# Patient Record
Sex: FEMALE | Race: WHITE | NOT HISPANIC OR LATINO | Employment: UNEMPLOYED | URBAN - METROPOLITAN AREA
[De-identification: names, ages, dates, MRNs, and addresses within clinical notes are randomized per-mention and may not be internally consistent; named-entity substitution may affect disease eponyms.]

---

## 2017-01-28 ENCOUNTER — HOSPITAL ENCOUNTER (EMERGENCY)
Facility: HOSPITAL | Age: 22
Discharge: HOME | End: 2017-01-28
Attending: EMERGENCY MEDICINE | Admitting: EMERGENCY MEDICINE

## 2017-01-28 ENCOUNTER — APPOINTMENT (OUTPATIENT)
Dept: GENERAL RADIOLOGY | Facility: HOSPITAL | Age: 22
End: 2017-01-28

## 2017-01-28 VITALS
WEIGHT: 168 LBS | DIASTOLIC BLOOD PRESSURE: 85 MMHG | HEIGHT: 69 IN | TEMPERATURE: 99.5 F | SYSTOLIC BLOOD PRESSURE: 138 MMHG | BODY MASS INDEX: 24.88 KG/M2 | OXYGEN SATURATION: 100 % | HEART RATE: 102 BPM | RESPIRATION RATE: 22 BRPM

## 2017-01-28 DIAGNOSIS — R06.02 SHORTNESS OF BREATH: Primary | ICD-10-CM

## 2017-01-28 DIAGNOSIS — R09.1 PLEURISY: ICD-10-CM

## 2017-01-28 DIAGNOSIS — F41.9 ANXIETY: ICD-10-CM

## 2017-01-28 PROCEDURE — 71020 HC CHEST PA AND LATERAL: CPT

## 2017-01-28 PROCEDURE — 99284 EMERGENCY DEPT VISIT MOD MDM: CPT

## 2017-01-28 RX ORDER — IBUPROFEN 200 MG
200 TABLET ORAL EVERY 6 HOURS PRN
COMMUNITY
End: 2018-04-27

## 2017-01-28 RX ORDER — MULTIVITAMIN
1 TABLET ORAL
COMMUNITY
End: 2018-04-27

## 2017-01-28 RX ORDER — HYDROCODONE BITARTRATE AND ACETAMINOPHEN 5; 325 MG/1; MG/1
1 TABLET ORAL EVERY 6 HOURS PRN
Qty: 12 TABLET | Refills: 0 | Status: SHIPPED | OUTPATIENT
Start: 2017-01-28 | End: 2018-04-27

## 2017-01-28 RX ORDER — HYDROCODONE BITARTRATE AND ACETAMINOPHEN 7.5; 325 MG/1; MG/1
1 TABLET ORAL ONCE
Status: COMPLETED | OUTPATIENT
Start: 2017-01-28 | End: 2017-01-28

## 2017-01-28 RX ORDER — ERGOCALCIFEROL 1.25 MG/1
50000 CAPSULE ORAL
COMMUNITY
End: 2018-04-27

## 2017-01-28 RX ORDER — MECLIZINE HCL 12.5 MG/1
12.5 TABLET ORAL 3 TIMES DAILY
COMMUNITY
Start: 2016-12-30 | End: 2018-04-27

## 2017-01-28 RX ORDER — FOLIC ACID 1 MG/1
1 TABLET ORAL
COMMUNITY
End: 2018-04-27

## 2017-01-28 RX ADMIN — HYDROCODONE BITARTRATE AND ACETAMINOPHEN 1 TABLET: 7.5; 325 TABLET ORAL at 20:48

## 2018-04-27 ENCOUNTER — OFFICE VISIT (OUTPATIENT)
Dept: FAMILY MEDICINE CLINIC | Facility: CLINIC | Age: 23
End: 2018-04-27

## 2018-04-27 ENCOUNTER — HOSPITAL ENCOUNTER (OUTPATIENT)
Dept: CT IMAGING | Facility: HOSPITAL | Age: 23
Discharge: HOME | End: 2018-04-27

## 2018-04-27 ENCOUNTER — HOSPITAL ENCOUNTER (OUTPATIENT)
Dept: CARDIOLOGY | Facility: HOSPITAL | Age: 23
Discharge: HOME | End: 2018-04-27
Admitting: NURSE PRACTITIONER

## 2018-04-27 VITALS
TEMPERATURE: 98.4 F | HEIGHT: 69 IN | BODY MASS INDEX: 25.92 KG/M2 | WEIGHT: 175 LBS | DIASTOLIC BLOOD PRESSURE: 88 MMHG | SYSTOLIC BLOOD PRESSURE: 152 MMHG

## 2018-04-27 DIAGNOSIS — M79.661 RIGHT CALF PAIN: Primary | ICD-10-CM

## 2018-04-27 DIAGNOSIS — R06.02 SOB (SHORTNESS OF BREATH): ICD-10-CM

## 2018-04-27 DIAGNOSIS — E55.9 VITAMIN D DEFICIENCY: ICD-10-CM

## 2018-04-27 DIAGNOSIS — D50.9 IRON DEFICIENCY ANEMIA, UNSPECIFIED IRON DEFICIENCY ANEMIA TYPE: ICD-10-CM

## 2018-04-27 LAB
25(OH)D3+25(OH)D2 SERPL-MCNC: 15.6 NG/ML (ref 30–100)
BASOPHILS # BLD AUTO: 0.01 10*3/MM3 (ref 0–0.2)
BASOPHILS NFR BLD AUTO: 0.3 % (ref 0–1.5)
BH CV LOWER VASCULAR LEFT COMMON FEMORAL AUGMENT: NORMAL
BH CV LOWER VASCULAR LEFT COMMON FEMORAL COMPETENT: NORMAL
BH CV LOWER VASCULAR LEFT COMMON FEMORAL COMPRESS: NORMAL
BH CV LOWER VASCULAR LEFT COMMON FEMORAL PHASIC: NORMAL
BH CV LOWER VASCULAR LEFT COMMON FEMORAL SPONT: NORMAL
BH CV LOWER VASCULAR RIGHT COMMON FEMORAL AUGMENT: NORMAL
BH CV LOWER VASCULAR RIGHT COMMON FEMORAL COMPETENT: NORMAL
BH CV LOWER VASCULAR RIGHT COMMON FEMORAL COMPRESS: NORMAL
BH CV LOWER VASCULAR RIGHT COMMON FEMORAL PHASIC: NORMAL
BH CV LOWER VASCULAR RIGHT COMMON FEMORAL SPONT: NORMAL
BH CV LOWER VASCULAR RIGHT DISTAL FEMORAL COMPRESS: NORMAL
BH CV LOWER VASCULAR RIGHT GASTRONEMIUS COMPRESS: NORMAL
BH CV LOWER VASCULAR RIGHT GREATER SAPH AK COMPRESS: NORMAL
BH CV LOWER VASCULAR RIGHT GREATER SAPH BK COMPRESS: NORMAL
BH CV LOWER VASCULAR RIGHT MID FEMORAL AUGMENT: NORMAL
BH CV LOWER VASCULAR RIGHT MID FEMORAL COMPETENT: NORMAL
BH CV LOWER VASCULAR RIGHT MID FEMORAL COMPRESS: NORMAL
BH CV LOWER VASCULAR RIGHT MID FEMORAL PHASIC: NORMAL
BH CV LOWER VASCULAR RIGHT MID FEMORAL SPONT: NORMAL
BH CV LOWER VASCULAR RIGHT PERONEAL COMPRESS: NORMAL
BH CV LOWER VASCULAR RIGHT POPLITEAL AUGMENT: NORMAL
BH CV LOWER VASCULAR RIGHT POPLITEAL COMPETENT: NORMAL
BH CV LOWER VASCULAR RIGHT POPLITEAL COMPRESS: NORMAL
BH CV LOWER VASCULAR RIGHT POPLITEAL PHASIC: NORMAL
BH CV LOWER VASCULAR RIGHT POPLITEAL SPONT: NORMAL
BH CV LOWER VASCULAR RIGHT POSTERIOR TIBIAL COMPRESS: NORMAL
BH CV LOWER VASCULAR RIGHT PROXIMAL FEMORAL COMPRESS: NORMAL
BH CV LOWER VASCULAR RIGHT SAPHENOFEMORAL JUNCTION AUGMENT: NORMAL
BH CV LOWER VASCULAR RIGHT SAPHENOFEMORAL JUNCTION COMPETENT: NORMAL
BH CV LOWER VASCULAR RIGHT SAPHENOFEMORAL JUNCTION COMPRESS: NORMAL
BH CV LOWER VASCULAR RIGHT SAPHENOFEMORAL JUNCTION PHASIC: NORMAL
BH CV LOWER VASCULAR RIGHT SAPHENOFEMORAL JUNCTION SPONT: NORMAL
EOSINOPHIL # BLD AUTO: 0.02 10*3/MM3 (ref 0–0.7)
EOSINOPHIL NFR BLD AUTO: 0.5 % (ref 0.3–6.2)
ERYTHROCYTE [DISTWIDTH] IN BLOOD BY AUTOMATED COUNT: 16.3 % (ref 11.7–13)
HCT VFR BLD AUTO: 37.7 % (ref 35.6–45.5)
HGB BLD-MCNC: 11.6 G/DL (ref 11.9–15.5)
IMM GRANULOCYTES # BLD: 0 10*3/MM3 (ref 0–0.03)
IMM GRANULOCYTES NFR BLD: 0 % (ref 0–0.5)
IRON SATN MFR SERPL: 6 % (ref 20–50)
IRON SERPL-MCNC: 31 MCG/DL (ref 37–145)
LYMPHOCYTES # BLD AUTO: 1.08 10*3/MM3 (ref 0.9–4.8)
LYMPHOCYTES NFR BLD AUTO: 28.9 % (ref 19.6–45.3)
MCH RBC QN AUTO: 25.3 PG (ref 26.9–32)
MCHC RBC AUTO-ENTMCNC: 30.8 G/DL (ref 32.4–36.3)
MCV RBC AUTO: 82.3 FL (ref 80.5–98.2)
MONOCYTES # BLD AUTO: 0.39 10*3/MM3 (ref 0.2–1.2)
MONOCYTES NFR BLD AUTO: 10.4 % (ref 5–12)
NEUTROPHILS # BLD AUTO: 2.24 10*3/MM3 (ref 1.9–8.1)
NEUTROPHILS NFR BLD AUTO: 59.9 % (ref 42.7–76)
PLATELET # BLD AUTO: 269 10*3/MM3 (ref 140–500)
RBC # BLD AUTO: 4.58 10*6/MM3 (ref 3.9–5.2)
TIBC SERPL-MCNC: 524 MCG/DL
UIBC SERPL-MCNC: 493 MCG/DL
WBC # BLD AUTO: 3.74 10*3/MM3 (ref 4.5–10.7)

## 2018-04-27 PROCEDURE — 71275 CT ANGIOGRAPHY CHEST: CPT

## 2018-04-27 PROCEDURE — 0 IOPAMIDOL PER 1 ML: Performed by: NURSE PRACTITIONER

## 2018-04-27 PROCEDURE — 99204 OFFICE O/P NEW MOD 45 MIN: CPT | Performed by: NURSE PRACTITIONER

## 2018-04-27 PROCEDURE — 93971 EXTREMITY STUDY: CPT

## 2018-04-27 RX ORDER — MULTIVITAMIN
1 TABLET ORAL DAILY
COMMUNITY
End: 2022-07-22

## 2018-04-27 RX ORDER — GABAPENTIN 800 MG/1
TABLET ORAL
COMMUNITY
Start: 2018-01-04 | End: 2020-11-24

## 2018-04-27 RX ORDER — IBUPROFEN 200 MG
TABLET ORAL
COMMUNITY
End: 2022-07-22

## 2018-04-27 RX ORDER — ONDANSETRON 4 MG/1
TABLET, ORALLY DISINTEGRATING ORAL
COMMUNITY
Start: 2018-02-14 | End: 2019-04-12 | Stop reason: SDUPTHER

## 2018-04-27 RX ADMIN — IOPAMIDOL 95 ML: 755 INJECTION, SOLUTION INTRAVENOUS at 18:03

## 2018-04-27 NOTE — PROGRESS NOTES
"Subjective   Henrietta Garrett is a 23 y.o. female.     History of Present Illness   Henrietta Garrett 23 y.o. female who presents today for a new patient appointment.    she has a history of   Patient Active Problem List   Diagnosis   • Right calf pain   • SOB (shortness of breath)   • Iron deficiency anemia   • Vitamin D deficiency   .  she is here to establish care I reviewed the PFSH recorded today by my MA/LPN staff.   she   She has been feeling anxious and poorly.    Patient's presenting to the office today with concerns over shortness of breath that she's had for a while but it has worsened in the last 2 weeks.  She is in a boot on her right foot being treated by an orthopedic issues had the boot on for 7 months.  She states that her shortness of breath probably started around that time also she's been having calf pain for about 2 months and that the amount of time at the shortness of breath is gotten worse.  She has not been traveling at all and no airplane she does not have any blood clotting disorders.  That she is sedentary.  She is involved in physical therapy and shrine to do more walking at home with her mom.  She gets very anxious she does state that she has a lot of stress but she believes that she's able to deal with her stress appropriately and she does not want to talk about any medications for that at this time.  She also has a history of iron deficiency anemia she does take vitamins that she's not had his level checked in quite a while.    The following portions of the patient's history were reviewed and updated as appropriate: allergies, current medications, past social history and problem list.    Review of Systems   Respiratory: Positive for shortness of breath.    Cardiovascular: Positive for chest pain.   All other systems reviewed and are negative.      Objective   /88 (BP Location: Right arm, Patient Position: Sitting)   Temp 98.4 °F (36.9 °C)   Ht 175.3 cm (69.02\")   Wt 79.4 kg (175 lb)  "  BMI 25.83 kg/m²   Physical Exam   Constitutional: She is oriented to person, place, and time. She appears well-developed and well-nourished. No distress.   HENT:   Head: Normocephalic and atraumatic. Hair is normal.   Right Ear: Hearing, tympanic membrane, external ear and ear canal normal. No drainage. No decreased hearing is noted.   Left Ear: Hearing, tympanic membrane, external ear and ear canal normal. No decreased hearing is noted.   Nose: No nasal deformity.   Mouth/Throat: Oropharynx is clear and moist.   Eyes: Conjunctivae, EOM and lids are normal. Pupils are equal, round, and reactive to light. Right eye exhibits no discharge. Left eye exhibits no discharge.   Neck: Normal range of motion. Neck supple. No JVD present. No tracheal deviation present. No thyromegaly present.   Cardiovascular: Normal rate, regular rhythm, normal heart sounds, intact distal pulses and normal pulses.  Exam reveals no gallop and no friction rub.    No murmur heard.  Pulmonary/Chest: Effort normal and breath sounds normal. No respiratory distress. She has no wheezes. She has no rales. She exhibits no tenderness.   Abdominal: Soft. Bowel sounds are normal. She exhibits no distension and no mass. There is no tenderness. There is no rebound and no guarding. No hernia.   Musculoskeletal: Normal range of motion. She exhibits no edema, tenderness or deformity.   Right calf pain with palpation.  No rope    Lymphadenopathy:     She has no cervical adenopathy.   Neurological: She is alert and oriented to person, place, and time. She has normal reflexes. She displays normal reflexes. No cranial nerve deficit. She exhibits normal muscle tone. Coordination normal.   Skin: Skin is warm and dry. No rash noted. She is not diaphoretic. No erythema.   Psychiatric: She has a normal mood and affect. Her behavior is normal. Judgment and thought content normal.   Vitals reviewed.      Assessment/Plan   Problem List Items Addressed This Visit         Respiratory    SOB (shortness of breath)    Relevant Orders    Duplex Venous Lower Extremity - Right CAR    CT angiogram chest w contrast       Digestive    Vitamin D deficiency    Relevant Orders    Vitamin D 25 Hydroxy       Nervous and Auditory    Right calf pain - Primary    Relevant Orders    Duplex Venous Lower Extremity - Right CAR    CT angiogram chest w contrast       Hematopoietic and Hemostatic    Iron deficiency anemia    Relevant Orders    CBC & Differential    Iron Profile      Other Visit Diagnoses    None.       I do believe that she is suffering from high anxiety and I do believe his shortness of breath probably has something to do with that but she is very determined not to talk about the anxiety and she does not want to be placed on a medication for the anxiety.  Considering her history of having a boot on consistently for the last 7 months and the shortness of breath and calf pain has increased in the last 2 months I believe that should rule out a DVT and possible PE.  Follow-up after testing.

## 2018-04-30 ENCOUNTER — TELEPHONE (OUTPATIENT)
Dept: FAMILY MEDICINE CLINIC | Facility: CLINIC | Age: 23
End: 2018-04-30

## 2018-05-02 RX ORDER — ERGOCALCIFEROL 1.25 MG/1
50000 CAPSULE ORAL WEEKLY
Qty: 12 CAPSULE | Refills: 3 | Status: SHIPPED | OUTPATIENT
Start: 2018-05-02 | End: 2020-11-24

## 2018-05-02 RX ORDER — IRON, FOLIC ACID, CYANOCOBALAMIN, ASCORBIC ACID, AND DOCUSATE SODIUM 90; 1; 12; 120; 50 MG/1; MG/1; UG/1; MG/1; MG/1
TABLET, FILM COATED ORAL
Qty: 90 EACH | Refills: 0 | Status: SHIPPED | OUTPATIENT
Start: 2018-05-02 | End: 2021-02-03

## 2018-05-04 ENCOUNTER — OFFICE VISIT (OUTPATIENT)
Dept: FAMILY MEDICINE CLINIC | Facility: CLINIC | Age: 23
End: 2018-05-04

## 2018-05-04 VITALS
HEIGHT: 69 IN | BODY MASS INDEX: 26.07 KG/M2 | OXYGEN SATURATION: 82 % | DIASTOLIC BLOOD PRESSURE: 82 MMHG | WEIGHT: 176 LBS | SYSTOLIC BLOOD PRESSURE: 124 MMHG

## 2018-05-04 DIAGNOSIS — R09.02 HYPOXIA: Primary | ICD-10-CM

## 2018-05-04 DIAGNOSIS — R06.02 SHORTNESS OF BREATH: ICD-10-CM

## 2018-05-04 DIAGNOSIS — J45.41 MODERATE PERSISTENT ASTHMA WITH EXACERBATION: ICD-10-CM

## 2018-05-04 PROBLEM — J45.990 EXERCISE-INDUCED ASTHMA: Status: ACTIVE | Noted: 2018-05-04

## 2018-05-04 PROBLEM — M32.9 LUPUS (SYSTEMIC LUPUS ERYTHEMATOSUS) (HCC): Status: ACTIVE | Noted: 2018-05-04

## 2018-05-04 PROBLEM — M79.661 RIGHT CALF PAIN: Status: RESOLVED | Noted: 2018-04-27 | Resolved: 2018-05-04

## 2018-05-04 PROBLEM — M06.9 RHEUMATOID ARTHRITIS INVOLVING MULTIPLE SITES: Status: ACTIVE | Noted: 2018-05-04

## 2018-05-04 PROBLEM — Q79.60 EHLERS-DANLOS SYNDROME: Status: ACTIVE | Noted: 2018-05-04

## 2018-05-04 PROBLEM — G90.523 COMPLEX REGIONAL PAIN SYNDROME TYPE 1 OF BOTH LOWER EXTREMITIES: Status: ACTIVE | Noted: 2018-05-04

## 2018-05-04 PROBLEM — K58.2 IRRITABLE BOWEL SYNDROME WITH BOTH CONSTIPATION AND DIARRHEA: Status: ACTIVE | Noted: 2018-05-04

## 2018-05-04 PROBLEM — I73.00 RAYNAUD'S DISEASE WITHOUT GANGRENE: Status: ACTIVE | Noted: 2018-05-04

## 2018-05-04 PROBLEM — G90.521 COMPLEX REGIONAL PAIN SYNDROME TYPE 1 OF RIGHT LOWER EXTREMITY: Status: ACTIVE | Noted: 2018-05-04

## 2018-05-04 PROCEDURE — 94640 AIRWAY INHALATION TREATMENT: CPT | Performed by: FAMILY MEDICINE

## 2018-05-04 PROCEDURE — 99214 OFFICE O/P EST MOD 30 MIN: CPT | Performed by: FAMILY MEDICINE

## 2018-05-04 PROCEDURE — 96372 THER/PROPH/DIAG INJ SC/IM: CPT | Performed by: FAMILY MEDICINE

## 2018-05-04 RX ORDER — ALBUTEROL SULFATE 90 UG/1
2 AEROSOL, METERED RESPIRATORY (INHALATION) EVERY 4 HOURS PRN
Qty: 18 G | Refills: 11 | Status: SHIPPED | OUTPATIENT
Start: 2018-05-04 | End: 2022-07-22

## 2018-05-04 RX ORDER — METHYLPREDNISOLONE SODIUM SUCCINATE 125 MG/2ML
125 INJECTION, POWDER, LYOPHILIZED, FOR SOLUTION INTRAMUSCULAR; INTRAVENOUS ONCE
Status: COMPLETED | OUTPATIENT
Start: 2018-05-04 | End: 2018-05-04

## 2018-05-04 RX ORDER — AMITRIPTYLINE HYDROCHLORIDE 50 MG/1
50 TABLET, FILM COATED ORAL NIGHTLY
COMMUNITY
End: 2019-05-21

## 2018-05-04 RX ORDER — IPRATROPIUM BROMIDE AND ALBUTEROL SULFATE 2.5; .5 MG/3ML; MG/3ML
3 SOLUTION RESPIRATORY (INHALATION) ONCE
Status: COMPLETED | OUTPATIENT
Start: 2018-05-04 | End: 2018-05-04

## 2018-05-04 RX ADMIN — IPRATROPIUM BROMIDE AND ALBUTEROL SULFATE 3 ML: 2.5; .5 SOLUTION RESPIRATORY (INHALATION) at 14:25

## 2018-05-04 RX ADMIN — METHYLPREDNISOLONE SODIUM SUCCINATE 125 MG: 125 INJECTION, POWDER, LYOPHILIZED, FOR SOLUTION INTRAMUSCULAR; INTRAVENOUS at 14:25

## 2018-05-04 NOTE — PROGRESS NOTES
Henrietta Garrett is a 23 y.o. female.     Chief Complaint   Patient presents with   • Shortness of Breath     pt in in pain lower right leg shoulder and is having chest pain and pressure on chets       HPI     Patient is a very pleasant 23-year-old female with a past medical history of Tomas-Danlos syndrome, rheumatoid arthritis involving multiple joints, Raynaud's disease with poor control, almost constant symptoms of the distal digits, iron deficiency anemia, complex regional pain syndrome of the right extremity managed by pain management, irritable bowel syndrome, fibromyalgia, and shortness of breath for the past few months.    SOA: She has been short of breath intermently throughout her life .  She would be swimming and told that she looked like a dying seal.  She was diagnosed with exercise induced asthma - it did help some.  Now she has difficulty with exertion, walking up stair and small things.  No SOA at rest.  It does improve with her inhaler.  She does get a nocturnal cough and coughs up phlegm, chest pressure and congestion.     The following portions of the patient's history were reviewed and updated as appropriate: allergies, current medications, past family history, past medical history, past social history, past surgical history and problem list.    Review of Systems   Constitutional: Negative for fever and unexpected weight change.   HENT: Negative for dental problem.    Respiratory: Positive for cough, chest tightness and shortness of breath.    Cardiovascular: Negative for chest pain.   Gastrointestinal: Negative for blood in stool.   Genitourinary: Negative for dysuria.   Musculoskeletal:        Lower right leg pain.left shoulder and chest pressure   Skin: Negative for rash.   Allergic/Immunologic: Negative for environmental allergies.   Neurological: Negative for syncope.   Psychiatric/Behavioral: The patient is not nervous/anxious.        Objective  Vitals:    05/04/18 1235   BP: 124/82    SpO2: (!) 82%        Physical Exam   Constitutional: She is oriented to person, place, and time. She appears well-developed and well-nourished.   Ill-appearing, pale and weak.  Not able to speak a full set without taking a breath   HENT:   Head: Normocephalic.   Nose: Nose normal.   Eyes: EOM are normal.   Cardiovascular: Normal rate, regular rhythm, normal heart sounds and intact distal pulses.    No murmur heard.  Pulmonary/Chest:   Patient is working hard to breathe, one deep inhalation every 10-15 seconds.  She is not tachypneic.  Breath sounds are minimal, very tight.  No wheezing.   Musculoskeletal: Normal range of motion.   Neurological: She is alert and oriented to person, place, and time.   Skin: Skin is warm and dry. No rash noted.   Psychiatric: She has a normal mood and affect. Her behavior is normal. Judgment and thought content normal.   Nursing note and vitals reviewed.        Current Outpatient Prescriptions:   •  amitriptyline (ELAVIL) 50 MG tablet, Take 50 mg by mouth Every Night., Disp: , Rfl:   •  Fe Cbn-Fe Gluc-FA-B12-C-DSS (FERRALET 90) 90-1 MG tablet, Take one tablet daily, Disp: 90 each, Rfl: 0  •  gabapentin (NEURONTIN) 800 MG tablet, TAKE ONE TABLET BY MOUTH FOUR TIMES A DAY, Disp: , Rfl:   •  ibuprofen (ADVIL,MOTRIN) 200 MG tablet, Take  by mouth., Disp: , Rfl:   •  Multiple Vitamin (MULTIVITAMIN) tablet, Take 1 tablet by mouth., Disp: , Rfl:   •  ondansetron ODT (ZOFRAN-ODT) 4 MG disintegrating tablet, , Disp: , Rfl:   •  SPRINTEC 28 0.25-35 MG-MCG per tablet, , Disp: , Rfl:   •  albuterol (PROVENTIL HFA;VENTOLIN HFA) 108 (90 Base) MCG/ACT inhaler, Inhale 2 puffs Every 4 (Four) Hours As Needed for Wheezing., Disp: 18 g, Rfl: 11  •  Fluticasone Furoate 200 MCG/ACT aerosol powder , Inhale 2 puffs Daily., Disp: 30 each, Rfl: 11  •  LYRICA 75 MG capsule, Take 75 mg by mouth 2 (Two) Times a Day., Disp: , Rfl:   •  vitamin D (ERGOCALCIFEROL) 71096 units capsule capsule, Take 1 capsule by  mouth 1 (One) Time Per Week., Disp: 12 capsule, Rfl: 3  No current facility-administered medications for this visit.     Procedures    Lab Results (most recent)     None              Henrietta was seen today for shortness of breath.    Diagnoses and all orders for this visit:    Hypoxia  -     methylPREDNISolone sodium succinate (SOLU-Medrol) injection 125 mg; Inject 2 mL into the shoulder, thigh, or buttocks 1 (One) Time.  -     ipratropium-albuterol (DUO-NEB) nebulizer solution 3 mL; Take 3 mL by nebulization 1 (One) Time.    Moderate persistent asthma with exacerbation  -     methylPREDNISolone sodium succinate (SOLU-Medrol) injection 125 mg; Inject 2 mL into the shoulder, thigh, or buttocks 1 (One) Time.  -     ipratropium-albuterol (DUO-NEB) nebulizer solution 3 mL; Take 3 mL by nebulization 1 (One) Time.  -     albuterol (PROVENTIL HFA;VENTOLIN HFA) 108 (90 Base) MCG/ACT inhaler; Inhale 2 puffs Every 4 (Four) Hours As Needed for Wheezing.  -     Fluticasone Furoate 200 MCG/ACT aerosol powder ; Inhale 2 puffs Daily.    Shortness of breath  -     methylPREDNISolone sodium succinate (SOLU-Medrol) injection 125 mg; Inject 2 mL into the shoulder, thigh, or buttocks 1 (One) Time.  -     ipratropium-albuterol (DUO-NEB) nebulizer solution 3 mL; Take 3 mL by nebulization 1 (One) Time.      Patient is a pleasant 23-year-old female with a comp.  It medical history, see HPI.  She is a new patient to me.  Presents today with 2 months of shortness of breath, I think is secondary to an asthma exacerbation.  She was hypoxic initially at 64 - difficult to get a pulse ox with severe Raynaud's of all her digits.  Initially, I thought it was an inaccurate pulse ox.  However we started her on oxygen and her O2 sat improved to 74.  We then started doing nebs and Solu-Medrol 125mg IM and oxygen improved to 85 on 4 L NC.  I had encouraged the patient throughout the office visit today for her to go emergently to the emergency room.  She  "was very resistant as she hates going to the hospital.  Was able to speak her mother over the phone and we came to an agreement for her to go to the emergency room by private car.  She is aware of the risk of death and permanent disability with not going by ambulance as she has severe hypoxia without oxygen.  She did feel significantly better - \"like a new person\" - after getting the Duonebs and oxygen.    Return in about 1 week (around 5/11/2018), or if symptoms worsen or fail to improve, for Please go to the ER immedietly, FU with me thereafter .      Angie Leung MD  "

## 2018-11-09 ENCOUNTER — TELEPHONE (OUTPATIENT)
Dept: GENERAL RADIOLOGY | Facility: HOSPITAL | Age: 23
End: 2018-11-09

## 2018-11-09 ENCOUNTER — LAB (OUTPATIENT)
Dept: OTHER | Facility: HOSPITAL | Age: 23
End: 2018-11-09

## 2018-11-09 ENCOUNTER — CONSULT (OUTPATIENT)
Dept: ONCOLOGY | Facility: CLINIC | Age: 23
End: 2018-11-09

## 2018-11-09 VITALS
OXYGEN SATURATION: 94 % | TEMPERATURE: 98 F | HEART RATE: 58 BPM | WEIGHT: 174.5 LBS | HEIGHT: 70 IN | DIASTOLIC BLOOD PRESSURE: 96 MMHG | RESPIRATION RATE: 16 BRPM | SYSTOLIC BLOOD PRESSURE: 144 MMHG | BODY MASS INDEX: 24.98 KG/M2

## 2018-11-09 DIAGNOSIS — D72.9 ABNORMAL WHITE BLOOD CELL (WBC) COUNT: Primary | ICD-10-CM

## 2018-11-09 DIAGNOSIS — R06.02 SOB (SHORTNESS OF BREATH): ICD-10-CM

## 2018-11-09 DIAGNOSIS — C95.90 LEUKEMIA NOT HAVING ACHIEVED REMISSION, UNSPECIFIED LEUKEMIA TYPE (HCC): Primary | ICD-10-CM

## 2018-11-09 DIAGNOSIS — R07.89 CHEST WALL PAIN: ICD-10-CM

## 2018-11-09 DIAGNOSIS — R04.2 HEMOPTYSIS: ICD-10-CM

## 2018-11-09 DIAGNOSIS — D50.9 IRON DEFICIENCY ANEMIA, UNSPECIFIED IRON DEFICIENCY ANEMIA TYPE: ICD-10-CM

## 2018-11-09 PROBLEM — T45.4X5A ADVERSE EFFECT OF IRON: Status: ACTIVE | Noted: 2018-11-09

## 2018-11-09 PROBLEM — E53.8 B12 DEFICIENCY: Status: ACTIVE | Noted: 2018-11-09

## 2018-11-09 LAB
ALBUMIN SERPL-MCNC: 4.5 G/DL (ref 3.5–5.2)
ALBUMIN/GLOB SERPL: 1 G/DL
ALP SERPL-CCNC: 45 U/L (ref 39–117)
ALT SERPL W P-5'-P-CCNC: 7 U/L (ref 1–33)
ANION GAP SERPL CALCULATED.3IONS-SCNC: 12.3 MMOL/L
AST SERPL-CCNC: 12 U/L (ref 1–32)
BASOPHILS # BLD AUTO: 0.01 10*3/MM3 (ref 0–0.2)
BASOPHILS NFR BLD AUTO: 0.2 % (ref 0–1.5)
BILIRUB SERPL-MCNC: 0.3 MG/DL (ref 0.1–1.2)
BUN BLD-MCNC: 8 MG/DL (ref 6–20)
BUN/CREAT SERPL: 10.4 (ref 7–25)
CALCIUM SPEC-SCNC: 10.1 MG/DL (ref 8.6–10.5)
CHLORIDE SERPL-SCNC: 102 MMOL/L (ref 98–107)
CO2 SERPL-SCNC: 24.7 MMOL/L (ref 22–29)
CREAT BLD-MCNC: 0.77 MG/DL (ref 0.57–1)
DEPRECATED RDW RBC AUTO: 41.7 FL (ref 37–54)
EOSINOPHIL # BLD AUTO: 0.02 10*3/MM3 (ref 0–0.7)
EOSINOPHIL NFR BLD AUTO: 0.4 % (ref 0.3–6.2)
ERYTHROCYTE [DISTWIDTH] IN BLOOD BY AUTOMATED COUNT: 14.6 % (ref 11.7–13)
FERRITIN SERPL-MCNC: 9.1 NG/ML (ref 13–150)
GFR SERPL CREATININE-BSD FRML MDRD: 93 ML/MIN/1.73
GLOBULIN UR ELPH-MCNC: 4.6 GM/DL
GLUCOSE BLD-MCNC: 102 MG/DL (ref 65–99)
HCT VFR BLD AUTO: 35.5 % (ref 35.6–45.5)
HGB BLD-MCNC: 11.9 G/DL (ref 11.9–15.5)
HGB RETIC QN: 30.4 PG (ref 32.7–38.6)
IMM GRANULOCYTES # BLD: 0.01 10*3/MM3 (ref 0–0.03)
IMM GRANULOCYTES NFR BLD: 0.2 % (ref 0–0.5)
IMM RETICS NFR: 13.5 % (ref 0.7–13.7)
IRON 24H UR-MRATE: 42 MCG/DL (ref 37–145)
IRON SATN MFR SERPL: 8 % (ref 20–50)
LYMPHOCYTES # BLD AUTO: 1.25 10*3/MM3 (ref 0.9–4.8)
LYMPHOCYTES NFR BLD AUTO: 26.9 % (ref 19.6–45.3)
MCH RBC QN AUTO: 26.4 PG (ref 26.9–32)
MCHC RBC AUTO-ENTMCNC: 33.5 G/DL (ref 32.4–36.3)
MCV RBC AUTO: 78.7 FL (ref 80.5–98.2)
MONOCYTES # BLD AUTO: 0.69 10*3/MM3 (ref 0.2–1.2)
MONOCYTES NFR BLD AUTO: 14.9 % (ref 5–12)
NEUTROPHILS # BLD AUTO: 2.66 10*3/MM3 (ref 1.9–8.1)
NEUTROPHILS NFR BLD AUTO: 57.4 % (ref 42.7–76)
NRBC BLD MANUAL-RTO: 0 /100 WBC (ref 0–0)
PLATELET # BLD AUTO: 218 10*3/MM3 (ref 140–500)
PMV BLD AUTO: 10.6 FL (ref 6–12)
POTASSIUM BLD-SCNC: 3.8 MMOL/L (ref 3.5–5.2)
PROT SERPL-MCNC: 9.1 G/DL (ref 6–8.5)
RBC # BLD AUTO: 4.51 10*6/MM3 (ref 3.9–5.2)
RETICS/RBC NFR AUTO: 0.99 % (ref 0.5–1.5)
SODIUM BLD-SCNC: 139 MMOL/L (ref 136–145)
TIBC SERPL-MCNC: 495 MCG/DL (ref 298–536)
TRANSFERRIN SERPL-MCNC: 332 MG/DL (ref 200–360)
VIT B12 BLD-MCNC: 154 PG/ML (ref 211–946)
WBC NRBC COR # BLD: 4.64 10*3/MM3 (ref 4.5–10.7)

## 2018-11-09 PROCEDURE — 80053 COMPREHEN METABOLIC PANEL: CPT | Performed by: INTERNAL MEDICINE

## 2018-11-09 PROCEDURE — 84466 ASSAY OF TRANSFERRIN: CPT | Performed by: INTERNAL MEDICINE

## 2018-11-09 PROCEDURE — 85014 HEMATOCRIT: CPT | Performed by: INTERNAL MEDICINE

## 2018-11-09 PROCEDURE — 85046 RETICYTE/HGB CONCENTRATE: CPT | Performed by: INTERNAL MEDICINE

## 2018-11-09 PROCEDURE — 99245 OFF/OP CONSLTJ NEW/EST HI 55: CPT | Performed by: INTERNAL MEDICINE

## 2018-11-09 PROCEDURE — 82607 VITAMIN B-12: CPT | Performed by: INTERNAL MEDICINE

## 2018-11-09 PROCEDURE — 82747 ASSAY OF FOLIC ACID RBC: CPT | Performed by: INTERNAL MEDICINE

## 2018-11-09 PROCEDURE — 83540 ASSAY OF IRON: CPT | Performed by: INTERNAL MEDICINE

## 2018-11-09 PROCEDURE — 82728 ASSAY OF FERRITIN: CPT | Performed by: INTERNAL MEDICINE

## 2018-11-09 PROCEDURE — 85025 COMPLETE CBC W/AUTO DIFF WBC: CPT | Performed by: INTERNAL MEDICINE

## 2018-11-09 PROCEDURE — 36415 COLL VENOUS BLD VENIPUNCTURE: CPT

## 2018-11-09 RX ORDER — SODIUM CHLORIDE 9 MG/ML
250 INJECTION, SOLUTION INTRAVENOUS ONCE
Status: CANCELLED | OUTPATIENT
Start: 2018-11-27

## 2018-11-09 RX ORDER — CYANOCOBALAMIN 1000 UG/ML
1000 INJECTION, SOLUTION INTRAMUSCULAR; SUBCUTANEOUS ONCE
Status: CANCELLED
Start: 2018-11-13 | End: 2018-11-13

## 2018-11-09 RX ORDER — SODIUM CHLORIDE 9 MG/ML
250 INJECTION, SOLUTION INTRAVENOUS ONCE
Status: CANCELLED | OUTPATIENT
Start: 2018-11-13

## 2018-11-09 RX ORDER — CYANOCOBALAMIN 1000 UG/ML
1000 INJECTION, SOLUTION INTRAMUSCULAR; SUBCUTANEOUS ONCE
Status: CANCELLED
Start: 2018-11-27 | End: 2018-11-20

## 2018-11-09 NOTE — PROGRESS NOTES
REFERRING PROVIDER:    Lorraine Terrazas MD  [id number]  [address] [address]    REASON FOR CONSULTATION:    1.  Concern for leukemia  2.  History of mild leukopenia  3.  History of mild microcytic anemia    HISTORY OF PRESENT ILLNESS:  Henrietta Garrett is a 23 y.o. female who is referred today for further evaluation of the above issues.    She has a complicated medical history including Raynaud's, rheumatoid arthritis, Tomas-Danlos syndrome, complex regional pain syndrome, and POTS.      She requires a left shoulder surgery.  She has been seeing Dr. Terrazas.          She had a CT angiogram of the chest on 4/27/2018 for chest pain that did not indicate any pulmonary embolism or pneumothorax but did show a 1.3 x 2.1 cm left axillary lymph node.  No further evaluation for this was performed at that time.  She had a normal right lower extremity venous duplex also on 4/27/2018.    She has a long history of iron deficiency anemia.  She has been on an iron supplement once daily for many years.    She has been seeing Dr. Molina with rheumatology but has not seen him recently.    She complains of pain extending from the left axilla through the left anterior chest wall and left breast.  This has been going on for many months.  Over the past month she has had hemoptysis.  This occurred about 3 or 4 times.  She describes this as blood streaks.  She has not had this for about 1 week.  This seems to be in association with orthostatic symptoms when she stands for long periods of time.  She feels lightheaded and nauseous.  She did meter has near-syncope or syncope.  She has coughed up blood on several of these occasions.  This does occur a lot in the shower when she is standing for long periods of time.  Again, she has a long history of POTS but has not had syncope in the recently.  This has not been formally evaluated.    She complains of low-grade fevers ongoing for a couple of months to 100.0°F.  She reports  excessive fatigue.  She has night sweats.  She has cold sensitivity.  She has heat intolerance.  She complains of skin changes.  She has poor vision.  She complains of shortness of breath.  She complains of chest pain and palpitations.  Every gastrointestinal review of system is positive.  She complains of abnormal urination and states that she sometimes goes a day or more without urinating.  She complains of constipation and sometimes goes more than 1 day without bowel movements.  She complains of diffuse arthralgias and back pain.  She has chronic headaches and paresthesias.    She has been unable to palpate the left axillary lymphadenopathy.  She does admit to heavy periods.  She denies any bleeding otherwise outside of the scant hemoptysis on a few occasions.  She does have easy bruising secondary to her connective tissue disorder.    Past Medical History:   Diagnosis Date   • Anemia    • Arthritis     RHEUMATOID   • CPRS 1 (complex regional pain syndrome I) of upper limb    • Depression    • EDS (Tomas-Danlos syndrome)    • Fibromyalgia 2015   • History of hyperkeratosis of skin    • IBS (irritable bowel syndrome)    • Raynauds syndrome    • SOB (shortness of breath)        Past Surgical History:   Procedure Laterality Date   • DENTAL PROCEDURE     • EPIDURAL BLOCK     • SHOULDER SURGERY Bilateral     X2   • TOE SURGERY Right 2011    3rd toe        SOCIAL HISTORY:   reports that she has never smoked. She has never used smokeless tobacco. She reports that she does not drink alcohol or use drugs.    FAMILY HISTORY:  family history includes Arthritis in her maternal uncle; Asthma in her brother and sister; Cancer in her maternal uncle and paternal grandmother; Diabetes in her maternal grandfather; Diabetes type II in her father; Tomas-Danlos syndrome in her sister; Heart disease in her maternal grandmother; Hyperlipidemia in her father; Leukemia in her maternal grandmother; Migraines in her sister; No Known  Problems in her brother; Prostate cancer in her maternal grandfather; Rheum arthritis in her maternal grandmother and mother; Stroke in her maternal grandfather and maternal grandmother.  One uncle had head and neck cancer.  Grandfather had prostate cancer.  Grandmother had leukemia.  Another grandmother had colon cancer.    ALLERGIES:  Allergies   Allergen Reactions   • Anesthetics, Amide Nausea And Vomiting   • Metoclopramide Nausea And Vomiting       MEDICATIONS:  The medication list has been reviewed with the patient by the medical assistant, and the list has been updated in the electronic medical record, which I reviewed.  Medication dosages and frequencies were confirmed to be accurate.    Review of Systems   Constitutional: Positive for chills, diaphoresis, fatigue and fever. Negative for appetite change and unexpected weight change.   HENT: Negative for congestion, dental problem, facial swelling, hearing loss, mouth sores, nosebleeds, rhinorrhea, sore throat, tinnitus, trouble swallowing and voice change.    Eyes: Positive for visual disturbance.   Respiratory: Positive for cough, chest tightness, shortness of breath and wheezing. Negative for stridor.    Cardiovascular: Positive for chest pain, palpitations and leg swelling.   Gastrointestinal: Positive for abdominal pain, constipation and nausea. Negative for abdominal distention, blood in stool and vomiting.   Endocrine: Positive for cold intolerance and heat intolerance.   Genitourinary: Positive for difficulty urinating. Negative for dysuria, flank pain, frequency, hematuria, menstrual problem, pelvic pain, vaginal bleeding and vaginal discharge.   Musculoskeletal: Positive for arthralgias, back pain, joint swelling, myalgias and neck pain. Negative for neck stiffness.   Skin: Positive for rash. Negative for color change and wound.   Allergic/Immunologic: Negative for environmental allergies.   Neurological: Positive for dizziness, syncope, numbness  "and headaches. Negative for seizures and weakness.   Hematological: Positive for adenopathy. Does not bruise/bleed easily.   Psychiatric/Behavioral: Negative for agitation, confusion and sleep disturbance. The patient is not nervous/anxious.    All other systems reviewed and are negative.      Vitals:    11/09/18 0815   BP: 144/96   Pulse: 58   Resp: 16   Temp: 98 °F (36.7 °C)   TempSrc: Oral   SpO2: 94%   Weight: 79.2 kg (174 lb 8 oz)   Height: 177.8 cm (70\")   PainSc:   8   PainLoc: Generalized       Physical Exam   Constitutional: She is oriented to person, place, and time. She appears well-developed and well-nourished.   HENT:   Head: Normocephalic and atraumatic.   Nose: Nose normal.   Eyes: Pupils are equal, round, and reactive to light. Conjunctivae and EOM are normal.   Neck: Neck supple.   Cardiovascular: Normal rate, regular rhythm, S1 normal, S2 normal and normal heart sounds.  Exam reveals no gallop and no friction rub.    No murmur heard.  Pulmonary/Chest: Effort normal and breath sounds normal. No stridor. No respiratory distress. She has no wheezes. She has no rhonchi. She has no rales. She exhibits no tenderness.   Abdominal: Soft. Bowel sounds are normal. She exhibits no distension and no mass. There is tenderness in the epigastric area. There is no rebound and no guarding.   Musculoskeletal: Normal range of motion. She exhibits no edema.   Tenderness in both axillary areas seemingly greater on the left   Lymphadenopathy:     She has no cervical adenopathy.     She has no axillary adenopathy.        Right: No inguinal and no supraclavicular adenopathy present.        Left: No inguinal and no supraclavicular adenopathy present.   Neurological: She is alert and oriented to person, place, and time. No cranial nerve deficit or sensory deficit.   Skin: Skin is warm and dry. No rash noted. No erythema.   Psychiatric: She has a normal mood and affect. Her behavior is normal. Judgment and thought content " normal.   Vitals reviewed.      DIAGNOSTIC DATA:  Results for orders placed or performed in visit on 11/09/18   CBC Auto Differential   Result Value Ref Range    WBC 4.64 4.50 - 10.70 10*3/mm3    RBC 4.51 3.90 - 5.20 10*6/mm3    Hemoglobin 11.9 11.9 - 15.5 g/dL    Hematocrit 35.5 (L) 35.6 - 45.5 %    MCV 78.7 (L) 80.5 - 98.2 fL    MCH 26.4 (L) 26.9 - 32.0 pg    MCHC 33.5 32.4 - 36.3 g/dL    RDW 14.6 (H) 11.7 - 13.0 %    RDW-SD 41.7 37.0 - 54.0 fl    MPV 10.6 6.0 - 12.0 fL    Platelets 218 140 - 500 10*3/mm3    Neutrophil % 57.4 42.7 - 76.0 %    Lymphocyte % 26.9 19.6 - 45.3 %    Monocyte % 14.9 (H) 5.0 - 12.0 %    Eosinophil % 0.4 0.3 - 6.2 %    Basophil % 0.2 0.0 - 1.5 %    Immature Grans % 0.2 0.0 - 0.5 %    Neutrophils, Absolute 2.66 1.90 - 8.10 10*3/mm3    Lymphocytes, Absolute 1.25 0.90 - 4.80 10*3/mm3    Monocytes, Absolute 0.69 0.20 - 1.20 10*3/mm3    Eosinophils, Absolute 0.02 0.00 - 0.70 10*3/mm3    Basophils, Absolute 0.01 0.00 - 0.20 10*3/mm3    Immature Grans, Absolute 0.01 0.00 - 0.03 10*3/mm3    nRBC 0.0 0.0 - 0.0 /100 WBC       IMAGING:    I personally reviewed her peripheral blood smear.  There is some mild anisocytosis and poikilocytosis present.  White blood cells appear normal in maturation and distribution.  Platelets are adequate in number and normal in morphology.    ASSESSMENT:  This is a 23 y.o. female with:  1.  History of mild leukopenia: Her white blood cell count is at the lower limit of normal today.  Her differential appears normal.  Nonetheless, there has been concern for leukemia.  I don't see any evidence for this today.  Nonetheless, we will send peripheral blood flow cytometry for further evaluation as she has had a history of leukopenia in the past.  2.  Microcytosis: She has a history of iron deficiency anemia.  She is on an iron supplement once daily.  She may not be absorbing this well.  She is not anemic.  She may still be iron deficient.  We will send labs today.  She may  require intravenous iron as she is already very constipated on her current oral iron supplement.  3.  Left axillary adenopathy: I was not able to palpate a lymph node today but she was very tender in this area and the examination was somewhat limited.  Because of this and other respiratory symptoms including hemoptysis and chest pain, we will obtain another CT scan of her chest for further evaluation.  If there remains an enlarged left axillary lymph node, we will consider an ultrasound-guided biopsy.  4.  History of Tomas Danlos syndrome, Raynaud's phenomenon, POTS, complex regional pain syndrome previously seen by Dr. Molina with rheumatology.  It might benefit her to be seen at a tertiary care center since her situation is very complicated.  5.  Hemoptysis: Etiology unclear.  CT scan of the chest.  6.  Syncope: Likely related to the POTS but she states that she has been known to have this condition for a long time and never had syncope until recently.  She might benefit from a cardiac evaluation.  Again, she might benefit from an evaluation at tertiary care center.  7.  Left shoulder pain with surgery apparently required: She follows with Dr. Terrazas    PLAN:   1.  Return to the lab today for flow cytometry, reticulocyte count, ferritin, iron profile, vitamin B12 level, RBC folate level.  2.  CT scan of the chest with contrast  3.  I will see her back in the next couple of weeks for follow-up with a CBC    ADDENDUM:  B12 low at 154  Ferritin low at 9.10  Iron 42 with 8% iron saturation  Left msg on voicemail to call me regarding the results.      I spoke with her on the phone.  She is interested in Injectafer and vitamin B12 injections.  Will schedule.

## 2018-11-09 NOTE — PROGRESS NOTES
Message rec'd from Dr Skinner for injectafer orders and B12 x 2 doses each. Plan entered as per ordered.

## 2018-11-12 ENCOUNTER — DOCUMENTATION (OUTPATIENT)
Dept: ONCOLOGY | Facility: CLINIC | Age: 23
End: 2018-11-12

## 2018-11-12 LAB
FOLATE BLD-MCNC: 288.8 NG/ML
FOLATE RBC-MCNC: 807 NG/ML
HCT VFR BLD AUTO: 35.8 % (ref 34–46.6)

## 2018-11-12 NOTE — PROGRESS NOTES
OSW initiated a phone call to the pt today ([phone])  to address her Distress Questionnare completed on 11/9/18 on which she scored 9/10, marking a host of emotional and physical concerns The pt came to EP for an initial medical oncology consultation with Dr. Skinner regarding concern for leukemia diagnosis. The pt has a follow-up scheduled with Dr. Skinner on 11/30/18.    OSW left a voicemail at 9:45 a.m. requesting a callback.    OSW remains available as needs arise.    Stephany Rogers, [facility]  Oncology Social Worker

## 2018-11-13 ENCOUNTER — APPOINTMENT (OUTPATIENT)
Dept: ONCOLOGY | Facility: HOSPITAL | Age: 23
End: 2018-11-13

## 2018-11-13 LAB — REF LAB TEST METHOD: NORMAL

## 2018-11-14 ENCOUNTER — INFUSION (OUTPATIENT)
Dept: ONCOLOGY | Facility: HOSPITAL | Age: 23
End: 2018-11-14

## 2018-11-14 VITALS
TEMPERATURE: 97.5 F | WEIGHT: 173 LBS | HEART RATE: 80 BPM | BODY MASS INDEX: 24.77 KG/M2 | SYSTOLIC BLOOD PRESSURE: 141 MMHG | HEIGHT: 70 IN | OXYGEN SATURATION: 98 % | RESPIRATION RATE: 18 BRPM | DIASTOLIC BLOOD PRESSURE: 89 MMHG

## 2018-11-14 DIAGNOSIS — E53.8 B12 DEFICIENCY: ICD-10-CM

## 2018-11-14 DIAGNOSIS — T45.4X5A ADVERSE EFFECT OF IRON, INITIAL ENCOUNTER: Primary | ICD-10-CM

## 2018-11-14 DIAGNOSIS — D50.9 IRON DEFICIENCY ANEMIA, UNSPECIFIED IRON DEFICIENCY ANEMIA TYPE: ICD-10-CM

## 2018-11-14 PROCEDURE — 25010000002 CYANOCOBALAMIN PER 1000 MCG: Performed by: INTERNAL MEDICINE

## 2018-11-14 PROCEDURE — 96372 THER/PROPH/DIAG INJ SC/IM: CPT | Performed by: NURSE PRACTITIONER

## 2018-11-14 PROCEDURE — 96374 THER/PROPH/DIAG INJ IV PUSH: CPT | Performed by: NURSE PRACTITIONER

## 2018-11-14 PROCEDURE — 25010000002 FERRIC CARBOXYMALTOSE 750 MG/15ML SOLUTION 15 ML VIAL: Performed by: INTERNAL MEDICINE

## 2018-11-14 RX ORDER — SODIUM CHLORIDE 9 MG/ML
250 INJECTION, SOLUTION INTRAVENOUS ONCE
Status: COMPLETED | OUTPATIENT
Start: 2018-11-14 | End: 2018-11-14

## 2018-11-14 RX ORDER — CYANOCOBALAMIN 1000 UG/ML
1000 INJECTION, SOLUTION INTRAMUSCULAR; SUBCUTANEOUS ONCE
Status: COMPLETED | OUTPATIENT
Start: 2018-11-14 | End: 2018-11-14

## 2018-11-14 RX ADMIN — SODIUM CHLORIDE 250 ML: 900 INJECTION, SOLUTION INTRAVENOUS at 08:49

## 2018-11-14 RX ADMIN — FERRIC CARBOXYMALTOSE INJECTION 750 MG: 50 INJECTION, SOLUTION INTRAVENOUS at 08:49

## 2018-11-14 RX ADMIN — CYANOCOBALAMIN 1000 MCG: 1000 INJECTION, SOLUTION INTRAMUSCULAR at 08:30

## 2018-11-19 ENCOUNTER — DOCUMENTATION (OUTPATIENT)
Dept: ONCOLOGY | Facility: CLINIC | Age: 23
End: 2018-11-19

## 2018-11-19 NOTE — PROGRESS NOTES
OSW initiated a phone call to the pt today ([phone])  to address her Distress Questionnare completed on 11/9/18 on which she scored 9/10, marking a host of emotional and physical concerns The pt came to EP for an initial medical oncology consultation with Dr. Skinner regarding concern for leukemia diagnosis. The pt has a follow-up scheduled with Dr. Skinner on 11/30/18.     OSW left a voicemail at 11:25 a.m. requesting a callback.     OSW remains available as needs arise.     Stephany Rogers, [facility]  Oncology Social Worker

## 2018-11-20 ENCOUNTER — APPOINTMENT (OUTPATIENT)
Dept: ONCOLOGY | Facility: HOSPITAL | Age: 23
End: 2018-11-20

## 2018-11-26 ENCOUNTER — HOSPITAL ENCOUNTER (OUTPATIENT)
Dept: CT IMAGING | Facility: HOSPITAL | Age: 23
Discharge: HOME | End: 2018-11-26
Attending: INTERNAL MEDICINE | Admitting: INTERNAL MEDICINE

## 2018-11-26 DIAGNOSIS — D72.9 ABNORMAL WHITE BLOOD CELL (WBC) COUNT: ICD-10-CM

## 2018-11-26 DIAGNOSIS — R04.2 HEMOPTYSIS: ICD-10-CM

## 2018-11-26 DIAGNOSIS — D50.9 IRON DEFICIENCY ANEMIA, UNSPECIFIED IRON DEFICIENCY ANEMIA TYPE: ICD-10-CM

## 2018-11-26 DIAGNOSIS — R06.02 SOB (SHORTNESS OF BREATH): ICD-10-CM

## 2018-11-26 DIAGNOSIS — R07.89 CHEST WALL PAIN: ICD-10-CM

## 2018-11-26 PROCEDURE — 71260 CT THORAX DX C+: CPT

## 2018-11-26 PROCEDURE — 25010000002 IOPAMIDOL 61 % SOLUTION: Performed by: INTERNAL MEDICINE

## 2018-11-26 RX ADMIN — IOPAMIDOL 75 ML: 612 INJECTION, SOLUTION INTRAVENOUS at 09:49

## 2018-11-27 ENCOUNTER — INFUSION (OUTPATIENT)
Dept: ONCOLOGY | Facility: HOSPITAL | Age: 23
End: 2018-11-27

## 2018-11-27 VITALS
WEIGHT: 171.2 LBS | BODY MASS INDEX: 24.51 KG/M2 | TEMPERATURE: 97.4 F | HEART RATE: 67 BPM | OXYGEN SATURATION: 98 % | HEIGHT: 70 IN | DIASTOLIC BLOOD PRESSURE: 74 MMHG | RESPIRATION RATE: 16 BRPM | SYSTOLIC BLOOD PRESSURE: 118 MMHG

## 2018-11-27 DIAGNOSIS — E53.8 B12 DEFICIENCY: ICD-10-CM

## 2018-11-27 DIAGNOSIS — T45.4X5A ADVERSE EFFECT OF IRON, INITIAL ENCOUNTER: Primary | ICD-10-CM

## 2018-11-27 DIAGNOSIS — D50.9 IRON DEFICIENCY ANEMIA, UNSPECIFIED IRON DEFICIENCY ANEMIA TYPE: ICD-10-CM

## 2018-11-27 PROCEDURE — 96372 THER/PROPH/DIAG INJ SC/IM: CPT | Performed by: INTERNAL MEDICINE

## 2018-11-27 PROCEDURE — 25010000002 FERRIC CARBOXYMALTOSE 750 MG/15ML SOLUTION 15 ML VIAL: Performed by: INTERNAL MEDICINE

## 2018-11-27 PROCEDURE — 25010000002 CYANOCOBALAMIN PER 1000 MCG: Performed by: INTERNAL MEDICINE

## 2018-11-27 PROCEDURE — 96374 THER/PROPH/DIAG INJ IV PUSH: CPT | Performed by: INTERNAL MEDICINE

## 2018-11-27 RX ORDER — SODIUM CHLORIDE 9 MG/ML
250 INJECTION, SOLUTION INTRAVENOUS ONCE
Status: COMPLETED | OUTPATIENT
Start: 2018-11-27 | End: 2018-11-27

## 2018-11-27 RX ORDER — CYANOCOBALAMIN 1000 UG/ML
1000 INJECTION, SOLUTION INTRAMUSCULAR; SUBCUTANEOUS ONCE
Status: COMPLETED | OUTPATIENT
Start: 2018-11-27 | End: 2018-11-27

## 2018-11-27 RX ADMIN — SODIUM CHLORIDE 250 ML: 900 INJECTION, SOLUTION INTRAVENOUS at 08:55

## 2018-11-27 RX ADMIN — CYANOCOBALAMIN 1000 MCG: 1000 INJECTION, SOLUTION INTRAMUSCULAR at 08:57

## 2018-11-27 RX ADMIN — FERRIC CARBOXYMALTOSE INJECTION 750 MG: 50 INJECTION, SOLUTION INTRAVENOUS at 08:55

## 2018-11-30 ENCOUNTER — OFFICE VISIT (OUTPATIENT)
Dept: ONCOLOGY | Facility: CLINIC | Age: 23
End: 2018-11-30

## 2018-11-30 ENCOUNTER — LAB (OUTPATIENT)
Dept: OTHER | Facility: HOSPITAL | Age: 23
End: 2018-11-30

## 2018-11-30 VITALS
TEMPERATURE: 97.4 F | SYSTOLIC BLOOD PRESSURE: 135 MMHG | HEART RATE: 65 BPM | DIASTOLIC BLOOD PRESSURE: 87 MMHG | RESPIRATION RATE: 16 BRPM | HEIGHT: 70 IN | WEIGHT: 170.5 LBS | BODY MASS INDEX: 24.41 KG/M2

## 2018-11-30 DIAGNOSIS — R06.02 SOB (SHORTNESS OF BREATH): ICD-10-CM

## 2018-11-30 DIAGNOSIS — R59.0 LYMPHADENOPATHY, AXILLARY: ICD-10-CM

## 2018-11-30 DIAGNOSIS — R04.2 HEMOPTYSIS: ICD-10-CM

## 2018-11-30 DIAGNOSIS — D50.9 IRON DEFICIENCY ANEMIA, UNSPECIFIED IRON DEFICIENCY ANEMIA TYPE: Primary | ICD-10-CM

## 2018-11-30 DIAGNOSIS — R07.89 CHEST WALL PAIN: ICD-10-CM

## 2018-11-30 DIAGNOSIS — D72.9 ABNORMAL WHITE BLOOD CELL (WBC) COUNT: ICD-10-CM

## 2018-11-30 DIAGNOSIS — E53.8 B12 DEFICIENCY: ICD-10-CM

## 2018-11-30 DIAGNOSIS — D50.9 IRON DEFICIENCY ANEMIA, UNSPECIFIED IRON DEFICIENCY ANEMIA TYPE: ICD-10-CM

## 2018-11-30 LAB
BASOPHILS # BLD MANUAL: 0.04 10*3/MM3 (ref 0–0.2)
BASOPHILS NFR BLD AUTO: 1 % (ref 0–2)
DEPRECATED RDW RBC AUTO: 49 FL (ref 37–54)
EOSINOPHIL # BLD MANUAL: 0.04 10*3/MM3 (ref 0–0.7)
EOSINOPHIL NFR BLD MANUAL: 1 % (ref 0–7)
ERYTHROCYTE [DISTWIDTH] IN BLOOD BY AUTOMATED COUNT: 16.5 % (ref 11.7–13)
HCT VFR BLD AUTO: 38.7 % (ref 35.6–45.5)
HGB BLD-MCNC: 13 G/DL (ref 11.9–15.5)
LYMPHOCYTES # BLD MANUAL: 1.4 10*3/MM3 (ref 0.8–7)
LYMPHOCYTES NFR BLD MANUAL: 10 % (ref 0–12)
LYMPHOCYTES NFR BLD MANUAL: 37 % (ref 24–44)
MCH RBC QN AUTO: 27.4 PG (ref 26.9–32)
MCHC RBC AUTO-ENTMCNC: 33.6 G/DL (ref 32.4–36.3)
MCV RBC AUTO: 81.6 FL (ref 80.5–98.2)
MICROCYTES BLD QL: ABNORMAL
MONOCYTES # BLD AUTO: 0.38 10*3/MM3 (ref 0–1)
NEUTROPHILS # BLD AUTO: 1.9 10*3/MM3 (ref 1.9–8.1)
NEUTROPHILS NFR BLD MANUAL: 50 % (ref 42.7–76)
NRBC SPEC MANUAL: 1 /100 WBC (ref 0–0)
OVALOCYTES BLD QL SMEAR: ABNORMAL
PLAT MORPH BLD: NORMAL
PLATELET # BLD AUTO: 195 10*3/MM3 (ref 140–500)
PMV BLD AUTO: 10.3 FL (ref 6–12)
RBC # BLD AUTO: 4.74 10*6/MM3 (ref 3.9–5.2)
SCAN SLIDE: NORMAL
VARIANT LYMPHS NFR BLD MANUAL: 1 % (ref 0–5)
WBC MORPH BLD: NORMAL
WBC NRBC COR # BLD: 3.79 10*3/MM3 (ref 4.5–10.7)

## 2018-11-30 PROCEDURE — 85025 COMPLETE CBC W/AUTO DIFF WBC: CPT | Performed by: INTERNAL MEDICINE

## 2018-11-30 PROCEDURE — 36415 COLL VENOUS BLD VENIPUNCTURE: CPT

## 2018-11-30 PROCEDURE — 85007 BL SMEAR W/DIFF WBC COUNT: CPT | Performed by: INTERNAL MEDICINE

## 2018-11-30 PROCEDURE — 99215 OFFICE O/P EST HI 40 MIN: CPT | Performed by: INTERNAL MEDICINE

## 2018-11-30 NOTE — PROGRESS NOTES
[facility] GROUP OUTPATIENT FOLLOW UP CLINIC VISIT    REASON FOR FOLLOW-UP:    1.  Concern for leukemia; flow cytometry negative  2.  History of mild leukopenia  3.  Microcytic anemia with iron and B12 deficiency  4.  Left axillary lymphadenopathy  Intravenous Injectafer and intramuscular vitamin B12 injections administered on 11/14/2018 and 11/27/2018.      HISTORY OF PRESENT ILLNESS:  Henrietta Garrett is a 23 y.o. female who returns today for follow up of the above issue.      She tolerated the Injectafer and vitamin B12 injections well.  She might have a little bit more energy.  Joint pain persists.        HEMATOLOGIC HISTORY:  She was seen initially in consultation on 11/9/2018.    She has a complicated medical history including Raynaud's, rheumatoid arthritis, Tomas-Danlos syndrome, complex regional pain syndrome, and POTS.       She requires a left shoulder surgery.  She has been seeing Dr. Terrazas.           She had a CT angiogram of the chest on 4/27/2018 for chest pain that did not indicate any pulmonary embolism or pneumothorax but did show a 1.3 x 2.1 cm left axillary lymph node.  No further evaluation for this was performed at that time.  She had a normal right lower extremity venous duplex also on 4/27/2018.     She has a long history of iron deficiency anemia.  She has been on an iron supplement once daily for many years.     She has been seeing Dr. Molina with rheumatology but has not seen him recently.     She complains of pain extending from the left axilla through the left anterior chest wall and left breast.  This has been going on for many months.  Over the past month she has had hemoptysis.  This occurred about 3 or 4 times.  She describes this as blood streaks.  She has not had this for about 1 week.  This seems to be in association with orthostatic symptoms when she stands for long periods of time.  She feels lightheaded and nauseous.  She did meter has near-syncope or syncope.  She  has coughed up blood on several of these occasions.  This does occur a lot in the shower when she is standing for long periods of time.  Again, she has a long history of POTS but has not had syncope in the recently.  This has not been formally evaluated.     She complains of low-grade fevers ongoing for a couple of months to 100.0°F.  She reports excessive fatigue.  She has night sweats.  She has cold sensitivity.  She has heat intolerance.  She complains of skin changes.  She has poor vision.  She complains of shortness of breath.  She complains of chest pain and palpitations.  Every gastrointestinal review of system is positive.  She complains of abnormal urination and states that she sometimes goes a day or more without urinating.  She complains of constipation and sometimes goes more than 1 day without bowel movements.  She complains of diffuse arthralgias and back pain.  She has chronic headaches and paresthesias.     She has been unable to palpate the left axillary lymphadenopathy.  She does admit to heavy periods.  She denies any bleeding otherwise outside of the scant hemoptysis on a few occasions.  She does have easy bruising secondary to her connective tissue disorder.    She was found to be vitamin B12 and iron deficient.  She was given 2 doses of Injectafer and vitamin B12 injections on 11/14/2018 and 11/27/2018.  Flow cytometry was unremarkable.  CT imaging of the chest showed some mild likely reactive left axillary lymphadenopathy.      ALLERGIES:  Allergies   Allergen Reactions   • Anesthetics, Amide Nausea And Vomiting   • Metoclopramide Nausea And Vomiting       MEDICATIONS:  The medication list has been reviewed with the patient by the medical assistant, and the list has been updated in the electronic medical record, which I reviewed.  Medication dosages and frequencies were confirmed to be accurate.    REVIEW OF SYSTEMS:  Constitutional: Positive for chills, diaphoresis, fatigue and fever.  "Negative for appetite change and unexpected weight change.   HENT: Negative for congestion, dental problem, facial swelling, hearing loss, mouth sores, nosebleeds, rhinorrhea, sore throat, tinnitus, trouble swallowing and voice change.    Eyes: Positive for visual disturbance.   Respiratory: Positive for cough, chest tightness, shortness of breath and wheezing. Negative for stridor.    Cardiovascular: Positive for chest pain, palpitations and leg swelling.   Gastrointestinal: Positive for abdominal pain, constipation and nausea. Negative for abdominal distention, blood in stool and vomiting.   Endocrine: Positive for cold intolerance and heat intolerance.   Genitourinary: Positive for difficulty urinating. Negative for dysuria, flank pain, frequency, hematuria, menstrual problem, pelvic pain, vaginal bleeding and vaginal discharge.   Musculoskeletal: Positive for arthralgias, back pain, joint swelling, myalgias and neck pain. Negative for neck stiffness.   Skin: Positive for rash. Negative for color change and wound.   Allergic/Immunologic: Negative for environmental allergies.   Neurological: Positive for dizziness, syncope, numbness and headaches. Negative for seizures and weakness.   Hematological: Positive for adenopathy. Does not bruise/bleed easily.   Psychiatric/Behavioral: Negative for agitation, confusion and sleep disturbance. The patient is not nervous/anxious.    All other systems reviewed and are negative.        Vitals:    11/30/18 0751   BP: 135/87   Pulse: 65   Resp: 16   Temp: 97.4 °F (36.3 °C)   TempSrc: Oral   SpO2: Comment: raynauds syndrome poor circulation   Weight: 77.3 kg (170 lb 8 oz)   Height: 177.8 cm (70\")   PainSc: 0-No pain       PHYSICAL EXAMINATION:  GENERAL:  Well-developed well-nourished female; awake, alert and oriented, in no acute distress.  SKIN:  Warm and dry, without rashes, purpura, or petechiae.  HEAD:  Normocephalic, atraumatic.  EYES:  Pupils equal, round and reactive to " light.  Extraocular movements intact.  Conjunctivae normal.  EARS:  Hearing intact.  NOSE:  Septum midline.  No excoriations or nasal discharge.  MOUTH:  No stomatitis or ulcers.  Lips are normal.  THROAT:  Oropharynx without lesions or exudates.  NECK:  Supple with good range of motion; no thyromegaly or masses; no JVD or bruits.  LYMPHATICS:  No cervical, supraclavicular, or axillary lymphadenopathy.  CHEST:  Lungs are clear to auscultation bilaterally.  No wheezes, rales, or rhonchi.  HEART:  Regular rate; normal rhythm.  No murmurs, gallops or rubs.  ABDOMEN:  Not examined today  EXTREMITIES:  No clubbing cyanosis or edema.  NEUROLOGICAL:  No focal neurologic deficits.    DIAGNOSTIC DATA:  Results for orders placed or performed in visit on 11/30/18   CBC Auto Differential   Result Value Ref Range    WBC 3.79 (L) 4.50 - 10.70 10*3/mm3    RBC 4.74 3.90 - 5.20 10*6/mm3    Hemoglobin 13.0 11.9 - 15.5 g/dL    Hematocrit 38.7 35.6 - 45.5 %    MCV 81.6 80.5 - 98.2 fL    MCH 27.4 26.9 - 32.0 pg    MCHC 33.6 32.4 - 36.3 g/dL    RDW 16.5 (H) 11.7 - 13.0 %    RDW-SD 49.0 37.0 - 54.0 fl    MPV 10.3 6.0 - 12.0 fL    Platelets 195 140 - 500 10*3/mm3    nRBC 0.0 0.0 - 0.0 /100 WBC       IMAGING:  CT images of the chest from 11/26/2018 images personally reviewed.  No significant lymphadenopathy.  No other acute findings.    ASSESSMENT:  This is a 23 y.o. female with:  1.  History of mild leukopenia: Her white blood cell count is at the lower limit of normal today.  Her differential appears normal.   flow cytometry was unremarkable.  I suspect this is related to rheumatologic disease.  2.  Microcytosis: She was found to be iron and vitamin B12 deficient.  She received iron and vitamin B12 on 11/14/2018 and 11/27/2018.  Her hemoglobin, hematocrit, and MCV today are normal.  We will monitor this intermittently.  3.  Left axillary adenopathy: This was not palpable.  The lymph nodes are not pathologically enlarged on CT imaging.   Monitor clinically.  4.  History of Tomas Danlos syndrome, Raynaud's phenomenon, POTS, complex regional pain syndrome previously seen by Dr. Molina with rheumatology.  It might benefit her to be seen at a tertiary care center since her situation is very complicated.  I advised her and her mother again of this today.  5.  Hemoptysis: Etiology unclear.  CT scan of the chest was unremarkable.  She did not complain of this today.  6.  Syncope: Likely related to the POTS but she states that she has been known to have this condition for a long time and never had syncope until recently.  She might benefit from a cardiac evaluation.  Again, she might benefit from an evaluation at tertiary care center.  7.  Left shoulder pain with surgery apparently required: She follows with Dr. Terrazas.  No hematologic contraindications for the left shoulder surgery.    PLAN:  1.  Weekly vitamin B12 injections through December.  After that she will start an oral disintegrating vitamin B12 supplement.  2.  I will see her back in about 3-4 months for follow-up with a CBC, reticulocyte count, ferritin, iron profile, and vitamin B12 level at that time.  3.  I think she should be seen at a tertiary care center.  4.  She is connected on social media with other people who have Tomas Danlos syndrome.  I advised her to reach out to that group to see if they recommend any specialists in the region.    Her mother was present today and provided some history.

## 2018-12-07 ENCOUNTER — INFUSION (OUTPATIENT)
Dept: ONCOLOGY | Facility: HOSPITAL | Age: 23
End: 2018-12-07

## 2018-12-07 VITALS — WEIGHT: 170.6 LBS | BODY MASS INDEX: 24.48 KG/M2

## 2018-12-07 DIAGNOSIS — E53.8 B12 DEFICIENCY: Primary | ICD-10-CM

## 2018-12-07 PROCEDURE — 96372 THER/PROPH/DIAG INJ SC/IM: CPT | Performed by: INTERNAL MEDICINE

## 2018-12-07 PROCEDURE — 25010000002 CYANOCOBALAMIN PER 1000 MCG: Performed by: INTERNAL MEDICINE

## 2018-12-07 RX ORDER — CYANOCOBALAMIN 1000 UG/ML
1000 INJECTION, SOLUTION INTRAMUSCULAR; SUBCUTANEOUS
Status: DISCONTINUED | OUTPATIENT
Start: 2018-12-07 | End: 2018-12-07 | Stop reason: HOSPADM

## 2018-12-07 RX ADMIN — CYANOCOBALAMIN 1000 MCG: 1000 INJECTION, SOLUTION INTRAMUSCULAR at 08:19

## 2018-12-14 ENCOUNTER — INFUSION (OUTPATIENT)
Dept: ONCOLOGY | Facility: HOSPITAL | Age: 23
End: 2018-12-14

## 2018-12-14 VITALS — BODY MASS INDEX: 24.77 KG/M2 | WEIGHT: 172.6 LBS

## 2018-12-14 DIAGNOSIS — E53.8 B12 DEFICIENCY: Primary | ICD-10-CM

## 2018-12-14 PROCEDURE — 96372 THER/PROPH/DIAG INJ SC/IM: CPT | Performed by: NURSE PRACTITIONER

## 2018-12-14 PROCEDURE — 25010000002 CYANOCOBALAMIN PER 1000 MCG: Performed by: NURSE PRACTITIONER

## 2018-12-14 RX ORDER — CYANOCOBALAMIN 1000 UG/ML
1000 INJECTION, SOLUTION INTRAMUSCULAR; SUBCUTANEOUS
Status: DISCONTINUED | OUTPATIENT
Start: 2018-12-14 | End: 2018-12-14 | Stop reason: HOSPADM

## 2018-12-14 RX ADMIN — CYANOCOBALAMIN 1000 MCG: 1000 INJECTION, SOLUTION INTRAMUSCULAR; SUBCUTANEOUS at 08:15

## 2018-12-21 ENCOUNTER — APPOINTMENT (OUTPATIENT)
Dept: ONCOLOGY | Facility: HOSPITAL | Age: 23
End: 2018-12-21

## 2019-04-12 ENCOUNTER — OFFICE VISIT (OUTPATIENT)
Dept: ONCOLOGY | Facility: CLINIC | Age: 24
End: 2019-04-12

## 2019-04-12 ENCOUNTER — INFUSION (OUTPATIENT)
Dept: ONCOLOGY | Facility: HOSPITAL | Age: 24
End: 2019-04-12

## 2019-04-12 ENCOUNTER — TELEPHONE (OUTPATIENT)
Dept: ONCOLOGY | Facility: CLINIC | Age: 24
End: 2019-04-12

## 2019-04-12 ENCOUNTER — LAB (OUTPATIENT)
Dept: OTHER | Facility: HOSPITAL | Age: 24
End: 2019-04-12

## 2019-04-12 VITALS
OXYGEN SATURATION: 95 % | DIASTOLIC BLOOD PRESSURE: 87 MMHG | WEIGHT: 180.8 LBS | TEMPERATURE: 98.2 F | BODY MASS INDEX: 25.88 KG/M2 | RESPIRATION RATE: 16 BRPM | HEART RATE: 87 BPM | HEIGHT: 70 IN | SYSTOLIC BLOOD PRESSURE: 138 MMHG

## 2019-04-12 DIAGNOSIS — R11.2 NON-INTRACTABLE VOMITING WITH NAUSEA, UNSPECIFIED VOMITING TYPE: ICD-10-CM

## 2019-04-12 DIAGNOSIS — R59.0 LYMPHADENOPATHY, AXILLARY: ICD-10-CM

## 2019-04-12 DIAGNOSIS — E53.8 B12 DEFICIENCY: Primary | ICD-10-CM

## 2019-04-12 DIAGNOSIS — D50.9 IRON DEFICIENCY ANEMIA, UNSPECIFIED IRON DEFICIENCY ANEMIA TYPE: ICD-10-CM

## 2019-04-12 DIAGNOSIS — E53.8 B12 DEFICIENCY: ICD-10-CM

## 2019-04-12 LAB
BASOPHILS # BLD AUTO: 0.01 10*3/MM3 (ref 0–0.2)
BASOPHILS NFR BLD AUTO: 0.3 % (ref 0–1.5)
DEPRECATED RDW RBC AUTO: 42.9 FL (ref 37–54)
EOSINOPHIL # BLD AUTO: 0.05 10*3/MM3 (ref 0–0.4)
EOSINOPHIL NFR BLD AUTO: 1.4 % (ref 0.3–6.2)
ERYTHROCYTE [DISTWIDTH] IN BLOOD BY AUTOMATED COUNT: 12.8 % (ref 12.3–15.4)
FERRITIN SERPL-MCNC: 193.7 NG/ML (ref 13–150)
HCT VFR BLD AUTO: 37 % (ref 34–46.6)
HGB BLD-MCNC: 12.5 G/DL (ref 12–15.9)
HGB RETIC QN AUTO: 34.9 PG (ref 29.8–36.1)
IMM GRANULOCYTES # BLD AUTO: 0 10*3/MM3 (ref 0–0.05)
IMM GRANULOCYTES NFR BLD AUTO: 0 % (ref 0–0.5)
IMM RETICS NFR: 10.5 % (ref 3–15.8)
IRON 24H UR-MRATE: 58 MCG/DL (ref 37–145)
IRON SATN MFR SERPL: 18 % (ref 20–50)
LYMPHOCYTES # BLD AUTO: 1.08 10*3/MM3 (ref 0.7–3.1)
LYMPHOCYTES NFR BLD AUTO: 29.6 % (ref 19.6–45.3)
MCH RBC QN AUTO: 30.9 PG (ref 26.6–33)
MCHC RBC AUTO-ENTMCNC: 33.8 G/DL (ref 31.5–35.7)
MCV RBC AUTO: 91.4 FL (ref 79–97)
MONOCYTES # BLD AUTO: 0.37 10*3/MM3 (ref 0.1–0.9)
MONOCYTES NFR BLD AUTO: 10.1 % (ref 5–12)
NEUTROPHILS # BLD AUTO: 2.14 10*3/MM3 (ref 1.4–7)
NEUTROPHILS NFR BLD AUTO: 58.6 % (ref 42.7–76)
NRBC BLD AUTO-RTO: 0 /100 WBC (ref 0–0)
PLATELET # BLD AUTO: 210 10*3/MM3 (ref 140–450)
PMV BLD AUTO: 10 FL (ref 6–12)
RBC # BLD AUTO: 4.05 10*6/MM3 (ref 3.77–5.28)
RETICS/RBC NFR AUTO: 1.48 % (ref 0.5–1.5)
TIBC SERPL-MCNC: 326 MCG/DL (ref 298–536)
TRANSFERRIN SERPL-MCNC: 219 MG/DL (ref 200–360)
VIT B12 BLD-MCNC: 160 PG/ML (ref 211–946)
WBC NRBC COR # BLD: 3.65 10*3/MM3 (ref 3.4–10.8)

## 2019-04-12 PROCEDURE — 82607 VITAMIN B-12: CPT | Performed by: INTERNAL MEDICINE

## 2019-04-12 PROCEDURE — 99215 OFFICE O/P EST HI 40 MIN: CPT | Performed by: INTERNAL MEDICINE

## 2019-04-12 PROCEDURE — 25010000002 CYANOCOBALAMIN PER 1000 MCG: Performed by: INTERNAL MEDICINE

## 2019-04-12 PROCEDURE — 83540 ASSAY OF IRON: CPT | Performed by: INTERNAL MEDICINE

## 2019-04-12 PROCEDURE — 82728 ASSAY OF FERRITIN: CPT | Performed by: INTERNAL MEDICINE

## 2019-04-12 PROCEDURE — 85025 COMPLETE CBC W/AUTO DIFF WBC: CPT | Performed by: INTERNAL MEDICINE

## 2019-04-12 PROCEDURE — 36415 COLL VENOUS BLD VENIPUNCTURE: CPT

## 2019-04-12 PROCEDURE — 85046 RETICYTE/HGB CONCENTRATE: CPT | Performed by: INTERNAL MEDICINE

## 2019-04-12 PROCEDURE — 96372 THER/PROPH/DIAG INJ SC/IM: CPT | Performed by: INTERNAL MEDICINE

## 2019-04-12 PROCEDURE — 84466 ASSAY OF TRANSFERRIN: CPT | Performed by: INTERNAL MEDICINE

## 2019-04-12 RX ORDER — ONDANSETRON 4 MG/1
4 TABLET, ORALLY DISINTEGRATING ORAL EVERY 8 HOURS PRN
Qty: 30 TABLET | Refills: 3 | Status: SHIPPED | OUTPATIENT
Start: 2019-04-12 | End: 2022-07-22

## 2019-04-12 RX ORDER — CYANOCOBALAMIN 1000 UG/ML
1000 INJECTION, SOLUTION INTRAMUSCULAR; SUBCUTANEOUS
Status: DISCONTINUED | OUTPATIENT
Start: 2019-04-12 | End: 2019-04-12 | Stop reason: HOSPADM

## 2019-04-12 RX ORDER — GABAPENTIN 300 MG/1
CAPSULE ORAL
Refills: 1 | COMMUNITY
Start: 2019-03-09 | End: 2020-11-24

## 2019-04-12 RX ADMIN — CYANOCOBALAMIN 1000 MCG: 1000 INJECTION, SOLUTION INTRAMUSCULAR at 09:00

## 2019-04-12 NOTE — TELEPHONE ENCOUNTER
----- Message from Kee Skinner MD sent at 4/12/2019 12:04 PM EDT -----  Please call the patient regarding her abnormal result.  Please let her know that her vit B12 level remains low.  We gave her an injection today and advised her to start an oral supplement.  However, it's reasonable to bring her in monthly for injections.  If she wants to do that, please arrange.  Otherwise she can take it orally as we previously discussed today.  Also let her know that her iron levels are excellent.  Thanks!  JENNY

## 2019-04-12 NOTE — TELEPHONE ENCOUNTER
Called patient and informed her of her abnormal results per dr. Skinner.   She will start a b12 supplament and would not come in every month for b12 injections.  She will take an oral supplement.

## 2019-04-12 NOTE — PROGRESS NOTES
[facility] GROUP OUTPATIENT FOLLOW UP CLINIC VISIT    REASON FOR FOLLOW-UP:    1.  Concern for leukemia; flow cytometry negative  2.  History of mild leukopenia  3.  Microcytic anemia with iron and B12 deficiency.    4.  Left axillary lymphadenopathy  5.  Intravenous Injectafer and intramuscular vitamin B12 injections administered on 11/14/2018 and 11/27/2018.      HISTORY OF PRESENT ILLNESS:  Henrietta Garrett is a 24 y.o. female who returns today for follow up of the above issue.      She has not been feeling well lately.  She initially felt better after her IV iron infusions and her B12 injections.  She did not start an oral iron supplement and I suggested that she get something over-the-counter today.  We will give her a vitamin B12 injection today.    She has persistent tender lymphadenopathy left axilla greater than right.  Nothing is palpable.    She still needs shoulder surgery but has not schedule this due to other issues as discussed below.    She has been having some issues with daily nausea and vomiting mostly in the mornings and early afternoons for the past few months.  She was taking ondansetron for this occasionally but does not have any medication for this at this point.  She complains of abdominal bloating after meals and improves with vomiting or bowel movements.  She denies any bleeding.  Again, the nausea usually improves by the middle of the afternoon.  No fevers or chills.  She is having some abdominal tenderness which has been chronic.    HEMATOLOGIC HISTORY:  She was seen initially in consultation on 11/9/2018.    She has a complicated medical history including Raynaud's, rheumatoid arthritis, Tomas-Danlos syndrome, complex regional pain syndrome, and POTS.       She requires a left shoulder surgery.  She has been seeing Dr. Terrazas.           She had a CT angiogram of the chest on 4/27/2018 for chest pain that did not indicate any pulmonary embolism or pneumothorax but did show a  1.3 x 2.1 cm left axillary lymph node.  No further evaluation for this was performed at that time.  She had a normal right lower extremity venous duplex also on 4/27/2018.     She has a long history of iron deficiency anemia.  She has been on an iron supplement once daily for many years.     She has been seeing Dr. Molina with rheumatology but has not seen him recently.     She complains of pain extending from the left axilla through the left anterior chest wall and left breast.  This has been going on for many months.  Over the past month she has had hemoptysis.  This occurred about 3 or 4 times.  She describes this as blood streaks.  She has not had this for about 1 week.  This seems to be in association with orthostatic symptoms when she stands for long periods of time.  She feels lightheaded and nauseous.  She did meter has near-syncope or syncope.  She has coughed up blood on several of these occasions.  This does occur a lot in the shower when she is standing for long periods of time.  Again, she has a long history of POTS but has not had syncope in the recently.  This has not been formally evaluated.     She complains of low-grade fevers ongoing for a couple of months to 100.0°F.  She reports excessive fatigue.  She has night sweats.  She has cold sensitivity.  She has heat intolerance.  She complains of skin changes.  She has poor vision.  She complains of shortness of breath.  She complains of chest pain and palpitations.  Every gastrointestinal review of system is positive.  She complains of abnormal urination and states that she sometimes goes a day or more without urinating.  She complains of constipation and sometimes goes more than 1 day without bowel movements.  She complains of diffuse arthralgias and back pain.  She has chronic headaches and paresthesias.     She has been unable to palpate the left axillary lymphadenopathy.  She does admit to heavy periods.  She denies any bleeding otherwise outside  of the scant hemoptysis on a few occasions.  She does have easy bruising secondary to her connective tissue disorder.    She was found to be vitamin B12 and iron deficient.  She was given 2 doses of Injectafer and vitamin B12 injections on 11/14/2018 and 11/27/2018.  Flow cytometry was unremarkable.  CT imaging of the chest showed some mild likely reactive left axillary lymphadenopathy.      ALLERGIES:  Allergies   Allergen Reactions   • Anesthetics, Amide Nausea And Vomiting   • Metoclopramide Nausea And Vomiting       MEDICATIONS:  The medication list has been reviewed with the patient by the medical assistant, and the list has been updated in the electronic medical record, which I reviewed.  Medication dosages and frequencies were confirmed to be accurate.    REVIEW OF SYSTEMS:  Constitutional: Fatigue negative for appetite change and unexpected weight change.   HENT: Negative for congestion, dental problem, facial swelling, hearing loss, mouth sores, nosebleeds, rhinorrhea, sore throat, tinnitus, trouble swallowing and voice change.    Eyes: Positive for visual disturbance.   Respiratory: No cough or shortness of breath   Cardiovascular: Positive for chest pain, palpitations and leg swelling.   Gastrointestinal: Positive for abdominal pain, constipation and nausea/vomiting.  Intermittent abdominal distention related to meals.  Endocrine: Positive for cold intolerance and heat intolerance.   Genitourinary: Positive for difficulty urinating. Negative for dysuria, flank pain, frequency, hematuria, menstrual problem, pelvic pain, vaginal bleeding and vaginal discharge.   Musculoskeletal: Positive for arthralgias, back pain, joint swelling, myalgias and neck pain. Negative for neck stiffness.   Skin: Positive for rash. Negative for color change and wound.   Allergic/Immunologic: Negative for environmental allergies.   Neurological: Positive for dizziness, syncope, numbness and headaches. Negative for seizures and  "weakness.   Hematological: Positive for adenopathy. Does not bruise/bleed easily.   Psychiatric/Behavioral: Poor sleep which is chronic.  Some depression is present.          Vitals:    04/12/19 0825   BP: 138/87   Pulse: 87   Resp: 16   Temp: 98.2 °F (36.8 °C)   TempSrc: Oral   SpO2: 95%   Weight: 82 kg (180 lb 12.8 oz)   Height: 177.8 cm (70\")   PainSc: 0-No pain  Comment: anemia       PHYSICAL EXAMINATION:  GENERAL:  Well-developed well-nourished female; awake, alert and oriented, in no acute distress.  Flat affect.  Appears depressed.  SKIN:  Warm and dry, without rashes, purpura, or petechiae.  HEAD:  Normocephalic, atraumatic.  EYES:  Pupils equal, round and reactive to light.  Extraocular movements intact.  Conjunctivae normal.  EARS:  Hearing intact.  NOSE:  Septum midline.  No excoriations or nasal discharge.  MOUTH:  No stomatitis or ulcers.  Lips are normal.  THROAT:  Oropharynx without lesions or exudates.  LYMPHATICS:  No cervical, supraclavicular, or axillary lymphadenopathy is palpable.  CHEST:  Lungs are clear to auscultation bilaterally.  No wheezes, rales, or rhonchi.  HEART:  Regular rate; normal rhythm.  No murmurs, gallops or rubs.  ABDOMEN: Soft, mild diffuse tenderness to palpation without rebound or guarding.  Normoactive bowel sounds.  EXTREMITIES:  No clubbing cyanosis or edema.  NEUROLOGICAL:  No focal neurologic deficits.    DIAGNOSTIC DATA:  Results for orders placed or performed in visit on 04/12/19   Retic With IRF & RET-He   Result Value Ref Range    Immature Reticulocyte Fraction 10.5 3.0 - 15.8 %    Reticulocyte % 1.48 0.50 - 1.50 %    Reticulocyte Hgb 34.9 29.8 - 36.1 pg   CBC Auto Differential   Result Value Ref Range    WBC 3.65 3.40 - 10.80 10*3/mm3    RBC 4.05 3.77 - 5.28 10*6/mm3    Hemoglobin 12.5 12.0 - 15.9 g/dL    Hematocrit 37.0 34.0 - 46.6 %    MCV 91.4 79.0 - 97.0 fL    MCH 30.9 26.6 - 33.0 pg    MCHC 33.8 31.5 - 35.7 g/dL    RDW 12.8 12.3 - 15.4 %    RDW-SD 42.9 37.0 - " 54.0 fl    MPV 10.0 6.0 - 12.0 fL    Platelets 210 140 - 450 10*3/mm3    Neutrophil % 58.6 42.7 - 76.0 %    Lymphocyte % 29.6 19.6 - 45.3 %    Monocyte % 10.1 5.0 - 12.0 %    Eosinophil % 1.4 0.3 - 6.2 %    Basophil % 0.3 0.0 - 1.5 %    Immature Grans % 0.0 0.0 - 0.5 %    Neutrophils, Absolute 2.14 1.40 - 7.00 10*3/mm3    Lymphocytes, Absolute 1.08 0.70 - 3.10 10*3/mm3    Monocytes, Absolute 0.37 0.10 - 0.90 10*3/mm3    Eosinophils, Absolute 0.05 0.00 - 0.40 10*3/mm3    Basophils, Absolute 0.01 0.00 - 0.20 10*3/mm3    Immature Grans, Absolute 0.00 0.00 - 0.05 10*3/mm3    nRBC 0.0 0.0 - 0.0 /100 WBC       IMAGING:  CT images of the chest from 11/26/2018.  No significant lymphadenopathy.  No other acute findings.    ASSESSMENT:  This is a 24 y.o. female with:  1.  History of mild leukopenia: Her white blood cell count is at the lower limit of normal today.  Her differential appears normal.  Flow cytometry was unremarkable.  I suspect this is related to rheumatologic disease.  2.  Microcytosis: She was found to be iron and vitamin B12 deficient.  She received Iv Injectafer and vitamin B12 on 11/14/2018 and 11/27/2018.  She received further vitamin B12 injections on 12/7/2018 and 12/14/2018 and then started an oral supplement.  Her hemoglobin, hematocrit, and MCV today remain normal.  We will monitor this intermittently.  3.  Left axillary adenopathy: This was not palpable.  The lymph nodes are not pathologically enlarged on CT imaging.  Monitor clinically.  She continues to complain of tenderness.    4.  History of Tomas Danlos syndrome, Raynaud's phenomenon, POTS, complex regional pain syndrome previously seen by Dr. Molina with rheumatology.  It might benefit her to be seen at a tertiary care center since her situation is very complicated.  5.  Hemoptysis: Etiology unclear.  CT scan of the chest was unremarkable.  She did not complain of this today.  6.  Syncope: Likely related to the POTS but she states that she has  been known to have this condition for a long time and never had syncope until recently.  She might benefit from a cardiac evaluation.  Again, she might benefit from an evaluation at tertiary care center.  7.  Left shoulder pain with surgery apparently required: She follows with Dr. Terrazas.  No hematologic contraindications for the left shoulder surgery.  She has not scheduled this yet.    8.  Nausea and vomiting: I refilled her ondansetron 4 mg tablets today.  She will see Dr. Barone with gastroenterology in the near future.  In the meantime, advised starting an over-the-counter proton pump inhibitor in the mornings.    PLAN:  1.  She will start an oral disintegrating vitamin B12 supplement.  2.  I refilled ondansetron 4 mg tablets for her today  3.  I suggested that she start an over-the-counter proton pump inhibitor in the mornings  4.  Follow-up pending labs from today  5.  Vitamin B12 injection today  6.  I continue to believe that she should be seen at a tertiary care center  7.  I will see her back in a few months for follow-up with a CBC and reticulocyte count  8.  She will see Dr. Barone with gastroenterology in the near future.

## 2019-05-21 ENCOUNTER — OFFICE VISIT (OUTPATIENT)
Dept: GASTROENTEROLOGY | Facility: CLINIC | Age: 24
End: 2019-05-21

## 2019-05-21 VITALS
SYSTOLIC BLOOD PRESSURE: 124 MMHG | WEIGHT: 176 LBS | HEIGHT: 71 IN | BODY MASS INDEX: 24.64 KG/M2 | DIASTOLIC BLOOD PRESSURE: 70 MMHG | TEMPERATURE: 98 F

## 2019-05-21 DIAGNOSIS — R11.14 BILIOUS VOMITING WITH NAUSEA: Primary | ICD-10-CM

## 2019-05-21 DIAGNOSIS — E53.8 VITAMIN B12 DEFICIENCY: ICD-10-CM

## 2019-05-21 DIAGNOSIS — R19.7 DIARRHEA, UNSPECIFIED TYPE: ICD-10-CM

## 2019-05-21 PROCEDURE — 99203 OFFICE O/P NEW LOW 30 MIN: CPT | Performed by: INTERNAL MEDICINE

## 2019-05-21 RX ORDER — PANTOPRAZOLE SODIUM 40 MG/1
40 TABLET, DELAYED RELEASE ORAL DAILY
Qty: 30 TABLET | Refills: 5 | Status: SHIPPED | OUTPATIENT
Start: 2019-05-21 | End: 2020-11-24 | Stop reason: ALTCHOICE

## 2019-05-21 NOTE — PROGRESS NOTES
Chief Complaint   Patient presents with   • Irritable Bowel Syndrome     Henrietta Garrett is a 24 y.o. female who presents with vomiting and diarrhea.    She reports vomiting 5-10 minutes after she eats.  She feels nauseated until midday.      She had lost a lot of weight (17lbs) and then she has gained this back.    She has had issues most of her life.    She will have abdominal distension prior to these episodes.  She rarely has formed stool - he sees blood in her stool which she attributes to hemorrhoids.    She tried elavil for for depression but it made her feel drugged.    She takes nsaids when she has her period.    She almost never has a formed stool.  HPI  Past Medical History:   Diagnosis Date   • Anemia    • Arthritis     RHEUMATOID   • Asthma    • CPRS 1 (complex regional pain syndrome I) of upper limb    • Depression    • EDS (Tomas-Danlos syndrome)    • Fibromyalgia 2015   • History of hyperkeratosis of skin    • IBS (irritable bowel syndrome)    • Leukopenia, mild    • Poor vision    • Raynauds syndrome    • SOB (shortness of breath)      Past Surgical History:   Procedure Laterality Date   • DENTAL PROCEDURE     • EPIDURAL BLOCK     • SHOULDER SURGERY Bilateral     X2   • TOE SURGERY Right 2011    3rd toe        Current Outpatient Medications:   •  albuterol (PROVENTIL HFA;VENTOLIN HFA) 108 (90 Base) MCG/ACT inhaler, Inhale 2 puffs Every 4 (Four) Hours As Needed for Wheezing., Disp: 18 g, Rfl: 11  •  Fe Cbn-Fe Gluc-FA-B12-C-DSS (FERRALET 90) 90-1 MG tablet, Take one tablet daily, Disp: 90 each, Rfl: 0  •  Fluticasone Furoate 200 MCG/ACT aerosol powder , Inhale 2 puffs Daily. (Patient taking differently: Inhale 2 puffs As Needed.), Disp: 30 each, Rfl: 11  •  gabapentin (NEURONTIN) 800 MG tablet, TAKE ONE TABLET BY MOUTH FOUR TIMES A DAY, Disp: , Rfl:   •  ibuprofen (ADVIL,MOTRIN) 200 MG tablet, Take  by mouth., Disp: , Rfl:   •  LYRICA 75 MG capsule, Take 75 mg by mouth 2 (Two) Times a Day., Disp: ,  Rfl:   •  Multiple Vitamin (MULTIVITAMIN) tablet, Take 1 tablet by mouth., Disp: , Rfl:   •  ondansetron ODT (ZOFRAN-ODT) 4 MG disintegrating tablet, Take 1 tablet by mouth Every 8 (Eight) Hours As Needed for Nausea or Vomiting., Disp: 30 tablet, Rfl: 3  •  SPRINTEC 28 0.25-35 MG-MCG per tablet, , Disp: , Rfl:   •  vitamin D (ERGOCALCIFEROL) 27634 units capsule capsule, Take 1 capsule by mouth 1 (One) Time Per Week., Disp: 12 capsule, Rfl: 3  •  gabapentin (NEURONTIN) 300 MG capsule, TAKE 1 TO 2 TABLETS BY MOUTH THREE TIMES DAILY, Disp: , Rfl: 1  •  pantoprazole (PROTONIX) 40 MG EC tablet, Take 1 tablet by mouth Daily., Disp: 30 tablet, Rfl: 5  Allergies   Allergen Reactions   • Anesthetics, Amide Nausea And Vomiting   • Metoclopramide Nausea And Vomiting     Social History     Socioeconomic History   • Marital status: Single     Spouse name: Not on file   • Number of children: 0   • Years of education: High School   • Highest education level: Not on file   Occupational History     Employer: [employer]   Tobacco Use   • Smoking status: Never Smoker   • Smokeless tobacco: Never Used   Substance and Sexual Activity   • Alcohol use: No   • Drug use: No   • Sexual activity: No     Family History   Problem Relation Age of Onset   • Rheum arthritis Mother    • Diabetes type II Father    • Hyperlipidemia Father    • Diabetes Father    • Cancer Maternal Uncle         throat cancer    • Arthritis Maternal Uncle    • Asthma Sister    • Migraines Sister    • Tomas-Danlos syndrome Sister    • Asthma Brother    • Leukemia Maternal Grandmother    • Stroke Maternal Grandmother    • Heart disease Maternal Grandmother    • Rheum arthritis Maternal Grandmother    • Prostate cancer Maternal Grandfather    • Stroke Maternal Grandfather    • Diabetes Maternal Grandfather    • Cancer Paternal Grandmother    • Colon cancer Paternal Grandmother    • No Known Problems Brother    • Breast cancer Neg Hx      Review of Systems    Constitutional: Negative for appetite change and unexpected weight change.   Gastrointestinal: Positive for abdominal pain, diarrhea, nausea and vomiting. Negative for blood in stool.   Musculoskeletal: Positive for arthralgias.        Raynaud's   All other systems reviewed and are negative.    Vitals:    05/21/19 1359   BP: 124/70   Temp: 98 °F (36.7 °C)         05/21/19  1359   Weight: 79.8 kg (176 lb)     Physical Exam   Constitutional: She appears well-developed and well-nourished.   HENT:   Head: Normocephalic and atraumatic.   Eyes: No scleral icterus.   Pulmonary/Chest: Effort normal.   Abdominal: Soft. She exhibits no distension. There is no tenderness.   Neurological: She is alert.   Skin: Skin is warm and dry.   Psychiatric: She has a normal mood and affect.     No images are attached to the encounter.  No notes on file  Henrietta was seen today for irritable bowel syndrome.    Diagnoses and all orders for this visit:    Bilious vomiting with nausea  -     Celiac Ab tTG DGP TIgA    Diarrhea, unspecified type    Vitamin B12 deficiency    Other orders  -     pantoprazole (PROTONIX) 40 MG EC tablet; Take 1 tablet by mouth Daily.    Plan:  - trial pantoprazole 40 mg daily  - check celiac panel  - plan for egd/colonoscopy for further evaluation

## 2019-05-22 LAB
GLIADIN PEPTIDE IGA SER-ACNC: 4 UNITS (ref 0–19)
GLIADIN PEPTIDE IGG SER-ACNC: 3 UNITS (ref 0–19)
IGA SERPL-MCNC: 367 MG/DL (ref 87–352)
TTG IGA SER-ACNC: <2 U/ML (ref 0–3)
TTG IGG SER-ACNC: 2 U/ML (ref 0–5)

## 2019-05-23 ENCOUNTER — TELEPHONE (OUTPATIENT)
Dept: GASTROENTEROLOGY | Facility: CLINIC | Age: 24
End: 2019-05-23

## 2019-08-09 ENCOUNTER — APPOINTMENT (OUTPATIENT)
Dept: ONCOLOGY | Facility: CLINIC | Age: 24
End: 2019-08-09

## 2019-08-09 ENCOUNTER — APPOINTMENT (OUTPATIENT)
Dept: OTHER | Facility: HOSPITAL | Age: 24
End: 2019-08-09

## 2020-11-24 ENCOUNTER — OFFICE VISIT (OUTPATIENT)
Dept: GASTROENTEROLOGY | Facility: CLINIC | Age: 25
End: 2020-11-24

## 2020-11-24 VITALS — BODY MASS INDEX: 25.34 KG/M2 | WEIGHT: 181 LBS | HEIGHT: 71 IN

## 2020-11-24 DIAGNOSIS — Z80.0 FH: COLON CANCER: ICD-10-CM

## 2020-11-24 DIAGNOSIS — R19.7 DIARRHEA, UNSPECIFIED TYPE: ICD-10-CM

## 2020-11-24 DIAGNOSIS — K21.9 GASTROESOPHAGEAL REFLUX DISEASE, UNSPECIFIED WHETHER ESOPHAGITIS PRESENT: ICD-10-CM

## 2020-11-24 DIAGNOSIS — R11.2 NAUSEA AND VOMITING, INTRACTABILITY OF VOMITING NOT SPECIFIED, UNSPECIFIED VOMITING TYPE: Primary | ICD-10-CM

## 2020-11-24 DIAGNOSIS — R10.30 LOWER ABDOMINAL PAIN: ICD-10-CM

## 2020-11-24 DIAGNOSIS — K62.5 RECTAL BLEEDING: ICD-10-CM

## 2020-11-24 PROCEDURE — 99214 OFFICE O/P EST MOD 30 MIN: CPT | Performed by: NURSE PRACTITIONER

## 2020-11-24 RX ORDER — FAMOTIDINE 20 MG/1
20 TABLET, FILM COATED ORAL 2 TIMES DAILY
Qty: 60 TABLET | Refills: 3 | Status: SHIPPED | OUTPATIENT
Start: 2020-11-24 | End: 2021-02-16 | Stop reason: SDUPTHER

## 2020-11-24 RX ORDER — DICYCLOMINE HCL 20 MG
20 TABLET ORAL 3 TIMES DAILY PRN
Qty: 90 TABLET | Refills: 3 | Status: SHIPPED | OUTPATIENT
Start: 2020-11-24 | End: 2021-02-16 | Stop reason: SDUPTHER

## 2020-11-24 NOTE — PROGRESS NOTES
Chief Complaint   Patient presents with   • Nausea   • Vomiting   • Diarrhea   • Rectal Bleeding       Henrietta Garrett is a  25 y.o. female here for a follow up visit for nausea and vomiting.    HPI  25-year-old female presents today for follow-up visit for nausea and vomiting with diarrhea.  She is a patient of Dr. Barone.  She was last seen in the office on 5/21/2019.  She is new to me.  She has been struggling with nausea and vomiting after eating with lots of diarrhea and occasional rectal bleeding.  Last year when she saw Dr. Chan she was supposed to go have an EGD and colonoscopy.  Unfortunately at that time she tore a tendon in her right foot and had to have that fixed so the scopes were delayed.  Then COVID-19 hit and she is finally getting back in here to hopefully get her evaluation back on track.  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  She tells me she is never had an EGD or colonoscopy.  She did have celiac panel done which was negative.  She reports has been in the ER several times since last year with the same symptoms as always nausea and vomiting with diarrhea and rectal bleeding.  She will have pain when she eats.  She tells me the ER doctors told her that think she has gastroparesis.  Although she is never done a gastric emptying study before.  She tells me she was given Reglan at one time but it made her hallucinate so she could not tolerate it so it was stopped.  She tells me she also has iron deficiency anemia and sees hematology for iron infusions.  She tells me she has a a family history of colon cancer with a paternal grandmother.  Patient admits the only medication that has really seem to help her lately is bentyl.  She was given this in the ER and it has just really helped.  She tells me it helps her abdominal pain helps the nausea vomiting it helps diarrhea.  She would like a refill of it.  She also is a history of GERD but she reports the Protonix that she was given last  year did not help.  But she is currently having reflux symptoms.  But she is not taking anything for it.  She reports Zofran does not help with nausea.  And neither does Phenergan.  She has tried both of those in the past.  She denies any dysphagia, constipation, or melena.  She admits her appetite is good but she is afraid to eat because then she gets sick after words.  Looks like she has gained 15 pounds since last November 2019.  She admits occasionally with a lot of diarrhea she will have some rectal bleeding when she wipes.  She admits last time she had any was several weeks ago.  Past Medical History:   Diagnosis Date   • Anemia    • Arthritis     RHEUMATOID   • Asthma    • CPRS 1 (complex regional pain syndrome I) of upper limb    • Depression    • EDS (Tomas-Danlos syndrome)    • Fibromyalgia 2015   • History of hyperkeratosis of skin    • IBS (irritable bowel syndrome)    • Leukopenia, mild    • Poor vision    • Raynauds syndrome    • SOB (shortness of breath)        Past Surgical History:   Procedure Laterality Date   • DENTAL PROCEDURE     • EPIDURAL BLOCK     • SHOULDER SURGERY Bilateral     X2   • TOE SURGERY Right 2011    3rd toe        Scheduled Meds:    Continuous Infusions:No current facility-administered medications for this visit.       PRN Meds:.    Allergies   Allergen Reactions   • Anesthetics, Amide Nausea And Vomiting   • Metoclopramide Nausea And Vomiting       Social History     Socioeconomic History   • Marital status: Single     Spouse name: Not on file   • Number of children: 0   • Years of education: High School   • Highest education level: Not on file   Occupational History     Employer: [employer]   Tobacco Use   • Smoking status: Never Smoker   • Smokeless tobacco: Never Used   Substance and Sexual Activity   • Alcohol use: No   • Drug use: No   • Sexual activity: Never       Family History   Problem Relation Age of Onset   • Rheum arthritis Mother    • Diabetes type II Father     • Hyperlipidemia Father    • Diabetes Father    • Cancer Maternal Uncle         throat cancer    • Arthritis Maternal Uncle    • Asthma Sister    • Migraines Sister    • Tomas-Danlos syndrome Sister    • Asthma Brother    • Leukemia Maternal Grandmother    • Stroke Maternal Grandmother    • Heart disease Maternal Grandmother    • Rheum arthritis Maternal Grandmother    • Prostate cancer Maternal Grandfather    • Stroke Maternal Grandfather    • Diabetes Maternal Grandfather    • Cancer Paternal Grandmother    • Colon cancer Paternal Grandmother    • No Known Problems Brother    • Breast cancer Neg Hx        Review of Systems   Constitutional: Negative for appetite change, chills, diaphoresis, fatigue, fever and unexpected weight change.   HENT: Negative for nosebleeds, postnasal drip, sore throat, trouble swallowing and voice change.    Respiratory: Negative for cough, choking, chest tightness, shortness of breath and wheezing.    Cardiovascular: Negative for chest pain, palpitations and leg swelling.   Gastrointestinal: Positive for abdominal distention, abdominal pain, anal bleeding, diarrhea, nausea and vomiting. Negative for blood in stool, constipation and rectal pain.   Endocrine: Negative for polydipsia, polyphagia and polyuria.   Musculoskeletal: Negative for gait problem.   Skin: Negative for rash and wound.   Allergic/Immunologic: Negative for food allergies.   Neurological: Negative for dizziness, speech difficulty and light-headedness.   Psychiatric/Behavioral: Negative for confusion, self-injury, sleep disturbance and suicidal ideas.       There were no vitals filed for this visit.    Physical Exam  Constitutional:       General: She is not in acute distress.     Appearance: She is well-developed. She is not ill-appearing.   HENT:      Head: Normocephalic.   Eyes:      Pupils: Pupils are equal, round, and reactive to light.   Cardiovascular:      Rate and Rhythm: Normal rate and regular rhythm.       Heart sounds: Normal heart sounds.   Pulmonary:      Effort: Pulmonary effort is normal.      Breath sounds: Normal breath sounds.   Abdominal:      General: Bowel sounds are normal. There is distension.      Palpations: Abdomen is soft. There is no mass.      Tenderness: There is abdominal tenderness. There is no guarding or rebound.      Hernia: No hernia is present.       Musculoskeletal: Normal range of motion.   Skin:     General: Skin is warm and dry.   Neurological:      Mental Status: She is alert and oriented to person, place, and time.   Psychiatric:         Speech: Speech normal.         Behavior: Behavior normal.         Judgment: Judgment normal.         No radiology results for the last 7 days     Assessment and plan     1. Nausea and vomiting, intractability of vomiting not specified, unspecified vomiting type  - Case Request; Standing  - Case Request  - NM Gastric Emptying; Future    2. Rectal bleeding  - Case Request; Standing  - Case Request    3. Lower abdominal pain  - Case Request; Standing  - Case Request  - NM Gastric Emptying; Future    4. Diarrhea, unspecified type  - Case Request; Standing  - Case Request  - NM Gastric Emptying; Future    5. Gastroesophageal reflux disease, unspecified whether esophagitis present  - Case Request; Standing  - Case Request  - NM Gastric Emptying; Future    6. FH: colon cancer  - Case Request; Standing  - Case Request      I did review her ER records with her today.  I also discussed everything over the phone with her mother as well during the visit.  I am not really sure what all the patient has going on.  I definitely think her reflux is not well controlled.  I will start with getting her on Pepcid 20 mg twice daily.  Continue GERD precautions.  I will go ahead and refill the Bentyl since it seems to be helping with the nausea vomiting and diarrhea.  I definitely think given her history and current symptoms that she schedule an EGD and colonoscopy with   Lizabeth for further evaluation.  Patient and mother are agreeable to the scopes.  Patient to call the office with any issues.  Patient to follow-up with me after her scopes.

## 2020-12-07 ENCOUNTER — LAB REQUISITION (OUTPATIENT)
Dept: LAB | Facility: HOSPITAL | Age: 25
End: 2020-12-07

## 2020-12-07 DIAGNOSIS — Z00.00 ENCOUNTER FOR GENERAL ADULT MEDICAL EXAMINATION WITHOUT ABNORMAL FINDINGS: ICD-10-CM

## 2020-12-07 PROCEDURE — U0004 COV-19 TEST NON-CDC HGH THRU: HCPCS | Performed by: INTERNAL MEDICINE

## 2020-12-08 LAB — SARS-COV-2 RNA RESP QL NAA+PROBE: NOT DETECTED

## 2020-12-09 ENCOUNTER — HOSPITAL ENCOUNTER (OUTPATIENT)
Dept: NUCLEAR MEDICINE | Facility: HOSPITAL | Age: 25
Discharge: HOME | End: 2020-12-09

## 2020-12-09 DIAGNOSIS — R19.7 DIARRHEA, UNSPECIFIED TYPE: ICD-10-CM

## 2020-12-09 DIAGNOSIS — K21.9 GASTROESOPHAGEAL REFLUX DISEASE, UNSPECIFIED WHETHER ESOPHAGITIS PRESENT: ICD-10-CM

## 2020-12-09 DIAGNOSIS — R10.30 LOWER ABDOMINAL PAIN: ICD-10-CM

## 2020-12-09 DIAGNOSIS — R11.2 NAUSEA AND VOMITING, INTRACTABILITY OF VOMITING NOT SPECIFIED, UNSPECIFIED VOMITING TYPE: ICD-10-CM

## 2020-12-09 PROCEDURE — 0 TECHNETIUM SULFUR COLLOID: Performed by: NURSE PRACTITIONER

## 2020-12-09 PROCEDURE — 78264 GASTRIC EMPTYING IMG STUDY: CPT

## 2020-12-09 PROCEDURE — A9541 TC99M SULFUR COLLOID: HCPCS | Performed by: NURSE PRACTITIONER

## 2020-12-09 RX ADMIN — TECHNETIUM TC 99M SULFUR COLLOID 1 DOSE: KIT at 07:25

## 2020-12-11 ENCOUNTER — OUTSIDE FACILITY SERVICE (OUTPATIENT)
Dept: GASTROENTEROLOGY | Facility: CLINIC | Age: 25
End: 2020-12-11

## 2020-12-11 ENCOUNTER — TELEPHONE (OUTPATIENT)
Dept: GASTROENTEROLOGY | Facility: CLINIC | Age: 25
End: 2020-12-11

## 2020-12-11 PROCEDURE — 43239 EGD BIOPSY SINGLE/MULTIPLE: CPT | Performed by: INTERNAL MEDICINE

## 2020-12-11 PROCEDURE — 45380 COLONOSCOPY AND BIOPSY: CPT | Performed by: INTERNAL MEDICINE

## 2020-12-11 RX ORDER — PANTOPRAZOLE SODIUM 40 MG/1
40 TABLET, DELAYED RELEASE ORAL DAILY
Qty: 30 TABLET | Refills: 5 | Status: SHIPPED | OUTPATIENT
Start: 2020-12-11 | End: 2021-02-03 | Stop reason: SDUPTHER

## 2020-12-16 ENCOUNTER — TELEPHONE (OUTPATIENT)
Dept: GASTROENTEROLOGY | Facility: CLINIC | Age: 25
End: 2020-12-16

## 2020-12-16 NOTE — TELEPHONE ENCOUNTER
"Call to pt.  States has been taking pantoprazole as ordered on 12/11 without relief of \"excrutiating stomach pain\".  Dicyclomine also does not help.  Reports diarrhea after eating.  Knows has hemorrhoids - notes blood on tissue with BM's.     Frustrated re: persistent symptoms.  Advise will send message to DR Barone 2nd recent scopes.      Verb understanding.  Also requests M Piedad call her.     Message to Manager re: scope notes.    "

## 2020-12-16 NOTE — TELEPHONE ENCOUNTER
Await path from recent EGD-could certainly start her on something like Carafate in the meantime in the hopes that this will help her abdominal pain.  She also needs to be on a bowel regimen to prevent further hemorrhoidal irritation

## 2020-12-16 NOTE — TELEPHONE ENCOUNTER
----- Message from Madelaine Westfall sent at 12/16/2020  9:22 AM EST -----  Regarding: Stomach pains  Contact: [phone]  PT hasn't been able to eat and has been having pain in her stomach and would like to discuss what she can do, please advise

## 2020-12-16 NOTE — TELEPHONE ENCOUNTER
Looks like she had a normal colonoscopy but I do not see any results of the EGD or path report yet.  Dr. Barone what are your thoughts?

## 2020-12-18 RX ORDER — SUCRALFATE 1 G/1
1 TABLET ORAL
Qty: 120 TABLET | Refills: 3 | Status: SHIPPED | OUTPATIENT
Start: 2020-12-18 | End: 2021-04-22

## 2020-12-18 NOTE — TELEPHONE ENCOUNTER
rec benefiber 1 tsp daily (this should not be too much for her).  The biopsies from her egd showed mild inflammation in her stomach but were otherwise normal.  Add carafate as recommended - small meals multiple times daily - move to liquid diet when nausea present-schedule officee f/u in 3-4 weeks with np

## 2020-12-18 NOTE — TELEPHONE ENCOUNTER
"Call to pt . Advise per Dr Barone note.  Verb understanding.      Question to pt if on fiber supplement for bowel regime - states has been advised by richie REESE's not to take fiber.  Instruct drink fluid - states drinking water is difficult because gets full quickly.  Drinks gatorade.  Does not use stool softener, because \"I go too much\".      Asking about path results.    Advise will update DR Barone.     Escribe initiated for carafate 1 gm - take 1 tab by mouth ac and hs prn.  Dissolve in 10 ml water, #120, R3.   "

## 2020-12-21 NOTE — TELEPHONE ENCOUNTER
Call to pt.  Advise per DR Barone note.  Verb understanding.     F/u appt scheduled with BERRY Herbert for 1/12 @ 9am.

## 2021-01-06 ENCOUNTER — TELEPHONE (OUTPATIENT)
Dept: GASTROENTEROLOGY | Facility: CLINIC | Age: 26
End: 2021-01-06

## 2021-01-06 NOTE — TELEPHONE ENCOUNTER
----- Message from MITCHELL Crawford sent at 12/9/2020  4:25 PM EST -----  These call the patient and let her know her gastric emptying study did show some slight delay after 4 hours.  These mail her out a gastroparesis handout.  She needs to work on eating smaller more frequent meals.  We can discuss this further at her next visit.

## 2021-01-06 NOTE — TELEPHONE ENCOUNTER
Called pt and advised of Jessica's note. Verb understanding. Gastroparesis handout mailed to pt's home address.

## 2021-02-03 ENCOUNTER — OFFICE VISIT (OUTPATIENT)
Dept: GASTROENTEROLOGY | Facility: CLINIC | Age: 26
End: 2021-02-03

## 2021-02-03 VITALS — TEMPERATURE: 97 F | BODY MASS INDEX: 24.44 KG/M2 | HEIGHT: 71 IN | WEIGHT: 174.6 LBS

## 2021-02-03 DIAGNOSIS — R19.7 DIARRHEA, UNSPECIFIED TYPE: ICD-10-CM

## 2021-02-03 DIAGNOSIS — K31.84 GASTROPARESIS: ICD-10-CM

## 2021-02-03 DIAGNOSIS — K20.80 ESOPHAGITIS, LOS ANGELES GRADE A: Primary | ICD-10-CM

## 2021-02-03 DIAGNOSIS — R10.30 LOWER ABDOMINAL PAIN: ICD-10-CM

## 2021-02-03 PROCEDURE — 99214 OFFICE O/P EST MOD 30 MIN: CPT | Performed by: NURSE PRACTITIONER

## 2021-02-03 RX ORDER — PANTOPRAZOLE SODIUM 40 MG/1
40 TABLET, DELAYED RELEASE ORAL 2 TIMES DAILY
COMMUNITY
Start: 2020-12-11

## 2021-02-03 RX ORDER — PREDNISONE 20 MG/1
20 TABLET ORAL DAILY
COMMUNITY
Start: 2021-02-01

## 2021-02-03 RX ORDER — SUCRALFATE 1 G/1
TABLET ORAL
COMMUNITY
Start: 2020-12-18 | End: 2021-02-03 | Stop reason: SDUPTHER

## 2021-02-03 RX ORDER — NIFEDIPINE 30 MG/1
TABLET, EXTENDED RELEASE ORAL
COMMUNITY
Start: 2021-02-01 | End: 2022-07-22

## 2021-02-03 NOTE — PROGRESS NOTES
Chief Complaint   Patient presents with   • Follow-up     Post Scopes   • Nausea   • Vomiting   • Diarrhea       Henrietta Garrett is a  26 y.o. female here for a follow up visit for nausea and vomiting.  HPI  26-year-old female presents today for follow-up visit for nausea and vomiting with diarrhea.  She is a patient of Dr. Barone.  She was last seen in the office by me on 11/24/2020.  She underwent gastric emptying study recently that showed significant delay at the end of 4 hours.  Patient admits she is not surprised that she has gastroparesis since that is one of the problems that is linked to her Erler's Danlos syndrome.  She tells me she is tried Reglan in the past and she is now allergic to it.  And she tells me she does not want to try domperidone.  She is also looking at the antibiotics that we use for gastroparesis and she does not want to try those either.  She tells me she is had a really hard time eating lately.  She has me she is not feeling any better.  She does me the Protonix is not working anymore.  She still taking the Carafate but she still having reflux after she eats.  She tells me she tries to eat really small frequent meals throughout the day.  She feels like she is barely getting anything down.  Since she eats she has fecal urgency with diarrhea.  She has been eating things like grilled chicken, crackers, yogurt and Jell-O.  She tells me the Dexilant she tried did not work.  Recently she underwent an EGD and colonoscopy on 12/11/2020.  The EGD showed LA grade a reflux esophagitis with gastritis.  Colonoscopy was unremarkable except for some nonbleeding internal hemorrhoids.  Path was negative.  She tells me she quit taking the Benefiber after 1 time.  She admits it did not help.  She does me now the Bentyl is not working.  She tells me she is just about to 2 years.  She just does not know why she feels so bad.  She tells me she is just tired of feeling this bad.  She does not even want to eat.   She is drinking Gatorade because she tells me she just cannot tolerate water.  Water does not want to stay down.  She is still having lower abdominal pain and cramping.  She is still taking Zofran as needed.  She tells me occasionally she has trouble swallowing.  But that is nothing new.  She denies any constipation, rectal bleeding or melena.  Past Medical History:   Diagnosis Date   • Anemia    • Arthritis     RHEUMATOID   • Asthma    • CPRS 1 (complex regional pain syndrome I) of upper limb    • Depression    • EDS (Tomas-Danlos syndrome)    • Fibromyalgia 2015   • History of hyperkeratosis of skin    • IBS (irritable bowel syndrome)    • Leukopenia, mild    • Poor vision    • Raynauds syndrome    • SOB (shortness of breath)        Past Surgical History:   Procedure Laterality Date   • COLONOSCOPY  12/11/2020    Dr. Barone   • DENTAL PROCEDURE     • EPIDURAL BLOCK     • SHOULDER SURGERY Bilateral     X2   • TOE SURGERY Right 2011    3rd toe    • UPPER GASTROINTESTINAL ENDOSCOPY  12/11/1920    Dr. Barone       Scheduled Meds:    Continuous Infusions:No current facility-administered medications for this visit.       PRN Meds:.    Allergies   Allergen Reactions   • Anesthetics, Amide Nausea And Vomiting   • Ciprofloxacin Other (See Comments)     EHERLIS STANDLIS? ILLNESS. UNABLE TO TAKE    • Metoclopramide Nausea And Vomiting       Social History     Socioeconomic History   • Marital status: Single     Spouse name: Not on file   • Number of children: 0   • Years of education: High School   • Highest education level: Not on file   Occupational History     Employer: [employer]   Tobacco Use   • Smoking status: Never Smoker   • Smokeless tobacco: Never Used   Substance and Sexual Activity   • Alcohol use: No   • Drug use: No   • Sexual activity: Never       Family History   Problem Relation Age of Onset   • Rheum arthritis Mother    • Diabetes type II Father    • Hyperlipidemia Father    • Diabetes Father    •  Cancer Maternal Uncle         throat cancer    • Arthritis Maternal Uncle    • Asthma Sister    • Migraines Sister    • Tomas-Danlos syndrome Sister    • Asthma Brother    • Leukemia Maternal Grandmother    • Stroke Maternal Grandmother    • Heart disease Maternal Grandmother    • Rheum arthritis Maternal Grandmother    • Prostate cancer Maternal Grandfather    • Stroke Maternal Grandfather    • Diabetes Maternal Grandfather    • Cancer Paternal Grandmother    • Colon cancer Paternal Grandmother    • No Known Problems Brother    • Breast cancer Neg Hx        Review of Systems   Constitutional: Positive for appetite change, fatigue and unexpected weight change. Negative for chills, diaphoresis and fever.   HENT: Negative for nosebleeds, postnasal drip, sore throat, trouble swallowing and voice change.    Respiratory: Negative for cough, choking, chest tightness, shortness of breath and wheezing.    Cardiovascular: Negative for chest pain, palpitations and leg swelling.   Gastrointestinal: Positive for abdominal distention, abdominal pain, diarrhea, nausea and vomiting. Negative for anal bleeding, blood in stool, constipation and rectal pain.   Endocrine: Negative for polydipsia, polyphagia and polyuria.   Musculoskeletal: Negative for gait problem.   Skin: Negative for rash and wound.   Allergic/Immunologic: Negative for food allergies.   Neurological: Negative for dizziness, speech difficulty and light-headedness.   Psychiatric/Behavioral: Negative for confusion, self-injury, sleep disturbance and suicidal ideas.       Vitals:    02/03/21 1103   Temp: 97 °F (36.1 °C)       Physical Exam  Constitutional:       General: She is not in acute distress.     Appearance: She is well-developed. She is not ill-appearing.   HENT:      Head: Normocephalic.   Eyes:      Pupils: Pupils are equal, round, and reactive to light.   Cardiovascular:      Rate and Rhythm: Normal rate and regular rhythm.      Heart sounds: Normal heart  sounds.   Pulmonary:      Effort: Pulmonary effort is normal.      Breath sounds: Normal breath sounds.   Abdominal:      General: Bowel sounds are normal. There is distension.      Palpations: Abdomen is soft. There is no mass.      Tenderness: There is abdominal tenderness. There is no guarding or rebound.      Hernia: No hernia is present.       Musculoskeletal: Normal range of motion.   Skin:     General: Skin is warm and dry.   Neurological:      Mental Status: She is alert and oriented to person, place, and time.   Psychiatric:         Speech: Speech normal.         Behavior: Behavior normal.         Judgment: Judgment normal.         No radiology results for the last 7 days      Assessment and plan     1. Esophagitis, [place] grade A    2. Gastroparesis    3. Lower abdominal pain    4. Diarrhea, unspecified type    Reviewed EGD and colonoscopy results with her today.  Sounds like she still having the same symptoms of reflux, nausea vomiting and diarrhea.  I am not sure what to do for her at this point.  I will discuss the case further with Dr. Barone.  I am not sure if she would benefit from a referral to the [facility] GI motility clinic?  Continue current medicine occasions for now.  Continue bland diet for now.  Stay hydrated.  Once I talked to Dr. Barone further I will let the patient know.  Patient is agreeable to the plan.

## 2021-02-10 ENCOUNTER — TELEPHONE (OUTPATIENT)
Dept: GASTROENTEROLOGY | Facility: CLINIC | Age: 26
End: 2021-02-10

## 2021-02-10 NOTE — TELEPHONE ENCOUNTER
----- Message from MITCHELL Crawford sent at 2/10/2021 12:59 PM EST -----  Call the patient with recommendations below from Dr. Barone.  Thanks  ----- Message -----  From: Malini Barone MD  Sent: 2/10/2021  12:48 PM EST  To: MITCHELL Crawford    I agree with considering referral to [facility] but it will take a long time for her to get in.  In the interim, would consider short trial of Reglan perhaps for 4 to 6 weeks.  We discussed the risks of this medication with her.  Given her significant symptoms I think it would be worth trying.  She also seems extremely anxious when I have met her and per your observations depressed, may be helpful to have a mental health provider see her to help her deal with some of these issues as well as help her cope with her medical problems.    lb  ----- Message -----  From: Jessica Herbert APRN  Sent: 2/3/2021   4:28 PM EST  To: Malini Barone MD    Seen today for follow-up.  She still seems pretty miserable.  On the verge of tears.  Now telling me the Bentyl is not working.  None of the reflux meds are working.  She tells me water when he can stay down.  She is drinking Gatorade.  She is pretty much just eating small pieces of chicken, crackers, Jell-O and yogurt.  She does me the Dexilant the Protonix while working.  She thinks maybe the Carafate helps a little bit but not much.  She seems depressed to me.  I did review the gastric emptying study with her which did show delay.  I gave her gastroparesis handouts.  She is allergic to Reglan.  I thought about a referral to [facility] motility clinic?  What are your thoughts?  Thanks

## 2021-02-10 NOTE — TELEPHONE ENCOUNTER
Call to pt.  Advise per DR Barone note.  Verb understanding.      States is allergic to reglan.  Refuses mental health referral.  States willing to proceed with referral to [facility] even if takes months to get in - will at least know there is a plan in place.     Message to Dr Barone.

## 2021-02-16 RX ORDER — DICYCLOMINE HCL 20 MG
20 TABLET ORAL 3 TIMES DAILY PRN
Qty: 270 TABLET | Refills: 1 | Status: SHIPPED | OUTPATIENT
Start: 2021-02-16 | End: 2021-09-17

## 2021-02-16 RX ORDER — FAMOTIDINE 20 MG/1
TABLET, FILM COATED ORAL
Qty: 180 TABLET | Refills: 1 | Status: SHIPPED | OUTPATIENT
Start: 2021-02-16 | End: 2021-09-17

## 2021-02-25 ENCOUNTER — TELEPHONE (OUTPATIENT)
Dept: GASTROENTEROLOGY | Facility: CLINIC | Age: 26
End: 2021-02-25

## 2021-02-25 NOTE — TELEPHONE ENCOUNTER
----- Message from Madelaine Cedillo sent at 2/25/2021 11:52 AM EST -----  Regarding: dr referral  Contact: [phone]  Pt is checking on status of referral to [facility] for Dr Quintana. Please advise pt. Thank you

## 2021-04-16 ENCOUNTER — BULK ORDERING (OUTPATIENT)
Dept: CASE MANAGEMENT | Facility: OTHER | Age: 26
End: 2021-04-16

## 2021-04-16 DIAGNOSIS — Z23 IMMUNIZATION DUE: ICD-10-CM

## 2021-04-22 RX ORDER — SUCRALFATE 1 G/1
1 TABLET ORAL
Qty: 120 TABLET | Refills: 3 | Status: SHIPPED | OUTPATIENT
Start: 2021-04-22 | End: 2022-07-22

## 2021-09-17 RX ORDER — DICYCLOMINE HCL 20 MG
20 TABLET ORAL 3 TIMES DAILY PRN
Qty: 270 TABLET | Refills: 1 | Status: SHIPPED | OUTPATIENT
Start: 2021-09-17 | End: 2022-07-23 | Stop reason: HOSPADM

## 2021-09-17 RX ORDER — FAMOTIDINE 20 MG/1
TABLET, FILM COATED ORAL
Qty: 180 TABLET | Refills: 1 | Status: SHIPPED | OUTPATIENT
Start: 2021-09-17

## 2022-02-24 ENCOUNTER — TRANSCRIBE ORDERS (OUTPATIENT)
Dept: ADMINISTRATIVE | Facility: HOSPITAL | Age: 27
End: 2022-02-24

## 2022-02-24 DIAGNOSIS — R47.02 DYSPHASIA: Primary | ICD-10-CM

## 2022-03-16 ENCOUNTER — APPOINTMENT (OUTPATIENT)
Dept: GENERAL RADIOLOGY | Facility: HOSPITAL | Age: 27
End: 2022-03-16

## 2022-03-16 RX ORDER — DICYCLOMINE HCL 20 MG
20 TABLET ORAL 3 TIMES DAILY PRN
Qty: 270 TABLET | Refills: 1 | OUTPATIENT
Start: 2022-03-16

## 2022-03-16 RX ORDER — FAMOTIDINE 20 MG/1
TABLET, FILM COATED ORAL
Qty: 180 TABLET | Refills: 1 | OUTPATIENT
Start: 2022-03-16

## 2022-03-31 ENCOUNTER — APPOINTMENT (OUTPATIENT)
Dept: GENERAL RADIOLOGY | Facility: HOSPITAL | Age: 27
End: 2022-03-31

## 2022-04-21 ENCOUNTER — APPOINTMENT (OUTPATIENT)
Dept: GENERAL RADIOLOGY | Facility: HOSPITAL | Age: 27
End: 2022-04-21

## 2022-05-09 ENCOUNTER — APPOINTMENT (OUTPATIENT)
Dept: GENERAL RADIOLOGY | Facility: HOSPITAL | Age: 27
End: 2022-05-09

## 2022-05-27 ENCOUNTER — APPOINTMENT (OUTPATIENT)
Dept: GENERAL RADIOLOGY | Facility: HOSPITAL | Age: 27
End: 2022-05-27

## 2022-07-14 ENCOUNTER — APPOINTMENT (OUTPATIENT)
Dept: GENERAL RADIOLOGY | Facility: HOSPITAL | Age: 27
End: 2022-07-14

## 2022-07-22 ENCOUNTER — HOSPITAL ENCOUNTER (OUTPATIENT)
Facility: HOSPITAL | Age: 27
Setting detail: OBSERVATION
Discharge: HOME | End: 2022-07-23
Attending: EMERGENCY MEDICINE | Admitting: EMERGENCY MEDICINE

## 2022-07-22 DIAGNOSIS — R10.32 LEFT LOWER QUADRANT PAIN: Primary | ICD-10-CM

## 2022-07-22 DIAGNOSIS — K62.5 BRIGHT RED BLOOD PER RECTUM: ICD-10-CM

## 2022-07-22 LAB
DEPRECATED RDW RBC AUTO: 43.4 FL (ref 37–54)
ERYTHROCYTE [DISTWIDTH] IN BLOOD BY AUTOMATED COUNT: 13.2 % (ref 12.3–15.4)
HCT VFR BLD AUTO: 36 % (ref 34–46.6)
HGB BLD-MCNC: 11.9 G/DL (ref 12–15.9)
MCH RBC QN AUTO: 30.1 PG (ref 26.6–33)
MCHC RBC AUTO-ENTMCNC: 33.1 G/DL (ref 31.5–35.7)
MCV RBC AUTO: 90.9 FL (ref 79–97)
PLATELET # BLD AUTO: 270 10*3/MM3 (ref 140–450)
PMV BLD AUTO: 10.4 FL (ref 6–12)
RBC # BLD AUTO: 3.96 10*6/MM3 (ref 3.77–5.28)
SARS-COV-2 RNA RESP QL NAA+PROBE: NOT DETECTED
WBC NRBC COR # BLD: 5.27 10*3/MM3 (ref 3.4–10.8)

## 2022-07-22 PROCEDURE — 25010000002 ONDANSETRON PER 1 MG: Performed by: EMERGENCY MEDICINE

## 2022-07-22 PROCEDURE — U0003 INFECTIOUS AGENT DETECTION BY NUCLEIC ACID (DNA OR RNA); SEVERE ACUTE RESPIRATORY SYNDROME CORONAVIRUS 2 (SARS-COV-2) (CORONAVIRUS DISEASE [COVID-19]), AMPLIFIED PROBE TECHNIQUE, MAKING USE OF HIGH THROUGHPUT TECHNOLOGIES AS DESCRIBED BY CMS-2020-01-R: HCPCS | Performed by: EMERGENCY MEDICINE

## 2022-07-22 PROCEDURE — G0378 HOSPITAL OBSERVATION PER HR: HCPCS

## 2022-07-22 PROCEDURE — 96376 TX/PRO/DX INJ SAME DRUG ADON: CPT

## 2022-07-22 PROCEDURE — C9803 HOPD COVID-19 SPEC COLLECT: HCPCS

## 2022-07-22 PROCEDURE — 96361 HYDRATE IV INFUSION ADD-ON: CPT

## 2022-07-22 PROCEDURE — 96374 THER/PROPH/DIAG INJ IV PUSH: CPT

## 2022-07-22 PROCEDURE — 99284 EMERGENCY DEPT VISIT MOD MDM: CPT

## 2022-07-22 PROCEDURE — 85027 COMPLETE CBC AUTOMATED: CPT | Performed by: STUDENT IN AN ORGANIZED HEALTH CARE EDUCATION/TRAINING PROGRAM

## 2022-07-22 PROCEDURE — 99244 OFF/OP CNSLTJ NEW/EST MOD 40: CPT | Performed by: INTERNAL MEDICINE

## 2022-07-22 PROCEDURE — 25010000002 HYDROMORPHONE 1 MG/ML SOLUTION: Performed by: NURSE PRACTITIONER

## 2022-07-22 PROCEDURE — 63710000001 PREDNISONE PER 1 MG: Performed by: NURSE PRACTITIONER

## 2022-07-22 PROCEDURE — 96375 TX/PRO/DX INJ NEW DRUG ADDON: CPT

## 2022-07-22 PROCEDURE — 25010000002 HYDROMORPHONE 1 MG/ML SOLUTION: Performed by: EMERGENCY MEDICINE

## 2022-07-22 RX ORDER — HYDROXYCHLOROQUINE SULFATE 200 MG/1
200 TABLET, FILM COATED ORAL 2 TIMES DAILY
Status: DISCONTINUED | OUTPATIENT
Start: 2022-07-22 | End: 2022-07-23 | Stop reason: HOSPADM

## 2022-07-22 RX ORDER — DICYCLOMINE HYDROCHLORIDE 10 MG/1
20 CAPSULE ORAL 3 TIMES DAILY PRN
Refills: 1 | Status: DISCONTINUED | OUTPATIENT
Start: 2022-07-22 | End: 2022-07-23 | Stop reason: HOSPADM

## 2022-07-22 RX ORDER — CYANOCOBALAMIN 1000 UG/ML
1 INJECTION, SOLUTION INTRAMUSCULAR; SUBCUTANEOUS
COMMUNITY

## 2022-07-22 RX ORDER — SODIUM CHLORIDE 0.9 % (FLUSH) 0.9 %
10 SYRINGE (ML) INJECTION EVERY 12 HOURS SCHEDULED
Status: DISCONTINUED | OUTPATIENT
Start: 2022-07-22 | End: 2022-07-23 | Stop reason: HOSPADM

## 2022-07-22 RX ORDER — ONDANSETRON 2 MG/ML
4 INJECTION INTRAMUSCULAR; INTRAVENOUS ONCE
Status: COMPLETED | OUTPATIENT
Start: 2022-07-22 | End: 2022-07-22

## 2022-07-22 RX ORDER — ONDANSETRON 4 MG/1
4 TABLET, FILM COATED ORAL EVERY 6 HOURS PRN
Status: DISCONTINUED | OUTPATIENT
Start: 2022-07-22 | End: 2022-07-23 | Stop reason: HOSPADM

## 2022-07-22 RX ORDER — ACETAMINOPHEN 325 MG/1
650 TABLET ORAL EVERY 4 HOURS PRN
Status: DISCONTINUED | OUTPATIENT
Start: 2022-07-22 | End: 2022-07-23 | Stop reason: HOSPADM

## 2022-07-22 RX ORDER — LEUCOVORIN CALCIUM 5 MG/1
1 TABLET ORAL WEEKLY
COMMUNITY
Start: 2022-03-07

## 2022-07-22 RX ORDER — PROMETHAZINE HYDROCHLORIDE 25 MG/1
25 TABLET ORAL EVERY 6 HOURS PRN
COMMUNITY
Start: 2022-03-30 | End: 2023-03-25

## 2022-07-22 RX ORDER — NORETHINDRONE ACETATE AND ETHINYL ESTRADIOL, AND FERROUS FUMARATE 1MG-20(24)
1 KIT ORAL DAILY
COMMUNITY

## 2022-07-22 RX ORDER — SILDENAFIL CITRATE 20 MG/1
20 TABLET ORAL DAILY
COMMUNITY

## 2022-07-22 RX ORDER — AMLODIPINE BESYLATE 10 MG/1
10 TABLET ORAL DAILY
COMMUNITY

## 2022-07-22 RX ORDER — FAMOTIDINE 20 MG/1
20 TABLET, FILM COATED ORAL DAILY PRN
Status: DISCONTINUED | OUTPATIENT
Start: 2022-07-22 | End: 2022-07-23 | Stop reason: HOSPADM

## 2022-07-22 RX ORDER — SILDENAFIL CITRATE 20 MG/1
20 TABLET ORAL DAILY
Status: DISCONTINUED | OUTPATIENT
Start: 2022-07-23 | End: 2022-07-23 | Stop reason: HOSPADM

## 2022-07-22 RX ORDER — ONDANSETRON 2 MG/ML
4 INJECTION INTRAMUSCULAR; INTRAVENOUS EVERY 6 HOURS PRN
Status: DISCONTINUED | OUTPATIENT
Start: 2022-07-22 | End: 2022-07-23 | Stop reason: HOSPADM

## 2022-07-22 RX ORDER — NITROGLYCERIN 0.4 MG/1
0.4 TABLET SUBLINGUAL
Status: DISCONTINUED | OUTPATIENT
Start: 2022-07-22 | End: 2022-07-22

## 2022-07-22 RX ORDER — SCOLOPAMINE TRANSDERMAL SYSTEM 1 MG/1
1 PATCH, EXTENDED RELEASE TRANSDERMAL
COMMUNITY
Start: 2022-03-18

## 2022-07-22 RX ORDER — PREDNISONE 20 MG/1
20 TABLET ORAL DAILY
Status: DISCONTINUED | OUTPATIENT
Start: 2022-07-22 | End: 2022-07-23 | Stop reason: HOSPADM

## 2022-07-22 RX ORDER — CHOLECALCIFEROL (VITAMIN D3) 125 MCG
5 CAPSULE ORAL NIGHTLY PRN
Status: DISCONTINUED | OUTPATIENT
Start: 2022-07-22 | End: 2022-07-23 | Stop reason: HOSPADM

## 2022-07-22 RX ORDER — HYDROXYCHLOROQUINE SULFATE 200 MG/1
200 TABLET, FILM COATED ORAL 2 TIMES DAILY
COMMUNITY
Start: 2022-07-21

## 2022-07-22 RX ORDER — SODIUM CHLORIDE, SODIUM LACTATE, POTASSIUM CHLORIDE, CALCIUM CHLORIDE 600; 310; 30; 20 MG/100ML; MG/100ML; MG/100ML; MG/100ML
100 INJECTION, SOLUTION INTRAVENOUS CONTINUOUS
Status: DISCONTINUED | OUTPATIENT
Start: 2022-07-22 | End: 2022-07-23 | Stop reason: HOSPADM

## 2022-07-22 RX ORDER — PANTOPRAZOLE SODIUM 40 MG/1
40 TABLET, DELAYED RELEASE ORAL 2 TIMES DAILY
Status: DISCONTINUED | OUTPATIENT
Start: 2022-07-22 | End: 2022-07-23 | Stop reason: HOSPADM

## 2022-07-22 RX ORDER — AMLODIPINE BESYLATE 10 MG/1
10 TABLET ORAL DAILY
Status: DISCONTINUED | OUTPATIENT
Start: 2022-07-23 | End: 2022-07-23 | Stop reason: HOSPADM

## 2022-07-22 RX ORDER — HYDROCORTISONE ACETATE 25 MG/1
25 SUPPOSITORY RECTAL 2 TIMES DAILY
Status: DISCONTINUED | OUTPATIENT
Start: 2022-07-22 | End: 2022-07-23 | Stop reason: HOSPADM

## 2022-07-22 RX ORDER — SODIUM CHLORIDE 0.9 % (FLUSH) 0.9 %
10 SYRINGE (ML) INJECTION AS NEEDED
Status: DISCONTINUED | OUTPATIENT
Start: 2022-07-22 | End: 2022-07-23 | Stop reason: HOSPADM

## 2022-07-22 RX ADMIN — HYDROCORTISONE ACETATE 25 MG: 25 SUPPOSITORY RECTAL at 22:32

## 2022-07-22 RX ADMIN — Medication 10 ML: at 20:35

## 2022-07-22 RX ADMIN — SODIUM CHLORIDE, POTASSIUM CHLORIDE, SODIUM LACTATE AND CALCIUM CHLORIDE 100 ML/HR: 600; 310; 30; 20 INJECTION, SOLUTION INTRAVENOUS at 20:15

## 2022-07-22 RX ADMIN — ONDANSETRON 4 MG: 2 INJECTION INTRAMUSCULAR; INTRAVENOUS at 13:52

## 2022-07-22 RX ADMIN — PANTOPRAZOLE SODIUM 40 MG: 40 TABLET, DELAYED RELEASE ORAL at 20:34

## 2022-07-22 RX ADMIN — HYDROMORPHONE HYDROCHLORIDE 1 MG: 1 INJECTION, SOLUTION INTRAMUSCULAR; INTRAVENOUS; SUBCUTANEOUS at 13:53

## 2022-07-22 RX ADMIN — HYDROMORPHONE HYDROCHLORIDE 1 MG: 1 INJECTION, SOLUTION INTRAMUSCULAR; INTRAVENOUS; SUBCUTANEOUS at 20:34

## 2022-07-22 RX ADMIN — HYDROXYCHLOROQUINE SULFATE 200 MG: 200 TABLET ORAL at 22:31

## 2022-07-22 RX ADMIN — PREDNISONE 20 MG: 20 TABLET ORAL at 20:34

## 2022-07-22 RX ADMIN — DICYCLOMINE HYDROCHLORIDE 20 MG: 10 CAPSULE ORAL at 20:45

## 2022-07-22 NOTE — PROGRESS NOTES
Clinical Pharmacy Services: Medication History    Henrietta Garrett is a 27 y.o. female presenting to [facility] for   Chief Complaint   Patient presents with   • Abdominal Pain   • Rectal Bleeding       She  has a past medical history of Anemia, Arthritis, Asthma, CPRS 1 (complex regional pain syndrome I) of upper limb, Depression, EDS (Tomas-Danlos syndrome), Fibromyalgia (2015), History of hyperkeratosis of skin, IBS (irritable bowel syndrome), Leukopenia, mild, Poor vision, Raynauds syndrome, and SOB (shortness of breath).    Allergies as of 07/22/2022 - Reviewed 07/22/2022   Allergen Reaction Noted   • Anesthetics, amide Nausea And Vomiting 04/27/2018   • Ciprofloxacin Other (See Comments) 01/07/2021   • Metoclopramide Nausea And Vomiting 01/27/2017       Medication information was obtained from: Patient   Pharmacy and Phone Number:     Prior to Admission Medications     Prescriptions Last Dose Informant Patient Reported? Taking?    amLODIPine (NORVASC) 10 MG tablet 7/22/2022 Self Yes Yes    Take 10 mg by mouth Daily.    cyanocobalamin 1000 MCG/ML injection Past Month Self Yes Yes    Inject 1 mL into the appropriate muscle as directed by prescriber Every 30 (Thirty) Days.    dicyclomine (BENTYL) 20 MG tablet 7/22/2022 Self No Yes    TAKE 1 TABLET BY MOUTH 3 (THREE) TIMES A DAY AS NEEDED (ABD PAIN OR CRAMPING).    famotidine (PEPCID) 20 MG tablet Past Week Self No Yes    TAKE 1 TABLET BY MOUTH TWICE A DAY    Patient taking differently:  Take 20 mg by mouth As Needed.    hydroxychloroquine (PLAQUENIL) 200 MG tablet 7/22/2022 Self Yes Yes    Take 200 mg by mouth 2 (Two) Times a Day.    leucovorin 5 MG tablet Past Week Self Yes Yes    Take 1 tablet by mouth 1 (One) Time Per Week. Mondays    NON FORMULARY 7/21/2022 Self Yes Yes    Take 10 mg by mouth 3 (Three) Times a Day With Meals. Domperidone    norethindrone-ethinyl estradiol-ferrous fumarate (Misa 24 Fe) 1-20 MG-MCG(24) per tablet 7/21/2022 Self  Yes Yes    Take 1 tablet by mouth Daily.    pantoprazole (PROTONIX) 40 MG EC tablet 7/22/2022 Self Yes Yes    Take 40 mg by mouth 2 (Two) Times a Day.    predniSONE (DELTASONE) 20 MG tablet 7/21/2022 Self Yes Yes    Take 20 mg by mouth Daily.    promethazine (PHENERGAN) 25 MG tablet 7/22/2022 Self Yes Yes    Take 25 mg by mouth Every 6 (Six) Hours As Needed.    Scopolamine 1 MG/3DAYS patch Past Month Self Yes Yes    Place 1 patch on the skin as directed by provider Every 72 (Seventy-Two) Hours As Needed.    sildenafil (REVATIO) 20 MG tablet 7/22/2022 Self Yes Yes    Take 20 mg by mouth Daily.            Medication notes:    This medication list is complete to the best of my knowledge as of 7/22/2022    Please call if questions.    Fabio Handley  Medication History Technician  [phone]    7/22/2022 15:41 EDT

## 2022-07-22 NOTE — ED NOTES
Pt to ED from home c/o bright red rectal bleeding beginning yesterday c nausea and LLQ abd pain. Pt reports hx of gastroparesis and lupus and receives infusions 2x week (last infusion Wednesday). SPO2 monitoring placed.    PPE per protocol utilized throughout entire patient encounter.

## 2022-07-22 NOTE — ED TRIAGE NOTES
Bright red rectal bleeding onset yesterday, states covers the bottom of toilet    Mask placed on patient in triage. Triage staff wore appropriate PPE during interaction with patient.

## 2022-07-22 NOTE — ED NOTES
"Nursing report ED to floor  Henrietta Garrett  27 y.o.  female    HPI :   Chief Complaint   Patient presents with   • Abdominal Pain   • Rectal Bleeding       Admitting doctor:   Lencho Carter MD    Admitting diagnosis:   The primary encounter diagnosis was Left lower quadrant pain. A diagnosis of Bright red blood per rectum was also pertinent to this visit.    Code status:   Current Code Status     Date Active Code Status Order ID Comments User Context       Not on file    Advance Care Planning Activity          Allergies:   Anesthetics, amide; Ciprofloxacin; and Metoclopramide    Intake and Output  No intake or output data in the 24 hours ending 07/22/22 1621    Weight:       07/22/22  1320   Weight: 77.1 kg (170 lb)       Most recent vitals:   Vitals:    07/22/22 1242 07/22/22 1320 07/22/22 1321 07/22/22 1331   BP:    142/80   Pulse: 87   88   Resp:       Temp:       TempSrc:       SpO2:   94% 97%   Weight:  77.1 kg (170 lb)     Height:  180.3 cm (71\")         Active LDAs/IV Access:   Lines, Drains & Airways     Active LDAs     Name Placement date Placement time Site Days    Peripheral IV 07/22/22 1324 Right Antecubital 07/22/22  1324  Antecubital  less than 1                Labs (abnormal labs have a star):   Labs Reviewed   COVID-19,[facility] IN-HOUSE CEPHEID/TED, NP SWAB IN TRANSPORT MEDIA 8-12 HR TAT - Normal    Narrative:     Fact sheet for providers: https://www.fda.gov/media/518637/download     Fact sheet for patients: https://www.fda.gov/media/601752/download   COVID PRE-OP / PRE-PROCEDURE SCREENING ORDER (NO ISOLATION)    Narrative:     The following orders were created for panel order COVID PRE-OP / PRE-PROCEDURE SCREENING ORDER (NO ISOLATION) - Swab, Nasopharynx.  Procedure                               Abnormality         Status                     ---------                               -----------         ------                     [facility] IN-HOUSE...[[id number]]  Normal              Final " result                 Please view results for these tests on the individual orders.       EKG:   No orders to display       Meds given in ED:   Medications   HYDROmorphone (DILAUDID) injection 1 mg (1 mg Intravenous Given 7/22/22 1353)   ondansetron (ZOFRAN) injection 4 mg (4 mg Intravenous Given 7/22/22 1352)       Imaging results:  No radiology results for the last day    Ambulatory status:   UP AD RODNEY    Social issues:   Social History     Socioeconomic History   • Marital status: Single   • Number of children: 0   • Years of education: High School   Tobacco Use   • Smoking status: Never Smoker   • Smokeless tobacco: Never Used   Substance and Sexual Activity   • Alcohol use: No   • Drug use: No   • Sexual activity: Never       NIH Stroke Scale:        Nursing report ED to floor:

## 2022-07-22 NOTE — ED PROVIDER NOTES
EMERGENCY DEPARTMENT ENCOUNTER    Room Number:  17/17  Date of encounter:  7/22/2022  PCP: Fanny Jarrett MD  Historian: Patient, mother at bedside    I used full protective equipment while examining this patient.  This includes face mask, gloves and protective eyewear.  I washed my hands before entering the room and immediately upon leaving the room      HPI:  Chief Complaint: GI bleeding, abdominal pain  A complete HPI/ROS/PMH/PSH/SH/FH are unobtainable due to: None    Context: Henrietta Grarett is a 27 y.o. female who presents to the ED c/o GI bleeding, abdominal pain.  Patient reports onset of bright red blood per rectum onset last night.  She passed moderate amount of stool on toilet paper and in the commode.  Bleeding was without stool.  Patient does report left lower quadrant pain which is currently moderate and cramping.  Patient denies nausea or vomiting.  She does have a history of multiple medical problems including lupus, Erler's Danlos syndrome and gastroparesis.  Patient went to [facility] and left without being seen this morning.  She then went to [facility] and apparently had CT scan of the abdomen and labs.  Apparently she spoke with Dr. El her primary care provider who told her to come to our hospital for possible admission.      MEDICAL RECORD REVIEW  I reviewed prior medical records including GI office visits with [facility] gastroenterology.  Patient does have a history of gastroparesis.  I did did count and find records from [facility] from earlier this morning.  CT scan of the abdomen was unremarkable and labs were relatively benign with normal white blood count and red blood count.  Chemistry is also unremarkable with sodium of 134.    PAST MEDICAL HISTORY  Active Ambulatory Problems     Diagnosis Date Noted   • SOB (shortness of breath) 04/27/2018   • Iron deficiency anemia 04/27/2018   • Vitamin D deficiency 04/27/2018   • Fibromyalgia 07/17/2015   •  Tomas-Danlos syndrome 05/04/2018   • Complex regional pain syndrome type 1 of right lower extremity 05/04/2018   • Rheumatoid arthritis involving multiple sites (HCC) 05/04/2018   • Raynaud's disease without gangrene 05/04/2018   • Irritable bowel syndrome with both constipation and diarrhea 05/04/2018   • Exercise-induced asthma 05/04/2018   • Lupus (systemic lupus erythematosus) (HCC) 05/04/2018   • Hemoptysis 11/09/2018   • Chest wall pain 11/09/2018   • Abnormal white blood cell (WBC) count 11/09/2018   • Adverse effect of iron 11/09/2018   • B12 deficiency 11/09/2018   • Lymphadenopathy, axillary 11/30/2018   • Nausea & vomiting 04/12/2019     Resolved Ambulatory Problems     Diagnosis Date Noted   • Right calf pain 04/27/2018     Past Medical History:   Diagnosis Date   • Anemia    • Arthritis    • Asthma    • CPRS 1 (complex regional pain syndrome I) of upper limb    • Depression    • EDS (Tomas-Danlos syndrome)    • History of hyperkeratosis of skin    • IBS (irritable bowel syndrome)    • Leukopenia, mild    • Poor vision    • Raynauds syndrome          PAST SURGICAL HISTORY  Past Surgical History:   Procedure Laterality Date   • COLONOSCOPY  12/11/2020    Dr. Barone   • DENTAL PROCEDURE     • EPIDURAL BLOCK     • SHOULDER SURGERY Bilateral     X2   • TOE SURGERY Right 2011    3rd toe    • UPPER GASTROINTESTINAL ENDOSCOPY  12/11/1920    Dr. Barone         FAMILY HISTORY  Family History   Problem Relation Age of Onset   • Rheum arthritis Mother    • Diabetes type II Father    • Hyperlipidemia Father    • Diabetes Father    • Cancer Maternal Uncle         throat cancer    • Arthritis Maternal Uncle    • Asthma Sister    • Migraines Sister    • Tomas-Danlos syndrome Sister    • Asthma Brother    • Leukemia Maternal Grandmother    • Stroke Maternal Grandmother    • Heart disease Maternal Grandmother    • Rheum arthritis Maternal Grandmother    • Prostate cancer Maternal Grandfather    • Stroke Maternal  Grandfather    • Diabetes Maternal Grandfather    • Cancer Paternal Grandmother    • Colon cancer Paternal Grandmother    • No Known Problems Brother    • Breast cancer Neg Hx          SOCIAL HISTORY  Social History     Socioeconomic History   • Marital status: Single   • Number of children: 0   • Years of education: High School   Tobacco Use   • Smoking status: Never Smoker   • Smokeless tobacco: Never Used   Substance and Sexual Activity   • Alcohol use: No   • Drug use: No   • Sexual activity: Never         ALLERGIES  Anesthetics, amide; Ciprofloxacin; and Metoclopramide       REVIEW OF SYSTEMS  Review of Systems   Constitutional: Negative.  Negative for fever.   HENT: Negative.  Negative for sore throat.    Eyes: Negative.    Respiratory: Negative.  Negative for cough.    Cardiovascular: Negative.  Negative for chest pain.   Gastrointestinal: Positive for abdominal pain and blood in stool.   Genitourinary: Negative.  Negative for dysuria.   Musculoskeletal: Negative.  Negative for back pain.   Skin: Negative.  Negative for rash.   Neurological: Negative.  Negative for headaches.   All other systems reviewed and are negative.          PHYSICAL EXAM    I have reviewed the triage vital signs and nursing notes.    ED Triage Vitals   Temp Heart Rate Resp BP SpO2   07/22/22 1240 07/22/22 1242 07/22/22 1240 07/22/22 1240 07/22/22 1321   98.8 °F (37.1 °C) 87 16 (!) 140/108 94 %      Temp src Heart Rate Source Patient Position BP Location FiO2 (%)   07/22/22 1240 -- -- -- --   Tympanic           Physical Exam  GENERAL: Alert female in mild distress.  Triage vitals reviewed notable for elevated blood pressure of 140/108, pulse 87  HENT: nares patent  EYES: no scleral icterus  CV: regular rhythm, regular rate  RESPIRATORY: normal effort, clear to auscultation bilaterally  ABDOMEN: soft, mild left lower quadrant tenderness to palpation  MUSCULOSKELETAL: no deformity  NEURO: Strength sensation and coordination are grossly  intact.  Speech and mentation are unremarkable  SKIN: warm, dry      LAB RESULTS  Recent Results (from the past 24 hour(s))   HCG, SERUM, QUALITATIVE    Collection Time: 07/22/22  5:35 AM    Specimen: Blood   Result Value Ref Range    HCG Qualitative Neg Negative   PT AND PTT    Collection Time: 07/22/22  5:35 AM    Specimen: Blood   Result Value Ref Range    Protime 11.4 9.1 - 12.2 Second    INR 0.95 INR INR    PTT 33.9 23.1 - 34.6 Second   AUTODIFF    Collection Time: 07/22/22  5:35 AM    Specimen: Blood   Result Value Ref Range    Neutrophil % 80.4 (H) 34.0 - 69.5 %    Lymphocyte % 12.3 (L) 20.0 - 53.0 %    Monocyte % 5.8 5.0 - 12.5 %    Eosinophil % 0.8 0.7 - 6.0 %    Basophil % 0.7 0.0 - 3.0 %    Neutrophils Absolute 6.60 (H) 1.70 - 6.00 x10(3)/ul    Lymphocytes Absolute 1.0 0.8 - 3.2 x10(3)/ul    Monocytes Absolute 0.5 0.2 - 1.4 x10(3)/ul    Eosinophils Absolute 0.1 0.0 - 0.6 x10(3)/ul    Basophils Absolute 0.1 0.0 - 0.3 x10(3)/ul   Urinalysis With Culture If Indicated -    Collection Time: 07/22/22  5:42 AM    Specimen: Urine   Result Value Ref Range    Specimen Type: U CleanCatch     Color, UA YELLOW     Appearance, UA CLEAR Clear    Specific Gravity, UA >=1.030 1.003 - 1.030    pH, UA 6 5.0 - 8.0    Leuk Esterase, UA NEGATIVE Negative    Nitrite, UA NEGATIVE Negative    Protein, UA NEGATIVE Negative    Glucose, UA NEGATIVE Negative    Ketones, UA NEGATIVE Negative    Urobilinogen, UA 0.2 0.1 - 1.0 EU/dL    Bilirubin, UA NEGATIVE Negative    Blood, UA NEGATIVE Negative       Ordered the above labs and independently reviewed the results.      RADIOLOGY  No Radiology Exams Resulted Within Past 24 Hours    I ordered the above noted radiological studies. Reviewed by me and discussed with radiologist.  See dictation for official radiology interpretation.      PROCEDURES  Procedures      MEDICATIONS GIVEN IN ER    Medications   HYDROmorphone (DILAUDID) injection 1 mg (1 mg Intravenous Given 7/22/22 3063)    ondansetron (ZOFRAN) injection 4 mg (4 mg Intravenous Given 7/22/22 1352)         PROGRESS, DATA ANALYSIS, CONSULTS, AND MEDICAL DECISION MAKING    All labs have been independently reviewed by me.  All radiology studies have been reviewed by me and discussed with radiologist dictating the report.   EKG's independently viewed and interpreted by me.  Discussion below represents my analysis of pertinent findings related to patient's condition, differential diagnosis, treatment plan and final disposition.      ED Course as of 07/22/22 1415   Fri Jul 22, 2022   1343 BXE-75-lvic-old female with multiple medical problems including lupus, Erler's Danlos syndrome and gastroparesis presents with rectal bleeding and left lower quadrant pain.    She states that Dr. El wanted her to be admitted to the hospital.  Labs and CT are fairly unremarkable.  Could certainly admit for observation status related to lower GI bleeding and possible GI consultation.  Patient family are not sure that they really want to stay in the hospital but would like me to contact Dr. El.  We will go ahead and make a call out to Dr. Gerald jaquez.  We will go ahead and give some IV Dilaudid and Zofran to help with pain and nausea. [DB]   2044 I did review CT scan and labs from visit at [facility] earlier today.  CT scan unremarkable without intra-abdominal bowel pathology.  Labs also benign with normal hemoglobin and white count.  Chemistries unremarkable.    At this point patient and family are still concerned about left lower quadrant pain and bright red blood per bleeding.  Will admit to observation unit for further observation of GI bleeding and likely GI consultation. [DB]      ED Course User Index  [DB] Quentin Gamble MD       AS OF 14:15 EDT VITALS:    BP - 142/80  HR - 88  TEMP - 98.8 °F (37.1 °C) (Tympanic)  O2 SATS - 97%      DIAGNOSIS  Final diagnoses:   Left lower quadrant pain   Bright red blood per rectum          DISPOSITION  Observation admission         Quentin Gamble MD  07/22/22 0233

## 2022-07-22 NOTE — H&P
.   [facility]   HISTORY AND PHYSICAL    Patient Name: Henrietta Garrett  : 1995  MRN: [id number]  Primary Care Physician:  Fanny Jarrett MD  Date of admission: 2022    Subjective   Subjective     Chief Complaint: Bleeding    HPI:    Henrietta Garrett is a 27 y.o. female past medical history including but not limited to depression, EDS, fibromyalgia, IBS, and Raynaud's syndrome presents to [facility] with rectal bleeding abdominal pain.  She states symptoms of bright red blood per rectum that started last night.  Reports bright red blood without any stool.  Patient reports left lower quadrant pain described as cramping and currently 8 on a scale 0-10.  Patient reports nausea but denies vomiting.  Report history of lupus and gastroparesis.  Patient reports history of hemorrhoids and frequent flareup but without large amounts of blood per rectum.  Patient reports she was seen at [facility] and had a CT scan of the abdomen and labs.  States she spoke with her PCP and was told to go to [facility] for possible admission.  Patient denies headache, dizziness, lightheadedness.  Denies chest pain, dyspnea, or abdominal pain.  Denies cough, chills, fever, or lower extremity air.  Denies hemoptysis or hematemesis.  Denies dysuria, hematuria, increased urinary frequency/urgency.    Review of Systems   All systems were reviewed and negative except for: All systems were reviewed and negative except for the mention above in HPI    Personal History     Past Medical History:   Diagnosis Date   • Anemia    • Arthritis     RHEUMATOID   • Asthma    • CPRS 1 (complex regional pain syndrome I) of upper limb    • Depression    • EDS (Tomas-Danlos syndrome)    • Fibromyalgia    • History of hyperkeratosis of skin    • IBS (irritable bowel syndrome)    • Leukopenia, mild    • Poor vision    • Raynauds syndrome    • SOB (shortness of breath)        Past Surgical History:    Procedure Laterality Date   • COLONOSCOPY  12/11/2020    Dr. Braone   • DENTAL PROCEDURE     • EPIDURAL BLOCK     • SHOULDER SURGERY Bilateral     X2   • TOE SURGERY Right 2011    3rd toe    • UPPER GASTROINTESTINAL ENDOSCOPY  12/11/1920    Dr. Barone       Family History: family history includes Arthritis in her maternal uncle; Asthma in her brother and sister; Cancer in her maternal uncle and paternal grandmother; Colon cancer in her paternal grandmother; Diabetes in her father and maternal grandfather; Diabetes type II in her father; Tomas-Danlos syndrome in her sister; Heart disease in her maternal grandmother; Hyperlipidemia in her father; Leukemia in her maternal grandmother; Migraines in her sister; No Known Problems in her brother; Prostate cancer in her maternal grandfather; Rheum arthritis in her maternal grandmother and mother; Stroke in her maternal grandfather and maternal grandmother. Otherwise pertinent FHx was reviewed and not pertinent to current issue.    Social History:  reports that she has never smoked. She has never used smokeless tobacco. She reports that she does not drink alcohol and does not use drugs.    Home Medications:  NON FORMULARY, Scopolamine, amLODIPine, cyanocobalamin, dicyclomine, famotidine, hydroxychloroquine, leucovorin, norethindrone-ethinyl estradiol-ferrous fumarate, pantoprazole, predniSONE, promethazine, and sildenafil    Allergies:  Allergies   Allergen Reactions   • Anesthetics, Amide Nausea And Vomiting   • Ciprofloxacin Other (See Comments)     EHERLIS STANDLIS? ILLNESS. UNABLE TO TAKE    • Metoclopramide Nausea And Vomiting       Objective   Objective     Vitals:   Temp:  [97.7 °F (36.5 °C)-98.8 °F (37.1 °C)] 97.7 °F (36.5 °C)  Heart Rate:  [60-88] 87  Resp:  [16] 16  BP: (110-142)/() 138/81  Physical Exam    Constitutional: Awake, alert, no acute distress   Eyes: PERRLA, sclerae anicteric, no conjunctival injection   HENT: NCAT, mucous membranes  moist   Neck: Supple, no thyromegaly, no lymphadenopathy, trachea midline   Respiratory: Clear to auscultation bilaterally, nonlabored respirations, no wheezing or stridor   Cardiovascular: RRR, S1 and S2, no S3 or S4, no murmurs, rubs, or gallops, palpable pedal pulses bilaterally   Gastrointestinal: Positive bowel sounds, soft, nontender, nondistended   Musculoskeletal: No bilateral ankle edema, no clubbing or cyanosis to extremities   Psychiatric: Appropriate affect, cooperative   Neurologic: Oriented x 3, strength symmetric in all extremities, Cranial Nerves grossly intact to confrontation, speech clear   Skin: No rashes     Result Review    Result Review:  I have personally reviewed the results from the time of this admission to 7/22/2022 19:26 EDT and agree with these findings:  [x]  Laboratory list / accordion  []  Microbiology  []  Radiology  []  EKG/Telemetry   []  Cardiology/Vascular   []  Pathology  []  Old records  []  Other:  Most notable findings include:    WBC 6.9, hemoglobin 11.9, platelets 219, PT 11.4, INR 0.95, and PTT 33.9    Assessment & Plan   Assessment / Plan     Brief Patient Summary:  Henrietta Garrett is a 27 y.o. female who was seen and evaluated at the ED for rectal bleeding.  Patient is being admitted to observation unit for further evaluation and GI consult.    Active Hospital Problems:  Active Hospital Problems    Diagnosis    • Rectal bleeding      Plan:   Rectal bleeding  -Initial hemoglobin 11.9, trend  -GI consult  -Antiemetic and analgesic as needed  -N.p.o. after midnight  -IVF overnight  -Per chart review, patient had CT abdomen at [facility] that was negative for any acute or acute findings.  There is 3 cm simple appearing left adnexal cyst    Lupus  -Chronic condition, continue home medications      DVT prophylaxis:  Mechanical DVT prophylaxis orders are present.    CODE STATUS:    Code Status (Patient has no pulse and is not breathing): CPR (Attempt to Resuscitate)  Medical  Interventions (Patient has pulse or is breathing): Full Support    Admission Status:  I believe this patient meets the observation status.    I wore an face mask, eye protection, and gloves during this patient encounter. Patient also wearing a surgical mask. Hand hygeine performed before and after seeing the patient.    Electronically signed by MITCHELL Sidhu, 07/22/22, 7:26 PM EDT.

## 2022-07-22 NOTE — CONSULTS
[facility]  Initial Inpatient Consult Note    Referring Provider: Nyasia/ Edel Fernandez    Reason for Consultation: rectal bleeding    Subjective     History of present illness:      Thank you for requesting my opinion.    This is a 27-year-old patient of Dr. Barone's with a history of gastroparesis, GERD IBS and Tomas-Danlos who presented with 1 day of rectal bleeding.  Symptoms started yesterday.  She felt like she needed to have a bowel movement but just passed bright red blood per rectum.  She said she was just passing blood, no stool.  She says the blood was dripping into the toilet and eventually she was using toilet paper to soak up the blood and reports saturating a number of tissues.  This has happened for further times.  She denies any known precipitating factors.  She has not had a bowel movement in 4 days which she attributes to her gastroparesis.    She initially presented to [facility] but was not admitted.  She called her primary care provider who told her to come to [facility] because she needed to be admitted for the bleeding.    CT imaging at [facility] is reviewed and notable only for a simple left adnexal cyst, no inflammation of the GI tract.  Her hemoglobin is 11.9 here which is congruent with her baseline going back 2 years.    She is on domperidone for her gastroparesis.    She did have an EGD and colonoscopy in December 2020 with Dr. Barone.  EGD was notable for small hiatal hernia and LA class a esophagitis.  Colonoscopy was notable for internal hemorrhoids.    Past Medical History:  Past Medical History:   Diagnosis Date   • Anemia    • Arthritis     RHEUMATOID   • Asthma    • CPRS 1 (complex regional pain syndrome I) of upper limb    • Depression    • EDS (Tomas-Danlos syndrome)    • Fibromyalgia 2015   • History of hyperkeratosis of skin    • IBS (irritable bowel syndrome)    • Leukopenia, mild    • Poor vision    • Raynauds syndrome    • SOB (shortness of breath)         Past Surgical History:  Past Surgical History:   Procedure Laterality Date   • COLONOSCOPY  12/11/2020    Dr. Barone   • DENTAL PROCEDURE     • EPIDURAL BLOCK     • SHOULDER SURGERY Bilateral     X2   • TOE SURGERY Right 2011    3rd toe    • UPPER GASTROINTESTINAL ENDOSCOPY  12/11/1920    Dr. Barone        Social History:   Social History     Tobacco Use   • Smoking status: Never Smoker   • Smokeless tobacco: Never Used   Substance Use Topics   • Alcohol use: No        Family History:  Family History   Problem Relation Age of Onset   • Rheum arthritis Mother    • Diabetes type II Father    • Hyperlipidemia Father    • Diabetes Father    • Cancer Maternal Uncle         throat cancer    • Arthritis Maternal Uncle    • Asthma Sister    • Migraines Sister    • Tomas-Danlos syndrome Sister    • Asthma Brother    • Leukemia Maternal Grandmother    • Stroke Maternal Grandmother    • Heart disease Maternal Grandmother    • Rheum arthritis Maternal Grandmother    • Prostate cancer Maternal Grandfather    • Stroke Maternal Grandfather    • Diabetes Maternal Grandfather    • Cancer Paternal Grandmother    • Colon cancer Paternal Grandmother    • No Known Problems Brother    • Breast cancer Neg Hx        Home Meds:  Medications Prior to Admission   Medication Sig Dispense Refill Last Dose   • amLODIPine (NORVASC) 10 MG tablet Take 10 mg by mouth Daily.   7/22/2022 at Unknown time   • cyanocobalamin 1000 MCG/ML injection Inject 1 mL into the appropriate muscle as directed by prescriber Every 30 (Thirty) Days.   Past Month at Unknown time   • dicyclomine (BENTYL) 20 MG tablet TAKE 1 TABLET BY MOUTH 3 (THREE) TIMES A DAY AS NEEDED (ABD PAIN OR CRAMPING). 270 tablet 1 7/22/2022 at Unknown time   • famotidine (PEPCID) 20 MG tablet TAKE 1 TABLET BY MOUTH TWICE A DAY (Patient taking differently: Take 20 mg by mouth As Needed.) 180 tablet 1 Past Week at Unknown time   • hydroxychloroquine (PLAQUENIL) 200 MG tablet Take 200 mg by  mouth 2 (Two) Times a Day.   7/22/2022 at Unknown time   • leucovorin 5 MG tablet Take 1 tablet by mouth 1 (One) Time Per Week. Mondays   Past Week at Unknown time   • NON FORMULARY Take 10 mg by mouth 3 (Three) Times a Day With Meals. Domperidone   7/21/2022 at Unknown time   • norethindrone-ethinyl estradiol-ferrous fumarate (Misa 24 Fe) 1-20 MG-MCG(24) per tablet Take 1 tablet by mouth Daily.   7/21/2022 at Unknown time   • pantoprazole (PROTONIX) 40 MG EC tablet Take 40 mg by mouth 2 (Two) Times a Day.   7/22/2022 at Unknown time   • predniSONE (DELTASONE) 20 MG tablet Take 20 mg by mouth Daily.   7/21/2022 at Unknown time   • promethazine (PHENERGAN) 25 MG tablet Take 25 mg by mouth Every 6 (Six) Hours As Needed.   7/22/2022 at Unknown time   • Scopolamine 1 MG/3DAYS patch Place 1 patch on the skin as directed by provider Every 72 (Seventy-Two) Hours As Needed.   Past Month at Unknown time   • sildenafil (REVATIO) 20 MG tablet Take 20 mg by mouth Daily.   7/22/2022 at Unknown time       Current Meds:   [START ON 7/23/2022] amLODIPine, 10 mg, Oral, Daily  HYDROmorphone, 1 mg, Intravenous, Once  hydroxychloroquine, 200 mg, Oral, BID  pantoprazole, 40 mg, Oral, BID  predniSONE, 20 mg, Oral, Daily  [START ON 7/23/2022] sildenafil, 20 mg, Oral, Daily  sodium chloride, 10 mL, Intravenous, Q12H        Allergies:  Allergies   Allergen Reactions   • Anesthetics, Amide Nausea And Vomiting   • Ciprofloxacin Other (See Comments)     EHERLIS STANDLIS? ILLNESS. UNABLE TO TAKE    • Metoclopramide Nausea And Vomiting       Review of Systems  All systems were reviewed and negative except for:  Gastrointestinal: positive for  bright red blood per rectum     Objective     Vital Signs  Temp:  [97.7 °F (36.5 °C)-98.8 °F (37.1 °C)] 97.7 °F (36.5 °C)  Heart Rate:  [60-88] 87  Resp:  [16] 16  BP: (110-142)/() 138/81    Physical Exam:  Constitutional:   Alert, cooperative, in no acute distress, appears stated age   Eyes:            Lids and lashes normal, conjunctivae and sclerae normal,   no icterus   Ears, nose, mouth and throat:  Normal appearance of external ears and nose, no oral l  lesions, no thrush, oral mucosa moist   Respiratory:    Clear to auscultation, respirations regular, even and             unlabored    Cardiovascular:   Regular rhythm and normal rate, normal S1 and S2, no        murmur, no gallop, palpable distal pulses, no lower extremity edema   Gastrointestinal:    Soft, nondistended, nontender to palpation, no guarding, no rebound tenderness, normal bowel sounds, no palpable masses or organomegaly  Rectal exam: deferred   Musculoskeletal:  Normal station, no atrophy, no tenderness to palpation, normal digits and nails   Skin:  Normal color, no bleeding, bruising, rashes or lesions   Lymphatics:  No palpable cervical or supraclavicular adenopathy   Psychiatric:  Judgement and insight: normal   Orientation to person, place and time: normal   Mood and affect: normal       Results Review:   I reviewed the patient's new clinical results.    Results from last 7 days   Lab Units 07/22/22  1912   WBC 10*3/mm3 5.27   HEMOGLOBIN g/dL 11.9*   HEMATOCRIT % 36.0   PLATELETS 10*3/mm3 270             Invalid input(s): LABALBU, PROT    Results from last 7 days   Lab Units 07/22/22  0535   INR INR 0.95       Lab Results   Lab Value Date/Time    LIPASE 80 01/05/2020 1030    LIPASE 53 07/20/2018 1151       Radiology:  Imaging Results (Last 72 Hours)     ** No results found for the last 72 hours. **          Assessment & Plan       Rectal bleeding      Impression  1.  Rectal bleeding: Suspected hemorrhoidal by description; reassuring CT scan, stable hemoglobin    2.  History of gastroparesis: Followed by [facility] motility clinic, on domperidone    3.  History of esophagitis    Plan  Continue clear liquid diet  Anusol suppositories  Follow hemoglobin  Monitor stools  If she persists with bleeding or decline in her hemoglobin we will look  towards prepping her tomorrow for possible colonoscopy Sunday    I discussed the patients findings and my recommendations with patient, family and nursing staff    All necessary PPE, including face mask and eye protection, were worn during this encounter.  Hand sanitization was performed both before and after the patient interaction.    Mary Ann Sarmiento MD  [facility]      Dictated utilizing Dragon dictation

## 2022-07-22 NOTE — CASE MANAGEMENT/SOCIAL WORK
Discharge Planning Assessment  [facility]     Patient Name: Henrietta Garrett  MRN: [id number]  Today's Date: 7/22/2022    Admit Date: 7/22/2022     Discharge Needs Assessment     Row Name 07/22/22 1553       Living Environment    People in Home alone    Current Living Arrangements home    Primary Care Provided by self    Provides Primary Care For no one    Family Caregiver if Needed parent(s)    Quality of Family Relationships helpful;involved;supportive    Able to Return to Prior Arrangements yes       Resource/Environmental Concerns    Resource/Environmental Concerns none    Transportation Concerns none       Transition Planning    Patient/Family Anticipates Transition to home with family    Patient/Family Anticipated Services at Transition none    Transportation Anticipated family or friend will provide       Discharge Needs Assessment    Readmission Within the Last 30 Days no previous admission in last 30 days    Equipment Currently Used at Home none    Concerns to be Addressed no discharge needs identified;denies needs/concerns at this time    Anticipated Changes Related to Illness none    Equipment Needed After Discharge none               Discharge Plan     Row Name 07/22/22 1555       Plan    Plan Plan for patient to return home upon discharge, where she lives in private residence. No discharge needs anticipated. Can arrange own transport. Patient uses [facility] on [address] and [facility] [address] [facility]. She is enrolled in the Meds to Beds program.    Patient/Family in Agreement with Plan yes    Plan Comments Home upon discharge, no needs anticipated.              Continued Care and Services - Admitted Since 7/22/2022    Coordination has not been started for this encounter.          Demographic Summary     Row Name 07/22/22 1559       General Information    Admission Type observation    Arrived From home    Required Notices Provided Observation Status Notice    Referral Source admission list;emergency department     Reason for Consult discharge planning    Preferred Language English       Contact Information    Permission Granted to Share Info With family/designee               Functional Status     Row Name 07/22/22 1552       Functional Status, IADL    Medications independent    Meal Preparation independent    Housekeeping independent    Laundry independent    Shopping independent       Mental Status    General Appearance WDL WDL       Mental Status Summary    Recent Changes in Mental Status/Cognitive Functioning no changes               Psychosocial     Row Name 07/22/22 1552       Values/Beliefs    Spiritual, Cultural Beliefs, Sikhism Practices, Values that Affect Care no       Behavior WDL    Behavior WDL WDL       Emotion Mood WDL    Emotion/Mood/Affect WDL WDL       Speech WDL    Speech WDL WDL       Perceptual State WDL    Perceptual State WDL WDL       Thought Process WDL    Thought Process WDL WDL       Intellectual Performance WDL    Intellectual Performance WDL WDL    Level of Consciousness Alert       Developmental Stage (Eriksson's)    Developmental Stage Stage 6 (18-35 years/Young Adulthood) Intimacy vs. Isolation               Abuse/Neglect    No documentation.                Legal    No documentation.                Substance Abuse    No documentation.                Patient Forms    No documentation.                   Abel Mckenzie RN

## 2022-07-23 VITALS
HEIGHT: 71 IN | TEMPERATURE: 98.5 F | BODY MASS INDEX: 25.62 KG/M2 | DIASTOLIC BLOOD PRESSURE: 76 MMHG | OXYGEN SATURATION: 98 % | HEART RATE: 63 BPM | RESPIRATION RATE: 18 BRPM | WEIGHT: 183 LBS | SYSTOLIC BLOOD PRESSURE: 126 MMHG

## 2022-07-23 LAB
ANION GAP SERPL CALCULATED.3IONS-SCNC: 10.6 MMOL/L (ref 5–15)
BUN SERPL-MCNC: 4 MG/DL (ref 6–20)
BUN/CREAT SERPL: 6.9 (ref 7–25)
CALCIUM SPEC-SCNC: 9.4 MG/DL (ref 8.6–10.5)
CHLORIDE SERPL-SCNC: 101 MMOL/L (ref 98–107)
CO2 SERPL-SCNC: 21.4 MMOL/L (ref 22–29)
CREAT SERPL-MCNC: 0.58 MG/DL (ref 0.57–1)
DEPRECATED RDW RBC AUTO: 43.6 FL (ref 37–54)
EGFRCR SERPLBLD CKD-EPI 2021: 127.4 ML/MIN/1.73
ERYTHROCYTE [DISTWIDTH] IN BLOOD BY AUTOMATED COUNT: 13.1 % (ref 12.3–15.4)
GLUCOSE SERPL-MCNC: 116 MG/DL (ref 65–99)
HCT VFR BLD AUTO: 36.5 % (ref 34–46.6)
HGB BLD-MCNC: 12.1 G/DL (ref 12–15.9)
MCH RBC QN AUTO: 30.4 PG (ref 26.6–33)
MCHC RBC AUTO-ENTMCNC: 33.2 G/DL (ref 31.5–35.7)
MCV RBC AUTO: 91.7 FL (ref 79–97)
PLATELET # BLD AUTO: 266 10*3/MM3 (ref 140–450)
PMV BLD AUTO: 10.7 FL (ref 6–12)
POTASSIUM SERPL-SCNC: 4.1 MMOL/L (ref 3.5–5.2)
RBC # BLD AUTO: 3.98 10*6/MM3 (ref 3.77–5.28)
SODIUM SERPL-SCNC: 133 MMOL/L (ref 136–145)
WBC NRBC COR # BLD: 4.51 10*3/MM3 (ref 3.4–10.8)

## 2022-07-23 PROCEDURE — 96376 TX/PRO/DX INJ SAME DRUG ADON: CPT

## 2022-07-23 PROCEDURE — 25010000002 DIPHENHYDRAMINE PER 50 MG: Performed by: STUDENT IN AN ORGANIZED HEALTH CARE EDUCATION/TRAINING PROGRAM

## 2022-07-23 PROCEDURE — 85027 COMPLETE CBC AUTOMATED: CPT | Performed by: STUDENT IN AN ORGANIZED HEALTH CARE EDUCATION/TRAINING PROGRAM

## 2022-07-23 PROCEDURE — 80048 BASIC METABOLIC PNL TOTAL CA: CPT | Performed by: STUDENT IN AN ORGANIZED HEALTH CARE EDUCATION/TRAINING PROGRAM

## 2022-07-23 PROCEDURE — 25010000002 ONDANSETRON PER 1 MG: Performed by: STUDENT IN AN ORGANIZED HEALTH CARE EDUCATION/TRAINING PROGRAM

## 2022-07-23 PROCEDURE — 25010000002 PROCHLORPERAZINE 10 MG/2ML SOLUTION: Performed by: STUDENT IN AN ORGANIZED HEALTH CARE EDUCATION/TRAINING PROGRAM

## 2022-07-23 PROCEDURE — 96361 HYDRATE IV INFUSION ADD-ON: CPT

## 2022-07-23 PROCEDURE — 96375 TX/PRO/DX INJ NEW DRUG ADDON: CPT

## 2022-07-23 PROCEDURE — 99214 OFFICE O/P EST MOD 30 MIN: CPT | Performed by: INTERNAL MEDICINE

## 2022-07-23 PROCEDURE — G0378 HOSPITAL OBSERVATION PER HR: HCPCS

## 2022-07-23 RX ORDER — PROCHLORPERAZINE EDISYLATE 5 MG/ML
10 INJECTION INTRAMUSCULAR; INTRAVENOUS ONCE
Status: COMPLETED | OUTPATIENT
Start: 2022-07-23 | End: 2022-07-23

## 2022-07-23 RX ORDER — BENZTROPINE MESYLATE 1 MG/ML
1 INJECTION INTRAMUSCULAR; INTRAVENOUS ONCE
Status: DISCONTINUED | OUTPATIENT
Start: 2022-07-23 | End: 2022-07-23 | Stop reason: HOSPADM

## 2022-07-23 RX ORDER — HYDROCORTISONE ACETATE 25 MG/1
25 SUPPOSITORY RECTAL 2 TIMES DAILY
Qty: 30 SUPPOSITORY | Refills: 1 | Status: SHIPPED | OUTPATIENT
Start: 2022-07-23 | End: 2022-08-22

## 2022-07-23 RX ORDER — POLYETHYLENE GLYCOL 3350 17 G/17G
17 POWDER, FOR SOLUTION ORAL DAILY
Qty: 20 PACKET | Refills: 1 | Status: SHIPPED | OUTPATIENT
Start: 2022-07-23

## 2022-07-23 RX ORDER — DIPHENHYDRAMINE HYDROCHLORIDE 50 MG/ML
50 INJECTION INTRAMUSCULAR; INTRAVENOUS ONCE
Status: COMPLETED | OUTPATIENT
Start: 2022-07-23 | End: 2022-07-23

## 2022-07-23 RX ORDER — HYDROCORTISONE ACETATE 25 MG/1
25 SUPPOSITORY RECTAL NIGHTLY PRN
Qty: 30 SUPPOSITORY | Refills: 1 | Status: SHIPPED | OUTPATIENT
Start: 2022-07-23 | End: 2022-07-23 | Stop reason: SDUPTHER

## 2022-07-23 RX ORDER — BENZTROPINE MESYLATE 1 MG/ML
1 INJECTION INTRAMUSCULAR; INTRAVENOUS ONCE
Status: DISCONTINUED | OUTPATIENT
Start: 2022-07-23 | End: 2022-07-23

## 2022-07-23 RX ADMIN — ONDANSETRON 4 MG: 2 INJECTION INTRAMUSCULAR; INTRAVENOUS at 00:29

## 2022-07-23 RX ADMIN — DIPHENHYDRAMINE HYDROCHLORIDE 50 MG: 50 INJECTION, SOLUTION INTRAMUSCULAR; INTRAVENOUS at 12:00

## 2022-07-23 RX ADMIN — SODIUM CHLORIDE, POTASSIUM CHLORIDE, SODIUM LACTATE AND CALCIUM CHLORIDE 100 ML/HR: 600; 310; 30; 20 INJECTION, SOLUTION INTRAVENOUS at 04:08

## 2022-07-23 RX ADMIN — PROCHLORPERAZINE EDISYLATE 10 MG: 5 INJECTION INTRAMUSCULAR; INTRAVENOUS at 11:19

## 2022-07-23 NOTE — PROGRESS NOTES
[facility]  Inpatient Progress Note    Reason for Follow Up: Rectal bleeding    Subjective     Interval History:   Less bleeding overnight.  She has not had a bowel movement.  She continues to complain of abdominal pain and issues related to her gastroparesis.  She is overall frustrated by her continuing issues with gastroparesis and her interaction with [facility] as she has only seen Dr. Quintana her initial visit.  She feels like she needs a point person.  A lot of the remedies that were recommended by the [facility] were not approved by her insurance.    Current Facility-Administered Medications:   •  acetaminophen (TYLENOL) tablet 650 mg, 650 mg, Oral, Q4H PRN, Edel Fernandez PA-C  •  amLODIPine (NORVASC) tablet 10 mg, 10 mg, Oral, Daily, Qadah, Abdalhakim, APRN  •  dicyclomine (BENTYL) capsule 20 mg, 20 mg, Oral, TID PRN, Qadah, Abdalhakim, APRN, 20 mg at 07/22/22 2045  •  famotidine (PEPCID) tablet 20 mg, 20 mg, Oral, Daily PRN, Qadah, Abdalhakim, APRN  •  hydrocortisone (ANUSOL-HC) suppository 25 mg, 25 mg, Rectal, BID, Mary Ann Sarmiento MD, 25 mg at 07/22/22 2232  •  hydroxychloroquine (PLAQUENIL) tablet 200 mg, 200 mg, Oral, BID, Qadah, Abdalhakim, APRN, 200 mg at 07/22/22 2231  •  lactated ringers infusion, 100 mL/hr, Intravenous, Continuous, Edel Fernandez PA-C, Last Rate: 100 mL/hr at 07/23/22 0408, 100 mL/hr at 07/23/22 0408  •  melatonin tablet 5 mg, 5 mg, Oral, Nightly PRN, Edel Fernandez PA-C  •  ondansetron (ZOFRAN) tablet 4 mg, 4 mg, Oral, Q6H PRN **OR** ondansetron (ZOFRAN) injection 4 mg, 4 mg, Intravenous, Q6H PRN, Edel Fernandez PA-C, 4 mg at 07/23/22 0029  •  pantoprazole (PROTONIX) EC tablet 40 mg, 40 mg, Oral, BID, Gayathri Pan APRN, 40 mg at 07/22/22 2034  •  predniSONE (DELTASONE) tablet 20 mg, 20 mg, Oral, Daily, Gayathri Pan APRN, 20 mg at 07/22/22 2034  •  sildenafil (REVATIO) tablet 20 mg, 20  mg, Oral, Daily, Gayathri Pan APRN  •  sodium chloride 0.9 % flush 10 mL, 10 mL, Intravenous, Q12H, Edel Fernandez PA-C, 10 mL at 07/22/22 2035  •  sodium chloride 0.9 % flush 10 mL, 10 mL, Intravenous, PRN, Edel Fernandez PA-C  Review of Systems:    Nausea, positive for abdominal pain, positive for dizziness after eating, negative for fevers or chills    Objective     Vital Signs  Temp:  [97.7 °F (36.5 °C)-98.8 °F (37.1 °C)] 98.6 °F (37 °C)  Heart Rate:  [60-88] 60  Resp:  [14-16] 16  BP: (106-142)/() 106/63  Body mass index is 25.52 kg/m².  No intake or output data in the 24 hours ending 07/23/22 0651  No intake/output data recorded.     Physical Exam:   General: patient awake, alert and cooperative   Eyes: Normal lids and lashes, no scleral icterus   Neck: supple, normal ROM   Skin: warm and dry, not jaundiced   Pulm:   regular and unlabored   Abdomen: soft,   nondistended    Extremities: no rash or edema   Psychiatric: Normal behavior; memory intact     Results Review:     I reviewed the patient's new clinical results.    Results from last 7 days   Lab Units 07/23/22  0421 07/22/22  1912   WBC 10*3/mm3 4.51 5.27   HEMOGLOBIN g/dL 12.1 11.9*   HEMATOCRIT % 36.5 36.0   PLATELETS 10*3/mm3 266 270     Results from last 7 days   Lab Units 07/23/22  0421   SODIUM mmol/L 133*   POTASSIUM mmol/L 4.1   CHLORIDE mmol/L 101   CO2 mmol/L 21.4*   BUN mg/dL 4*   CREATININE mg/dL 0.58   CALCIUM mg/dL 9.4   GLUCOSE mg/dL 116*     Results from last 7 days   Lab Units 07/22/22  0535   INR INR 0.95     Lab Results   Lab Value Date/Time    LIPASE 80 01/05/2020 1030    LIPASE 53 07/20/2018 1151       Radiology:  No orders to display       Assessment & Plan     Patient Active Problem List   Diagnosis   • SOB (shortness of breath)   • Iron deficiency anemia   • Vitamin D deficiency   • Fibromyalgia   • Tomas-Danlos syndrome   • Complex regional pain syndrome type 1 of right lower extremity   • Rheumatoid  arthritis involving multiple sites (HCC)   • Raynaud's disease without gangrene   • Irritable bowel syndrome with both constipation and diarrhea   • Exercise-induced asthma   • Lupus (systemic lupus erythematosus) (HCC)   • Hemoptysis   • Chest wall pain   • Abnormal white blood cell (WBC) count   • Adverse effect of iron   • B12 deficiency   • Lymphadenopathy, axillary   • Nausea & vomiting   • Rectal bleeding     Problems are new to me today  Assessment:  1.  Rectal bleeding: Suspected hemorrhoidal by description; reassuring CT scan, stable hemoglobin     2.  History of gastroparesis: Followed by [facility] motility clinic, on domperidone     3.  History of esophagitis      Plan:  · Hemoglobin has remained stable overnight  · Continue Anusol suppositories for 2 weeks at least, longer if scant bleeding continues  · Bleeding seems to be subsiding.  I suspect this is hemorrhoidal in nature and related to constipation  · Will need bowel regimen moving forward to prevent further hemorrhoidal irritation as this is the presumed cause of her bleeding.  Would avoid Bentyl as this will only precipitate constipation.  Start MiraLAX one half capful daily and increase to full dose.  She will stay on this regimen until she sees me back in the office.  · She has follow-up with [facility] on August 10 and I will schedule follow-up with me thereafter.  Had a long discussion with her and her mother today.  Would consider seeking an alternative opinion from a different motility group depending on her upcoming appointment and the recommendations at that time  · OK to d/c from GI standpoint    I discussed the patients findings and my recommendations with patient and family.    Malini Barone MD

## 2022-07-23 NOTE — DISCHARGE INSTRUCTIONS
Start taking MiraLAX half a pack daily for 1 week, if you do not experience diarrhea or any increased abdominal pain or discomfort please increase MiraLAX dosage to 1 pack daily.  Start taking hydrocortisone Anusol 25 mg suppositories twice daily for 2 weeks.  If you need do not have any further rectal bleeding you can stop taking this however if you continue to have some mild rectal bleeding continue for another week.  It has been recommended for you to stop taking Bentyl as it will further worsen constipation, if you have any further questions please contact Dr. Barone's office regarding continuing the medication.  Keep your follow-up appointment with Dr. Quintana with [facility] on August 10  Dr. Barone with GI is going to be setting up a follow-up appointment with you in mid August, their office should contact you regarding this appointment.    Return to the emergency department with worsening symptoms, uncontrolled pain, inability to tolerate oral liquids, fever greater than 101°F not controlled by Tylenol or as needed with emergent concerns.

## 2022-07-23 NOTE — PROGRESS NOTES
ED OBSERVATION PROGRESS/DISCHARGE SUMMARY    Date of Admission: 7/22/2022   LOS: 0 days   PCP: Fanny Jarrett MD      Subjective    No acute events overnight.  Patient states she is not currently having any abdominal pain.  She is not having any nausea or vomiting at this time.  Denies any fevers and chills overnight.  She reports rectal bleeding has decreased.  She states that she follows with Dr. Quintana at [facility] and has an appointment with her in the next couple of weeks and is hoping to discuss options for possible tube feeding.      Hospital Outcome:   27-year-old female admitted to the observation unit for further evaluation of rectal bleeding.  Patient was seen at [facility] prior to her arrival to our ER early in the morning of 7/22/2022 in which she had a CT scan that was negative for acute findings.  Patient's hemoglobin on arrival was 11.9.  Overnight her hemoglobin remained stable at 12.1 this morning patient's rectal bleeding has decreased overnight.  GI was consulted evaluated the patient commending for patient to start Anusol suppositories for 2 weeks at least as well to start MiraLAX one half capful daily to increase to a full dose.  Patient follows with [facility] clinic in which her next appointment is August 10 and Dr. Evangelista with GI plans to schedule a follow-up with her in their office thereafter.  I have discussed all of the findings and plan for discharge with the patient.  Patient endorses understanding is in agreement.    ROS:  General: no fevers, chills  Respiratory: no cough, dyspnea  Cardiovascular: no chest pain, palpitations  Abdomen: No abdominal pain, nausea, vomiting, or diarrhea  Neurologic: No focal weakness    Objective   Physical Exam:  I have reviewed the vital signs.  Temp:  [97.7 °F (36.5 °C)-98.8 °F (37.1 °C)] 98.3 °F (36.8 °C)  Heart Rate:  [60-88] 64  Resp:  [14-16] 16  BP: (106-142)/() 131/74  General Appearance:  27-year-old  female, in no acute distress on room air  Head:    Normocephalic, atraumatic  Eyes:    Sclerae anicteric  Neck:   Supple, no mass  Lungs: Clear to auscultation bilaterally, respirations unlabored  Heart: Regular rate and rhythm, S1 and S2 normal, no murmur, rub or gallop  Abdomen:  Soft, non-tender, bowel sounds active, nondistended  Extremities: No clubbing, cyanosis, or edema to lower extremities  Pulses:  2+ and symmetric in distal lower extremities  Skin: No rashes   Neurologic: Oriented x3, Normal strength to extremities    Results Review:    I have reviewed the labs, radiology results and diagnostic studies.    Results from last 7 days   Lab Units 07/23/22  0421   WBC 10*3/mm3 4.51   HEMOGLOBIN g/dL 12.1   HEMATOCRIT % 36.5   PLATELETS 10*3/mm3 266     Results from last 7 days   Lab Units 07/23/22  0421   SODIUM mmol/L 133*   POTASSIUM mmol/L 4.1   CHLORIDE mmol/L 101   CO2 mmol/L 21.4*   BUN mg/dL 4*   CREATININE mg/dL 0.58   CALCIUM mg/dL 9.4   GLUCOSE mg/dL 116*     Imaging Results (Last 24 Hours)     ** No results found for the last 24 hours. **          I have reviewed the medications.  ---------------------------------------------------------------------------------------------  Assessment & Plan   Assessment/Problem List    Rectal bleeding      Plan:    Rectal bleeding  -Hgb stable 11.9 to 12.1 overnight.   -GI consulted, believes this is likely secondary to hemorrhoids, will send patient home with Anusol suppositories as well as start her on MiraLAX regimen.  Advised patient to discontinue Bentyl.  Dr. Evangelista will set up an outpatient follow-up with the patient mid August after her follow-up with [facility]    Gastroparesis  -Patient was able to tolerate p.o. intake  -Advised patient to keep her follow-up with Dr. Quintana on August 10  -Discussed with patient that she would likely benefit from a nutritionist that specializes in gastroparesis    SLE  -No acute issues  -Continue home  medications    Disposition: Home    Follow-up after Discharge: [facility], GI, PCP    This note will serve as a discharge summary.    I wore an face mask, eye protection, and gloves during this patient encounter. Patient also wearing a surgical mask. Hand hygeine performed before and after seeing the patient.    Edel Fernandez PA-C 07/23/22 10:27 EDT

## 2022-07-23 NOTE — PLAN OF CARE
Goal Outcome Evaluation:         Pt d/c private vehicle. Boyfriend and mom at bedside. Pt sent home with follow up instructions. IV removed, belongings returned. No questions/complaints at this time.

## 2022-07-23 NOTE — PROGRESS NOTES
MD ATTESTATION NOTE    The FABIOLA and I have discussed this patient's history, physical exam, and treatment plan.  I have reviewed the documentation and personally had a face to face interaction with the patient. I affirm the documentation and agree with the treatment and plan.  The attached note describes my personal findings.      I provided a substantive portion of the care of the patient.  I personally performed the physical exam in its entirety, and below are my findings.  For this patient encounter, the patient wore surgical mask, I wore full protective PPE including N95 and eye protection.      Brief HPI: This patient is a 27-year-old female admitted to the observation unit for further evaluation of abdominal discomfort with associated bright red blood per rectum.  The patient has a history of lupus as well as a history of gastroparesis.  Currently, the patient has had no further episodes of rectal bleeding but does continue to complain of lack of appetite as well as lower abdominal discomfort.    PHYSICAL EXAM  ED Triage Vitals   Temp Heart Rate Resp BP SpO2   07/22/22 1240 07/22/22 1242 07/22/22 1240 07/22/22 1240 07/22/22 1321   98.8 °F (37.1 °C) 87 16 (!) 140/108 94 %      Temp src Heart Rate Source Patient Position BP Location FiO2 (%)   07/22/22 1240 07/22/22 2037 07/22/22 2037 07/22/22 2037 --   Tympanic Monitor Lying Right arm          GENERAL: In mild distress secondary to discomfort, nontoxic in appearance  HENT: nares patent  EYES: no scleral icterus  CV: regular rhythm, normal rate, no M/R/G  RESPIRATORY: normal effort, lungs clear bilaterally  ABDOMEN: soft, mild tenderness to palpation to the lower abdomen bilaterally, no rebound or guarding  MUSCULOSKELETAL: no deformity, no edema  NEURO: alert, moves all extremities, follows commands  PSYCH:  calm, cooperative  SKIN: warm, dry    Vital signs and nursing notes reviewed.        Plan: CT scan in the emergency room was unremarkable for acute  findings.  Her hemoglobin has remained stable overnight.  GI has evaluated the patient and is given recommendations.  We will feed the patient and make sure that she can tolerate a diet.  Anticipate discharge with GI follow-up following.

## 2022-07-23 NOTE — PLAN OF CARE
Goal Outcome Evaluation:  Plan of Care Reviewed With: patient        Progress: improving  Outcome Evaluation: Pt rectal bleeding has decreased and is only small, infreq amount, prn pain med given for abd pain, VSS, mother at bedside

## 2022-07-23 NOTE — SIGNIFICANT NOTE
"  Prior to discharge patient was reporting increased nausea after eating lunch, a dose of IV Compazine was ordered for nausea.  Patient started to report feelings of anxiousness stating that she felt very nervous and not she was \"itching out of her skin.\"  A dose of IV Benadryl 50 mg was given with significant improvement in symptoms.  Patient states she is feeling well and is wishing to go home at this time.  Family at bedside is in agreement.  "

## 2022-08-20 ENCOUNTER — APPOINTMENT (OUTPATIENT)
Dept: CARDIOLOGY | Facility: HOSPITAL | Age: 27
End: 2022-08-20

## 2022-08-20 ENCOUNTER — HOSPITAL ENCOUNTER (EMERGENCY)
Facility: HOSPITAL | Age: 27
Discharge: HOME | End: 2022-08-20
Attending: EMERGENCY MEDICINE | Admitting: EMERGENCY MEDICINE

## 2022-08-20 VITALS
DIASTOLIC BLOOD PRESSURE: 72 MMHG | TEMPERATURE: 97.2 F | WEIGHT: 168 LBS | SYSTOLIC BLOOD PRESSURE: 129 MMHG | HEART RATE: 72 BPM | BODY MASS INDEX: 23.52 KG/M2 | RESPIRATION RATE: 16 BRPM | OXYGEN SATURATION: 100 % | HEIGHT: 71 IN

## 2022-08-20 DIAGNOSIS — Z48.89 ENCOUNTER FOR POSTOPERATIVE WOUND CHECK: Primary | ICD-10-CM

## 2022-08-20 LAB
BH CV UPPER VENOUS LEFT INTERNAL JUGULAR AUGMENT: NORMAL
BH CV UPPER VENOUS LEFT INTERNAL JUGULAR COMPRESS: NORMAL
BH CV UPPER VENOUS LEFT INTERNAL JUGULAR PHASIC: NORMAL
BH CV UPPER VENOUS LEFT INTERNAL JUGULAR SPONT: NORMAL
BH CV UPPER VENOUS LEFT SUBCLAVIAN AUGMENT: NORMAL
BH CV UPPER VENOUS LEFT SUBCLAVIAN COMPRESS: NORMAL
BH CV UPPER VENOUS LEFT SUBCLAVIAN PHASIC: NORMAL
BH CV UPPER VENOUS LEFT SUBCLAVIAN SPONT: NORMAL
BH CV UPPER VENOUS RIGHT AXILLARY AUGMENT: NORMAL
BH CV UPPER VENOUS RIGHT AXILLARY COMPRESS: NORMAL
BH CV UPPER VENOUS RIGHT AXILLARY PHASIC: NORMAL
BH CV UPPER VENOUS RIGHT AXILLARY SPONT: NORMAL
BH CV UPPER VENOUS RIGHT BASILIC FOREARM COMPRESS: NORMAL
BH CV UPPER VENOUS RIGHT BASILIC UPPER COMPRESS: NORMAL
BH CV UPPER VENOUS RIGHT BRACHIAL COMPRESS: NORMAL
BH CV UPPER VENOUS RIGHT CEPHALIC FOREARM COMPRESS: NORMAL
BH CV UPPER VENOUS RIGHT CEPHALIC UPPER COMPRESS: NORMAL
BH CV UPPER VENOUS RIGHT INTERNAL JUGULAR AUGMENT: NORMAL
BH CV UPPER VENOUS RIGHT INTERNAL JUGULAR COMPRESS: NORMAL
BH CV UPPER VENOUS RIGHT INTERNAL JUGULAR PHASIC: NORMAL
BH CV UPPER VENOUS RIGHT INTERNAL JUGULAR SPONT: NORMAL
BH CV UPPER VENOUS RIGHT RADIAL COMPRESS: NORMAL
BH CV UPPER VENOUS RIGHT SUBCLAVIAN AUGMENT: NORMAL
BH CV UPPER VENOUS RIGHT SUBCLAVIAN PHASIC: NORMAL
BH CV UPPER VENOUS RIGHT SUBCLAVIAN SPONT: NORMAL
BH CV UPPER VENOUS RIGHT ULNAR COMPRESS: NORMAL
MAXIMAL PREDICTED HEART RATE: 193 BPM
STRESS TARGET HR: 164 BPM

## 2022-08-20 PROCEDURE — 93971 EXTREMITY STUDY: CPT

## 2022-08-20 PROCEDURE — 99283 EMERGENCY DEPT VISIT LOW MDM: CPT

## 2022-08-20 RX ORDER — HYDROCODONE BITARTRATE AND ACETAMINOPHEN 7.5; 325 MG/1; MG/1
1 TABLET ORAL ONCE
Status: COMPLETED | OUTPATIENT
Start: 2022-08-20 | End: 2022-08-20

## 2022-08-20 RX ADMIN — HYDROCODONE BITARTRATE AND ACETAMINOPHEN 1 TABLET: 7.5; 325 TABLET ORAL at 08:38

## 2022-08-20 NOTE — ED NOTES
Pt had port placed 2 weeks ago and reports constant pain since surgery and bleeding from site that started 2 hours ago. Controlled upon arrival

## 2022-08-20 NOTE — ED PROVIDER NOTES
EMERGENCY DEPARTMENT ENCOUNTER    Room Number:  33/33  Date of encounter:  8/20/2022  PCP: Fanny Jarrett MD  Historian: Patient and mother      HPI:  Chief Complaint: Wound check right chest port  A complete HPI/ROS/PMH/PSH/SH/FH are unobtainable due to: N/A    Context: Henrietta Garrett is a 27 y.o. female who presents to the ED c/o continued pain at site of recent right port placement.  She noticed some bleeding through the bandage today and was concerned.  She has had a lot of issues with pain since the port was placed-she states it was placed at [facility] and she does not know the name of the vascular surgeon who placed it.  No fevers or chills.  Area around the port is erythematous and tender.      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  On August 11 the patient had a CT of the chest with IV contrast that showed no discrete neck mass or fluid collection.  Right foot was terminating in severe vena cava.  Chest CT was otherwise unremarkable.    The patient was placed in a mask in triage, hand hygiene was performed before and after my interaction with the patient.  I wore a mask, safety glasses and gloves during my entire interaction with the patient.    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   • Complex regional pain syndrome type 1 of right lower extremity 05/04/2018   • Rheumatoid arthritis involving multiple sites (HCC) 05/04/2018   • Raynaud's disease without gangrene 05/04/2018   • Irritable bowel syndrome with both constipation and diarrhea 05/04/2018   • Exercise-induced asthma 05/04/2018   • Lupus (systemic lupus erythematosus) (HCC) 05/04/2018   • Hemoptysis 11/09/2018   • Chest wall pain 11/09/2018    • Abnormal white blood cell (WBC) count 11/09/2018   • Adverse effect of iron 11/09/2018   • B12 deficiency 11/09/2018   • Lymphadenopathy, axillary 11/30/2018   • Nausea & vomiting 04/12/2019   • Rectal bleeding 07/22/2022     Resolved Ambulatory Problems     Diagnosis Date Noted   • Right calf pain 04/27/2018     Past Medical History:   Diagnosis Date   • Anemia    • Arthritis    • Asthma    • CPRS 1 (complex regional pain syndrome I) of upper limb    • Depression    • EDS (Tomas-Danlos syndrome)    • History of hyperkeratosis of skin    • IBS (irritable bowel syndrome)    • Leukopenia, mild    • Poor vision    • Raynauds syndrome          PAST SURGICAL HISTORY  Past Surgical History:   Procedure Laterality Date   • COLONOSCOPY  12/11/2020    Dr. Barone   • DENTAL PROCEDURE     • EPIDURAL BLOCK     • SHOULDER SURGERY Bilateral     X2   • TOE SURGERY Right 2011    3rd toe    • UPPER GASTROINTESTINAL ENDOSCOPY  12/11/1920    Dr. Barone         FAMILY HISTORY  Family History   Problem Relation Age of Onset   • Rheum arthritis Mother    • Diabetes type II Father    • Hyperlipidemia Father    • Diabetes Father    • Cancer Maternal Uncle         throat cancer    • Arthritis Maternal Uncle    • Asthma Sister    • Migraines Sister    • Tomas-Danlos syndrome Sister    • Asthma Brother    • Leukemia Maternal Grandmother    • Stroke Maternal Grandmother    • Heart disease Maternal Grandmother    • Rheum arthritis Maternal Grandmother    • Prostate cancer Maternal Grandfather    • Stroke Maternal Grandfather    • Diabetes Maternal Grandfather    • Cancer Paternal Grandmother    • Colon cancer Paternal Grandmother    • No Known Problems Brother    • Breast cancer Neg Hx          SOCIAL HISTORY  Social History     Socioeconomic History   • Marital status: Single   • Number of children: 0   • Years of education: High School   Tobacco Use   • Smoking status: Never Smoker   • Smokeless tobacco: Never Used   Substance and  Sexual Activity   • Alcohol use: No   • Drug use: No   • Sexual activity: Never         ALLERGIES  Anesthetics, amide; Ciprofloxacin; Compazine [prochlorperazine]; and Metoclopramide        REVIEW OF SYSTEMS  Review of Systems   Skin: Positive for wound.        All systems reviewed and negative except for those discussed in HPI.       PHYSICAL EXAM    I have reviewed the triage vital signs and nursing notes.    ED Triage Vitals   Temp Heart Rate Resp BP SpO2   08/20/22 0720 08/20/22 0738 08/20/22 0742 08/20/22 0722 08/20/22 0738   97.2 °F (36.2 °C) 71 16 144/92 97 %      Temp src Heart Rate Source Patient Position BP Location FiO2 (%)   -- 08/20/22 0742 08/20/22 0742 08/20/22 0742 --    Monitor Lying Right arm        Physical Exam   Constitutional: Pt. is oriented to person, place, and time and well-developed, well-nourished, and in no distress.   Neck: Normal range of motion. Neck supple. No JVD present.   Cardiovascular: Normal rate, regular rhythm and normal heart sounds. No murmur heard.  Pulmonary/Chest: Effort normal and breath sounds normal. No stridor. No respiratory distress. No wheezes, no rales.   Musculoskeletal: Normal range of motion. No edema, tenderness or deformity.  Right upper extremity is nonswollen.  Radial and ulnar pulses are easily palpable, capillary refill is brisk.  There is mild right axillary tenderness but no edema is noted.  Neurological: Pt. is alert and oriented to person, place, and time.  She has no focal neurologic deficits  Skin: Right chest port is covered with a large Band-Aid which the patient removed.  There is erythema of the skin consistent with reaction to the adhesive.  There is minimal ecchymosis about the port with a small amount of dark red blood on the dressing.  Steri-Strips are intact.  There is no swelling in the area.  There is no JVD.  Skin is otherwise warm and dry. No rash noted. Pt. is not diaphoretic. No erythema.   Psychiatric: Mood is flat, affect blunted.   She is pleasant and cooperative.  Nursing note and vitals reviewed.        LAB RESULTS  Recent Results (from the past 24 hour(s))   Duplex Venous Upper Extremity - Right    Collection Time: 08/20/22 10:01 AM   Result Value Ref Range    Target HR (85%) 164 bpm    Max. Pred. HR (100%) 193 bpm       Ordered the above labs and independently reviewed the results.        RADIOLOGY  No Radiology Exams Resulted Within Past 24 Hours    I ordered the above noted radiological studies. Reviewed by me and discussed with radiologist.  See dictation for official radiology interpretation.      PROCEDURES    Procedures      MEDICATIONS GIVEN IN ER    Medications   HYDROcodone-acetaminophen (NORCO) 7.5-325 MG per tablet 1 tablet (1 tablet Oral Given 8/20/22 0838)         PROGRESS, DATA ANALYSIS, CONSULTS, AND MEDICAL DECISION MAKING    Any/all labs have been independently reviewed by me.  Any/all radiology studies have been reviewed by me and discussed with radiologist dictating the report.   EKG's independently viewed and interpreted by me.  Discussion below represents my analysis of pertinent findings related to patient's condition, differential diagnosis, treatment plan and final disposition.    Number of Diagnoses or Management Options     Amount and/or Complexity of Data Reviewed  Clinical lab tests:  No  Tests in the radiology section of CPT®:  Yes  Tests in the medicine section of CPT®:  Yes  Review and summarize past medical records:  (Yes-see HPI)  Independent visualization of images, tracings, or specimens: (Yes-see below)      ED Course as of 08/20/22 1010   Sat Aug 20, 2022   1006 Preliminary ultrasound report is negative for DVT. [WC]      ED Course User Index  [WC] Lencho Carter MD       AS OF 10:10 EDT VITALS:    BP - 144/83  HR - 72  TEMP - 97.2 °F (36.2 °C)  02 SATS - 100%        DIAGNOSIS  Final diagnoses:   Encounter for postoperative wound check         DISPOSITION  Discharged           Lencho Carter,  MD  08/20/22 1017

## 2022-08-24 ENCOUNTER — OFFICE VISIT (OUTPATIENT)
Dept: GASTROENTEROLOGY | Facility: CLINIC | Age: 27
End: 2022-08-24

## 2022-08-24 VITALS
TEMPERATURE: 97.1 F | HEIGHT: 71 IN | WEIGHT: 175.3 LBS | SYSTOLIC BLOOD PRESSURE: 126 MMHG | BODY MASS INDEX: 24.54 KG/M2 | DIASTOLIC BLOOD PRESSURE: 80 MMHG

## 2022-08-24 DIAGNOSIS — K31.84 GASTROPARESIS: ICD-10-CM

## 2022-08-24 DIAGNOSIS — K21.00 GASTROESOPHAGEAL REFLUX DISEASE WITH ESOPHAGITIS WITHOUT HEMORRHAGE: Primary | ICD-10-CM

## 2022-08-24 DIAGNOSIS — K58.1 IRRITABLE BOWEL SYNDROME WITH CONSTIPATION: ICD-10-CM

## 2022-08-24 DIAGNOSIS — Q79.60 EHLERS-DANLOS DISEASE: ICD-10-CM

## 2022-08-24 DIAGNOSIS — K64.9 BLEEDING HEMORRHOIDS: ICD-10-CM

## 2022-08-24 PROCEDURE — 99214 OFFICE O/P EST MOD 30 MIN: CPT | Performed by: INTERNAL MEDICINE

## 2022-08-24 RX ORDER — HYDROCORTISONE ACETATE 25 MG/1
25 SUPPOSITORY RECTAL NIGHTLY PRN
Qty: 30 SUPPOSITORY | Refills: 1 | Status: SHIPPED | OUTPATIENT
Start: 2022-08-24

## 2022-08-24 NOTE — PROGRESS NOTES
Subjective   Chief Complaint   Patient presents with   • gastroparesis   • Heartburn   • gastritis       Henrietta Garrett is a  27 y.o. female here for a follow up visit for gastroparesis, heartburn, gastritis.  She had a recent hospitalization at the end of July for rectal bleeding.  Hemoglobin was normal.  This is felt to be due to hemorrhoids related to her chronic constipation.  She said a previous colonoscopy in 2020.  She was started on daily MiraLAX at that time.      She recently had a port placed for routine IVF - she is getting 2 liters of fluids, IV zofran and phenergan three days a week.  She had noticed improvement in her dizziness and her migraines.  Dr Quintana has recommended a trial of NJT feedings with plan for J tube if she tolerates it.    She reports syncopal episodes after eating - this has been worse lately.  She takes amlodipine and sildenafil for her Raynaud's.  She had no improvement with her Raynaud's on the lower dose of amlodipine.    She has been taking miralax daily - she sometimes has diarrhea.  Continues to have rectal bleeding at times.  Never rcvd the suppository.      She was recently diagnosed w/lupus.    HPI  Past Medical History:   Diagnosis Date   • Anemia    • Arthritis     RHEUMATOID   • Asthma    • CPRS 1 (complex regional pain syndrome I) of upper limb    • Depression    • EDS (Tomas-Danlos syndrome)    • Fibromyalgia 2015   • History of hyperkeratosis of skin    • IBS (irritable bowel syndrome)    • Leukopenia, mild    • Poor vision    • Raynauds syndrome    • SOB (shortness of breath)      Past Surgical History:   Procedure Laterality Date   • COLONOSCOPY  12/11/2020    Dr. Barone   • DENTAL PROCEDURE     • EPIDURAL BLOCK     • SHOULDER SURGERY Bilateral     X2   • TOE SURGERY Right 2011    3rd toe    • UPPER GASTROINTESTINAL ENDOSCOPY  12/11/1920    Dr. Barone       Current Outpatient Medications:   •  amLODIPine (NORVASC) 10 MG tablet, Take 10 mg by mouth Daily., Disp: ,  Rfl:   •  cyanocobalamin 1000 MCG/ML injection, Inject 1 mL into the appropriate muscle as directed by prescriber Every 30 (Thirty) Days., Disp: , Rfl:   •  famotidine (PEPCID) 20 MG tablet, TAKE 1 TABLET BY MOUTH TWICE A DAY (Patient taking differently: Take 20 mg by mouth As Needed.), Disp: 180 tablet, Rfl: 1  •  hydroxychloroquine (PLAQUENIL) 200 MG tablet, Take 200 mg by mouth 2 (Two) Times a Day., Disp: , Rfl:   •  leucovorin 5 MG tablet, Take 1 tablet by mouth 1 (One) Time Per Week. Mondays, Disp: , Rfl:   •  NON FORMULARY, Take 10 mg by mouth 3 (Three) Times a Day With Meals. Domperidone, Disp: , Rfl:   •  norethindrone-ethinyl estradiol-ferrous fumarate (Misa 24 Fe) 1-20 MG-MCG(24) per tablet, Take 1 tablet by mouth Daily., Disp: , Rfl:   •  pantoprazole (PROTONIX) 40 MG EC tablet, Take 40 mg by mouth 2 (Two) Times a Day., Disp: , Rfl:   •  polyethylene glycol (MIRALAX) 17 g packet, Take 17 g by mouth Daily. Take 1/2 packet once daily for 1 week then start taking 1 packet once daily., Disp: 20 packet, Rfl: 1  •  predniSONE (DELTASONE) 20 MG tablet, Take 20 mg by mouth Daily., Disp: , Rfl:   •  promethazine (PHENERGAN) 25 MG tablet, Take 25 mg by mouth Every 6 (Six) Hours As Needed., Disp: , Rfl:   •  Scopolamine 1 MG/3DAYS patch, Place 1 patch on the skin as directed by provider Every 72 (Seventy-Two) Hours As Needed., Disp: , Rfl:   •  sildenafil (REVATIO) 20 MG tablet, Take 20 mg by mouth Daily., Disp: , Rfl:   •  hydrocortisone (ANUSOL-HC) 25 MG suppository, Insert 1 suppository into the rectum At Night As Needed for Hemorrhoids (rectal discomfort/bleeding)., Disp: 30 suppository, Rfl: 1  PRN Meds:.  Allergies   Allergen Reactions   • Anesthetics, Amide Nausea And Vomiting   • Ciprofloxacin Other (See Comments)     EHERLIS STANDLIS? ILLNESS. UNABLE TO TAKE    • Compazine [Prochlorperazine] Dystonia   • Metoclopramide Nausea And Vomiting     Social History     Socioeconomic History   • Marital status:  Single   • Number of children: 0   • Years of education: High School   Tobacco Use   • Smoking status: Never Smoker   • Smokeless tobacco: Never Used   Substance and Sexual Activity   • Alcohol use: No   • Drug use: No   • Sexual activity: Never     Family History   Problem Relation Age of Onset   • Rheum arthritis Mother    • Diabetes type II Father    • Hyperlipidemia Father    • Diabetes Father    • Cancer Maternal Uncle         throat cancer    • Arthritis Maternal Uncle    • Asthma Sister    • Migraines Sister    • Tomas-Danlos syndrome Sister    • Asthma Brother    • Leukemia Maternal Grandmother    • Stroke Maternal Grandmother    • Heart disease Maternal Grandmother    • Rheum arthritis Maternal Grandmother    • Prostate cancer Maternal Grandfather    • Stroke Maternal Grandfather    • Diabetes Maternal Grandfather    • Cancer Paternal Grandmother    • Colon cancer Paternal Grandmother    • No Known Problems Brother    • Breast cancer Neg Hx      Review of Systems   Constitutional: Positive for appetite change. Negative for unexpected weight change.   Gastrointestinal: Positive for blood in stool and nausea.   Neurological: Positive for dizziness.     Vitals:    08/24/22 1247   BP: 126/80   Temp: 97.1 °F (36.2 °C)         08/24/22  1247   Weight: 79.5 kg (175 lb 4.8 oz)       Objective   Physical Exam  Constitutional:       Appearance: Normal appearance. She is well-developed.   HENT:      Head: Normocephalic and atraumatic.   Eyes:      General: No scleral icterus.     Conjunctiva/sclera: Conjunctivae normal.   Pulmonary:      Effort: Pulmonary effort is normal.   Abdominal:      General: There is no distension.      Palpations: Abdomen is soft.   Musculoskeletal:      Cervical back: Normal range of motion and neck supple.   Skin:     General: Skin is warm and dry.   Neurological:      Mental Status: She is alert.   Psychiatric:         Mood and Affect: Mood normal.         Behavior: Behavior normal.        No radiology results for the last 7 days    Assessment & Plan   Diagnoses and all orders for this visit:    Gastroesophageal reflux disease with esophagitis without hemorrhage    Irritable bowel syndrome with constipation    Tomas-Danlos disease    Gastroparesis    Bleeding hemorrhoids    Other orders  -     hydrocortisone (ANUSOL-HC) 25 MG suppository; Insert 1 suppository into the rectum At Night As Needed for Hemorrhoids (rectal discomfort/bleeding).      Plan:  · I agree that she needs an alternative means of nutrition if she is unable to maintain orally.  I encouraged her to reach out to Dr. Quintana's office to get her EGD with NG tube placement scheduled.  · Continue daily MiraLAX-hold dose for diarrhea as needed  · Anusol suppository sent to pharmacy for hemorrhoidal irritation and bleeding  · GERD stable on Protonix

## 2022-08-26 ENCOUNTER — TELEPHONE (OUTPATIENT)
Dept: GASTROENTEROLOGY | Facility: CLINIC | Age: 27
End: 2022-08-26

## 2022-08-26 NOTE — TELEPHONE ENCOUNTER
----- Message from Henrietta Garrett sent at 8/26/2022  1:26 PM EDT -----  Regarding: NJ tube   I have messaged dr kiran a few times and they have looked at it but haven’t responded and my mom and I have both called and left voicemails  about scheduling my NJ tube placement and no one has gotten back to us so is there anyway you could order it to be done in the hospital or something because I know you said your office doesn’t do them in office I’m just desperate for help at this point

## 2022-08-31 NOTE — TELEPHONE ENCOUNTER
Could possibly be done in radiology but Dr. Quintana mention doing an EGD as well when she places the tube.  Would really try to pursue with Dr. Quintana but if no other options could try to get placed in radiology

## 2023-01-23 ENCOUNTER — TELEPHONE (OUTPATIENT)
Dept: GASTROENTEROLOGY | Facility: CLINIC | Age: 28
End: 2023-01-23

## 2023-01-23 NOTE — TELEPHONE ENCOUNTER
Caller: Henrietta Garrett    Relationship to patient: Self    Best call back number: [phone]    Chief complaint: PATIENT HAD TO RESCHEDULE APPT. FOR 1- TO MARCH DUE TO A PRIOR FEEDING TUBE APPT.     IF ABLE TO FIT PATIENT IN SOONER PLEASE CONTACT HER.     Type of visit: FOLLOW UP

## 2023-03-09 ENCOUNTER — TELEPHONE (OUTPATIENT)
Dept: GASTROENTEROLOGY | Facility: CLINIC | Age: 28
End: 2023-03-09

## 2023-03-09 NOTE — TELEPHONE ENCOUNTER
Called pt who stated she has not been able to fu with Dr Barone every 2 months due to lack of rides.  She said she is not having any issues, just that it has been a while between visits.    Is she ok to wait until 6/8 for visit?

## 2023-03-09 NOTE — TELEPHONE ENCOUNTER
Caller: Henrietta Garrett    Relationship: Self    Best call back number: [phone]    What is the best time to reach you: ANY    Who are you requesting to speak with (clinical staff, provider,  specific staff member): SCHEDULING      What was the call regarding: PATIENT WOULD LIKE A CALL BACK IF THERE ARE ANY SOONER APPT AVAIL. PATIENT STATES SHE IS ON A FEEDING TUBE AND IS SUPPOSED TO FOLLOW UP WITH DR. GASPAR EVERY 2 MONTHS SHE WAS LAST SEEN IN OFFICE 8/24/22 PATIENT IS PLACED ON WAITING LIST AS WELL       Do you require a callback: YES

## 2023-05-12 ENCOUNTER — PREP FOR SURGERY (OUTPATIENT)
Dept: OTHER | Facility: HOSPITAL | Age: 28
End: 2023-05-12

## 2023-06-08 ENCOUNTER — OFFICE VISIT (OUTPATIENT)
Dept: GASTROENTEROLOGY | Facility: CLINIC | Age: 28
End: 2023-06-08
Payer: COMMERCIAL

## 2023-06-08 VITALS
HEIGHT: 71 IN | BODY MASS INDEX: 23.41 KG/M2 | DIASTOLIC BLOOD PRESSURE: 75 MMHG | TEMPERATURE: 97.3 F | SYSTOLIC BLOOD PRESSURE: 116 MMHG | HEART RATE: 82 BPM | OXYGEN SATURATION: 91 % | WEIGHT: 167.2 LBS

## 2023-06-08 DIAGNOSIS — K31.84 GASTROPARESIS: Primary | ICD-10-CM

## 2023-06-08 DIAGNOSIS — K21.00 GASTROESOPHAGEAL REFLUX DISEASE WITH ESOPHAGITIS WITHOUT HEMORRHAGE: ICD-10-CM

## 2023-06-08 DIAGNOSIS — Q79.60 EHLERS-DANLOS DISEASE: ICD-10-CM

## 2023-06-08 DIAGNOSIS — K58.1 IRRITABLE BOWEL SYNDROME WITH CONSTIPATION: ICD-10-CM

## 2023-06-08 DIAGNOSIS — Z98.890 HISTORY OF JEJUNOSTOMY: ICD-10-CM

## 2023-06-08 PROCEDURE — 99214 OFFICE O/P EST MOD 30 MIN: CPT | Performed by: INTERNAL MEDICINE

## 2023-06-08 RX ORDER — ONDANSETRON 4 MG/1
TABLET, ORALLY DISINTEGRATING ORAL
COMMUNITY
Start: 2022-09-12

## 2023-06-08 RX ORDER — LACTULOSE 10 G/15ML
10 SOLUTION ORAL
COMMUNITY
Start: 2023-03-15

## 2023-06-08 RX ORDER — METOPROLOL SUCCINATE 25 MG/1
0.5 TABLET, EXTENDED RELEASE ORAL NIGHTLY
COMMUNITY
Start: 2023-02-01

## 2023-06-08 RX ORDER — DEXTROMETHORPHAN HYDROBROMIDE AND PROMETHAZINE HYDROCHLORIDE 15; 6.25 MG/5ML; MG/5ML
5 SYRUP ORAL
COMMUNITY
Start: 2022-09-12

## 2023-06-08 RX ORDER — HUMAN IMMUNOGLOBULIN G 5 G/100ML
SOLUTION INTRAVENOUS
COMMUNITY
Start: 2023-05-22

## 2023-06-08 RX ORDER — NITROGLYCERIN 20 MG/G
OINTMENT TOPICAL
COMMUNITY
Start: 2023-02-14

## 2023-06-08 RX ORDER — GABAPENTIN 100 MG/1
200 CAPSULE ORAL
COMMUNITY
Start: 2023-05-18

## 2023-06-08 RX ORDER — PROMETHAZINE HYDROCHLORIDE 6.25 MG/5ML
SYRUP ORAL
COMMUNITY
Start: 2023-03-11

## 2023-06-08 RX ORDER — ABATACEPT 125 MG/ML
INJECTION, SOLUTION SUBCUTANEOUS
COMMUNITY
Start: 2023-03-02

## 2023-06-08 NOTE — PROGRESS NOTES
Subjective   Chief Complaint   Patient presents with    Gastroparesis    Irritable Bowel Syndrome       Henrietta Garrett is a  28 y.o. female here for a follow up visit for gastroparesis and IBS.  She was last seen in our office in August 2022.    Since she was last seen she now has a J-tube which was placed at the [facility].  She is now receiving IVIG.  She is followed by Dr. Gia Quintana's Cm at [facility]    Weight has been stable.    She is on pecid [employer] 1.5 - goal is 50 cc/hour.      She is managing constipation with lactulose-still has some constipation alternating with diarrhea.  Difficult to find a middle ground sometimes.      She is not taking much po at all at this point - causing nausea and also has episodes of syncope associated with eating.    She took cymbalta before and this didn't permitted.  She does endorse some depression at times but she feels like she manages things pretty well despite her health challenges.  HPI  Past Medical History:   Diagnosis Date    Anemia     Arthritis     RHEUMATOID    Asthma     CPRS 1 (complex regional pain syndrome I) of upper limb     Depression     EDS (Tomas-Danlos syndrome)     Fibromyalgia 2015    History of hyperkeratosis of skin     IBS (irritable bowel syndrome)     Leukopenia, mild     Poor vision     Raynauds syndrome     SOB (shortness of breath)      Past Surgical History:   Procedure Laterality Date    COLONOSCOPY  12/11/2020    Dr. Barone    DENTAL PROCEDURE      EPIDURAL BLOCK      SHOULDER SURGERY Bilateral     X2    TOE SURGERY Right 2011    3rd toe     UPPER GASTROINTESTINAL ENDOSCOPY  12/11/1920    Dr. Barone       Current Outpatient Medications:     amLODIPine (NORVASC) 10 MG tablet, Take 1 tablet by mouth Daily., Disp: , Rfl:     cyanocobalamin 1000 MCG/ML injection, Inject 1 mL into the appropriate muscle as directed by prescriber Every 30 (Thirty) Days., Disp: , Rfl:     famotidine (PEPCID) 20 MG tablet, TAKE 1 TABLET BY MOUTH  TWICE A DAY (Patient taking differently: Take 1 tablet by mouth As Needed.), Disp: 180 tablet, Rfl: 1    gabapentin (NEURONTIN) 100 MG capsule, 2 capsules., Disp: , Rfl:     hydrocortisone (ANUSOL-HC) 25 MG suppository, Insert 1 suppository into the rectum At Night As Needed for Hemorrhoids (rectal discomfort/bleeding)., Disp: 30 suppository, Rfl: 1    hydroxychloroquine (PLAQUENIL) 200 MG tablet, Take 1 tablet by mouth 2 (Two) Times a Day., Disp: , Rfl:     immune globulin, human, (Gammaplex) 10 GM/200ML solution infusion, 800 mL, 0 Refill(s), Disp: , Rfl:     lactulose (CHRONULAC) 10 GM/15ML solution, Take 15 mL by mouth., Disp: , Rfl:     leucovorin 5 MG tablet, Take 1 tablet by mouth 1 (One) Time Per Week. Mondays, Disp: , Rfl:     metoprolol succinate XL (TOPROL-XL) 25 MG 24 hr tablet, Take 0.5 tablets by mouth Every Night., Disp: , Rfl:     Nitro-Bid 2 % ointment, APPLY BY TRANSDERMAL ROUTE TO FINGER WEBS FOR RAYNAUD'S EVERY 12 HOURS AND REMOVE AT BEDTIME, Disp: , Rfl:     NON FORMULARY, Take 10 mg by mouth 3 (Three) Times a Day With Meals. Domperidone, Disp: , Rfl:     norethindrone-ethinyl estradiol-ferrous fumarate (Misa 24 Fe) 1-20 MG-MCG(24) per tablet, Take 1 tablet by mouth Daily., Disp: , Rfl:     ondansetron ODT (ZOFRAN-ODT) 4 MG disintegrating tablet, 0 Refill(s), Disp: , Rfl:     Orencia ClickJect 125 MG/ML solution auto-injector, 4 mL, 0 Refill(s), Disp: , Rfl:     pantoprazole (PROTONIX) 40 MG EC tablet, Take 1 tablet by mouth 2 (Two) Times a Day., Disp: , Rfl:     polyethylene glycol (MIRALAX) 17 g packet, Take 17 g by mouth Daily. Take 1/2 packet once daily for 1 week then start taking 1 packet once daily., Disp: 20 packet, Rfl: 1    predniSONE (DELTASONE) 20 MG tablet, Take 1 tablet by mouth Daily., Disp: , Rfl:     promethazine (PHENERGAN) 6.25 MG/5ML syrup, PLEASE SEE ATTACHED FOR DETAILED DIRECTIONS, Disp: , Rfl:     promethazine-dextromethorphan (PROMETHAZINE-DM) 6.25-15 MG/5ML syrup, 5  mL by Enteral route., Disp: , Rfl:     sildenafil (REVATIO) 20 MG tablet, Take 1 tablet by mouth Daily., Disp: , Rfl:   PRN Meds:.  Allergies   Allergen Reactions    Anesthetics, Amide Nausea And Vomiting    Ciprofloxacin Other (See Comments)     EHERLIS STANDLIS? ILLNESS. UNABLE TO TAKE     Compazine [Prochlorperazine] Dystonia    Metoclopramide Nausea And Vomiting     Social History     Socioeconomic History    Marital status: Single    Number of children: 0    Years of education: High School   Tobacco Use    Smoking status: Never    Smokeless tobacco: Never   Substance and Sexual Activity    Alcohol use: No    Drug use: No    Sexual activity: Never     Family History   Problem Relation Age of Onset    Rheum arthritis Mother     Diabetes type II Father     Hyperlipidemia Father     Diabetes Father     Cancer Maternal Uncle         throat cancer     Arthritis Maternal Uncle     Asthma Sister     Migraines Sister     Tomas-Danlos syndrome Sister     Asthma Brother     Leukemia Maternal Grandmother     Stroke Maternal Grandmother     Heart disease Maternal Grandmother     Rheum arthritis Maternal Grandmother     Prostate cancer Maternal Grandfather     Stroke Maternal Grandfather     Diabetes Maternal Grandfather     Cancer Paternal Grandmother     Colon cancer Paternal Grandmother     No Known Problems Brother     Breast cancer Neg Hx      Review of Systems   Constitutional:  Negative for appetite change and unexpected weight change.   Gastrointestinal:  Positive for constipation, diarrhea, nausea and vomiting.   Vitals:    06/08/23 0842   BP: 116/75   Pulse: 82   Temp: 97.3 °F (36.3 °C)   SpO2: 91%         06/08/23  0842   Weight: 75.8 kg (167 lb 3.2 oz)       Objective   Physical Exam  Constitutional:       Appearance: Normal appearance. She is well-developed.   HENT:      Head: Normocephalic and atraumatic.   Eyes:      General: No scleral icterus.     Conjunctiva/sclera: Conjunctivae normal.   Pulmonary:       Effort: Pulmonary effort is normal.   Abdominal:      General: There is no distension.      Palpations: Abdomen is soft.   Musculoskeletal:      Cervical back: Normal range of motion and neck supple.   Skin:     General: Skin is warm and dry.   Neurological:      Mental Status: She is alert.   Psychiatric:         Mood and Affect: Mood normal.         Behavior: Behavior normal.     No radiology results for the last 7 days    Assessment & Plan   Diagnoses and all orders for this visit:    Gastroparesis    Tomas-Danlos disease    Irritable bowel syndrome with constipation    Gastroesophageal reflux disease with esophagitis without hemorrhage    History of jejunostomy    Other orders  -     immune globulin, human, (Gammaplex) 10 GM/200ML solution infusion; 800 mL, 0 Refill(s)  -     metoprolol succinate XL (TOPROL-XL) 25 MG 24 hr tablet; Take 0.5 tablets by mouth Every Night.  -     lactulose (CHRONULAC) 10 GM/15ML solution; Take 15 mL by mouth.  -     ondansetron ODT (ZOFRAN-ODT) 4 MG disintegrating tablet; 0 Refill(s)  -     promethazine (PHENERGAN) 6.25 MG/5ML syrup; PLEASE SEE ATTACHED FOR DETAILED DIRECTIONS  -     Nitro-Bid 2 % ointment; APPLY BY TRANSDERMAL ROUTE TO FINGER WEBS FOR RAYNAUD'S EVERY 12 HOURS AND REMOVE AT BEDTIME  -     promethazine-dextromethorphan (PROMETHAZINE-DM) 6.25-15 MG/5ML syrup; 5 mL by Enteral route.  -     gabapentin (NEURONTIN) 100 MG capsule; 2 capsules.  -     Orencia ClickJect 125 MG/ML solution auto-injector; 4 mL, 0 Refill(s)      Plan:  Continuing with J-tube feeds managed by the [facility].  Recommend that she see a dietitian.  She will reach out to you eval again to get this set up.  We discussed adding sertraline or alternative to help with her mood as it may also help to decrease any symptoms exacerbated by stress or anxiety.  She feels like she is managing this pretty well but is open to trying.  Taking lactulose titrated for bowel movements.

## 2023-06-13 ENCOUNTER — TRANSCRIBE ORDERS (OUTPATIENT)
Dept: ADMINISTRATIVE | Facility: HOSPITAL | Age: 28
End: 2023-06-13
Payer: COMMERCIAL

## 2023-06-13 DIAGNOSIS — R06.00 DYSPNEA, UNSPECIFIED TYPE: Primary | ICD-10-CM

## 2023-06-16 ENCOUNTER — HOSPITAL ENCOUNTER (EMERGENCY)
Facility: HOSPITAL | Age: 28
Discharge: HOME | End: 2023-06-16
Attending: STUDENT IN AN ORGANIZED HEALTH CARE EDUCATION/TRAINING PROGRAM
Payer: COMMERCIAL

## 2023-06-16 ENCOUNTER — APPOINTMENT (OUTPATIENT)
Dept: CT IMAGING | Facility: HOSPITAL | Age: 28
End: 2023-06-16
Payer: COMMERCIAL

## 2023-06-16 VITALS
HEART RATE: 67 BPM | OXYGEN SATURATION: 98 % | RESPIRATION RATE: 17 BRPM | HEIGHT: 71 IN | DIASTOLIC BLOOD PRESSURE: 77 MMHG | SYSTOLIC BLOOD PRESSURE: 115 MMHG | WEIGHT: 160 LBS | TEMPERATURE: 98.8 F | BODY MASS INDEX: 22.4 KG/M2

## 2023-06-16 DIAGNOSIS — K94.12 JEJUNOSTOMY SITE INFECTION: Primary | ICD-10-CM

## 2023-06-16 LAB
ALBUMIN SERPL-MCNC: 4 G/DL (ref 3.5–5.2)
ALBUMIN/GLOB SERPL: 0.9 G/DL
ALP SERPL-CCNC: 45 U/L (ref 39–117)
ALT SERPL W P-5'-P-CCNC: <5 U/L (ref 1–33)
ANION GAP SERPL CALCULATED.3IONS-SCNC: 12.2 MMOL/L (ref 5–15)
AST SERPL-CCNC: 11 U/L (ref 1–32)
B-HCG UR QL: NEGATIVE
BASOPHILS # BLD AUTO: 0.01 10*3/MM3 (ref 0–0.2)
BASOPHILS NFR BLD AUTO: 0.3 % (ref 0–1.5)
BILIRUB SERPL-MCNC: 0.2 MG/DL (ref 0–1.2)
BUN SERPL-MCNC: 6 MG/DL (ref 6–20)
BUN/CREAT SERPL: 9 (ref 7–25)
CALCIUM SPEC-SCNC: 9.7 MG/DL (ref 8.6–10.5)
CHLORIDE SERPL-SCNC: 102 MMOL/L (ref 98–107)
CO2 SERPL-SCNC: 22.8 MMOL/L (ref 22–29)
CREAT SERPL-MCNC: 0.67 MG/DL (ref 0.57–1)
DEPRECATED RDW RBC AUTO: 42.6 FL (ref 37–54)
EGFRCR SERPLBLD CKD-EPI 2021: 122.3 ML/MIN/1.73
EOSINOPHIL # BLD AUTO: 0.04 10*3/MM3 (ref 0–0.4)
EOSINOPHIL NFR BLD AUTO: 1.1 % (ref 0.3–6.2)
ERYTHROCYTE [DISTWIDTH] IN BLOOD BY AUTOMATED COUNT: 13.4 % (ref 12.3–15.4)
GLOBULIN UR ELPH-MCNC: 4.5 GM/DL
GLUCOSE SERPL-MCNC: 93 MG/DL (ref 65–99)
HCT VFR BLD AUTO: 34.6 % (ref 34–46.6)
HGB BLD-MCNC: 11.5 G/DL (ref 12–15.9)
IMM GRANULOCYTES # BLD AUTO: 0 10*3/MM3 (ref 0–0.05)
IMM GRANULOCYTES NFR BLD AUTO: 0 % (ref 0–0.5)
LIPASE SERPL-CCNC: 30 U/L (ref 13–60)
LYMPHOCYTES # BLD AUTO: 1.42 10*3/MM3 (ref 0.7–3.1)
LYMPHOCYTES NFR BLD AUTO: 40.3 % (ref 19.6–45.3)
MCH RBC QN AUTO: 28.8 PG (ref 26.6–33)
MCHC RBC AUTO-ENTMCNC: 33.2 G/DL (ref 31.5–35.7)
MCV RBC AUTO: 86.5 FL (ref 79–97)
MONOCYTES # BLD AUTO: 0.32 10*3/MM3 (ref 0.1–0.9)
MONOCYTES NFR BLD AUTO: 9.1 % (ref 5–12)
NEUTROPHILS NFR BLD AUTO: 1.73 10*3/MM3 (ref 1.7–7)
NEUTROPHILS NFR BLD AUTO: 49.2 % (ref 42.7–76)
PLATELET # BLD AUTO: 206 10*3/MM3 (ref 140–450)
PMV BLD AUTO: 10.8 FL (ref 6–12)
POTASSIUM SERPL-SCNC: 3.5 MMOL/L (ref 3.5–5.2)
PROT SERPL-MCNC: 8.5 G/DL (ref 6–8.5)
RBC # BLD AUTO: 4 10*6/MM3 (ref 3.77–5.28)
SODIUM SERPL-SCNC: 137 MMOL/L (ref 136–145)
WBC NRBC COR # BLD: 3.52 10*3/MM3 (ref 3.4–10.8)

## 2023-06-16 PROCEDURE — 80053 COMPREHEN METABOLIC PANEL: CPT | Performed by: STUDENT IN AN ORGANIZED HEALTH CARE EDUCATION/TRAINING PROGRAM

## 2023-06-16 PROCEDURE — 74177 CT ABD & PELVIS W/CONTRAST: CPT

## 2023-06-16 PROCEDURE — 81025 URINE PREGNANCY TEST: CPT | Performed by: STUDENT IN AN ORGANIZED HEALTH CARE EDUCATION/TRAINING PROGRAM

## 2023-06-16 PROCEDURE — 83690 ASSAY OF LIPASE: CPT | Performed by: STUDENT IN AN ORGANIZED HEALTH CARE EDUCATION/TRAINING PROGRAM

## 2023-06-16 PROCEDURE — 25510000001 IOPAMIDOL PER 1 ML: Performed by: STUDENT IN AN ORGANIZED HEALTH CARE EDUCATION/TRAINING PROGRAM

## 2023-06-16 PROCEDURE — 85025 COMPLETE CBC W/AUTO DIFF WBC: CPT | Performed by: STUDENT IN AN ORGANIZED HEALTH CARE EDUCATION/TRAINING PROGRAM

## 2023-06-16 PROCEDURE — 25010000002 CEFTRIAXONE PER 250 MG: Performed by: STUDENT IN AN ORGANIZED HEALTH CARE EDUCATION/TRAINING PROGRAM

## 2023-06-16 PROCEDURE — 25010000002 MORPHINE PER 10 MG: Performed by: STUDENT IN AN ORGANIZED HEALTH CARE EDUCATION/TRAINING PROGRAM

## 2023-06-16 RX ORDER — CEFPODOXIME PROXETIL 100 MG/5ML
400 GRANULE, FOR SUSPENSION ORAL 2 TIMES DAILY
Qty: 280 ML | Refills: 0 | Status: SHIPPED | OUTPATIENT
Start: 2023-06-16 | End: 2023-06-16 | Stop reason: SDUPTHER

## 2023-06-16 RX ORDER — CEFPODOXIME PROXETIL 100 MG/5ML
400 GRANULE, FOR SUSPENSION ORAL 2 TIMES DAILY
Qty: 280 ML | Refills: 0 | Status: SHIPPED | OUTPATIENT
Start: 2023-06-16 | End: 2023-06-23

## 2023-06-16 RX ORDER — MORPHINE SULFATE 2 MG/ML
2 INJECTION, SOLUTION INTRAMUSCULAR; INTRAVENOUS ONCE
Status: COMPLETED | OUTPATIENT
Start: 2023-06-16 | End: 2023-06-16

## 2023-06-16 RX ORDER — MORPHINE SULFATE 2 MG/ML
4 INJECTION, SOLUTION INTRAMUSCULAR; INTRAVENOUS ONCE
Status: COMPLETED | OUTPATIENT
Start: 2023-06-16 | End: 2023-06-16

## 2023-06-16 RX ADMIN — CEFTRIAXONE SODIUM 1 G: 1 INJECTION, POWDER, FOR SOLUTION INTRAMUSCULAR; INTRAVENOUS at 03:15

## 2023-06-16 RX ADMIN — MORPHINE SULFATE 4 MG: 2 INJECTION, SOLUTION INTRAMUSCULAR; INTRAVENOUS at 02:47

## 2023-06-16 RX ADMIN — MORPHINE SULFATE 4 MG: 2 INJECTION, SOLUTION INTRAMUSCULAR; INTRAVENOUS at 01:02

## 2023-06-16 RX ADMIN — MORPHINE SULFATE 2 MG: 2 INJECTION, SOLUTION INTRAMUSCULAR; INTRAVENOUS at 03:35

## 2023-06-16 RX ADMIN — IOPAMIDOL 100 ML: 755 INJECTION, SOLUTION INTRAVENOUS at 02:30

## 2023-06-16 NOTE — DISCHARGE INSTRUCTIONS
You have an infection at the site of the j-tube. You were given antibiotics in the ER. Your next dose starts in 12 hours. This was sent to the pharmacy. Call you GI doctor in the morning to discuss a followup appointment. If symptoms worsen, please see a doctor for reevaluation immediately.

## 2023-06-16 NOTE — FSED PROVIDER NOTE
Subjective   History of Present Illness  29yo h/o SLE on methotrexate, hydorcloraquine and prednisone IBS, gastroporesis, tomas danlos, J tube p/w pain and foul smelling pus drainagae around J tube site that started 3 days ago. Patient reports there is copious amount of drainage that is foul smelling and yellow in color. Last hada J tube change May 25th after the J tube clogged. Reports she has had it infected once before as well. Temp has been . Reports the tube still drains normally and no pus from the J tube itself.     History provided by:  Medical records, patient and parent    Review of Systems   All other systems reviewed and are negative.    Past Medical History:   Diagnosis Date    Anemia     Arthritis     RHEUMATOID    Asthma     CPRS 1 (complex regional pain syndrome I) of upper limb     Depression     EDS (Tomas-Danlos syndrome)     Fibromyalgia 2015    Gastroparesis     History of hyperkeratosis of skin     IBS (irritable bowel syndrome)     Leukopenia, mild     Poor vision     Raynauds syndrome     SOB (shortness of breath)        Allergies   Allergen Reactions    Anesthetics, Amide Nausea And Vomiting    Ciprofloxacin Other (See Comments)     EHERLIS STANDLIS? ILLNESS. UNABLE TO TAKE     Compazine [Prochlorperazine] Dystonia    Metoclopramide Nausea And Vomiting       Past Surgical History:   Procedure Laterality Date    COLONOSCOPY  12/11/2020    Dr. Barone    DENTAL PROCEDURE      EPIDURAL BLOCK      JEJUNOSTOMY TUBE INSERTION      SHOULDER SURGERY Bilateral     X2    TOE SURGERY Right 2011    3rd toe     UPPER GASTROINTESTINAL ENDOSCOPY  12/11/1920    Dr. Barone       Family History   Problem Relation Age of Onset    Rheum arthritis Mother     Diabetes type II Father     Hyperlipidemia Father     Diabetes Father     Cancer Maternal Uncle         throat cancer     Arthritis Maternal Uncle     Asthma Sister     Migraines Sister     Tomas-Danlos syndrome Sister     Asthma Brother     Leukemia  Maternal Grandmother     Stroke Maternal Grandmother     Heart disease Maternal Grandmother     Rheum arthritis Maternal Grandmother     Prostate cancer Maternal Grandfather     Stroke Maternal Grandfather     Diabetes Maternal Grandfather     Cancer Paternal Grandmother     Colon cancer Paternal Grandmother     No Known Problems Brother     Breast cancer Neg Hx        Social History     Socioeconomic History    Marital status: Single    Number of children: 0    Years of education: High School   Tobacco Use    Smoking status: Never    Smokeless tobacco: Never   Substance and Sexual Activity    Alcohol use: No    Drug use: No    Sexual activity: Defer           Objective   Physical Exam  Vitals and nursing note reviewed.   Constitutional:       General: She is in acute distress.      Comments: Crying in pain, tearful   HENT:      Head: Normocephalic and atraumatic.      Nose: Nose normal.      Mouth/Throat:      Mouth: Mucous membranes are moist.   Eyes:      Extraocular Movements: Extraocular movements intact.      Pupils: Pupils are equal, round, and reactive to light.   Cardiovascular:      Rate and Rhythm: Normal rate and regular rhythm.   Pulmonary:      Effort: Pulmonary effort is normal.      Breath sounds: Normal breath sounds.   Abdominal:      Palpations: Abdomen is soft.      Tenderness: There is abdominal tenderness.      Comments: J tube in place, white/yellow pus drainage from around the J tube site, mild erythema around site of Jtube, ttp LUQ and around the J tube site   Musculoskeletal:         General: Normal range of motion.      Cervical back: Normal range of motion.   Skin:     General: Skin is warm and dry.   Neurological:      Mental Status: She is alert.       Procedures           ED Course  ED Course as of 06/16/23 0315   Fri Jun 16, 2023   0310 Labs without leukocytosis, patient much more comfortable after morphine hwoever required second dose after CT. CT wtith stranding around soft tissue  site where J tube is but iwthout discrete abscess, also with ovarian cyst. Will give ceftriaxone and will give cefpodoxime to go home with. Pt reports she also received 7 days of antibiotics through the J tube when this happened last year. Discussed close followup with her GI doctor. [SH]      ED Course User Index  [SH] Crissy Downey MD                                           Medical Decision Making  27yo h/o SLE on methotrexate, hydorcloraquine and prednisone IBS, gastroporesis, paris danlos, J tube p/w pain and foul smelling pus drainagae around J tube site that started 3 days ago. Pt crying in pain, abd soft with ttp LUQ and around J tube, will obtain CT to evaluate for intraabdominal abscess, will obtain labs, meds, ct    Problems Addressed:  Jejunostomy site infection: complicated acute illness or injury    Amount and/or Complexity of Data Reviewed  Independent Historian:      Details: pt and mother  External Data Reviewed: labs, radiology and notes.  Labs: ordered. Decision-making details documented in ED Course.  Radiology: ordered. Decision-making details documented in ED Course.    Risk  Prescription drug management.        Final diagnoses:   Jejunostomy site infection       ED Disposition  ED Disposition       ED Disposition   Discharge    Condition   Stable    Comment   --               Fanny Jarrett MD  [address] [place], [place] [id number] [phone]          Gia Quintana MD  [address] [place] [address] [address]  [place] [id number] [phone]    Schedule an appointment as soon as possible for a visit            Medication List        New Prescriptions      cefpodoxime 100 MG/5ML suspension  Commonly known as: VANTIN  Administer 20 mL per J tube 2 (Two) Times a Day for 7 days.            Changed      famotidine 20 MG tablet  Commonly known as: PEPCID  TAKE 1 TABLET BY MOUTH TWICE A DAY  What changed:   when to take this  reasons to take this               Where to Get  Your Medications        These medications were sent to [facility]/pharmacy #[id number] - [place], [place] - [address] AT [facility] - [phone]  - [phone] fx [address], [facility] [address]      Phone: [phone]   cefpodoxime 100 MG/5ML suspension

## 2023-06-27 PROBLEM — G90.89 INTESTINAL AUTONOMIC NEUROPATHY: Status: ACTIVE | Noted: 2023-01-09

## 2023-06-27 PROBLEM — K59.89 DIFFUSE DYSFUNCTION OF SMOOTH MUSCLE OF GASTROINTESTINAL TRACT: Status: ACTIVE | Noted: 2022-02-06

## 2023-06-27 PROBLEM — K21.9 GASTROESOPHAGEAL REFLUX DISEASE: Status: ACTIVE | Noted: 2022-09-26

## 2023-06-27 PROBLEM — Z93.4 JEJUNOSTOMY TUBE PRESENT: Status: ACTIVE | Noted: 2023-06-27

## 2023-06-27 PROBLEM — L81.7 PROGRESSIVE PIGMENTARY DERMATOSIS OF SCHAMBERG: Status: ACTIVE | Noted: 2023-06-27

## 2023-06-27 PROBLEM — G90.8 INTESTINAL AUTONOMIC NEUROPATHY: Status: ACTIVE | Noted: 2023-01-09

## 2023-06-27 PROBLEM — M35.00 SJOGREN'S SYNDROME: Status: ACTIVE | Noted: 2023-06-27

## 2023-06-27 PROBLEM — G90.9 DISORDER OF AUTONOMIC NERVOUS SYSTEM: Status: ACTIVE | Noted: 2023-06-27

## 2023-06-27 PROBLEM — G90.A POSTURAL ORTHOSTATIC TACHYCARDIA SYNDROME: Status: ACTIVE | Noted: 2022-09-26

## 2023-06-27 PROBLEM — I73.00 RAYNAUD'S PHENOMENON: Status: ACTIVE | Noted: 2022-09-26

## 2023-06-27 PROBLEM — K31.84 GASTROPARESIS: Status: ACTIVE | Noted: 2022-01-24

## 2023-07-04 PROBLEM — R10.9 ABDOMINAL WALL PAIN: Status: ACTIVE | Noted: 2023-07-04

## 2023-07-05 PROBLEM — K94.13 MALFUNCTION OF JEJUNOSTOMY TUBE: Status: ACTIVE | Noted: 2023-07-05

## 2023-07-12 ENCOUNTER — APPOINTMENT (OUTPATIENT)
Dept: CT IMAGING | Facility: HOSPITAL | Age: 28
DRG: 603 | End: 2023-07-12
Payer: COMMERCIAL

## 2023-07-12 ENCOUNTER — HOSPITAL ENCOUNTER (INPATIENT)
Facility: HOSPITAL | Age: 28
LOS: 3 days | Discharge: HOME | DRG: 603 | End: 2023-07-15
Attending: EMERGENCY MEDICINE | Admitting: STUDENT IN AN ORGANIZED HEALTH CARE EDUCATION/TRAINING PROGRAM
Payer: COMMERCIAL

## 2023-07-12 ENCOUNTER — APPOINTMENT (OUTPATIENT)
Dept: GENERAL RADIOLOGY | Facility: HOSPITAL | Age: 28
DRG: 603 | End: 2023-07-12
Payer: COMMERCIAL

## 2023-07-12 DIAGNOSIS — K04.7 PERIAPICAL ABSCESS WITHOUT SINUS: ICD-10-CM

## 2023-07-12 DIAGNOSIS — L03.211 FACIAL CELLULITIS: Primary | ICD-10-CM

## 2023-07-12 LAB
ALBUMIN SERPL-MCNC: 4.4 G/DL (ref 3.5–5.2)
ALBUMIN/GLOB SERPL: 1.1 G/DL
ALP SERPL-CCNC: 51 U/L (ref 39–117)
ALT SERPL W P-5'-P-CCNC: 8 U/L (ref 1–33)
ANION GAP SERPL CALCULATED.3IONS-SCNC: 11 MMOL/L (ref 5–15)
AST SERPL-CCNC: 15 U/L (ref 1–32)
BASOPHILS # BLD AUTO: 0 10*3/MM3 (ref 0–0.2)
BASOPHILS NFR BLD AUTO: 0 % (ref 0–1.5)
BILIRUB SERPL-MCNC: 0.4 MG/DL (ref 0–1.2)
BUN SERPL-MCNC: 7 MG/DL (ref 6–20)
BUN/CREAT SERPL: 10.6 (ref 7–25)
CALCIUM SPEC-SCNC: 9.4 MG/DL (ref 8.6–10.5)
CHLORIDE SERPL-SCNC: 105 MMOL/L (ref 98–107)
CO2 SERPL-SCNC: 22 MMOL/L (ref 22–29)
CREAT SERPL-MCNC: 0.66 MG/DL (ref 0.57–1)
D-LACTATE SERPL-SCNC: 0.7 MMOL/L (ref 0.5–2)
DEPRECATED RDW RBC AUTO: 39.1 FL (ref 37–54)
EGFRCR SERPLBLD CKD-EPI 2021: 122.7 ML/MIN/1.73
EOSINOPHIL # BLD AUTO: 0.03 10*3/MM3 (ref 0–0.4)
EOSINOPHIL NFR BLD AUTO: 0.6 % (ref 0.3–6.2)
ERYTHROCYTE [DISTWIDTH] IN BLOOD BY AUTOMATED COUNT: 12.7 % (ref 12.3–15.4)
GLOBULIN UR ELPH-MCNC: 4.1 GM/DL
GLUCOSE BLDC GLUCOMTR-MCNC: 117 MG/DL (ref 70–130)
GLUCOSE BLDC GLUCOMTR-MCNC: 73 MG/DL (ref 70–130)
GLUCOSE SERPL-MCNC: 111 MG/DL (ref 65–99)
HCG SERPL QL: NEGATIVE
HCT VFR BLD AUTO: 33 % (ref 34–46.6)
HGB BLD-MCNC: 11.2 G/DL (ref 12–15.9)
IMM GRANULOCYTES # BLD AUTO: 0.01 10*3/MM3 (ref 0–0.05)
IMM GRANULOCYTES NFR BLD AUTO: 0.2 % (ref 0–0.5)
LYMPHOCYTES # BLD AUTO: 0.93 10*3/MM3 (ref 0.7–3.1)
LYMPHOCYTES NFR BLD AUTO: 17.1 % (ref 19.6–45.3)
MCH RBC QN AUTO: 28.7 PG (ref 26.6–33)
MCHC RBC AUTO-ENTMCNC: 33.9 G/DL (ref 31.5–35.7)
MCV RBC AUTO: 84.6 FL (ref 79–97)
MONOCYTES # BLD AUTO: 0.34 10*3/MM3 (ref 0.1–0.9)
MONOCYTES NFR BLD AUTO: 6.3 % (ref 5–12)
NEUTROPHILS NFR BLD AUTO: 4.13 10*3/MM3 (ref 1.7–7)
NEUTROPHILS NFR BLD AUTO: 75.8 % (ref 42.7–76)
NRBC BLD AUTO-RTO: 0 /100 WBC (ref 0–0.2)
PLATELET # BLD AUTO: 202 10*3/MM3 (ref 140–450)
PMV BLD AUTO: 11.1 FL (ref 6–12)
POTASSIUM SERPL-SCNC: 3.6 MMOL/L (ref 3.5–5.2)
PROCALCITONIN SERPL-MCNC: 0.82 NG/ML (ref 0–0.25)
PROT SERPL-MCNC: 8.5 G/DL (ref 6–8.5)
RBC # BLD AUTO: 3.9 10*6/MM3 (ref 3.77–5.28)
SODIUM SERPL-SCNC: 138 MMOL/L (ref 136–145)
WBC NRBC COR # BLD: 5.44 10*3/MM3 (ref 3.4–10.8)

## 2023-07-12 PROCEDURE — 25510000001 IOPAMIDOL 61 % SOLUTION: Performed by: EMERGENCY MEDICINE

## 2023-07-12 PROCEDURE — 25010000002 MORPHINE PER 10 MG: Performed by: EMERGENCY MEDICINE

## 2023-07-12 PROCEDURE — 25010000002 KETOROLAC TROMETHAMINE PER 15 MG: Performed by: NURSE PRACTITIONER

## 2023-07-12 PROCEDURE — 82948 REAGENT STRIP/BLOOD GLUCOSE: CPT

## 2023-07-12 PROCEDURE — 84145 PROCALCITONIN (PCT): CPT | Performed by: EMERGENCY MEDICINE

## 2023-07-12 PROCEDURE — 25010000002 ONDANSETRON PER 1 MG: Performed by: EMERGENCY MEDICINE

## 2023-07-12 PROCEDURE — 36415 COLL VENOUS BLD VENIPUNCTURE: CPT

## 2023-07-12 PROCEDURE — 25010000002 ONDANSETRON PER 1 MG: Performed by: NURSE PRACTITIONER

## 2023-07-12 PROCEDURE — 25010000002 AMPICILLIN-SULBACTAM PER 1.5 G: Performed by: DENTIST

## 2023-07-12 PROCEDURE — 83605 ASSAY OF LACTIC ACID: CPT | Performed by: EMERGENCY MEDICINE

## 2023-07-12 PROCEDURE — 87040 BLOOD CULTURE FOR BACTERIA: CPT | Performed by: EMERGENCY MEDICINE

## 2023-07-12 PROCEDURE — 84703 CHORIONIC GONADOTROPIN ASSAY: CPT | Performed by: EMERGENCY MEDICINE

## 2023-07-12 PROCEDURE — 25010000002 MORPHINE PER 10 MG: Performed by: NURSE PRACTITIONER

## 2023-07-12 PROCEDURE — 63710000001 PREDNISONE PER 1 MG: Performed by: STUDENT IN AN ORGANIZED HEALTH CARE EDUCATION/TRAINING PROGRAM

## 2023-07-12 PROCEDURE — 80053 COMPREHEN METABOLIC PANEL: CPT | Performed by: EMERGENCY MEDICINE

## 2023-07-12 PROCEDURE — 25010000002 MORPHINE PER 10 MG: Performed by: STUDENT IN AN ORGANIZED HEALTH CARE EDUCATION/TRAINING PROGRAM

## 2023-07-12 PROCEDURE — 90791 PSYCH DIAGNOSTIC EVALUATION: CPT

## 2023-07-12 PROCEDURE — 99285 EMERGENCY DEPT VISIT HI MDM: CPT

## 2023-07-12 PROCEDURE — 25010000002 AMPICILLIN-SULBACTAM PER 1.5 G: Performed by: EMERGENCY MEDICINE

## 2023-07-12 PROCEDURE — 70355 PANORAMIC X-RAY OF JAWS: CPT

## 2023-07-12 PROCEDURE — 70491 CT SOFT TISSUE NECK W/DYE: CPT

## 2023-07-12 PROCEDURE — 85025 COMPLETE CBC W/AUTO DIFF WBC: CPT | Performed by: EMERGENCY MEDICINE

## 2023-07-12 RX ORDER — METOPROLOL SUCCINATE 25 MG/1
12.5 TABLET, EXTENDED RELEASE ORAL NIGHTLY
Status: DISCONTINUED | OUTPATIENT
Start: 2023-07-12 | End: 2023-07-14

## 2023-07-12 RX ORDER — NALOXONE HCL 0.4 MG/ML
0.4 VIAL (ML) INJECTION
Status: DISCONTINUED | OUTPATIENT
Start: 2023-07-12 | End: 2023-07-12

## 2023-07-12 RX ORDER — HYDROCODONE BITARTRATE AND ACETAMINOPHEN 5; 325 MG/1; MG/1
1 TABLET ORAL EVERY 4 HOURS PRN
Status: DISCONTINUED | OUTPATIENT
Start: 2023-07-12 | End: 2023-07-14

## 2023-07-12 RX ORDER — KETOROLAC TROMETHAMINE 15 MG/ML
15 INJECTION, SOLUTION INTRAMUSCULAR; INTRAVENOUS EVERY 6 HOURS PRN
Status: DISCONTINUED | OUTPATIENT
Start: 2023-07-12 | End: 2023-07-15 | Stop reason: HOSPADM

## 2023-07-12 RX ORDER — AMOXICILLIN 250 MG
2 CAPSULE ORAL 2 TIMES DAILY
Status: DISCONTINUED | OUTPATIENT
Start: 2023-07-12 | End: 2023-07-15 | Stop reason: HOSPADM

## 2023-07-12 RX ORDER — HYDROXYCHLOROQUINE SULFATE 200 MG/1
200 TABLET, FILM COATED ORAL 2 TIMES DAILY
Status: DISCONTINUED | OUTPATIENT
Start: 2023-07-12 | End: 2023-07-15 | Stop reason: HOSPADM

## 2023-07-12 RX ORDER — SODIUM CHLORIDE 0.9 % (FLUSH) 0.9 %
10 SYRINGE (ML) INJECTION AS NEEDED
Status: DISCONTINUED | OUTPATIENT
Start: 2023-07-12 | End: 2023-07-15 | Stop reason: HOSPADM

## 2023-07-12 RX ORDER — MORPHINE SULFATE 2 MG/ML
2 INJECTION, SOLUTION INTRAMUSCULAR; INTRAVENOUS
Status: DISCONTINUED | OUTPATIENT
Start: 2023-07-12 | End: 2023-07-15 | Stop reason: HOSPADM

## 2023-07-12 RX ORDER — SODIUM CHLORIDE, SODIUM LACTATE, POTASSIUM CHLORIDE, CALCIUM CHLORIDE 600; 310; 30; 20 MG/100ML; MG/100ML; MG/100ML; MG/100ML
100 INJECTION, SOLUTION INTRAVENOUS CONTINUOUS
Status: DISCONTINUED | OUTPATIENT
Start: 2023-07-12 | End: 2023-07-14

## 2023-07-12 RX ORDER — MORPHINE SULFATE 2 MG/ML
4 INJECTION, SOLUTION INTRAMUSCULAR; INTRAVENOUS ONCE
Status: COMPLETED | OUTPATIENT
Start: 2023-07-12 | End: 2023-07-12

## 2023-07-12 RX ORDER — DEXTROMETHORPHAN HYDROBROMIDE AND PROMETHAZINE HYDROCHLORIDE 15; 6.25 MG/5ML; MG/5ML
5 SYRUP ORAL 4 TIMES DAILY PRN
Status: DISCONTINUED | OUTPATIENT
Start: 2023-07-12 | End: 2023-07-15 | Stop reason: HOSPADM

## 2023-07-12 RX ORDER — LEUCOVORIN CALCIUM 5 MG/1
5 TABLET ORAL WEEKLY
Status: DISCONTINUED | OUTPATIENT
Start: 2023-07-12 | End: 2023-07-15 | Stop reason: HOSPADM

## 2023-07-12 RX ORDER — MULTIPLE VITAMINS W/ MINERALS TAB 9MG-400MCG
1 TAB ORAL DAILY
Status: DISCONTINUED | OUTPATIENT
Start: 2023-07-12 | End: 2023-07-15 | Stop reason: HOSPADM

## 2023-07-12 RX ORDER — ONDANSETRON 2 MG/ML
4 INJECTION INTRAMUSCULAR; INTRAVENOUS ONCE
Status: DISCONTINUED | OUTPATIENT
Start: 2023-07-12 | End: 2023-07-15 | Stop reason: HOSPADM

## 2023-07-12 RX ORDER — FAMOTIDINE 10 MG/ML
20 INJECTION, SOLUTION INTRAVENOUS EVERY 12 HOURS SCHEDULED
Status: DISCONTINUED | OUTPATIENT
Start: 2023-07-12 | End: 2023-07-15 | Stop reason: HOSPADM

## 2023-07-12 RX ORDER — ONDANSETRON 2 MG/ML
4 INJECTION INTRAMUSCULAR; INTRAVENOUS ONCE
Status: COMPLETED | OUTPATIENT
Start: 2023-07-12 | End: 2023-07-12

## 2023-07-12 RX ORDER — LACTULOSE 10 G/15ML
10 SOLUTION ORAL AS NEEDED
Status: DISCONTINUED | OUTPATIENT
Start: 2023-07-12 | End: 2023-07-15 | Stop reason: HOSPADM

## 2023-07-12 RX ORDER — KETOROLAC TROMETHAMINE 15 MG/ML
15 INJECTION, SOLUTION INTRAMUSCULAR; INTRAVENOUS ONCE
Status: DISCONTINUED | OUTPATIENT
Start: 2023-07-12 | End: 2023-07-12

## 2023-07-12 RX ORDER — ONDANSETRON 2 MG/ML
4 INJECTION INTRAMUSCULAR; INTRAVENOUS EVERY 6 HOURS PRN
Status: DISCONTINUED | OUTPATIENT
Start: 2023-07-12 | End: 2023-07-15 | Stop reason: HOSPADM

## 2023-07-12 RX ORDER — PANTOPRAZOLE SODIUM 40 MG/1
40 TABLET, DELAYED RELEASE ORAL 2 TIMES DAILY
Status: DISCONTINUED | OUTPATIENT
Start: 2023-07-12 | End: 2023-07-15 | Stop reason: HOSPADM

## 2023-07-12 RX ORDER — ACETAMINOPHEN 325 MG/1
650 TABLET ORAL EVERY 6 HOURS PRN
Status: DISCONTINUED | OUTPATIENT
Start: 2023-07-12 | End: 2023-07-15 | Stop reason: HOSPADM

## 2023-07-12 RX ORDER — DICYCLOMINE HYDROCHLORIDE 10 MG/1
10 CAPSULE ORAL 4 TIMES DAILY
Status: DISCONTINUED | OUTPATIENT
Start: 2023-07-12 | End: 2023-07-15 | Stop reason: HOSPADM

## 2023-07-12 RX ORDER — PREDNISONE 20 MG/1
20 TABLET ORAL DAILY
Status: DISCONTINUED | OUTPATIENT
Start: 2023-07-12 | End: 2023-07-15 | Stop reason: HOSPADM

## 2023-07-12 RX ORDER — NALOXONE HCL 0.4 MG/ML
0.4 VIAL (ML) INJECTION
Status: DISCONTINUED | OUTPATIENT
Start: 2023-07-12 | End: 2023-07-15 | Stop reason: HOSPADM

## 2023-07-12 RX ORDER — HYDROXYZINE HYDROCHLORIDE 25 MG/1
25 TABLET, FILM COATED ORAL 3 TIMES DAILY PRN
Status: DISCONTINUED | OUTPATIENT
Start: 2023-07-12 | End: 2023-07-15 | Stop reason: HOSPADM

## 2023-07-12 RX ORDER — METHOCARBAMOL 750 MG/1
750 TABLET, FILM COATED ORAL 3 TIMES DAILY PRN
Status: DISCONTINUED | OUTPATIENT
Start: 2023-07-12 | End: 2023-07-15 | Stop reason: HOSPADM

## 2023-07-12 RX ORDER — GABAPENTIN 100 MG/1
200 CAPSULE ORAL 3 TIMES DAILY
Status: DISCONTINUED | OUTPATIENT
Start: 2023-07-12 | End: 2023-07-14

## 2023-07-12 RX ORDER — MORPHINE SULFATE 2 MG/ML
2 INJECTION, SOLUTION INTRAMUSCULAR; INTRAVENOUS EVERY 4 HOURS PRN
Status: DISCONTINUED | OUTPATIENT
Start: 2023-07-12 | End: 2023-07-12

## 2023-07-12 RX ADMIN — AMPICILLIN SODIUM AND SULBACTAM SODIUM 3 G: 2; 1 INJECTION, POWDER, FOR SOLUTION INTRAMUSCULAR; INTRAVENOUS at 11:36

## 2023-07-12 RX ADMIN — GABAPENTIN 200 MG: 100 CAPSULE ORAL at 16:44

## 2023-07-12 RX ADMIN — HYDROCODONE BITARTRATE AND ACETAMINOPHEN 1 TABLET: 5; 325 TABLET ORAL at 18:21

## 2023-07-12 RX ADMIN — GABAPENTIN 200 MG: 100 CAPSULE ORAL at 21:27

## 2023-07-12 RX ADMIN — HYDROXYZINE HYDROCHLORIDE 25 MG: 25 TABLET ORAL at 11:22

## 2023-07-12 RX ADMIN — MORPHINE SULFATE 2 MG: 2 INJECTION, SOLUTION INTRAMUSCULAR; INTRAVENOUS at 16:45

## 2023-07-12 RX ADMIN — MORPHINE SULFATE 2 MG: 2 INJECTION, SOLUTION INTRAMUSCULAR; INTRAVENOUS at 20:41

## 2023-07-12 RX ADMIN — MORPHINE SULFATE 4 MG: 2 INJECTION, SOLUTION INTRAMUSCULAR; INTRAVENOUS at 05:39

## 2023-07-12 RX ADMIN — HYDROCODONE BITARTRATE AND ACETAMINOPHEN 1 TABLET: 5; 325 TABLET ORAL at 22:42

## 2023-07-12 RX ADMIN — FAMOTIDINE 20 MG: 10 INJECTION INTRAVENOUS at 20:59

## 2023-07-12 RX ADMIN — DICYCLOMINE HYDROCHLORIDE 10 MG: 10 CAPSULE ORAL at 16:47

## 2023-07-12 RX ADMIN — MORPHINE SULFATE 2 MG: 2 INJECTION, SOLUTION INTRAMUSCULAR; INTRAVENOUS at 14:18

## 2023-07-12 RX ADMIN — MORPHINE SULFATE 2 MG: 2 INJECTION, SOLUTION INTRAMUSCULAR; INTRAVENOUS at 10:10

## 2023-07-12 RX ADMIN — AMPICILLIN SODIUM AND SULBACTAM SODIUM 3 G: 2; 1 INJECTION, POWDER, FOR SOLUTION INTRAMUSCULAR; INTRAVENOUS at 20:41

## 2023-07-12 RX ADMIN — ONDANSETRON 4 MG: 2 INJECTION INTRAMUSCULAR; INTRAVENOUS at 05:39

## 2023-07-12 RX ADMIN — DICYCLOMINE HYDROCHLORIDE 10 MG: 10 CAPSULE ORAL at 21:27

## 2023-07-12 RX ADMIN — KETOROLAC TROMETHAMINE 15 MG: 15 INJECTION, SOLUTION INTRAMUSCULAR; INTRAVENOUS at 11:23

## 2023-07-12 RX ADMIN — ACETAMINOPHEN 650 MG: 325 TABLET ORAL at 12:21

## 2023-07-12 RX ADMIN — SODIUM CHLORIDE 1000 ML: 9 INJECTION, SOLUTION INTRAVENOUS at 05:45

## 2023-07-12 RX ADMIN — FAMOTIDINE 20 MG: 10 INJECTION INTRAVENOUS at 11:26

## 2023-07-12 RX ADMIN — METOPROLOL SUCCINATE 12.5 MG: 25 TABLET, EXTENDED RELEASE ORAL at 22:42

## 2023-07-12 RX ADMIN — PREDNISONE 20 MG: 20 TABLET ORAL at 16:44

## 2023-07-12 RX ADMIN — ONDANSETRON 4 MG: 2 INJECTION INTRAMUSCULAR; INTRAVENOUS at 11:25

## 2023-07-12 RX ADMIN — IOPAMIDOL 75 ML: 612 INJECTION, SOLUTION INTRAVENOUS at 07:21

## 2023-07-12 RX ADMIN — HYDROXYCHLOROQUINE SULFATE 200 MG: 200 TABLET ORAL at 21:27

## 2023-07-12 RX ADMIN — SODIUM CHLORIDE, POTASSIUM CHLORIDE, SODIUM LACTATE AND CALCIUM CHLORIDE 100 ML/HR: 600; 310; 30; 20 INJECTION, SOLUTION INTRAVENOUS at 16:44

## 2023-07-12 RX ADMIN — ONDANSETRON 4 MG: 2 INJECTION INTRAMUSCULAR; INTRAVENOUS at 20:59

## 2023-07-12 NOTE — CONSULTS
[facility]   HISTORY AND PHYSICAL    Patient Name: Henrietta Garrett  : 1995  MRN: [id number]  Primary Care Physician:  Fanny Jarrett MD  Date of admission: 2023    Subjective   Subjective     Chief Complaint: facial swelling     HPI:    Henrietta Garrett is a 28 y.o. female who presented to ER with complaint of facial swelling of one day.  Patient states that she woke up this morning with facial swelling and then presented to ER.  Patient denies any current tooth pain however reports that she has persistent emesis as a result of her gastroparesis.  She also has several other co-morbidities that contribute.  After CT scan it demonstrated facial cellulitis along with several radiolucencies.  Patient reports that she does have a dentist she has consulted with however they were waiting for her nutrition levels to improve.  OMS consulted for evaluation     Review of Systems   All systems were reviewed and negative except for: except above cc/hpi    Personal History     Past Medical History:   Diagnosis Date    Anemia     Arthritis     RHEUMATOID    Asthma     CPRS 1 (complex regional pain syndrome I) of upper limb     Depression     EDS (Tomas-Danlos syndrome)     Fibromyalgia 2015    Gastroparesis     History of hyperkeratosis of skin     IBS (irritable bowel syndrome)     Leukopenia, mild     Poor vision     Raynauds syndrome     Sjogren's disease     SOB (shortness of breath)        Past Surgical History:   Procedure Laterality Date    COLONOSCOPY  2020    Dr. Barone    DENTAL PROCEDURE      EPIDURAL BLOCK      JEJUNOSTOMY TUBE INSERTION      SHOULDER SURGERY Bilateral     X2    TOE SURGERY Right 2011    3rd toe     UPPER GASTROINTESTINAL ENDOSCOPY  1920    Dr. Barone       Family History: family history includes Arthritis in her maternal uncle; Asthma in her brother and sister; Cancer in her maternal uncle and paternal grandmother; Colon cancer in her paternal grandmother;  Diabetes in her father and maternal grandfather; Diabetes type II in her father; Tomas-Danlos syndrome in her sister; Heart disease in her maternal grandmother; Hyperlipidemia in her father; Leukemia in her maternal grandmother; Migraines in her sister; No Known Problems in her brother; Prostate cancer in her maternal grandfather; Rheum arthritis in her maternal grandmother and mother; Stroke in her maternal grandfather and maternal grandmother. Otherwise pertinent FHx was reviewed and not pertinent to current issue.    Social History:  reports that she has never smoked. She has never used smokeless tobacco. She reports that she does not drink alcohol and does not use drugs.    Home Medications:  EPINEPHrine, Levonorgestrel-Eth Estradiol, amLODIPine, dicyclomine, gabapentin, hydroxychloroquine, immune globulin (human), lactulose, leucovorin, methocarbamol, methotrexate PF, metoprolol succinate XL, multivitamin with minerals, nitroglycerin, pantoprazole, predniSONE, and promethazine-dextromethorphan      Allergies:  Allergies   Allergen Reactions    Anesthetics, Amide Nausea And Vomiting    Ciprofloxacin Other (See Comments)     EHERLIS STANDLIS? ILLNESS. UNABLE TO TAKE     Compazine [Prochlorperazine] Dystonia    Domperidone Other (See Comments)     Bad reaction per pt report    Haldol [Haloperidol] Other (See Comments)     Muscle spasms      Metoclopramide Nausea And Vomiting    Sulfa Antibiotics Other (See Comments)     Unable to take as causes leukopenia       Objective   Objective     Vitals:   Temp:  [98.8 °F (37.1 °C)-101.6 °F (38.7 °C)] 98.8 °F (37.1 °C)  Heart Rate:  [] 77  Resp:  [18] 18  BP: (107-162)/(71-92) 107/71  Physical Exam    Constitutional: Awake, alert   Eyes: PERRLA, sclerae anicteric, no conjunctival injection   HENT: NCAT, mucous membranes moist, facial edema with tenderness in right malar area   ORAL: multiple carious lesions, no palatal draping, SINAN 30mm, FOM soft without elevation,  no palpable or visible purulence    Neck: Supple, no thyromegaly, no lymphadenopathy, trachea midline   Respiratory: Clear to auscultation bilaterally, nonlabored respirations    Cardiovascular: RRR, no murmurs, rubs, or gallops, palpable pedal pulses bilaterally   Gastrointestinal: Positive bowel sounds, soft, nontender, nondistended   Musculoskeletal: No bilateral ankle edema, no clubbing or cyanosis to extremities   Psychiatric: Appropriate affect, cooperative   Neurologic: Oriented x 3, strength symmetric in all extremities, Cranial Nerves grossly intact to confrontation, speech clear   Skin: No rashes     Result Review    Result Review:  I have personally reviewed the results from the time of this admission to 7/12/2023 17:30 EDT and agree with these findings:  [x]  Laboratory list / accordion  [x]  Microbiology  [x]  Radiology  []  EKG/Telemetry   []  Cardiology/Vascular   []  Pathology  [x]  Old records  []  Other:  Most notable findings include: XR PANOREX-     Clinical: Poor dilatation with right facial swelling     FINDINGS: Maxillary sinuses are clear. Mandibular condyles are situated  within each respective condylar fossa. No acute osseous abnormality of  the mandible seen.        Poor dentition.     Lucency surrounding the roots of 2 teeth along the right maxilla and 2  perhaps 3 mandibular incisors to the left of midline. The findings are  consistent with root abscesses.     This report was finalized on 7/12/2023 5:09 PM by Dr. Beau Barnard M.D.    CT SCAN OF THE SOFT TISSUES OF THE NECK WITH CONTRAST     CLINICAL HISTORY:  Right-sided facial swelling.     TECHNIQUE: CT scan of the soft tissues of neck was obtained with 3 mm  axial images following the administration of IV contrast. Sagittal and  coronal reconstructed images were obtained.     FINDINGS:       There is prominent fat stranding within the right buccal space lateral  to the right aspect of the maxillary alveolar ridge and  this  circumscribes the right parotid duct. This soft tissue swelling extends  into the right preseptal region as well as along the right aspect of the  nose. The right parotid gland has an unremarkable appearance. However,  there are prominent carious teeth and periodontal disease within the  adjacent maxillary alveolar ridge. As such, I suspect that the findings  are odontogenic in nature. In specific, in this region, there is a  markedly carious appearance of tooth #3 with prominent periapical  lucency compatible with a periapical abscess. Additionally, there is a  prominently carious appearance of tooth #4. A prominent periapical  lucency is seen circumscribing tooth #7. Additional and carious teeth  are noted within the mandibular and maxillary alveolar ridge. There are  mildly enlarged level 1B and 2A lymph nodes are all likely benign and  reactive in nature. Additional scattered shotty lymph nodes are  identified scattered throughout the neck.     The visualized contents of the cranial vault are unremarkable. The left  orbit is within normal limits. The parotid glands, submandibular glands,  and sublingual glands are within normal limits. The nasopharynx is  unremarkable. There is mild prominence of the palatine tonsils.  Otherwise, the oropharynx, hypopharynx, cervical esophagus, and  visualized upper thoracic esophagus are within normal limits. The  epiglottis, aryepiglottic folds, glottis, and subglottic airway are  within normal limits. The thyroid gland is within normal limits. The  visualized lung apices are clear.     There is a right IJ approach Mediport catheter in place. Incidental note  is made of mild mucosal thickening within the maxillary sinuses.     IMPRESSION:     There is prominent fat stranding within the buccal space lateral to the  right aspect of the maxillary alveolar ridge and there is contiguous  soft tissue swelling extending into the right preseptal soft tissues as  well as the soft  tissues along the right side of the nose. The findings  are most compatible with a cellulitis. There are are areas of periapical  lucencies as well compatible with periapical abscesses as discussed in  detail above. In specific, tooth #3 has a markedly carious appearance  with a prominent periapical lucency compatible with a periapical  abscess.      These findings were discussed with Dr. Tee on 07/12/2023 at  approximately 8:00 AM.     Radiation dose reduction techniques were utilized, including automated  exposure control and exposure modulation based on body size.     This report was finalized on 7/12/2023 8:02 AM by Dr. Nav Hirsch M.D      Assessment & Plan   Assessment / Plan     Brief Patient Summary:  Henrietta Garrett is a 28 y.o. female with facial cellulitis and some noted non restorable teeth     Active Hospital Problems:  Active Hospital Problems    Diagnosis     **Facial cellulitis     Periapical abscess without sinus     Jejunostomy tube present     Sjogren's syndrome     Intestinal autonomic neuropathy     Raynaud's phenomenon     Gastroparesis     Tomas-Danlos syndrome     Irritable bowel syndrome with both constipation and diarrhea     Rheumatoid arthritis involving multiple sites     Systemic lupus erythematosus     Iron deficiency anemia     Fibromyalgia      Plan:   Cont current management per primary team   Cont IV unasyn   Pain control prn  Will plan for surgery tomorrow with my partner Dr Escoto    DVT prophylaxis:  Mechanical DVT prophylaxis orders are present.    CODE STATUS:    Code Status (Patient has no pulse and is not breathing): CPR (Attempt to Resuscitate)  Medical Interventions (Patient has pulse or is breathing): Full Support      Ganesh Guzman MD

## 2023-07-12 NOTE — PLAN OF CARE
Problem: Adult Inpatient Plan of Care  Goal: Plan of Care Review  Outcome: Ongoing, Not Progressing  Flowsheets (Taken 7/12/2023 1752)  Progress: no change  Plan of Care Reviewed With: patient  Goal: Patient-Specific Goal (Individualized)  Outcome: Ongoing, Not Progressing  Goal: Absence of Hospital-Acquired Illness or Injury  Outcome: Ongoing, Not Progressing  Intervention: Identify and Manage Fall Risk  Recent Flowsheet Documentation  Taken 7/12/2023 1645 by Ashleigh Mccabe RN  Safety Promotion/Fall Prevention:   assistive device/personal items within reach   clutter free environment maintained   nonskid shoes/slippers when out of bed   room organization consistent   safety round/check completed  Taken 7/12/2023 1541 by Ashleigh Mccabe RN  Safety Promotion/Fall Prevention:   activity supervised   assistive device/personal items within reach   clutter free environment maintained   fall prevention program maintained   nonskid shoes/slippers when out of bed   room organization consistent   safety round/check completed  Intervention: Prevent Skin Injury  Recent Flowsheet Documentation  Taken 7/12/2023 1645 by Ashleigh Mccabe RN  Body Position: supine  Taken 7/12/2023 1541 by Ashleigh Mccabe RN  Body Position: supine  Goal: Optimal Comfort and Wellbeing  Outcome: Ongoing, Not Progressing  Intervention: Monitor Pain and Promote Comfort  Recent Flowsheet Documentation  Taken 7/12/2023 1541 by Ashleigh Mccabe RN  Pain Management Interventions: cold applied  Goal: Readiness for Transition of Care  Outcome: Ongoing, Not Progressing  Intervention: Mutually Develop Transition Plan  Recent Flowsheet Documentation  Taken 7/12/2023 1500 by Ashleigh Mccabe RN  Equipment Currently Used at Home:   wheelchair   orthotic device   walker, standard  Transportation Anticipated: family or friend will provide  Patient/Family Anticipated Services at Transition: none  Patient/Family Anticipates Transition to: home  with family     Problem: Pain Acute  Goal: Acceptable Pain Control and Functional Ability  Outcome: Ongoing, Not Progressing  Intervention: Develop Pain Management Plan  Recent Flowsheet Documentation  Taken 7/12/2023 1541 by Ashleigh Mccabe RN  Pain Management Interventions: cold applied     Problem: Aspiration (Enteral Nutrition)  Goal: Absence of Aspiration Signs and Symptoms  Outcome: Ongoing, Not Progressing  Intervention: Minimize Aspiration Risk  Recent Flowsheet Documentation  Taken 7/12/2023 1645 by Ashleigh Mccabe, RN  Head of Bed (HOB) Positioning: HOB at 30-45 degrees  Taken 7/12/2023 1541 by Ashleigh Mccabe, RN  Head of Bed (HOB) Positioning: HOB at 30-45 degrees     Problem: Device-Related Complication Risk (Enteral Nutrition)  Goal: Safe, Effective Therapy Delivery  Outcome: Ongoing, Not Progressing     Problem: Feeding Intolerance (Enteral Nutrition)  Goal: Feeding Tolerance  Outcome: Ongoing, Not Progressing   Goal Outcome Evaluation:  Plan of Care Reviewed With: patient        Progress: no change

## 2023-07-12 NOTE — ED TRIAGE NOTES
"Pt arrives via private vehicle with complaints of dental pain. Pt has a j tube and \"struggles with nutrition,\" and says her dentist will not do anything about her tooth until her nutrition improves. She says she does not eat enough to be healthy.   "

## 2023-07-12 NOTE — ED NOTES
.Nursing report ED to floor  Henrietta Garrett  28 y.o.  female    HPI :   Chief Complaint   Patient presents with    Dental Pain       Admitting doctor:   Haris Levine MD    Admitting diagnosis:   The encounter diagnosis was Facial cellulitis.    Code status:   Current Code Status       Date Active Code Status Order ID Comments User Context       7/12/2023 0937 CPR (Attempt to Resuscitate) [id number]  Jeanette Escobar APRN ED        Question Answer    Code Status (Patient has no pulse and is not breathing) CPR (Attempt to Resuscitate)    Medical Interventions (Patient has pulse or is breathing) Full Support                    Allergies:   Anesthetics, amide; Ciprofloxacin; Compazine [prochlorperazine]; Domperidone; Haldol [haloperidol]; Metoclopramide; and Sulfa antibiotics    Isolation:   No active isolations    Intake and Output    Intake/Output Summary (Last 24 hours) at 7/12/2023 1134  Last data filed at 7/12/2023 0644  Gross per 24 hour   Intake 1000 ml   Output --   Net 1000 ml       Weight:       07/12/23  0354   Weight: 73.9 kg (163 lb)       Most recent vitals:   Vitals:    07/12/23 1129 07/12/23 1129 07/12/23 1131 07/12/23 1132   BP: 137/85 137/85 132/80    BP Location:  Right arm     Patient Position:  Lying     Pulse:   79 81   Resp:       Temp:       TempSrc:       SpO2:  100% 100% 98%   Weight:       Height:           Active LDAs/IV Access:   Lines, Drains & Airways       Active LDAs       Name Placement date Placement time Site Days    Peripheral IV 07/12/23 0539 Anterior;Proximal;Right Forearm 07/12/23  0539  Forearm  less than 1    Gastrostomy/Enterostomy Jejunostomy LUQ 06/25/23  --  LUQ  17                    Labs (abnormal labs have a star):   Labs Reviewed   COMPREHENSIVE METABOLIC PANEL - Abnormal; Notable for the following components:       Result Value    Glucose 111 (*)     All other components within normal limits    Narrative:     GFR Normal >60  Chronic Kidney Disease  "<60  Kidney Failure <15     PROCALCITONIN - Abnormal; Notable for the following components:    Procalcitonin 0.82 (*)     All other components within normal limits    Narrative:     As a Marker for Sepsis (Non-Neonates):    1. <0.5 ng/mL represents a low risk of severe sepsis and/or septic shock.  2. >2 ng/mL represents a high risk of severe sepsis and/or septic shock.    As a Marker for Lower Respiratory Tract Infections that require antibiotic therapy:    PCT on Admission    Antibiotic Therapy       6-12 Hrs later    >0.5                Strongly Recommended  >0.25 - <0.5        Recommended   0.1 - 0.25          Discouraged              Remeasure/reassess PCT  <0.1                Strongly Discouraged     Remeasure/reassess PCT    As 28 day mortality risk marker: \"Change in Procalcitonin Result\" (>80% or <=80%) if Day 0 (or Day 1) and Day 4 values are available. Refer to http://www.[employer]pct-calculator.com    Change in PCT <=80%  A decrease of PCT levels below or equal to 80% defines a positive change in PCT test result representing a higher risk for 28-day all-cause mortality of patients diagnosed with severe sepsis for septic shock.    Change in PCT >80%  A decrease of PCT levels of more than 80% defines a negative change in PCT result representing a lower risk for 28-day all-cause mortality of patients diagnosed with severe sepsis or septic shock.      CBC WITH AUTO DIFFERENTIAL - Abnormal; Notable for the following components:    Hemoglobin 11.2 (*)     Hematocrit 33.0 (*)     Lymphocyte % 17.1 (*)     All other components within normal limits   HCG, SERUM, QUALITATIVE - Normal   LACTIC ACID, PLASMA - Normal   BLOOD CULTURE   BLOOD CULTURE   CBC AND DIFFERENTIAL    Narrative:     The following orders were created for panel order CBC & Differential.  Procedure                               Abnormality         Status                     ---------                               -----------         ------            "          CBC Auto Differential[[id number]]        Abnormal            Final result                 Please view results for these tests on the individual orders.       EKG:   No orders to display       Meds given in ED:   Medications   sodium chloride 0.9 % flush 10 mL (has no administration in time range)   ondansetron (ZOFRAN) injection 4 mg (4 mg Intravenous Not Given 7/12/23 0814)   ampicillin-sulbactam (UNASYN) 3 g in sodium chloride 0.9 % 100 mL IVPB-VTB (has no administration in time range)   ondansetron (ZOFRAN) injection 4 mg (4 mg Intravenous Given 7/12/23 1125)   morphine injection 2 mg (2 mg Intravenous Given 7/12/23 1010)     And   naloxone (NARCAN) injection 0.4 mg (has no administration in time range)   hydrOXYzine (ATARAX) tablet 25 mg (25 mg Oral Given 7/12/23 1122)   famotidine (PEPCID) injection 20 mg (20 mg Intravenous Given 7/12/23 1126)   ketorolac (TORADOL) injection 15 mg (15 mg Intravenous Given 7/12/23 1123)   sodium chloride 0.9 % bolus 1,000 mL (0 mL Intravenous Stopped 7/12/23 0644)   morphine injection 4 mg (4 mg Intravenous Given 7/12/23 0539)   ondansetron (ZOFRAN) injection 4 mg (4 mg Intravenous Given 7/12/23 0539)   iopamidol (ISOVUE-300) 61 % injection 100 mL (75 mL Intravenous Given 7/12/23 0721)       Imaging results:  CT Soft Tissue Neck With Contrast    Result Date: 7/12/2023   There is prominent fat stranding within the buccal space lateral to the right aspect of the maxillary alveolar ridge and there is contiguous soft tissue swelling extending into the right preseptal soft tissues as well as the soft tissues along the right side of the nose. The findings are most compatible with a cellulitis. There are are areas of periapical lucencies as well compatible with periapical abscesses as discussed in detail above. In specific, tooth #3 has a markedly carious appearance with a prominent periapical lucency compatible with a periapical abscess.  These findings were discussed with   Randal on 07/12/2023 at approximately 8:00 AM.  Radiation dose reduction techniques were utilized, including automated exposure control and exposure modulation based on body size.  This report was finalized on 7/12/2023 8:02 AM by Dr. Nav Hirsch M.D.       Ambulatory status:   - ADLIB    Social issues:   Social History     Socioeconomic History    Marital status: Single    Number of children: 0    Years of education: High School   Tobacco Use    Smoking status: Never    Smokeless tobacco: Never   Vaping Use    Vaping Use: Never used   Substance and Sexual Activity    Alcohol use: No    Drug use: No    Sexual activity: Defer       Alfred Lemus RN  07/12/23 11:34 EDT

## 2023-07-12 NOTE — H&P
Patient Name:  Henrietta Garrett  YOB: 1995  MRN:  [id number]  Admit Date:  7/12/2023  Patient Care Team:  Fanny Jarrett MD as PCP - General (Family Medicine)  Lorraine Terrazas MD as Referring Physician (Orthopedic Surgery)  Kee Skinner MD as Consulting Physician (Hematology and Oncology)  Rui Molina MD as Consulting Physician (Rheumatology)      Subjective   History Present Illness     Chief Complaint   Patient presents with    Dental Pain       Ms. Garrett is a 28 y.o. history of lupus,sjorgrens, RA, Raynaud's disease, Tomas-Danlos syndrome, POTS, IBS,GT,GERD, fibromyalgia, autonomic nervous system disorder, gastroparesis, with jejunostomy tube that was just discharged on 7/7/23 for treatment of jtube leak from PDBO corrected by general surgery. She presents with acute facial swelling and dental pain.  Pain is located primarily at right lateral maxillary incisor.  She had mild discomfort yesterday but today it is severe with subjective fever.  She denies trouble swallowing, tolerating her secretions or shortness of breath.  She has been on clindamycin for 2 days with no relief.  Patient has significantly poor dentition but her dentist does not want to intervene until her nutrition has improved (Jtube for intermittent feedings).  She has been referred to [facility] as outpatient  but has not yet obtained appt.         History of Present Illness  Review of Systems   Constitutional:  Positive for chills and fever. Negative for appetite change and unexpected weight change.   HENT:  Positive for dental problem, facial swelling and mouth sores. Negative for drooling, ear discharge, ear pain and trouble swallowing.    Eyes:  Negative for visual disturbance.   Respiratory:  Negative for cough, choking, chest tightness and shortness of breath.    Cardiovascular:  Negative for chest pain.   Gastrointestinal:  Negative for abdominal distention, abdominal pain, blood in stool, constipation,  diarrhea, nausea and vomiting.   Musculoskeletal:  Negative for back pain.   Skin:  Negative for color change.   Neurological:  Negative for dizziness and numbness.   Hematological:  Does not bruise/bleed easily.   Psychiatric/Behavioral: Negative.  Negative for confusion.       Personal History     Past Medical History:   Diagnosis Date    Anemia     Arthritis     RHEUMATOID    Asthma     CPRS 1 (complex regional pain syndrome I) of upper limb     Depression     EDS (Tomas-Danlos syndrome)     Fibromyalgia 2015    Gastroparesis     History of hyperkeratosis of skin     IBS (irritable bowel syndrome)     Leukopenia, mild     Poor vision     Raynauds syndrome     Sjogren's disease     SOB (shortness of breath)      Past Surgical History:   Procedure Laterality Date    COLONOSCOPY  12/11/2020    Dr. Barone    DENTAL PROCEDURE      EPIDURAL BLOCK      JEJUNOSTOMY TUBE INSERTION      SHOULDER SURGERY Bilateral     X2    TOE SURGERY Right 2011    3rd toe     UPPER GASTROINTESTINAL ENDOSCOPY  12/11/1920    Dr. Barone     Family History   Problem Relation Age of Onset    Rheum arthritis Mother     Diabetes type II Father     Hyperlipidemia Father     Diabetes Father     Cancer Maternal Uncle         throat cancer     Arthritis Maternal Uncle     Asthma Sister     Migraines Sister     Tomas-Danlos syndrome Sister     Asthma Brother     Leukemia Maternal Grandmother     Stroke Maternal Grandmother     Heart disease Maternal Grandmother     Rheum arthritis Maternal Grandmother     Prostate cancer Maternal Grandfather     Stroke Maternal Grandfather     Diabetes Maternal Grandfather     Cancer Paternal Grandmother     Colon cancer Paternal Grandmother     No Known Problems Brother     Breast cancer Neg Hx      Social History     Tobacco Use    Smoking status: Never    Smokeless tobacco: Never   Vaping Use    Vaping Use: Never used   Substance Use Topics    Alcohol use: No    Drug use: No     No current facility-administered  medications on file prior to encounter.     Current Outpatient Medications on File Prior to Encounter   Medication Sig Dispense Refill    amLODIPine (NORVASC) 10 MG tablet Take 1 tablet by mouth Daily.      dicyclomine (BENTYL) 10 MG/5ML syrup Take 5 mL by mouth 4 (Four) Times a Day Before Meals & at Bedtime.      EPINEPHrine (EPIPEN) 0.3 MG/0.3ML solution auto-injector injection Inject 0.3 mg into the appropriate muscle as directed by prescriber As Needed.      gabapentin (NEURONTIN) 100 MG capsule 2 capsules 3 (Three) Times a Day.      hydroxychloroquine (PLAQUENIL) 200 MG tablet Take 1 tablet by mouth 2 (Two) Times a Day.      immune globulin, human, (Gammaplex) 10 GM/200ML solution infusion 800 mL, 0 Refill(s)      lactulose (CHRONULAC) 10 GM/15ML solution Administer 15 mL per J tube As Needed.      leucovorin 5 MG tablet Take 1 tablet by mouth 1 (One) Time Per Week. Mondays      Levonorgestrel-Eth Estradiol (Twirla) 120-30 MCG/24HR patch weekly Place 1 patch on the skin as directed by provider 1 (One) Time Per Week.      methocarbamol (ROBAXIN) 750 MG tablet Take 1 tablet by mouth 3 (Three) Times a Day As Needed for Muscle Spasms. 21 tablet 0    Methotrexate Sodium (methotrexate PF) 50 MG/2ML chemo syringe Inject 2 mL into the appropriate muscle as directed by prescriber 1 (One) Time Per Week. Wednesdays      metoprolol succinate XL (TOPROL-XL) 25 MG 24 hr tablet Take 0.5 tablets by mouth Every Night.      multivitamin with minerals tablet tablet Take 1 tablet by mouth Daily.      Nitro-Bid 2 % ointment APPLY BY TRANSDERMAL ROUTE TO FINGER WEBS FOR RAYNAUD'S EVERY 12 HOURS AND REMOVE AT BEDTIME      pantoprazole (PROTONIX) 40 MG EC tablet Take 1 tablet by mouth 2 (Two) Times a Day.      predniSONE (DELTASONE) 20 MG tablet Take 1 tablet by mouth Daily.      promethazine-dextromethorphan (PROMETHAZINE-DM) 6.25-15 MG/5ML syrup Administer 5 mL per J tube 4 (Four) Times a Day As Needed.       Allergies   Allergen  Reactions    Anesthetics, Amide Nausea And Vomiting    Ciprofloxacin Other (See Comments)     EHERLIS STANDLIS? ILLNESS. UNABLE TO TAKE     Compazine [Prochlorperazine] Dystonia    Domperidone Other (See Comments)     Bad reaction per pt report    Haldol [Haloperidol] Other (See Comments)     Muscle spasms      Metoclopramide Nausea And Vomiting    Sulfa Antibiotics Other (See Comments)     Unable to take as causes leukopenia       Objective    Objective     Vital Signs  Temp:  [99.5 °F (37.5 °C)] 99.5 °F (37.5 °C)  Heart Rate:  [] 84  Resp:  [18] 18  BP: (133-155)/(85-91) 155/88  SpO2:  [97 %-100 %] 97 %  on   ;   Device (Oxygen Therapy): room air  Body mass index is 22.73 kg/m².    Physical Exam  Vitals and nursing note reviewed.   Constitutional:       Appearance: She is well-developed. She is ill-appearing.   HENT:      Head: Normocephalic and atraumatic.      Mouth/Throat:      Dentition: Abnormal dentition. Dental tenderness present.      Tongue: No lesions.      Pharynx: No pharyngeal swelling, oropharyngeal exudate, posterior oropharyngeal erythema or uvula swelling.      Tonsils: No tonsillar exudate or tonsillar abscesses.      Comments: Poor dentition no obvious drainage.  Tenderness right maxillary incisor. Swelling to cheeks. Generalized Facial swelling   Eyes:      Pupils: Pupils are equal, round, and reactive to light.   Cardiovascular:      Rate and Rhythm: Normal rate and regular rhythm.      Heart sounds: Normal heart sounds.   Pulmonary:      Effort: Pulmonary effort is normal.      Breath sounds: Normal breath sounds.   Abdominal:      General: Bowel sounds are normal. There is no distension or abdominal bruit.      Palpations: Abdomen is soft. Abdomen is not rigid. There is no shifting dullness, fluid wave, mass or pulsatile mass.      Tenderness: There is no abdominal tenderness. There is no guarding.      Hernia: No hernia is present.      Comments: Jtube present with minimal leaking,  mild tenderness at insertion site with granulated tissue without erythema or warmth   Musculoskeletal:         General: Normal range of motion.   Skin:     General: Skin is warm and dry.   Neurological:      Mental Status: She is alert and oriented to person, place, and time.   Psychiatric:         Mood and Affect: Mood normal.         Behavior: Behavior normal.         Thought Content: Thought content normal.      Comments: Uncomfortable        Results Review:  I reviewed the patient's new clinical results.  I reviewed the patient's new imaging results and agree with the interpretation.  I reviewed the patient's other test results and agree with the interpretation  I personally viewed and interpreted the patient's EKG/Telemetry data  Discussed with ED provider.    Lab Results (last 24 hours)       Procedure Component Value Units Date/Time    CBC & Differential [[id number]]  (Abnormal) Collected: 07/12/23 0541    Specimen: Blood Updated: 07/12/23 0554    Narrative:      The following orders were created for panel order CBC & Differential.  Procedure                               Abnormality         Status                     ---------                               -----------         ------                     CBC Auto Differential[[id number]]        Abnormal            Final result                 Please view results for these tests on the individual orders.    Comprehensive Metabolic Panel [[id number]]  (Abnormal) Collected: 07/12/23 0541    Specimen: Blood Updated: 07/12/23 0612     Glucose 111 mg/dL      BUN 7 mg/dL      Creatinine 0.66 mg/dL      Sodium 138 mmol/L      Potassium 3.6 mmol/L      Chloride 105 mmol/L      CO2 22.0 mmol/L      Calcium 9.4 mg/dL      Total Protein 8.5 g/dL      Albumin 4.4 g/dL      ALT (SGPT) 8 U/L      AST (SGOT) 15 U/L      Alkaline Phosphatase 51 U/L      Total Bilirubin 0.4 mg/dL      Globulin 4.1 gm/dL      A/G Ratio 1.1 g/dL      BUN/Creatinine Ratio 10.6     Anion Gap 11.0  "mmol/L      eGFR 122.7 mL/min/1.73     Narrative:      GFR Normal >60  Chronic Kidney Disease <60  Kidney Failure <15      hCG, Serum, Qualitative [[id number]]  (Normal) Collected: 07/12/23 0541    Specimen: Blood Updated: 07/12/23 0635     HCG Qualitative Negative    Lactic Acid, Plasma [[id number]]  (Normal) Collected: 07/12/23 0541    Specimen: Blood Updated: 07/12/23 0609     Lactate 0.7 mmol/L     Procalcitonin [[id number]]  (Abnormal) Collected: 07/12/23 0541    Specimen: Blood Updated: 07/12/23 0619     Procalcitonin 0.82 ng/mL     Narrative:      As a Marker for Sepsis (Non-Neonates):    1. <0.5 ng/mL represents a low risk of severe sepsis and/or septic shock.  2. >2 ng/mL represents a high risk of severe sepsis and/or septic shock.    As a Marker for Lower Respiratory Tract Infections that require antibiotic therapy:    PCT on Admission    Antibiotic Therapy       6-12 Hrs later    >0.5                Strongly Recommended  >0.25 - <0.5        Recommended   0.1 - 0.25          Discouraged              Remeasure/reassess PCT  <0.1                Strongly Discouraged     Remeasure/reassess PCT    As 28 day mortality risk marker: \"Change in Procalcitonin Result\" (>80% or <=80%) if Day 0 (or Day 1) and Day 4 values are available. Refer to http://www.[employer]-pct-calculator.com    Change in PCT <=80%  A decrease of PCT levels below or equal to 80% defines a positive change in PCT test result representing a higher risk for 28-day all-cause mortality of patients diagnosed with severe sepsis for septic shock.    Change in PCT >80%  A decrease of PCT levels of more than 80% defines a negative change in PCT result representing a lower risk for 28-day all-cause mortality of patients diagnosed with severe sepsis or septic shock.       CBC Auto Differential [[id number]]  (Abnormal) Collected: 07/12/23 0541    Specimen: Blood Updated: 07/12/23 0554     WBC 5.44 10*3/mm3      RBC 3.90 10*6/mm3      Hemoglobin 11.2 g/dL      " Hematocrit 33.0 %      MCV 84.6 fL      MCH 28.7 pg      MCHC 33.9 g/dL      RDW 12.7 %      RDW-SD 39.1 fl      MPV 11.1 fL      Platelets 202 10*3/mm3      Neutrophil % 75.8 %      Lymphocyte % 17.1 %      Monocyte % 6.3 %      Eosinophil % 0.6 %      Basophil % 0.0 %      Immature Grans % 0.2 %      Neutrophils, Absolute 4.13 10*3/mm3      Lymphocytes, Absolute 0.93 10*3/mm3      Monocytes, Absolute 0.34 10*3/mm3      Eosinophils, Absolute 0.03 10*3/mm3      Basophils, Absolute 0.00 10*3/mm3      Immature Grans, Absolute 0.01 10*3/mm3      nRBC 0.0 /100 WBC     Blood Culture - Blood, Arm, Left [[id number]] Collected: 07/12/23 0833    Specimen: Blood from Arm, Left Updated: 07/12/23 0841            Imaging Results (Last 24 Hours)       Procedure Component Value Units Date/Time    CT Soft Tissue Neck With Contrast [[id number]] Collected: 07/12/23 0739     Updated: 07/12/23 0806    Narrative:      CT SCAN OF THE SOFT TISSUES OF THE NECK WITH CONTRAST     CLINICAL HISTORY:  Right-sided facial swelling.     TECHNIQUE: CT scan of the soft tissues of neck was obtained with 3 mm  axial images following the administration of IV contrast. Sagittal and  coronal reconstructed images were obtained.     FINDINGS:       There is prominent fat stranding within the right buccal space lateral  to the right aspect of the maxillary alveolar ridge and this  circumscribes the right parotid duct. This soft tissue swelling extends  into the right preseptal region as well as along the right aspect of the  nose. The right parotid gland has an unremarkable appearance. However,  there are prominent carious teeth and periodontal disease within the  adjacent maxillary alveolar ridge. As such, I suspect that the findings  are odontogenic in nature. In specific, in this region, there is a  markedly carious appearance of tooth #3 with prominent periapical  lucency compatible with a periapical abscess. Additionally, there is a  prominently carious  appearance of tooth #4. A prominent periapical  lucency is seen circumscribing tooth #7. Additional and carious teeth  are noted within the mandibular and maxillary alveolar ridge. There are  mildly enlarged level 1B and 2A lymph nodes are all likely benign and  reactive in nature. Additional scattered shotty lymph nodes are  identified scattered throughout the neck.     The visualized contents of the cranial vault are unremarkable. The left  orbit is within normal limits. The parotid glands, submandibular glands,  and sublingual glands are within normal limits. The nasopharynx is  unremarkable. There is mild prominence of the palatine tonsils.  Otherwise, the oropharynx, hypopharynx, cervical esophagus, and  visualized upper thoracic esophagus are within normal limits. The  epiglottis, aryepiglottic folds, glottis, and subglottic airway are  within normal limits. The thyroid gland is within normal limits. The  visualized lung apices are clear.     There is a right IJ approach Mediport catheter in place. Incidental note  is made of mild mucosal thickening within the maxillary sinuses.       Impression:         There is prominent fat stranding within the buccal space lateral to the  right aspect of the maxillary alveolar ridge and there is contiguous  soft tissue swelling extending into the right preseptal soft tissues as  well as the soft tissues along the right side of the nose. The findings  are most compatible with a cellulitis. There are are areas of periapical  lucencies as well compatible with periapical abscesses as discussed in  detail above. In specific, tooth #3 has a markedly carious appearance  with a prominent periapical lucency compatible with a periapical  abscess.      These findings were discussed with Dr. Tee on 07/12/2023 at  approximately 8:00 AM.     Radiation dose reduction techniques were utilized, including automated  exposure control and exposure modulation based on body size.     This  report was finalized on 7/12/2023 8:02 AM by Dr. Nav Hirsch M.D.                   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       Ms. Garrett is a 28 y.o. with numerous medical problems as listed above that was recently here following leaking J-tube.  She presents with facial cellulitis and dental pain in the setting of poor dentition.  CT demonstrates periapical abscess around tooth #3.     Periapical abscess/facial cellulitis  No respiratory compromise. Continue Unasyn IV.  Await cultures.  Consult [facility].  Analgesics.  Toradol IV and as needed morphine.  Pepcid IV twice daily.  clear liquid diet.     Jejunostomy tube present  Chronic   Consult nutrition - continue home feedings.  Functioning since last week's admission     Intestinal autonomic neuropathy  Gastroparesis  IBS syndrome with both constipation and diarrhea  Continue home lactulose, Protonix, Bentyl         Fibromyalgia  Tomas-Danlos syndrome  Rheumatoid arthritis involving multiple sites  Systemic lupus erythematosus  Raynaud's phenomenon  Chronic conditions   Continue home regimen pending home med rec verification and completion  Hold today's dose of methotrexate.  leucovorin, Plaquenil, gabapentin, prednisone, IVIG       I discussed the patient's  findings and my recommendations with patient, family, nursing staff, and ED provider Dr. Levine.    VTE Prophylaxis - SCDs.  Code Status - Full code.       MITCHELL Alicea  [facility]  07/12/23  10:45 EDT

## 2023-07-12 NOTE — ED PROVIDER NOTES
EMERGENCY DEPARTMENT ENCOUNTER    Room Number:  12/12  PCP: Fanny Jarrett MD      HPI:  Chief Complaint: Dental pain  A complete HPI/ROS/PMH/PSH/SH/FH are unobtainable due to: None  Context: Henrietta Garrett is a 28 y.o. female who presents to the ED c/o dental pain.  Reports having right facial swelling.  This is constant.  Is been going on for several days.  She been on clindamycin for 2 days and says that she been getting worse.  She has had subjective fever but no documented fever.  She is supposed to get her teeth removed but she states that her dentist wants her nutrition to improve before extracting her teeth.  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    Complex regional pain syndrome type 1 of right lower extremity 05/04/2018    Rheumatoid arthritis involving multiple sites 05/04/2018    Raynaud's disease without gangrene 05/04/2018    Irritable bowel syndrome with both constipation and diarrhea 05/04/2018    Exercise-induced asthma 05/04/2018    Systemic lupus erythematosus 05/04/2018    Hemoptysis 11/09/2018    Chest wall pain 11/09/2018    Abnormal white blood cell (WBC) count 11/09/2018    Adverse effect of iron 11/09/2018    B12 deficiency 11/09/2018    Lymphadenopathy, axillary 11/30/2018    Nausea & vomiting 04/12/2019    Rectal bleeding 07/22/2022    Intestinal autonomic neuropathy 01/09/2023    Gastroparesis 01/24/2022    Postural orthostatic tachycardia syndrome 09/26/2022    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    Raynaud's phenomenon 09/26/2022    Sjogren's syndrome 06/27/2023    Diffuse dysfunction of smooth muscle of gastrointestinal tract 02/06/2022    Disorder of autonomic nervous system  06/27/2023    Gastroesophageal reflux disease 09/26/2022    Jejunostomy tube present 06/27/2023    Abdominal wall pain 07/04/2023    Malfunction of jejunostomy tube 07/05/2023     Resolved Ambulatory Problems     Diagnosis Date Noted    Right calf pain 04/27/2018     Past Medical History:   Diagnosis Date    Anemia     Arthritis     Asthma     CPRS 1 (complex regional pain syndrome I) of upper limb     Depression     EDS (Tomas-Danlos syndrome)     History of hyperkeratosis of skin     IBS (irritable bowel syndrome)     Leukopenia, mild     Poor vision     Raynauds syndrome     Sjogren's disease          PAST SURGICAL HISTORY  Past Surgical History:   Procedure Laterality Date    COLONOSCOPY  12/11/2020    Dr. Barone    DENTAL PROCEDURE      EPIDURAL BLOCK      JEJUNOSTOMY TUBE INSERTION      SHOULDER SURGERY Bilateral     X2    TOE SURGERY Right 2011    3rd toe     UPPER GASTROINTESTINAL ENDOSCOPY  12/11/1920    Dr. Barone         FAMILY HISTORY  Family History   Problem Relation Age of Onset    Rheum arthritis Mother     Diabetes type II Father     Hyperlipidemia Father     Diabetes Father     Cancer Maternal Uncle         throat cancer     Arthritis Maternal Uncle     Asthma Sister     Migraines Sister     Tomas-Danlos syndrome Sister     Asthma Brother     Leukemia Maternal Grandmother     Stroke Maternal Grandmother     Heart disease Maternal Grandmother     Rheum arthritis Maternal Grandmother     Prostate cancer Maternal Grandfather     Stroke Maternal Grandfather     Diabetes Maternal Grandfather     Cancer Paternal Grandmother     Colon cancer Paternal Grandmother     No Known Problems Brother     Breast cancer Neg Hx          SOCIAL HISTORY  Social History     Socioeconomic History    Marital status: Single    Number of children: 0    Years of education: High School   Tobacco Use    Smoking status: Never    Smokeless tobacco: Never   Vaping Use    Vaping Use: Never used   Substance and Sexual Activity     Alcohol use: No    Drug use: No    Sexual activity: Defer         ALLERGIES  Anesthetics, amide; Ciprofloxacin; Compazine [prochlorperazine]; Domperidone; Haldol [haloperidol]; Metoclopramide; and Sulfa antibiotics        REVIEW OF SYSTEMS  Review of Systems     All systems reviewed and negative except for those discussed in HPI.       PHYSICAL EXAM  ED Triage Vitals   Temp Heart Rate Resp BP SpO2   07/12/23 0354 07/12/23 0354 07/12/23 0354 07/12/23 0400 07/12/23 0354   99.5 °F (37.5 °C) 102 18 133/85 97 %      Temp src Heart Rate Source Patient Position BP Location FiO2 (%)   07/12/23 0354 07/12/23 0354 -- -- --   Tympanic Monitor          Physical Exam      GENERAL: no acute distress  HENT: nares patent, TMs clear bilaterally, poor dental hygiene, she has tenderness to percussion to the right lateral maxillary incisor, there is swelling to the cheek with periocular swelling as well  EYES: no scleral icterus, EOMI, PERRL  CV: regular rhythm, normal rate  RESPIRATORY: normal effort  MUSCULOSKELETAL: no deformity  NEURO: alert, moves all extremities, follows commands  PSYCH:  calm, cooperative  SKIN: warm, dry    Vital signs and nursing notes reviewed.          LAB RESULTS  Recent Results (from the past 24 hour(s))   Comprehensive Metabolic Panel    Collection Time: 07/12/23  5:41 AM    Specimen: Blood   Result Value Ref Range    Glucose 111 (H) 65 - 99 mg/dL    BUN 7 6 - 20 mg/dL    Creatinine 0.66 0.57 - 1.00 mg/dL    Sodium 138 136 - 145 mmol/L    Potassium 3.6 3.5 - 5.2 mmol/L    Chloride 105 98 - 107 mmol/L    CO2 22.0 22.0 - 29.0 mmol/L    Calcium 9.4 8.6 - 10.5 mg/dL    Total Protein 8.5 6.0 - 8.5 g/dL    Albumin 4.4 3.5 - 5.2 g/dL    ALT (SGPT) 8 1 - 33 U/L    AST (SGOT) 15 1 - 32 U/L    Alkaline Phosphatase 51 39 - 117 U/L    Total Bilirubin 0.4 0.0 - 1.2 mg/dL    Globulin 4.1 gm/dL    A/G Ratio 1.1 g/dL    BUN/Creatinine Ratio 10.6 7.0 - 25.0    Anion Gap 11.0 5.0 - 15.0 mmol/L    eGFR 122.7 >60.0  mL/min/1.73   hCG, Serum, Qualitative    Collection Time: 07/12/23  5:41 AM    Specimen: Blood   Result Value Ref Range    HCG Qualitative Negative Negative   Lactic Acid, Plasma    Collection Time: 07/12/23  5:41 AM    Specimen: Blood   Result Value Ref Range    Lactate 0.7 0.5 - 2.0 mmol/L   Procalcitonin    Collection Time: 07/12/23  5:41 AM    Specimen: Blood   Result Value Ref Range    Procalcitonin 0.82 (H) 0.00 - 0.25 ng/mL   CBC Auto Differential    Collection Time: 07/12/23  5:41 AM    Specimen: Blood   Result Value Ref Range    WBC 5.44 3.40 - 10.80 10*3/mm3    RBC 3.90 3.77 - 5.28 10*6/mm3    Hemoglobin 11.2 (L) 12.0 - 15.9 g/dL    Hematocrit 33.0 (L) 34.0 - 46.6 %    MCV 84.6 79.0 - 97.0 fL    MCH 28.7 26.6 - 33.0 pg    MCHC 33.9 31.5 - 35.7 g/dL    RDW 12.7 12.3 - 15.4 %    RDW-SD 39.1 37.0 - 54.0 fl    MPV 11.1 6.0 - 12.0 fL    Platelets 202 140 - 450 10*3/mm3    Neutrophil % 75.8 42.7 - 76.0 %    Lymphocyte % 17.1 (L) 19.6 - 45.3 %    Monocyte % 6.3 5.0 - 12.0 %    Eosinophil % 0.6 0.3 - 6.2 %    Basophil % 0.0 0.0 - 1.5 %    Immature Grans % 0.2 0.0 - 0.5 %    Neutrophils, Absolute 4.13 1.70 - 7.00 10*3/mm3    Lymphocytes, Absolute 0.93 0.70 - 3.10 10*3/mm3    Monocytes, Absolute 0.34 0.10 - 0.90 10*3/mm3    Eosinophils, Absolute 0.03 0.00 - 0.40 10*3/mm3    Basophils, Absolute 0.00 0.00 - 0.20 10*3/mm3    Immature Grans, Absolute 0.01 0.00 - 0.05 10*3/mm3    nRBC 0.0 0.0 - 0.2 /100 WBC       Ordered the above labs and reviewed the results.        RADIOLOGY  CT Soft Tissue Neck With Contrast    Result Date: 7/12/2023  CT SCAN OF THE SOFT TISSUES OF THE NECK WITH CONTRAST  CLINICAL HISTORY:  Right-sided facial swelling.  TECHNIQUE: CT scan of the soft tissues of neck was obtained with 3 mm axial images following the administration of IV contrast. Sagittal and coronal reconstructed images were obtained.  FINDINGS:   There is prominent fat stranding within the right buccal space lateral to the right  aspect of the maxillary alveolar ridge and this circumscribes the right parotid duct. This soft tissue swelling extends into the right preseptal region as well as along the right aspect of the nose. The right parotid gland has an unremarkable appearance. However, there are prominent carious teeth and periodontal disease within the adjacent maxillary alveolar ridge. As such, I suspect that the findings are odontogenic in nature. In specific, in this region, there is a markedly carious appearance of tooth #3 with prominent periapical lucency compatible with a periapical abscess. Additionally, there is a prominently carious appearance of tooth #4. A prominent periapical lucency is seen circumscribing tooth #7. Additional and carious teeth are noted within the mandibular and maxillary alveolar ridge. There are mildly enlarged level 1B and 2A lymph nodes are all likely benign and reactive in nature. Additional scattered shotty lymph nodes are identified scattered throughout the neck.  The visualized contents of the cranial vault are unremarkable. The left orbit is within normal limits. The parotid glands, submandibular glands, and sublingual glands are within normal limits. The nasopharynx is unremarkable. There is mild prominence of the palatine tonsils. Otherwise, the oropharynx, hypopharynx, cervical esophagus, and visualized upper thoracic esophagus are within normal limits. The epiglottis, aryepiglottic folds, glottis, and subglottic airway are within normal limits. The thyroid gland is within normal limits. The visualized lung apices are clear.  There is a right IJ approach Mediport catheter in place. Incidental note is made of mild mucosal thickening within the maxillary sinuses.       There is prominent fat stranding within the buccal space lateral to the right aspect of the maxillary alveolar ridge and there is contiguous soft tissue swelling extending into the right preseptal soft tissues as well as the soft  tissues along the right side of the nose. The findings are most compatible with a cellulitis. There are are areas of periapical lucencies as well compatible with periapical abscesses as discussed in detail above. In specific, tooth #3 has a markedly carious appearance with a prominent periapical lucency compatible with a periapical abscess.  These findings were discussed with Dr. Tee on 07/12/2023 at approximately 8:00 AM.  Radiation dose reduction techniques were utilized, including automated exposure control and exposure modulation based on body size.  This report was finalized on 7/12/2023 8:02 AM by Dr. Nav Hirsch M.D.       Ordered the above noted radiological studies. Reviewed by me in PACS.          PROCEDURES  Procedures          MEDICATIONS GIVEN IN ER  Medications   sodium chloride 0.9 % flush 10 mL (has no administration in time range)   ketorolac (TORADOL) injection 15 mg (15 mg Intravenous Not Given 7/12/23 0814)   ondansetron (ZOFRAN) injection 4 mg (4 mg Intravenous Not Given 7/12/23 0814)   ampicillin-sulbactam (UNASYN) 3 g in sodium chloride 0.9 % 100 mL IVPB-VTB (has no administration in time range)   sodium chloride 0.9 % bolus 1,000 mL (0 mL Intravenous Stopped 7/12/23 0644)   morphine injection 4 mg (4 mg Intravenous Given 7/12/23 0539)   ondansetron (ZOFRAN) injection 4 mg (4 mg Intravenous Given 7/12/23 0539)   iopamidol (ISOVUE-300) 61 % injection 100 mL (75 mL Intravenous Given 7/12/23 0721)         MEDICAL DECISION MAKING, PROGRESS, and CONSULTS    Discussion below represents my analysis of pertinent findings related to patient's condition, differential diagnosis, treatment plan and final disposition.      Orders placed during this visit:  Orders Placed This Encounter   Procedures    Blood Culture - Blood,    Blood Culture - Blood,    CT Soft Tissue Neck With Contrast    Comprehensive Metabolic Panel    hCG, Serum, Qualitative    Lactic Acid, Plasma    Procalcitonin    CBC Auto  Differential    [facility] (on-call MD unless specified) Details    Insert Peripheral IV    Inpatient Admission    CBC & Differential           Differential diagnosis:    Dental abscess, dental caries, cavernous sinus thrombosis is felt to be less likely          Independent interpretation of labs, radiology studies, and discussions with consultants:  ED Course as of 07/12/23 0851   Wed Jul 12, 2023   0531 First look: Patient presents with swelling to right cheek and right upper dental pain.  Patient has history of gastroparesis and has a J-tube.  States that she has been told she has to get her nutrition fixed before a dentist can take care of her.  Patient has been referred to an oral surgeon but has not yet seen them.  Patient has been taking clindamycin for the past 2 days. [TJ]   0802 I discussed the CT results with Dr. Hirsch, radiology.  Patient has evidence of periapical abscess around tooth #3. [TD]      ED Course User Index  [TD] Eliseo Tee II, MD  [TJ] Eliseo Laboy MD         I discussed the case with MITCHELL Ni with hospitalist medicine.  We reviewed the patient's imaging and failure to respond to oral clindamycin.  She will be admitted for IV antibiotics.      DIAGNOSIS  Final diagnoses:   Facial cellulitis         DISPOSITION  Admit      Latest Documented Vital Signs:  As of 08:51 EDT  BP- 141/91 HR- 83 Temp- 99.5 °F (37.5 °C) (Tympanic) O2 sat- 100%      --    Please note that portions of this were completed with a voice recognition program.       Note Disclaimer: At [facility], we believe that sharing information builds trust and better relationships. You are receiving this note because you are receiving care at [facility] or recently visited. It is possible you will see health information before a provider has talked with you about it. This kind of information can be easy to misunderstand. To help you fully understand what it means for your health, we urge you to discuss  this note with your provider.         Eliseo Tee II, MD  07/12/23 7819

## 2023-07-12 NOTE — LETTER
[facility]  [phone]    ACCESS CENTER STATEMENT OF DISPOSITION        I, Henrietta Garrett, was assessed in the [facility] at [facility] on 7/15/2023.  I understand the recommendations below and what follow-up action is expected of me.      -  [facility]  [phone]    [facility]  [phone]    [facility]  [phone]    [facility]  [phone]    [facility]  [phone]    [email or web address]         ________________________________  Clinician Signature    7/15/2023  10:33 EDT

## 2023-07-12 NOTE — CONSULTS
Nutrition Services    Patient Name:  Henrietta Garrett  YOB: 1995  MRN: [id number]  Admit Date:  7/12/2023    Assessment Date:  07/12/23    Comment: Nutrition consult for TF assessment.  Patient just here last week with leaking J tube.  Now admitted with acute facial swelling and dental pain.  PMH lupus, sjorgrens, RA, Raynaud's disease, Schamberg disease, Tomas-Danlos syndrome, POTS, IBS, GT, GERD, fibromyalgia, autonomic nervous system disorder, gastroparesis.    Patient does home TFs of [employer] Peptide 1.5 at home with a goal rate of 50 mL/hr - she is often unable to tolerate at goal rate per last admission.  PO intake minimal, mostly crackers and sprite.  Weight stable.    Recommendations/Plan:  Start TFs via J tube of [employer] Peptide 1.5 at goal rate of 50 mL/hr.  Add free water flushes of 30 mL q 4 hours while on IVFs.    RD to follow closely.    CLINICAL NUTRITION ASSESSMENT      Reason for Assessment Physician Consult, Tube Feeding Assessment      Diagnosis/Problem     Facial cellulitis    Iron deficiency anemia    Fibromyalgia    Tomas-Danlos syndrome    Rheumatoid arthritis involving multiple sites    Irritable bowel syndrome with both constipation and diarrhea    Systemic lupus erythematosus    Intestinal autonomic neuropathy    Gastroparesis    Raynaud's phenomenon    Sjogren's syndrome    Jejunostomy tube present    Periapical abscess without sinus     Medical/Surgical History Past Medical History:   Diagnosis Date    Anemia     Arthritis     RHEUMATOID    Asthma     CPRS 1 (complex regional pain syndrome I) of upper limb     Depression     EDS (Tomas-Danlos syndrome)     Fibromyalgia 2015    Gastroparesis     History of hyperkeratosis of skin     IBS (irritable bowel syndrome)     Leukopenia, mild     Poor vision     Raynauds syndrome     Sjogren's disease     SOB (shortness of breath)        Past Surgical History:   Procedure Laterality Date    COLONOSCOPY  12/11/2020      "Lizabeth    DENTAL PROCEDURE      EPIDURAL BLOCK      JEJUNOSTOMY TUBE INSERTION      SHOULDER SURGERY Bilateral     X2    TOE SURGERY Right 2011    3rd toe     UPPER GASTROINTESTINAL ENDOSCOPY  12/11/1920    Dr. Barone        Encounter Information        Nutrition History:     Food Preferences:    Supplements:    Factors Affecting Intake: altered GI function     Anthropometrics        Current Height  Current Weight  BMI kg/m2 Height: 180.3 cm (71\")  Weight: 73.9 kg (163 lb) (07/12/23 0354)  Body mass index is 22.73 kg/m².   Adjusted BMI (if applicable)    BMI Category Normal/Healthy (18.4 - 24.9)       Admission Weight 163 lb (7/12)       Ideal Body Weight (IBW) 156 lb (70.8 kg)   Adjusted IBW (if applicable)        Usual Body Weight (UBW) 160 lb   Weight Change/Trend Stable       Weight History Wt Readings from Last 30 Encounters:   07/12/23 0354 73.9 kg (163 lb)   07/07/23 0630 70.2 kg (154 lb 11.2 oz)   07/06/23 0736 74.8 kg (164 lb 12.8 oz)   07/05/23 0110 73.5 kg (162 lb 0.6 oz)   07/04/23 2247 72.6 kg (160 lb)   06/27/23 2125 73.5 kg (162 lb)   06/27/23 1621 73.5 kg (162 lb)   06/26/23 0605 73.5 kg (162 lb)   06/19/23 2250 72.6 kg (160 lb)   06/16/23 0014 72.6 kg (160 lb)   06/08/23 0842 75.8 kg (167 lb 3.2 oz)   08/24/22 1247 79.5 kg (175 lb 4.8 oz)   08/20/22 0742 76.2 kg (168 lb)   07/22/22 1644 83 kg (183 lb)   07/22/22 1320 77.1 kg (170 lb)   02/03/21 1103 79.2 kg (174 lb 9.6 oz)   11/24/20 0931 82.1 kg (181 lb)   11/30/19 1423 74.8 kg (165 lb)   05/21/19 1359 79.8 kg (176 lb)   04/12/19 0825 82 kg (180 lb 12.8 oz)   12/14/18 0757 78.3 kg (172 lb 9.6 oz)   12/07/18 0813 77.4 kg (170 lb 9.6 oz)   11/30/18 0751 77.3 kg (170 lb 8 oz)   11/27/18 0829 77.7 kg (171 lb 3.2 oz)   11/14/18 0803 78.5 kg (173 lb)   11/09/18 0815 79.2 kg (174 lb 8 oz)   05/04/18 1235 79.8 kg (176 lb)   04/27/18 1059 79.4 kg (175 lb)   01/28/17 1759 76.2 kg (168 lb)           --  Estimated/Assessed Needs        Current Weight  Weight: " 73.9 kg (163 lb) (07/12/23 0354)       Energy Requirements    Weight for Calculation 163 lb (73.9 kg)   Method for Estimation  25 kcal/kg, 30 kcal/kg   EST Needs (kcal/day) 4057-4257       Protein Requirements    Weight for Calculation 163 lb (73.9 kg)   EST Protein Needs (g/kg) 1.0 - 1.2 gm/kg   EST Daily Needs (g/day) 74-89       Fluid Requirements     Method for Estimation 1 mL/kcal    EST Needs (mL/day) 2200     Tests/Procedures        Tests/Procedures No new tests/procedures     Labs       Pertinent Labs    Results from last 7 days   Lab Units 07/12/23  0541 07/07/23  0545 07/06/23  0535   SODIUM mmol/L 138 138 137   POTASSIUM mmol/L 3.6 4.0 4.0   CHLORIDE mmol/L 105 109* 107   CO2 mmol/L 22.0 22.0 24.4   BUN mg/dL 7 7 8   CREATININE mg/dL 0.66 0.57 0.55*   CALCIUM mg/dL 9.4 9.2 9.1   BILIRUBIN mg/dL 0.4 0.3 0.4   ALK PHOS U/L 51 45 44   ALT (SGPT) U/L 8 8 7   AST (SGOT) U/L 15 11 9   GLUCOSE mg/dL 111* 99 109*     Results from last 7 days   Lab Units 07/12/23  0541   HEMOGLOBIN g/dL 11.2*   HEMATOCRIT % 33.0*   WBC 10*3/mm3 5.44   ALBUMIN g/dL 4.4     Results from last 7 days   Lab Units 07/12/23  0541 07/07/23  0545 07/06/23  0535   PLATELETS 10*3/mm3 202 216 220     SARS-CoV-2, DANIELLA   Date Value Ref Range Status   08/08/2022 NEGATIVE Negative Final     No results found for: HGBA1C       Medications           Scheduled Medications dicyclomine, 10 mg, Per J Tube, 4x Daily  famotidine, 20 mg, Intravenous, Q12H  gabapentin, 200 mg, Per J Tube, TID  hydroxychloroquine, 200 mg, Oral, BID  leucovorin, 5 mg, Oral, Weekly  metoprolol succinate XL, 12.5 mg, Oral, Nightly  multivitamin with minerals, 1 tablet, Oral, Daily  nitroglycerin, 0.5 inch, Topical, BID  ondansetron, 4 mg, Intravenous, Once  pantoprazole, 40 mg, Oral, BID  predniSONE, 20 mg, Oral, Daily  senna-docusate sodium, 2 tablet, Per J Tube, BID       Infusions lactated ringers, 100 mL/hr       PRN Medications   acetaminophen    HYDROcodone-acetaminophen     hydrOXYzine    ketorolac    lactulose    methocarbamol    Morphine **AND** naloxone    ondansetron    promethazine-dextromethorphan    [COMPLETED] Insert Peripheral IV **AND** sodium chloride     Physical Findings          Physical Appearance alert, oriented, room air   Oral/Mouth Cavity WNL   Edema  no edema   Gastrointestinal normoactive   Skin  skin intact   Tubes/Drains/Lines PEG-J   NFPE Not indicated at this time   --  Current Nutrition Orders & Evaluation of Intake       Oral Nutrition     Food Allergies NKFA   Current PO Diet Diet: Liquid Diets; Clear Liquid; Texture: Regular Texture (IDDSI 7); Fluid Consistency: Thin (IDDSI 0)   Supplement n/a   PO Evaluation     % PO Intake No intake available     # of Days Evaluated    --  PES STATEMENT / NUTRITION DIAGNOSIS      Nutrition Dx Problem  Problem: Altered GI Function  Etiology: Medical Diagnosis gastroparesis   Signs/Symptoms: EN Intake/Delivery    Comment:    --  NUTRITION INTERVENTION / PLAN OF CARE      Intervention Goal(s) Maintain nutrition status, Nutrition support treatment, Reduce/improve symptoms, Meet estimated needs, Disease management/therapy, Initiate TF/PN, Tolerate TF/PN at goal, and Maintain weight         RD Intervention/Action Follow Tx Progress, Care plan reviewed, and Recommend/ordered: EN         Prescription/Orders:       PO Diet       Supplements       Snacks       Enteral Nutrition    Enteral Prescription:     Enteral Route PEG-J    TF Delivery Method Continuous    Enteral Product [employer] Peptide 1.5    Modular None    Propofol Rate/Kcal     TF Start Rate (mL/hr) 50 mL/hr    TF Goal Rate (mL/hr) 50 mL/hr    Free Water Flush (mL) 30 mL q 4 hours (while on IVFs)    TF Provision at Goal: 1800 kcal, 89 gm protein, 840 mL free water + 180 mL water flushes         Calories 97 % needs met         Protein  100 % needs met         Fluid (mL) 1020 mL total     Prescription Ordered Yes         Parenteral Nutrition    New Prescription Ordered?  Yes   --      Monitor/Evaluation Per protocol, I&O, Supplement intake, Pertinent labs, EN delivery/tolerance, Weight, GI status, Symptoms   Discharge Plan/Needs Pending clinical course   Education Will instruct as appropriate   --    RD to follow per protocol.      Electronically signed by:  Taylor Stringer RD  07/12/23 16:20 EDT

## 2023-07-13 LAB
ANION GAP SERPL CALCULATED.3IONS-SCNC: 9.1 MMOL/L (ref 5–15)
BUN SERPL-MCNC: 4 MG/DL (ref 6–20)
BUN/CREAT SERPL: 8.9 (ref 7–25)
CALCIUM SPEC-SCNC: 9.3 MG/DL (ref 8.6–10.5)
CHLORIDE SERPL-SCNC: 108 MMOL/L (ref 98–107)
CO2 SERPL-SCNC: 22.9 MMOL/L (ref 22–29)
CREAT SERPL-MCNC: 0.45 MG/DL (ref 0.57–1)
DEPRECATED RDW RBC AUTO: 37.9 FL (ref 37–54)
EGFRCR SERPLBLD CKD-EPI 2021: 134.6 ML/MIN/1.73
ERYTHROCYTE [DISTWIDTH] IN BLOOD BY AUTOMATED COUNT: 12.4 % (ref 12.3–15.4)
GLUCOSE BLDC GLUCOMTR-MCNC: 83 MG/DL (ref 70–130)
GLUCOSE BLDC GLUCOMTR-MCNC: 97 MG/DL (ref 70–130)
GLUCOSE SERPL-MCNC: 88 MG/DL (ref 65–99)
HCT VFR BLD AUTO: 28.5 % (ref 34–46.6)
HGB BLD-MCNC: 9.4 G/DL (ref 12–15.9)
MCH RBC QN AUTO: 27.6 PG (ref 26.6–33)
MCHC RBC AUTO-ENTMCNC: 33 G/DL (ref 31.5–35.7)
MCV RBC AUTO: 83.8 FL (ref 79–97)
PLATELET # BLD AUTO: 178 10*3/MM3 (ref 140–450)
PMV BLD AUTO: 11.6 FL (ref 6–12)
POTASSIUM SERPL-SCNC: 4.2 MMOL/L (ref 3.5–5.2)
RBC # BLD AUTO: 3.4 10*6/MM3 (ref 3.77–5.28)
SODIUM SERPL-SCNC: 140 MMOL/L (ref 136–145)
WBC NRBC COR # BLD: 3.94 10*3/MM3 (ref 3.4–10.8)

## 2023-07-13 PROCEDURE — 25010000002 ONDANSETRON PER 1 MG: Performed by: ANESTHESIOLOGY

## 2023-07-13 PROCEDURE — 25010000002 AMPICILLIN-SULBACTAM PER 1.5 G: Performed by: DENTIST

## 2023-07-13 PROCEDURE — 25010000002 FENTANYL CITRATE (PF) 50 MCG/ML SOLUTION: Performed by: ANESTHESIOLOGY

## 2023-07-13 PROCEDURE — 80048 BASIC METABOLIC PNL TOTAL CA: CPT | Performed by: NURSE PRACTITIONER

## 2023-07-13 PROCEDURE — 82948 REAGENT STRIP/BLOOD GLUCOSE: CPT

## 2023-07-13 PROCEDURE — 0CDWXZ1 EXTRACTION OF UPPER TOOTH, MULTIPLE, EXTERNAL APPROACH: ICD-10-PCS | Performed by: DENTIST

## 2023-07-13 PROCEDURE — 25010000002 HYDROMORPHONE PER 4 MG: Performed by: ANESTHESIOLOGY

## 2023-07-13 PROCEDURE — 25010000002 MORPHINE PER 10 MG: Performed by: DENTIST

## 2023-07-13 PROCEDURE — 25010000002 MIDAZOLAM PER 1 MG: Performed by: ANESTHESIOLOGY

## 2023-07-13 PROCEDURE — 63710000001 PREDNISONE PER 1 MG: Performed by: STUDENT IN AN ORGANIZED HEALTH CARE EDUCATION/TRAINING PROGRAM

## 2023-07-13 PROCEDURE — 85027 COMPLETE CBC AUTOMATED: CPT | Performed by: NURSE PRACTITIONER

## 2023-07-13 PROCEDURE — 25010000002 MORPHINE PER 10 MG: Performed by: STUDENT IN AN ORGANIZED HEALTH CARE EDUCATION/TRAINING PROGRAM

## 2023-07-13 PROCEDURE — 25010000002 ONDANSETRON PER 1 MG: Performed by: NURSE PRACTITIONER

## 2023-07-13 RX ORDER — ESCITALOPRAM OXALATE 5 MG/1
5 TABLET ORAL DAILY
Status: DISCONTINUED | OUTPATIENT
Start: 2023-07-13 | End: 2023-07-15 | Stop reason: HOSPADM

## 2023-07-13 RX ORDER — FENTANYL CITRATE 50 UG/ML
50 INJECTION, SOLUTION INTRAMUSCULAR; INTRAVENOUS
Status: DISCONTINUED | OUTPATIENT
Start: 2023-07-13 | End: 2023-07-13 | Stop reason: HOSPADM

## 2023-07-13 RX ORDER — BUPIVACAINE HYDROCHLORIDE AND EPINEPHRINE 2.5; 5 MG/ML; UG/ML
INJECTION, SOLUTION EPIDURAL; INFILTRATION; INTRACAUDAL; PERINEURAL AS NEEDED
Status: DISCONTINUED | OUTPATIENT
Start: 2023-07-13 | End: 2023-07-13 | Stop reason: HOSPADM

## 2023-07-13 RX ORDER — OXYCODONE AND ACETAMINOPHEN 7.5; 325 MG/1; MG/1
1 TABLET ORAL EVERY 4 HOURS PRN
Status: DISCONTINUED | OUTPATIENT
Start: 2023-07-13 | End: 2023-07-13 | Stop reason: HOSPADM

## 2023-07-13 RX ORDER — MIDAZOLAM HYDROCHLORIDE 1 MG/ML
0.5 INJECTION INTRAMUSCULAR; INTRAVENOUS
Status: COMPLETED | OUTPATIENT
Start: 2023-07-13 | End: 2023-07-13

## 2023-07-13 RX ORDER — ONDANSETRON 2 MG/ML
4 INJECTION INTRAMUSCULAR; INTRAVENOUS ONCE AS NEEDED
Status: COMPLETED | OUTPATIENT
Start: 2023-07-13 | End: 2023-07-13

## 2023-07-13 RX ORDER — DIPHENHYDRAMINE HYDROCHLORIDE 50 MG/ML
12.5 INJECTION INTRAMUSCULAR; INTRAVENOUS
Status: DISCONTINUED | OUTPATIENT
Start: 2023-07-13 | End: 2023-07-13 | Stop reason: HOSPADM

## 2023-07-13 RX ORDER — NALOXONE HCL 0.4 MG/ML
0.2 VIAL (ML) INJECTION AS NEEDED
Status: DISCONTINUED | OUTPATIENT
Start: 2023-07-13 | End: 2023-07-13 | Stop reason: HOSPADM

## 2023-07-13 RX ORDER — SODIUM CHLORIDE 0.9 % (FLUSH) 0.9 %
3-10 SYRINGE (ML) INJECTION AS NEEDED
Status: DISCONTINUED | OUTPATIENT
Start: 2023-07-13 | End: 2023-07-13 | Stop reason: HOSPADM

## 2023-07-13 RX ORDER — HYDROCODONE BITARTRATE AND ACETAMINOPHEN 7.5; 325 MG/1; MG/1
1 TABLET ORAL ONCE AS NEEDED
Status: DISCONTINUED | OUTPATIENT
Start: 2023-07-13 | End: 2023-07-13 | Stop reason: HOSPADM

## 2023-07-13 RX ORDER — SODIUM CHLORIDE 0.9 % (FLUSH) 0.9 %
3 SYRINGE (ML) INJECTION EVERY 12 HOURS SCHEDULED
Status: DISCONTINUED | OUTPATIENT
Start: 2023-07-13 | End: 2023-07-13 | Stop reason: HOSPADM

## 2023-07-13 RX ORDER — EPHEDRINE SULFATE 50 MG/ML
5 INJECTION, SOLUTION INTRAVENOUS ONCE AS NEEDED
Status: DISCONTINUED | OUTPATIENT
Start: 2023-07-13 | End: 2023-07-13 | Stop reason: HOSPADM

## 2023-07-13 RX ORDER — LABETALOL HYDROCHLORIDE 5 MG/ML
5 INJECTION, SOLUTION INTRAVENOUS
Status: DISCONTINUED | OUTPATIENT
Start: 2023-07-13 | End: 2023-07-13 | Stop reason: HOSPADM

## 2023-07-13 RX ORDER — SODIUM CHLORIDE, SODIUM LACTATE, POTASSIUM CHLORIDE, CALCIUM CHLORIDE 600; 310; 30; 20 MG/100ML; MG/100ML; MG/100ML; MG/100ML
9 INJECTION, SOLUTION INTRAVENOUS CONTINUOUS
Status: DISCONTINUED | OUTPATIENT
Start: 2023-07-13 | End: 2023-07-14

## 2023-07-13 RX ORDER — MIDAZOLAM HYDROCHLORIDE 1 MG/ML
1 INJECTION INTRAMUSCULAR; INTRAVENOUS
Status: DISCONTINUED | OUTPATIENT
Start: 2023-07-13 | End: 2023-07-13 | Stop reason: HOSPADM

## 2023-07-13 RX ORDER — HYDRALAZINE HYDROCHLORIDE 20 MG/ML
5 INJECTION INTRAMUSCULAR; INTRAVENOUS
Status: DISCONTINUED | OUTPATIENT
Start: 2023-07-13 | End: 2023-07-13 | Stop reason: HOSPADM

## 2023-07-13 RX ORDER — FAMOTIDINE 10 MG/ML
20 INJECTION, SOLUTION INTRAVENOUS ONCE
Status: COMPLETED | OUTPATIENT
Start: 2023-07-13 | End: 2023-07-13

## 2023-07-13 RX ORDER — HYDROMORPHONE HYDROCHLORIDE 1 MG/ML
0.5 INJECTION, SOLUTION INTRAMUSCULAR; INTRAVENOUS; SUBCUTANEOUS
Status: DISCONTINUED | OUTPATIENT
Start: 2023-07-13 | End: 2023-07-13 | Stop reason: HOSPADM

## 2023-07-13 RX ORDER — LIDOCAINE HYDROCHLORIDE 10 MG/ML
0.5 INJECTION, SOLUTION EPIDURAL; INFILTRATION; INTRACAUDAL; PERINEURAL ONCE AS NEEDED
Status: DISCONTINUED | OUTPATIENT
Start: 2023-07-13 | End: 2023-07-13 | Stop reason: HOSPADM

## 2023-07-13 RX ORDER — FENTANYL CITRATE 50 UG/ML
50 INJECTION, SOLUTION INTRAMUSCULAR; INTRAVENOUS ONCE AS NEEDED
Status: COMPLETED | OUTPATIENT
Start: 2023-07-13 | End: 2023-07-13

## 2023-07-13 RX ORDER — IPRATROPIUM BROMIDE AND ALBUTEROL SULFATE 2.5; .5 MG/3ML; MG/3ML
3 SOLUTION RESPIRATORY (INHALATION) ONCE AS NEEDED
Status: DISCONTINUED | OUTPATIENT
Start: 2023-07-13 | End: 2023-07-13 | Stop reason: HOSPADM

## 2023-07-13 RX ORDER — SCOLOPAMINE TRANSDERMAL SYSTEM 1 MG/1
1 PATCH, EXTENDED RELEASE TRANSDERMAL ONCE
Status: DISCONTINUED | OUTPATIENT
Start: 2023-07-13 | End: 2023-07-13 | Stop reason: HOSPADM

## 2023-07-13 RX ORDER — FLUMAZENIL 0.1 MG/ML
0.2 INJECTION INTRAVENOUS AS NEEDED
Status: DISCONTINUED | OUTPATIENT
Start: 2023-07-13 | End: 2023-07-13 | Stop reason: HOSPADM

## 2023-07-13 RX ORDER — GABAPENTIN 250 MG/5ML
200 SOLUTION ORAL EVERY 8 HOURS SCHEDULED
Status: DISCONTINUED | OUTPATIENT
Start: 2023-07-13 | End: 2023-07-15 | Stop reason: HOSPADM

## 2023-07-13 RX ADMIN — HYDROCODONE BITARTRATE AND ACETAMINOPHEN 1 TABLET: 5; 325 TABLET ORAL at 10:15

## 2023-07-13 RX ADMIN — ONDANSETRON 4 MG: 2 INJECTION INTRAMUSCULAR; INTRAVENOUS at 03:57

## 2023-07-13 RX ADMIN — FAMOTIDINE 20 MG: 10 INJECTION INTRAVENOUS at 16:47

## 2023-07-13 RX ADMIN — DICYCLOMINE HYDROCHLORIDE 10 MG: 10 CAPSULE ORAL at 22:15

## 2023-07-13 RX ADMIN — SODIUM CHLORIDE, POTASSIUM CHLORIDE, SODIUM LACTATE AND CALCIUM CHLORIDE 9 ML/HR: 600; 310; 30; 20 INJECTION, SOLUTION INTRAVENOUS at 16:46

## 2023-07-13 RX ADMIN — FENTANYL CITRATE 50 MCG: 50 INJECTION, SOLUTION INTRAMUSCULAR; INTRAVENOUS at 18:32

## 2023-07-13 RX ADMIN — MORPHINE SULFATE 2 MG: 2 INJECTION, SOLUTION INTRAMUSCULAR; INTRAVENOUS at 08:37

## 2023-07-13 RX ADMIN — GABAPENTIN 200 MG: 250 SOLUTION ORAL at 22:16

## 2023-07-13 RX ADMIN — HYDROXYCHLOROQUINE SULFATE 200 MG: 200 TABLET ORAL at 08:38

## 2023-07-13 RX ADMIN — FENTANYL CITRATE 50 MCG: 50 INJECTION, SOLUTION INTRAMUSCULAR; INTRAVENOUS at 19:11

## 2023-07-13 RX ADMIN — HYDROXYCHLOROQUINE SULFATE 200 MG: 200 TABLET ORAL at 22:12

## 2023-07-13 RX ADMIN — MORPHINE SULFATE 2 MG: 2 INJECTION, SOLUTION INTRAMUSCULAR; INTRAVENOUS at 06:31

## 2023-07-13 RX ADMIN — PANTOPRAZOLE SODIUM 40 MG: 40 TABLET, DELAYED RELEASE ORAL at 08:38

## 2023-07-13 RX ADMIN — AMPICILLIN SODIUM AND SULBACTAM SODIUM 3 G: 2; 1 INJECTION, POWDER, FOR SOLUTION INTRAMUSCULAR; INTRAVENOUS at 02:51

## 2023-07-13 RX ADMIN — FENTANYL CITRATE 50 MCG: 50 INJECTION, SOLUTION INTRAMUSCULAR; INTRAVENOUS at 19:22

## 2023-07-13 RX ADMIN — HYDROMORPHONE HYDROCHLORIDE 0.5 MG: 1 INJECTION, SOLUTION INTRAMUSCULAR; INTRAVENOUS; SUBCUTANEOUS at 19:15

## 2023-07-13 RX ADMIN — MIDAZOLAM 0.5 MG: 1 INJECTION INTRAMUSCULAR; INTRAVENOUS at 19:01

## 2023-07-13 RX ADMIN — FAMOTIDINE 20 MG: 10 INJECTION INTRAVENOUS at 22:14

## 2023-07-13 RX ADMIN — PROMETHAZINE HYDROCHLORIDE AND DEXTROMETHORPHAN HYDROBROMIDE ORAL SOLUTION 5 ML: 15; 6.25 SOLUTION ORAL at 22:15

## 2023-07-13 RX ADMIN — METOPROLOL SUCCINATE 12.5 MG: 25 TABLET, EXTENDED RELEASE ORAL at 22:13

## 2023-07-13 RX ADMIN — SODIUM CHLORIDE, POTASSIUM CHLORIDE, SODIUM LACTATE AND CALCIUM CHLORIDE 100 ML/HR: 600; 310; 30; 20 INJECTION, SOLUTION INTRAVENOUS at 01:51

## 2023-07-13 RX ADMIN — AMPICILLIN SODIUM AND SULBACTAM SODIUM 3 G: 2; 1 INJECTION, POWDER, FOR SOLUTION INTRAMUSCULAR; INTRAVENOUS at 22:08

## 2023-07-13 RX ADMIN — FENTANYL CITRATE 25 MCG: 50 INJECTION, SOLUTION INTRAMUSCULAR; INTRAVENOUS at 16:47

## 2023-07-13 RX ADMIN — GABAPENTIN 200 MG: 100 CAPSULE ORAL at 08:37

## 2023-07-13 RX ADMIN — HYDROMORPHONE HYDROCHLORIDE 0.5 MG: 1 INJECTION, SOLUTION INTRAMUSCULAR; INTRAVENOUS; SUBCUTANEOUS at 19:46

## 2023-07-13 RX ADMIN — ONDANSETRON 4 MG: 2 INJECTION INTRAMUSCULAR; INTRAVENOUS at 18:59

## 2023-07-13 RX ADMIN — AMPICILLIN SODIUM AND SULBACTAM SODIUM 3 G: 2; 1 INJECTION, POWDER, FOR SOLUTION INTRAMUSCULAR; INTRAVENOUS at 08:50

## 2023-07-13 RX ADMIN — HYDROMORPHONE HYDROCHLORIDE 0.5 MG: 1 INJECTION, SOLUTION INTRAMUSCULAR; INTRAVENOUS; SUBCUTANEOUS at 19:36

## 2023-07-13 RX ADMIN — PREDNISONE 20 MG: 20 TABLET ORAL at 08:38

## 2023-07-13 RX ADMIN — AMPICILLIN SODIUM AND SULBACTAM SODIUM 3 G: 2; 1 INJECTION, POWDER, FOR SOLUTION INTRAMUSCULAR; INTRAVENOUS at 14:42

## 2023-07-13 RX ADMIN — MORPHINE SULFATE 2 MG: 2 INJECTION, SOLUTION INTRAMUSCULAR; INTRAVENOUS at 14:25

## 2023-07-13 RX ADMIN — ONDANSETRON 4 MG: 2 INJECTION INTRAMUSCULAR; INTRAVENOUS at 16:47

## 2023-07-13 RX ADMIN — FAMOTIDINE 20 MG: 10 INJECTION INTRAVENOUS at 08:37

## 2023-07-13 RX ADMIN — SODIUM CHLORIDE, POTASSIUM CHLORIDE, SODIUM LACTATE AND CALCIUM CHLORIDE 100 ML/HR: 600; 310; 30; 20 INJECTION, SOLUTION INTRAVENOUS at 12:26

## 2023-07-13 RX ADMIN — FENTANYL CITRATE 50 MCG: 50 INJECTION, SOLUTION INTRAMUSCULAR; INTRAVENOUS at 18:54

## 2023-07-13 RX ADMIN — MIDAZOLAM 1 MG: 1 INJECTION INTRAMUSCULAR; INTRAVENOUS at 16:47

## 2023-07-13 RX ADMIN — MORPHINE SULFATE 2 MG: 2 INJECTION, SOLUTION INTRAMUSCULAR; INTRAVENOUS at 23:39

## 2023-07-13 RX ADMIN — PANTOPRAZOLE SODIUM 40 MG: 40 TABLET, DELAYED RELEASE ORAL at 22:14

## 2023-07-13 RX ADMIN — DICYCLOMINE HYDROCHLORIDE 10 MG: 10 CAPSULE ORAL at 08:37

## 2023-07-13 RX ADMIN — MORPHINE SULFATE 2 MG: 2 INJECTION, SOLUTION INTRAMUSCULAR; INTRAVENOUS at 03:57

## 2023-07-13 RX ADMIN — MORPHINE SULFATE 2 MG: 2 INJECTION, SOLUTION INTRAMUSCULAR; INTRAVENOUS at 12:25

## 2023-07-13 RX ADMIN — HYDROMORPHONE HYDROCHLORIDE 0.5 MG: 1 INJECTION, SOLUTION INTRAMUSCULAR; INTRAVENOUS; SUBCUTANEOUS at 18:40

## 2023-07-13 RX ADMIN — HYDROCODONE BITARTRATE AND ACETAMINOPHEN 1 TABLET: 5; 325 TABLET ORAL at 20:45

## 2023-07-13 RX ADMIN — HYDROCODONE BITARTRATE AND ACETAMINOPHEN 1 TABLET: 5; 325 TABLET ORAL at 02:59

## 2023-07-13 RX ADMIN — MIDAZOLAM 0.5 MG: 1 INJECTION INTRAMUSCULAR; INTRAVENOUS at 18:51

## 2023-07-13 RX ADMIN — MORPHINE SULFATE 2 MG: 2 INJECTION, SOLUTION INTRAMUSCULAR; INTRAVENOUS at 01:51

## 2023-07-13 NOTE — CASE MANAGEMENT/SOCIAL WORK
Discharge Planning Assessment  [facility]     Patient Name: Henrietta Garrett  MRN: [id number]  Today's Date: 7/13/2023    Admit Date: 7/12/2023    Plan: return home with fiancé, continue weekly IVIG injectionsI with [facility]   Discharge Needs Assessment       Row Name 07/13/23 1745       Living Environment    People in Home significant other    Name(s) of People in Home Flaco Redman    Current Living Arrangements apartment    Potentially Unsafe Housing Conditions none    Primary Care Provided by self    Provides Primary Care For no one    Family Caregiver if Needed significant other;parent(s)    Family Caregiver Names SO: Flaco Redman, Mother: jose Singh    Quality of Family Relationships supportive       Resource/Environmental Concerns    Resource/Environmental Concerns none    Transportation Concerns none       Transition Planning    Patient/Family Anticipated Services at Transition none    Transportation Anticipated family or friend will provide       Discharge Needs Assessment    Readmission Within the Last 30 Days no previous admission in last 30 days    Equipment Currently Used at Home walker, rolling;wheelchair;feeding device  uses DME PRN, tube feeding supplies    Concerns to be Addressed denies needs/concerns at this time;adjustment to diagnosis/illness;decision-making;basic needs    Anticipated Changes Related to Illness none    Equipment Needed After Discharge none                   Discharge Plan       Row Name 07/13/23 7354       Plan    Plan return home with jigar, continue weekly IVIG injectionsI with [facility]    Patient/Family in Agreement with Plan yes    Provided Post Acute Provider List? N/A    N/A Provider List Comment pt does not anticipate the need for HH / SNF    Plan Comments I met with pt, her mother Jose Singh and pt's jigar Flaco Redman ([phone]), pt said okay to speak with her visitors. Pt said the address on the facesheet is not correct as she recently moved  in with Flaco in an upper level apartment and the building has an elevator, she said to leave th address as is so the bills will go to her mother's home.  Pt said she is IADL most of the time and if not Flaco assist her, she does not drive (Flaco and her mother confirmed they provide transportation and will at discharge), pt said she uses tube feedings for the last 9 months and formula is from [employer], pt said she has a walker and wheelchair if needed, she said she get weekly IVIG injectons administered by [facility].  Pt said she plans to return home at discharge and does not anticipate any needs.    Sharifa Lira RN                  Continued Care and Services - Admitted Since 7/12/2023    Coordination has not been started for this encounter.       Selected Continued Care - Prior Encounters Includes continued care and service providers with selected services from prior encounters from 4/13/2023 to 7/13/2023      Discharged on 7/7/2023 Admission date: 7/4/2023 - Discharge disposition: Home or Self Care      Dialysis/Infusion       Service Provider Selected Services Address Phone Fax Patient Pref[facility]    [facility] [address] [address] [phone] --                          Expected Discharge Date and Time       Expected Discharge Date Expected Discharge Time    Jul 14, 2023            Demographic Summary       Row Name 07/13/23 1740       General Information    Admission Type inpatient    Arrived From home    Referral Source admission list    Reason for Consult discharge planning    Preferred Language English       Contact Information    Permission Granted to Share Info With family/designee                   Functional Status       Row Name 07/13/23 1741       Functional Status    Usual Activity Tolerance fair    Current Activity Tolerance fair                        Sharifa Lira, ROSELINE

## 2023-07-13 NOTE — OP NOTE
PREOPERATIVE DIAGNOSIS: Right canine space cellulitis and dental caries.     POSTOPERATIVE DIAGNOSIS: Right canine space cellulitis and dental caries.     PROCEDURE PERFORMED: Extraction of teeth.     SURGEON: Trae Escoto DMD    ANESTHESIA: General.     ESTIMATED BLOOD LOSS: Minimal.     SPECIMENS: None.     DRAINS: None.     INDICATIONS/CONSENT: This is a 28-year-old female who presented to the emergency department with complaint of swelling and dental pain. After clinical and radiographic evaluation she was diagnosed with right facial cellulitis secondary to dental caries. It was felt that she would benefit from IV antibiotics given her history of immunocompromise and extraction of the teeth in the operating room. The procedure, potential risks and complications were explained to the patient and both verbal and written consent were obtained.     DESCRIPTION OF PROCEDURE: The patient was taken to the operating room and placed in the supine position on the operating room table. The state of general anesthesia was induced and oral endotracheal tube was placed. The right maxilla was infiltrated with 0.25% Marcaine with 1:200,000 epinephrine. A throat pack was placed. Teeth #3, 4, and 7 were all extracted with forceps and curetted to clear any purulent discharge, most of which was found around tooth #7, and incision was made in the upper right maxillary vestibule but no pus was obtained so no drain was placed. The wounds were then irrigated. Dry dressings were placed. The throat pack was removed. The patient was awakened, extubated, and taken to the recovery room. There were no complications. All counts were correct x 3.

## 2023-07-13 NOTE — PAYOR COMM NOTE
"Henrietta Garrett BEENA (28 y.o. Female)     REQUESTING INPATIENT AUTHORIZATION:   [id number]     REPLY TO EULA CORTEZ RN/CCP: [phone],  [phone]    [facility]: NPI [id number]  [facility]# [id number]      Date of Birth   1995    Social Security Number       Address   [address] [place] [address]    Home Phone   [phone]    MRN   [id number]       Restorationist   Non-Pentecostal    Marital Status   Single                            Admission Date   7/12/23    Admission Type   Emergency    Admitting Provider   Haris Levine MD    Attending Provider   Haris Levine MD    Department, Room/Bed   [facility] OBSERVATION, 101/1       Discharge Date       Discharge Disposition       Discharge Destination                                 Attending Provider: Haris Levine MD    Allergies: Anesthetics, Amide, Ciprofloxacin, Compazine [Prochlorperazine], Domperidone, Haldol [Haloperidol], Metoclopramide, Sulfa Antibiotics    Isolation: None   Infection: None   Code Status: CPR    Ht: 180.3 cm (71\")   Wt: 73.9 kg (163 lb)    Admission Cmt: None   Principal Problem: Facial cellulitis [L03.211]                   Active Insurance as of 7/12/2023       Primary Coverage       Payor Plan Insurance Group Employer/Plan Group    [facility] [employer] [facility] PPO [id number]       Payor Plan Address Payor Plan Phone Number Payor Plan Fax Number Effective Dates    [address] [phone]  1/1/2023 - None Entered    [place] [address]         Subscriber Name Subscriber Birth Date Member ID       NETTA SINGH [birth year]1963 [id number]                     Emergency Contacts        (Rel.) Home Phone Work Phone Mobile Phone    Miley Singh (Mother) [phone] -- [phone]    Flaco Redman -- -- --                 History & Physical        Jeanette Escobar, APRN at 07/12/23 0851       Attestation signed by Julianna, " Haris Larkin MD at 07/12/23 5324      Brief Attending Admission Attestation   Addendum: I have reviewed the history and plan as obtained by MITCHELL Cao and performed my own independent history. I have personally examined the patient and conducted greater than 50% of medical decision making. My exam confirms her physical findings and I agree with the plan as listed above, with the addition of the following:       Brief Summary Statement/HPI  28 y.o. female extensive autoimmune past medical history including lupus, Sjogren's, RA, Raynaud's disease, Tomas-Danlos, POTS, fibromyalgia, gastroparesis status post J-tube who presented with acute facial swelling and dental pain.  She states that her pain is worse on her cheek below her eye where her face is swollen.  History is difficult to obtain from her as she is quite agitated, stating that she is tired of repeating herself.     Remainder of detailed HPI is as noted above and has been reviewed by me for completeness.    I have personally reviewed:  [x]  Laboratory  [x]  Old records  [x]  Radiology   []  EKG/Telemetry   []  Microbiology    []  Cardiology/Vascular   []  Pathology     Attending Physical Exam  Temp:  [98.8 °F (37.1 °C)-101.6 °F (38.7 °C)] 98.8 °F (37.1 °C)  Heart Rate:  [] 77  Resp:  [18] 18  BP: (107-162)/(71-92) 107/71  Constitutional: alert, cooperative, and mild distress  HENT: Poor dentition; swelling and erythema especially of right cheek  Neck: no JVD  Respiratory: clear to auscultation, unlabored  Cardiovascular: regular rate and rhythm, no murmur  Abdominal: soft, non-tender, non-distended; J-tube  Musculoskeletal: no edema  Neurological: alert and oriented x 3    Assessment/Plan    We will continue Unasyn at this time for facial cellulitis as well as periapical abscess seen on CT.  Consult oral surgeon, hopefully they will be able to see while she is here.  Will add as needed Norco for some extended pain control.  Resume home  DMARDs.    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     See below for additional assessment and plan developed with APRN which I have reviewed.      Electronically signed by Haris Levine MD, 7/12/2023, 15:47 EDT.                         Patient Name:  Henrietta Garrett  YOB: 1995  MRN:  [id number]  Admit Date:  7/12/2023  Patient Care Team:  Fanny Jarrett MD as PCP - General (Family Medicine)  Lorraine Terrazas MD as Referring Physician (Orthopedic Surgery)  Kee Skinner MD as Consulting Physician (Hematology and Oncology)  Rui Molina MD as Consulting Physician (Rheumatology)      Subjective  History Present Illness     Chief Complaint   Patient presents with    Dental Pain       Ms. Garrett is a 28 y.o. history of lupus,sjorgrens, RA, Raynaud's disease, Tomas-Danlos syndrome, POTS, IBS,GT,GERD, fibromyalgia, autonomic nervous system disorder, gastroparesis, with jejunostomy tube that was just discharged on 7/7/23 for treatment of jtube leak from PDBO corrected by general surgery. She presents with acute facial swelling and dental pain.  Pain is located primarily at right lateral maxillary incisor.  She had mild discomfort yesterday but today it is severe with subjective fever.  She denies trouble swallowing, tolerating her  secretions or shortness of breath.  She has been on clindamycin for 2 days with no relief.  Patient has significantly poor dentition but her dentist does not want to intervene until her nutrition has improved (Jtube for intermittent feedings).  She has been referred to OMFS as outpatient  but has not yet obtained appt.         History of Present Illness  Review of Systems   Constitutional:  Positive for chills and fever. Negative for appetite change and unexpected weight change.   HENT:  Positive for dental problem, facial swelling and mouth sores. Negative for drooling, ear discharge, ear pain and trouble swallowing.    Eyes:  Negative for visual disturbance.   Respiratory:  Negative for cough, choking, chest tightness and shortness of breath.    Cardiovascular:  Negative for chest pain.   Gastrointestinal:  Negative for abdominal distention, abdominal pain, blood in stool, constipation, diarrhea, nausea and vomiting.   Musculoskeletal:  Negative for back pain.   Skin:  Negative for color change.   Neurological:  Negative for dizziness and numbness.   Hematological:  Does not bruise/bleed easily.   Psychiatric/Behavioral: Negative.  Negative for confusion.       Personal History     Past Medical History:   Diagnosis Date    Anemia     Arthritis     RHEUMATOID    Asthma     CPRS 1 (complex regional pain syndrome I) of upper limb     Depression     EDS (Tomas-Danlos syndrome)     Fibromyalgia 2015    Gastroparesis     History of hyperkeratosis of skin     IBS (irritable bowel syndrome)     Leukopenia, mild     Poor vision     Raynauds syndrome     Sjogren's disease     SOB (shortness of breath)      Past Surgical History:   Procedure Laterality Date    COLONOSCOPY  12/11/2020    Dr. Barone    DENTAL PROCEDURE      EPIDURAL BLOCK      JEJUNOSTOMY TUBE INSERTION      SHOULDER SURGERY Bilateral     X2    TOE SURGERY Right 2011    3rd toe     UPPER GASTROINTESTINAL ENDOSCOPY  12/11/1920    Dr. Barone     Family History    Problem Relation Age of Onset    Rheum arthritis Mother     Diabetes type II Father     Hyperlipidemia Father     Diabetes Father     Cancer Maternal Uncle         throat cancer     Arthritis Maternal Uncle     Asthma Sister     Migraines Sister     Tomas-Danlos syndrome Sister     Asthma Brother     Leukemia Maternal Grandmother     Stroke Maternal Grandmother     Heart disease Maternal Grandmother     Rheum arthritis Maternal Grandmother     Prostate cancer Maternal Grandfather     Stroke Maternal Grandfather     Diabetes Maternal Grandfather     Cancer Paternal Grandmother     Colon cancer Paternal Grandmother     No Known Problems Brother     Breast cancer Neg Hx      Social History     Tobacco Use    Smoking status: Never    Smokeless tobacco: Never   Vaping Use    Vaping Use: Never used   Substance Use Topics    Alcohol use: No    Drug use: No     No current facility-administered medications on file prior to encounter.     Current Outpatient Medications on File Prior to Encounter   Medication Sig Dispense Refill    amLODIPine (NORVASC) 10 MG tablet Take 1 tablet by mouth Daily.      dicyclomine (BENTYL) 10 MG/5ML syrup Take 5 mL by mouth 4 (Four) Times a Day Before Meals & at Bedtime.      EPINEPHrine (EPIPEN) 0.3 MG/0.3ML solution auto-injector injection Inject 0.3 mg into the appropriate muscle as directed by prescriber As Needed.      gabapentin (NEURONTIN) 100 MG capsule 2 capsules 3 (Three) Times a Day.      hydroxychloroquine (PLAQUENIL) 200 MG tablet Take 1 tablet by mouth 2 (Two) Times a Day.      immune globulin, human, (Gammaplex) 10 GM/200ML solution infusion 800 mL, 0 Refill(s)      lactulose (CHRONULAC) 10 GM/15ML solution Administer 15 mL per J tube As Needed.      leucovorin 5 MG tablet Take 1 tablet by mouth 1 (One) Time Per Week. Mondays      Levonorgestrel-Eth Estradiol (Twirla) 120-30 MCG/24HR patch weekly Place 1 patch on the skin as directed by provider 1 (One) Time Per Week.       methocarbamol (ROBAXIN) 750 MG tablet Take 1 tablet by mouth 3 (Three) Times a Day As Needed for Muscle Spasms. 21 tablet 0    Methotrexate Sodium (methotrexate PF) 50 MG/2ML chemo syringe Inject 2 mL into the appropriate muscle as directed by prescriber 1 (One) Time Per Week. Wednesdays      metoprolol succinate XL (TOPROL-XL) 25 MG 24 hr tablet Take 0.5 tablets by mouth Every Night.      multivitamin with minerals tablet tablet Take 1 tablet by mouth Daily.      Nitro-Bid 2 % ointment APPLY BY TRANSDERMAL ROUTE TO FINGER WEBS FOR RAYNAUD'S EVERY 12 HOURS AND REMOVE AT BEDTIME      pantoprazole (PROTONIX) 40 MG EC tablet Take 1 tablet by mouth 2 (Two) Times a Day.      predniSONE (DELTASONE) 20 MG tablet Take 1 tablet by mouth Daily.      promethazine-dextromethorphan (PROMETHAZINE-DM) 6.25-15 MG/5ML syrup Administer 5 mL per J tube 4 (Four) Times a Day As Needed.       Allergies   Allergen Reactions    Anesthetics, Amide Nausea And Vomiting    Ciprofloxacin Other (See Comments)     EHERLIS STANDLIS? ILLNESS. UNABLE TO TAKE     Compazine [Prochlorperazine] Dystonia    Domperidone Other (See Comments)     Bad reaction per pt report    Haldol [Haloperidol] Other (See Comments)     Muscle spasms      Metoclopramide Nausea And Vomiting    Sulfa Antibiotics Other (See Comments)     Unable to take as causes leukopenia       Objective   Objective     Vital Signs  Temp:  [99.5 °F (37.5 °C)] 99.5 °F (37.5 °C)  Heart Rate:  [] 84  Resp:  [18] 18  BP: (133-155)/(85-91) 155/88  SpO2:  [97 %-100 %] 97 %  on   ;   Device (Oxygen Therapy): room air  Body mass index is 22.73 kg/m².    Physical Exam  Vitals and nursing note reviewed.   Constitutional:       Appearance: She is well-developed. She is ill-appearing.   HENT:      Head: Normocephalic and atraumatic.      Mouth/Throat:      Dentition: Abnormal dentition. Dental tenderness present.      Tongue: No lesions.      Pharynx: No pharyngeal swelling, oropharyngeal exudate,  posterior oropharyngeal erythema or uvula swelling.      Tonsils: No tonsillar exudate or tonsillar abscesses.      Comments: Poor dentition no obvious drainage.  Tenderness right maxillary incisor. Swelling to cheeks. Generalized Facial swelling   Eyes:      Pupils: Pupils are equal, round, and reactive to light.   Cardiovascular:      Rate and Rhythm: Normal rate and regular rhythm.      Heart sounds: Normal heart sounds.   Pulmonary:      Effort: Pulmonary effort is normal.      Breath sounds: Normal breath sounds.   Abdominal:      General: Bowel sounds are normal. There is no distension or abdominal bruit.      Palpations: Abdomen is soft. Abdomen is not rigid. There is no shifting dullness, fluid wave, mass or pulsatile mass.      Tenderness: There is no abdominal tenderness. There is no guarding.      Hernia: No hernia is present.      Comments: Jtube present with minimal leaking, mild tenderness at insertion site with granulated tissue without erythema or warmth   Musculoskeletal:         General: Normal range of motion.   Skin:     General: Skin is warm and dry.   Neurological:      Mental Status: She is alert and oriented to person, place, and time.   Psychiatric:         Mood and Affect: Mood normal.         Behavior: Behavior normal.         Thought Content: Thought content normal.      Comments: Uncomfortable        Results Review:  I reviewed the patient's new clinical results.  I reviewed the patient's new imaging results and agree with the interpretation.  I reviewed the patient's other test results and agree with the interpretation  I personally viewed and interpreted the patient's EKG/Telemetry data  Discussed with ED provider.    Lab Results (last 24 hours)       Procedure Component Value Units Date/Time    CBC & Differential [[id number]]  (Abnormal) Collected: 07/12/23 0541    Specimen: Blood Updated: 07/12/23 0554    Narrative:      The following orders were created for panel order CBC &  Differential.  Procedure                               Abnormality         Status                     ---------                               -----------         ------                     CBC Auto Differential[[id number]]        Abnormal            Final result                 Please view results for these tests on the individual orders.    Comprehensive Metabolic Panel [[id number]]  (Abnormal) Collected: 07/12/23 0541    Specimen: Blood Updated: 07/12/23 0612     Glucose 111 mg/dL      BUN 7 mg/dL      Creatinine 0.66 mg/dL      Sodium 138 mmol/L      Potassium 3.6 mmol/L      Chloride 105 mmol/L      CO2 22.0 mmol/L      Calcium 9.4 mg/dL      Total Protein 8.5 g/dL      Albumin 4.4 g/dL      ALT (SGPT) 8 U/L      AST (SGOT) 15 U/L      Alkaline Phosphatase 51 U/L      Total Bilirubin 0.4 mg/dL      Globulin 4.1 gm/dL      A/G Ratio 1.1 g/dL      BUN/Creatinine Ratio 10.6     Anion Gap 11.0 mmol/L      eGFR 122.7 mL/min/1.73     Narrative:      GFR Normal >60  Chronic Kidney Disease <60  Kidney Failure <15      hCG, Serum, Qualitative [[id number]]  (Normal) Collected: 07/12/23 0541    Specimen: Blood Updated: 07/12/23 0635     HCG Qualitative Negative    Lactic Acid, Plasma [[id number]]  (Normal) Collected: 07/12/23 0541    Specimen: Blood Updated: 07/12/23 0609     Lactate 0.7 mmol/L     Procalcitonin [[id number]]  (Abnormal) Collected: 07/12/23 0541    Specimen: Blood Updated: 07/12/23 0619     Procalcitonin 0.82 ng/mL     Narrative:      As a Marker for Sepsis (Non-Neonates):    1. <0.5 ng/mL represents a low risk of severe sepsis and/or septic shock.  2. >2 ng/mL represents a high risk of severe sepsis and/or septic shock.    As a Marker for Lower Respiratory Tract Infections that require antibiotic therapy:    PCT on Admission    Antibiotic Therapy       6-12 Hrs later    >0.5                Strongly Recommended  >0.25 - <0.5        Recommended   0.1 - 0.25          Discouraged              Remeasure/reassess  "PCT  <0.1                Strongly Discouraged     Remeasure/reassess PCT    As 28 day mortality risk marker: \"Change in Procalcitonin Result\" (>80% or <=80%) if Day 0 (or Day 1) and Day 4 values are available. Refer to http://www.[facility]-pct-calculator.com    Change in PCT <=80%  A decrease of PCT levels below or equal to 80% defines a positive change in PCT test result representing a higher risk for 28-day all-cause mortality of patients diagnosed with severe sepsis for septic shock.    Change in PCT >80%  A decrease of PCT levels of more than 80% defines a negative change in PCT result representing a lower risk for 28-day all-cause mortality of patients diagnosed with severe sepsis or septic shock.       CBC Auto Differential [[id number]]  (Abnormal) Collected: 07/12/23 0541    Specimen: Blood Updated: 07/12/23 0554     WBC 5.44 10*3/mm3      RBC 3.90 10*6/mm3      Hemoglobin 11.2 g/dL      Hematocrit 33.0 %      MCV 84.6 fL      MCH 28.7 pg      MCHC 33.9 g/dL      RDW 12.7 %      RDW-SD 39.1 fl      MPV 11.1 fL      Platelets 202 10*3/mm3      Neutrophil % 75.8 %      Lymphocyte % 17.1 %      Monocyte % 6.3 %      Eosinophil % 0.6 %      Basophil % 0.0 %      Immature Grans % 0.2 %      Neutrophils, Absolute 4.13 10*3/mm3      Lymphocytes, Absolute 0.93 10*3/mm3      Monocytes, Absolute 0.34 10*3/mm3      Eosinophils, Absolute 0.03 10*3/mm3      Basophils, Absolute 0.00 10*3/mm3      Immature Grans, Absolute 0.01 10*3/mm3      nRBC 0.0 /100 WBC     Blood Culture - Blood, Arm, Left [[id number]] Collected: 07/12/23 0833    Specimen: Blood from Arm, Left Updated: 07/12/23 0841            Imaging Results (Last 24 Hours)       Procedure Component Value Units Date/Time    CT Soft Tissue Neck With Contrast [[id number]] Collected: 07/12/23 0739     Updated: 07/12/23 0806    Narrative:      CT SCAN OF THE SOFT TISSUES OF THE NECK WITH CONTRAST     CLINICAL HISTORY:  Right-sided facial swelling.     TECHNIQUE: CT scan of the " soft tissues of neck was obtained with 3 mm  axial images following the administration of IV contrast. Sagittal and  coronal reconstructed images were obtained.     FINDINGS:       There is prominent fat stranding within the right buccal space lateral  to the right aspect of the maxillary alveolar ridge and this  circumscribes the right parotid duct. This soft tissue swelling extends  into the right preseptal region as well as along the right aspect of the  nose. The right parotid gland has an unremarkable appearance. However,  there are prominent carious teeth and periodontal disease within the  adjacent maxillary alveolar ridge. As such, I suspect that the findings  are odontogenic in nature. In specific, in this region, there is a  markedly carious appearance of tooth #3 with prominent periapical  lucency compatible with a periapical abscess. Additionally, there is a  prominently carious appearance of tooth #4. A prominent periapical  lucency is seen circumscribing tooth #7. Additional and carious teeth  are noted within the mandibular and maxillary alveolar ridge. There are  mildly enlarged level 1B and 2A lymph nodes are all likely benign and  reactive in nature. Additional scattered shotty lymph nodes are  identified scattered throughout the neck.     The visualized contents of the cranial vault are unremarkable. The left  orbit is within normal limits. The parotid glands, submandibular glands,  and sublingual glands are within normal limits. The nasopharynx is  unremarkable. There is mild prominence of the palatine tonsils.  Otherwise, the oropharynx, hypopharynx, cervical esophagus, and  visualized upper thoracic esophagus are within normal limits. The  epiglottis, aryepiglottic folds, glottis, and subglottic airway are  within normal limits. The thyroid gland is within normal limits. The  visualized lung apices are clear.     There is a right IJ approach Mediport catheter in place. Incidental note  is made  of mild mucosal thickening within the maxillary sinuses.       Impression:         There is prominent fat stranding within the buccal space lateral to the  right aspect of the maxillary alveolar ridge and there is contiguous  soft tissue swelling extending into the right preseptal soft tissues as  well as the soft tissues along the right side of the nose. The findings  are most compatible with a cellulitis. There are are areas of periapical  lucencies as well compatible with periapical abscesses as discussed in  detail above. In specific, tooth #3 has a markedly carious appearance  with a prominent periapical lucency compatible with a periapical  abscess.      These findings were discussed with Dr. Tee on 07/12/2023 at  approximately 8:00 AM.     Radiation dose reduction techniques were utilized, including automated  exposure control and exposure modulation based on body size.     This report was finalized on 7/12/2023 8:02 AM by Dr. Nav Hirsch M.D.                   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       Ms. Garrett is a 28 y.o. with numerous medical problems as listed above that was recently here following leaking J-tube.  She presents with facial cellulitis and dental pain in the setting of poor  dentition.  CT demonstrates periapical abscess around tooth #3.     Periapical abscess/facial cellulitis  No respiratory compromise. Continue Unasyn IV.  Await cultures.  Consult OMFS.  Analgesics.  Toradol IV and as needed morphine.  Pepcid IV twice daily.  clear liquid diet.     Jejunostomy tube present  Chronic   Consult nutrition - continue home feedings.  Functioning since last week's admission     Intestinal autonomic neuropathy  Gastroparesis  IBS syndrome with both constipation and diarrhea  Continue home lactulose, Protonix, Bentyl         Fibromyalgia  Tomas-Danlos syndrome  Rheumatoid arthritis involving multiple sites  Systemic lupus erythematosus  Raynaud's phenomenon  Chronic conditions   Continue home regimen pending home med rec verification and completion  Hold today's dose of methotrexate.  leucovorin, Plaquenil, gabapentin, prednisone, IVIG       I discussed the patient's findings and my recommendations with patient, family, nursing staff, and ED provider Dr. Levine.    VTE Prophylaxis - SCDs.  Code Status - Full code.       MITCHELL Alicea  [facility]  07/12/23  10:45 EDT    Electronically signed by Haris Levine MD at 07/12/23 1554          Emergency Department Notes        Gisela Rao, RN at 07/12/23 1148          Ck REESE made aware of pts temp. Orders placed.     Electronically signed by Gisela Rao RN at 07/12/23 1146           Eliseo Tee II, MD at 07/12/23 0712           EMERGENCY DEPARTMENT ENCOUNTER    Room Number:  12/12  PCP: Fanny Jarrett MD      HPI:  Chief Complaint: Dental pain  A complete HPI/ROS/PMH/PSH/SH/FH are unobtainable due to: None  Context: Henrietta Garrett is a 28 y.o. female who presents to the ED c/o dental pain.  Reports having right facial swelling.  This is constant.  Is been going on for several days.  She been on clindamycin for 2 days and says that she been getting worse.  She has had  subjective fever but no documented fever.  She is supposed to get her teeth removed but she states that her dentist wants her nutrition to improve before extracting her teeth.  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    Complex regional pain syndrome type 1 of right lower extremity 05/04/2018    Rheumatoid arthritis involving multiple sites 05/04/2018    Raynaud's disease without gangrene 05/04/2018    Irritable bowel syndrome with both constipation and diarrhea 05/04/2018    Exercise-induced asthma 05/04/2018    Systemic lupus erythematosus 05/04/2018    Hemoptysis 11/09/2018    Chest wall pain 11/09/2018    Abnormal white blood cell (WBC) count 11/09/2018    Adverse effect of iron 11/09/2018    B12 deficiency 11/09/2018    Lymphadenopathy, axillary 11/30/2018    Nausea & vomiting 04/12/2019    Rectal bleeding 07/22/2022    Intestinal autonomic neuropathy 01/09/2023    Gastroparesis 01/24/2022    Postural orthostatic tachycardia syndrome 09/26/2022    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    Raynaud's phenomenon 09/26/2022    Sjogren's syndrome 06/27/2023    Diffuse dysfunction of smooth muscle of gastrointestinal tract 02/06/2022    Disorder of autonomic nervous system 06/27/2023    Gastroesophageal reflux disease 09/26/2022    Jejunostomy tube present 06/27/2023    Abdominal wall pain 07/04/2023    Malfunction of jejunostomy tube 07/05/2023     Resolved Ambulatory Problems     Diagnosis Date Noted    Right calf pain 04/27/2018     Past Medical History:   Diagnosis Date    Anemia     Arthritis     Asthma     CPRS 1 (complex regional pain syndrome I) of upper limb     Depression     EDS (Tomas-Danlos syndrome)     History  of hyperkeratosis of skin     IBS (irritable bowel syndrome)     Leukopenia, mild     Poor vision     Raynauds syndrome     Sjogren's disease          PAST SURGICAL HISTORY  Past Surgical History:   Procedure Laterality Date    COLONOSCOPY  12/11/2020    Dr. Barone    DENTAL PROCEDURE      EPIDURAL BLOCK      JEJUNOSTOMY TUBE INSERTION      SHOULDER SURGERY Bilateral     X2    TOE SURGERY Right 2011    3rd toe     UPPER GASTROINTESTINAL ENDOSCOPY  12/11/1920    Dr. Barone         FAMILY HISTORY  Family History   Problem Relation Age of Onset    Rheum arthritis Mother     Diabetes type II Father     Hyperlipidemia Father     Diabetes Father     Cancer Maternal Uncle         throat cancer     Arthritis Maternal Uncle     Asthma Sister     Migraines Sister     Tomas-Danlos syndrome Sister     Asthma Brother     Leukemia Maternal Grandmother     Stroke Maternal Grandmother     Heart disease Maternal Grandmother     Rheum arthritis Maternal Grandmother     Prostate cancer Maternal Grandfather     Stroke Maternal Grandfather     Diabetes Maternal Grandfather     Cancer Paternal Grandmother     Colon cancer Paternal Grandmother     No Known Problems Brother     Breast cancer Neg Hx          SOCIAL HISTORY  Social History     Socioeconomic History    Marital status: Single    Number of children: 0    Years of education: High School   Tobacco Use    Smoking status: Never    Smokeless tobacco: Never   Vaping Use    Vaping Use: Never used   Substance and Sexual Activity    Alcohol use: No    Drug use: No    Sexual activity: Defer         ALLERGIES  Anesthetics, amide; Ciprofloxacin; Compazine [prochlorperazine]; Domperidone; Haldol [haloperidol]; Metoclopramide; and Sulfa antibiotics        REVIEW OF SYSTEMS  Review of Systems     All systems reviewed and negative except for those discussed in HPI.       PHYSICAL EXAM  ED Triage Vitals   Temp Heart Rate Resp BP SpO2   07/12/23 0354 07/12/23 0354 07/12/23 0354 07/12/23 0400  07/12/23 0354   99.5 °F (37.5 °C) 102 18 133/85 97 %      Temp src Heart Rate Source Patient Position BP Location FiO2 (%)   07/12/23 0354 07/12/23 0354 -- -- --   Tympanic Monitor          Physical Exam      GENERAL: no acute distress  HENT: nares patent, TMs clear bilaterally, poor dental hygiene, she has tenderness to percussion to the right lateral maxillary incisor, there is swelling to the cheek with periocular swelling as well  EYES: no scleral icterus, EOMI, PERRL  CV: regular rhythm, normal rate  RESPIRATORY: normal effort  MUSCULOSKELETAL: no deformity  NEURO: alert, moves all extremities, follows commands  PSYCH:  calm, cooperative  SKIN: warm, dry    Vital signs and nursing notes reviewed.          LAB RESULTS  Recent Results (from the past 24 hour(s))   Comprehensive Metabolic Panel    Collection Time: 07/12/23  5:41 AM    Specimen: Blood   Result Value Ref Range    Glucose 111 (H) 65 - 99 mg/dL    BUN 7 6 - 20 mg/dL    Creatinine 0.66 0.57 - 1.00 mg/dL    Sodium 138 136 - 145 mmol/L    Potassium 3.6 3.5 - 5.2 mmol/L    Chloride 105 98 - 107 mmol/L    CO2 22.0 22.0 - 29.0 mmol/L    Calcium 9.4 8.6 - 10.5 mg/dL    Total Protein 8.5 6.0 - 8.5 g/dL    Albumin 4.4 3.5 - 5.2 g/dL    ALT (SGPT) 8 1 - 33 U/L    AST (SGOT) 15 1 - 32 U/L    Alkaline Phosphatase 51 39 - 117 U/L    Total Bilirubin 0.4 0.0 - 1.2 mg/dL    Globulin 4.1 gm/dL    A/G Ratio 1.1 g/dL    BUN/Creatinine Ratio 10.6 7.0 - 25.0    Anion Gap 11.0 5.0 - 15.0 mmol/L    eGFR 122.7 >60.0 mL/min/1.73   hCG, Serum, Qualitative    Collection Time: 07/12/23  5:41 AM    Specimen: Blood   Result Value Ref Range    HCG Qualitative Negative Negative   Lactic Acid, Plasma    Collection Time: 07/12/23  5:41 AM    Specimen: Blood   Result Value Ref Range    Lactate 0.7 0.5 - 2.0 mmol/L   Procalcitonin    Collection Time: 07/12/23  5:41 AM    Specimen: Blood   Result Value Ref Range    Procalcitonin 0.82 (H) 0.00 - 0.25 ng/mL   CBC Auto Differential     Collection Time: 07/12/23  5:41 AM    Specimen: Blood   Result Value Ref Range    WBC 5.44 3.40 - 10.80 10*3/mm3    RBC 3.90 3.77 - 5.28 10*6/mm3    Hemoglobin 11.2 (L) 12.0 - 15.9 g/dL    Hematocrit 33.0 (L) 34.0 - 46.6 %    MCV 84.6 79.0 - 97.0 fL    MCH 28.7 26.6 - 33.0 pg    MCHC 33.9 31.5 - 35.7 g/dL    RDW 12.7 12.3 - 15.4 %    RDW-SD 39.1 37.0 - 54.0 fl    MPV 11.1 6.0 - 12.0 fL    Platelets 202 140 - 450 10*3/mm3    Neutrophil % 75.8 42.7 - 76.0 %    Lymphocyte % 17.1 (L) 19.6 - 45.3 %    Monocyte % 6.3 5.0 - 12.0 %    Eosinophil % 0.6 0.3 - 6.2 %    Basophil % 0.0 0.0 - 1.5 %    Immature Grans % 0.2 0.0 - 0.5 %    Neutrophils, Absolute 4.13 1.70 - 7.00 10*3/mm3    Lymphocytes, Absolute 0.93 0.70 - 3.10 10*3/mm3    Monocytes, Absolute 0.34 0.10 - 0.90 10*3/mm3    Eosinophils, Absolute 0.03 0.00 - 0.40 10*3/mm3    Basophils, Absolute 0.00 0.00 - 0.20 10*3/mm3    Immature Grans, Absolute 0.01 0.00 - 0.05 10*3/mm3    nRBC 0.0 0.0 - 0.2 /100 WBC       Ordered the above labs and reviewed the results.        RADIOLOGY  CT Soft Tissue Neck With Contrast    Result Date: 7/12/2023  CT SCAN OF THE SOFT TISSUES OF THE NECK WITH CONTRAST  CLINICAL HISTORY:  Right-sided facial swelling.  TECHNIQUE: CT scan of the soft tissues of neck was obtained with 3 mm axial images following the administration of IV contrast. Sagittal and coronal reconstructed images were obtained.  FINDINGS:   There is prominent fat stranding within the right buccal space lateral to the right aspect of the maxillary alveolar ridge and this circumscribes the right parotid duct. This soft tissue swelling extends into the right preseptal region as well as along the right aspect of the nose. The right parotid gland has an unremarkable appearance. However, there are prominent carious teeth and periodontal disease within the adjacent maxillary alveolar ridge. As such, I suspect that the findings are odontogenic in nature. In specific, in this region,  there is a markedly carious appearance of tooth #3 with prominent periapical lucency compatible with a periapical abscess. Additionally, there is a prominently carious appearance of tooth #4. A prominent periapical lucency is seen circumscribing tooth #7. Additional and carious teeth are noted within the mandibular and maxillary alveolar ridge. There are mildly enlarged level 1B and 2A lymph nodes are all likely benign and reactive in nature. Additional scattered shotty lymph nodes are identified scattered throughout the neck.  The visualized contents of the cranial vault are unremarkable. The left orbit is within normal limits. The parotid glands, submandibular glands, and sublingual glands are within normal limits. The nasopharynx is unremarkable. There is mild prominence of the palatine tonsils. Otherwise, the oropharynx, hypopharynx, cervical esophagus, and visualized upper thoracic esophagus are within normal limits. The epiglottis, aryepiglottic folds, glottis, and subglottic airway are within normal limits. The thyroid gland is within normal limits. The visualized lung apices are clear.  There is a right IJ approach Mediport catheter in place. Incidental note is made of mild mucosal thickening within the maxillary sinuses.       There is prominent fat stranding within the buccal space lateral to the right aspect of the maxillary alveolar ridge and there is contiguous soft tissue swelling extending into the right preseptal soft tissues as well as the soft tissues along the right side of the nose. The findings are most compatible with a cellulitis. There are are areas of periapical lucencies as well compatible with periapical abscesses as discussed in detail above. In specific, tooth #3 has a markedly carious appearance with a prominent periapical lucency compatible with a periapical abscess.  These findings were discussed with Dr. Tee on 07/12/2023 at approximately 8:00 AM.  Radiation dose reduction  techniques were utilized, including automated exposure control and exposure modulation based on body size.  This report was finalized on 7/12/2023 8:02 AM by Dr. Nav Hirsch M.D.       Ordered the above noted radiological studies. Reviewed by me in PACS.          PROCEDURES  Procedures          MEDICATIONS GIVEN IN ER  Medications   sodium chloride 0.9 % flush 10 mL (has no administration in time range)   ketorolac (TORADOL) injection 15 mg (15 mg Intravenous Not Given 7/12/23 0814)   ondansetron (ZOFRAN) injection 4 mg (4 mg Intravenous Not Given 7/12/23 0814)   ampicillin-sulbactam (UNASYN) 3 g in sodium chloride 0.9 % 100 mL IVPB-VTB (has no administration in time range)   sodium chloride 0.9 % bolus 1,000 mL (0 mL Intravenous Stopped 7/12/23 0644)   morphine injection 4 mg (4 mg Intravenous Given 7/12/23 0539)   ondansetron (ZOFRAN) injection 4 mg (4 mg Intravenous Given 7/12/23 0539)   iopamidol (ISOVUE-300) 61 % injection 100 mL (75 mL Intravenous Given 7/12/23 0721)         MEDICAL DECISION MAKING, PROGRESS, and CONSULTS    Discussion below represents my analysis of pertinent findings related to patient's condition, differential diagnosis, treatment plan and final disposition.      Orders placed during this visit:  Orders Placed This Encounter   Procedures    Blood Culture - Blood,    Blood Culture - Blood,    CT Soft Tissue Neck With Contrast    Comprehensive Metabolic Panel    hCG, Serum, Qualitative    Lactic Acid, Plasma    Procalcitonin    CBC Auto Differential    [facility] (on-call MD unless specified) Details    Insert Peripheral IV    Inpatient Admission    CBC & Differential           Differential diagnosis:    Dental abscess, dental caries, cavernous sinus thrombosis is felt to be less likely          Independent interpretation of labs, radiology studies, and discussions with consultants:  ED Course as of 07/12/23 0851   Wed Jul 12, 2023   0531 First look: Patient presents with swelling to right cheek  "and right upper dental pain.  Patient has history of gastroparesis and has a J-tube.  States that she has been told she has to get her nutrition fixed before a dentist can take care of her.  Patient has been referred to an oral surgeon but has not yet seen them.  Patient has been taking clindamycin for the past 2 days. [TJ]   0802 I discussed the CT results with Dr. Hirsch, radiology.  Patient has evidence of periapical abscess around tooth #3. [TD]      ED Course User Index  [TD] Eliseo Tee II, MD  [TJ] Eliseo Laboy MD         I discussed the case with MITCHELL Ni with hospitalist medicine.  We reviewed the patient's imaging and failure to respond to oral clindamycin.  She will be admitted for IV antibiotics.      DIAGNOSIS  Final diagnoses:   Facial cellulitis         DISPOSITION  Admit      Latest Documented Vital Signs:  As of 08:51 EDT  BP- 141/91 HR- 83 Temp- 99.5 °F (37.5 °C) (Tympanic) O2 sat- 100%      --    Please note that portions of this were completed with a voice recognition program.       Note Disclaimer: At [facility], we believe that sharing information builds trust and better relationships. You are receiving this note because you are receiving care at [facility] or recently visited. It is possible you will see health information before a provider has talked with you about it. This kind of information can be easy to misunderstand. To help you fully understand what it means for your health, we urge you to discuss this note with your provider.         Eliseo Tee II, MD  07/12/23 0842      Electronically signed by Eliseo Tee II, MD at 07/12/23 4398       Danelle Babb RN at 07/12/23 7010          Pt arrives via private vehicle with complaints of dental pain. Pt has a j tube and \"struggles with nutrition,\" and says her dentist will not do anything about her tooth until her nutrition improves. She says she does not eat enough to be healthy. "     Electronically signed by Danelle Babb RN at 07/12/23 0359       Vital Signs (last 2 days)       Date/Time Temp Temp src Pulse Resp BP Patient Position SpO2    07/13/23 1405 98 (36.7) Oral 56 -- 125/79 Lying --    07/13/23 0854 98.4 (36.9) -- 88 18 121/75 -- 98    07/12/23 2257 98.9 (37.2) Oral 58 18 111/70 Lying --    07/12/23 1933 99.5 (37.5) Oral 79 18 118/66 Lying --    07/12/23 1541 98.8 (37.1) -- 77 18 107/71 -- 99    07/12/23 1400 -- -- 72 -- 119/75 -- 97    07/12/23 1300 -- -- 73 -- 119/71 -- 97    07/12/23 1250 -- -- 74 -- -- -- 97    07/12/23 1240 -- -- 75 -- -- -- 97    07/12/23 1230 -- -- 74 -- 119/73 -- 97    07/12/23 11:45:21 101.6 (38.7) Tympanic -- -- -- -- --    07/12/23 1132 -- -- 81 -- -- -- 98    07/12/23 1131 -- -- 79 -- 132/80 -- 100    07/12/23 11:29:47 -- -- -- -- 137/85 Lying 100    07/12/23 1129 -- -- -- -- 137/85 -- --    07/12/23 1029 -- -- -- -- 148/91 -- --    07/12/23 0959 -- -- -- -- 162/92 -- --    07/12/23 0934 -- -- 88 -- -- -- 100    07/12/23 0931 -- -- 84 -- 155/88 -- 97    07/12/23 0732 -- -- 83 -- 141/91 -- 100    07/12/23 0400 -- -- -- -- 133/85 -- --    07/12/23 0354 99.5 (37.5) Tympanic 102 18 -- -- 97          Oxygen Therapy (last 2 days)       Date/Time SpO2 Device (Oxygen Therapy) Flow (L/min) Oxygen Concentration (%) ETCO2 (mmHg)    07/13/23 1405 -- room air -- -- --    07/13/23 0854 98 -- -- -- --    07/13/23 0830 -- room air -- -- --    07/13/23 0032 -- room air -- -- --    07/12/23 2041 -- room air -- -- --    07/12/23 1541 99 room air -- -- --    07/12/23 1400 97 -- -- -- --    07/12/23 1300 97 -- -- -- --    07/12/23 1250 97 -- -- -- --    07/12/23 1240 97 -- -- -- --    07/12/23 1230 97 -- -- -- --    07/12/23 1132 98 -- -- -- --    07/12/23 1131 100 -- -- -- --    07/12/23 11:29:47 100 room air -- -- --    07/12/23 0934 100 -- -- -- --    07/12/23 0931 97 -- -- -- --    07/12/23 0732 100 -- -- -- --    07/12/23 0354 97 room air -- -- --          Intake &  Output (last 2 days)         07/11 0701  07/12 0700 07/12 0701  07/13 0700 07/13 0701  07/14 0700    I.V. (mL/kg)  1348.3 (18.2)     IV Piggyback 1000 200     Total Intake(mL/kg) 1000 (13.5) 1548.3 (21)     Net +1000 +1548.3            Urine Unmeasured Occurrence  1 x           Lines, Drains & Airways       Active LDAs       Name Placement date Placement time Site Days    Peripheral IV 07/12/23 0539 Anterior;Proximal;Right Forearm 07/12/23  0539  Forearm  1    Gastrostomy/Enterostomy Jejunostomy LUQ 06/25/23  --  LUQ  18    Single Lumen Implantable Port Right Subclavian --  --  Subclavian  --                 Medication Administration Report for Henrietta Garrett BEENA as of 07/13/23 1420     Legend:    Given Hold Not Given Due Canceled Entry Other Actions    Time Time (Time) Time Time-Action         Discontinued     Completed     Future     MAR Hold     Linked             Medications 07/11/23 07/12/23 07/13/23      acetaminophen (TYLENOL) tablet 650 mg  Dose: 650 mg  Freq: Every 6 Hours PRN Route: PO  PRN Reason: Mild Pain  Start: 07/12/23 1147   Admin Instructions:   Based on patient request - if ordered for moderate or severe pain, provider allows for administration of a medication prescribed for a lower pain scale.  Do not exceed 4 grams of acetaminophen in a 24 hr period. Max dose of 2gm for AST/ALT greater than 120 units/L    If given for fever, use fever parameter: fever greater than 100.4 °F.    If given for pain, use the following pain scale:   Mild Pain = Pain Score of 1-3, CPOT 1-2  Moderate Pain = Pain Score of 4-6, CPOT 3-4  Severe Pain = Pain Score of 7-10, CPOT 5-8     1221-Given              ampicillin-sulbactam (UNASYN) 3 g in sodium chloride 0.9 % 100 mL IVPB-VTB  Dose: 3 g  Freq: Every 6 Hours Route: IV  Indications of Use: SKIN AND SOFT TISSUE INFECTION  Start: 07/12/23 1845   End: 07/17/23 1829   Admin Instructions:   Activate vial before using.     2041-New Bag [C]          0251-New Bag     0850-New Bag  [C]     1226-Canceled Entry       1240 0112            dicyclomine (BENTYL) capsule 10 mg  Dose: 10 mg  Freq: 4 Times Daily Route: PER J TUBE  Start: 07/12/23 1800     1647-Given     1800-Canceled Entry     2127-Given        0837-Given     (1226)-Not Given     1800       2100             escitalopram (LEXAPRO) tablet 5 mg  Dose: 5 mg  Freq: Daily Route: PER J TUBE  Start: 07/13/23 1330   Admin Instructions:   Caution: Look alike/sound alike drug alert.      2000 [C]             famotidine (PEPCID) injection 20 mg  Dose: 20 mg  Freq: Every 12 Hours Scheduled Route: IV  Start: 07/12/23 1048   Admin Instructions:   Give IV push over 2 minutes.     1126-Given     2059-Given         0837-Given     2100            gabapentin (NEURONTIN) capsule 200 mg  Dose: 200 mg  Freq: 3 Times Daily Route: PER J TUBE  Start: 07/12/23 1645   Admin Instructions:              1644-Given     2127-Given         0837-Given     1600     2100           HYDROcodone-acetaminophen (NORCO) 5-325 MG per tablet 1 tablet  Dose: 1 tablet  Freq: Every 4 Hours PRN Route: PER J TUBE  PRN Reason: Moderate Pain  Start: 07/12/23 1541   End: 07/19/23 1540   Admin Instructions:   Based on patient request - if ordered for moderate or severe pain, provider allows for administration of a medication prescribed for a lower pain scale.  [SARA]    Do not exceed 4 grams of acetaminophen in a 24 hr period. Max dose of 2gm for AST/ALT greater than 120 units/L        If given for pain, use the following pain scale:   Mild Pain = Pain Score of 1-3, CPOT 1-2  Moderate Pain = Pain Score of 4-6, CPOT 3-4  Severe Pain = Pain Score of 7-10, CPOT 5-8     1821-Given     2242-Given         0259-Given     1015-Given            hydroxychloroquine (PLAQUENIL) tablet 200 mg  Dose: 200 mg  Freq: 2 Times Daily Route: PO  Indications of Use: SJOGREN'S SYNDROME  Start: 07/12/23 2100 2127-Given          0838-Given     2100            hydrOXYzine (ATARAX) tablet 25 mg  Dose: 25  mg  Freq: 3 Times Daily PRN Route: PO  PRN Reasons: Itching,Anxiety,Sleep  Start: 07/12/23 1031   Admin Instructions:   Can be given is she is agreeable to trying toradol.   Caution: Look alike/sound alike drug alert     1122-Given              ketorolac (TORADOL) injection 15 mg  Dose: 15 mg  Freq: Every 6 Hours PRN Route: IV  PRN Reason: Severe Pain  PRN Comment: give with atarax  Start: 07/12/23 1032   End: 07/17/23 1031   Admin Instructions:   Based on patient request - if ordered for moderate or severe pain, provider allows for administration of a medication prescribed for a lower pain scale.        If given for pain, use the following pain scale:  Mild Pain = Pain Score of 1-3, CPOT 1-2  Moderate Pain = Pain Score of 4-6, CPOT 3-4  Severe Pain = Pain Score of 7-10, CPOT 5-8     1123-Given              lactated ringers infusion  Rate: 100 mL/hr Dose: 100 mL/hr  Freq: Continuous Route: IV  Start: 07/12/23 1645     1644-New Bag          0151-New Bag     1226-New Bag            lactulose (CHRONULAC) 10 GM/15ML solution 10 g  Dose: 10 g  Freq: As Needed Route: PER J TUBE  PRN Comment: constipation  Start: 07/12/23 1551   Admin Instructions:   May be mixed with fruit juice, water, or milk.          leucovorin tablet 5 mg  Dose: 5 mg  Freq: Weekly Route: PO  Start: 07/12/23 1645   Admin Instructions:   Caution: Look alike/sound alike drug alert     (1645)-Not Given [C]              methocarbamol (ROBAXIN) tablet 750 mg  Dose: 750 mg  Freq: 3 Times Daily PRN Route: PO  PRN Reason: Muscle Spasms  Start: 07/12/23 1551          metoprolol succinate XL (TOPROL-XL) 24 hr tablet 12.5 mg  Dose: 12.5 mg  Freq: Nightly Route: PO  Start: 07/12/23 2100   Admin Instructions:   Hold for SBP less than 100, DBP less than 60, or heart rate less than 50  Do not crush or chew.     2242-Given          2100             morphine injection 2 mg  Dose: 2 mg  Freq: Every 2 Hours PRN Route: IV  PRN Reason: Moderate Pain  Start: 07/12/23 1543  "  Admin Instructions:   Based on patient request - if ordered for moderate or severe pain, provider allows for administration of a medication prescribed for a lower pain scale.  If given for pain, use the following pain scale:  Mild Pain = Pain Score of 1-3, CPOT 1-2  Moderate Pain = Pain Score of 4-6, CPOT 3-4  Severe Pain = Pain Score of 7-10, CPOT 5-8     1645-Given     2041-Given         0151-Given     0357-Given     0631-Given       0837-Given     1225-Given           And  naloxone (NARCAN) injection 0.4 mg  Dose: 0.4 mg  Freq: Every 5 Minutes PRN Route: IV  PRN Reason: Respiratory Depression  Start: 07/12/23 1541   Admin Instructions:   If respiratory rate is less than 8 breaths/minute or patient is difficult to arouse stop any narcotics and contact physician.   Administer slow IV push. Repeat as ordered until patient's respiratory rate is greater than 12 breaths/minute.          multivitamin with minerals 1 tablet  Dose: 1 tablet  Freq: Daily Route: PO  Start: 07/12/23 1645   Admin Instructions:          (1646)-Not Given          (0830)-Not Given             nitroglycerin (NITROSTAT) ointment 0.5 inch  Dose: 0.5 inch  Freq: 2 Times Daily Route: TOP  Start: 07/12/23 2100   Admin Instructions:   Apply to chest wall. If scheduled every 6 hours, omit the midnight dose to allow for nitrate free interval.     (2028)-Not Given [C]          (0830)-Not Given     2100            ondansetron (ZOFRAN) injection 4 mg  Dose: 4 mg  Freq: Every 6 Hours PRN Route: IV  PRN Reasons: Nausea,Vomiting  Start: 07/12/23 0935   Admin Instructions:   If BOTH ondansetron (ZOFRAN) and promethazine (PHENERGAN) are ordered use ondansetron first and THEN promethazine IF ondansetron is ineffective.     1125-Given     2059-Given         0357-Given             ondansetron (ZOFRAN) injection 4 mg  Dose: 4 mg  Freq: Once Route: IV  Start: 07/12/23 0727   Admin Instructions:   \"If multiple N/V medications ordered, use in the following order: " "Ondansetron, Prochlorperazine, Promethazine. Use PO unless patient refuses or patient unable to swallow.\"       (0814)-Not Given              pantoprazole (PROTONIX) EC tablet 40 mg  Dose: 40 mg  Freq: 2 Times Daily Route: PO  Start: 07/12/23 2100   Admin Instructions:   Swallow whole; do not crush, split, or chew.     (2026)-Not Given [C]          0838-Given     2100            predniSONE (DELTASONE) tablet 20 mg  Dose: 20 mg  Freq: Daily Route: PO  Start: 07/12/23 1645   Admin Instructions:   Take with food.     1644-Given          0838-Given             promethazine-dextromethorphan (PROMETHAZINE-DM) 6.25-15 MG/5ML syrup 5 mL  Dose: 5 mL  Freq: 4 Times Daily PRN Route: PER J TUBE  PRN Reason: Cough  Start: 07/12/23 1552   Admin Instructions:               sennosides-docusate (PERICOLACE) 8.6-50 MG per tablet 2 tablet  Dose: 2 tablet  Freq: 2 Times Daily Route: PER J TUBE  Start: 07/12/23 2100   Admin Instructions:   Hold for loose stool     (2057)-Not Given          (0831)-Not Given     2100            sodium chloride 0.9 % flush 10 mL  Dose: 10 mL  Freq: As Needed Route: IV  PRN Reason: Line Care  Start: 07/12/23 0527         Completed Medications  Medications 07/11/23 07/12/23 07/13/23       ampicillin-sulbactam (UNASYN) 3 g in sodium chloride 0.9 % 100 mL IVPB-VTB  Dose: 3 g  Freq: Once Route: IV  Indications of Use: SKIN AND SOFT TISSUE INFECTION  Start: 07/12/23 0830   End: 07/12/23 1216   Admin Instructions:   Activate vial before using.     1136-New Bag     1216-Stopped             iopamidol (ISOVUE-300) 61 % injection 100 mL  Dose: 100 mL  Freq: Once in Imaging Route: IV  Start: 07/12/23 0737   End: 07/12/23 0721 0721-Given              morphine injection 4 mg  Dose: 4 mg  Freq: Once Route: IV  Start: 07/12/23 0544   End: 07/12/23 0539   Admin Instructions:   Based on patient request - if ordered for moderate or severe pain, provider allows for administration of a medication prescribed for a lower " "pain scale.  If given for pain, use the following pain scale:  Mild Pain = Pain Score of 1-3, CPOT 1-2  Moderate Pain = Pain Score of 4-6, CPOT 3-4  Severe Pain = Pain Score of 7-10, CPOT 5-8     0539-Given              ondansetron (ZOFRAN) injection 4 mg  Dose: 4 mg  Freq: Once Route: IV  Start: 07/12/23 0544   End: 07/12/23 0539   Admin Instructions:   \"If multiple N/V medications ordered, use in the following order: Ondansetron, Prochlorperazine, Promethazine. Use PO unless patient refuses or patient unable to swallow.\"       0539-Given              sodium chloride 0.9 % bolus 1,000 mL  Dose: 1,000 mL  Freq: Once Route: IV  Start: 07/12/23 0544   End: 07/12/23 0644     0545-New Bag     0644-Stopped            Discontinued Medications  Medications 07/11/23 07/12/23 07/13/23       ketorolac (TORADOL) injection 15 mg  Dose: 15 mg  Freq: Once Route: IV  Start: 07/12/23 0727   End: 07/12/23 1032   Admin Instructions:   Based on patient request - if ordered for moderate or severe pain, provider allows for administration of a medication prescribed for a lower pain scale.        If given for pain, use the following pain scale:  Mild Pain = Pain Score of 1-3, CPOT 1-2  Moderate Pain = Pain Score of 4-6, CPOT 3-4  Severe Pain = Pain Score of 7-10, CPOT 5-8     (0814)-Not Given              lactated ringers bolus 1,000 mL  Dose: 1,000 mL  Freq: Once Route: IV  Start: 07/12/23 0727   End: 07/12/23 0711          morphine injection 2 mg  Dose: 2 mg  Freq: Every 4 Hours PRN Route: IV  PRN Reason: Moderate Pain  Start: 07/12/23 0937   End: 07/12/23 1541   Admin Instructions:   Based on patient request - if ordered for moderate or severe pain, provider allows for administration of a medication prescribed for a lower pain scale.  If given for pain, use the following pain scale:  Mild Pain = Pain Score of 1-3, CPOT 1-2  Moderate Pain = Pain Score of 4-6, CPOT 3-4  Severe Pain = Pain Score of 7-10, CPOT 5-8     1010-Given     " 1418-Given            And  naloxone (NARCAN) injection 0.4 mg  Dose: 0.4 mg  Freq: Every 5 Minutes PRN Route: IV  PRN Reason: Respiratory Depression  Start: 07/12/23 0937   End: 07/12/23 1541   Admin Instructions:   If respiratory rate is less than 8 breaths/minute or patient is difficult to arouse stop any narcotics and contact physician.   Administer slow IV push. Repeat as ordered until patient's respiratory rate is greater than 12 breaths/minute.             Lab Results (all)       Procedure Component Value Units Date/Time    POC Glucose Once [[id number]]  (Normal) Collected: 07/13/23 1213    Specimen: Blood Updated: 07/13/23 1216     Glucose 97 mg/dL      Comment: Meter: [id number] : [id number] [place] POWERS       Blood Culture - Blood, Arm, Right [[id number]]  (Normal) Collected: 07/12/23 1119    Specimen: Blood from Arm, Right Updated: 07/13/23 1130     Blood Culture No growth at 24 hours    Blood Culture - Blood, Arm, Left [[id number]]  (Normal) Collected: 07/12/23 0833    Specimen: Blood from Arm, Left Updated: 07/13/23 0846     Blood Culture No growth at 24 hours    Basic Metabolic Panel [[id number]]  (Abnormal) Collected: 07/13/23 0653    Specimen: Blood Updated: 07/13/23 0815     Glucose 88 mg/dL      BUN 4 mg/dL      Creatinine 0.45 mg/dL      Sodium 140 mmol/L      Potassium 4.2 mmol/L      Chloride 108 mmol/L      CO2 22.9 mmol/L      Calcium 9.3 mg/dL      BUN/Creatinine Ratio 8.9     Anion Gap 9.1 mmol/L      eGFR 134.6 mL/min/1.73     Narrative:      GFR Normal >60  Chronic Kidney Disease <60  Kidney Failure <15      CBC (No Diff) [[id number]]  (Abnormal) Collected: 07/13/23 0653    Specimen: Blood Updated: 07/13/23 0755     WBC 3.94 10*3/mm3      RBC 3.40 10*6/mm3      Hemoglobin 9.4 g/dL      Hematocrit 28.5 %      MCV 83.8 fL      MCH 27.6 pg      MCHC 33.0 g/dL      RDW 12.4 %      RDW-SD 37.9 fl      MPV 11.6 fL      Platelets 178 10*3/mm3     POC Glucose Once [[id number]]  (Normal)  "Collected: 07/13/23 0623    Specimen: Blood Updated: 07/13/23 0624     Glucose 83 mg/dL      Comment: Meter: [id number] : [id number] [place] NA       POC Glucose Once [[id number]]  (Normal) Collected: 07/12/23 2342    Specimen: Blood Updated: 07/12/23 2343     Glucose 117 mg/dL      Comment: Meter: [id number] : [id number] [place] NA       POC Glucose Once [[id number]]  (Normal) Collected: 07/12/23 1711    Specimen: Blood Updated: 07/12/23 1712     Glucose 73 mg/dL      Comment: Meter: [id number] : [id number] Vivianbk POWERS       hCG, Serum, Qualitative [[id number]]  (Normal) Collected: 07/12/23 0541    Specimen: Blood Updated: 07/12/23 0635     HCG Qualitative Negative    Procalcitonin [[id number]]  (Abnormal) Collected: 07/12/23 0541    Specimen: Blood Updated: 07/12/23 0619     Procalcitonin 0.82 ng/mL     Narrative:      As a Marker for Sepsis (Non-Neonates):    1. <0.5 ng/mL represents a low risk of severe sepsis and/or septic shock.  2. >2 ng/mL represents a high risk of severe sepsis and/or septic shock.    As a Marker for Lower Respiratory Tract Infections that require antibiotic therapy:    PCT on Admission    Antibiotic Therapy       6-12 Hrs later    >0.5                Strongly Recommended  >0.25 - <0.5        Recommended   0.1 - 0.25          Discouraged              Remeasure/reassess PCT  <0.1                Strongly Discouraged     Remeasure/reassess PCT    As 28 day mortality risk marker: \"Change in Procalcitonin Result\" (>80% or <=80%) if Day 0 (or Day 1) and Day 4 values are available. Refer to http://www.[facility]pct[facility]culator.com    Change in PCT <=80%  A decrease of PCT levels below or equal to 80% defines a positive change in PCT test result representing a higher risk for 28-day all-cause mortality of patients diagnosed with severe sepsis for septic shock.    Change in PCT >80%  A decrease of PCT levels of more than 80% defines a negative change in PCT result representing a " lower risk for 28-day all-cause mortality of patients diagnosed with severe sepsis or septic shock.       Comprehensive Metabolic Panel [[id number]]  (Abnormal) Collected: 07/12/23 0541    Specimen: Blood Updated: 07/12/23 0612     Glucose 111 mg/dL      BUN 7 mg/dL      Creatinine 0.66 mg/dL      Sodium 138 mmol/L      Potassium 3.6 mmol/L      Chloride 105 mmol/L      CO2 22.0 mmol/L      Calcium 9.4 mg/dL      Total Protein 8.5 g/dL      Albumin 4.4 g/dL      ALT (SGPT) 8 U/L      AST (SGOT) 15 U/L      Alkaline Phosphatase 51 U/L      Total Bilirubin 0.4 mg/dL      Globulin 4.1 gm/dL      A/G Ratio 1.1 g/dL      BUN/Creatinine Ratio 10.6     Anion Gap 11.0 mmol/L      eGFR 122.7 mL/min/1.73     Narrative:      GFR Normal >60  Chronic Kidney Disease <60  Kidney Failure <15      Lactic Acid, Plasma [[id number]]  (Normal) Collected: 07/12/23 0541    Specimen: Blood Updated: 07/12/23 0609     Lactate 0.7 mmol/L     CBC & Differential [[id number]]  (Abnormal) Collected: 07/12/23 0541    Specimen: Blood Updated: 07/12/23 0554    Narrative:      The following orders were created for panel order CBC & Differential.  Procedure                               Abnormality         Status                     ---------                               -----------         ------                     CBC Auto Differential[[id number]]        Abnormal            Final result                 Please view results for these tests on the individual orders.    CBC Auto Differential [[id number]]  (Abnormal) Collected: 07/12/23 0541    Specimen: Blood Updated: 07/12/23 0554     WBC 5.44 10*3/mm3      RBC 3.90 10*6/mm3      Hemoglobin 11.2 g/dL      Hematocrit 33.0 %      MCV 84.6 fL      MCH 28.7 pg      MCHC 33.9 g/dL      RDW 12.7 %      RDW-SD 39.1 fl      MPV 11.1 fL      Platelets 202 10*3/mm3      Neutrophil % 75.8 %      Lymphocyte % 17.1 %      Monocyte % 6.3 %      Eosinophil % 0.6 %      Basophil % 0.0 %      Immature Grans % 0.2 %       Neutrophils, Absolute 4.13 10*3/mm3      Lymphocytes, Absolute 0.93 10*3/mm3      Monocytes, Absolute 0.34 10*3/mm3      Eosinophils, Absolute 0.03 10*3/mm3      Basophils, Absolute 0.00 10*3/mm3      Immature Grans, Absolute 0.01 10*3/mm3      nRBC 0.0 /100 WBC           Imaging Results (All)       Procedure Component Value Units Date/Time    XR Panorex [[id number]] Collected: 07/12/23 1705     Updated: 07/12/23 1712    Narrative:      XR PANOREX-     Clinical: Poor dilatation with right facial swelling     FINDINGS: Maxillary sinuses are clear. Mandibular condyles are situated  within each respective condylar fossa. No acute osseous abnormality of  the mandible seen.        Poor dentition.     Lucency surrounding the roots of 2 teeth along the right maxilla and 2  perhaps 3 mandibular incisors to the left of midline. The findings are  consistent with root abscesses.     This report was finalized on 7/12/2023 5:09 PM by Dr. Beau Barnard M.D.       CT Soft Tissue Neck With Contrast [[id number]] Collected: 07/12/23 0739     Updated: 07/12/23 0806    Narrative:      CT SCAN OF THE SOFT TISSUES OF THE NECK WITH CONTRAST     CLINICAL HISTORY:  Right-sided facial swelling.     TECHNIQUE: CT scan of the soft tissues of neck was obtained with 3 mm  axial images following the administration of IV contrast. Sagittal and  coronal reconstructed images were obtained.     FINDINGS:       There is prominent fat stranding within the right buccal space lateral  to the right aspect of the maxillary alveolar ridge and this  circumscribes the right parotid duct. This soft tissue swelling extends  into the right preseptal region as well as along the right aspect of the  nose. The right parotid gland has an unremarkable appearance. However,  there are prominent carious teeth and periodontal disease within the  adjacent maxillary alveolar ridge. As such, I suspect that the findings  are odontogenic in nature. In specific, in this  region, there is a  markedly carious appearance of tooth #3 with prominent periapical  lucency compatible with a periapical abscess. Additionally, there is a  prominently carious appearance of tooth #4. A prominent periapical  lucency is seen circumscribing tooth #7. Additional and carious teeth  are noted within the mandibular and maxillary alveolar ridge. There are  mildly enlarged level 1B and 2A lymph nodes are all likely benign and  reactive in nature. Additional scattered shotty lymph nodes are  identified scattered throughout the neck.     The visualized contents of the cranial vault are unremarkable. The left  orbit is within normal limits. The parotid glands, submandibular glands,  and sublingual glands are within normal limits. The nasopharynx is  unremarkable. There is mild prominence of the palatine tonsils.  Otherwise, the oropharynx, hypopharynx, cervical esophagus, and  visualized upper thoracic esophagus are within normal limits. The  epiglottis, aryepiglottic folds, glottis, and subglottic airway are  within normal limits. The thyroid gland is within normal limits. The  visualized lung apices are clear.     There is a right IJ approach Mediport catheter in place. Incidental note  is made of mild mucosal thickening within the maxillary sinuses.       Impression:         There is prominent fat stranding within the buccal space lateral to the  right aspect of the maxillary alveolar ridge and there is contiguous  soft tissue swelling extending into the right preseptal soft tissues as  well as the soft tissues along the right side of the nose. The findings  are most compatible with a cellulitis. There are are areas of periapical  lucencies as well compatible with periapical abscesses as discussed in  detail above. In specific, tooth #3 has a markedly carious appearance  with a prominent periapical lucency compatible with a periapical  abscess.      These findings were discussed with Dr. Tee on  07/12/2023 at  approximately 8:00 AM.     Radiation dose reduction techniques were utilized, including automated  exposure control and exposure modulation based on body size.     This report was finalized on 7/12/2023 8:02 AM by Dr. Nav Hirsch M.D.            Orders (all)        Start     Ordered    07/13/23 1330  escitalopram (LEXAPRO) tablet 5 mg  Daily         07/13/23 1234    07/13/23 1237  Please start patient on Lexapro 5 mg per G-tube daily and prescribe a 30-day supply with 1 refill at discharge.  I am unable to add this medication as the patient's medications have already been reconciled.  Physician Communication Order  Once        Comments: Please start patient on Lexapro 5 mg per G-tube daily and prescribe a 30-day supply with 1 refill at discharge.  I am unable to add this medication as the patient's medications have already been reconciled.    07/13/23 1237    07/13/23 1217  POC Glucose Once  PROCEDURE ONCE         07/13/23 1213    07/13/23 0820  Obtain Informed Consent  Once        Comments: Please note Surgeon is going to be Dr Trae Escoto    07/13/23 0820    07/13/23 0624  POC Glucose Once  PROCEDURE ONCE         07/13/23 0623    07/13/23 0600  Basic Metabolic Panel  Morning Draw         07/12/23 0937    07/13/23 0600  CBC (No Diff)  Morning Draw         07/12/23 0937    07/13/23 0001  NPO Diet NPO Type: Strict NPO  Diet Effective Midnight         07/12/23 1824    07/13/23 0000  Hold Tube Feeding  Until Discontinued        Comments: Plan for surgery afternoon 7-13-23 07/12/23 1820    07/12/23 2344  POC Glucose Once  PROCEDURE ONCE         07/12/23 2342    07/12/23 2100  sennosides-docusate (PERICOLACE) 8.6-50 MG per tablet 2 tablet  2 Times Daily         07/12/23 1541    07/12/23 2100  hydroxychloroquine (PLAQUENIL) tablet 200 mg  2 Times Daily         07/12/23 1553    07/12/23 2100  metoprolol succinate XL (TOPROL-XL) 24 hr tablet 12.5 mg  Nightly         07/12/23 1553    07/12/23 2100   nitroglycerin (NITROSTAT) ointment 0.5 inch  2 Times Daily         07/12/23 1553    07/12/23 2100  pantoprazole (PROTONIX) EC tablet 40 mg  2 Times Daily         07/12/23 1553    07/12/23 2041  Inpatient Psychiatrist Consult  Once        Specialty:  Psychiatry  Provider:  Enrike Laura III, MD    07/12/23 2056 07/12/23 1845  ampicillin-sulbactam (UNASYN) 3 g in sodium chloride 0.9 % 100 mL IVPB-VTB  Every 6 Hours         07/12/23 1747    07/12/23 1812  Inpatient Access Center Consult  Once        Provider:  (Not yet assigned)    07/12/23 1811    07/12/23 1800  Oral Care  2 Times Daily       07/12/23 0937    07/12/23 1800  dicyclomine (BENTYL) capsule 10 mg  4 Times Daily         07/12/23 1553    07/12/23 1800  POC Glucose Q6H  Every 6 Hours       07/12/23 1639    07/12/23 1713  POC Glucose Once  PROCEDURE ONCE         07/12/23 1711    07/12/23 1704  Initiate & Follow Hypercapnic Monitoring Guideline for Opioid Administration via EtCO2 and / or SpO2  Continuous        Comments: Follow Hypercapnic Monitoring Guideline As Outlined in Process Instructions (Open Order Report to View Full Instructions)    07/12/23 1703    07/12/23 1704  Opioid Administration - Document EtCO2 and / or SpO2 With Each Set of Vitals & Any Change in Patient Status  Per Order Details        Comments: With Each Set of Vitals & Any Change in Patient Status    07/12/23 1703    07/12/23 1704  Opioid Administration - Notify Provider Hypercapnic Monitoring  Until Discontinued         07/12/23 1703    07/12/23 1704  Opioid Administration - Capnography  Continuous        Comments: If Patient Bed Does Not Have Capnography Monitoring Ability - Contact Provider for SpO2 Monitoring Order    07/12/23 1703    07/12/23 1645  gabapentin (NEURONTIN) capsule 200 mg  3 Times Daily         07/12/23 1553    07/12/23 1645  leucovorin tablet 5 mg  Weekly         07/12/23 1553    07/12/23 1645  multivitamin with minerals 1 tablet  Daily          07/12/23 1553    07/12/23 1645  predniSONE (DELTASONE) tablet 20 mg  Daily         07/12/23 1553    07/12/23 1645  lactated ringers infusion  Continuous         07/12/23 1555    07/12/23 1640  Daily Weights  Daily       07/12/23 1639    07/12/23 1639  NPO Diet NPO Type: Strict NPO  Diet Effective Now,   Status:  Canceled         07/12/23 1639    07/12/23 1639  Administer Tube Feeding Via Feeding Tube Already in Place  Continuous         07/12/23 1639    07/12/23 1639  Write Hang Time on Enteral Feeding Bag  Per Order Details        Comments: Enteral Nutrition Bag May Hang For 24 Hours.  If Cans in Bags, Hang Time Limited to 4 Hours.    07/12/23 1639    07/12/23 1639  Notify Provider - Abdominal Discomfort, Pain or Distention, No Bowel Movement in 3 Days  Until Discontinued         07/12/23 1639    07/12/23 1639  Other (See Comment); [employer] Peptide 1.5; Tube Feeding Type: Continuous; Continuous Tube Feeding Start Rate (mL/hr): 50; Then Advance Rate By (mL/hr): Do Not Advance; Every __ Hours: Patient at Goal Rate; To Goal Rate of (mL/hr): 50  Diet Effective Now         07/12/23 1639    07/12/23 1552  promethazine-dextromethorphan (PROMETHAZINE-DM) 6.25-15 MG/5ML syrup 5 mL  4 Times Daily PRN         07/12/23 1553    07/12/23 1551  lactulose (CHRONULAC) 10 GM/15ML solution 10 g  As Needed         07/12/23 1553    07/12/23 1551  methocarbamol (ROBAXIN) tablet 750 mg  3 Times Daily PRN         07/12/23 1553    07/12/23 1549  XR Panorex  1 Time Imaging         07/12/23 1549    07/12/23 1545  morphine injection 2 mg  Every 2 Hours PRN        See Hyperspace for full Linked Orders Report.    07/12/23 1541    07/12/23 1541  HYDROcodone-acetaminophen (NORCO) 5-325 MG per tablet 1 tablet  Every 4 Hours PRN         07/12/23 1541    07/12/23 1541  naloxone (NARCAN) injection 0.4 mg  Every 5 Minutes PRN        See Hyperspace for full Linked Orders Report.    07/12/23 1541    07/12/23 1200  Vital Signs  Every 4 Hours        07/12/23 0937    07/12/23 1147  acetaminophen (TYLENOL) tablet 650 mg  Every 6 Hours PRN         07/12/23 1147    07/12/23 1048  famotidine (PEPCID) injection 20 mg  Every 12 Hours Scheduled         07/12/23 1032    07/12/23 1033  Inpatient Nutrition Consult  Once        Comments: Jtube feedings   Provider:  (Not yet assigned)    07/12/23 1033    07/12/23 1032  ketorolac (TORADOL) injection 15 mg  Every 6 Hours PRN         07/12/23 1032    07/12/23 1031  hydrOXYzine (ATARAX) tablet 25 mg  3 Times Daily PRN         07/12/23 1032    07/12/23 1030  Inpatient Oral & Maxillofacial Surgery Consult  Once        Specialty:  Oral Surgery  Provider:  Ganesh Guzman MD    07/12/23 1031    07/12/23 0938  Bowel Regimen Not Indicated  Once         07/12/23 0937    07/12/23 0937  morphine injection 2 mg  Every 4 Hours PRN,   Status:  Discontinued        See Hyperspace for full Linked Orders Report.    07/12/23 0937    07/12/23 0937  naloxone (NARCAN) injection 0.4 mg  Every 5 Minutes PRN,   Status:  Discontinued        See Hyperspace for full Linked Orders Report.    07/12/23 0937    07/12/23 0937  Place Sequential Compression Device  Once         07/12/23 0937    07/12/23 0937  Maintain Sequential Compression Device  Continuous         07/12/23 0937    07/12/23 0936  Activity - Ad Carina  Until Discontinued         07/12/23 0937    07/12/23 0936  Intake & Output  Every Shift       07/12/23 0937    07/12/23 0936  Code Status and Medical Interventions:  Continuous         07/12/23 0937    07/12/23 0936  Diet: Liquid Diets; Clear Liquid; Texture: Regular Texture (IDDSI 7); Fluid Consistency: Thin (IDDSI 0)  Diet Effective Now,   Status:  Canceled         07/12/23 0937    07/12/23 0935  ondansetron (ZOFRAN) injection 4 mg  Every 6 Hours PRN         07/12/23 0937    07/12/23 0851  Inpatient Admission  Once         07/12/23 0851    07/12/23 0830  ampicillin-sulbactam (UNASYN) 3 g in sodium chloride 0.9 % 100 mL IVPB-VTB  Once          07/12/23 0814    07/12/23 0815  Blood Culture - Blood, Arm, Left  Once         07/12/23 0814    07/12/23 0815  Blood Culture - Blood, Arm, Right  Once         07/12/23 0814    07/12/23 0814  LIZETH (on-call MD unless specified) Details  Once        Specialty:  Hospitalist  Provider:  Jeanette Escobar APRN    07/12/23 0814    07/12/23 0737  iopamidol (ISOVUE-300) 61 % injection 100 mL  Once in Imaging         07/12/23 0721    07/12/23 0727  ketorolac (TORADOL) injection 15 mg  Once,   Status:  Discontinued         07/12/23 0711    07/12/23 0727  ondansetron (ZOFRAN) injection 4 mg  Once         07/12/23 0711    07/12/23 0727  lactated ringers bolus 1,000 mL  Once,   Status:  Discontinued         07/12/23 0711    07/12/23 0546  CT Soft Tissue Neck With Contrast  1 Time Imaging         07/12/23 0545    07/12/23 0544  sodium chloride 0.9 % bolus 1,000 mL  Once         07/12/23 0528    07/12/23 0544  morphine injection 4 mg  Once         07/12/23 0528    07/12/23 0544  ondansetron (ZOFRAN) injection 4 mg  Once         07/12/23 0528    07/12/23 0528  CBC & Differential  Once         07/12/23 0528    07/12/23 0528  Comprehensive Metabolic Panel  Once         07/12/23 0528    07/12/23 0528  hCG, Serum, Qualitative  STAT         07/12/23 0528    07/12/23 0528  Lactic Acid, Plasma  Once         07/12/23 0528    07/12/23 0528  Procalcitonin  Once         07/12/23 0528    07/12/23 0528  Insert Peripheral IV  Once        See Hyperspace for full Linked Orders Report.    07/12/23 0528    07/12/23 0528  CBC Auto Differential  PROCEDURE ONCE         07/12/23 0528    07/12/23 0527  sodium chloride 0.9 % flush 10 mL  As Needed        See Hyperspace for full Linked Orders Report.    07/12/23 0528    Unscheduled  Liam and Document Tube Depth (in cm)  As Needed       07/12/23 1639    Unscheduled  Verify Tube Placement Upon Insertion & As Needed  As Needed       07/12/23 1639    Unscheduled  Flush Feeding Tube With 30-50mL Water As  Needed  As Needed       23 1639                    Physician Progress Notes (all)        Haris Levine MD at 23 1020              Name: Henrietta Garrett ADMIT: 2023   : 1995  PCP: Fanny Jarrett MD    MRN: [id number] LOS: 1 days   AGE/SEX: 28 y.o. female  ROOM: [facility]/1     Subjective   Subjective   Still feels 'miserable' this morning.     Objective   Objective   Vital Signs  Temp:  [98.4 °F (36.9 °C)-101.6 °F (38.7 °C)] 98.4 °F (36.9 °C)  Heart Rate:  [58-88] 88  Resp:  [18] 18  BP: (107-137)/(66-85) 121/75  SpO2:  [97 %-100 %] 99 %  on   ;   Device (Oxygen Therapy): room air  Body mass index is 22.73 kg/m².  Physical Exam  Constitutional:       General: She is not in acute distress.  HENT:      Head:      Comments: Swelling and erythema of face R > L, improved from prior; poor dentition  Cardiovascular:      Rate and Rhythm: Normal rate.      Pulses: Normal pulses.   Pulmonary:      Effort: Pulmonary effort is normal. No respiratory distress.   Abdominal:      General: Abdomen is flat. There is no distension.      Palpations: Abdomen is soft.      Comments: J-tube   Musculoskeletal:         General: Normal range of motion.   Neurological:      Mental Status: She is alert.       Results Review     I reviewed the patient's new clinical results.  Results from last 7 days   Lab Units 23  0653 23  0541 23  0545   WBC 10*3/mm3 3.94 5.44 3.04*   HEMOGLOBIN g/dL 9.4* 11.2* 11.6*   PLATELETS 10*3/mm3 178 202 216     Results from last 7 days   Lab Units 23  0653 23  0541 23  0545   SODIUM mmol/L 140 138 138   POTASSIUM mmol/L 4.2 3.6 4.0   CHLORIDE mmol/L 108* 105 109*   CO2 mmol/L 22.9 22.0 22.0   BUN mg/dL 4* 7 7   CREATININE mg/dL 0.45* 0.66 0.57   GLUCOSE mg/dL 88 111* 99   EGFR mL/min/1.73 134.6 122.7 127.1     Results from last 7 days   Lab Units 23  0541 23  0545   ALBUMIN g/dL 4.4 3.8   BILIRUBIN mg/dL 0.4 0.3   ALK PHOS U/L 51  45   AST (SGOT) U/L 15 11   ALT (SGPT) U/L 8 8     Results from last 7 days   Lab Units 07/13/23  0653 07/12/23  0541 07/07/23  0545   CALCIUM mg/dL 9.3 9.4 9.2   ALBUMIN g/dL  --  4.4 3.8     Results from last 7 days   Lab Units 07/12/23  0541   PROCALCITONIN ng/mL 0.82*   LACTATE mmol/L 0.7     Glucose   Date/Time Value Ref Range Status   07/13/2023 0623 83 70 - 130 mg/dL Final     Comment:     Meter: [id number] : [id number] [place] Kelliea NA   07/12/2023 2342 117 70 - 130 mg/dL Final     Comment:     Meter: [id number] : [id number] [place] [facility]   07/12/2023 1711 73 70 - 130 mg/dL Final     Comment:     Meter: [id number] : [id number] Vivian Zoraida NA       CT Soft Tissue Neck With Contrast    Result Date: 7/12/2023   There is prominent fat stranding within the buccal space lateral to the right aspect of the maxillary alveolar ridge and there is contiguous soft tissue swelling extending into the right preseptal soft tissues as well as the soft tissues along the right side of the nose. The findings are most compatible with a cellulitis. There are are areas of periapical lucencies as well compatible with periapical abscesses as discussed in detail above. In specific, tooth #3 has a markedly carious appearance with a prominent periapical lucency compatible with a periapical abscess.  These findings were discussed with Dr. Tee on 07/12/2023 at approximately 8:00 AM.  Radiation dose reduction techniques were utilized, including automated exposure control and exposure modulation based on body size.  This report was finalized on 7/12/2023 8:02 AM by Dr. Nav Hirsch M.D.     Scheduled Medications  ampicillin-sulbactam, 3 g, Intravenous, Q6H  dicyclomine, 10 mg, Per J Tube, 4x Daily  famotidine, 20 mg, Intravenous, Q12H  gabapentin, 200 mg, Per J Tube, TID  hydroxychloroquine, 200 mg, Oral, BID  leucovorin, 5 mg, Oral, Weekly  metoprolol succinate XL, 12.5 mg, Oral, Nightly  multivitamin with minerals, 1  tablet, Oral, Daily  nitroglycerin, 0.5 inch, Topical, BID  ondansetron, 4 mg, Intravenous, Once  pantoprazole, 40 mg, Oral, BID  predniSONE, 20 mg, Oral, Daily  senna-docusate sodium, 2 tablet, Per J Tube, BID    Infusions  lactated ringers, 100 mL/hr, Last Rate: 100 mL/hr (07/13/23 0151)    Diet  NPO Diet NPO Type: Strict NPO      Assessment/Plan     Active Hospital Problems    Diagnosis  POA    **Facial cellulitis [L03.211]  Yes    Periapical abscess without sinus [K04.7]  Yes    Jejunostomy tube present [Z93.4]  Not Applicable    Sjogren's syndrome [M35.00]  Yes    Intestinal autonomic neuropathy [G90.8]  Yes    Raynaud's phenomenon [I73.00]  Yes    Gastroparesis [K31.84]  Yes    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       28 y.o. female admitted with Facial cellulitis.      07/13/23  Plan for OR today with OM[facility].     Multiple periapical abscess  Facial cellulitis  -Panorex showing lucencies surrounding the roots of 2 teeth along right maxilla and 3 mandibular incisors left of midline  -OMFS planning OR today   -cont Unasyn    Jejunostomy tube present  -Nutrition following for tube feeds     Intestinal autonomic neuropathy  Gastroparesis  IBS syndrome with both constipation and diarrhea  -lactulose, Protonix, Bentyl      Fibromyalgia  Tomas-Danlos syndrome  Rheumatoid arthritis involving multiple sites  Systemic lupus erythematosus  Raynaud's phenomenon  -resume leucovorin, Plaquenil, gabapentin, prednisone, IVIG    SCDs for DVT prophylaxis.  Discussed with patient and nursing staff.  Anticipate discharge home when cleared by consultants      Haris Levine MD  [facility]  07/13/23  10:29 EDT        Electronically signed by Haris Levine MD at 07/13/23 1030          Consult Notes (all)        Enrike Laura III, MD at 07/13/23 1237        Consult Orders    1. Inpatient Psychiatrist Consult [[id number]] ordered by Haris Levine MD at 07/12/23 2056                 IDENTIFYING INFORMATION: The patient is a 28-year-old white female with a history of multiple medical issues admitted for facial cellulitis.  He is seen by psychiatry related to history of depression    CHIEF COMPLAINT: None given    INFORMANT: Patient and chart    RELIABILITY: Limited    HISTORY OF PRESENT ILLNESS: The patient is an unfortunate 28-year-old white female admitted with complaints of facial cellulitis.  Her complicated medical history includes a history of lupus, Sjogren's syndrome, rheumatoid arthritis, Raynaud's disease, Tomas-Danlos syndrome, POTS, IBS, GT, GERD, fibromyalgia, autonomic nervous system disorder, gastroparesis with J-tube placement and recent onset of facial cellulitis.  The patient reports that she has consulted with psychiatrist in the past but reports \"they never understood me\".  She reports a history of treatment with antidepressant medications in the past but does not recall what if any she may have tried.  She does not recall having ever been on Lexapro.  She denies prior suicide attempts or gestures and denies current suicidal or homicidal ideation.  She does complain of poor sleep and loss of appetite.  She denies abuse of any psychoactive substances.    PAST PSYCHIATRIC HISTORY: As noted previously, the patient does report a history of previous psychiatric contact but complains that her previous providers did not understand her complicated medical status    PAST MEDICAL HISTORY: As above    MEDICATIONS:   Current Facility-Administered Medications   Medication Dose Route Frequency Provider Last Rate Last Admin    acetaminophen (TYLENOL) tablet " 650 mg  650 mg Oral Q6H PRN Haris Levine MD   650 mg at 07/12/23 1221    ampicillin-sulbactam (UNASYN) 3 g in sodium chloride 0.9 % 100 mL IVPB-VTB  3 g Intravenous Q6H Ganesh Guzman MD   3 g at 07/13/23 0850    dicyclomine (BENTYL) capsule 10 mg  10 mg Per J Tube 4x Daily Haris Levine MD   10 mg at 07/13/23 0837    escitalopram (LEXAPRO) tablet 5 mg  5 mg Per J Tube Daily Enrike Laura III, MD        famotidine (PEPCID) injection 20 mg  20 mg Intravenous Q12H Jeanette Escobar APRN   20 mg at 07/13/23 0837    gabapentin (NEURONTIN) capsule 200 mg  200 mg Per J Tube TID Haris Levine MD   200 mg at 07/13/23 0837    HYDROcodone-acetaminophen (NORCO) 5-325 MG per tablet 1 tablet  1 tablet Per J Tube Q4H PRN Haris Levine MD   1 tablet at 07/13/23 1015    hydroxychloroquine (PLAQUENIL) tablet 200 mg  200 mg Oral BID Haris Levine MD   200 mg at 07/13/23 0838    hydrOXYzine (ATARAX) tablet 25 mg  25 mg Oral TID PRN Jeanette Escobar APRN   25 mg at 07/12/23 1122    ketorolac (TORADOL) injection 15 mg  15 mg Intravenous Q6H PRN Jeanette Escobar APRN   15 mg at 07/12/23 1123    lactated ringers infusion  100 mL/hr Intravenous Continuous Haris Levine  mL/hr at 07/13/23 1226 100 mL/hr at 07/13/23 1226    lactulose (CHRONULAC) 10 GM/15ML solution 10 g  10 g Per J Tube PRN Haris Levine MD        leucovorin tablet 5 mg  5 mg Oral Weekly Haris Levine MD        methocarbamol (ROBAXIN) tablet 750 mg  750 mg Oral TID PRN Haris Levine MD        metoprolol succinate XL (TOPROL-XL) 24 hr tablet 12.5 mg  12.5 mg Oral Nightly Haris Levine MD   12.5 mg at 07/12/23 2242    morphine injection 2 mg  2 mg Intravenous Q2H PRN HosHaris duran MD   2 mg at 07/13/23 1225    And    naloxone (NARCAN) injection 0.4 mg  0.4 mg Intravenous Q5 Min PRN Haris Levine MD        multivitamin with minerals 1  tablet  1 tablet Oral Daily Haris Levine MD        nitroglycerin (NITROSTAT) ointment 0.5 inch  0.5 inch Topical BID Haris Levine MD        ondansetron (ZOFRAN) injection 4 mg  4 mg Intravenous Once Eliseo Tee II, MD        ondansetron (ZOFRAN) injection 4 mg  4 mg Intravenous Q6H PRN Sheryl EscobarzabeMITCHELL Garay   4 mg at 07/13/23 0357    pantoprazole (PROTONIX) EC tablet 40 mg  40 mg Oral BID Haris Levine MD   40 mg at 07/13/23 0838    predniSONE (DELTASONE) tablet 20 mg  20 mg Oral Daily Haris Levine MD   20 mg at 07/13/23 0838    promethazine-dextromethorphan (PROMETHAZINE-DM) 6.25-15 MG/5ML syrup 5 mL  5 mL Per J Tube 4x Daily PRN Haris Levine MD        sennosides-docusate (PERICOLACE) 8.6-50 MG per tablet 2 tablet  2 tablet Per J Tube BID Haris Levine MD        sodium chloride 0.9 % flush 10 mL  10 mL Intravenous PRN Eliseo Laboy MD             ALLERGIES: Cipro, Compazine, domperidone, Haldol, metoclopramide, sulfa, anesthetic amide    FAMILY HISTORY: The patient's mother suffered from depression    SOCIAL HISTORY: Patient lives with her .  She does not work outside the home.  She completed the 10th grade.  She denies abuse of any psychoactive substances.    MENTAL STATUS EXAM: The patient is an ill-appearing white female who is abed.  She is awake alert and oriented all spheres.  Her mood is dysphoric her affect flat.  Speech is relevant coherent.  There are no deficits memory or cognition noted.  Intelligence is judged to be in the average range based on fund of knowledge, the patient is cooperative throughout the interview.  She denies current suicidal or homicidal ideation or psychotic features.  Judgement and insight are intact.    ASSETS/LIABILITIES: Supportive family/multiple health issues    DIAGNOSTIC IMPRESSION: Major depressive disorder recurrent moderate, medical problems as described previously    PLAN: The patient  is willing to begin a trial of Lexapro which I will again at a conservative dose of 5 mg to be given per J-tube daily.  I have explained the latency of onset of this medication to her and will ask the access center to assist her with arrangement for psychiatric follow-up though she seems rather negative regarding this suggestion.    Electronically signed by Enrike Laura III, MD at 23 1242       Ganesh Guzman MD at [phone]           [facility]   HISTORY AND PHYSICAL    Patient Name: Henrietta Garrett  : 1995  MRN: [id number]  Primary Care Physician:  Fanny Jarrett MD  Date of admission: 2023    Subjective   Subjective     Chief Complaint: facial swelling     HPI:    Henrietta Garrett is a 28 y.o. female who presented to ER with complaint of facial swelling of one day.  Patient states that she woke up this morning with facial swelling and then presented to ER.  Patient denies any current tooth pain however reports that she has persistent emesis as a result of her gastroparesis.  She also has several other co-morbidities that contribute.  After CT scan it demonstrated facial cellulitis along with several radiolucencies.  Patient reports that she does have a dentist she has consulted with however they were waiting for her nutrition levels to improve.  OMS consulted for evaluation     Review of Systems   All systems were reviewed and negative except for: except above cc/hpi    Personal History     Past Medical History:   Diagnosis Date    Anemia     Arthritis     RHEUMATOID    Asthma     CPRS 1 (complex regional pain syndrome I) of upper limb     Depression     EDS (Tomas-Danlos syndrome)     Fibromyalgia     Gastroparesis     History of hyperkeratosis of skin     IBS (irritable bowel syndrome)     Leukopenia, mild     Poor vision     Raynauds syndrome     Sjogren's disease     SOB (shortness of breath)        Past Surgical History:   Procedure Laterality Date     COLONOSCOPY  12/11/2020    Dr. Barone    DENTAL PROCEDURE      EPIDURAL BLOCK      JEJUNOSTOMY TUBE INSERTION      SHOULDER SURGERY Bilateral     X2    TOE SURGERY Right 2011    3rd toe     UPPER GASTROINTESTINAL ENDOSCOPY  12/11/1920    Dr. Barone       Family History: family history includes Arthritis in her maternal uncle; Asthma in her brother and sister; Cancer in her maternal uncle and paternal grandmother; Colon cancer in her paternal grandmother; Diabetes in her father and maternal grandfather; Diabetes type II in her father; Tomas-Danlos syndrome in her sister; Heart disease in her maternal grandmother; Hyperlipidemia in her father; Leukemia in her maternal grandmother; Migraines in her sister; No Known Problems in her brother; Prostate cancer in her maternal grandfather; Rheum arthritis in her maternal grandmother and mother; Stroke in her maternal grandfather and maternal grandmother. Otherwise pertinent FHx was reviewed and not pertinent to current issue.    Social History:  reports that she has never smoked. She has never used smokeless tobacco. She reports that she does not drink alcohol and does not use drugs.    Home Medications:  EPINEPHrine, Levonorgestrel-Eth Estradiol, amLODIPine, dicyclomine, gabapentin, hydroxychloroquine, immune globulin (human), lactulose, leucovorin, methocarbamol, methotrexate PF, metoprolol succinate XL, multivitamin with minerals, nitroglycerin, pantoprazole, predniSONE, and promethazine-dextromethorphan      Allergies:  Allergies   Allergen Reactions    Anesthetics, Amide Nausea And Vomiting    Ciprofloxacin Other (See Comments)     EHERLIS STANDLIS? ILLNESS. UNABLE TO TAKE     Compazine [Prochlorperazine] Dystonia    Domperidone Other (See Comments)     Bad reaction per pt report    Haldol [Haloperidol] Other (See Comments)     Muscle spasms      Metoclopramide Nausea And Vomiting    Sulfa Antibiotics Other (See Comments)     Unable to take as causes leukopenia        Objective   Objective     Vitals:   Temp:  [98.8 °F (37.1 °C)-101.6 °F (38.7 °C)] 98.8 °F (37.1 °C)  Heart Rate:  [] 77  Resp:  [18] 18  BP: (107-162)/(71-92) 107/71  Physical Exam    Constitutional: Awake, alert   Eyes: PERRLA, sclerae anicteric, no conjunctival injection   HENT: NCAT, mucous membranes moist, facial edema with tenderness in right malar area   ORAL: multiple carious lesions, no palatal draping, SINAN 30mm, FOM soft without elevation, no palpable or visible purulence    Neck: Supple, no thyromegaly, no lymphadenopathy, trachea midline   Respiratory: Clear to auscultation bilaterally, nonlabored respirations    Cardiovascular: RRR, no murmurs, rubs, or gallops, palpable pedal pulses bilaterally   Gastrointestinal: Positive bowel sounds, soft, nontender, nondistended   Musculoskeletal: No bilateral ankle edema, no clubbing or cyanosis to extremities   Psychiatric: Appropriate affect, cooperative   Neurologic: Oriented x 3, strength symmetric in all extremities, Cranial Nerves grossly intact to confrontation, speech clear   Skin: No rashes     Result Review    Result Review:  I have personally reviewed the results from the time of this admission to 7/12/2023 17:30 EDT and agree with these findings:  [x]  Laboratory list / accordion  [x]  Microbiology  [x]  Radiology  []  EKG/Telemetry   []  Cardiology/Vascular   []  Pathology  [x]  Old records  []  Other:  Most notable findings include: XR PANOREX-     Clinical: Poor dilatation with right facial swelling     FINDINGS: Maxillary sinuses are clear. Mandibular condyles are situated  within each respective condylar fossa. No acute osseous abnormality of  the mandible seen.        Poor dentition.     Lucency surrounding the roots of 2 teeth along the right maxilla and 2  perhaps 3 mandibular incisors to the left of midline. The findings are  consistent with root abscesses.     This report was finalized on 7/12/2023 5:09 PM by Dr. Yanez  ThePRAVEEN will    CT SCAN OF THE SOFT TISSUES OF THE NECK WITH CONTRAST     CLINICAL HISTORY:  Right-sided facial swelling.     TECHNIQUE: CT scan of the soft tissues of neck was obtained with 3 mm  axial images following the administration of IV contrast. Sagittal and  coronal reconstructed images were obtained.     FINDINGS:       There is prominent fat stranding within the right buccal space lateral  to the right aspect of the maxillary alveolar ridge and this  circumscribes the right parotid duct. This soft tissue swelling extends  into the right preseptal region as well as along the right aspect of the  nose. The right parotid gland has an unremarkable appearance. However,  there are prominent carious teeth and periodontal disease within the  adjacent maxillary alveolar ridge. As such, I suspect that the findings  are odontogenic in nature. In specific, in this region, there is a  markedly carious appearance of tooth #3 with prominent periapical  lucency compatible with a periapical abscess. Additionally, there is a  prominently carious appearance of tooth #4. A prominent periapical  lucency is seen circumscribing tooth #7. Additional and carious teeth  are noted within the mandibular and maxillary alveolar ridge. There are  mildly enlarged level 1B and 2A lymph nodes are all likely benign and  reactive in nature. Additional scattered shotty lymph nodes are  identified scattered throughout the neck.     The visualized contents of the cranial vault are unremarkable. The left  orbit is within normal limits. The parotid glands, submandibular glands,  and sublingual glands are within normal limits. The nasopharynx is  unremarkable. There is mild prominence of the palatine tonsils.  Otherwise, the oropharynx, hypopharynx, cervical esophagus, and  visualized upper thoracic esophagus are within normal limits. The  epiglottis, aryepiglottic folds, glottis, and subglottic airway are  within normal limits. The thyroid  gland is within normal limits. The  visualized lung apices are clear.     There is a right IJ approach Mediport catheter in place. Incidental note  is made of mild mucosal thickening within the maxillary sinuses.     IMPRESSION:     There is prominent fat stranding within the buccal space lateral to the  right aspect of the maxillary alveolar ridge and there is contiguous  soft tissue swelling extending into the right preseptal soft tissues as  well as the soft tissues along the right side of the nose. The findings  are most compatible with a cellulitis. There are are areas of periapical  lucencies as well compatible with periapical abscesses as discussed in  detail above. In specific, tooth #3 has a markedly carious appearance  with a prominent periapical lucency compatible with a periapical  abscess.      These findings were discussed with Dr. Tee on 07/12/2023 at  approximately 8:00 AM.     Radiation dose reduction techniques were utilized, including automated  exposure control and exposure modulation based on body size.     This report was finalized on 7/12/2023 8:02 AM by Dr. Nav Hirsch M.D      Assessment & Plan   Assessment / Plan     Brief Patient Summary:  Henrietta Garrett is a 28 y.o. female with facial cellulitis and some noted non restorable teeth     Active Hospital Problems:  Active Hospital Problems    Diagnosis     **Facial cellulitis     Periapical abscess without sinus     Jejunostomy tube present     Sjogren's syndrome     Intestinal autonomic neuropathy     Raynaud's phenomenon     Gastroparesis     Tomas-Danlos syndrome     Irritable bowel syndrome with both constipation and diarrhea     Rheumatoid arthritis involving multiple sites     Systemic lupus erythematosus     Iron deficiency anemia     Fibromyalgia      Plan:   Cont current management per primary team   Cont IV unasyn   Pain control prn  Will plan for surgery tomorrow with my partner Dr Escoto    DVT prophylaxis:  Mechanical DVT  prophylaxis orders are present.    CODE STATUS:    Code Status (Patient has no pulse and is not breathing): CPR (Attempt to Resuscitate)  Medical Interventions (Patient has pulse or is breathing): Full Support      Ganesh Guzman MD    Electronically signed by Ganesh Guzman MD at 07/12/23 1800

## 2023-07-13 NOTE — NURSING NOTE
"  Access center note.    Patient lying in bed, visitor at bedside. Patient allowed visitor(brother) to remain in room. Pt. Reports did not sleep much overnight stating \"I was in a lot of pain.\" Tube feedings held overnight for upcoming surgery today. Patient with flat affect. Patient denies SI. Patient discussed her depression \"depends on the day.\" She did not rate but states \"it's bad, how could it not be.\" Access following.   "

## 2023-07-13 NOTE — PROGRESS NOTES
Name: Henrietta Garrett ADMIT: 2023   : 1995  PCP: Fanny Jarrett MD    MRN: [id number] LOS: 1 days   AGE/SEX: 28 y.o. female  ROOM: [facility]/     Subjective   Subjective   Still feels 'miserable' this morning.     Objective   Objective   Vital Signs  Temp:  [98.4 °F (36.9 °C)-101.6 °F (38.7 °C)] 98.4 °F (36.9 °C)  Heart Rate:  [58-88] 88  Resp:  [18] 18  BP: (107-137)/(66-85) 121/75  SpO2:  [97 %-100 %] 99 %  on   ;   Device (Oxygen Therapy): room air  Body mass index is 22.73 kg/m².  Physical Exam  Constitutional:       General: She is not in acute distress.  HENT:      Head:      Comments: Swelling and erythema of face R > L, improved from prior; poor dentition  Cardiovascular:      Rate and Rhythm: Normal rate.      Pulses: Normal pulses.   Pulmonary:      Effort: Pulmonary effort is normal. No respiratory distress.   Abdominal:      General: Abdomen is flat. There is no distension.      Palpations: Abdomen is soft.      Comments: J-tube   Musculoskeletal:         General: Normal range of motion.   Neurological:      Mental Status: She is alert.       Results Review     I reviewed the patient's new clinical results.  Results from last 7 days   Lab Units 23  0653 23  0541 23  0545   WBC 10*3/mm3 3.94 5.44 3.04*   HEMOGLOBIN g/dL 9.4* 11.2* 11.6*   PLATELETS 10*3/mm3 178 202 216     Results from last 7 days   Lab Units 23  0653 23  0541 23  0545   SODIUM mmol/L 140 138 138   POTASSIUM mmol/L 4.2 3.6 4.0   CHLORIDE mmol/L 108* 105 109*   CO2 mmol/L 22.9 22.0 22.0   BUN mg/dL 4* 7 7   CREATININE mg/dL 0.45* 0.66 0.57   GLUCOSE mg/dL 88 111* 99   EGFR mL/min/1.73 134.6 122.7 127.1     Results from last 7 days   Lab Units 23  0541 23  0545   ALBUMIN g/dL 4.4 3.8   BILIRUBIN mg/dL 0.4 0.3   ALK PHOS U/L 51 45   AST (SGOT) U/L 15 11   ALT (SGPT) U/L 8 8     Results from last 7 days   Lab Units 23  0653 23  0541 23  0545   CALCIUM mg/dL  9.3 9.4 9.2   ALBUMIN g/dL  --  4.4 3.8     Results from last 7 days   Lab Units 07/12/23  0541   PROCALCITONIN ng/mL 0.82*   LACTATE mmol/L 0.7     Glucose   Date/Time Value Ref Range Status   07/13/2023 0623 83 70 - 130 mg/dL Final     Comment:     Meter: [id number] : [id number] [facility] NA   07/12/2023 2342 117 70 - 130 mg/dL Final     Comment:     Meter: [id number] : [id number] [facility] NA   07/12/2023 1711 73 70 - 130 mg/dL Final     Comment:     Meter: [id number] : [id number] Vivian POWERS       CT Soft Tissue Neck With Contrast    Result Date: 7/12/2023   There is prominent fat stranding within the buccal space lateral to the right aspect of the maxillary alveolar ridge and there is contiguous soft tissue swelling extending into the right preseptal soft tissues as well as the soft tissues along the right side of the nose. The findings are most compatible with a cellulitis. There are are areas of periapical lucencies as well compatible with periapical abscesses as discussed in detail above. In specific, tooth #3 has a markedly carious appearance with a prominent periapical lucency compatible with a periapical abscess.  These findings were discussed with Dr. Tee on 07/12/2023 at approximately 8:00 AM.  Radiation dose reduction techniques were utilized, including automated exposure control and exposure modulation based on body size.  This report was finalized on 7/12/2023 8:02 AM by Dr. Nav Hirsch M.D.     Scheduled Medications  ampicillin-sulbactam, 3 g, Intravenous, Q6H  dicyclomine, 10 mg, Per J Tube, 4x Daily  famotidine, 20 mg, Intravenous, Q12H  gabapentin, 200 mg, Per J Tube, TID  hydroxychloroquine, 200 mg, Oral, BID  leucovorin, 5 mg, Oral, Weekly  metoprolol succinate XL, 12.5 mg, Oral, Nightly  multivitamin with minerals, 1 tablet, Oral, Daily  nitroglycerin, 0.5 inch, Topical, BID  ondansetron, 4 mg, Intravenous, Once  pantoprazole, 40 mg, Oral, BID  predniSONE, 20 mg,  Oral, Daily  senna-docusate sodium, 2 tablet, Per J Tube, BID    Infusions  lactated ringers, 100 mL/hr, Last Rate: 100 mL/hr (07/13/23 0151)    Diet  NPO Diet NPO Type: Strict NPO       Assessment/Plan     Active Hospital Problems    Diagnosis  POA    **Facial cellulitis [L03.211]  Yes    Periapical abscess without sinus [K04.7]  Yes    Jejunostomy tube present [Z93.4]  Not Applicable    Sjogren's syndrome [M35.00]  Yes    Intestinal autonomic neuropathy [G90.8]  Yes    Raynaud's phenomenon [I73.00]  Yes    Gastroparesis [K31.84]  Yes    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       28 y.o. female admitted with Facial cellulitis.      07/13/23  Plan for OR today with OMFS.     Multiple periapical abscess  Facial cellulitis  -Panorex showing lucencies surrounding the roots of 2 teeth along right maxilla and 3 mandibular incisors left of midline  -OMFS planning OR today   -cont Unasyn    Jejunostomy tube present  -Nutrition following for tube feeds     Intestinal autonomic neuropathy  Gastroparesis  IBS syndrome with both constipation and diarrhea  -lactulose, Protonix, Bentyl      Fibromyalgia  Tomas-Danlos syndrome  Rheumatoid arthritis involving multiple sites  Systemic lupus erythematosus  Raynaud's phenomenon  -resume leucovorin, Plaquenil, gabapentin, prednisone, IVIG    SCDs for DVT prophylaxis.  Discussed with patient and nursing staff.  Anticipate discharge home when cleared by consultants      Haris Levine MD  [facility]  07/13/23  10:29 EDT
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
## 2023-07-13 NOTE — CONSULTS
"IDENTIFYING INFORMATION: The patient is a 28-year-old white female with a history of multiple medical issues admitted for facial cellulitis.  He is seen by psychiatry related to history of depression    CHIEF COMPLAINT: None given    INFORMANT: Patient and chart    RELIABILITY: Limited    HISTORY OF PRESENT ILLNESS: The patient is an unfortunate 28-year-old white female admitted with complaints of facial cellulitis.  Her complicated medical history includes a history of lupus, Sjogren's syndrome, rheumatoid arthritis, Raynaud's disease, Tomas-Danlos syndrome, POTS, IBS, GT, GERD, fibromyalgia, autonomic nervous system disorder, gastroparesis with J-tube placement and recent onset of facial cellulitis.  The patient reports that she has consulted with psychiatrist in the past but reports \"they never understood me\".  She reports a history of treatment with antidepressant medications in the past but does not recall what if any she may have tried.  She does not recall having ever been on Lexapro.  She denies prior suicide attempts or gestures and denies current suicidal or homicidal ideation.  She does complain of poor sleep and loss of appetite.  She denies abuse of any psychoactive substances.    PAST PSYCHIATRIC HISTORY: As noted previously, the patient does report a history of previous psychiatric contact but complains that her previous providers did not understand her complicated medical status    PAST MEDICAL HISTORY: As above    MEDICATIONS:   Current Facility-Administered Medications   Medication Dose Route Frequency Provider Last Rate Last Admin    acetaminophen (TYLENOL) tablet 650 mg  650 mg Oral Q6H PRN Haris Levine MD   650 mg at 07/12/23 1221    ampicillin-sulbactam (UNASYN) 3 g in sodium chloride 0.9 % 100 mL IVPB-VTB  3 g Intravenous Q6H Ganesh Guzman MD   3 g at 07/13/23 0850    dicyclomine (BENTYL) capsule 10 mg  10 mg Per J Tube 4x Daily Haris Levine MD   10 mg at 07/13/23 " 0837    escitalopram (LEXAPRO) tablet 5 mg  5 mg Per J Tube Daily Enrike Laura III, MD        famotidine (PEPCID) injection 20 mg  20 mg Intravenous Q12H Jeanette Escobar, APRN   20 mg at 07/13/23 0837    gabapentin (NEURONTIN) capsule 200 mg  200 mg Per J Tube TID HosHaris duran MD   200 mg at 07/13/23 0837    HYDROcodone-acetaminophen (NORCO) 5-325 MG per tablet 1 tablet  1 tablet Per J Tube Q4H PRN Haris Levine MD   1 tablet at 07/13/23 1015    hydroxychloroquine (PLAQUENIL) tablet 200 mg  200 mg Oral BID HosHaris duran MD   200 mg at 07/13/23 0838    hydrOXYzine (ATARAX) tablet 25 mg  25 mg Oral TID PRN Jeanette Escobar, APRN   25 mg at 07/12/23 1122    ketorolac (TORADOL) injection 15 mg  15 mg Intravenous Q6H PRN Jeanette Escobar, APRN   15 mg at 07/12/23 1123    lactated ringers infusion  100 mL/hr Intravenous Continuous HosHaris duran  mL/hr at 07/13/23 1226 100 mL/hr at 07/13/23 1226    lactulose (CHRONULAC) 10 GM/15ML solution 10 g  10 g Per J Tube PRN Haris Levine MD        leucovorin tablet 5 mg  5 mg Oral Weekly Haris Levine MD        methocarbamol (ROBAXIN) tablet 750 mg  750 mg Oral TID PRN Haris Levine MD        metoprolol succinate XL (TOPROL-XL) 24 hr tablet 12.5 mg  12.5 mg Oral Nightly HosHaris duran MD   12.5 mg at 07/12/23 2242    morphine injection 2 mg  2 mg Intravenous Q2H PRN Haris Levine MD   2 mg at 07/13/23 1225    And    naloxone (NARCAN) injection 0.4 mg  0.4 mg Intravenous Q5 Min PRN Haris Levine MD        multivitamin with minerals 1 tablet  1 tablet Oral Daily Haris Levine MD        nitroglycerin (NITROSTAT) ointment 0.5 inch  0.5 inch Topical BID Haris Levine MD        ondansetron (ZOFRAN) injection 4 mg  4 mg Intravenous Once Eliseo Tee II, MD        ondansetron (ZOFRAN) injection 4 mg  4 mg Intravenous Q6H PRN Jeanette Escobar,  APRN   4 mg at 07/13/23 0357    pantoprazole (PROTONIX) EC tablet 40 mg  40 mg Oral BID Haris Levine MD   40 mg at 07/13/23 0838    predniSONE (DELTASONE) tablet 20 mg  20 mg Oral Daily Haris Levine MD   20 mg at 07/13/23 0838    promethazine-dextromethorphan (PROMETHAZINE-DM) 6.25-15 MG/5ML syrup 5 mL  5 mL Per J Tube 4x Daily PRN Haris Levine MD        sennosides-docusate (PERICOLACE) 8.6-50 MG per tablet 2 tablet  2 tablet Per J Tube BID Haris Levine MD        sodium chloride 0.9 % flush 10 mL  10 mL Intravenous PRN Eliseo Laboy MD             ALLERGIES: Cipro, Compazine, domperidone, Haldol, metoclopramide, sulfa, anesthetic amide    FAMILY HISTORY: The patient's mother suffered from depression    SOCIAL HISTORY: Patient lives with her .  She does not work outside the home.  She completed the 10th grade.  She denies abuse of any psychoactive substances.    MENTAL STATUS EXAM: The patient is an ill-appearing white female who is abed.  She is awake alert and oriented all spheres.  Her mood is dysphoric her affect flat.  Speech is relevant coherent.  There are no deficits memory or cognition noted.  Intelligence is judged to be in the average range based on fund of knowledge, the patient is cooperative throughout the interview.  She denies current suicidal or homicidal ideation or psychotic features.  Judgement and insight are intact.    ASSETS/LIABILITIES: Supportive family/multiple health issues    DIAGNOSTIC IMPRESSION: Major depressive disorder recurrent moderate, medical problems as described previously    PLAN: The patient is willing to begin a trial of Lexapro which I will again at a conservative dose of 5 mg to be given per J-tube daily.  I have explained the latency of onset of this medication to her and will ask the access center to assist her with arrangement for psychiatric follow-up though she seems rather negative regarding this suggestion.

## 2023-07-13 NOTE — BRIEF OP NOTE
TOOTH EXTRACTION  Progress Note    Henrietta Garrett  7/13/2023    Pre-op Diagnosis:   Dental caries, canine space cellulitis       Post-Op Diagnosis Codes:  same    Procedure/CPT® Codes:        Procedure(s):  TOOTH EXTRACTION              Surgeon(s):  Trae Escoto DMD    Anesthesia: General    Staff:   Circulator: Poli Matute RN  Scrub Person: Manuel Saha         Estimated Blood Loss: minimal    Urine Voided: * No values recorded between 7/13/2023  5:47 PM and 7/13/2023  6:14 PM *    Specimens:                None          Drains:   Gastrostomy/Enterostomy Jejunostomy LUQ (Active)   Surrounding Skin Intact;Reddened;Dry 07/12/23 1541   Securement anchored to abdomen with adhesive device 07/13/23 0830   Drain Status Clamped 07/13/23 0830   Drainage Appearance None 07/07/23 1416   Site Description Reddened;Healing 07/12/23 1817   Gastrostomy/Enterostomy Site Interventions Site assessed 07/13/23 1015   Dressing Status Old drainage 07/13/23 1015   Dressing Intervention Dressing changed 07/13/23 1015   Dressing Type Split gauze 07/13/23 1015   Tube Feeding Frequency Continuous 07/13/23 0032   Tube Feeding Product Other (comment) 07/12/23 2041   Tube Feeding Strength Full strength 07/13/23 0032   Tube Feeding Method Other (Comment) 07/13/23 0032   Tube Feeding Rate (mL/hr) 17 mL/HR 07/12/23 2041   Tube Feeding Bag Changed Yes 07/12/23 1817   Tube Feeding Residual (mL) 0 mL 07/12/23 2041   Tube Feeding Residual Returned (mL) 0 mL 07/12/23 2041   Feeding Tube Flushed With Tap water 07/12/23 2041   Flush/ Irrigation Intake (mL) 60 07/07/23 1416   Tube Feeding Intake (mL) 180 07/07/23 0637   Intake (mL) 240 mL 07/06/23 0607       Findings: none        Complications: none          Trae Escoto DMD     Date: 7/13/2023  Time: 18:15 EDT

## 2023-07-13 NOTE — PLAN OF CARE
Goal Outcome Evaluation:  Plan of Care Reviewed With: patient           Outcome Evaluation: pt continues to have severe pain overnight-prn pain and nausea meds given-see mar. significant other at bedside most of night and very supportive. tube feedings held at midnight per order in prep for surgery today. continue IV fluids and IV abx per mar. R face/cheek and eye remain swollen, tender-ice packs applied intermittently this shift.resting quietly between care.

## 2023-07-14 LAB
GLUCOSE BLDC GLUCOMTR-MCNC: 100 MG/DL (ref 70–130)
GLUCOSE BLDC GLUCOMTR-MCNC: 102 MG/DL (ref 70–130)
GLUCOSE BLDC GLUCOMTR-MCNC: 119 MG/DL (ref 70–130)
GLUCOSE BLDC GLUCOMTR-MCNC: 123 MG/DL (ref 70–130)
GLUCOSE BLDC GLUCOMTR-MCNC: 97 MG/DL (ref 70–130)

## 2023-07-14 PROCEDURE — 25010000002 AMPICILLIN-SULBACTAM PER 1.5 G: Performed by: DENTIST

## 2023-07-14 PROCEDURE — 25010000002 MORPHINE PER 10 MG: Performed by: DENTIST

## 2023-07-14 PROCEDURE — 82948 REAGENT STRIP/BLOOD GLUCOSE: CPT

## 2023-07-14 PROCEDURE — 63710000001 PREDNISONE PER 1 MG: Performed by: DENTIST

## 2023-07-14 RX ORDER — ZOLPIDEM TARTRATE 5 MG/1
5 TABLET ORAL NIGHTLY PRN
Status: DISCONTINUED | OUTPATIENT
Start: 2023-07-14 | End: 2023-07-14

## 2023-07-14 RX ORDER — ZOLPIDEM TARTRATE 5 MG/1
5 TABLET ORAL NIGHTLY PRN
Status: DISCONTINUED | OUTPATIENT
Start: 2023-07-14 | End: 2023-07-15 | Stop reason: HOSPADM

## 2023-07-14 RX ADMIN — HYDROCODONE BITARTRATE AND ACETAMINOPHEN 10 ML: 7.5; 325 SOLUTION ORAL at 22:26

## 2023-07-14 RX ADMIN — HYDROCODONE BITARTRATE AND ACETAMINOPHEN 1 TABLET: 5; 325 TABLET ORAL at 05:13

## 2023-07-14 RX ADMIN — HYDROXYCHLOROQUINE SULFATE 200 MG: 200 TABLET ORAL at 09:21

## 2023-07-14 RX ADMIN — HYDROXYCHLOROQUINE SULFATE 200 MG: 200 TABLET ORAL at 21:58

## 2023-07-14 RX ADMIN — HYDROCODONE BITARTRATE AND ACETAMINOPHEN 10 ML: 7.5; 325 SOLUTION ORAL at 17:31

## 2023-07-14 RX ADMIN — AMPICILLIN SODIUM AND SULBACTAM SODIUM 3 G: 2; 1 INJECTION, POWDER, FOR SOLUTION INTRAMUSCULAR; INTRAVENOUS at 10:11

## 2023-07-14 RX ADMIN — DICYCLOMINE HYDROCHLORIDE 10 MG: 10 CAPSULE ORAL at 21:58

## 2023-07-14 RX ADMIN — METHOCARBAMOL TABLETS 750 MG: 750 TABLET, COATED ORAL at 01:01

## 2023-07-14 RX ADMIN — METHOCARBAMOL TABLETS 750 MG: 750 TABLET, COATED ORAL at 18:33

## 2023-07-14 RX ADMIN — FAMOTIDINE 20 MG: 10 INJECTION INTRAVENOUS at 22:02

## 2023-07-14 RX ADMIN — MORPHINE SULFATE 2 MG: 2 INJECTION, SOLUTION INTRAMUSCULAR; INTRAVENOUS at 03:46

## 2023-07-14 RX ADMIN — ANORECTAL OINTMENT 1 APPLICATION: 15.7; .44; 24; 20.6 OINTMENT TOPICAL at 21:58

## 2023-07-14 RX ADMIN — SODIUM CHLORIDE, POTASSIUM CHLORIDE, SODIUM LACTATE AND CALCIUM CHLORIDE 100 ML/HR: 600; 310; 30; 20 INJECTION, SOLUTION INTRAVENOUS at 05:23

## 2023-07-14 RX ADMIN — HYDROCODONE BITARTRATE AND ACETAMINOPHEN 10 ML: 7.5; 325 SOLUTION ORAL at 09:20

## 2023-07-14 RX ADMIN — AMPICILLIN SODIUM AND SULBACTAM SODIUM 3 G: 2; 1 INJECTION, POWDER, FOR SOLUTION INTRAMUSCULAR; INTRAVENOUS at 03:46

## 2023-07-14 RX ADMIN — METOPROLOL TARTRATE 12.5 MG: 25 TABLET, FILM COATED ORAL at 22:02

## 2023-07-14 RX ADMIN — GABAPENTIN 200 MG: 250 SOLUTION ORAL at 05:12

## 2023-07-14 RX ADMIN — AMPICILLIN SODIUM AND SULBACTAM SODIUM 3 G: 2; 1 INJECTION, POWDER, FOR SOLUTION INTRAMUSCULAR; INTRAVENOUS at 15:57

## 2023-07-14 RX ADMIN — FAMOTIDINE 20 MG: 10 INJECTION INTRAVENOUS at 09:20

## 2023-07-14 RX ADMIN — PREDNISONE 20 MG: 20 TABLET ORAL at 09:21

## 2023-07-14 RX ADMIN — ESCITALOPRAM 5 MG: 5 TABLET, FILM COATED ORAL at 09:21

## 2023-07-14 RX ADMIN — HYDROCODONE BITARTRATE AND ACETAMINOPHEN 1 TABLET: 5; 325 TABLET ORAL at 01:00

## 2023-07-14 RX ADMIN — AMPICILLIN SODIUM AND SULBACTAM SODIUM 3 G: 2; 1 INJECTION, POWDER, FOR SOLUTION INTRAMUSCULAR; INTRAVENOUS at 21:58

## 2023-07-14 RX ADMIN — DICYCLOMINE HYDROCHLORIDE 10 MG: 10 CAPSULE ORAL at 13:26

## 2023-07-14 RX ADMIN — GABAPENTIN 200 MG: 250 SOLUTION ORAL at 21:58

## 2023-07-14 RX ADMIN — SENNOSIDES AND DOCUSATE SODIUM 2 TABLET: 50; 8.6 TABLET ORAL at 09:21

## 2023-07-14 RX ADMIN — MULTIPLE VITAMINS W/ MINERALS TAB 1 TABLET: TAB at 09:21

## 2023-07-14 RX ADMIN — DICYCLOMINE HYDROCHLORIDE 10 MG: 10 CAPSULE ORAL at 09:21

## 2023-07-14 RX ADMIN — HYDROCODONE BITARTRATE AND ACETAMINOPHEN 10 ML: 7.5; 325 SOLUTION ORAL at 13:26

## 2023-07-14 RX ADMIN — DICYCLOMINE HYDROCHLORIDE 10 MG: 10 CAPSULE ORAL at 17:31

## 2023-07-14 RX ADMIN — GABAPENTIN 200 MG: 250 SOLUTION ORAL at 14:42

## 2023-07-14 NOTE — PROGRESS NOTES
Continued Stay Note  [facility]     Patient Name: Henrietta Garrett  MRN: [id number]  Today's Date: 7/14/2023    Admit Date: 7/12/2023    Plan: Home with anurag'. Continue weekly IVIG injections with [facility]   Discharge Plan       Row Name 07/14/23 1507       Plan    Plan Home with fijason'. Continue weekly IVIG injections with [facility]    Plan Comments Introduced self and role of CCP. Patient lives with zurdo Redman in an apartment. Patient is current with [facility] for tube Feeds ([facility]). Uses a cane, walker and/or wheelchair. Has weekly IVIG injections through [facility]. Denies any needs/equipment. Family will assist as needed and will provide transportation at [place]. Wanted to used [facility].                   Discharge Codes    No documentation.                 Expected Discharge Date and Time       Expected Discharge Date Expected Discharge Time    Jul 16, 2023               Melody Calle RN

## 2023-07-14 NOTE — PAYOR COMM NOTE
"Henrietta Garrett (28 y.o. Female)                               ATTENTION; CONTINUED STAY CLINICALS AUTH #[id number]                            REPLY TO [facility] DEPT  [phone] OR CALL    TAMMY ZIEGLER LPBEENA               Date of Birth   1995    Social Security Number       Address   [address] [place] [address]    Home Phone   [phone]    MRN   [id number]       Worship   Non-Latter day    Marital Status   Single                            Admission Date   7/12/23    Admission Type   Emergency    Admitting Provider   Haris Levine MD    Attending Provider   Haris Levine MD    Department, Room/Bed   [address], 99/1       Discharge Date       Discharge Disposition       Discharge Destination                                 Attending Provider: Haris Levine MD    Allergies: Anesthetics, Amide, Ciprofloxacin, Compazine [Prochlorperazine], Domperidone, Droperidol, Haldol [Haloperidol], Metoclopramide, Sulfa Antibiotics    Isolation: None   Infection: None   Code Status: CPR    Ht: 180.3 cm (71\")   Wt: 75.6 kg (166 lb 10.7 oz)    Admission Cmt: None   Principal Problem: Facial cellulitis [L03.211]                   Active Insurance as of 7/12/2023       Primary Coverage       Payor Plan Insurance Group Employer/Plan Group    [facility] [facility] PPO [id number]       Payor Plan Address Payor Plan Phone Number Payor Plan Fax Number Effective Dates    [address] [phone]  1/1/2023 - None Entered    [place] [address]         Subscriber Name Subscriber Birth Date Member ID       NETTA SINGH 11/27/1963 [id number]                     Emergency Contacts        (Rel.) Home Phone Work Phone Mobile Phone    Miley Singh (Mother) [phone] -- [phone]    Flaco Redman -- -- --              Vital Signs (last 2 days)       Date/Time Temp Temp src Pulse Resp BP Patient Position " SpO2    07/14/23 1331 96.8 (36) Axillary 53 18 133/79 Lying 99    07/14/23 0937 97 (36.1) Oral 58 18 140/90 Lying 99    07/14/23 0514 97.5 (36.4) Oral 54 18 120/72 Lying 100    07/14/23 0102 97.8 (36.6) Oral 60 20 115/71 Lying 100    07/13/23 2236 -- -- 56 20 135/84 Lying 100    07/13/23 2107 -- -- 61 20 129/78 -- 100    07/13/23 2010 99.7 (37.6) Oral 62 20 150/88 Lying 100    07/13/23 1915 -- -- 68 -- 146/107 -- 100    07/13/23 1910 -- -- 75 -- -- -- 100    07/13/23 1905 -- -- 64 -- -- -- 99    07/13/23 1900 -- -- 64 -- 157/101 -- --    07/13/23 1855 -- -- 68 -- -- -- --    07/13/23 1850 -- -- 73 -- -- -- --    07/13/23 1845 -- -- 80 20 165/103 -- --    07/13/23 1840 -- -- -- 20 -- -- --    07/13/23 1835 -- -- 84 24 155/97 -- 96    07/13/23 1830 -- -- 100 24 152/104 -- 100    07/13/23 1610 98.7 (37.1) Oral 65 18 132/80 Lying 100    07/13/23 1405 98 (36.7) Oral 56 -- 125/79 Lying --    07/13/23 0854 98.4 (36.9) -- 88 18 121/75 -- 98    07/12/23 2257 98.9 (37.2) Oral 58 18 111/70 Lying --    07/12/23 1933 99.5 (37.5) Oral 79 18 118/66 Lying --    07/12/23 1541 98.8 (37.1) -- 77 18 107/71 -- 99    07/12/23 1400 -- -- 72 -- 119/75 -- 97    07/12/23 1300 -- -- 73 -- 119/71 -- 97    07/12/23 1250 -- -- 74 -- -- -- 97    07/12/23 1240 -- -- 75 -- -- -- 97    07/12/23 1230 -- -- 74 -- 119/73 -- 97    07/12/23 11:45:21 101.6 (38.7) Tympanic -- -- -- -- --    07/12/23 1132 -- -- 81 -- -- -- 98    07/12/23 1131 -- -- 79 -- 132/80 -- 100    07/12/23 11:29:47 -- -- -- -- 137/85 Lying 100    07/12/23 1129 -- -- -- -- 137/85 -- --    07/12/23 1029 -- -- -- -- 148/91 -- --    07/12/23 0959 -- -- -- -- 162/92 -- --    07/12/23 0934 -- -- 88 -- -- -- 100    07/12/23 0931 -- -- 84 -- 155/88 -- 97    07/12/23 0732 -- -- 83 -- 141/91 -- 100    07/12/23 0400 -- -- -- -- 133/85 -- --    07/12/23 0354 99.5 (37.5) Tympanic 102 18 -- -- 97          Oxygen Therapy (last 2 days)       Date/Time SpO2 Device (Oxygen Therapy) Flow (L/min) Oxygen  Concentration (%) ETCO2 (mmHg)    07/14/23 1331 99 room air -- -- --    07/14/23 0937 99 room air -- -- --    07/14/23 0514 100 nasal cannula 2 -- --    07/14/23 0102 100 nasal cannula -- -- --    07/13/23 2236 100 nasal cannula 2 -- --    07/13/23 2107 100 nasal cannula 2 -- --    07/13/23 2045 -- nasal cannula 2 -- --    07/13/23 2010 100 nasal cannula 2 -- --    07/13/23 1915 100 -- -- -- --    07/13/23 1910 100 -- -- -- --    07/13/23 1905 99 -- -- -- --    07/13/23 1835 96 -- -- -- --    07/13/23 1830 100 -- -- -- --    07/13/23 1610 100 room air -- -- --    07/13/23 1405 -- room air -- -- --    07/13/23 0854 98 -- -- -- --    07/13/23 0830 -- room air -- -- --    07/13/23 0032 -- room air -- -- --    07/12/23 2041 -- room air -- -- --    07/12/23 1541 99 room air -- -- --    07/12/23 1400 97 -- -- -- --    07/12/23 1300 97 -- -- -- --    07/12/23 1250 97 -- -- -- --    07/12/23 1240 97 -- -- -- --    07/12/23 1230 97 -- -- -- --    07/12/23 1132 98 -- -- -- --    07/12/23 1131 100 -- -- -- --    07/12/23 11:29:47 100 room air -- -- --    07/12/23 0934 100 -- -- -- --    07/12/23 0931 97 -- -- -- --    07/12/23 0732 100 -- -- -- --    07/12/23 0354 97 room air -- -- --          Lines, Drains & Airways       Active LDAs       Name Placement date Placement time Site Days    Peripheral IV 07/12/23 0539 Anterior;Proximal;Right Forearm 07/12/23  0539  Forearm  2    Gastrostomy/Enterostomy Jejunostomy LUQ 06/25/23  --  LUQ  19    Single Lumen Implantable Port Right Subclavian --  --  Subclavian  --                  Current Facility-Administered Medications   Medication Dose Route Frequency Provider Last Rate Last Admin    acetaminophen (TYLENOL) tablet 650 mg  650 mg Oral Q6H PRN Trae Escoto DMD   650 mg at 07/12/23 1221    ampicillin-sulbactam (UNASYN) 3 g in sodium chloride 0.9 % 100 mL IVPB-VTB  3 g Intravenous Q6H Trae Escoto DMD   3 g at 07/14/23 1011    dicyclomine (BENTYL) capsule 10 mg  10 mg Per J  Tube 4x Daily Trae Escoto DMD   10 mg at 07/14/23 1326    escitalopram (LEXAPRO) tablet 5 mg  5 mg Per J Tube Daily Trae Esctoo DMD   5 mg at 07/14/23 0921    famotidine (PEPCID) injection 20 mg  20 mg Intravenous Q12H Trae Escoto DMD   20 mg at 07/14/23 0920    Gabapentin (NEURONTIN) 50 mg/mL solution 200 mg  200 mg Per J Tube Q8H HosHaris duran MD   200 mg at 07/14/23 1442    HYDROcodone-acetaminophen (HYCET) 7.5-325 MG/15ML solution 10 mL  10 mL Per J Tube Q4H PRN Haris Levine MD   10 mL at 07/14/23 1326    hydroxychloroquine (PLAQUENIL) tablet 200 mg  200 mg Oral BID Trae Escoto DMD   200 mg at 07/14/23 0921    hydrOXYzine (ATARAX) tablet 25 mg  25 mg Oral TID PRN Trae Escoto DMD   25 mg at 07/12/23 1122    ketorolac (TORADOL) injection 15 mg  15 mg Intravenous Q6H PRN Trae Escoto DMD   15 mg at 07/12/23 1123    lactated ringers infusion  100 mL/hr Intravenous Continuous Trae Escoto  mL/hr at 07/14/23 0523 100 mL/hr at 07/14/23 0523    lactated ringers infusion  9 mL/hr Intravenous Continuous Trae Escoto DMD 9 mL/hr at 07/13/23 1646 9 mL/hr at 07/13/23 1646    lactulose (CHRONULAC) 10 GM/15ML solution 10 g  10 g Per J Tube PRN Trae Escoto DMD        leucovorin tablet 5 mg  5 mg Oral Weekly Trae Escoto DMD        methocarbamol (ROBAXIN) tablet 750 mg  750 mg Oral TID PRN Trae Escoto DMD   750 mg at 07/14/23 0101    metoprolol tartrate (LOPRESSOR) tablet 12.5 mg  12.5 mg Per J Tube Nightly Haris Levine MD        morphine injection 2 mg  2 mg Intravenous Q2H PRN Trae Esocto DMD   2 mg at 07/14/23 0346    And    naloxone (NARCAN) injection 0.4 mg  0.4 mg Intravenous Q5 Min PRN Trae Escoto DMD        multivitamin with minerals 1 tablet  1 tablet Oral Daily Trae Escoto DMD   1 tablet at 07/14/23 0921    nitroglycerin (NITROSTAT) ointment 0.5 inch  0.5 inch Topical BID Trae Escoto DMD        ondansetron  (ZOFRAN) injection 4 mg  4 mg Intravenous Once Trae Escoto, DMD        ondansetron (ZOFRAN) injection 4 mg  4 mg Intravenous Q6H PRN Trae Escoto, DMD   4 mg at 07/13/23 0357    pantoprazole (PROTONIX) EC tablet 40 mg  40 mg Oral BID JevonTrae last, DMD   40 mg at 07/13/23 2214    predniSONE (DELTASONE) tablet 20 mg  20 mg Oral Daily Trae Escoto, DMD   20 mg at 07/14/23 0921    promethazine-dextromethorphan (PROMETHAZINE-DM) 6.25-15 MG/5ML syrup 5 mL  5 mL Per J Tube 4x Daily PRN Trae Escoto, DMD   5 mL at 07/13/23 2215    sennosides-docusate (PERICOLACE) 8.6-50 MG per tablet 2 tablet  2 tablet Per J Tube BID Trae Escoto, DMD   2 tablet at 07/14/23 0921    sodium chloride 0.9 % flush 10 mL  10 mL Intravenous PRN Trae Escoto, DMD        zolpidem (AMBIEN) tablet 5 mg  5 mg Per J Tube Nightly PRN Enrike Laura III, MD         Orders (last 24 hrs)        Start     Ordered    07/14/23 2100  metoprolol tartrate (LOPRESSOR) tablet 12.5 mg  Nightly         07/14/23 0810    07/14/23 1214  zolpidem (AMBIEN) tablet 5 mg  Nightly PRN         07/14/23 1214    07/14/23 1213  zolpidem (AMBIEN) tablet 5 mg  Nightly PRN,   Status:  Discontinued         07/14/23 1213    07/14/23 1154  POC Glucose Once  PROCEDURE ONCE         07/14/23 1151    07/14/23 0725  HYDROcodone-acetaminophen (HYCET) 7.5-325 MG/15ML solution 10 mL  Every 4 Hours PRN         07/14/23 0725    07/14/23 0711  NPO Diet NPO Type: Strict NPO  Diet Effective Now         07/14/23 0710    07/14/23 0556  POC Glucose Once  PROCEDURE ONCE         07/14/23 0553    07/14/23 0017  POC Glucose Once  PROCEDURE ONCE         07/14/23 0015    07/13/23 2200  Gabapentin (NEURONTIN) 50 mg/mL solution 200 mg  Every 8 Hours Scheduled         07/13/23 2101 07/13/23 2100  sodium chloride 0.9 % flush 3 mL  Every 12 Hours Scheduled,   Status:  Discontinued         07/13/23 1630    07/13/23 2017  Activity - Ad Carina  Until Discontinued        Comments:  Up with assistance first time then ad isa    07/13/23 2017 07/13/23 1845  midazolam (VERSED) injection 0.5 mg  Every 10 Minutes PRN         07/13/23 1846 07/13/23 1828  Pulse Oximetry, Continuous  Continuous         07/13/23 1827 07/13/23 1828  POC Glucose Once  Once,   Status:  Canceled        Comments: Post op glucose check on all diabetic patients...Notify Anesthesia if blood sugar is less than 80 mg/dL or greater than 220 mg/dL.      07/13/23 1828 07/13/23 1828  Vital signs every 5 minutes for 15 minutes, every 15 minutes thereafter.  Once,   Status:  Canceled         07/13/23 1828 07/13/23 1828  Call Anesthesiologist for additional IV Fluid bolus for Hypotension/Tachycardia  Until Discontinued,   Status:  Canceled         07/13/23 1828 07/13/23 1828  Notify Anesthesia of Any Acute Changes in Patient Condition  Until Discontinued,   Status:  Canceled         07/13/23 1828 07/13/23 1828  Notify Anesthesia for Unrelieved Pain  Until Discontinued,   Status:  Canceled         07/13/23 1828 07/13/23 1828  Once DC criteria to floor met, follow surgeon's orders.  Until Discontinued,   Status:  Canceled         07/13/23 1828 07/13/23 1828  Discharge patient from PACU when discharge criteria is met.  Until Discontinued,   Status:  Canceled         07/13/23 1828 07/13/23 1827  HYDROcodone-acetaminophen (NORCO) 7.5-325 MG per tablet 1 tablet  Once As Needed,   Status:  Discontinued         07/13/23 1828 07/13/23 1827  HYDROmorphone (DILAUDID) injection 0.5 mg  Every 5 Minutes PRN,   Status:  Discontinued         07/13/23 1828 07/13/23 1827  oxyCODONE-acetaminophen (PERCOCET) 7.5-325 MG per tablet 1 tablet  Every 4 Hours PRN,   Status:  Discontinued         07/13/23 1828 07/13/23 1827  fentaNYL citrate (PF) (SUBLIMAZE) injection 50 mcg  Every 5 Minutes PRN,   Status:  Discontinued         07/13/23 1828 07/13/23 1827  naloxone (NARCAN) injection 0.2 mg  As Needed,   Status:   Discontinued         07/13/23 1828 07/13/23 1827  flumazenil (ROMAZICON) injection 0.2 mg  As Needed,   Status:  Discontinued         07/13/23 1828 07/13/23 1827  ondansetron (ZOFRAN) injection 4 mg  Once As Needed         07/13/23 1828 07/13/23 1827  labetalol (NORMODYNE,TRANDATE) injection 5 mg  Every 5 Minutes PRN,   Status:  Discontinued         07/13/23 1828 07/13/23 1827  hydrALAZINE (APRESOLINE) injection 5 mg  Every 10 Minutes PRN,   Status:  Discontinued         07/13/23 1828 07/13/23 1827  ePHEDrine injection 5 mg  Once As Needed,   Status:  Discontinued         07/13/23 1828 07/13/23 1827  diphenhydrAMINE (BENADRYL) injection 12.5 mg  Every 15 Minutes PRN,   Status:  Discontinued         07/13/23 1828 07/13/23 1827  ipratropium-albuterol (DUO-NEB) nebulizer solution 3 mL  Once As Needed,   Status:  Discontinued         07/13/23 1828 07/13/23 1811  bupivacaine-EPINEPHrine PF (MARCAINE w/EPI) 0.25% -[id number] injection  As Needed,   Status:  Discontinued         07/13/23 1812 07/13/23 1635  scopolamine patch 1 mg/72 hr  Once,   Status:  Discontinued         07/13/23 1633    07/13/23 1633  ondansetron (ZOFRAN) injection 4 mg  Once As Needed         07/13/23 1633    07/13/23 1632  lactated ringers infusion  Continuous         07/13/23 1630    07/13/23 1632  famotidine (PEPCID) injection 20 mg  Once         07/13/23 1630    07/13/23 1631  POC Urine Pregnancy  STAT,   Status:  Canceled         07/13/23 1630    07/13/23 1631  Insert Peripheral IV  Once,   Status:  Canceled         07/13/23 1630    07/13/23 1631  Saline Lock & Maintain IV Access  Continuous,   Status:  Canceled         07/13/23 1630    07/13/23 1631  Pulse Oximetry, Continuous  Continuous,   Status:  Canceled         07/13/23 1630    07/13/23 1631  May take Beta Blocker from home with sip of water.  Once,   Status:  Canceled        Comments: Only applicable to patients on home Beta Blocker    07/13/23 1630    07/13/23  1630  Vital Signs - Per Anesthesia Protocol  As Needed,   Status:  Canceled       07/13/23 1630    07/13/23 1630  Oxygen Therapy- Nasal Cannula; Titrate 1-6 LPM Per SpO2; 90 - 95%  Continuous PRN,   Status:  Canceled       07/13/23 1630    07/13/23 1630  Pulse Oximetry, Continuous  Continuous PRN,   Status:  Canceled       07/13/23 1630    07/13/23 1630  POC Glucose PRN  As Needed,   Status:  Canceled      Comments: For all diabetic patients. Notify Anesthesiologist for blood sugar > 180.      07/13/23 1630    07/13/23 1630  sodium chloride 0.9 % flush 3-10 mL  As Needed,   Status:  Discontinued         07/13/23 1630    07/13/23 1630  lidocaine PF 1% (XYLOCAINE) injection 0.5 mL  Once As Needed,   Status:  Discontinued         07/13/23 1630    07/13/23 1630  fentaNYL citrate (PF) (SUBLIMAZE) injection 50 mcg  Once As Needed         07/13/23 1630    07/13/23 1630  midazolam (VERSED) injection 1 mg  Every 5 Minutes PRN,   Status:  Discontinued         07/13/23 1630    07/13/23 1330  escitalopram (LEXAPRO) tablet 5 mg  Daily         07/13/23 1234    07/12/23 2100  sennosides-docusate (PERICOLACE) 8.6-50 MG per tablet 2 tablet  2 Times Daily         07/12/23 1541    07/12/23 2100  hydroxychloroquine (PLAQUENIL) tablet 200 mg  2 Times Daily         07/12/23 1553    07/12/23 2100  metoprolol succinate XL (TOPROL-XL) 24 hr tablet 12.5 mg  Nightly,   Status:  Discontinued         07/12/23 1553    07/12/23 2100  nitroglycerin (NITROSTAT) ointment 0.5 inch  2 Times Daily         07/12/23 1553    07/12/23 2100  pantoprazole (PROTONIX) EC tablet 40 mg  2 Times Daily         07/12/23 1553    07/12/23 1845  ampicillin-sulbactam (UNASYN) 3 g in sodium chloride 0.9 % 100 mL IVPB-VTB  Every 6 Hours         07/12/23 1747    07/12/23 1800  Oral Care  2 Times Daily,   Status:  Canceled       07/12/23 0937    07/12/23 1800  dicyclomine (BENTYL) capsule 10 mg  4 Times Daily         07/12/23 1553    07/12/23 1800  POC Glucose Q6H  Every 6  Hours       07/12/23 1639    07/12/23 1645  gabapentin (NEURONTIN) capsule 200 mg  3 Times Daily,   Status:  Discontinued         07/12/23 1553    07/12/23 1645  leucovorin tablet 5 mg  Weekly         07/12/23 1553    07/12/23 1645  multivitamin with minerals 1 tablet  Daily         07/12/23 1553    07/12/23 1645  predniSONE (DELTASONE) tablet 20 mg  Daily         07/12/23 1553    07/12/23 1645  lactated ringers infusion  Continuous         07/12/23 1555    07/12/23 1640  Daily Weights  Daily       07/12/23 1639    07/12/23 1552  promethazine-dextromethorphan (PROMETHAZINE-DM) 6.25-15 MG/5ML syrup 5 mL  4 Times Daily PRN         07/12/23 1553    07/12/23 1551  lactulose (CHRONULAC) 10 GM/15ML solution 10 g  As Needed         07/12/23 1553    07/12/23 1551  methocarbamol (ROBAXIN) tablet 750 mg  3 Times Daily PRN         07/12/23 1553    07/12/23 1545  morphine injection 2 mg  Every 2 Hours PRN        See [facility]ers[facility] for full Linked Orders Report.    07/12/23 1541    07/12/23 1541  HYDROcodone-acetaminophen (NORCO) 5-325 MG per tablet 1 tablet  Every 4 Hours PRN,   Status:  Discontinued         07/12/23 1541    07/12/23 1541  naloxone (NARCAN) injection 0.4 mg  Every 5 Minutes PRN        See Hyperspace for full Linked Orders Report.    07/12/23 1541    07/12/23 1200  Vital Signs  Every 4 Hours       07/12/23 0937    07/12/23 1147  acetaminophen (TYLENOL) tablet 650 mg  Every 6 Hours PRN         07/12/23 1147    07/12/23 1048  famotidine (PEPCID) injection 20 mg  Every 12 Hours Scheduled         07/12/23 1032    07/12/23 1032  ketorolac (TORADOL) injection 15 mg  Every 6 Hours PRN         07/12/23 1032    07/12/23 1031  hydrOXYzine (ATARAX) tablet 25 mg  3 Times Daily PRN         07/12/23 1032    07/12/23 0936  Intake & Output  Every Shift       07/12/23 0937    07/12/23 0935  ondansetron (ZOFRAN) injection 4 mg  Every 6 Hours PRN         07/12/23 0937    07/12/23 0727  ondansetron (ZOFRAN) injection 4 mg  Once          07/12/23 0711    07/12/23 0527  sodium chloride 0.9 % flush 10 mL  As Needed        [facility].    07/12/23 0528    Unscheduled  Liam and Document Tube Depth (in cm)  As Needed       07/12/23 1639    Unscheduled  Verify Tube Placement Upon Insertion & As Needed  As Needed       07/12/23 1639    Unscheduled  Flush Feeding Tube With 30-50mL Water As Needed  As Needed       07/12/23 1639    Unscheduled  Oxygen Therapy- Nasal Cannula; Titrate 1-6 LPM Per SpO2; 90 - 95%  Continuous PRN       07/13/23 1827    Unscheduled  Wound Care  As Needed       07/13/23 2017    Unscheduled  Wound Care  As Needed       07/13/23 2017                     Operative/Procedure Notes (last 48 hours)        Trae Escoto DMD at 07/13/23 1803          TOOTH EXTRACTION  Progress Note    Henriettamylene Garrett  7/13/2023    Pre-op Diagnosis:   Dental caries, canine space cellulitis       Post-Op Diagnosis Codes:  same    Procedure/CPT® Codes:        Procedure(s):  TOOTH EXTRACTION              Surgeon(s):  Trae Escoto DMD    Anesthesia: General    Staff:   Circulator: Poli Matute RN  Scrub Person: Manuel Saha         Estimated Blood Loss: minimal    Urine Voided: * No values recorded between 7/13/2023  5:47 PM and 7/13/2023  6:14 PM *    Specimens:                None          Drains:   Gastrostomy/Enterostomy Jejunostomy LUQ (Active)   Surrounding Skin Intact;Reddened;Dry 07/12/23 1541   Securement anchored to abdomen with adhesive device 07/13/23 0830   Drain Status Clamped 07/13/23 0830   Drainage Appearance None 07/07/23 1416   Site Description Reddened;Healing 07/12/23 1817   Gastrostomy/Enterostomy Site Interventions Site assessed 07/13/23 1015   Dressing Status Old drainage 07/13/23 1015   Dressing Intervention Dressing changed 07/13/23 1015   Dressing Type Split gauze 07/13/23 1015   Tube Feeding Frequency Continuous 07/13/23 0032   Tube Feeding Product Other (comment) 07/12/23 2041   Tube  Feeding Strength Full strength 07/13/23 0032   Tube Feeding Method Other (Comment) 07/13/23 0032   Tube Feeding Rate (mL/hr) 17 mL/HR 07/12/23 2041   Tube Feeding Bag Changed Yes 07/12/23 1817   Tube Feeding Residual (mL) 0 mL 07/12/23 2041   Tube Feeding Residual Returned (mL) 0 mL 07/12/23 2041   Feeding Tube Flushed With Tap water 07/12/23 2041   Flush/ Irrigation Intake (mL) 60 07/07/23 1416   Tube Feeding Intake (mL) 180 07/07/23 0637   Intake (mL) 240 mL 07/06/23 0607       Findings: none        Complications: none          Trae Escoto DMD     Date: 7/13/2023  Time: 18:15 EDT          Electronically signed by Trae Escoto DMD at 07/13/23 1815       Trae Escoto DMD at 07/13/23 1803          PREOPERATIVE DIAGNOSIS: Right canine space cellulitis and dental caries.     POSTOPERATIVE DIAGNOSIS: Right canine space cellulitis and dental caries.     PROCEDURE PERFORMED: Extraction of teeth.     SURGEON: Trae Escoto DMD    ANESTHESIA: General.     ESTIMATED BLOOD LOSS: Minimal.     SPECIMENS: None.     DRAINS: None.     INDICATIONS/CONSENT: This is a 28-year-old female who presented to the emergency department with complaint of swelling and dental pain. After clinical and radiographic evaluation she was diagnosed with right facial cellulitis secondary to dental caries. It was felt that she would benefit from IV antibiotics given her history of immunocompromise and extraction of the teeth in the operating room. The procedure, potential risks and complications were explained to the patient and both verbal and written consent were obtained.     DESCRIPTION OF PROCEDURE: The patient was taken to the operating room and placed in the supine position on the operating room table. The state of general anesthesia was induced and oral endotracheal tube was placed. The right maxilla was infiltrated with 0.25% Marcaine with 1:200,000 epinephrine. A throat pack was placed. Teeth #3, 4, and 7 were all extracted  with forceps and curetted to clear any purulent discharge, most of which was found around tooth #7, and incision was made in the upper right maxillary vestibule but no pus was obtained so no drain was placed. The wounds were then irrigated. Dry dressings were placed. The throat pack was removed. The patient was awakened, extubated, and taken to the recovery room. There were no complications. All counts were correct x 3.         Electronically signed by Trae Escoto DMD at 23 1348          Physician Progress Notes (last 48 hours)        Enrike Laura III, MD at 23 1213          The patient communicates with some difficulty after undergoing dental extraction yesterday.  She states that she slept poorly last night and is continuing to complain of some pain but denies any suicidal ideation.  She began Lexapro per J-tube yesterday.  I will order as needed Ambien for sleep.    Electronically signed by Enrike Laura III, MD at 23 1214       Haris Levine MD at 23 1020              Name: Henrietta Garrett ADMIT: 2023   : 1995  PCP: Fanny Jarrett MD    MRN: [id number] LOS: 1 days   AGE/SEX: 28 y.o. female  ROOM: 101/     Subjective   Subjective   Still feels 'miserable' this morning.     Objective   Objective   Vital Signs  Temp:  [98.4 °F (36.9 °C)-101.6 °F (38.7 °C)] 98.4 °F (36.9 °C)  Heart Rate:  [58-88] 88  Resp:  [18] 18  BP: (107-137)/(66-85) 121/75  SpO2:  [97 %-100 %] 99 %  on   ;   Device (Oxygen Therapy): room air  Body mass index is 22.73 kg/m².  Physical Exam  Constitutional:       General: She is not in acute distress.  HENT:      Head:      Comments: Swelling and erythema of face R > L, improved from prior; poor dentition  Cardiovascular:      Rate and Rhythm: Normal rate.      Pulses: Normal pulses.   Pulmonary:      Effort: Pulmonary effort is normal. No respiratory distress.   Abdominal:      General: Abdomen is flat. There  is no distension.      Palpations: Abdomen is soft.      Comments: J-tube   Musculoskeletal:         General: Normal range of motion.   Neurological:      Mental Status: She is alert.       Results Review     I reviewed the patient's new clinical results.  Results from last 7 days   Lab Units 07/13/23  0653 07/12/23  0541 07/07/23  0545   WBC 10*3/mm3 3.94 5.44 3.04*   HEMOGLOBIN g/dL 9.4* 11.2* 11.6*   PLATELETS 10*3/mm3 178 202 216     Results from last 7 days   Lab Units 07/13/23  0653 07/12/23  0541 07/07/23  0545   SODIUM mmol/L 140 138 138   POTASSIUM mmol/L 4.2 3.6 4.0   CHLORIDE mmol/L 108* 105 109*   CO2 mmol/L 22.9 22.0 22.0   BUN mg/dL 4* 7 7   CREATININE mg/dL 0.45* 0.66 0.57   GLUCOSE mg/dL 88 111* 99   EGFR mL/min/1.73 134.6 122.7 127.1     Results from last 7 days   Lab Units 07/12/23  0541 07/07/23  0545   ALBUMIN g/dL 4.4 3.8   BILIRUBIN mg/dL 0.4 0.3   ALK PHOS U/L 51 45   AST (SGOT) U/L 15 11   ALT (SGPT) U/L 8 8     Results from last 7 days   Lab Units 07/13/23  0653 07/12/23  0541 07/07/23  0545   CALCIUM mg/dL 9.3 9.4 9.2   ALBUMIN g/dL  --  4.4 3.8     Results from last 7 days   Lab Units 07/12/23  0541   PROCALCITONIN ng/mL 0.82*   LACTATE mmol/L 0.7     Glucose   Date/Time Value Ref Range Status   07/13/2023 0623 83 70 - 130 mg/dL Final     Comment:     Meter: [id number] : [id number] [facility]   07/12/2023 2342 117 70 - 130 mg/dL Final     Comment:     Meter: [id number] : [id number] [facility]   07/12/2023 1711 73 70 - 130 mg/dL Final     Comment:     Meter: [id number] : [id number] Vivian Zoraida NA       CT Soft Tissue Neck With Contrast    Result Date: 7/12/2023   There is prominent fat stranding within the buccal space lateral to the right aspect of the maxillary alveolar ridge and there is contiguous soft tissue swelling extending into the right preseptal soft tissues as well as the soft tissues along the right side of the nose. The findings are most compatible with  a cellulitis. There are are areas of periapical lucencies as well compatible with periapical abscesses as discussed in detail above. In specific, tooth #3 has a markedly carious appearance with a prominent periapical lucency compatible with a periapical abscess.  These findings were discussed with Dr. Tee on 07/12/2023 at approximately 8:00 AM.  Radiation dose reduction techniques were utilized, including automated exposure control and exposure modulation based on body size.  This report was finalized on 7/12/2023 8:02 AM by Dr. Nav Hirsch M.D.     Scheduled Medications  ampicillin-sulbactam, 3 g, Intravenous, Q6H  dicyclomine, 10 mg, Per J Tube, 4x Daily  famotidine, 20 mg, Intravenous, Q12H  gabapentin, 200 mg, Per J Tube, TID  hydroxychloroquine, 200 mg, Oral, BID  leucovorin, 5 mg, Oral, Weekly  metoprolol succinate XL, 12.5 mg, Oral, Nightly  multivitamin with minerals, 1 tablet, Oral, Daily  nitroglycerin, 0.5 inch, Topical, BID  ondansetron, 4 mg, Intravenous, Once  pantoprazole, 40 mg, Oral, BID  predniSONE, 20 mg, Oral, Daily  senna-docusate sodium, 2 tablet, Per J Tube, BID    Infusions  lactated ringers, 100 mL/hr, Last Rate: 100 mL/hr (07/13/23 0151)    Diet  NPO Diet NPO Type: Strict NPO      Assessment/Plan     Active Hospital Problems    Diagnosis  POA    **Facial cellulitis [L03.211]  Yes    Periapical abscess without sinus [K04.7]  Yes    Jejunostomy tube present [Z93.4]  Not Applicable    Sjogren's syndrome [M35.00]  Yes    Intestinal autonomic neuropathy [G90.8]  Yes    Raynaud's phenomenon [I73.00]  Yes    Gastroparesis [K31.84]  Yes    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       28 y.o. female admitted with Facial cellulitis.      07/13/23  Plan for OR today with OMFS.     Multiple periapical abscess  Facial cellulitis  -Panorex showing lucencies surrounding the roots of 2 teeth along right maxilla and 3 mandibular incisors left of midline  -OMFS planning OR today   -cont Unasyn    Jejunostomy tube present  -Nutrition following for tube feeds     Intestinal autonomic neuropathy  Gastroparesis  IBS syndrome with both constipation and diarrhea  -lactulose, Protonix, Bentyl      Fibromyalgia  Tomas-Danlos syndrome  Rheumatoid arthritis involving multiple sites  Systemic lupus erythematosus  Raynaud's phenomenon  -resume leucovorin, Plaquenil, gabapentin, prednisone, IVIG    SCDs for DVT prophylaxis.  Discussed with patient and nursing staff.  Anticipate discharge home when cleared by consultants      Haris Levine MD  [facility]  07/13/23  10:29 EDT        Electronically signed by Haris Levine MD at 07/13/23 1030          Consult Notes (last 48 hours)        Enrike Laura III, MD at 07/13/23 1237        Consult Orders    1. Inpatient Psychiatrist Consult [[id number]] ordered by Haris Levine MD at 07/12/23 2056                 IDENTIFYING INFORMATION: The patient is a 28-year-old white female with a history of multiple medical issues admitted for facial cellulitis.  He is seen by psychiatry related to history of depression    CHIEF COMPLAINT: None given    INFORMANT: Patient and chart    RELIABILITY: Limited    HISTORY OF PRESENT ILLNESS: The patient is an unfortunate 28-year-old white female admitted with complaints of facial cellulitis.  Her complicated medical history includes a history of lupus, Sjogren's syndrome, rheumatoid arthritis, Raynaud's disease, Tomas-Danlos syndrome, POTS, IBS, GT, GERD, fibromyalgia, autonomic nervous system disorder, gastroparesis with J-tube placement and recent onset of facial  "cellulitis.  The patient reports that she has consulted with psychiatrist in the past but reports \"they never understood me\".  She reports a history of treatment with antidepressant medications in the past but does not recall what if any she may have tried.  She does not recall having ever been on Lexapro.  She denies prior suicide attempts or gestures and denies current suicidal or homicidal ideation.  She does complain of poor sleep and loss of appetite.  She denies abuse of any psychoactive substances.    PAST PSYCHIATRIC HISTORY: As noted previously, the patient does report a history of previous psychiatric contact but complains that her previous providers did not understand her complicated medical status    PAST MEDICAL HISTORY: As above    MEDICATIONS:   Current Facility-Administered Medications   Medication Dose Route Frequency Provider Last Rate Last Admin    acetaminophen (TYLENOL) tablet 650 mg  650 mg Oral Q6H PRN Haris Levine MD   650 mg at 07/12/23 1221    ampicillin-sulbactam (UNASYN) 3 g in sodium chloride 0.9 % 100 mL IVPB-VTB  3 g Intravenous Q6H Ganesh Guzman MD   3 g at 07/13/23 0850    dicyclomine (BENTYL) capsule 10 mg  10 mg Per J Tube 4x Daily Haris Levine MD   10 mg at 07/13/23 0837    escitalopram (LEXAPRO) tablet 5 mg  5 mg Per J Tube Daily Enrike Laura III, MD        famotidine (PEPCID) injection 20 mg  20 mg Intravenous Q12H Jeanette Escobar APRN   20 mg at 07/13/23 0837    gabapentin (NEURONTIN) capsule 200 mg  200 mg Per J Tube TID Haris Levine MD   200 mg at 07/13/23 0837    HYDROcodone-acetaminophen (NORCO) 5-325 MG per tablet 1 tablet  1 tablet Per J Tube Q4H PRN Haris Levine MD   1 tablet at 07/13/23 1015    hydroxychloroquine (PLAQUENIL) tablet 200 mg  200 mg Oral BID Haris Levine MD   200 mg at 07/13/23 0838    hydrOXYzine (ATARAX) tablet 25 mg  25 mg Oral TID PRN Jeanette Escobar APRN   25 mg at 07/12/23 " 1122    ketorolac (TORADOL) injection 15 mg  15 mg Intravenous Q6H PRN Jeanette Escobar, APRN   15 mg at 07/12/23 1123    lactated ringers infusion  100 mL/hr Intravenous Continuous Hos, Haris Larkin  mL/hr at 07/13/23 1226 100 mL/hr at 07/13/23 1226    lactulose (CHRONULAC) 10 GM/15ML solution 10 g  10 g Per J Tube PRN HosHaris duran MD        leucovorin tablet 5 mg  5 mg Oral Weekly Hos, Haris Larkin MD        methocarbamol (ROBAXIN) tablet 750 mg  750 mg Oral TID PRN HosHaris duran MD        metoprolol succinate XL (TOPROL-XL) 24 hr tablet 12.5 mg  12.5 mg Oral Nightly HosHaris duran MD   12.5 mg at 07/12/23 2242    morphine injection 2 mg  2 mg Intravenous Q2H PRN HosHaris duran MD   2 mg at 07/13/23 1225    And    naloxone (NARCAN) injection 0.4 mg  0.4 mg Intravenous Q5 Min PRN HosHaris duran MD        multivitamin with minerals 1 tablet  1 tablet Oral Daily HosHaris duran MD        nitroglycerin (NITROSTAT) ointment 0.5 inch  0.5 inch Topical BID Haris Levine MD        ondansetron (ZOFRAN) injection 4 mg  4 mg Intravenous Once RandalEliseo II, MD        ondansetron (ZOFRAN) injection 4 mg  4 mg Intravenous Q6H PRN Jeanette Escobar, APRN   4 mg at 07/13/23 0357    pantoprazole (PROTONIX) EC tablet 40 mg  40 mg Oral BID HosHaris duran MD   40 mg at 07/13/23 0838    predniSONE (DELTASONE) tablet 20 mg  20 mg Oral Daily Haris Levine MD   20 mg at 07/13/23 0838    promethazine-dextromethorphan (PROMETHAZINE-DM) 6.25-15 MG/5ML syrup 5 mL  5 mL Per J Tube 4x Daily PRN Haris Levine MD        sennosides-docusate (PERICOLACE) 8.6-50 MG per tablet 2 tablet  2 tablet Per J Tube BID HosHaris duran MD        sodium chloride 0.9 % flush 10 mL  10 mL Intravenous PRN Eliseo Laboy MD             ALLERGIES: Cipro, Compazine, domperidone, Haldol, metoclopramide, sulfa, anesthetic amide    FAMILY  HISTORY: The patient's mother suffered from depression    SOCIAL HISTORY: Patient lives with her .  She does not work outside the home.  She completed the 10th grade.  She denies abuse of any psychoactive substances.    MENTAL STATUS EXAM: The patient is an ill-appearing white female who is abed.  She is awake alert and oriented all spheres.  Her mood is dysphoric her affect flat.  Speech is relevant coherent.  There are no deficits memory or cognition noted.  Intelligence is judged to be in the average range based on fund of knowledge, the patient is cooperative throughout the interview.  She denies current suicidal or homicidal ideation or psychotic features.  Judgement and insight are intact.    ASSETS/LIABILITIES: Supportive family/multiple health issues    DIAGNOSTIC IMPRESSION: Major depressive disorder recurrent moderate, medical problems as described previously    PLAN: The patient is willing to begin a trial of Lexapro which I will again at a conservative dose of 5 mg to be given per J-tube daily.  I have explained the latency of onset of this medication to her and will ask the access center to assist her with arrangement for psychiatric follow-up though she seems rather negative regarding this suggestion.    Electronically signed by Enrike Laura III, MD at 23 1242       Ganesh Guzman MD at 23 1240           [facility]   HISTORY AND PHYSICAL    Patient Name: Henrietta Garrett  : 1995  MRN: [id number]  Primary Care Physician:  Fanny Jarrett MD  Date of admission: 2023    Subjective   Subjective     Chief Complaint: facial swelling     HPI:    Henrietta Garrett is a 28 y.o. female who presented to ER with complaint of facial swelling of one day.  Patient states that she woke up this morning with facial swelling and then presented to ER.  Patient denies any current tooth pain however reports that she has persistent emesis as a result of her  gastroparesis.  She also has several other co-morbidities that contribute.  After CT scan it demonstrated facial cellulitis along with several radiolucencies.  Patient reports that she does have a dentist she has consulted with however they were waiting for her nutrition levels to improve.  OMS consulted for evaluation     Review of Systems   All systems were reviewed and negative except for: except above cc/hpi    Personal History     Past Medical History:   Diagnosis Date    Anemia     Arthritis     RHEUMATOID    Asthma     CPRS 1 (complex regional pain syndrome I) of upper limb     Depression     EDS (Tomas-Danlos syndrome)     Fibromyalgia 2015    Gastroparesis     History of hyperkeratosis of skin     IBS (irritable bowel syndrome)     Leukopenia, mild     Poor vision     Raynauds syndrome     Sjogren's disease     SOB (shortness of breath)        Past Surgical History:   Procedure Laterality Date    COLONOSCOPY  12/11/2020    Dr. Barone    DENTAL PROCEDURE      EPIDURAL BLOCK      JEJUNOSTOMY TUBE INSERTION      SHOULDER SURGERY Bilateral     X2    TOE SURGERY Right 2011    3rd toe     UPPER GASTROINTESTINAL ENDOSCOPY  12/11/1920    Dr. Barone       Family History: family history includes Arthritis in her maternal uncle; Asthma in her brother and sister; Cancer in her maternal uncle and paternal grandmother; Colon cancer in her paternal grandmother; Diabetes in her father and maternal grandfather; Diabetes type II in her father; Tomas-Danlos syndrome in her sister; Heart disease in her maternal grandmother; Hyperlipidemia in her father; Leukemia in her maternal grandmother; Migraines in her sister; No Known Problems in her brother; Prostate cancer in her maternal grandfather; Rheum arthritis in her maternal grandmother and mother; Stroke in her maternal grandfather and maternal grandmother. Otherwise pertinent FHx was reviewed and not pertinent to current issue.    Social History:  reports that she has never  smoked. She has never used smokeless tobacco. She reports that she does not drink alcohol and does not use drugs.    Home Medications:  EPINEPHrine, Levonorgestrel-Eth Estradiol, amLODIPine, dicyclomine, gabapentin, hydroxychloroquine, immune globulin (human), lactulose, leucovorin, methocarbamol, methotrexate PF, metoprolol succinate XL, multivitamin with minerals, nitroglycerin, pantoprazole, predniSONE, and promethazine-dextromethorphan      Allergies:  Allergies   Allergen Reactions    Anesthetics, Amide Nausea And Vomiting    Ciprofloxacin Other (See Comments)     EHERLIS STANDLIS? ILLNESS. UNABLE TO TAKE     Compazine [Prochlorperazine] Dystonia    Domperidone Other (See Comments)     Bad reaction per pt report    Haldol [Haloperidol] Other (See Comments)     Muscle spasms      Metoclopramide Nausea And Vomiting    Sulfa Antibiotics Other (See Comments)     Unable to take as causes leukopenia       Objective   Objective     Vitals:   Temp:  [98.8 °F (37.1 °C)-101.6 °F (38.7 °C)] 98.8 °F (37.1 °C)  Heart Rate:  [] 77  Resp:  [18] 18  BP: (107-162)/(71-92) 107/71  Physical Exam    Constitutional: Awake, alert   Eyes: PERRLA, sclerae anicteric, no conjunctival injection   HENT: NCAT, mucous membranes moist, facial edema with tenderness in right malar area   ORAL: multiple carious lesions, no palatal draping, SINAN 30mm, FOM soft without elevation, no palpable or visible purulence    Neck: Supple, no thyromegaly, no lymphadenopathy, trachea midline   Respiratory: Clear to auscultation bilaterally, nonlabored respirations    Cardiovascular: RRR, no murmurs, rubs, or gallops, palpable pedal pulses bilaterally   Gastrointestinal: Positive bowel sounds, soft, nontender, nondistended   Musculoskeletal: No bilateral ankle edema, no clubbing or cyanosis to extremities   Psychiatric: Appropriate affect, cooperative   Neurologic: Oriented x 3, strength symmetric in all extremities, Cranial Nerves grossly intact to  confrontation, speech clear   Skin: No rashes     Result Review    Result Review:  I have personally reviewed the results from the time of this admission to 7/12/2023 17:30 EDT and agree with these findings:  [x]  Laboratory list / accordion  [x]  Microbiology  [x]  Radiology  []  EKG/Telemetry   []  Cardiology/Vascular   []  Pathology  [x]  Old records  []  Other:  Most notable findings include: XR PANOREX-     Clinical: Poor dilatation with right facial swelling     FINDINGS: Maxillary sinuses are clear. Mandibular condyles are situated  within each respective condylar fossa. No acute osseous abnormality of  the mandible seen.        Poor dentition.     Lucency surrounding the roots of 2 teeth along the right maxilla and 2  perhaps 3 mandibular incisors to the left of midline. The findings are  consistent with root abscesses.     This report was finalized on 7/12/2023 5:09 PM by Dr. Beau Barnard M.D.    CT SCAN OF THE SOFT TISSUES OF THE NECK WITH CONTRAST     CLINICAL HISTORY:  Right-sided facial swelling.     TECHNIQUE: CT scan of the soft tissues of neck was obtained with 3 mm  axial images following the administration of IV contrast. Sagittal and  coronal reconstructed images were obtained.     FINDINGS:       There is prominent fat stranding within the right buccal space lateral  to the right aspect of the maxillary alveolar ridge and this  circumscribes the right parotid duct. This soft tissue swelling extends  into the right preseptal region as well as along the right aspect of the  nose. The right parotid gland has an unremarkable appearance. However,  there are prominent carious teeth and periodontal disease within the  adjacent maxillary alveolar ridge. As such, I suspect that the findings  are odontogenic in nature. In specific, in this region, there is a  markedly carious appearance of tooth #3 with prominent periapical  lucency compatible with a periapical abscess. Additionally, there is  a  prominently carious appearance of tooth #4. A prominent periapical  lucency is seen circumscribing tooth #7. Additional and carious teeth  are noted within the mandibular and maxillary alveolar ridge. There are  mildly enlarged level 1B and 2A lymph nodes are all likely benign and  reactive in nature. Additional scattered shotty lymph nodes are  identified scattered throughout the neck.     The visualized contents of the cranial vault are unremarkable. The left  orbit is within normal limits. The parotid glands, submandibular glands,  and sublingual glands are within normal limits. The nasopharynx is  unremarkable. There is mild prominence of the palatine tonsils.  Otherwise, the oropharynx, hypopharynx, cervical esophagus, and  visualized upper thoracic esophagus are within normal limits. The  epiglottis, aryepiglottic folds, glottis, and subglottic airway are  within normal limits. The thyroid gland is within normal limits. The  visualized lung apices are clear.     There is a right IJ approach Mediport catheter in place. Incidental note  is made of mild mucosal thickening within the maxillary sinuses.     IMPRESSION:     There is prominent fat stranding within the buccal space lateral to the  right aspect of the maxillary alveolar ridge and there is contiguous  soft tissue swelling extending into the right preseptal soft tissues as  well as the soft tissues along the right side of the nose. The findings  are most compatible with a cellulitis. There are are areas of periapical  lucencies as well compatible with periapical abscesses as discussed in  detail above. In specific, tooth #3 has a markedly carious appearance  with a prominent periapical lucency compatible with a periapical  abscess.      These findings were discussed with Dr. Tee on 07/12/2023 at  approximately 8:00 AM.     Radiation dose reduction techniques were utilized, including automated  exposure control and exposure modulation based on body  size.     This report was finalized on 7/12/2023 8:02 AM by Dr. Nav Hirsch M.D      Assessment & Plan   Assessment / Plan     Brief Patient Summary:  Henrietta Garrett is a 28 y.o. female with facial cellulitis and some noted non restorable teeth     Active Hospital Problems:  Active Hospital Problems    Diagnosis     **Facial cellulitis     Periapical abscess without sinus     Jejunostomy tube present     Sjogren's syndrome     Intestinal autonomic neuropathy     Raynaud's phenomenon     Gastroparesis     Tomas-Danlos syndrome     Irritable bowel syndrome with both constipation and diarrhea     Rheumatoid arthritis involving multiple sites     Systemic lupus erythematosus     Iron deficiency anemia     Fibromyalgia      Plan:   Cont current management per primary team   Cont IV unasyn   Pain control prn  Will plan for surgery tomorrow with my partner Dr Escoto    DVT prophylaxis:  Mechanical DVT prophylaxis orders are present.    CODE STATUS:    Code Status (Patient has no pulse and is not breathing): CPR (Attempt to Resuscitate)  Medical Interventions (Patient has pulse or is breathing): Full Support      Ganesh Guzman MD    Electronically signed by Ganesh Guzman MD at 07/12/23 1800

## 2023-07-14 NOTE — PROGRESS NOTES
Access Center follow up d/t depression; this writer reviewed chart and spoke with ROSELINE Cohen. Per RN, pt's tooth extraction occurred yesterday; she/patient struggled with pain and tearfulness post op but she was eventually able to achieve sleep around 2:30 AM.  Psychiatrist, Dr. Laura, saw patient yesterday and ordered Lexapro 5 mg; RN states this medication will be given today due to n.p.o. status yesterday.  Patient's mother is/was at bedside overnight; discharge plan is for patient to return home with her fiancé. No further needs/concerns noted at this time per RN and/or medical team; Access Center to continue following.

## 2023-07-14 NOTE — POST-PROCEDURE NOTE
From oral surgery standpoint patient can be discharged to home on oral antibiotics when cleared by medicine.  She can see me on an as-needed basis but should follow up with her dentist to plan for dentures in the future.

## 2023-07-14 NOTE — PLAN OF CARE
Goal Outcome Evaluation:  Plan of Care Reviewed With: patient        Progress: improving  Outcome Evaluation: vss and afebrile, tolerating j-tube feedings at 20cc/hr, no c/o n/v, j-tube pain medication given with some relief, tolerating salt water rinses using yanker, ambulated in givens x1 with walker, voiding well, no BM, iv at [facility] for iv abx

## 2023-07-14 NOTE — PROGRESS NOTES
Name: Henrietta Garrett ADMIT: 2023   : 1995  PCP: Fanny Jarrett MD    MRN: [id number] LOS: 2 days   AGE/SEX: 28 y.o. female  ROOM: [facility]     Subjective   Subjective   She is feeling a little bit better postop.  Pain is under little bit better control.    Objective   Objective   Vital Signs  Temp:  [97.5 °F (36.4 °C)-99.7 °F (37.6 °C)] 97.5 °F (36.4 °C)  Heart Rate:  [] 54  Resp:  [18-24] 18  BP: (115-165)/() 120/72  SpO2:  [96 %-100 %] 100 %  on  Flow (L/min):  [2] 2;   Device (Oxygen Therapy): nasal cannula  Body mass index is 23.25 kg/m².  Physical Exam  Constitutional:       General: She is not in acute distress.  HENT:      Head:      Comments: Swelling and erythema of face R > L, improved from prior; poor dentition  Cardiovascular:      Rate and Rhythm: Normal rate.      Pulses: Normal pulses.   Pulmonary:      Effort: Pulmonary effort is normal. No respiratory distress.   Abdominal:      General: Abdomen is flat. There is no distension.      Palpations: Abdomen is soft.      Comments: J-tube   Musculoskeletal:         General: Normal range of motion.   Neurological:      Mental Status: She is alert.       Results Review     I reviewed the patient's new clinical results.  Results from last 7 days   Lab Units 23  0653 23  0541   WBC 10*3/mm3 3.94 5.44   HEMOGLOBIN g/dL 9.4* 11.2*   PLATELETS 10*3/mm3 178 202       Results from last 7 days   Lab Units 23  0653 23  0541   SODIUM mmol/L 140 138   POTASSIUM mmol/L 4.2 3.6   CHLORIDE mmol/L 108* 105   CO2 mmol/L 22.9 22.0   BUN mg/dL 4* 7   CREATININE mg/dL 0.45* 0.66   GLUCOSE mg/dL 88 111*   EGFR mL/min/1.73 134.6 122.7       Results from last 7 days   Lab Units 23  0541   ALBUMIN g/dL 4.4   BILIRUBIN mg/dL 0.4   ALK PHOS U/L 51   AST (SGOT) U/L 15   ALT (SGPT) U/L 8       Results from last 7 days   Lab Units 23  0653 23  0541   CALCIUM mg/dL 9.3 9.4   ALBUMIN g/dL  --  4.4        Results from last 7 days   Lab Units 07/12/23  0541   PROCALCITONIN ng/mL 0.82*   LACTATE mmol/L 0.7       Glucose   Date/Time Value Ref Range Status   07/14/2023 0553 97 70 - 130 mg/dL Final     Comment:     Meter: [id number] : [id number] Christine Vaz NA   07/14/2023 0015 123 70 - 130 mg/dL Final     Comment:     Meter: [id number] : [id number] Christine Vaz NA   07/13/2023 1213 97 70 - 130 mg/dL Final     Comment:     Meter: [id number] : [id number] Thang Alexis NA   07/13/2023 0623 83 70 - 130 mg/dL Final     Comment:     Meter: [id number] : [id number] Rashad Kyle    07/12/2023 2342 117 70 - 130 mg/dL Final     Comment:     Meter: [id number] : [id number] [place] Anabella    07/12/2023 1711 73 70 - 130 mg/dL Final     Comment:     Meter: [id number] : [id number] Vivian POWERS       No radiology results for the last day  Scheduled Medications  ampicillin-sulbactam, 3 g, Intravenous, Q6H  dicyclomine, 10 mg, Per J Tube, 4x Daily  escitalopram, 5 mg, Per J Tube, Daily  famotidine, 20 mg, Intravenous, Q12H  Gabapentin, 200 mg, Per J Tube, Q8H  hydroxychloroquine, 200 mg, Oral, BID  leucovorin, 5 mg, Oral, Weekly  metoprolol succinate XL, 12.5 mg, Oral, Nightly  multivitamin with minerals, 1 tablet, Oral, Daily  nitroglycerin, 0.5 inch, Topical, BID  ondansetron, 4 mg, Intravenous, Once  pantoprazole, 40 mg, Oral, BID  predniSONE, 20 mg, Oral, Daily  senna-docusate sodium, 2 tablet, Per J Tube, BID    Infusions  lactated ringers, 100 mL/hr, Last Rate: 100 mL/hr (07/14/23 0523)  lactated ringers, 9 mL/hr, Last Rate: 9 mL/hr (07/13/23 1646)    Diet  NPO Diet NPO Type: Strict NPO       Assessment/Plan     Active Hospital Problems    Diagnosis  POA    **Facial cellulitis [L03.211]  Yes    Periapical abscess without sinus [K04.7]  Yes    Jejunostomy tube present [Z93.4]  Not Applicable    Sjogren's syndrome [M35.00]  Yes    Intestinal autonomic neuropathy [G90.8]  Yes    Raynaud's phenomenon  [I73.00]  Yes    Gastroparesis [K31.84]  Yes    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       28 y.o. female admitted with Facial cellulitis.      07/14/23  Continue antibiotics and pain control.  Possible DC tomorrow if improved    Multiple periapical abscess  Facial cellulitis  Right canine space cellulitis with dental caries  -Panorex showing lucencies surrounding the roots of 2 teeth along right maxilla and 3 mandibular incisors left of midline  -Status post tooth #3, 4, 7 extraction  -cont Unasyn; will discharge with Augmentin    Jejunostomy tube present  -Nutrition following for tube feeds     Intestinal autonomic neuropathy  Gastroparesis  IBS syndrome with both constipation and diarrhea  -lactulose, Protonix, Bentyl      Fibromyalgia  Tomas-Danlos syndrome  Rheumatoid arthritis involving multiple sites  Systemic lupus erythematosus  Raynaud's phenomenon  -resume leucovorin, Plaquenil, gabapentin, prednisone, IVIG    SCDs for DVT prophylaxis.  Discussed with patient and nursing staff.  Anticipate discharge home in 1-2 days      Haris Levine MD  [facility]  07/14/23  07:57 EDT

## 2023-07-14 NOTE — PLAN OF CARE
Problem: Adult Inpatient Plan of Care  Goal: Plan of Care Review  Outcome: Ongoing, Progressing  Flowsheets (Taken 7/14/2023 0404)  Progress: no change  Plan of Care Reviewed With: patient  Outcome Evaluation: received from PACU  awke and alert, anxious, moaning in pain, pain 7/10, biting on gauze, small bloody drainage noted, mild oral suctioning done, swelling Rt side of face noted, NPO, IVF and IV antibiotics continued, tube feeds restarted, Pt on [employer] [place] Peptide 1.5 started at 20 ml per hour, with 30 ml water flushes every 4 hours, Pt reports she has only been tolerating her tube feeds at 20 ml/ H,  medicated with Norco, Morphine and Robaxin along with her home time meds, for constant aching, throbbing, sharp pains, Rt side of face, finally was able to sleep after 0230 am, bradycardiec at times, Pt has Hx of POTS and takes Metoprolol, 100 % on 2L of oxygen, voiding, ambulated to the bathroom with 2 assist, very unsteady, will continue to monitor   Goal Outcome Evaluation:  Plan of Care Reviewed With: patient        Progress: no change  Outcome Evaluation: received from PACU  awke and alert, anxious, moaning in pain, pain 7/10, biting on gauze, small bloody drainage noted, mild oral suctioning done, swelling Rt side of face noted, NPO, IVF and IV antibiotics continued, tube feeds restarted, Pt on [employer] [place] Peptide 1.5 started at 20 ml per hour, with 30 ml water flushes every 4 hours, Pt reports she has only been tolerating her tube feeds at 20 ml/ H,  medicated with Norco, Morphine and Robaxin along with her home time meds, for constant aching, throbbing, sharp pains, Rt side of face, finally was able to sleep after 0230 am, bradycardiec at times, Pt has Hx of POTS and takes Metoprolol, 100 % on 2L of oxygen, voiding, ambulated to the bathroom with 2 assist, very unsteady, will continue to monitor

## 2023-07-14 NOTE — PROGRESS NOTES
The patient communicates with some difficulty after undergoing dental extraction yesterday.  She states that she slept poorly last night and is continuing to complain of some pain but denies any suicidal ideation.  She began Lexapro per J-tube yesterday.  I will order as needed Ambien for sleep.

## 2023-07-15 VITALS
WEIGHT: 166.01 LBS | OXYGEN SATURATION: 99 % | HEART RATE: 61 BPM | BODY MASS INDEX: 23.24 KG/M2 | SYSTOLIC BLOOD PRESSURE: 112 MMHG | DIASTOLIC BLOOD PRESSURE: 79 MMHG | TEMPERATURE: 97.1 F | HEIGHT: 71 IN | RESPIRATION RATE: 18 BRPM

## 2023-07-15 LAB
GLUCOSE BLDC GLUCOMTR-MCNC: 113 MG/DL (ref 70–130)
GLUCOSE BLDC GLUCOMTR-MCNC: 70 MG/DL (ref 70–130)
GLUCOSE BLDC GLUCOMTR-MCNC: 93 MG/DL (ref 70–130)

## 2023-07-15 PROCEDURE — 63710000001 PREDNISONE PER 1 MG: Performed by: DENTIST

## 2023-07-15 PROCEDURE — 25010000002 AMPICILLIN-SULBACTAM PER 1.5 G: Performed by: DENTIST

## 2023-07-15 PROCEDURE — 82948 REAGENT STRIP/BLOOD GLUCOSE: CPT

## 2023-07-15 RX ORDER — IBUPROFEN 800 MG/1
800 TABLET ORAL EVERY 8 HOURS SCHEDULED
Status: DISCONTINUED | OUTPATIENT
Start: 2023-07-15 | End: 2023-07-15 | Stop reason: HOSPADM

## 2023-07-15 RX ORDER — IBUPROFEN 800 MG/1
800 TABLET ORAL EVERY 8 HOURS PRN
Qty: 15 TABLET | Refills: 0 | Status: SHIPPED | OUTPATIENT
Start: 2023-07-15 | End: 2023-07-20

## 2023-07-15 RX ORDER — IBUPROFEN 800 MG/1
800 TABLET ORAL EVERY 8 HOURS SCHEDULED
Qty: 21 TABLET | Refills: 0 | Status: SHIPPED | OUTPATIENT
Start: 2023-07-15 | End: 2023-07-15 | Stop reason: SDUPTHER

## 2023-07-15 RX ORDER — ANORECTAL OINTMENT 15.7; .44; 24; 20.6 G/100G; G/100G; G/100G; G/100G
1 OINTMENT TOPICAL 2 TIMES DAILY
Qty: 71 G | Refills: 0 | Status: SHIPPED | OUTPATIENT
Start: 2023-07-15

## 2023-07-15 RX ORDER — AMOXICILLIN 250 MG
2 CAPSULE ORAL 2 TIMES DAILY
Qty: 120 TABLET | Refills: 0 | Status: SHIPPED | OUTPATIENT
Start: 2023-07-15

## 2023-07-15 RX ORDER — AMOXICILLIN AND CLAVULANATE POTASSIUM 875; 125 MG/1; MG/1
1 TABLET, FILM COATED ORAL 2 TIMES DAILY
Qty: 12 TABLET | Refills: 0 | Status: SHIPPED | OUTPATIENT
Start: 2023-07-15 | End: 2023-07-21

## 2023-07-15 RX ORDER — ESCITALOPRAM OXALATE 5 MG/1
5 TABLET ORAL DAILY
Qty: 30 TABLET | Refills: 1 | Status: SHIPPED | OUTPATIENT
Start: 2023-07-15

## 2023-07-15 RX ADMIN — HYDROCODONE BITARTRATE AND ACETAMINOPHEN 10 ML: 7.5; 325 SOLUTION ORAL at 06:59

## 2023-07-15 RX ADMIN — ESCITALOPRAM 5 MG: 5 TABLET, FILM COATED ORAL at 10:09

## 2023-07-15 RX ADMIN — PREDNISONE 20 MG: 20 TABLET ORAL at 10:09

## 2023-07-15 RX ADMIN — MULTIPLE VITAMINS W/ MINERALS TAB 1 TABLET: TAB at 10:09

## 2023-07-15 RX ADMIN — AMPICILLIN SODIUM AND SULBACTAM SODIUM 3 G: 2; 1 INJECTION, POWDER, FOR SOLUTION INTRAMUSCULAR; INTRAVENOUS at 04:58

## 2023-07-15 RX ADMIN — DICYCLOMINE HYDROCHLORIDE 10 MG: 10 CAPSULE ORAL at 10:10

## 2023-07-15 RX ADMIN — PANTOPRAZOLE SODIUM 40 MG: 40 TABLET, DELAYED RELEASE ORAL at 10:10

## 2023-07-15 RX ADMIN — AMPICILLIN SODIUM AND SULBACTAM SODIUM 3 G: 2; 1 INJECTION, POWDER, FOR SOLUTION INTRAMUSCULAR; INTRAVENOUS at 10:09

## 2023-07-15 RX ADMIN — SENNOSIDES AND DOCUSATE SODIUM 2 TABLET: 50; 8.6 TABLET ORAL at 10:11

## 2023-07-15 RX ADMIN — GABAPENTIN 200 MG: 250 SOLUTION ORAL at 06:59

## 2023-07-15 RX ADMIN — HYDROXYCHLOROQUINE SULFATE 200 MG: 200 TABLET ORAL at 10:09

## 2023-07-15 RX ADMIN — FAMOTIDINE 20 MG: 10 INJECTION INTRAVENOUS at 10:11

## 2023-07-15 NOTE — DISCHARGE SUMMARY
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        Brief Admitting HPI     Ms. Garrett is a 28 y.o. history of lupus,sjorgrens, RA, Raynaud's disease, Tomas-Danlos syndrome, POTS, IBS,GT,GERD, fibromyalgia, autonomic nervous system disorder, gastroparesis, with jejunostomy tube that was just discharged on 23 for treatment of jtube leak from PDBO corrected by general surgery. She presents with acute facial swelling and dental pain.  Pain is located primarily at right lateral maxillary incisor.  She had mild discomfort yesterday but today it is severe with subjective fever.  She denies trouble swallowing, tolerating her secretions or shortness of breath.  She has been on clindamycin for 2 days with no relief.  Patient has significantly poor dentition but her dentist does not want to intervene until her nutrition has improved (Jtube for intermittent feedings).  She has been referred to [facility] as outpatient  but has not yet obtained appt.     Hospital Course     Pt admitted for facial cellulitis.  She was seen in consultation  with [facility] and was noted to have right canine space cellulitis and dental caries.  She had teeth numbers 3, 4, 7 extracted.  She had improvement in her facial swelling as well as pain.  She will be discharged with Augmentin to complete a course of antibiotics.  Noted that she needs to follow-up with her dentist to plan for dentures in the future per [facility].  In addition she was seen by psychiatry during hospitalization given depression and they started her on Lexapro.  She was able to tolerate diet per J-tube and is ambulatory independently.  She is stable for discharge home at this time.      Discharge Plan     Multiple periapical abscess  Facial cellulitis  Right canine space cellulitis with dental caries  -Panorex showing lucencies surrounding the roots of 2 teeth along right maxilla and 3 mandibular incisors left of midline  -Status post tooth #3, 4, 7 extraction  -discharge with Augmentin     Jejunostomy tube present  -Continuous tube feeds     Intestinal autonomic neuropathy  Gastroparesis  IBS syndrome with both constipation and diarrhea  -lactulose, Protonix, Bentyl      Fibromyalgia  Tomas-Danlos syndrome  Rheumatoid arthritis involving multiple sites  Systemic lupus erythematosus  Raynaud's phenomenon  -resume leucovorin, Plaquenil, gabapentin, prednisone, IVIG    Day of Discharge     Physical Exam:  Temp:  [96.8 °F (36 °C)-97 °F (36.1 °C)] 96.8 °F (36 °C)  Heart Rate:  [51-58] 51  Resp:  [18] 18  BP: (101-140)/(66-90) 101/66  Body mass index is 23.15 kg/m².  Physical Exam  Constitutional:       General: She is not in acute distress.  HENT:      Head:      Comments: Swelling and erythema of face R > L, improved from prior; poor dentition  Cardiovascular:      Rate and Rhythm: Normal rate.      Pulses: Normal pulses.   Pulmonary:      Effort: Pulmonary effort is normal. No respiratory distress.   Abdominal:      General: Abdomen is flat. There is no distension.      Palpations: Abdomen is soft.      Comments:  J-tube   Musculoskeletal:         General: Normal range of motion.   Neurological:      Mental Status: She is alert.     Consultants     Consult Orders (all) (From admission, onward)       Start     Ordered    07/12/23 2041  Inpatient Psychiatrist Consult  Once        Specialty:  Psychiatry  Provider:  Enrike Laura III, MD    07/12/23 2056 07/12/23 1812  Inpatient Access Center Consult  Once        Provider:  (Not yet assigned)    07/12/23 1811 07/12/23 1033  Inpatient Nutrition Consult  Once        Comments: Jtube feedings   Provider:  (Not yet assigned)    07/12/23 1033 07/12/23 1030  Inpatient Oral & Maxillofacial Surgery Consult  Once        Specialty:  Oral Surgery  Provider:  Ganesh Guzman MD    07/12/23 1031 07/12/23 0814  [facility] (on-call MD unless specified) Details  Once        Specialty:  Hospitalist  Provider:  Jeanette Escobar APRN    07/12/23 0814                  Procedures     TOOTH EXTRACTION      Imaging Results (All)       Procedure Component Value Units Date/Time    XR Panorex [[id number]] Collected: 07/12/23 1705     Updated: 07/12/23 1712    Narrative:      XR PANOREX-     Clinical: Poor dilatation with right facial swelling     FINDINGS: Maxillary sinuses are clear. Mandibular condyles are situated  within each respective condylar fossa. No acute osseous abnormality of  the mandible seen.        Poor dentition.     Lucency surrounding the roots of 2 teeth along the right maxilla and 2  perhaps 3 mandibular incisors to the left of midline. The findings are  consistent with root abscesses.     This report was finalized on 7/12/2023 5:09 PM by Dr. Beau Barnard M.D.       CT Soft Tissue Neck With Contrast [[id number]] Collected: 07/12/23 0739     Updated: 07/12/23 0806    Narrative:      CT SCAN OF THE SOFT TISSUES OF THE NECK WITH CONTRAST     CLINICAL HISTORY:  Right-sided facial swelling.     TECHNIQUE: CT scan of the soft tissues of neck was obtained with 3  mm  axial images following the administration of IV contrast. Sagittal and  coronal reconstructed images were obtained.     FINDINGS:       There is prominent fat stranding within the right buccal space lateral  to the right aspect of the maxillary alveolar ridge and this  circumscribes the right parotid duct. This soft tissue swelling extends  into the right preseptal region as well as along the right aspect of the  nose. The right parotid gland has an unremarkable appearance. However,  there are prominent carious teeth and periodontal disease within the  adjacent maxillary alveolar ridge. As such, I suspect that the findings  are odontogenic in nature. In specific, in this region, there is a  markedly carious appearance of tooth #3 with prominent periapical  lucency compatible with a periapical abscess. Additionally, there is a  prominently carious appearance of tooth #4. A prominent periapical  lucency is seen circumscribing tooth #7. Additional and carious teeth  are noted within the mandibular and maxillary alveolar ridge. There are  mildly enlarged level 1B and 2A lymph nodes are all likely benign and  reactive in nature. Additional scattered shotty lymph nodes are  identified scattered throughout the neck.     The visualized contents of the cranial vault are unremarkable. The left  orbit is within normal limits. The parotid glands, submandibular glands,  and sublingual glands are within normal limits. The nasopharynx is  unremarkable. There is mild prominence of the palatine tonsils.  Otherwise, the oropharynx, hypopharynx, cervical esophagus, and  visualized upper thoracic esophagus are within normal limits. The  epiglottis, aryepiglottic folds, glottis, and subglottic airway are  within normal limits. The thyroid gland is within normal limits. The  visualized lung apices are clear.     There is a right IJ approach Mediport catheter in place. Incidental note  is made of mild mucosal thickening within the  maxillary sinuses.       Impression:         There is prominent fat stranding within the buccal space lateral to the  right aspect of the maxillary alveolar ridge and there is contiguous  soft tissue swelling extending into the right preseptal soft tissues as  well as the soft tissues along the right side of the nose. The findings  are most compatible with a cellulitis. There are are areas of periapical  lucencies as well compatible with periapical abscesses as discussed in  detail above. In specific, tooth #3 has a markedly carious appearance  with a prominent periapical lucency compatible with a periapical  abscess.      These findings were discussed with Dr. Tee on 07/12/2023 at  approximately 8:00 AM.     Radiation dose reduction techniques were utilized, including automated  exposure control and exposure modulation based on body size.     This report was finalized on 7/12/2023 8:02 AM by Dr. Nav Hirsch M.D.             Results for orders placed during the hospital encounter of 08/20/22    Duplex Venous Upper Extremity - Right    Interpretation Summary  · Normal right upper extremity venous duplex scan.      Pertinent Labs     Results from last 7 days   Lab Units 07/13/23  0653 07/12/23  0541   WBC 10*3/mm3 3.94 5.44   HEMOGLOBIN g/dL 9.4* 11.2*   PLATELETS 10*3/mm3 178 202     Results from last 7 days   Lab Units 07/13/23  0653 07/12/23  0541   SODIUM mmol/L 140 138   POTASSIUM mmol/L 4.2 3.6   CHLORIDE mmol/L 108* 105   CO2 mmol/L 22.9 22.0   BUN mg/dL 4* 7   CREATININE mg/dL 0.45* 0.66   GLUCOSE mg/dL 88 111*   EGFR mL/min/1.73 134.6 122.7     Results from last 7 days   Lab Units 07/12/23  0541   ALBUMIN g/dL 4.4   BILIRUBIN mg/dL 0.4   ALK PHOS U/L 51   AST (SGOT) U/L 15   ALT (SGPT) U/L 8     Results from last 7 days   Lab Units 07/13/23  0653 07/12/23  0541   CALCIUM mg/dL 9.3 9.4   ALBUMIN g/dL  --  4.4               Invalid input(s): LDLCALC  Results from last 7 days   Lab Units 07/12/23  1112  07/12/23  0833   BLOODCX  No growth at 2 days No growth at 3 days         Test Results Pending at Discharge     Pending Labs       Order Current Status    Blood Culture - Blood, Arm, Left Preliminary result    Blood Culture - Blood, Arm, Right Preliminary result            Discharge Details        Discharge Medications        New Medications        Instructions Start Date   amoxicillin-clavulanate 875-125 MG per tablet  Commonly known as: AUGMENTIN   1 tablet, Per J Tube, 2 Times Daily      Calmoseptine 0.44-20.6 % ointment  Generic drug: Menthol-Zinc Oxide   1 application , Topical, 2 Times Daily      escitalopram 5 MG tablet  Commonly known as: LEXAPRO   5 mg, Per J Tube, Daily      HYDROcodone-acetaminophen 7.5-325 MG/15ML solution  Commonly known as: HYCET   10 mL, Per J Tube, Every 4 Hours PRN      ibuprofen 800 MG tablet  Commonly known as: ADVIL,MOTRIN   800 mg, Oral, Every 8 Hours PRN      sennosides-docusate 8.6-50 MG per tablet  Commonly known as: PERICOLACE   2 tablets, Per J Tube, 2 Times Daily, Hold for loose stool             Continue These Medications        Instructions Start Date   amLODIPine 10 MG tablet  Commonly known as: NORVASC   10 mg, Oral, Daily      dicyclomine 10 MG/5ML syrup  Commonly known as: BENTYL   10 mg, Oral, 4 Times Daily Before Meals & Nightly      EPINEPHrine 0.3 MG/0.3ML solution auto-injector injection  Commonly known as: EPIPEN   0.3 mg, Intramuscular, As Needed      gabapentin 100 MG capsule  Commonly known as: NEURONTIN   200 mg, 3 Times Daily      Gammaplex 10 GM/200ML solution infusion  Generic drug: immune globulin (human)   800 mL, 0 Refill(s)      hydroxychloroquine 200 MG tablet  Commonly known as: PLAQUENIL   200 mg, Oral, 2 Times Daily      lactulose 10 GM/15ML solution  Commonly known as: CHRONULAC   10 g, Per J Tube, As Needed      leucovorin 5 MG tablet   1 tablet, Oral, Weekly, Mondays      methocarbamol 750 MG tablet  Commonly known as: ROBAXIN   750 mg, Oral,  3 Times Daily PRN      methotrexate PF 50 MG/2ML chemo syringe   50 mg, Intramuscular, Weekly, Wednesdays      metoprolol succinate XL 25 MG 24 hr tablet  Commonly known as: TOPROL-XL   0.5 tablets, Oral, Nightly      multivitamin with minerals tablet tablet   1 tablet, Oral, Daily      Nitro-Bid 2 % ointment  Generic drug: nitroglycerin   APPLY BY TRANSDERMAL ROUTE TO FINGER WEBS FOR RAYNAUD'S EVERY 12 HOURS AND REMOVE AT BEDTIME      pantoprazole 40 MG EC tablet  Commonly known as: PROTONIX   40 mg, Oral, 2 Times Daily      predniSONE 20 MG tablet  Commonly known as: DELTASONE   20 mg, Oral, Daily      promethazine-dextromethorphan 6.25-15 MG/5ML syrup  Commonly known as: PROMETHAZINE-DM   5 mL, Per J Tube, 4 Times Daily PRN      Twirla 120-30 MCG/24HR patch weekly  Generic drug: Levonorgestrel-Eth Estradiol   1 patch, Transdermal, Weekly               Allergies   Allergen Reactions    Anesthetics, Amide Nausea And Vomiting    Ciprofloxacin Other (See Comments)     EHERLIS STANDLIS? ILLNESS. UNABLE TO TAKE     Compazine [Prochlorperazine] Dystonia    Domperidone Other (See Comments)     Bad reaction per pt report    Droperidol Other (See Comments)     Tardive dyskinesia    Haldol [Haloperidol] Other (See Comments)     Muscle spasms      Metoclopramide Nausea And Vomiting    Sulfa Antibiotics Other (See Comments)     Unable to take as causes leukopenia       Discharge Disposition:  Home or Self Care      Discharge Diet:  Diet Order   Procedures    Diet: Regular/House Diet; Texture: Regular Texture (IDDSI 7); Fluid Consistency: Thin (IDDSI 0)       Discharge Activity:       CODE STATUS:    Code Status and Medical Interventions:   Ordered at: 07/12/23 0937     Code Status (Patient has no pulse and is not breathing):    CPR (Attempt to Resuscitate)     Medical Interventions (Patient has pulse or is breathing):    Full Support       Future Appointments   Date Time Provider Department Center   7/26/2023  9:00 AM  Mirna Burciaga PA-C [facility]   8/20/2023  7:30 PM [facility] SLEEP [facility]   9/25/2023  7:00 AM [facility] CARD ECHO CART 1  [facility]I [facility]   9/25/2023  8:15 AM [facility] CT 2 [facility]   12/13/2023  3:00 PM Malini Barone MD [facility]      Follow-up Information       Trae Escoto, DMD Follow up.    Specialty: Oral Surgery  Why: As needed  Contact information:  [address]  [facility] [address]  [facility] [id number] [phone]               Fanny Jarrett MD .    Specialty: Family Medicine  Contact information:  [address]  [place] [id number] [phone]                             Time Spent on Discharge:  Greater than 30 minutes spent on discharge management including final examination, discussion of hospital stay and patient education, preparation of records, medication reconciliation, follow up planning      Haris Levine MD  [facility]  07/15/23  08:52 EDT

## 2023-07-15 NOTE — PLAN OF CARE
Goal Outcome Evaluation:  Plan of Care Reviewed With: patient        Progress: improving  Outcome Evaluation: iv @kvo for iv abx, vdg, up with assist-falls protocol, accumax, right side of face sl swollen-med and ice packs for comfort, gargling per order, jt with myles narayanan-following home dose of 20/hr-does not want it increased, family @ [facility], radha d/c today

## 2023-07-15 NOTE — NURSING NOTE
[facility] follow-up regarding depression.  Patient awake in bed, RN at bedside-she is discharging home.  Patient affect flat smiles some with conversation.  She reports feeling sad and anxious regarding health.  She denies SI.  She was initiated on Lexapro per psychiatrist.  She was encouraged to follow-up with her family physician and/or a psychiatrist for future refills and was provided with additional resources for same.    Access will sign off, call if any questions.

## 2023-07-15 NOTE — PLAN OF CARE
Goal Outcome Evaluation:  No c/o pain, trace facial swelling, tube feed clamped per patient, easily flushed after meds, voiding w/o difficulty, declined dressing change around J tube, states she will do at home, up with assist, d/c home  Plan of Care Reviewed With: patient, sibling

## 2023-07-15 NOTE — CASE MANAGEMENT/SOCIAL WORK
Case Management Discharge Note      Final Note: DC Home    Provided Post Acute Provider List?: N/A  N/A Provider List Comment: pt does not anticipate the need for HH / SNF    Selected Continued Care - Discharged on 7/15/2023 Admission date: 7/12/2023 - Discharge disposition: Home or Self Care      Destination    No services have been selected for the patient.                Durable Medical Equipment    No services have been selected for the patient.                Dialysis/Infusion    No services have been selected for the patient.                Home Medical Care    No services have been selected for the patient.                Therapy    No services have been selected for the patient.                Community Resources    No services have been selected for the patient.                Community & DME    No services have been selected for the patient.                    Selected Continued Care - Prior Encounters Includes continued care and service providers with selected services from prior encounters from 4/13/2023 to 7/15/2023      Discharged on 7/7/2023 Admission date: 7/4/2023 - Discharge disposition: Home or Self Care      Dialysis/Infusion       Service Provider Selected Services Address Phone Fax Patient [facility]    [facility] [address] [place] [address] [phone] --                               Final Discharge Disposition Code: 01 - home or self-care

## 2023-07-16 NOTE — PAYOR COMM NOTE
"Henrietta Garrett (28 y.o. Female)                                   ATTENTION; DISCHARGE CASE REF [id number]                                REPLY TO UR DEPT  [phone] -           Date of Birth   1995    Social Security Number       [facility] [place] [address]    Home Phone   [phone]    MRN   [id number]       Yarsani   Non-Yazidi    Marital Status   Single                            Admission Date   23    Admission Type   Emergency    Admitting Provider   Haris Levine MD    Attending Provider       Department, Room/Bed   [address], P699/1       Discharge Date   7/15/2023    Discharge Disposition   Home or Self Care    Discharge Destination                                 Attending Provider: (none)   Allergies: Anesthetics, Amide, Ciprofloxacin, Compazine [Prochlorperazine], Domperidone, Droperidol, Haldol [Haloperidol], Metoclopramide, Sulfa Antibiotics    Isolation: None   Infection: None   Code Status: Prior    Ht: 180.3 cm (71\")   Wt: 75.3 kg (166 lb 0.1 oz)    Admission Cmt: None   Principal Problem: Facial cellulitis [L03.211]                   Active Insurance as of 2023       Primary Coverage       Payor Plan Insurance Group Employer/Plan Group    ANTHEM BLUE CROSS ANTHEM BLUE CROSS BLUE SHIELD PPO [id number]       Payor Plan Address Payor Plan Phone Number Payor Plan Fax Number Effective Dates    [address] [phone]  2023 - None Entered    [place] [address]         Subscriber Name Subscriber Birth Date Member ID       NETTA SINGH [id number]                     Emergency Contacts        (Rel.) Home Phone Work Phone Mobile Phone    Miley Singh (Mother) [phone] -- [phone]    Flaco Redman -- -- --                 Discharge Summary        Haris Levine MD at 07/15/23 0852              Patient Name: Henrietta Garrett  : 1995  MRN: " [id number]    Date of Admission: 7/12/2023  Date of Discharge:  7/15/2023  Primary Care Physician: Fanny Jarrett MD      Chief Complaint:   Dental Pain      Discharge Diagnoses     Active Hospital Problems    Diagnosis  POA    **Facial cellulitis [L03.211]  Yes    Periapical abscess without sinus [K04.7]  Yes    Jejunostomy tube present [Z93.4]  Not Applicable    Sjogren's syndrome [M35.00]  Yes    Intestinal autonomic neuropathy [G90.8]  Yes    Raynaud's phenomenon [I73.00]  Yes    Gastroparesis [K31.84]  Yes    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        Brief Admitting HPI     Ms. Garrett is a 28 y.o. history of lupus,sjorgrens, RA, Raynaud's disease, Tomas-Danlos syndrome, POTS, IBS,GT,GERD, fibromyalgia, autonomic nervous system disorder, gastroparesis, with jejunostomy tube that was just discharged on 7/7/23 for treatment of jtube leak from PDBO corrected by general surgery. She presents with acute facial swelling and dental pain.  Pain is located primarily at right lateral maxillary incisor.  She had mild discomfort yesterday but today it is severe with subjective fever.  She denies trouble swallowing, tolerating her secretions or shortness of breath.  She has been on clindamycin for 2 days with no relief.  Patient has significantly poor dentition but her dentist does not want to intervene until her nutrition has improved (Jtube for intermittent feedings).  She has been referred to [facility] as outpatient  but has not yet obtained appt.     Hospital Course     Pt admitted for facial cellulitis.  She was seen in consultation with OMFS and was noted to have right canine space  cellulitis and dental caries.  She had teeth numbers 3, 4, 7 extracted.  She had improvement in her facial swelling as well as pain.  She will be discharged with Augmentin to complete a course of antibiotics.  Noted that she needs to follow-up with her dentist to plan for dentures in the future per OMFS.  In addition she was seen by psychiatry during hospitalization given depression and they started her on Lexapro.  She was able to tolerate diet per J-tube and is ambulatory independently.  She is stable for discharge home at this time.      Discharge Plan     Multiple periapical abscess  Facial cellulitis  Right canine space cellulitis with dental caries  -Panorex showing lucencies surrounding the roots of 2 teeth along right maxilla and 3 mandibular incisors left of midline  -Status post tooth #3, 4, 7 extraction  -discharge with Augmentin     Jejunostomy tube present  -Continuous tube feeds     Intestinal autonomic neuropathy  Gastroparesis  IBS syndrome with both constipation and diarrhea  -lactulose, Protonix, Bentyl      Fibromyalgia  Tomas-Danlos syndrome  Rheumatoid arthritis involving multiple sites  Systemic lupus erythematosus  Raynaud's phenomenon  -resume leucovorin, Plaquenil, gabapentin, prednisone, IVIG    Day of Discharge     Physical Exam:  Temp:  [96.8 °F (36 °C)-97 °F (36.1 °C)] 96.8 °F (36 °C)  Heart Rate:  [51-58] 51  Resp:  [18] 18  BP: (101-140)/(66-90) 101/66  Body mass index is 23.15 kg/m².  Physical Exam  Constitutional:       General: She is not in acute distress.  HENT:      Head:      Comments: Swelling and erythema of face R > L, improved from prior; poor dentition  Cardiovascular:      Rate and Rhythm: Normal rate.      Pulses: Normal pulses.   Pulmonary:      Effort: Pulmonary effort is normal. No respiratory distress.   Abdominal:      General: Abdomen is flat. There is no distension.      Palpations: Abdomen is soft.      Comments: J-tube   Musculoskeletal:         General: Normal  range of motion.   Neurological:      Mental Status: She is alert.     Consultants     Consult Orders (all) (From admission, onward)       Start     Ordered    07/12/23 2041  Inpatient Psychiatrist Consult  Once        Specialty:  Psychiatry  Provider:  Enrike Laura III, MD    07/12/23 2056 07/12/23 1812  Inpatient Access Center Consult  Once        Provider:  (Not yet assigned)    07/12/23 1811 07/12/23 1033  Inpatient Nutrition Consult  Once        Comments: Jtube feedings   Provider:  (Not yet assigned)    07/12/23 1033 07/12/23 1030  Inpatient Oral & Maxillofacial Surgery Consult  Once        Specialty:  Oral Surgery  Provider:  Ganesh Guzman MD    07/12/23 1031    07/12/23 0814  [facility] (on-call MD unless specified) Details  Once        Specialty:  Hospitalist  Provider:  Jeanette Escobar APRN    07/12/23 0814                  Procedures     TOOTH EXTRACTION      Imaging Results (All)       Procedure Component Value Units Date/Time    XR Panorex [[id number]] Collected: 07/12/23 1705     Updated: 07/12/23 1712    Narrative:      XR PANOREX-     Clinical: Poor dilatation with right facial swelling     FINDINGS: Maxillary sinuses are clear. Mandibular condyles are situated  within each respective condylar fossa. No acute osseous abnormality of  the mandible seen.        Poor dentition.     Lucency surrounding the roots of 2 teeth along the right maxilla and 2  perhaps 3 mandibular incisors to the left of midline. The findings are  consistent with root abscesses.     This report was finalized on 7/12/2023 5:09 PM by Dr. Beau Barnard M.D.       CT Soft Tissue Neck With Contrast [[id number]] Collected: 07/12/23 0739     Updated: 07/12/23 0806    Narrative:      CT SCAN OF THE SOFT TISSUES OF THE NECK WITH CONTRAST     CLINICAL HISTORY:  Right-sided facial swelling.     TECHNIQUE: CT scan of the soft tissues of neck was obtained with 3 mm  axial images following the administration of IV  contrast. Sagittal and  coronal reconstructed images were obtained.     FINDINGS:       There is prominent fat stranding within the right buccal space lateral  to the right aspect of the maxillary alveolar ridge and this  circumscribes the right parotid duct. This soft tissue swelling extends  into the right preseptal region as well as along the right aspect of the  nose. The right parotid gland has an unremarkable appearance. However,  there are prominent carious teeth and periodontal disease within the  adjacent maxillary alveolar ridge. As such, I suspect that the findings  are odontogenic in nature. In specific, in this region, there is a  markedly carious appearance of tooth #3 with prominent periapical  lucency compatible with a periapical abscess. Additionally, there is a  prominently carious appearance of tooth #4. A prominent periapical  lucency is seen circumscribing tooth #7. Additional and carious teeth  are noted within the mandibular and maxillary alveolar ridge. There are  mildly enlarged level 1B and 2A lymph nodes are all likely benign and  reactive in nature. Additional scattered shotty lymph nodes are  identified scattered throughout the neck.     The visualized contents of the cranial vault are unremarkable. The left  orbit is within normal limits. The parotid glands, submandibular glands,  and sublingual glands are within normal limits. The nasopharynx is  unremarkable. There is mild prominence of the palatine tonsils.  Otherwise, the oropharynx, hypopharynx, cervical esophagus, and  visualized upper thoracic esophagus are within normal limits. The  epiglottis, aryepiglottic folds, glottis, and subglottic airway are  within normal limits. The thyroid gland is within normal limits. The  visualized lung apices are clear.     There is a right IJ approach Mediport catheter in place. Incidental note  is made of mild mucosal thickening within the maxillary sinuses.       Impression:         There is  Abdominal Pain, N/V/D prominent fat stranding within the buccal space lateral to the  right aspect of the maxillary alveolar ridge and there is contiguous  soft tissue swelling extending into the right preseptal soft tissues as  well as the soft tissues along the right side of the nose. The findings  are most compatible with a cellulitis. There are are areas of periapical  lucencies as well compatible with periapical abscesses as discussed in  detail above. In specific, tooth #3 has a markedly carious appearance  with a prominent periapical lucency compatible with a periapical  abscess.      These findings were discussed with Dr. Tee on 07/12/2023 at  approximately 8:00 AM.     Radiation dose reduction techniques were utilized, including automated  exposure control and exposure modulation based on body size.     This report was finalized on 7/12/2023 8:02 AM by Dr. Nav Hirsch M.D.             Results for orders placed during the hospital encounter of 08/20/22    Duplex Venous Upper Extremity - Right    Interpretation Summary  · Normal right upper extremity venous duplex scan.      Pertinent Labs     Results from last 7 days   Lab Units 07/13/23  0653 07/12/23  0541   WBC 10*3/mm3 3.94 5.44   HEMOGLOBIN g/dL 9.4* 11.2*   PLATELETS 10*3/mm3 178 202     Results from last 7 days   Lab Units 07/13/23  0653 07/12/23  0541   SODIUM mmol/L 140 138   POTASSIUM mmol/L 4.2 3.6   CHLORIDE mmol/L 108* 105   CO2 mmol/L 22.9 22.0   BUN mg/dL 4* 7   CREATININE mg/dL 0.45* 0.66   GLUCOSE mg/dL 88 111*   EGFR mL/min/1.73 134.6 122.7     Results from last 7 days   Lab Units 07/12/23  0541   ALBUMIN g/dL 4.4   BILIRUBIN mg/dL 0.4   ALK PHOS U/L 51   AST (SGOT) U/L 15   ALT (SGPT) U/L 8     Results from last 7 days   Lab Units 07/13/23  0653 07/12/23  0541   CALCIUM mg/dL 9.3 9.4   ALBUMIN g/dL  --  4.4               Invalid input(s): LDLCALC  Results from last 7 days   Lab Units 07/12/23  1119 07/12/23  0833   BLOODCX  No growth at 2 days No growth at  3 days         Test Results Pending at Discharge     Pending Labs       Order Current Status    Blood Culture - Blood, Arm, Left Preliminary result    Blood Culture - Blood, Arm, Right Preliminary result            Discharge Details        Discharge Medications        New Medications        Instructions Start Date   amoxicillin-clavulanate 875-125 MG per tablet  Commonly known as: AUGMENTIN   1 tablet, Per J Tube, 2 Times Daily      Calmoseptine 0.44-20.6 % ointment  Generic drug: Menthol-Zinc Oxide   1 application , Topical, 2 Times Daily      escitalopram 5 MG tablet  Commonly known as: LEXAPRO   5 mg, Per J Tube, Daily      HYDROcodone-acetaminophen 7.5-325 MG/15ML solution  Commonly known as: HYCET   10 mL, Per J Tube, Every 4 Hours PRN      ibuprofen 800 MG tablet  Commonly known as: ADVIL,MOTRIN   800 mg, Oral, Every 8 Hours PRN      sennosides-docusate 8.6-50 MG per tablet  Commonly known as: PERICOLACE   2 tablets, Per J Tube, 2 Times Daily, Hold for loose stool             Continue These Medications        Instructions Start Date   amLODIPine 10 MG tablet  Commonly known as: NORVASC   10 mg, Oral, Daily      dicyclomine 10 MG/5ML syrup  Commonly known as: BENTYL   10 mg, Oral, 4 Times Daily Before Meals & Nightly      EPINEPHrine 0.3 MG/0.3ML solution auto-injector injection  Commonly known as: EPIPEN   0.3 mg, Intramuscular, As Needed      gabapentin 100 MG capsule  Commonly known as: NEURONTIN   200 mg, 3 Times Daily      Gammaplex 10 GM/200ML solution infusion  Generic drug: immune globulin (human)   800 mL, 0 Refill(s)      hydroxychloroquine 200 MG tablet  Commonly known as: PLAQUENIL   200 mg, Oral, 2 Times Daily      lactulose 10 GM/15ML solution  Commonly known as: CHRONULAC   10 g, Per J Tube, As Needed      leucovorin 5 MG tablet   1 tablet, Oral, Weekly, Mondays      methocarbamol 750 MG tablet  Commonly known as: ROBAXIN   750 mg, Oral, 3 Times Daily PRN      methotrexate PF 50 MG/2ML chemo  syringe   50 mg, Intramuscular, Weekly, Wednesdays      metoprolol succinate XL 25 MG 24 hr tablet  Commonly known as: TOPROL-XL   0.5 tablets, Oral, Nightly      multivitamin with minerals tablet tablet   1 tablet, Oral, Daily      Nitro-Bid 2 % ointment  Generic drug: nitroglycerin   APPLY BY TRANSDERMAL ROUTE TO FINGER WEBS FOR RAYNAUD'S EVERY 12 HOURS AND REMOVE AT BEDTIME      pantoprazole 40 MG EC tablet  Commonly known as: PROTONIX   40 mg, Oral, 2 Times Daily      predniSONE 20 MG tablet  Commonly known as: DELTASONE   20 mg, Oral, Daily      promethazine-dextromethorphan 6.25-15 MG/5ML syrup  Commonly known as: PROMETHAZINE-DM   5 mL, Per J Tube, 4 Times Daily PRN      Twirla 120-30 MCG/24HR patch weekly  Generic drug: Levonorgestrel-Eth Estradiol   1 patch, Transdermal, Weekly               Allergies   Allergen Reactions    Anesthetics, Amide Nausea And Vomiting    Ciprofloxacin Other (See Comments)     EHERLIS STANDLIS? ILLNESS. UNABLE TO TAKE     Compazine [Prochlorperazine] Dystonia    Domperidone Other (See Comments)     Bad reaction per pt report    Droperidol Other (See Comments)     Tardive dyskinesia    Haldol [Haloperidol] Other (See Comments)     Muscle spasms      Metoclopramide Nausea And Vomiting    Sulfa Antibiotics Other (See Comments)     Unable to take as causes leukopenia       Discharge Disposition:  Home or Self Care      Discharge Diet:  Diet Order   Procedures    Diet: Regular/House Diet; Texture: Regular Texture (IDDSI 7); Fluid Consistency: Thin (IDDSI 0)       Discharge Activity:       CODE STATUS:    Code Status and Medical Interventions:   Ordered at: 07/12/23 0937     Code Status (Patient has no pulse and is not breathing):    CPR (Attempt to Resuscitate)     Medical Interventions (Patient has pulse or is breathing):    Full Support       Future Appointments   Date Time Provider Department Center   7/26/2023  9:00 AM Mirna Burciaga PA-C [facility]   8/20/2023  7:30  PM  [facility]  [facility]   9/25/2023  7:00 AM [facility] C[facility] ECHO CART 1  [facility]I [facility]   9/25/2023  8:15 AM [facility] CT 2  [facility] CT [facility]   12/13/2023  3:00 PM Malini Barone MD [facility]      Follow-up Information       Trae Escoto, DMD Follow up.    Specialty: Oral Surgery  Why: As needed  Contact information:  [address]  [address]  [facility] [id number] [phone]               Fanny Jarrett MD .    Specialty: Family Medicine  Contact information:  [address]  [facility] [id number] [phone]                             Time Spent on Discharge:  Greater than 30 minutes spent on discharge management including final examination, discussion of hospital stay and patient education, preparation of records, medication reconciliation, follow up planning      Haris Levine MD  [facility]  07/15/23  08:52 EDT                Electronically signed by Haris Levine MD at 07/15/23 0910

## 2023-07-17 LAB
BACTERIA SPEC AEROBE CULT: NORMAL
BACTERIA SPEC AEROBE CULT: NORMAL

## 2023-07-25 ENCOUNTER — READMISSION MANAGEMENT (OUTPATIENT)
Dept: CALL CENTER | Facility: HOSPITAL | Age: 28
End: 2023-07-25
Payer: COMMERCIAL

## 2023-07-25 NOTE — OUTREACH NOTE
Medical Week 2 Survey      Flowsheet Row Responses   [facility] patient discharged from? [place]   Does the patient have one of the following disease processes/diagnoses(primary or secondary)? Other   Week 2 attempt successful? No   Unsuccessful attempts Attempt 1            Mariana Zarco Registered Nurse

## 2023-07-28 ENCOUNTER — READMISSION MANAGEMENT (OUTPATIENT)
Dept: CALL CENTER | Facility: HOSPITAL | Age: 28
End: 2023-07-28
Payer: COMMERCIAL

## 2023-07-28 NOTE — OUTREACH NOTE
Medical Week 2 Survey      Flowsheet Row Responses   [facility] patient discharged from? [place]   Does the patient have one of the following disease processes/diagnoses(primary or secondary)? Other   Week 2 attempt successful? No   Unsuccessful attempts Attempt 2            [place] Zarco Registered Nurse

## 2023-08-08 ENCOUNTER — TRANSCRIBE ORDERS (OUTPATIENT)
Dept: SLEEP MEDICINE | Facility: HOSPITAL | Age: 28
End: 2023-08-08
Payer: COMMERCIAL

## 2023-08-08 DIAGNOSIS — R06.00 DYSPNEA, UNSPECIFIED TYPE: ICD-10-CM

## 2023-08-08 DIAGNOSIS — G47.31 PRIMARY CENTRAL SLEEP APNEA: Primary | ICD-10-CM

## 2023-09-22 ENCOUNTER — TELEPHONE (OUTPATIENT)
Dept: ENDOCRINOLOGY | Age: 28
End: 2023-09-22
Payer: COMMERCIAL

## 2023-09-25 ENCOUNTER — HOSPITAL ENCOUNTER (OUTPATIENT)
Dept: CT IMAGING | Facility: HOSPITAL | Age: 28
Discharge: HOME | End: 2023-09-25
Payer: COMMERCIAL

## 2023-09-25 ENCOUNTER — HOSPITAL ENCOUNTER (OUTPATIENT)
Dept: CARDIOLOGY | Facility: HOSPITAL | Age: 28
Discharge: HOME | End: 2023-09-25
Payer: COMMERCIAL

## 2023-09-25 VITALS
HEART RATE: 77 BPM | DIASTOLIC BLOOD PRESSURE: 72 MMHG | WEIGHT: 163 LBS | BODY MASS INDEX: 22.82 KG/M2 | SYSTOLIC BLOOD PRESSURE: 116 MMHG | HEIGHT: 71 IN

## 2023-09-25 DIAGNOSIS — R06.00 DYSPNEA, UNSPECIFIED TYPE: ICD-10-CM

## 2023-09-25 LAB
AORTIC ARCH: 2.2 CM
AORTIC DIMENSIONLESS INDEX: 0.6 (DI)
BH CV ECHO MEAS - AO MAX PG: 5.4 MMHG
BH CV ECHO MEAS - AO MEAN PG: 3 MMHG
BH CV ECHO MEAS - AO ROOT DIAM: 2.24 CM
BH CV ECHO MEAS - AO V2 MAX: 116 CM/SEC
BH CV ECHO MEAS - AO V2 VTI: 24.2 CM
BH CV ECHO MEAS - AVA(I,D): 2.48 CM2
BH CV ECHO MEAS - EDV(CUBED): 97.3 ML
BH CV ECHO MEAS - EDV(MOD-SP2): 68 ML
BH CV ECHO MEAS - EDV(MOD-SP4): 76 ML
BH CV ECHO MEAS - EF(MOD-BP): 59.8 %
BH CV ECHO MEAS - EF(MOD-SP2): 61.8 %
BH CV ECHO MEAS - EF(MOD-SP4): 57.9 %
BH CV ECHO MEAS - ESV(CUBED): 35.6 ML
BH CV ECHO MEAS - ESV(MOD-SP2): 26 ML
BH CV ECHO MEAS - ESV(MOD-SP4): 32 ML
BH CV ECHO MEAS - FS: 28.5 %
BH CV ECHO MEAS - IVS/LVPW: 1 CM
BH CV ECHO MEAS - IVSD: 0.8 CM
BH CV ECHO MEAS - LAT PEAK E' VEL: 21.2 CM/SEC
BH CV ECHO MEAS - LV DIASTOLIC VOL/BSA (35-75): 39.3 CM2
BH CV ECHO MEAS - LV MASS(C)D: 117.9 GRAMS
BH CV ECHO MEAS - LV MAX PG: 2.6 MMHG
BH CV ECHO MEAS - LV MEAN PG: 1 MMHG
BH CV ECHO MEAS - LV SYSTOLIC VOL/BSA (12-30): 16.6 CM2
BH CV ECHO MEAS - LV V1 MAX: 80.1 CM/SEC
BH CV ECHO MEAS - LV V1 VTI: 15.5 CM
BH CV ECHO MEAS - LVIDD: 4.6 CM
BH CV ECHO MEAS - LVIDS: 3.3 CM
BH CV ECHO MEAS - LVOT AREA: 3.9 CM2
BH CV ECHO MEAS - LVOT DIAM: 2.22 CM
BH CV ECHO MEAS - LVPWD: 0.8 CM
BH CV ECHO MEAS - MED PEAK E' VEL: 13.6 CM/SEC
BH CV ECHO MEAS - MV A DUR: 0.12 SEC
BH CV ECHO MEAS - MV A MAX VEL: 56.1 CM/SEC
BH CV ECHO MEAS - MV DEC SLOPE: 387.5 CM/SEC2
BH CV ECHO MEAS - MV DEC TIME: 0.19 SEC
BH CV ECHO MEAS - MV E MAX VEL: 98.5 CM/SEC
BH CV ECHO MEAS - MV E/A: 1.76
BH CV ECHO MEAS - MV MAX PG: 4.7 MMHG
BH CV ECHO MEAS - MV MEAN PG: 1.74 MMHG
BH CV ECHO MEAS - MV P1/2T: 87.8 MSEC
BH CV ECHO MEAS - MV V2 VTI: 27.7 CM
BH CV ECHO MEAS - MVA(P1/2T): 2.5 CM2
BH CV ECHO MEAS - MVA(VTI): 2.17 CM2
BH CV ECHO MEAS - PULM A REVS DUR: 0.12 SEC
BH CV ECHO MEAS - PULM A REVS VEL: 21.4 CM/SEC
BH CV ECHO MEAS - PULM DIAS VEL: 40.7 CM/SEC
BH CV ECHO MEAS - PULM S/D: 1.49
BH CV ECHO MEAS - PULM SYS VEL: 60.8 CM/SEC
BH CV ECHO MEAS - RV MAX PG: 3.2 MMHG
BH CV ECHO MEAS - RV V1 MAX: 89.1 CM/SEC
BH CV ECHO MEAS - RV V1 VTI: 15.3 CM
BH CV ECHO MEAS - SI(MOD-SP2): 21.7 ML/M2
BH CV ECHO MEAS - SI(MOD-SP4): 22.8 ML/M2
BH CV ECHO MEAS - SV(LVOT): 60 ML
BH CV ECHO MEAS - SV(MOD-SP2): 42 ML
BH CV ECHO MEAS - SV(MOD-SP4): 44 ML
BH CV ECHO MEAS - TAPSE (>1.6): 2.6 CM
BH CV ECHO MEASUREMENTS AVERAGE E/E' RATIO: 5.66
BH CV XLRA - RV BASE: 3 CM
BH CV XLRA - RV LENGTH: 6.9 CM
BH CV XLRA - RV MID: 3 CM
BH CV XLRA - TDI S': 13.1 CM/SEC
LEFT ATRIUM VOLUME INDEX: 15.3 ML/M2

## 2023-09-25 PROCEDURE — 93306 TTE W/DOPPLER COMPLETE: CPT

## 2023-09-25 PROCEDURE — 71250 CT THORAX DX C-: CPT

## 2023-09-25 PROCEDURE — 93306 TTE W/DOPPLER COMPLETE: CPT | Performed by: INTERNAL MEDICINE

## 2023-10-03 ENCOUNTER — OFFICE VISIT (OUTPATIENT)
Dept: SURGERY | Facility: CLINIC | Age: 28
End: 2023-10-03
Payer: COMMERCIAL

## 2023-10-03 VITALS
SYSTOLIC BLOOD PRESSURE: 115 MMHG | WEIGHT: 168 LBS | DIASTOLIC BLOOD PRESSURE: 70 MMHG | HEIGHT: 71 IN | BODY MASS INDEX: 23.52 KG/M2

## 2023-10-03 DIAGNOSIS — K31.84 GASTROPARESIS: Primary | ICD-10-CM

## 2023-10-03 DIAGNOSIS — K94.13 JEJUNOSTOMY TUBE LEAK: ICD-10-CM

## 2023-10-03 PROCEDURE — 99214 OFFICE O/P EST MOD 30 MIN: CPT | Performed by: SURGERY
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
## 2023-10-16 ENCOUNTER — HOSPITAL ENCOUNTER (OUTPATIENT)
Dept: SLEEP MEDICINE | Facility: HOSPITAL | Age: 28
Discharge: HOME | End: 2023-10-16
Admitting: INTERNAL MEDICINE
Payer: COMMERCIAL

## 2023-10-16 DIAGNOSIS — R06.00 DYSPNEA, UNSPECIFIED TYPE: ICD-10-CM

## 2023-10-16 DIAGNOSIS — G47.31 PRIMARY CENTRAL SLEEP APNEA: ICD-10-CM

## 2023-10-16 PROCEDURE — 95810 POLYSOM 6/> YRS 4/> PARAM: CPT

## 2023-10-18 ENCOUNTER — TELEPHONE (OUTPATIENT)
Dept: SURGERY | Facility: CLINIC | Age: 28
End: 2023-10-18
Payer: COMMERCIAL

## 2023-10-18 NOTE — TELEPHONE ENCOUNTER
Patient wants to wait to sched till January, I advised pt to call around the beginning of Dec, to sched for January

## 2023-10-20 ENCOUNTER — TELEPHONE (OUTPATIENT)
Dept: SLEEP MEDICINE | Facility: HOSPITAL | Age: 28
End: 2023-10-20
Payer: COMMERCIAL

## 2023-10-26 ENCOUNTER — TELEPHONE (OUTPATIENT)
Dept: FAMILY MEDICINE CLINIC | Facility: CLINIC | Age: 28
End: 2023-10-26
Payer: COMMERCIAL

## 2023-10-26 NOTE — TELEPHONE ENCOUNTER
Caller: Henrietta Garrett    Relationship to patient: Self    Best call back number: [phone]     Date of exposure: NOT SURE     Date of positive COVID19 test: 10/26/23    Date if possible COVID19 exposure: NOT SURE     COVID19 symptoms: HEADACHE, CHILLS, FEVER, ACHY AND STUFFY NOSE     Date of initial quarantine: 10/26/23\    Additional information or concerns: PATIENT STATES SHE HAS A FEEDING TUBE AND A PORT SO SHE IS CONCERNED       What is the patients preferred pharmacy: [facility]/pharmacy #[id number] - [place], [place] - [address] AT [facility] [phone] [facility] [phone]

## 2023-12-10 ENCOUNTER — HOSPITAL ENCOUNTER (INPATIENT)
Facility: HOSPITAL | Age: 28
LOS: 1 days | Discharge: HOME | DRG: 418 | End: 2023-12-15
Attending: HOSPITALIST | Admitting: HOSPITALIST
Payer: COMMERCIAL

## 2023-12-10 ENCOUNTER — APPOINTMENT (OUTPATIENT)
Dept: ULTRASOUND IMAGING | Facility: HOSPITAL | Age: 28
DRG: 418 | End: 2023-12-10
Payer: COMMERCIAL

## 2023-12-10 ENCOUNTER — APPOINTMENT (OUTPATIENT)
Dept: OTHER | Facility: HOSPITAL | Age: 28
DRG: 418 | End: 2023-12-10
Payer: COMMERCIAL

## 2023-12-10 DIAGNOSIS — K80.12 CALCULUS OF GALLBLADDER WITH ACUTE ON CHRONIC CHOLECYSTITIS WITHOUT OBSTRUCTION: ICD-10-CM

## 2023-12-10 DIAGNOSIS — R10.11 RIGHT UPPER QUADRANT ABDOMINAL PAIN: ICD-10-CM

## 2023-12-10 DIAGNOSIS — Z09 FOLLOW-UP EXAM: Primary | ICD-10-CM

## 2023-12-10 PROBLEM — D64.9 ANEMIA: Status: ACTIVE | Noted: 2023-12-10

## 2023-12-10 PROBLEM — R93.5 ABNORMAL CT OF THE ABDOMEN: Status: ACTIVE | Noted: 2023-12-10

## 2023-12-10 PROBLEM — R10.9 ABDOMINAL PAIN: Status: ACTIVE | Noted: 2023-12-10

## 2023-12-10 LAB
ALBUMIN SERPL-MCNC: 3.9 G/DL (ref 3.5–5.2)
ALBUMIN/GLOB SERPL: 1 G/DL
ALP SERPL-CCNC: 57 U/L (ref 39–117)
ALT SERPL W P-5'-P-CCNC: 31 U/L (ref 1–33)
ANION GAP SERPL CALCULATED.3IONS-SCNC: 10 MMOL/L (ref 5–15)
AST SERPL-CCNC: 54 U/L (ref 1–32)
BASOPHILS # BLD AUTO: 0.01 10*3/MM3 (ref 0–0.2)
BASOPHILS NFR BLD AUTO: 0.3 % (ref 0–1.5)
BILIRUB SERPL-MCNC: 0.3 MG/DL (ref 0–1.2)
BUN SERPL-MCNC: 7 MG/DL (ref 6–20)
BUN/CREAT SERPL: 9.3 (ref 7–25)
CALCIUM SPEC-SCNC: 9.1 MG/DL (ref 8.6–10.5)
CHLORIDE SERPL-SCNC: 107 MMOL/L (ref 98–107)
CO2 SERPL-SCNC: 21 MMOL/L (ref 22–29)
CREAT SERPL-MCNC: 0.75 MG/DL (ref 0.57–1)
DEPRECATED RDW RBC AUTO: 39.8 FL (ref 37–54)
EGFRCR SERPLBLD CKD-EPI 2021: 111.4 ML/MIN/1.73
EOSINOPHIL # BLD AUTO: 0.1 10*3/MM3 (ref 0–0.4)
EOSINOPHIL NFR BLD AUTO: 3 % (ref 0.3–6.2)
ERYTHROCYTE [DISTWIDTH] IN BLOOD BY AUTOMATED COUNT: 13.5 % (ref 12.3–15.4)
GLOBULIN UR ELPH-MCNC: 3.9 GM/DL
GLUCOSE SERPL-MCNC: 82 MG/DL (ref 65–99)
HCT VFR BLD AUTO: 30.4 % (ref 34–46.6)
HGB BLD-MCNC: 9.8 G/DL (ref 12–15.9)
IMM GRANULOCYTES # BLD AUTO: 0.01 10*3/MM3 (ref 0–0.05)
IMM GRANULOCYTES NFR BLD AUTO: 0.3 % (ref 0–0.5)
INR PPP: 1.05 (ref 0.9–1.1)
LYMPHOCYTES # BLD AUTO: 1.3 10*3/MM3 (ref 0.7–3.1)
LYMPHOCYTES NFR BLD AUTO: 39.2 % (ref 19.6–45.3)
MCH RBC QN AUTO: 26.2 PG (ref 26.6–33)
MCHC RBC AUTO-ENTMCNC: 32.2 G/DL (ref 31.5–35.7)
MCV RBC AUTO: 81.3 FL (ref 79–97)
MONOCYTES # BLD AUTO: 0.39 10*3/MM3 (ref 0.1–0.9)
MONOCYTES NFR BLD AUTO: 11.7 % (ref 5–12)
NEUTROPHILS NFR BLD AUTO: 1.51 10*3/MM3 (ref 1.7–7)
NEUTROPHILS NFR BLD AUTO: 45.5 % (ref 42.7–76)
NRBC BLD AUTO-RTO: 0 /100 WBC (ref 0–0.2)
PLATELET # BLD AUTO: 217 10*3/MM3 (ref 140–450)
PMV BLD AUTO: 11 FL (ref 6–12)
POTASSIUM SERPL-SCNC: 3.7 MMOL/L (ref 3.5–5.2)
PROT SERPL-MCNC: 7.8 G/DL (ref 6–8.5)
PROTHROMBIN TIME: 13.8 SECONDS (ref 11.7–14.2)
RBC # BLD AUTO: 3.74 10*6/MM3 (ref 3.77–5.28)
SODIUM SERPL-SCNC: 138 MMOL/L (ref 136–145)
WBC NRBC COR # BLD AUTO: 3.32 10*3/MM3 (ref 3.4–10.8)

## 2023-12-10 PROCEDURE — 99222 1ST HOSP IP/OBS MODERATE 55: CPT | Performed by: INTERNAL MEDICINE

## 2023-12-10 PROCEDURE — 25010000002 HYDROMORPHONE PER 4 MG: Performed by: HOSPITALIST

## 2023-12-10 PROCEDURE — G0378 HOSPITAL OBSERVATION PER HR: HCPCS

## 2023-12-10 PROCEDURE — 76705 ECHO EXAM OF ABDOMEN: CPT

## 2023-12-10 PROCEDURE — 94640 AIRWAY INHALATION TREATMENT: CPT

## 2023-12-10 PROCEDURE — 85610 PROTHROMBIN TIME: CPT | Performed by: NURSE PRACTITIONER

## 2023-12-10 PROCEDURE — 25810000003 SODIUM CHLORIDE 0.9 % SOLUTION: Performed by: NURSE PRACTITIONER

## 2023-12-10 PROCEDURE — 80053 COMPREHEN METABOLIC PANEL: CPT | Performed by: NURSE PRACTITIONER

## 2023-12-10 PROCEDURE — 99214 OFFICE O/P EST MOD 30 MIN: CPT | Performed by: SURGERY

## 2023-12-10 PROCEDURE — 85025 COMPLETE CBC W/AUTO DIFF WBC: CPT | Performed by: NURSE PRACTITIONER

## 2023-12-10 PROCEDURE — 25010000002 HYDROMORPHONE PER 4 MG: Performed by: NURSE PRACTITIONER

## 2023-12-10 RX ORDER — HEPARIN SODIUM (PORCINE) LOCK FLUSH IV SOLN 100 UNIT/ML 100 UNIT/ML
5 SOLUTION INTRAVENOUS AS NEEDED
Status: DISCONTINUED | OUTPATIENT
Start: 2023-12-10 | End: 2023-12-15 | Stop reason: HOSPADM

## 2023-12-10 RX ORDER — SODIUM CHLORIDE 0.9 % (FLUSH) 0.9 %
20 SYRINGE (ML) INJECTION AS NEEDED
Status: DISCONTINUED | OUTPATIENT
Start: 2023-12-10 | End: 2023-12-15 | Stop reason: HOSPADM

## 2023-12-10 RX ORDER — HYDROMORPHONE HYDROCHLORIDE 1 MG/ML
0.5 INJECTION, SOLUTION INTRAMUSCULAR; INTRAVENOUS; SUBCUTANEOUS
Status: COMPLETED | OUTPATIENT
Start: 2023-12-10 | End: 2023-12-10

## 2023-12-10 RX ORDER — SODIUM CHLORIDE 0.9 % (FLUSH) 0.9 %
10 SYRINGE (ML) INJECTION AS NEEDED
Status: DISCONTINUED | OUTPATIENT
Start: 2023-12-10 | End: 2023-12-15 | Stop reason: HOSPADM

## 2023-12-10 RX ORDER — AMLODIPINE BESYLATE 10 MG/1
10 TABLET ORAL DAILY
Status: DISCONTINUED | OUTPATIENT
Start: 2023-12-10 | End: 2023-12-10

## 2023-12-10 RX ORDER — HYDROXYCHLOROQUINE SULFATE 200 MG/1
200 TABLET, FILM COATED ORAL 2 TIMES DAILY
Status: DISCONTINUED | OUTPATIENT
Start: 2023-12-10 | End: 2023-12-15 | Stop reason: HOSPADM

## 2023-12-10 RX ORDER — SODIUM CHLORIDE 0.9 % (FLUSH) 0.9 %
10 SYRINGE (ML) INJECTION EVERY 12 HOURS SCHEDULED
Status: DISCONTINUED | OUTPATIENT
Start: 2023-12-10 | End: 2023-12-15 | Stop reason: HOSPADM

## 2023-12-10 RX ORDER — BISACODYL 5 MG/1
5 TABLET, DELAYED RELEASE ORAL DAILY PRN
Status: DISCONTINUED | OUTPATIENT
Start: 2023-12-10 | End: 2023-12-10

## 2023-12-10 RX ORDER — ONDANSETRON 2 MG/ML
4 INJECTION INTRAMUSCULAR; INTRAVENOUS EVERY 6 HOURS PRN
Status: DISCONTINUED | OUTPATIENT
Start: 2023-12-10 | End: 2023-12-10 | Stop reason: SDUPTHER

## 2023-12-10 RX ORDER — BUDESONIDE AND FORMOTEROL FUMARATE DIHYDRATE 160; 4.5 UG/1; UG/1
2 AEROSOL RESPIRATORY (INHALATION)
COMMUNITY
Start: 2023-11-28

## 2023-12-10 RX ORDER — POLYETHYLENE GLYCOL 3350 17 G/17G
17 POWDER, FOR SOLUTION ORAL DAILY PRN
Status: DISCONTINUED | OUTPATIENT
Start: 2023-12-10 | End: 2023-12-10

## 2023-12-10 RX ORDER — HYDROMORPHONE HYDROCHLORIDE 1 MG/ML
0.5 INJECTION, SOLUTION INTRAMUSCULAR; INTRAVENOUS; SUBCUTANEOUS EVERY 4 HOURS PRN
Status: DISCONTINUED | OUTPATIENT
Start: 2023-12-10 | End: 2023-12-11

## 2023-12-10 RX ORDER — SODIUM CHLORIDE 9 MG/ML
40 INJECTION, SOLUTION INTRAVENOUS AS NEEDED
Status: DISCONTINUED | OUTPATIENT
Start: 2023-12-10 | End: 2023-12-15 | Stop reason: HOSPADM

## 2023-12-10 RX ORDER — AMOXICILLIN 250 MG
2 CAPSULE ORAL 2 TIMES DAILY
Status: DISCONTINUED | OUTPATIENT
Start: 2023-12-10 | End: 2023-12-10

## 2023-12-10 RX ORDER — ESCITALOPRAM OXALATE 5 MG/1
5 TABLET ORAL DAILY
Status: DISCONTINUED | OUTPATIENT
Start: 2023-12-10 | End: 2023-12-15 | Stop reason: HOSPADM

## 2023-12-10 RX ORDER — AMLODIPINE BESYLATE 10 MG/1
10 TABLET ORAL DAILY
Status: DISCONTINUED | OUTPATIENT
Start: 2023-12-10 | End: 2023-12-15 | Stop reason: HOSPADM

## 2023-12-10 RX ORDER — MULTIPLE VITAMINS W/ MINERALS TAB 9MG-400MCG
1 TAB ORAL DAILY
Status: DISCONTINUED | OUTPATIENT
Start: 2023-12-10 | End: 2023-12-10

## 2023-12-10 RX ORDER — ACETAMINOPHEN 650 MG/1
650 SUPPOSITORY RECTAL EVERY 4 HOURS PRN
Status: DISCONTINUED | OUTPATIENT
Start: 2023-12-10 | End: 2023-12-15 | Stop reason: HOSPADM

## 2023-12-10 RX ORDER — DICYCLOMINE HYDROCHLORIDE 10 MG/1
10 CAPSULE ORAL EVERY 8 HOURS PRN
Status: DISCONTINUED | OUTPATIENT
Start: 2023-12-10 | End: 2023-12-15 | Stop reason: HOSPADM

## 2023-12-10 RX ORDER — ONDANSETRON 2 MG/ML
4 INJECTION INTRAMUSCULAR; INTRAVENOUS EVERY 6 HOURS PRN
Status: DISCONTINUED | OUTPATIENT
Start: 2023-12-10 | End: 2023-12-15 | Stop reason: HOSPADM

## 2023-12-10 RX ORDER — ACETAMINOPHEN 160 MG/5ML
650 SOLUTION ORAL EVERY 4 HOURS PRN
Status: DISCONTINUED | OUTPATIENT
Start: 2023-12-10 | End: 2023-12-10 | Stop reason: SDUPTHER

## 2023-12-10 RX ORDER — ACETAMINOPHEN 650 MG/1
650 SUPPOSITORY RECTAL EVERY 4 HOURS PRN
Status: DISCONTINUED | OUTPATIENT
Start: 2023-12-10 | End: 2023-12-10 | Stop reason: SDUPTHER

## 2023-12-10 RX ORDER — NORETHINDRONE ACETATE AND ETHINYL ESTRADIOL 1MG-20(24)
1 KIT ORAL DAILY
COMMUNITY
Start: 2023-10-11

## 2023-12-10 RX ORDER — PANTOPRAZOLE SODIUM 40 MG/1
80 TABLET, DELAYED RELEASE ORAL 2 TIMES DAILY
Status: DISCONTINUED | OUTPATIENT
Start: 2023-12-10 | End: 2023-12-15 | Stop reason: HOSPADM

## 2023-12-10 RX ORDER — ACETAMINOPHEN 325 MG/1
650 TABLET ORAL EVERY 4 HOURS PRN
Status: DISCONTINUED | OUTPATIENT
Start: 2023-12-10 | End: 2023-12-15 | Stop reason: HOSPADM

## 2023-12-10 RX ORDER — ACETAMINOPHEN 160 MG/5ML
650 SOLUTION ORAL EVERY 4 HOURS PRN
Status: DISCONTINUED | OUTPATIENT
Start: 2023-12-10 | End: 2023-12-15 | Stop reason: HOSPADM

## 2023-12-10 RX ORDER — SODIUM CHLORIDE 9 MG/ML
100 INJECTION, SOLUTION INTRAVENOUS CONTINUOUS
Status: DISCONTINUED | OUTPATIENT
Start: 2023-12-10 | End: 2023-12-11

## 2023-12-10 RX ORDER — BUDESONIDE AND FORMOTEROL FUMARATE DIHYDRATE 160; 4.5 UG/1; UG/1
2 AEROSOL RESPIRATORY (INHALATION)
Status: DISCONTINUED | OUTPATIENT
Start: 2023-12-10 | End: 2023-12-15 | Stop reason: HOSPADM

## 2023-12-10 RX ORDER — NORETHINDRONE ACETATE AND ETHINYL ESTRADIOL AND FERROUS FUMARATE 1MG-20(24)
1 KIT ORAL DAILY
Status: DISCONTINUED | OUTPATIENT
Start: 2023-12-10 | End: 2023-12-15 | Stop reason: HOSPADM

## 2023-12-10 RX ORDER — HYDROXYCHLOROQUINE SULFATE 200 MG/1
200 TABLET, FILM COATED ORAL 2 TIMES DAILY
Status: DISCONTINUED | OUTPATIENT
Start: 2023-12-10 | End: 2023-12-10

## 2023-12-10 RX ORDER — GABAPENTIN 100 MG/1
100 CAPSULE ORAL EVERY 8 HOURS SCHEDULED
Status: DISCONTINUED | OUTPATIENT
Start: 2023-12-10 | End: 2023-12-15 | Stop reason: HOSPADM

## 2023-12-10 RX ORDER — ACETAMINOPHEN 325 MG/1
650 TABLET ORAL EVERY 4 HOURS PRN
Status: DISCONTINUED | OUTPATIENT
Start: 2023-12-10 | End: 2023-12-10 | Stop reason: SDUPTHER

## 2023-12-10 RX ORDER — BISACODYL 10 MG
10 SUPPOSITORY, RECTAL RECTAL DAILY PRN
Status: DISCONTINUED | OUTPATIENT
Start: 2023-12-10 | End: 2023-12-15 | Stop reason: HOSPADM

## 2023-12-10 RX ORDER — NALOXONE HCL 0.4 MG/ML
0.4 VIAL (ML) INJECTION
Status: DISCONTINUED | OUTPATIENT
Start: 2023-12-10 | End: 2023-12-15 | Stop reason: HOSPADM

## 2023-12-10 RX ADMIN — HYDROMORPHONE HYDROCHLORIDE 0.5 MG: 1 INJECTION, SOLUTION INTRAMUSCULAR; INTRAVENOUS; SUBCUTANEOUS at 21:24

## 2023-12-10 RX ADMIN — HYDROMORPHONE HYDROCHLORIDE 0.5 MG: 1 INJECTION, SOLUTION INTRAMUSCULAR; INTRAVENOUS; SUBCUTANEOUS at 08:14

## 2023-12-10 RX ADMIN — HYDROMORPHONE HYDROCHLORIDE 0.5 MG: 1 INJECTION, SOLUTION INTRAMUSCULAR; INTRAVENOUS; SUBCUTANEOUS at 12:40

## 2023-12-10 RX ADMIN — GABAPENTIN 100 MG: 100 CAPSULE ORAL at 14:30

## 2023-12-10 RX ADMIN — HYDROMORPHONE HYDROCHLORIDE 0.5 MG: 1 INJECTION, SOLUTION INTRAMUSCULAR; INTRAVENOUS; SUBCUTANEOUS at 04:51

## 2023-12-10 RX ADMIN — DICYCLOMINE HYDROCHLORIDE 10 MG: 10 CAPSULE ORAL at 12:44

## 2023-12-10 RX ADMIN — SODIUM CHLORIDE 100 ML/HR: 9 INJECTION, SOLUTION INTRAVENOUS at 20:07

## 2023-12-10 RX ADMIN — PANTOPRAZOLE SODIUM 80 MG: 40 TABLET, DELAYED RELEASE ORAL at 10:31

## 2023-12-10 RX ADMIN — BUDESONIDE AND FORMOTEROL FUMARATE DIHYDRATE 2 PUFF: 160; 4.5 AEROSOL RESPIRATORY (INHALATION) at 20:59

## 2023-12-10 RX ADMIN — ACETAMINOPHEN 650 MG: 160 SOLUTION ORAL at 06:17

## 2023-12-10 RX ADMIN — AMLODIPINE BESYLATE 10 MG: 10 TABLET ORAL at 10:31

## 2023-12-10 RX ADMIN — Medication 10 ML: at 08:14

## 2023-12-10 RX ADMIN — ACETAMINOPHEN 650 MG: 325 TABLET, FILM COATED ORAL at 20:00

## 2023-12-10 RX ADMIN — ESCITALOPRAM 5 MG: 5 TABLET, FILM COATED ORAL at 10:31

## 2023-12-10 RX ADMIN — GABAPENTIN 100 MG: 100 CAPSULE ORAL at 22:22

## 2023-12-10 RX ADMIN — HYDROMORPHONE HYDROCHLORIDE 0.5 MG: 1 INJECTION, SOLUTION INTRAMUSCULAR; INTRAVENOUS; SUBCUTANEOUS at 16:49

## 2023-12-10 RX ADMIN — PANTOPRAZOLE SODIUM 80 MG: 40 TABLET, DELAYED RELEASE ORAL at 20:00

## 2023-12-10 RX ADMIN — Medication 10 ML: at 20:02

## 2023-12-10 RX ADMIN — HYDROXYCHLOROQUINE SULFATE 200 MG: 200 TABLET ORAL at 20:01

## 2023-12-10 RX ADMIN — SODIUM CHLORIDE 100 ML/HR: 9 INJECTION, SOLUTION INTRAVENOUS at 10:32

## 2023-12-10 RX ADMIN — HYDROXYCHLOROQUINE SULFATE 200 MG: 200 TABLET ORAL at 10:31

## 2023-12-10 NOTE — PLAN OF CARE
Goal Outcome Evaluation:  Plan of Care Reviewed With: patient        Progress: improving  Outcome Evaluation: We went over the plan of care and ans wered all questions. Vitals stable and pain is well controlled. Pain meds reordered. Patient is up ad isa and fluids are infusing.

## 2023-12-10 NOTE — PLAN OF CARE
Goal Outcome Evaluation:  Plan of Care Reviewed With: patient           Outcome Evaluation: VSS, c/o pain in upper abd, has had past issues with gallbladder, has J tube. and recieves tube feedings, currently NPO, arrived as direct admit from [facility], has chest port on right that was accessed there and saline locked per Ying who I recieved report from, Consults called into General surgery and Gastroenterology this am, Dilaudid given for pain, with slight relief, also given tylenol, pt c/o of itching, thinks maybe sheets. will continue to monitor.

## 2023-12-10 NOTE — LETTER
December 15, 2023      [address]  [address] [facility] [address]  [phone]          Patient: Henrietta Garrett   YOB: 1995   Date of Visit: 12/10/2023       To Whom It May Concern:    Henrietta Garrett was seen at [address] on 12/10/2023.    Please excuse  Flaco Redman  from work for 12/10/23-10/15/23 days.        Sincerely,       Danelle REYES RN
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
## 2023-12-10 NOTE — LETTER
December 15, 2023      [address]  [address] [facility] [address]  [phone]          Patient: Henrietta Garrett   YOB: 1995   Date of Visit: 12/10/2023       To Whom It May Concern:    Henrietta Garrett was seen at [address] on 12/10/2023.    Please excuse her mother from work for 12/10/23-12/15/23 days.        Sincerely,       Danelle REYES RN
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
## 2023-12-11 ENCOUNTER — APPOINTMENT (OUTPATIENT)
Dept: GENERAL RADIOLOGY | Facility: HOSPITAL | Age: 28
DRG: 418 | End: 2023-12-11
Payer: COMMERCIAL

## 2023-12-11 ENCOUNTER — ANESTHESIA (OUTPATIENT)
Dept: PERIOP | Facility: HOSPITAL | Age: 28
End: 2023-12-11
Payer: COMMERCIAL

## 2023-12-11 ENCOUNTER — ANESTHESIA EVENT (OUTPATIENT)
Dept: PERIOP | Facility: HOSPITAL | Age: 28
End: 2023-12-11
Payer: COMMERCIAL

## 2023-12-11 PROBLEM — K80.12 CALCULUS OF GALLBLADDER WITH ACUTE ON CHRONIC CHOLECYSTITIS WITHOUT OBSTRUCTION: Status: ACTIVE | Noted: 2023-12-11

## 2023-12-11 LAB
ANION GAP SERPL CALCULATED.3IONS-SCNC: 11.1 MMOL/L (ref 5–15)
BUN SERPL-MCNC: 6 MG/DL (ref 6–20)
BUN/CREAT SERPL: 10.3 (ref 7–25)
CALCIUM SPEC-SCNC: 8.6 MG/DL (ref 8.6–10.5)
CHLORIDE SERPL-SCNC: 105 MMOL/L (ref 98–107)
CO2 SERPL-SCNC: 18.9 MMOL/L (ref 22–29)
CREAT SERPL-MCNC: 0.58 MG/DL (ref 0.57–1)
DEPRECATED RDW RBC AUTO: 42.2 FL (ref 37–54)
EGFRCR SERPLBLD CKD-EPI 2021: 126.6 ML/MIN/1.73
ERYTHROCYTE [DISTWIDTH] IN BLOOD BY AUTOMATED COUNT: 13.5 % (ref 12.3–15.4)
GLUCOSE SERPL-MCNC: 73 MG/DL (ref 65–99)
HCT VFR BLD AUTO: 31.8 % (ref 34–46.6)
HGB BLD-MCNC: 10.1 G/DL (ref 12–15.9)
IRON 24H UR-MRATE: 46 MCG/DL (ref 37–145)
IRON SATN MFR SERPL: 12 % (ref 20–50)
MCH RBC QN AUTO: 26.6 PG (ref 26.6–33)
MCHC RBC AUTO-ENTMCNC: 31.8 G/DL (ref 31.5–35.7)
MCV RBC AUTO: 83.9 FL (ref 79–97)
PLATELET # BLD AUTO: 199 10*3/MM3 (ref 140–450)
PMV BLD AUTO: 10.7 FL (ref 6–12)
POTASSIUM SERPL-SCNC: 3.5 MMOL/L (ref 3.5–5.2)
RBC # BLD AUTO: 3.79 10*6/MM3 (ref 3.77–5.28)
SODIUM SERPL-SCNC: 135 MMOL/L (ref 136–145)
TIBC SERPL-MCNC: 368 MCG/DL (ref 298–536)
TRANSFERRIN SERPL-MCNC: 247 MG/DL (ref 200–360)
WBC NRBC COR # BLD AUTO: 2.15 10*3/MM3 (ref 3.4–10.8)

## 2023-12-11 PROCEDURE — 94799 UNLISTED PULMONARY SVC/PX: CPT

## 2023-12-11 PROCEDURE — 25010000002 CEFAZOLIN IN DEXTROSE 2-4 GM/100ML-% SOLUTION: Performed by: SURGERY

## 2023-12-11 PROCEDURE — 25010000002 SUGAMMADEX 200 MG/2ML SOLUTION: Performed by: ANESTHESIOLOGY

## 2023-12-11 PROCEDURE — 25010000002 MIDAZOLAM PER 1 MG: Performed by: ANESTHESIOLOGY

## 2023-12-11 PROCEDURE — 25010000002 PROPOFOL 200 MG/20ML EMULSION: Performed by: REGISTERED NURSE

## 2023-12-11 PROCEDURE — 25010000002 HYDROMORPHONE PER 4 MG: Performed by: REGISTERED NURSE

## 2023-12-11 PROCEDURE — 25010000002 FENTANYL CITRATE (PF) 50 MCG/ML SOLUTION: Performed by: ANESTHESIOLOGY

## 2023-12-11 PROCEDURE — G0378 HOSPITAL OBSERVATION PER HR: HCPCS

## 2023-12-11 PROCEDURE — 74300 X-RAY BILE DUCTS/PANCREAS: CPT

## 2023-12-11 PROCEDURE — 25810000003 LACTATED RINGERS PER 1000 ML: Performed by: ANESTHESIOLOGY

## 2023-12-11 PROCEDURE — 47563 LAPARO CHOLECYSTECTOMY/GRAPH: CPT | Performed by: SURGERY

## 2023-12-11 PROCEDURE — 25010000002 HYDROMORPHONE PER 4 MG: Performed by: HOSPITALIST

## 2023-12-11 PROCEDURE — 25010000002 ONDANSETRON PER 1 MG: Performed by: SURGERY

## 2023-12-11 PROCEDURE — 47563 LAPARO CHOLECYSTECTOMY/GRAPH: CPT | Performed by: SPECIALIST/TECHNOLOGIST, OTHER

## 2023-12-11 PROCEDURE — 25010000002 ONDANSETRON PER 1 MG: Performed by: NURSE PRACTITIONER

## 2023-12-11 PROCEDURE — 25010000002 DEXAMETHASONE SODIUM PHOSPHATE 20 MG/5ML SOLUTION: Performed by: REGISTERED NURSE

## 2023-12-11 PROCEDURE — 83540 ASSAY OF IRON: CPT | Performed by: NURSE PRACTITIONER

## 2023-12-11 PROCEDURE — 25010000002 HYDROMORPHONE 1 MG/ML SOLUTION: Performed by: SURGERY

## 2023-12-11 PROCEDURE — 63710000001 PROMETHAZINE PER 12.5 MG: Performed by: SURGERY

## 2023-12-11 PROCEDURE — 25810000003 SODIUM CHLORIDE 0.9 % SOLUTION: Performed by: NURSE PRACTITIONER

## 2023-12-11 PROCEDURE — 63710000001 PROMETHAZINE PER 12.5 MG: Performed by: PHYSICIAN ASSISTANT

## 2023-12-11 PROCEDURE — 80048 BASIC METABOLIC PNL TOTAL CA: CPT | Performed by: NURSE PRACTITIONER

## 2023-12-11 PROCEDURE — 0 IOTHALAMATE 60 % SOLUTION: Performed by: SURGERY

## 2023-12-11 PROCEDURE — 25010000002 ONDANSETRON PER 1 MG: Performed by: REGISTERED NURSE

## 2023-12-11 PROCEDURE — 88304 TISSUE EXAM BY PATHOLOGIST: CPT | Performed by: SURGERY

## 2023-12-11 PROCEDURE — 84466 ASSAY OF TRANSFERRIN: CPT | Performed by: NURSE PRACTITIONER

## 2023-12-11 PROCEDURE — 85027 COMPLETE CBC AUTOMATED: CPT | Performed by: NURSE PRACTITIONER

## 2023-12-11 PROCEDURE — 99232 SBSQ HOSP IP/OBS MODERATE 35: CPT | Performed by: SURGERY

## 2023-12-11 PROCEDURE — 25010000002 HYDROMORPHONE 1 MG/ML SOLUTION: Performed by: REGISTERED NURSE

## 2023-12-11 PROCEDURE — 25010000002 FENTANYL CITRATE (PF) 50 MCG/ML SOLUTION: Performed by: REGISTERED NURSE

## 2023-12-11 PROCEDURE — BF121ZZ FLUOROSCOPY OF GALLBLADDER USING LOW OSMOLAR CONTRAST: ICD-10-PCS | Performed by: SURGERY

## 2023-12-11 PROCEDURE — 0FT44ZZ RESECTION OF GALLBLADDER, PERCUTANEOUS ENDOSCOPIC APPROACH: ICD-10-PCS | Performed by: SURGERY

## 2023-12-11 DEVICE — CLIP LIGAT VASC HORIZON TI MD/LG GRN 6CT: Type: IMPLANTABLE DEVICE | Site: ABDOMEN | Status: FUNCTIONAL

## 2023-12-11 RX ORDER — SODIUM CHLORIDE, SODIUM LACTATE, POTASSIUM CHLORIDE, CALCIUM CHLORIDE 600; 310; 30; 20 MG/100ML; MG/100ML; MG/100ML; MG/100ML
9 INJECTION, SOLUTION INTRAVENOUS CONTINUOUS
Status: DISCONTINUED | OUTPATIENT
Start: 2023-12-11 | End: 2023-12-11

## 2023-12-11 RX ORDER — DEXTROSE MONOHYDRATE, SODIUM CHLORIDE, AND POTASSIUM CHLORIDE 50; 1.49; 9 G/1000ML; G/1000ML; G/1000ML
20 INJECTION, SOLUTION INTRAVENOUS CONTINUOUS
Status: DISCONTINUED | OUTPATIENT
Start: 2023-12-11 | End: 2023-12-14

## 2023-12-11 RX ORDER — FLUMAZENIL 0.1 MG/ML
0.2 INJECTION INTRAVENOUS AS NEEDED
Status: DISCONTINUED | OUTPATIENT
Start: 2023-12-11 | End: 2023-12-11

## 2023-12-11 RX ORDER — OXYCODONE AND ACETAMINOPHEN 7.5; 325 MG/1; MG/1
1 TABLET ORAL EVERY 4 HOURS PRN
Status: DISCONTINUED | OUTPATIENT
Start: 2023-12-11 | End: 2023-12-14

## 2023-12-11 RX ORDER — IPRATROPIUM BROMIDE AND ALBUTEROL SULFATE 2.5; .5 MG/3ML; MG/3ML
3 SOLUTION RESPIRATORY (INHALATION) ONCE AS NEEDED
Status: DISCONTINUED | OUTPATIENT
Start: 2023-12-11 | End: 2023-12-11

## 2023-12-11 RX ORDER — SODIUM CHLORIDE, SODIUM LACTATE, POTASSIUM CHLORIDE, CALCIUM CHLORIDE 600; 310; 30; 20 MG/100ML; MG/100ML; MG/100ML; MG/100ML
125 INJECTION, SOLUTION INTRAVENOUS CONTINUOUS
Status: DISCONTINUED | OUTPATIENT
Start: 2023-12-11 | End: 2023-12-11

## 2023-12-11 RX ORDER — DIPHENHYDRAMINE HYDROCHLORIDE 50 MG/ML
12.5 INJECTION INTRAMUSCULAR; INTRAVENOUS
Status: DISCONTINUED | OUTPATIENT
Start: 2023-12-11 | End: 2023-12-11

## 2023-12-11 RX ORDER — LIDOCAINE HYDROCHLORIDE 20 MG/ML
INJECTION, SOLUTION INFILTRATION; PERINEURAL AS NEEDED
Status: DISCONTINUED | OUTPATIENT
Start: 2023-12-11 | End: 2023-12-11 | Stop reason: SURG

## 2023-12-11 RX ORDER — ROCURONIUM BROMIDE 10 MG/ML
INJECTION, SOLUTION INTRAVENOUS AS NEEDED
Status: DISCONTINUED | OUTPATIENT
Start: 2023-12-11 | End: 2023-12-11 | Stop reason: SURG

## 2023-12-11 RX ORDER — POTASSIUM CHLORIDE 750 MG/1
40 TABLET, FILM COATED, EXTENDED RELEASE ORAL EVERY 4 HOURS
Status: DISCONTINUED | OUTPATIENT
Start: 2023-12-11 | End: 2023-12-11

## 2023-12-11 RX ORDER — ONDANSETRON 2 MG/ML
4 INJECTION INTRAMUSCULAR; INTRAVENOUS ONCE AS NEEDED
Status: DISCONTINUED | OUTPATIENT
Start: 2023-12-11 | End: 2023-12-11

## 2023-12-11 RX ORDER — LABETALOL HYDROCHLORIDE 5 MG/ML
5 INJECTION, SOLUTION INTRAVENOUS
Status: DISCONTINUED | OUTPATIENT
Start: 2023-12-11 | End: 2023-12-11

## 2023-12-11 RX ORDER — MIDAZOLAM HYDROCHLORIDE 1 MG/ML
1 INJECTION INTRAMUSCULAR; INTRAVENOUS
Status: COMPLETED | OUTPATIENT
Start: 2023-12-11 | End: 2023-12-11

## 2023-12-11 RX ORDER — PROPOFOL 10 MG/ML
INJECTION, EMULSION INTRAVENOUS AS NEEDED
Status: DISCONTINUED | OUTPATIENT
Start: 2023-12-11 | End: 2023-12-11 | Stop reason: SURG

## 2023-12-11 RX ORDER — FENTANYL CITRATE 50 UG/ML
50 INJECTION, SOLUTION INTRAMUSCULAR; INTRAVENOUS ONCE AS NEEDED
Status: COMPLETED | OUTPATIENT
Start: 2023-12-11 | End: 2023-12-11

## 2023-12-11 RX ORDER — BUPIVACAINE HYDROCHLORIDE AND EPINEPHRINE 5; 5 MG/ML; UG/ML
INJECTION, SOLUTION EPIDURAL; INTRACAUDAL; PERINEURAL AS NEEDED
Status: DISCONTINUED | OUTPATIENT
Start: 2023-12-11 | End: 2023-12-11 | Stop reason: HOSPADM

## 2023-12-11 RX ORDER — HYDROCODONE BITARTRATE AND ACETAMINOPHEN 5; 325 MG/1; MG/1
1 TABLET ORAL ONCE AS NEEDED
Status: DISCONTINUED | OUTPATIENT
Start: 2023-12-11 | End: 2023-12-11

## 2023-12-11 RX ORDER — HYDRALAZINE HYDROCHLORIDE 20 MG/ML
5 INJECTION INTRAMUSCULAR; INTRAVENOUS
Status: DISCONTINUED | OUTPATIENT
Start: 2023-12-11 | End: 2023-12-11

## 2023-12-11 RX ORDER — PROMETHAZINE HYDROCHLORIDE 12.5 MG/1
12.5 TABLET ORAL EVERY 8 HOURS
Status: DISCONTINUED | OUTPATIENT
Start: 2023-12-11 | End: 2023-12-15 | Stop reason: HOSPADM

## 2023-12-11 RX ORDER — FAMOTIDINE 10 MG/ML
20 INJECTION, SOLUTION INTRAVENOUS ONCE
Status: COMPLETED | OUTPATIENT
Start: 2023-12-11 | End: 2023-12-11

## 2023-12-11 RX ORDER — DEXAMETHASONE SODIUM PHOSPHATE 4 MG/ML
INJECTION, SOLUTION INTRA-ARTICULAR; INTRALESIONAL; INTRAMUSCULAR; INTRAVENOUS; SOFT TISSUE AS NEEDED
Status: DISCONTINUED | OUTPATIENT
Start: 2023-12-11 | End: 2023-12-11 | Stop reason: SURG

## 2023-12-11 RX ORDER — FENTANYL CITRATE 50 UG/ML
50 INJECTION, SOLUTION INTRAMUSCULAR; INTRAVENOUS
Status: DISCONTINUED | OUTPATIENT
Start: 2023-12-11 | End: 2023-12-11

## 2023-12-11 RX ORDER — EPHEDRINE SULFATE 50 MG/ML
5 INJECTION, SOLUTION INTRAVENOUS ONCE AS NEEDED
Status: DISCONTINUED | OUTPATIENT
Start: 2023-12-11 | End: 2023-12-11

## 2023-12-11 RX ORDER — MAGNESIUM HYDROXIDE 1200 MG/15ML
LIQUID ORAL AS NEEDED
Status: DISCONTINUED | OUTPATIENT
Start: 2023-12-11 | End: 2023-12-11 | Stop reason: HOSPADM

## 2023-12-11 RX ORDER — SCOLOPAMINE TRANSDERMAL SYSTEM 1 MG/1
1 PATCH, EXTENDED RELEASE TRANSDERMAL
Status: DISCONTINUED | OUTPATIENT
Start: 2023-12-11 | End: 2023-12-11 | Stop reason: HOSPADM

## 2023-12-11 RX ORDER — NALOXONE HCL 0.4 MG/ML
0.2 VIAL (ML) INJECTION AS NEEDED
Status: DISCONTINUED | OUTPATIENT
Start: 2023-12-11 | End: 2023-12-11

## 2023-12-11 RX ORDER — ONDANSETRON 2 MG/ML
INJECTION INTRAMUSCULAR; INTRAVENOUS AS NEEDED
Status: DISCONTINUED | OUTPATIENT
Start: 2023-12-11 | End: 2023-12-11 | Stop reason: SURG

## 2023-12-11 RX ORDER — FENTANYL CITRATE 50 UG/ML
INJECTION, SOLUTION INTRAMUSCULAR; INTRAVENOUS AS NEEDED
Status: DISCONTINUED | OUTPATIENT
Start: 2023-12-11 | End: 2023-12-11 | Stop reason: SURG

## 2023-12-11 RX ORDER — SODIUM CHLORIDE 0.9 % (FLUSH) 0.9 %
3-10 SYRINGE (ML) INJECTION AS NEEDED
Status: DISCONTINUED | OUTPATIENT
Start: 2023-12-11 | End: 2023-12-11 | Stop reason: HOSPADM

## 2023-12-11 RX ORDER — OXYCODONE AND ACETAMINOPHEN 7.5; 325 MG/1; MG/1
1 TABLET ORAL EVERY 4 HOURS PRN
Status: DISCONTINUED | OUTPATIENT
Start: 2023-12-11 | End: 2023-12-11

## 2023-12-11 RX ORDER — LIDOCAINE HYDROCHLORIDE 10 MG/ML
0.5 INJECTION, SOLUTION INFILTRATION; PERINEURAL ONCE AS NEEDED
Status: DISCONTINUED | OUTPATIENT
Start: 2023-12-11 | End: 2023-12-11 | Stop reason: HOSPADM

## 2023-12-11 RX ORDER — SODIUM CHLORIDE 0.9 % (FLUSH) 0.9 %
3 SYRINGE (ML) INJECTION EVERY 12 HOURS SCHEDULED
Status: DISCONTINUED | OUTPATIENT
Start: 2023-12-11 | End: 2023-12-11 | Stop reason: HOSPADM

## 2023-12-11 RX ORDER — HYDROMORPHONE HYDROCHLORIDE 1 MG/ML
0.5 INJECTION, SOLUTION INTRAMUSCULAR; INTRAVENOUS; SUBCUTANEOUS
Status: DISCONTINUED | OUTPATIENT
Start: 2023-12-11 | End: 2023-12-11

## 2023-12-11 RX ORDER — CEFAZOLIN SODIUM 2 G/100ML
2000 INJECTION, SOLUTION INTRAVENOUS
Status: COMPLETED | OUTPATIENT
Start: 2023-12-11 | End: 2023-12-11

## 2023-12-11 RX ADMIN — PROPOFOL 150 MG: 10 INJECTION, EMULSION INTRAVENOUS at 14:16

## 2023-12-11 RX ADMIN — LIDOCAINE HYDROCHLORIDE 60 MG: 20 INJECTION, SOLUTION INFILTRATION; PERINEURAL at 14:16

## 2023-12-11 RX ADMIN — GABAPENTIN 100 MG: 100 CAPSULE ORAL at 22:12

## 2023-12-11 RX ADMIN — SODIUM CHLORIDE 100 ML/HR: 9 INJECTION, SOLUTION INTRAVENOUS at 05:15

## 2023-12-11 RX ADMIN — MIDAZOLAM 1 MG: 1 INJECTION INTRAMUSCULAR; INTRAVENOUS at 14:02

## 2023-12-11 RX ADMIN — POTASSIUM CHLORIDE, DEXTROSE MONOHYDRATE AND SODIUM CHLORIDE 75 ML/HR: 150; 5; 900 INJECTION, SOLUTION INTRAVENOUS at 19:45

## 2023-12-11 RX ADMIN — ROCURONIUM BROMIDE 50 MG: 10 INJECTION, SOLUTION INTRAVENOUS at 14:16

## 2023-12-11 RX ADMIN — FENTANYL CITRATE 50 MCG: 50 INJECTION, SOLUTION INTRAMUSCULAR; INTRAVENOUS at 14:16

## 2023-12-11 RX ADMIN — BUDESONIDE AND FORMOTEROL FUMARATE DIHYDRATE 2 PUFF: 160; 4.5 AEROSOL RESPIRATORY (INHALATION) at 20:07

## 2023-12-11 RX ADMIN — DEXAMETHASONE SODIUM PHOSPHATE 8 MG: 4 INJECTION, SOLUTION INTRAMUSCULAR; INTRAVENOUS at 14:22

## 2023-12-11 RX ADMIN — OXYCODONE AND ACETAMINOPHEN 1 TABLET: 7.5; 325 TABLET ORAL at 18:06

## 2023-12-11 RX ADMIN — HYDROMORPHONE HYDROCHLORIDE 0.5 MG: 1 INJECTION, SOLUTION INTRAMUSCULAR; INTRAVENOUS; SUBCUTANEOUS at 10:48

## 2023-12-11 RX ADMIN — HYDROMORPHONE HYDROCHLORIDE 0.5 MG: 1 INJECTION, SOLUTION INTRAMUSCULAR; INTRAVENOUS; SUBCUTANEOUS at 01:52

## 2023-12-11 RX ADMIN — Medication 10 ML: at 10:48

## 2023-12-11 RX ADMIN — GABAPENTIN 100 MG: 100 CAPSULE ORAL at 06:09

## 2023-12-11 RX ADMIN — DICYCLOMINE HYDROCHLORIDE 10 MG: 10 CAPSULE ORAL at 09:05

## 2023-12-11 RX ADMIN — PROMETHAZINE HYDROCHLORIDE 12.5 MG: 12.5 TABLET ORAL at 11:23

## 2023-12-11 RX ADMIN — FENTANYL CITRATE 50 MCG: 50 INJECTION, SOLUTION INTRAMUSCULAR; INTRAVENOUS at 13:45

## 2023-12-11 RX ADMIN — FAMOTIDINE 20 MG: 10 INJECTION INTRAVENOUS at 13:44

## 2023-12-11 RX ADMIN — SUGAMMADEX 200 MG: 100 INJECTION, SOLUTION INTRAVENOUS at 15:16

## 2023-12-11 RX ADMIN — ONDANSETRON 4 MG: 2 INJECTION INTRAMUSCULAR; INTRAVENOUS at 03:49

## 2023-12-11 RX ADMIN — Medication 10 ML: at 19:46

## 2023-12-11 RX ADMIN — OXYCODONE AND ACETAMINOPHEN 1 TABLET: 7.5; 325 TABLET ORAL at 22:14

## 2023-12-11 RX ADMIN — Medication 10 ML: at 19:47

## 2023-12-11 RX ADMIN — ONDANSETRON 4 MG: 2 INJECTION INTRAMUSCULAR; INTRAVENOUS at 14:22

## 2023-12-11 RX ADMIN — HYDROMORPHONE HYDROCHLORIDE 0.5 MG: 1 INJECTION, SOLUTION INTRAMUSCULAR; INTRAVENOUS; SUBCUTANEOUS at 16:29

## 2023-12-11 RX ADMIN — HYDROMORPHONE HYDROCHLORIDE 0.5 MG: 1 INJECTION, SOLUTION INTRAMUSCULAR; INTRAVENOUS; SUBCUTANEOUS at 06:09

## 2023-12-11 RX ADMIN — HYDROMORPHONE HYDROCHLORIDE 0.5 MG: 1 INJECTION, SOLUTION INTRAMUSCULAR; INTRAVENOUS; SUBCUTANEOUS at 16:44

## 2023-12-11 RX ADMIN — SCOPALAMINE 1 PATCH: 1 PATCH, EXTENDED RELEASE TRANSDERMAL at 13:48

## 2023-12-11 RX ADMIN — HYDROMORPHONE HYDROCHLORIDE 1 MG: 1 INJECTION, SOLUTION INTRAMUSCULAR; INTRAVENOUS; SUBCUTANEOUS at 19:45

## 2023-12-11 RX ADMIN — HYDROMORPHONE HYDROCHLORIDE 1 MG: 1 INJECTION, SOLUTION INTRAMUSCULAR; INTRAVENOUS; SUBCUTANEOUS at 14:39

## 2023-12-11 RX ADMIN — PROMETHAZINE HYDROCHLORIDE 12.5 MG: 12.5 TABLET ORAL at 18:50

## 2023-12-11 RX ADMIN — ACETAMINOPHEN 650 MG: 325 TABLET, FILM COATED ORAL at 03:45

## 2023-12-11 RX ADMIN — HYDROMORPHONE HYDROCHLORIDE 0.5 MG: 1 INJECTION, SOLUTION INTRAMUSCULAR; INTRAVENOUS; SUBCUTANEOUS at 16:04

## 2023-12-11 RX ADMIN — SODIUM CHLORIDE, POTASSIUM CHLORIDE, SODIUM LACTATE AND CALCIUM CHLORIDE 9 ML/HR: 600; 310; 30; 20 INJECTION, SOLUTION INTRAVENOUS at 13:46

## 2023-12-11 RX ADMIN — PANTOPRAZOLE SODIUM 80 MG: 40 TABLET, DELAYED RELEASE ORAL at 19:45

## 2023-12-11 RX ADMIN — HYDROMORPHONE HYDROCHLORIDE 0.5 MG: 1 INJECTION, SOLUTION INTRAMUSCULAR; INTRAVENOUS; SUBCUTANEOUS at 15:38

## 2023-12-11 RX ADMIN — MIDAZOLAM 1 MG: 1 INJECTION INTRAMUSCULAR; INTRAVENOUS at 13:45

## 2023-12-11 RX ADMIN — HYDROXYCHLOROQUINE SULFATE 200 MG: 200 TABLET ORAL at 22:12

## 2023-12-11 RX ADMIN — CEFAZOLIN SODIUM 2000 MG: 2 INJECTION, SOLUTION INTRAVENOUS at 14:02

## 2023-12-11 NOTE — OP NOTE
Surgeon: Madhu Zabala Jr., M.D.    Assistant: Ben Golden CSA    An assistant was necessary and provided valuable retraction and exposure, as well as camera holding, manipulation of the gallbladder, assistance with the cholangiogram, placing the gallbladder in the Endo Catch bag, and wound closure.    Pre-Operative Diagnosis:     Calculus of gallbladder with acute on chronic cholecystitis without obstruction [K80.12]    Post-Operative Diagnosis:    Cholelithiasis with chronic cholecystitis and hydrops of the gallbladder    Procedure Performed:     Laparoscopic cholecystectomy with intraoperative cholangiogram    Indications:     The patient is a pleasant 28-year-old female with multiple medical problems including gastroparesis requiring tube feeds through J-tube who has had persistent right upper quadrant abdominal pain.  CT scan of the abdomen and pelvis shows a distended gallbladder and right upper quadrant ultrasound shows gallstones.  It is concerning that she has chronic cholecystitis as the source of her abdominal pain and she presents for laparoscopic cholecystectomy with intraoperative cholangiogram.  The patient understands the indications, alternatives, risks, and benefits of the procedure and wishes to proceed.     Procedure:     The patient was identified and taken to the operating room where she was placed in the supine position on the operating table.  She underwent general endotracheal anesthesia and was appropriate monitored throughout the case by the anesthesia personnel.  Her abdomen was prepped and draped in the standard surgical fashion well maintaining the J-tube in place and protecting it from the operative field.  Each incision was infiltrated with 0.5% Marcaine with epinephrine prior to making the incision.  A small right upper quadrant incision was made with a scalpel carried through the skin into the subcutaneous tissue.  A 5 mm Optiview port was placed with laparoscopic guidance.   The abdomen was then insufflated with low pressures encountered initially.  The abdomen was inspected and there were adhesions from the liver to the abdominal wall as well as dense adhesions in the mid abdomen related to the J-tube.  There appeared to be a free area just below the umbilicus for port as well as a port in the subxiphoid position and right lateral abdomen.  A small incision was made just below the umbilicus with a scalpel carried through the skin into the subcutaneous tissue.  A 5 mm port was placed through the incision into the abdomen with direct visitation intra-abdominal using laparoscope.  Once placed, the laparoscope was removed from the right upper quadrant and placed to the umbilical port site.  With this position a 10 mm subxiphoid port was placed and a 5 mm right lateral abdominal port was placed all with laparoscopic guidance in the same fashion as the umbilical port.  The gallbladder was quite distended.  An attempt was made elevated over the liver but the liver was not mobile due to adhesions to the abdominal wall.  These adhesions were lysed using Bovie electrocautery and this allowed elevation of the liver.  The gallbladder was first aspirated with an aspirating needle and the contents were clear, consistent with hydrops of the gallbladder.  The gallbladder also had a thickened wall consistent with chronic cholecystitis.  The gallbladder was grasped and elevated over the liver.  The gallbladder was then grasped at the infundibulum and traction was applied to open the triangle of Calot.  The triangle of Calot was carefully inspected and an enlarged lymph node was clearly identified.  This lymph node was taken down using Bovie electrocautery and the cystic artery was identified deep to this lymph node.  Just to the left side of the artery the cystic duct was identified.  The cystic duct was wrapped in scar tissue making the dissection somewhat difficult.  A cautery hook was used to free  the cystic duct from the scar tissue.  To improve access to the cystic duct, it was elected to divide the cystic artery first.  The structure was surrounded and divided after placing 2 clips proximally and dividing distally using Bovie electrocautery.  This allowed elevation of the gallbladder and extension of the cystic duct for improved access.  A cholangiocatheter was introduced into the abdomen through an Angiocath.  A small nick was created in the cystic duct and the cholangiocatheter was inserted into the cystic duct and secured with a clip.  A cholangiogram was then performed that confirmed our impression of the anatomy, showed a normal-sized common bile duct with no filling defects, and rapid drainage of contrast material into the duodenum.  The clip was removed and the cholangiocatheter was removed.  The cystic duct was then divided after placing 2 clips proximally, 1 clip distally, and divided with the scissors.  The gallbladder was then removed from the gallbladder fossa using both electrocautery without difficulty.  It was placed in an Endo Catch bag and then removed from the subxiphoid port.  The gallbladder fossa was inspected and hemostasis was noted be adequate.  The area was irrigated with irrigation fluid and there is no evidence of bile leakage or bleeding.  The operative bed was very clean.  The irrigation fluid was suctioned.  The abdomen was deflated and the laparoscopic equipment was removed.  The fascia of the subxiphoid port was closed with a 0 Vicryl in a figure-of-eight fashion.  All skin incisions were closed with a 4-0 Monocryl in a subcuticular fashion followed by Mastisol Steri-Strips.  The sponge, needle, and instrument counts were correct at the end of the case.  The patient tolerated procedure well and was transferred to the recovery in stable condition.    Estimated Blood Loss:      minimal    Specimens:     None    Madhu Zabala Jr., M.D.

## 2023-12-11 NOTE — ANESTHESIA PREPROCEDURE EVALUATION
Anesthesia Evaluation     Patient summary reviewed and Nursing notes reviewed   history of anesthetic complications:   NPO Solid Status: > 8 hours  NPO Liquid Status: > 4 hours           Airway   Mallampati: II  Neck ROM: full  Dental - normal exam     Pulmonary     breath sounds clear to auscultation  (+) asthma,shortness of breath  Cardiovascular     Rhythm: regular        Neuro/Psych  (+) numbness, psychiatric history  GI/Hepatic/Renal/Endo    (+) GERD, GI bleeding     Musculoskeletal     Abdominal    Substance History      OB/GYN          Other   arthritis, autoimmune disease Sjogren syndrome,       Other Comment: Bell mata              Anesthesia Plan    ASA 3     general     (Awareness in past, use BIS, multiple allergies, read through list)  intravenous induction     Anesthetic plan, risks, benefits, and alternatives have been provided, discussed and informed consent has been obtained with: patient.    CODE STATUS:    Code Status (Patient has no pulse and is not breathing): CPR (Attempt to Resuscitate)  Medical Interventions (Patient has pulse or is breathing): Full Support

## 2023-12-11 NOTE — PROGRESS NOTES
Chief Complaint:    Abdominal pain    Subjective:    The patient is well-known to me with multiple medical problems and a complicated dysfunctional GI tract with severe gastroparesis that requires a J-tube for feeds.  She has known gallbladder disease in the past and has been having episodes of right upper quadrant abdominal pain.  She now has constant pain in the right upper quadrant.  A CT scan of the abdomen pelvis shows a distended gallbladder and a right upper quadrant ultrasound shows a gallstone but no evidence of cholecystitis.    Objective:    Temp:  [96.8 °F (36 °C)-97.8 °F (36.6 °C)] 97.8 °F (36.6 °C)  Heart Rate:  [53-69] 69  Resp:  [16-18] 16  BP: ()/(59-75) 127/75    Physical Exam  Constitutional:       Appearance: She is ill-appearing. She is not toxic-appearing.   Abdominal:      Palpations: Abdomen is soft.      Tenderness: There is generalized abdominal tenderness and tenderness in the right upper quadrant.      Comments: The abdomen is soft and mildly tender diffusely and moderately tender in the right upper quadrant.   Neurological:      Mental Status: She is alert.   Psychiatric:         Behavior: Behavior is cooperative.       BMI is within normal parameters. No other follow-up for BMI required.      Results:    WBC is 2.15.  H/H is 10.1/31.8.  BUN is 16 creatinine 0.58.    Assessment/Plan:    The patient has multiple medical problems and severe gastroparesis with persistent right upper quadrant abdominal pain.  The gallbladder has a gallstone and is distended and may be the source of her persistent pain.  This was discussed with the patient and her mother.  It is appropriate to proceed with a cholecystectomy.  We will plan to schedule a laparoscopic cholecystectomy with intraoperative cholangiogram when surgery is available.  The patient understands the indications, alternatives, risks, and benefits of the procedure and wishes to proceed.     She had a CT scan of the abdomen and pelvis  performed at an outlying facility that showed swirling of the mesentery.  That study shows no evidence of a small bowel obstruction and she does not have distention on exam to suggest an obstructive process.  She also has no clinical evidence of ischemia.  I do not think the swirling is clinically significant at this point but we will need to follow her clinical course.    Madhu Zabala Jr., M.D.

## 2023-12-11 NOTE — ANESTHESIA POSTPROCEDURE EVALUATION
Patient: Henrietta Garrett    Procedure Summary       Date: 12/11/23 Room / Location: [facility] OR [address] [place] [address] MAIN OR    Anesthesia Start: 1409 Anesthesia Stop: 1538    Procedure: CHOLECYSTECTOMY LAPAROSCOPIC INTRAOPERATIVE CHOLANGIOGRAM (Abdomen) Diagnosis:       Calculus of gallbladder with acute on chronic cholecystitis without obstruction      (Calculus of gallbladder with acute on chronic cholecystitis without obstruction [K80.12])    Surgeons: Madhu Zabala Jr., MD Provider: Azeb Warner MD    Anesthesia Type: general ASA Status: 3            Anesthesia Type: general    Vitals  Vitals Value Taken Time   /78 12/11/23 1600   Temp 37.1 °C (98.8 °F) 12/11/23 1535   Pulse 56 12/11/23 1614   Resp 13 12/11/23 1540   SpO2 100 % 12/11/23 1614   Vitals shown include unfiled device data.        Post Anesthesia Care and Evaluation    Patient location during evaluation: bedside  Pain management: adequate    Airway patency: patent  Anesthetic complications: No anesthetic complications    Cardiovascular status: acceptable  Respiratory status: acceptable  Hydration status: acceptable

## 2023-12-11 NOTE — PROGRESS NOTES
[facility]  Inpatient Progress Note    Reason for Follow Up:  gastroparesis    Subjective     Interval History:   Pt was sobbing.   She reports exacerbation of pain that started few minutes ago. She states her whole abdomen hurts, but there is some localization on RUQ pain. She states pain radiates to the back.  She is NPO at this time. She is +nausea, allergic to reglan.   No vomiting.   No BM since admission.       Current Facility-Administered Medications:     acetaminophen (TYLENOL) tablet 650 mg, 650 mg, Oral, Q4H PRN, 650 mg at 12/11/23 0345 **OR** acetaminophen (TYLENOL) 160 MG/5ML oral solution 650 mg, 650 mg, Oral, Q4H PRN, 650 mg at 12/10/23 0617 **OR** acetaminophen (TYLENOL) suppository 650 mg, 650 mg, Rectal, Q4H PRN, Marleny Barksdale APRN    amLODIPine (NORVASC) tablet 10 mg, 10 mg, Per J Tube, Daily, Chelsy Benitez APRN, 10 mg at 12/10/23 1031    [DISCONTINUED] sennosides-docusate (PERICOLACE) 8.6-50 MG per tablet 2 tablet, 2 tablet, Oral, BID **AND** [DISCONTINUED] polyethylene glycol (MIRALAX) packet 17 g, 17 g, Oral, Daily PRN **AND** [DISCONTINUED] bisacodyl (DULCOLAX) EC tablet 5 mg, 5 mg, Oral, Daily PRN **AND** bisacodyl (DULCOLAX) suppository 10 mg, 10 mg, Rectal, Daily PRN, Marleny Barksdale APRN    budesonide-formoterol (SYMBICORT) 160-4.5 MCG/ACT inhaler 2 puff, 2 puff, Inhalation, BID - RT, Chelsy Benitez APRN, 2 puff at 12/10/23 2059    dicyclomine (BENTYL) capsule 10 mg, 10 mg, Oral, Q8H PRN, Chelsy Benitez APRN, 10 mg at 12/11/23 0905    escitalopram (LEXAPRO) tablet 5 mg, 5 mg, Per J Tube, Daily, Chelsy Benitez APRN, 5 mg at 12/10/23 1031    gabapentin (NEURONTIN) capsule 100 mg, 100 mg, Oral, Q8H, Zachery Gallegos MD, 100 mg at 12/11/23 0609    heparin injection 500 Units, 5 mL, Intravenous, PRN, Marleny Barksdale, APRN    HYDROmorphone (DILAUDID) injection 0.5 mg, 0.5 mg, Intravenous, Q4H PRN,  Zachery Gallegos MD, 0.5 mg at 12/11/23 0609    hydroxychloroquine (PLAQUENIL) tablet 200 mg, 200 mg, Per J Tube, BID, Chelsy Benitez APRN, 200 mg at 12/10/23 2001    [COMPLETED] HYDROmorphone (DILAUDID) injection 0.5 mg, 0.5 mg, Intravenous, Q3H PRN, 0.5 mg at 12/10/23 1240 **AND** naloxone (NARCAN) injection 0.4 mg, 0.4 mg, Intravenous, Q5 Min PRN, Marleny Barksdale APRN    norethindrone-ethinyl estradiol-ferrous fumarate (LOESTIN 24 FE) 1-20 MG-MCG(24) per tablet 1 tablet, 1 tablet, Per J Tube, Daily, Chelsy Benitez APRN    ondansetron (ZOFRAN) injection 4 mg, 4 mg, Intravenous, Q6H PRN, Chelsy Benitez APRN, 4 mg at 12/11/23 0349    pantoprazole (PROTONIX) EC tablet 80 mg, 80 mg, Oral, BID, Chelsy Benitez APRN, 80 mg at 12/11/23 0859    Potassium Replacement - Follow Nurse / BPA Driven Protocol, , Does not apply, PRN, Chelsy Benitez APRN    sodium chloride 0.9 % flush 10 mL, 10 mL, Intravenous, Q12H, Marleny Barksdale APRN, 10 mL at 12/10/23 2002    sodium chloride 0.9 % flush 10 mL, 10 mL, Intravenous, PRN, Marleny Barksdale APRN    sodium chloride 0.9 % flush 10 mL, 10 mL, Intravenous, Q12H, Marleny Barksdale APRN, 10 mL at 12/10/23 2002    sodium chloride 0.9 % flush 10 mL, 10 mL, Intravenous, PRN, Marleny Barksdale APRN    sodium chloride 0.9 % flush 10 mL, 10 mL, Intravenous, Q12H, Chelsy Benitez APRN    sodium chloride 0.9 % flush 10 mL, 10 mL, Intravenous, PRN, Chelsy Benitez, APRN    sodium chloride 0.9 % flush 20 mL, 20 mL, Intravenous, PRN, Marleny Barksdale, APRN    sodium chloride 0.9 % infusion 40 mL, 40 mL, Intravenous, PRN, Marleny Barksdale M, APRN    sodium chloride 0.9 % infusion 40 mL, 40 mL, Intravenous, PRN, Marleny Barksdale, APRN    sodium chloride 0.9 % infusion 40 mL, 40 mL, Intravenous, PRN, Chelsy Benitez, APRN    sodium chloride 0.9 % infusion, 100 mL/hr, Intravenous,  Tai, Shamir Marleny M, APRN, Last Rate: 100 mL/hr at 12/11/23 0515, 100 mL/hr at 12/11/23 0515        Objective     Vital Signs  Temp:  [96.8 °F (36 °C)-96.9 °F (36.1 °C)] 96.9 °F (36.1 °C)  Heart Rate:  [53-64] 64  Resp:  [16-18] 16  BP: ()/(59-75) 98/59  Body mass index is 23.89 kg/m².    Intake/Output Summary (Last 24 hours) at 12/11/2023 1039  Last data filed at 12/11/2023 0515  Gross per 24 hour   Intake 2099 ml   Output --   Net 2099 ml     No intake/output data recorded.     Physical Exam:   General: patient awake, alert and cooperative. Crying.    Eyes: Normal lids and lashes, no scleral icterus   Abdomen:Soft, ND; +guarding.     Results Review:     I reviewed the patient's new clinical results.    Results from last 7 days   Lab Units 12/11/23  0514 12/10/23  0458   WBC 10*3/mm3 2.15* 3.32*   HEMOGLOBIN g/dL 10.1* 9.8*   HEMATOCRIT % 31.8* 30.4*   PLATELETS 10*3/mm3 199 217     Results from last 7 days   Lab Units 12/11/23 0514 12/10/23  0458   SODIUM mmol/L 135* 138   POTASSIUM mmol/L 3.5 3.7   CHLORIDE mmol/L 105 107   CO2 mmol/L 18.9* 21.0*   BUN mg/dL 6 7   CREATININE mg/dL 0.58 0.75   CALCIUM mg/dL 8.6 9.1   BILIRUBIN mg/dL  --  0.3   ALK PHOS U/L  --  57   ALT (SGPT) U/L  --  31   AST (SGOT) U/L  --  54*   GLUCOSE mg/dL 73 82     Results from last 7 days   Lab Units 12/10/23  0458   INR  1.05     Lab Results   Lab Value Date/Time    LIPASE 44 12/09/2023 2058    LIPASE 18 06/26/2023 0642    LIPASE 19 06/23/2023 0653    LIPASE 30 06/16/2023 0101    LIPASE 80 01/05/2020 1030    LIPASE 53 07/20/2018 1151       Radiology:  US Gallbladder   Final Result   Single 1.3 cm gallstone within the gallbladder without   cholecystitis or biliary dilatation.       This report was finalized on 12/10/2023 1:47 PM by Dr. Cami Holder M.D   on Workstation: [id number]          CT Outside Films   Final Result          Assessment & Plan     Active Hospital Problems    Diagnosis     **Abdominal pain     Anemia      Abnormal CT of the abdomen     Jejunostomy tube present     Gastroesophageal reflux disease     Gastroparesis     Nausea & vomiting     Systemic lupus erythematosus     Fibromyalgia          Assessment:   J-tube in place  Abdominal pain right upper quadrant  Gastroparesis  Autonomic dysfunction of the GI tract  Chronic idiopathic constipation  Irritable bowel syndrome  Fibromyalgia    All problems new to me   Plan:   Trial of phenergan via J-tube. Per patient report- she does well with this. (She is allergic to reglan)  Will f/u w/ recs from general surgery re: 1.3cm gallstone.  Otherwise continue supportive care w/ IVF, analgesics PRN  Continue to remain NPO, holding J-tube given exacerbation of pain this morning.     I discussed the patients findings and my recommendations with patient.         Aida Coleman PA-C  [facility]  [address] [place] [address]  Office: [phone]

## 2023-12-11 NOTE — PLAN OF CARE
Goal Outcome Evaluation:              Outcome Evaluation: VSS. Dilaudid given for pain. J tube in place, Nutrition consult to start tube feeds. Lap rodney done today with Dr. Zabala, lap sites with steri-strips. K+: 3.5, IV fluids changed to D5 NS with 20 KCL. Due to void.

## 2023-12-11 NOTE — PROGRESS NOTES
Name: Henrietta Garrett ADMIT: 12/10/2023   : 1995  PCP: Fanny Jarrett MD    MRN: [id number] LOS: 0 days   AGE/SEX: 28 y.o. female  ROOM: [facility]     Subjective   Subjective   Patient looks uncomfortable and in no apparent distress.  Family and RN at bedside.  Chronic left lower quadrant abdominal pain with acute right upper quadrant abdominal pain.  Nausea without vomiting.  Denies chest pain or shortness of breath.       Objective   Objective   Vital Signs  Temp:  [96.8 °F (36 °C)-97.8 °F (36.6 °C)] 97.8 °F (36.6 °C)  Heart Rate:  [53-69] 69  Resp:  [16-18] 16  BP: ()/(59-75) 127/75  SpO2:  [98 %-99 %] 98 %  on   ;   Device (Oxygen Therapy): room air  Body mass index is 23.89 kg/m².    Physical Exam  Constitutional:       General: She is not in acute distress.     Appearance: She is not toxic-appearing.      Comments: Uncomfortable and generally weak   Cardiovascular:      Rate and Rhythm: Normal rate.      Heart sounds: Normal heart sounds.   Pulmonary:      Effort: Pulmonary effort is normal.      Breath sounds: Normal breath sounds.   Abdominal:      Palpations: Abdomen is soft.      Tenderness: There is abdominal tenderness (RUQ).   Musculoskeletal:      Right lower leg: No edema.      Left lower leg: No edema.   Skin:     General: Skin is warm and dry.   Neurological:      Mental Status: She is alert and oriented to person, place, and time.      Cranial Nerves: No cranial nerve deficit.       Results Review     I reviewed the patient's new clinical results.  Results from last 7 days   Lab Units 23  0514 12/10/23  0458   WBC 10*3/mm3 2.15* 3.32*   HEMOGLOBIN g/dL 10.1* 9.8*   PLATELETS 10*3/mm3 199 217     Results from last 7 days   Lab Units 23  0514 12/10/23  0458   SODIUM mmol/L 135* 138   POTASSIUM mmol/L 3.5 3.7   CHLORIDE mmol/L 105 107   CO2 mmol/L 18.9* 21.0*   BUN mg/dL 6 7   CREATININE mg/dL 0.58 0.75   GLUCOSE mg/dL 73 82   EGFR mL/min/1.73 126.6 111.4  "    Results from last 7 days   Lab Units 12/10/23  0458   ALBUMIN g/dL 3.9   BILIRUBIN mg/dL 0.3   ALK PHOS U/L 57   AST (SGOT) U/L 54*   ALT (SGPT) U/L 31     Results from last 7 days   Lab Units 12/11/23  0514 12/10/23  0458   CALCIUM mg/dL 8.6 9.1   ALBUMIN g/dL  --  3.9       No results found for: \"HGBA1C\", \"POCGLU\"    US Gallbladder    Result Date: 12/10/2023  Single 1.3 cm gallstone within the gallbladder without cholecystitis or biliary dilatation.  This report was finalized on 12/10/2023 1:47 PM by Dr. Cami Holder M.D on Workstation: [id number]       I have personally reviewed all medications:  Scheduled Medications  amLODIPine, 10 mg, Per J Tube, Daily  budesonide-formoterol, 2 puff, Inhalation, BID - RT  ceFAZolin, 2,000 mg, Intravenous, On Call to OR  escitalopram, 5 mg, Per J Tube, Daily  gabapentin, 100 mg, Oral, Q8H  hydroxychloroquine, 200 mg, Per J Tube, BID  norethindrone-ethinyl estradiol-ferrous fumarate, 1 tablet, Per J Tube, Daily  pantoprazole, 80 mg, Oral, BID  promethazine, 12.5 mg, Oral, Q8H  sodium chloride, 10 mL, Intravenous, Q12H  sodium chloride, 10 mL, Intravenous, Q12H  sodium chloride, 10 mL, Intravenous, Q12H    Infusions  lactated ringers, 125 mL/hr    Diet  NPO Diet NPO Type: Sips with Meds    I have personally reviewed:  [x]  Laboratory   []  Microbiology   []  Radiology   []  EKG/Telemetry  []  Cardiology/Vascular   []  Pathology    []  Records       Assessment/Plan     Active Hospital Problems    Diagnosis  POA    **Abdominal pain [R10.9]  Unknown    Calculus of gallbladder with acute on chronic cholecystitis without obstruction [K80.12]  Unknown    Anemia [D64.9]  Unknown    Abnormal CT of the abdomen [R93.5]  Unknown    Jejunostomy tube present [Z93.4]  Not Applicable    Gastroesophageal reflux disease [K21.9]  Yes    Gastroparesis [K31.84]  Yes    Nausea & vomiting [R11.2]  Yes    Systemic lupus erythematosus [M32.9]  Yes    Fibromyalgia [M79.7]  Yes      Resolved Hospital " Problems   No resolved problems to display.       28 y.o. female admitted with Abdominal pain.    General surgery following calculus of gallbladder with acute on chronic cholecystitis and recommend cholecystectomy with intraoperative cholangiogram this admission.  Continue symptom management with as needed IV Dilaudid.  Avoid sedation--continuous pulse oximetry.  IV fluids switched to LR for metabolic acidosis with bicarbonate losses.  Ordering blood cultures x 2.  Perioperative cefazolin.    Gastroenterology following gastroparesis recommends follow-up with Dr. Quintana at [facility].  PPI.  Antiemetics as needed continued.  Phenergan per J-tube.    Anemia: Iron profile 40 with iron saturation 12 decreased from prior 18 (4/2019).  Stool Hemoccult ordered.  Would avoid supplemental iron for now given acute cholecystitis in the setting of gastroparesis.      Hypertension with somewhat labile blood pressures in the setting of IV Dilaudid as needed for acute right upper quadrant abdominal pain.  Amlodipine continued with hold parameters.    Plaquenil continued for SLE.    Gabapentin continued for fibromyalgia.    Lexapro continued for depression.    SCDs for DVT prophylaxis.  Full code.  Discussed with patient and family, & RN.  Anticipate discharge home with HH vs SNU facility timing yet to be determined.      MITCHELL Mejias  [facility]  12/11/23  12:53 EST

## 2023-12-11 NOTE — PLAN OF CARE
Goal Outcome Evaluation:  Plan of Care Reviewed With: patient        Progress: no change  Outcome Evaluation: vss, dilaudid and tylenol for pain, some med given thru her J-tube, keep npo except sip of meds, voiding freely, up with assist, continue to monitor the pt.

## 2023-12-11 NOTE — ANESTHESIA PROCEDURE NOTES
Airway  Urgency: elective    Date/Time: 12/11/2023 2:18 PM  Airway not difficult    General Information and Staff    Patient location during procedure: OR  CRNA/CAA: Elina Tapia CRNA    Indications and Patient Condition    Preoxygenated: yes  Mask difficulty assessment: 1 - vent by mask    Final Airway Details  Final airway type: endotracheal airway      Successful airway: ETT  Cuffed: yes   Successful intubation technique: direct laryngoscopy  Facilitating devices/methods: intubating stylet  Endotracheal tube insertion site: oral  Blade: Escobar  Blade size: 2  ETT size (mm): 7.5  Cormack-Lehane Classification: grade I - full view of glottis  Placement verified by: capnometry   Measured from: lips  ETT/EBT  to lips (cm): 23  Number of attempts at approach: 1  Assessment: lips, teeth, and gum same as pre-op and atraumatic intubation

## 2023-12-12 LAB
ANION GAP SERPL CALCULATED.3IONS-SCNC: 11 MMOL/L (ref 5–15)
BUN SERPL-MCNC: 4 MG/DL (ref 6–20)
BUN/CREAT SERPL: 6.1 (ref 7–25)
CALCIUM SPEC-SCNC: 9.1 MG/DL (ref 8.6–10.5)
CHLORIDE SERPL-SCNC: 104 MMOL/L (ref 98–107)
CO2 SERPL-SCNC: 19 MMOL/L (ref 22–29)
CREAT SERPL-MCNC: 0.66 MG/DL (ref 0.57–1)
DEPRECATED RDW RBC AUTO: 41 FL (ref 37–54)
EGFRCR SERPLBLD CKD-EPI 2021: 122.7 ML/MIN/1.73
ERYTHROCYTE [DISTWIDTH] IN BLOOD BY AUTOMATED COUNT: 13.6 % (ref 12.3–15.4)
GLUCOSE SERPL-MCNC: 142 MG/DL (ref 65–99)
HCT VFR BLD AUTO: 34.6 % (ref 34–46.6)
HGB BLD-MCNC: 11.2 G/DL (ref 12–15.9)
MCH RBC QN AUTO: 26.7 PG (ref 26.6–33)
MCHC RBC AUTO-ENTMCNC: 32.4 G/DL (ref 31.5–35.7)
MCV RBC AUTO: 82.4 FL (ref 79–97)
PLATELET # BLD AUTO: 251 10*3/MM3 (ref 140–450)
PMV BLD AUTO: 11.1 FL (ref 6–12)
POTASSIUM SERPL-SCNC: 4.4 MMOL/L (ref 3.5–5.2)
RBC # BLD AUTO: 4.2 10*6/MM3 (ref 3.77–5.28)
SODIUM SERPL-SCNC: 134 MMOL/L (ref 136–145)
WBC NRBC COR # BLD AUTO: 3.54 10*3/MM3 (ref 3.4–10.8)

## 2023-12-12 PROCEDURE — 63710000001 PROMETHAZINE PER 12.5 MG: Performed by: SURGERY

## 2023-12-12 PROCEDURE — 25010000002 HYDROMORPHONE 1 MG/ML SOLUTION: Performed by: SURGERY

## 2023-12-12 PROCEDURE — 85027 COMPLETE CBC AUTOMATED: CPT | Performed by: SURGERY

## 2023-12-12 PROCEDURE — 94799 UNLISTED PULMONARY SVC/PX: CPT

## 2023-12-12 PROCEDURE — 80048 BASIC METABOLIC PNL TOTAL CA: CPT | Performed by: SURGERY

## 2023-12-12 PROCEDURE — 99024 POSTOP FOLLOW-UP VISIT: CPT | Performed by: SURGERY

## 2023-12-12 PROCEDURE — G0378 HOSPITAL OBSERVATION PER HR: HCPCS

## 2023-12-12 RX ADMIN — GABAPENTIN 100 MG: 100 CAPSULE ORAL at 06:33

## 2023-12-12 RX ADMIN — PANTOPRAZOLE SODIUM 80 MG: 40 TABLET, DELAYED RELEASE ORAL at 08:31

## 2023-12-12 RX ADMIN — OXYCODONE AND ACETAMINOPHEN 1 TABLET: 7.5; 325 TABLET ORAL at 20:27

## 2023-12-12 RX ADMIN — ESCITALOPRAM 5 MG: 5 TABLET, FILM COATED ORAL at 08:43

## 2023-12-12 RX ADMIN — Medication 10 ML: at 20:28

## 2023-12-12 RX ADMIN — AMLODIPINE BESYLATE 10 MG: 10 TABLET ORAL at 08:43

## 2023-12-12 RX ADMIN — HYDROMORPHONE HYDROCHLORIDE 1 MG: 1 INJECTION, SOLUTION INTRAMUSCULAR; INTRAVENOUS; SUBCUTANEOUS at 05:02

## 2023-12-12 RX ADMIN — POTASSIUM CHLORIDE, DEXTROSE MONOHYDRATE AND SODIUM CHLORIDE 50 ML/HR: 150; 5; 900 INJECTION, SOLUTION INTRAVENOUS at 10:46

## 2023-12-12 RX ADMIN — PROMETHAZINE HYDROCHLORIDE 12.5 MG: 12.5 TABLET ORAL at 05:01

## 2023-12-12 RX ADMIN — OXYCODONE AND ACETAMINOPHEN 1 TABLET: 7.5; 325 TABLET ORAL at 16:33

## 2023-12-12 RX ADMIN — BUDESONIDE AND FORMOTEROL FUMARATE DIHYDRATE 2 PUFF: 160; 4.5 AEROSOL RESPIRATORY (INHALATION) at 19:07

## 2023-12-12 RX ADMIN — GABAPENTIN 100 MG: 100 CAPSULE ORAL at 16:33

## 2023-12-12 RX ADMIN — PROMETHAZINE HYDROCHLORIDE 12.5 MG: 12.5 TABLET ORAL at 20:20

## 2023-12-12 RX ADMIN — GABAPENTIN 100 MG: 100 CAPSULE ORAL at 21:43

## 2023-12-12 RX ADMIN — HYDROMORPHONE HYDROCHLORIDE 1 MG: 1 INJECTION, SOLUTION INTRAMUSCULAR; INTRAVENOUS; SUBCUTANEOUS at 00:57

## 2023-12-12 RX ADMIN — PROMETHAZINE HYDROCHLORIDE 12.5 MG: 12.5 TABLET ORAL at 12:11

## 2023-12-12 RX ADMIN — OXYCODONE AND ACETAMINOPHEN 1 TABLET: 7.5; 325 TABLET ORAL at 08:31

## 2023-12-12 RX ADMIN — OXYCODONE AND ACETAMINOPHEN 1 TABLET: 7.5; 325 TABLET ORAL at 12:11

## 2023-12-12 RX ADMIN — PANTOPRAZOLE SODIUM 80 MG: 40 TABLET, DELAYED RELEASE ORAL at 20:20

## 2023-12-12 RX ADMIN — HYDROXYCHLOROQUINE SULFATE 200 MG: 200 TABLET ORAL at 20:19

## 2023-12-12 NOTE — PLAN OF CARE
Goal Outcome Evaluation:              Outcome Evaluation: HR bradycardic otherwise VSS. IV fluids infusing. Tube feedings started. Up in chair. Percocet given for pain. Lap sites with steri-strips. Ambulated in hallway but short distance.
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
## 2023-12-12 NOTE — PROGRESS NOTES
Chief Complaint:    S/P Laparoscopic cholecystectomy with intraoperative cholangiogram, POD 1    Subjective:    The patient is complaining of expected postop abdominal pain.  She is getting some improvement with the Dilaudid but the oxycodone is working better.  She has nausea and is receiving Phenergan.    Objective:    Temp:  [97 °F (36.1 °C)-98.8 °F (37.1 °C)] 97.6 °F (36.4 °C)  Heart Rate:  [50-69] 52  Resp:  [13-18] 16  BP: (103-133)/(61-84) 121/75    Physical Exam  Constitutional:       Appearance: She is ill-appearing. She is not toxic-appearing.   Abdominal:      Palpations: Abdomen is soft.      Tenderness: There is generalized abdominal tenderness (Appropriate postop tenderness).   Neurological:      Mental Status: She is alert.   Psychiatric:         Behavior: Behavior is cooperative.       BMI is within normal parameters. No other follow-up for BMI required.      Results:    WBC is 3.54.  H/H is 11.2/34.6.  BUN is 4 and creatinine 0.66.    Impression/Plan:    The patient is POD 1 from a laparoscopic cholecystectomy with intraoperative cholangiogram for chronic cholecystitis with hydrops of the gallbladder.  She has expected postop pain but is clinically stable.  She is ready for the tube feeds.    I have consulted nutrition to restart tube feeds.  She normally takes [employer] peptide which had to be ordered previously.  I will leave tube feeding options up to nutrition but they should be started today.    Madhu Zabala Jr., M.D.

## 2023-12-12 NOTE — PROGRESS NOTES
[facility]  Inpatient Progress Note    Reason for Follow Up:  Nausea/abdominal pain    Subjective     Interval History:   S/p lap ALLEGRAY w/ Dr. Zabala 12/11. Negative IOC.   Pt reports she is feeling better than yesterday, however is still c/o abdominal pain. Pain is diffuse. No vomiting, but is c/o nausea. Phenergan is helping significantly.       Current Facility-Administered Medications:     acetaminophen (TYLENOL) tablet 650 mg, 650 mg, Oral, Q4H PRN, 650 mg at 12/11/23 0345 **OR** acetaminophen (TYLENOL) 160 MG/5ML oral solution 650 mg, 650 mg, Oral, Q4H PRN, 650 mg at 12/10/23 0617 **OR** acetaminophen (TYLENOL) suppository 650 mg, 650 mg, Rectal, Q4H PRN, Madhu Zabala Jr., MD    amLODIPine (NORVASC) tablet 10 mg, 10 mg, Per J Tube, Daily, Madhu Zabala Jr., MD, 10 mg at 12/12/23 0843    [DISCONTINUED] sennosides-docusate (PERICOLACE) 8.6-50 MG per tablet 2 tablet, 2 tablet, Oral, BID **AND** [DISCONTINUED] polyethylene glycol (MIRALAX) packet 17 g, 17 g, Oral, Daily PRN **AND** [DISCONTINUED] bisacodyl (DULCOLAX) EC tablet 5 mg, 5 mg, Oral, Daily PRN **AND** bisacodyl (DULCOLAX) suppository 10 mg, 10 mg, Rectal, Daily PRN, Madhu Zabala Jr., MD    budesonide-formoterol (SYMBICORT) 160-4.5 MCG/ACT inhaler 2 puff, 2 puff, Inhalation, BID - RT, Madhu Zabala Jr., MD, 2 puff at 12/11/23 2007    dextrose 5 % and sodium chloride 0.9 % with KCl 20 mEq/L infusion, 50 mL/hr, Intravenous, Continuous, Roney Mattson APRN, Last Rate: 75 mL/hr at 12/12/23 0100, 75 mL/hr at 12/12/23 0100    dicyclomine (BENTYL) capsule 10 mg, 10 mg, Oral, Q8H PRN, Madhu Zabala Jr., MD, 10 mg at 12/11/23 0905    escitalopram (LEXAPRO) tablet 5 mg, 5 mg, Per J Tube, Daily, Madhu Zabala Jr., MD, 5 mg at 12/12/23 0843    gabapentin (NEURONTIN) capsule 100 mg, 100 mg, Oral, Q8H, Madhu Zabala Jr., MD, 100 mg at 12/12/23 0633    heparin injection 500 Units, 5 mL, Intravenous, PRN, Madhu Zabala Jr.,  MD    HYDROmorphone (DILAUDID) injection 1 mg, 1 mg, Intravenous, Q4H PRN, Madhu Zabala Jr., MD, 1 mg at 12/12/23 0502    hydroxychloroquine (PLAQUENIL) tablet 200 mg, 200 mg, Per J Tube, BID, Madhu Zabala Jr., MD, 200 mg at 12/11/23 2212    [COMPLETED] HYDROmorphone (DILAUDID) injection 0.5 mg, 0.5 mg, Intravenous, Q3H PRN, 0.5 mg at 12/10/23 1240 **AND** naloxone (NARCAN) injection 0.4 mg, 0.4 mg, Intravenous, Q5 Min PRN, Madhu Zabala Jr., MD    norethindrone-ethinyl estradiol-ferrous fumarate (LOESTIN 24 FE) 1-20 MG-MCG(24) per tablet 1 tablet, 1 tablet, Per J Tube, Daily, Madhu Zabala Jr., MD    ondansetron (ZOFRAN) injection 4 mg, 4 mg, Intravenous, Q6H PRN, Madhu Zabala Jr., MD, 4 mg at 12/11/23 0349    oxyCODONE-acetaminophen (PERCOCET) 7.5-325 MG per tablet 1 tablet, 1 tablet, Oral, Q4H PRN, Madhu Zabala Jr., MD, 1 tablet at 12/12/23 0831    pantoprazole (PROTONIX) EC tablet 80 mg, 80 mg, Oral, BID, Madhu Zabala Jr., MD, 80 mg at 12/12/23 0831    Potassium Replacement - Follow Nurse / BPA Driven Protocol, , Does not apply, PRN, Madhu Zabala Jr., MD    promethazine (PHENERGAN) tablet 12.5 mg, 12.5 mg, Oral, Q8H, Madhu Zabala Jr., MD, 12.5 mg at 12/12/23 0501    sodium chloride 0.9 % flush 10 mL, 10 mL, Intravenous, Q12H, Madhu Zabala Jr., MD, 10 mL at 12/10/23 2002    sodium chloride 0.9 % flush 10 mL, 10 mL, Intravenous, PRN, Madhu Zabala Jr., MD    sodium chloride 0.9 % flush 10 mL, 10 mL, Intravenous, Q12H, Madhu Zabala Jr., MD, 10 mL at 12/11/23 1946    sodium chloride 0.9 % flush 10 mL, 10 mL, Intravenous, PRN, Madhu Zabala Jr., MD    sodium chloride 0.9 % flush 10 mL, 10 mL, Intravenous, Q12H, Madhu Zabala Jr., MD, 10 mL at 12/11/23 1947    sodium chloride 0.9 % flush 10 mL, 10 mL, Intravenous, PRN, Madhu Zabala Jr., MD    sodium chloride 0.9 % flush 20 mL, 20 mL, Intravenous, PRN, Madhu Zabala Jr., MD    sodium chloride 0.9 % infusion 40 mL, 40 mL,  Intravenous, Vj HARDING Anthony Jr., MD    sodium chloride 0.9 % infusion 40 mL, 40 mL, IntravenousMEHDI George, Anthony Jr., MD    sodium chloride 0.9 % infusion 40 mL, 40 mL, Intravenous, Vj HARDING Anthony Jr., MD        Objective     Vital Signs  Temp:  [97 °F (36.1 °C)-98.8 °F (37.1 °C)] 97.6 °F (36.4 °C)  Heart Rate:  [50-69] 52  Resp:  [13-18] 16  BP: (103-133)/(61-84) 121/75  Body mass index is 23.89 kg/m².    Intake/Output Summary (Last 24 hours) at 12/12/2023 0922  Last data filed at 12/11/2023 1945  Gross per 24 hour   Intake 785.72 ml   Output --   Net 785.72 ml     No intake/output data recorded.     Physical Exam:   General: patient awake, alert and cooperative   Eyes: Normal lids and lashes, no scleral icterus   Abdomen: Soft, but mild difuse abd pain. Surgical incisions are non-inflamed.    Results Review:     I reviewed the patient's new clinical results.    Results from last 7 days   Lab Units 12/12/23  0509 12/11/23  0514 12/10/23  0458   WBC 10*3/mm3 3.54 2.15* 3.32*   HEMOGLOBIN g/dL 11.2* 10.1* 9.8*   HEMATOCRIT % 34.6 31.8* 30.4*   PLATELETS 10*3/mm3 251 199 217     Results from last 7 days   Lab Units 12/12/23  0509 12/11/23  0514 12/10/23  0458   SODIUM mmol/L 134* 135* 138   POTASSIUM mmol/L 4.4 3.5 3.7   CHLORIDE mmol/L 104 105 107   CO2 mmol/L 19.0* 18.9* 21.0*   BUN mg/dL 4* 6 7   CREATININE mg/dL 0.66 0.58 0.75   CALCIUM mg/dL 9.1 8.6 9.1   BILIRUBIN mg/dL  --   --  0.3   ALK PHOS U/L  --   --  57   ALT (SGPT) U/L  --   --  31   AST (SGOT) U/L  --   --  54*   GLUCOSE mg/dL 142* 73 82     Results from last 7 days   Lab Units 12/10/23  0458   INR  1.05     Lab Results   Lab Value Date/Time    LIPASE 44 12/09/2023 2058    LIPASE 18 06/26/2023 0642    LIPASE 19 06/23/2023 0653    LIPASE 30 06/16/2023 0101    LIPASE 80 01/05/2020 1030    LIPASE 53 07/20/2018 1151       Radiology:  FL Cholangiogram Operative   Final Result      US Gallbladder   Final Result   Single 1.3 cm gallstone  within the gallbladder without   cholecystitis or biliary dilatation.       This report was finalized on 12/10/2023 1:47 PM by Dr. Cami Holder M.D   on Workstation: [id number]          CT Outside Films   Final Result          Assessment & Plan     Active Hospital Problems    Diagnosis     **Abdominal pain     Calculus of gallbladder with acute on chronic cholecystitis without obstruction     Anemia     Abnormal CT of the abdomen     Jejunostomy tube present     Gastroesophageal reflux disease     Gastroparesis     Nausea & vomiting     Systemic lupus erythematosus     Fibromyalgia          Assessment:   J-tube in place  Abdominal pain right upper quadrant- u/s showing gallstone. S/p lap CCY 12/11  Gastroparesis  Autonomic dysfunction of the GI tract  Chronic idiopathic constipation  Irritable bowel syndrome  Fibromyalgia    Plan:   Continue phenergan for nausea. It wouldn't be completely unexpected if there is some exacerbation of symptoms related to gastroparesis as she recently just had surgery.  Okay to start tube feeds from GI perspective as well  Encourage ambulation/out of bed as much as possible  Outpatient f/u w/ Dr. Quintana at [facility] who manages her chronic GI symptoms.   No further recs from GI, we will s/o, please call with any questions or concerns.     I discussed the patients findings and my recommendations with patient.         Aida Coleman PA-C  [facility]  [address] [place] [address]  Office: [phone]

## 2023-12-12 NOTE — PROGRESS NOTES
Name: Henrietta Garrett ADMIT: 12/10/2023   : 1995  PCP: Fanny Jarrett MD    MRN: [id number] LOS: 0 days   AGE/SEX: 28 y.o. female  ROOM: [facility]     Subjective   Subjective   Patient looks more comfortable and in no apparent distress today.  Mother at bedside requesting tube feeds given lack of intake for 4 days.  Postoperative tenderness today.  Ambulating halls today with assistance.  Denies chest pain or shortness of breath.       Objective   Objective   Vital Signs  Temp:  [97 °F (36.1 °C)-98.8 °F (37.1 °C)] 97.4 °F (36.3 °C)  Heart Rate:  [50-69] 55  Resp:  [13-18] 16  BP: (103-135)/(61-84) 135/84  SpO2:  [97 %-100 %] 100 %  on  Flow (L/min):  [2] 2;   Device (Oxygen Therapy): room air  Body mass index is 23.89 kg/m².    Physical Exam  Constitutional:       General: She is not in acute distress.     Appearance: She is not toxic-appearing.      Comments: Generally weak   Cardiovascular:      Rate and Rhythm: Normal rate.      Heart sounds: Normal heart sounds.   Pulmonary:      Effort: Pulmonary effort is normal.      Breath sounds: Normal breath sounds.   Abdominal:      Palpations: Abdomen is soft.      Tenderness: There is abdominal tenderness (RUQ).      Comments: Feeding tube   Musculoskeletal:      Right lower leg: No edema.      Left lower leg: No edema.   Skin:     General: Skin is warm and dry.   Neurological:      Mental Status: She is alert and oriented to person, place, and time.      Cranial Nerves: No cranial nerve deficit.     *physical exam performed on 2023 as per above    Results Review     I reviewed the patient's new clinical results.  Results from last 7 days   Lab Units 23  0509 23  0514 12/10/23  0458   WBC 10*3/mm3 3.54 2.15* 3.32*   HEMOGLOBIN g/dL 11.2* 10.1* 9.8*   PLATELETS 10*3/mm3 251 199 217     Results from last 7 days   Lab Units 23  0509 23  0514 12/10/23  0458   SODIUM mmol/L 134* 135* 138   POTASSIUM mmol/L 4.4 3.5 3.7  "  CHLORIDE mmol/L 104 105 107   CO2 mmol/L 19.0* 18.9* 21.0*   BUN mg/dL 4* 6 7   CREATININE mg/dL 0.66 0.58 0.75   GLUCOSE mg/dL 142* 73 82   EGFR mL/min/1.73 122.7 126.6 111.4     Results from last 7 days   Lab Units 12/10/23  0458   ALBUMIN g/dL 3.9   BILIRUBIN mg/dL 0.3   ALK PHOS U/L 57   AST (SGOT) U/L 54*   ALT (SGPT) U/L 31     Results from last 7 days   Lab Units 12/12/23  0509 12/11/23  0514 12/10/23  0458   CALCIUM mg/dL 9.1 8.6 9.1   ALBUMIN g/dL  --   --  3.9       No results found for: \"HGBA1C\", \"POCGLU\"    No radiology results for the last day    I have personally reviewed all medications:  Scheduled Medications  amLODIPine, 10 mg, Per J Tube, Daily  budesonide-formoterol, 2 puff, Inhalation, BID - RT  escitalopram, 5 mg, Per J Tube, Daily  gabapentin, 100 mg, Oral, Q8H  hydroxychloroquine, 200 mg, Per J Tube, BID  norethindrone-ethinyl estradiol-ferrous fumarate, 1 tablet, Per J Tube, Daily  pantoprazole, 80 mg, Oral, BID  promethazine, 12.5 mg, Oral, Q8H  sodium chloride, 10 mL, Intravenous, Q12H  sodium chloride, 10 mL, Intravenous, Q12H  sodium chloride, 10 mL, Intravenous, Q12H    Infusions  dextrose 5 % and sodium chloride 0.9 % with KCl 20 mEq, 50 mL/hr, Last Rate: 50 mL/hr (12/12/23 1046)    Diet  NPO Diet NPO Type: Strict NPO    I have personally reviewed:  [x]  Laboratory   []  Microbiology   []  Radiology   []  EKG/Telemetry  []  Cardiology/Vascular   []  Pathology    []  Records       Assessment/Plan     Active Hospital Problems    Diagnosis  POA    **Abdominal pain [R10.9]  Unknown    Calculus of gallbladder with acute on chronic cholecystitis without obstruction [K80.12]  Unknown    Anemia [D64.9]  Unknown    Abnormal CT of the abdomen [R93.5]  Unknown    Jejunostomy tube present [Z93.4]  Not Applicable    Gastroesophageal reflux disease [K21.9]  Yes    Gastroparesis [K31.84]  Yes    Nausea & vomiting [R11.2]  Yes    Systemic lupus erythematosus [M32.9]  Yes    Fibromyalgia [M79.7]  Yes "      Resolved Hospital Problems   No resolved problems to display.       28 y.o. female admitted with Abdominal pain.    General surgery following calculus of gallbladder with acute on chronic cholecystitis s/p cholecystectomy with intraoperative cholangiogram on 12/12/2023.  Plan to wean as needed IV Dilaudid & utilize percocet PRN pain for now.  Avoid sedation--continuous pulse oximetry.  IV fluids switched to D5 NS with KCL 20 mEq.  Perioperative cefazolin completed.  RD following plan resume TF today given general surgery approval.    Gastroenterology evaluated gastroparesis & signed off  with recommendation for follow-up with Dr. Quintana at [facility].  PPI.  Antiemetics as needed continued.  Phenergan per J-tube.    Anemia: Iron profile 40 with iron saturation 12 decreased from prior 18 (4/2019).  Stool Hemoccult ordered without collection thus far--on BM to date.  Would avoid supplemental iron for now given acute cholecystitis in the setting of gastroparesis.      Hypertension:  Blood pressure trends more consistent today.  Amlodipine continued with hold parameters.    Plaquenil continued for SLE.    Gabapentin continued for fibromyalgia.    Lexapro continued for depression.    SCDs for DVT prophylaxis.  Full code.  Discussed with patient and family, & RN.  Anticipate discharge hopefully home with family in 1-2 days      MITCHELL Mejias  [facility]  12/12/23  14:26 EST

## 2023-12-12 NOTE — PLAN OF CARE
Goal Outcome Evaluation:  Plan of Care Reviewed With: patient        Progress: no change  Outcome Evaluation: sinus bradycardia, c/o pain medicated with iv dilaudid and schedule phenergan given, voiding freely, j-tube clamped, keep npo. 3 lap site with steri strip. encourage to use incentive spirometer, up with assist, will ambulate today, continue to monitor the pt.

## 2023-12-12 NOTE — PLAN OF CARE
Problem: Aspiration (Enteral Nutrition)  Goal: Absence of Aspiration Signs and Symptoms  Outcome: Ongoing, Progressing     Problem: Device-Related Complication Risk (Enteral Nutrition)  Goal: Safe, Effective Therapy Delivery  Outcome: Ongoing, Progressing     Problem: Feeding Intolerance (Enteral Nutrition)  Goal: Feeding Tolerance  Outcome: Ongoing, Progressing  Intervention: Prevent and Manage Feeding Intolerance  Flowsheets (Taken 12/12/2023 1146)  Nutrition Support Management: tube feeding initiated   Goal Outcome Evaluation: Nutrition assessment complete. TF's to start today. RD following.

## 2023-12-13 LAB
ANION GAP SERPL CALCULATED.3IONS-SCNC: 7.5 MMOL/L (ref 5–15)
BUN SERPL-MCNC: 5 MG/DL (ref 6–20)
BUN/CREAT SERPL: 7.6 (ref 7–25)
CALCIUM SPEC-SCNC: 8.8 MG/DL (ref 8.6–10.5)
CHLORIDE SERPL-SCNC: 106 MMOL/L (ref 98–107)
CO2 SERPL-SCNC: 23.5 MMOL/L (ref 22–29)
CREAT SERPL-MCNC: 0.66 MG/DL (ref 0.57–1)
DEPRECATED RDW RBC AUTO: 40.2 FL (ref 37–54)
EGFRCR SERPLBLD CKD-EPI 2021: 122.7 ML/MIN/1.73
ERYTHROCYTE [DISTWIDTH] IN BLOOD BY AUTOMATED COUNT: 13.5 % (ref 12.3–15.4)
GLUCOSE SERPL-MCNC: 120 MG/DL (ref 65–99)
HCT VFR BLD AUTO: 30.1 % (ref 34–46.6)
HGB BLD-MCNC: 9.7 G/DL (ref 12–15.9)
LAB AP CASE REPORT: NORMAL
MCH RBC QN AUTO: 26.4 PG (ref 26.6–33)
MCHC RBC AUTO-ENTMCNC: 32.2 G/DL (ref 31.5–35.7)
MCV RBC AUTO: 81.8 FL (ref 79–97)
PATH REPORT.FINAL DX SPEC: NORMAL
PATH REPORT.GROSS SPEC: NORMAL
PLATELET # BLD AUTO: 187 10*3/MM3 (ref 140–450)
PMV BLD AUTO: 11 FL (ref 6–12)
POTASSIUM SERPL-SCNC: 3.6 MMOL/L (ref 3.5–5.2)
POTASSIUM SERPL-SCNC: 4 MMOL/L (ref 3.5–5.2)
RBC # BLD AUTO: 3.68 10*6/MM3 (ref 3.77–5.28)
SODIUM SERPL-SCNC: 137 MMOL/L (ref 136–145)
WBC NRBC COR # BLD AUTO: 3.9 10*3/MM3 (ref 3.4–10.8)

## 2023-12-13 PROCEDURE — 94799 UNLISTED PULMONARY SVC/PX: CPT

## 2023-12-13 PROCEDURE — 84132 ASSAY OF SERUM POTASSIUM: CPT | Performed by: STUDENT IN AN ORGANIZED HEALTH CARE EDUCATION/TRAINING PROGRAM

## 2023-12-13 PROCEDURE — 97162 PT EVAL MOD COMPLEX 30 MIN: CPT

## 2023-12-13 PROCEDURE — G0378 HOSPITAL OBSERVATION PER HR: HCPCS

## 2023-12-13 PROCEDURE — 97530 THERAPEUTIC ACTIVITIES: CPT

## 2023-12-13 PROCEDURE — 63710000001 PROMETHAZINE PER 12.5 MG: Performed by: SURGERY

## 2023-12-13 PROCEDURE — 99024 POSTOP FOLLOW-UP VISIT: CPT | Performed by: SURGERY

## 2023-12-13 PROCEDURE — 80048 BASIC METABOLIC PNL TOTAL CA: CPT | Performed by: NURSE PRACTITIONER

## 2023-12-13 PROCEDURE — 85027 COMPLETE CBC AUTOMATED: CPT | Performed by: NURSE PRACTITIONER

## 2023-12-13 RX ORDER — POTASSIUM CHLORIDE 1.5 G/1.58G
40 POWDER, FOR SOLUTION ORAL EVERY 4 HOURS
Status: DISPENSED | OUTPATIENT
Start: 2023-12-13 | End: 2023-12-13

## 2023-12-13 RX ORDER — SIMETHICONE 80 MG
80 TABLET,CHEWABLE ORAL 4 TIMES DAILY PRN
Status: DISCONTINUED | OUTPATIENT
Start: 2023-12-13 | End: 2023-12-14

## 2023-12-13 RX ORDER — HYDROMORPHONE HYDROCHLORIDE 1 MG/ML
0.5 INJECTION, SOLUTION INTRAMUSCULAR; INTRAVENOUS; SUBCUTANEOUS 3 TIMES DAILY PRN
Status: DISCONTINUED | OUTPATIENT
Start: 2023-12-13 | End: 2023-12-14

## 2023-12-13 RX ADMIN — Medication 10 ML: at 20:47

## 2023-12-13 RX ADMIN — PANTOPRAZOLE SODIUM 80 MG: 40 TABLET, DELAYED RELEASE ORAL at 20:47

## 2023-12-13 RX ADMIN — PROMETHAZINE HYDROCHLORIDE 12.5 MG: 12.5 TABLET ORAL at 20:47

## 2023-12-13 RX ADMIN — OXYCODONE AND ACETAMINOPHEN 1 TABLET: 7.5; 325 TABLET ORAL at 18:38

## 2023-12-13 RX ADMIN — AMLODIPINE BESYLATE 10 MG: 10 TABLET ORAL at 11:18

## 2023-12-13 RX ADMIN — PANTOPRAZOLE SODIUM 80 MG: 40 TABLET, DELAYED RELEASE ORAL at 09:04

## 2023-12-13 RX ADMIN — Medication 10 ML: at 09:16

## 2023-12-13 RX ADMIN — HYDROXYCHLOROQUINE SULFATE 200 MG: 200 TABLET ORAL at 09:04

## 2023-12-13 RX ADMIN — GABAPENTIN 100 MG: 100 CAPSULE ORAL at 14:25

## 2023-12-13 RX ADMIN — PROMETHAZINE HYDROCHLORIDE 12.5 MG: 12.5 TABLET ORAL at 11:18

## 2023-12-13 RX ADMIN — PROMETHAZINE HYDROCHLORIDE 12.5 MG: 12.5 TABLET ORAL at 04:21

## 2023-12-13 RX ADMIN — OXYCODONE AND ACETAMINOPHEN 1 TABLET: 7.5; 325 TABLET ORAL at 00:12

## 2023-12-13 RX ADMIN — HYDROXYCHLOROQUINE SULFATE 200 MG: 200 TABLET ORAL at 20:47

## 2023-12-13 RX ADMIN — OXYCODONE AND ACETAMINOPHEN 1 TABLET: 7.5; 325 TABLET ORAL at 09:04

## 2023-12-13 RX ADMIN — ESCITALOPRAM 5 MG: 5 TABLET, FILM COATED ORAL at 09:04

## 2023-12-13 RX ADMIN — POTASSIUM CHLORIDE, DEXTROSE MONOHYDRATE AND SODIUM CHLORIDE 50 ML/HR: 150; 5; 900 INJECTION, SOLUTION INTRAVENOUS at 11:19

## 2023-12-13 RX ADMIN — GABAPENTIN 100 MG: 100 CAPSULE ORAL at 22:39

## 2023-12-13 RX ADMIN — GABAPENTIN 100 MG: 100 CAPSULE ORAL at 05:26

## 2023-12-13 RX ADMIN — POTASSIUM CHLORIDE 40 MEQ: 1.5 POWDER, FOR SOLUTION ORAL at 09:05

## 2023-12-13 RX ADMIN — OXYCODONE AND ACETAMINOPHEN 1 TABLET: 7.5; 325 TABLET ORAL at 04:21

## 2023-12-13 RX ADMIN — BUDESONIDE AND FORMOTEROL FUMARATE DIHYDRATE 2 PUFF: 160; 4.5 AEROSOL RESPIRATORY (INHALATION) at 19:33

## 2023-12-13 RX ADMIN — OXYCODONE AND ACETAMINOPHEN 1 TABLET: 7.5; 325 TABLET ORAL at 14:31

## 2023-12-13 RX ADMIN — Medication 10 ML: at 09:15

## 2023-12-13 NOTE — PLAN OF CARE
Goal Outcome Evaluation:  Plan of Care Reviewed With: patient, family           Outcome Evaluation: Pt is a 29 y/o female evaluated by therapy POD2 lap cholecystectomy on 12/11. The patient has a PMHx that includes but is not limited to: Sjogren's, EDS, SLE, and gastroparesis w/ chronic J-tube. The patient reports that she lives with her fiance and has no TERRIE. At baseline she reports she needs occasional assist with ADLs and that she only walks short distances in the home. She has a WC that she uses for community mobility. Today the patient came to EOB modA, STS from EOB min-modA, and ambulated 15' in cga + rwx. She reports significant pain and ambulates with very slow, antalgic step-to gait but is steady overall with no LOB. She states that she wishes to DC home when cleared. Anticipated DC home with assist. PT will continue to monitor and progress as tolerated. Pt encouraged to ambulate with nsg 2-3x/day and sit in recliner OOB as much as tolerated t/o the day.      Anticipated Discharge Disposition (PT): home with assist
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
## 2023-12-13 NOTE — PROGRESS NOTES
Chief Complaint:    S/P Laparoscopic cholecystectomy with intraoperative cholangiogram, POD 2    Subjective:    The patient has expected postop abdominal pain.  She is also having significant nausea related to her gastroparesis.    Objective:    Temp:  [96.9 °F (36.1 °C)-98.6 °F (37 °C)] 98.2 °F (36.8 °C)  Heart Rate:  [54-78] 78  Resp:  [16] 16  BP: ()/(61-78) 116/73    Physical Exam  Constitutional:       Appearance: She is not ill-appearing or toxic-appearing.   Abdominal:      Palpations: Abdomen is soft.      Tenderness: There is no abdominal tenderness.   Neurological:      Mental Status: She is alert.   Psychiatric:         Behavior: Behavior is cooperative.       BMI is within normal parameters. No other follow-up for BMI required.        Results:    WBC is 3.90.  H/H 9.7/30.1.  BUN is 5 and creatinine 0.66.    Impression/Plan:    The patient is POD 2 from a laparoscopic cholecystectomy with intraoperative cholangiogram for cholelithiasis, chronic cholecystitis, and hydrops of the gallbladder.  She is on tube feeds and these should be advanced to target.    She is having persistent nausea and an NG tube was discussed with her.  She was advised that if an NG tube significantly improved her symptoms, she may be a good candidate for a PEG to decompress her stomach.    Madhu Zabala Jr., M.D.

## 2023-12-13 NOTE — PLAN OF CARE
Goal Outcome Evaluation:  Plan of Care Reviewed With: patient, family        Progress: no change  Outcome Evaluation: VSS, on room air, percocet given for pain w/ slight relief, phenergan for nausea w/ slight relief. pt had increased nausea after giving 1 dose of k+, so only 1 dose given. K+ is now WDL. Up in chair, IVFs as ordered, tolerating very small amounts of clear liquids, no reports of passing flatus, IS used, slept on and off most of the shift, family at bedside.

## 2023-12-13 NOTE — PROGRESS NOTES
Name: Henrietta Garrett ADMIT: 12/10/2023   : 1995  PCP: Fanny Jarrett MD    MRN: [id number] LOS: 0 days   AGE/SEX: 28 y.o. female  ROOM: [facility]     Subjective   Subjective   Patient looks generally weak & somewhat uncomfortable today.  Nausea without vomiting.  No apparent distress today.  No family at bedside.  Postoperative tenderness persists with gentle palpation.  Ambulating halls earlier with assistance.  Denies chest pain or shortness of breath.       Objective   Objective   Vital Signs  Temp:  [96.9 °F (36.1 °C)-98.6 °F (37 °C)] 98.2 °F (36.8 °C)  Heart Rate:  [54-78] 78  Resp:  [16] 16  BP: ()/(61-78) 116/73  SpO2:  [98 %-100 %] 98 %  on   ;   Device (Oxygen Therapy): room air  Body mass index is 23.89 kg/m².    Physical Exam  Constitutional:       General: She is not in acute distress.     Appearance: She is not toxic-appearing.      Comments: Generally weak   Cardiovascular:      Rate and Rhythm: Normal rate.      Heart sounds: Normal heart sounds.   Pulmonary:      Effort: Pulmonary effort is normal.      Breath sounds: Normal breath sounds.   Abdominal:      Palpations: Abdomen is soft.      Tenderness: There is abdominal tenderness (RUQ).      Comments: Feeding tube   Musculoskeletal:      Right lower leg: No edema.      Left lower leg: No edema.   Skin:     General: Skin is warm and dry.   Neurological:      Mental Status: She is alert and oriented to person, place, and time.      Cranial Nerves: No cranial nerve deficit.     *Physical exam performed on 2023 as per above    Results Review     I reviewed the patient's new clinical results.  Results from last 7 days   Lab Units 23  0536 23  0509 12/11/23  0514 12/10/23  0458   WBC 10*3/mm3 3.90 3.54 2.15* 3.32*   HEMOGLOBIN g/dL 9.7* 11.2* 10.1* 9.8*   PLATELETS 10*3/mm3 187 251 199 217     Results from last 7 days   Lab Units 23  0536 23  0509 2314 12/10/23  0458   SODIUM mmol/L 137  "134* 135* 138   POTASSIUM mmol/L 3.6 4.4 3.5 3.7   CHLORIDE mmol/L 106 104 105 107   CO2 mmol/L 23.5 19.0* 18.9* 21.0*   BUN mg/dL 5* 4* 6 7   CREATININE mg/dL 0.66 0.66 0.58 0.75   GLUCOSE mg/dL 120* 142* 73 82   EGFR mL/min/1.73 122.7 122.7 126.6 111.4     Results from last 7 days   Lab Units 12/10/23  0458   ALBUMIN g/dL 3.9   BILIRUBIN mg/dL 0.3   ALK PHOS U/L 57   AST (SGOT) U/L 54*   ALT (SGPT) U/L 31     Results from last 7 days   Lab Units 12/13/23  0536 12/12/23  0509 12/11/23  0514 12/10/23  0458   CALCIUM mg/dL 8.8 9.1 8.6 9.1   ALBUMIN g/dL  --   --   --  3.9       No results found for: \"HGBA1C\", \"POCGLU\"    No radiology results for the last day    I have personally reviewed all medications:  Scheduled Medications  amLODIPine, 10 mg, Per J Tube, Daily  budesonide-formoterol, 2 puff, Inhalation, BID - RT  escitalopram, 5 mg, Per J Tube, Daily  gabapentin, 100 mg, Oral, Q8H  hydroxychloroquine, 200 mg, Per J Tube, BID  norethindrone-ethinyl estradiol-ferrous fumarate, 1 tablet, Per J Tube, Daily  pantoprazole, 80 mg, Oral, BID  potassium chloride, 40 mEq, Oral, Q4H  promethazine, 12.5 mg, Oral, Q8H  sodium chloride, 10 mL, Intravenous, Q12H  sodium chloride, 10 mL, Intravenous, Q12H  sodium chloride, 10 mL, Intravenous, Q12H    Infusions  dextrose 5 % and sodium chloride 0.9 % with KCl 20 mEq, 50 mL/hr, Last Rate: 50 mL/hr (12/13/23 1119)    Diet  Diet: Liquid Diets; Clear Liquid; Fluid Consistency: Thin (IDDSI 0)    I have personally reviewed:  [x]  Laboratory   []  Microbiology   []  Radiology   []  EKG/Telemetry  []  Cardiology/Vascular   []  Pathology    []  Records       Assessment/Plan     Active Hospital Problems    Diagnosis  POA    **Abdominal pain [R10.9]  Unknown    Calculus of gallbladder with acute on chronic cholecystitis without obstruction [K80.12]  Unknown    Anemia [D64.9]  Unknown    Abnormal CT of the abdomen [R93.5]  Unknown    Jejunostomy tube present [Z93.4]  Not Applicable    " Gastroesophageal reflux disease [K21.9]  Yes    Gastroparesis [K31.84]  Yes    Nausea & vomiting [R11.2]  Yes    Systemic lupus erythematosus [M32.9]  Yes    Fibromyalgia [M79.7]  Yes      Resolved Hospital Problems   No resolved problems to display.       28 y.o. female admitted with Abdominal pain.    General surgery following calculus of gallbladder with acute on chronic cholecystitis s/p cholecystectomy with intraoperative cholangiogram on 12/12/2023.  Continue wean as needed IV Dilaudid from 1 mg TID PRN to 0.5 mg TID PRN & utilize percocet PRN pain for now.  Avoid sedation--continuous pulse oximetry.  Plan to reduce rate D5 NS with KCL 20 mEq from 50 mL/hr to KVO.  Perioperative cefazolin completed.  RD following S/P resumption TF on 12/12/2023 with goal 50 mL/hr with free water 80 mL every 4 hours.  Will trial simethicone for abdominal cramps, bloated sensation, & nausea approved by general surgery today.    Gastroenterology evaluated gastroparesis & signed off  with recommendation for follow-up with Dr. Quintana at [facility].  PPI.  Antiemetics as needed continued.  Phenergan per J-tube.    Anemia: Iron profile 40 with iron saturation 12 decreased from prior 18 (4/2019).  Stool Hemoccult ordered without collection thus far--no BM to date.  Would avoid supplemental iron for now given acute cholecystitis in the setting of gastroparesis & anticipate constipation / slow return of bowel function.     Hypertension:  Blood pressure trends more consistent today.  Amlodipine continued with hold parameters.    Plaquenil continued for SLE.    Gabapentin continued for fibromyalgia.    Lexapro continued for depression.    SCDs for DVT prophylaxis.  Full code.  Discussed with patient and family, & RN.  Anticipate discharge hopefully home with family in 1-2 days      MITCHELL Mejias  [facility]  12/13/23  13:57 EST

## 2023-12-13 NOTE — CONSULTS
Nutrition Services    Patient Name:  Henrietta Garrett  YOB: 1995  MRN: [id number]  Admit Date:  12/10/2023    Assessment Date:  12/13/23    Comment: Nutrition assessment initiated due to TF consult.  Pt was admitted for chronic cholecystitis and now S/P Lab Annamarie 12/11.  Pt has a long hx - Lupus, EDS, Sjogren's, gastroparesis with chronic J-tube, Chronic constipation, IBS.  She takes little by mouth at baseline and uses her Jtube for nutrition ([employer] Peptide 1.5 at 35-40mL/hr x 24 hrs but her goal is 50 - can't tolerate goal rate most of the time).  Labs, meds, skin reviewed.      IVF infusing at 50mL/hr.     Plan/Recommendations:  Start [employer] at 10 mL/hr  Adv as tolerates to goal of 50 mL/hr  Free water at 80 mL q 4 hrs  Po ALEX when OK with surgeon    Will follow clinical course, nutrition needs.    CLINICAL NUTRITION ASSESSMENT      Reason for Assessment Nurse or Nurse Practitioner Consult, TF Assessment    Diagnosis/Problem chronic cholecystitis and now S/P Lab Annamarie 12/11. Hx - Lupus, Sjogren's, EDS, gastroparesis with chronic J-tube, Chronic constipation, IBS   Current Problems Alt GI     Encounter Information        Nutrition History S[employer] at 35-40mL/hr at home, doesn't typically tolerate much more. Weight has been stable (+/- 10 lbs)   Food Preferences Nibbles on small amounts po.   Supplements    Factors Affecting Intake altered GI function   Tests/Procedures Other: cholangiogram     Anthropometrics        Current Height   Current Weight  BMI kg/m2    Weight: 77.7 kg (171 lb 4.8 oz) (12/10/23 0253)  Body mass index is 23.89 kg/m².     Adj BMI (if applicable)    BMI Category Normal/Healthy (18.4 - 24.9)       Admission Weight 171lb   Ideal Body Weight (IBW) 156lb     Adj IBW (if applicable)    Usual Body Weight (UBW) 164-165lbs   Weight Change/Trend Gain         Estimated/Assessed Needs        Energy Requirements    Weight for Calculation 77kg   Method for Estimation  25 kcal/kg  "  EST Needs (kcal/day) 1925       Protein Requirements    Weight for Calculation 77kg   EST Protein Needs (g/kg) 1.0 - 1.2 gm/kg   EST Daily Needs (g/day) 77-92       Fluid Requirements     Method for Estimation 1 mL/kcal    Estimated Needs (mL/day)        Fluid Deficit    Current Na Level (mEq/L)    Desired Na Level (mEq/L)    Estimated Fluid Deficit (L)       Labs        Pertinent Labs    Results from last 7 days   Lab Units 12/13/23  0536 12/12/23  0509 12/11/23  0514 12/10/23  0458   SODIUM mmol/L 137 134* 135* 138   POTASSIUM mmol/L 3.6 4.4 3.5 3.7   CHLORIDE mmol/L 106 104 105 107   CO2 mmol/L 23.5 19.0* 18.9* 21.0*   BUN mg/dL 5* 4* 6 7   CREATININE mg/dL 0.66 0.66 0.58 0.75   CALCIUM mg/dL 8.8 9.1 8.6 9.1   BILIRUBIN mg/dL  --   --   --  0.3   ALK PHOS U/L  --   --   --  57   ALT (SGPT) U/L  --   --   --  31   AST (SGOT) U/L  --   --   --  54*   GLUCOSE mg/dL 120* 142* 73 82     Results from last 7 days   Lab Units 12/13/23  0536 12/11/23  0514 12/10/23  0458   HEMOGLOBIN g/dL 9.7*   < > 9.8*   HEMATOCRIT % 30.1*   < > 30.4*   WBC 10*3/mm3 3.90   < > 3.32*   ALBUMIN g/dL  --   --  3.9    < > = values in this interval not displayed.     Results from last 7 days   Lab Units 12/13/23  0536 12/12/23  0509 12/11/23  0514 12/10/23  0458   INR   --   --   --  1.05   PLATELETS 10*3/mm3 187 251 199 217     SARS-CoV-2, DANIELLA   Date Value Ref Range Status   08/08/2022 NEGATIVE Negative Final     No results found for: \"HGBA1C\"       Medications            Scheduled Medications amLODIPine, 10 mg, Per J Tube, Daily  budesonide-formoterol, 2 puff, Inhalation, BID - RT  escitalopram, 5 mg, Per J Tube, Daily  gabapentin, 100 mg, Oral, Q8H  hydroxychloroquine, 200 mg, Per J Tube, BID  norethindrone-ethinyl estradiol-ferrous fumarate, 1 tablet, Per J Tube, Daily  pantoprazole, 80 mg, Oral, BID  potassium chloride, 40 mEq, Oral, Q4H  promethazine, 12.5 mg, Oral, Q8H  sodium chloride, 10 mL, Intravenous, Q12H  sodium chloride, 10 " mL, Intravenous, Q12H  sodium chloride, 10 mL, Intravenous, Q12H        Infusions dextrose 5 % and sodium chloride 0.9 % with KCl 20 mEq, 50 mL/hr, Last Rate: 50 mL/hr (12/12/23 1633)        PRN Medications   acetaminophen **OR** acetaminophen **OR** acetaminophen    [DISCONTINUED] senna-docusate sodium **AND** [DISCONTINUED] polyethylene glycol **AND** [DISCONTINUED] bisacodyl **AND** bisacodyl    dicyclomine    heparin    HYDROmorphone    [COMPLETED] HYDROmorphone **AND** naloxone    ondansetron    oxyCODONE-acetaminophen    Potassium Replacement - Follow Nurse / BPA Driven Protocol    sodium chloride    sodium chloride    sodium chloride    sodium chloride    sodium chloride    sodium chloride    sodium chloride     Physical Findings          Physical Appearance alert, oriented   Oral/Mouth Cavity WDL   Edema  not assessed   Gastrointestinal nausea, last bowel movement: pending   Skin  surgical incision: abd   Tubes/Drains/Lines jejunostomy    NFPE No clinical signs of muscle wasting or fat loss   --  Current Nutrition Orders & Evaluation of Intake       Oral Nutrition     Food Allergies NKFA   Current PO Diet NPO Diet NPO Type: Strict NPO   Supplement    PO Evaluation     Trending % PO Intake    --  Nutrition Diagnosis        Nutrition Dx Problem 1 Problem: Altered GI Function  Etiology: Medical Diagnosis - gastroparesis  Signs/Symptoms: EN Intake/DeliveryJtube feeding dependent    Comment:      INTERVENTION / PLAN OF CARE  Intervention Goal        Intervention Goal(s) Maintain nutrition status, Reduce/improve symptoms, Meet estimated needs, Disease management/therapy, Tolerate PO , Initiate TF/PN, Tolerate TF/PN at goal, and No significant weight loss     Nutrition Intervention        RD Action Continue to monitor, Care plan reviewed, and Recommend/order: EN     Prescription         Diet     Supplement/Snack    EN/PN     Prescription Ordered       Enteral Prescription:     Enteral Route Jejunostomy    TF  Delivery Method Continuous    Enteral Product Svet[employer] [place] Peptide 1.5    Modular None    Propofol Rate/Kcal     TF Start Rate 10    TF Goal Rate 50    Free Water Flush 80 mL q 4 hr (manual flushes per RN)    TF Provision at Goal: 1800 kcal, 89 gm protein, 840 mL free water + 480 mL water flushes         Calories 91 % needs met         Protein  100 % needs met         Fluid (mL) 1320mL    Prescription Ordered Yes     Monitor/Evaluation        Monitor Per protocol   Discharge Plan Pending clinical course   Education Will instruct as appropriate     RD to follow per protocol.       Electronically signed by:  Taylor Stringer RD  12/13/23 09:31 EST

## 2023-12-13 NOTE — CASE MANAGEMENT/SOCIAL WORK
Discharge Planning Assessment  [facility]     Patient Name: Henrietta Garrett  MRN: [id number]  Today's Date: 12/13/2023    Admit Date: 12/10/2023    Plan: Return home with fiance   Discharge Needs Assessment       Row Name 12/13/23 1137       Living Environment    People in Home significant other    Current Living Arrangements apartment    Potentially Unsafe Housing Conditions none    Primary Care Provided by self    Provides Primary Care For no one    Family Caregiver if Needed significant other    Quality of Family Relationships helpful;involved;supportive    Able to Return to Prior Arrangements yes       Resource/Environmental Concerns    Resource/Environmental Concerns none    Transportation Concerns none       Transition Planning    Patient/Family Anticipates Transition to home with family    Patient/Family Anticipated Services at Transition none    Transportation Anticipated family or friend will provide       Discharge Needs Assessment    Readmission Within the Last 30 Days no previous admission in last 30 days    Equipment Currently Used at Home feeding device;wheelchair    Concerns to be Addressed no discharge needs identified;denies needs/concerns at this time    Anticipated Changes Related to Illness none    Equipment Needed After Discharge none    Provided Post Acute Provider List? N/A    Provided Post Acute Provider Quality & Resource List? N/A                   Discharge Plan       Row Name 12/13/23 1138       Plan    Plan Return home with fiance    Patient/Family in Agreement with Plan yes    Plan Comments CCP met with the patient and her uncle Adrian at bedside with the patient’s verbal permission. The patient confirmed that the information on her face sheet was accurate. She states that she lives in an apartment with her fiancé with an elevator to get up to the apartment. She denies any steps inside the apartment. She confirmed her PCP is Fanny Jarrett. She states she has nursing care with  [facility] and uses a wheelchair to assist with her ambulation. She denies any history of SNF. She is enrolled in the [facility] program. She has a J-tube for tube feeds, and she gets her tube feeds through [facility]. She denies any needs at this time and states family can transport her at discharge. CCP to follow for needs. CD, ERNESTOW.                  Continued Care and Services - Admitted Since 12/10/2023    Coordination has not been started for this encounter.       Expected Discharge Date and Time       Expected Discharge Date Expected Discharge Time    Dec 14, 2023            Demographic Summary       Row Name 12/13/23 1135       General Information    Admission Type observation    Arrived From emergency department    Referral Source admission list    Reason for Consult discharge planning    Preferred Language English                   Functional Status       Row Name 12/13/23 1136       Functional Status    Usual Activity Tolerance fair    Current Activity Tolerance poor       Functional Status, IADL    Medications assistive equipment    Meal Preparation other (see comments)  Patient has J-tube for tube feeds    Housekeeping assistive equipment    Laundry assistive equipment    Shopping assistive equipment       Mental Status    General Appearance WDL WDL       Mental Status Summary    Recent Changes in Mental Status/Cognitive Functioning no changes       Employment/    Employment Status unemployed                   Psychosocial    No documentation.                  Abuse/Neglect    No documentation.                  Legal    No documentation.                  Substance Abuse    No documentation.                  Patient Forms    No documentation.

## 2023-12-13 NOTE — PLAN OF CARE
Goal Outcome Evaluation:  Plan of Care Reviewed With: patient        Progress: no change  Outcome Evaluation: VSS. IVF infusing. PO pain and nausea medication with relief. IVF infusing. Lap sites x3 CDI. Tube feed at 10mL/hr.       Ambulated in givens this AM

## 2023-12-14 ENCOUNTER — APPOINTMENT (OUTPATIENT)
Dept: GENERAL RADIOLOGY | Facility: HOSPITAL | Age: 28
DRG: 418 | End: 2023-12-14
Payer: COMMERCIAL

## 2023-12-14 LAB
ANION GAP SERPL CALCULATED.3IONS-SCNC: 9 MMOL/L (ref 5–15)
BUN SERPL-MCNC: 5 MG/DL (ref 6–20)
BUN/CREAT SERPL: 8.8 (ref 7–25)
CALCIUM SPEC-SCNC: 9.1 MG/DL (ref 8.6–10.5)
CHLORIDE SERPL-SCNC: 102 MMOL/L (ref 98–107)
CO2 SERPL-SCNC: 26 MMOL/L (ref 22–29)
CREAT SERPL-MCNC: 0.57 MG/DL (ref 0.57–1)
DEPRECATED RDW RBC AUTO: 42.6 FL (ref 37–54)
EGFRCR SERPLBLD CKD-EPI 2021: 127.1 ML/MIN/1.73
ERYTHROCYTE [DISTWIDTH] IN BLOOD BY AUTOMATED COUNT: 13.9 % (ref 12.3–15.4)
GLUCOSE SERPL-MCNC: 102 MG/DL (ref 65–99)
HCT VFR BLD AUTO: 32.9 % (ref 34–46.6)
HGB BLD-MCNC: 10.7 G/DL (ref 12–15.9)
MCH RBC QN AUTO: 27.3 PG (ref 26.6–33)
MCHC RBC AUTO-ENTMCNC: 32.5 G/DL (ref 31.5–35.7)
MCV RBC AUTO: 83.9 FL (ref 79–97)
PLATELET # BLD AUTO: 207 10*3/MM3 (ref 140–450)
PMV BLD AUTO: 11.1 FL (ref 6–12)
POTASSIUM SERPL-SCNC: 3.7 MMOL/L (ref 3.5–5.2)
RBC # BLD AUTO: 3.92 10*6/MM3 (ref 3.77–5.28)
SODIUM SERPL-SCNC: 137 MMOL/L (ref 136–145)
WBC NRBC COR # BLD AUTO: 3.1 10*3/MM3 (ref 3.4–10.8)

## 2023-12-14 PROCEDURE — 63710000001 PROMETHAZINE PER 12.5 MG: Performed by: SURGERY

## 2023-12-14 PROCEDURE — 85027 COMPLETE CBC AUTOMATED: CPT | Performed by: NURSE PRACTITIONER

## 2023-12-14 PROCEDURE — 99024 POSTOP FOLLOW-UP VISIT: CPT | Performed by: SURGERY

## 2023-12-14 PROCEDURE — 80048 BASIC METABOLIC PNL TOTAL CA: CPT | Performed by: NURSE PRACTITIONER

## 2023-12-14 PROCEDURE — 97110 THERAPEUTIC EXERCISES: CPT

## 2023-12-14 PROCEDURE — 94799 UNLISTED PULMONARY SVC/PX: CPT

## 2023-12-14 PROCEDURE — 74019 RADEX ABDOMEN 2 VIEWS: CPT

## 2023-12-14 RX ORDER — SIMETHICONE 20 MG/.3ML
80 EMULSION ORAL 4 TIMES DAILY
Status: DISCONTINUED | OUTPATIENT
Start: 2023-12-14 | End: 2023-12-15 | Stop reason: HOSPADM

## 2023-12-14 RX ORDER — OXYCODONE AND ACETAMINOPHEN 7.5; 325 MG/1; MG/1
1 TABLET ORAL EVERY 6 HOURS PRN
Status: DISCONTINUED | OUTPATIENT
Start: 2023-12-14 | End: 2023-12-15

## 2023-12-14 RX ORDER — SIMETHICONE 80 MG
80 TABLET,CHEWABLE ORAL 4 TIMES DAILY PRN
Status: DISCONTINUED | OUTPATIENT
Start: 2023-12-14 | End: 2023-12-15 | Stop reason: HOSPADM

## 2023-12-14 RX ADMIN — HYDROXYCHLOROQUINE SULFATE 200 MG: 200 TABLET ORAL at 09:03

## 2023-12-14 RX ADMIN — Medication 10 ML: at 09:04

## 2023-12-14 RX ADMIN — HYDROXYCHLOROQUINE SULFATE 200 MG: 200 TABLET ORAL at 21:07

## 2023-12-14 RX ADMIN — GABAPENTIN 100 MG: 100 CAPSULE ORAL at 13:13

## 2023-12-14 RX ADMIN — Medication 10 ML: at 21:08

## 2023-12-14 RX ADMIN — GABAPENTIN 100 MG: 100 CAPSULE ORAL at 21:07

## 2023-12-14 RX ADMIN — BUDESONIDE AND FORMOTEROL FUMARATE DIHYDRATE 2 PUFF: 160; 4.5 AEROSOL RESPIRATORY (INHALATION) at 20:19

## 2023-12-14 RX ADMIN — ESCITALOPRAM 5 MG: 5 TABLET, FILM COATED ORAL at 09:03

## 2023-12-14 RX ADMIN — PROMETHAZINE HYDROCHLORIDE 12.5 MG: 12.5 TABLET ORAL at 04:24

## 2023-12-14 RX ADMIN — PANTOPRAZOLE SODIUM 80 MG: 40 TABLET, DELAYED RELEASE ORAL at 09:03

## 2023-12-14 RX ADMIN — OXYCODONE AND ACETAMINOPHEN 1 TABLET: 7.5; 325 TABLET ORAL at 13:16

## 2023-12-14 RX ADMIN — PROMETHAZINE HYDROCHLORIDE 12.5 MG: 12.5 TABLET ORAL at 13:12

## 2023-12-14 RX ADMIN — GABAPENTIN 100 MG: 100 CAPSULE ORAL at 05:36

## 2023-12-14 RX ADMIN — OXYCODONE AND ACETAMINOPHEN 1 TABLET: 7.5; 325 TABLET ORAL at 00:39

## 2023-12-14 RX ADMIN — PROMETHAZINE HYDROCHLORIDE 12.5 MG: 12.5 TABLET ORAL at 21:07

## 2023-12-14 RX ADMIN — PANTOPRAZOLE SODIUM 80 MG: 40 TABLET, DELAYED RELEASE ORAL at 21:07

## 2023-12-14 RX ADMIN — OXYCODONE AND ACETAMINOPHEN 1 TABLET: 7.5; 325 TABLET ORAL at 09:03

## 2023-12-14 RX ADMIN — OXYCODONE AND ACETAMINOPHEN 1 TABLET: 7.5; 325 TABLET ORAL at 21:07

## 2023-12-14 NOTE — CONSULTS
Nutrition Services    Patient Name:  Henrietta Garrett  YOB: 1995  MRN: [id number]  Admit Date:  12/10/2023    Assessment Date:  23    Comment: TF f/u    28yoF seen for TF f/u. 1.5 Pt has j-tube and were originally sent the  [employer] peptide. Unexpired [employer] peptide just sent to the pt's floor. TF running at 25mL/hr during visit. Goal is 50mL/hr. Per chart review, pt didn't want to advance TF rate d/t unsure of how her body would react. Pt reports doing 35-40mL mL/hr x 24 hrs normally. Pt has had recent lap annamarie . Pt reports some constipation. Last BM was PTA, but unsure when. Pt's expected dc is today.     Plan/Recommendations:  Cont [employer] peptide 1.5 @ 25ml/hr, ADAT to goal of 50mL/hr  Free water at 80 mL q 4 hrs  PO ALEX when OK with surgeon    Will follow clinical course, nutrition needs.    CLINICAL NUTRITION ASSESSMENT      Reason for Assessment Nurse or Nurse Practitioner Consult, TF Assessment    Diagnosis/Problem chronic cholecystitis and now S/P Lab Annamarie . Hx - Lupus, Sjogren's, EDS, gastroparesis with chronic J-tube, Chronic constipation, IBS   Current Problems Alt GI     Encounter Information        Nutrition History [employer] at 35-40mL/hr at home, doesn't typically tolerate much more. Weight has been stable (+/- 10 lbs)   Food Preferences Nibbles on small amounts po.   Supplements    Factors Affecting Intake altered GI function   Tests/Procedures Other: cholangiogram     Anthropometrics        Current Height   Current Weight  BMI kg/m2    Weight: 77.7 kg (171 lb 4.8 oz) (12/10/23 0253)  Body mass index is 23.89 kg/m².     Adj BMI (if applicable)    BMI Category Normal/Healthy (18.4 - 24.9)       Admission Weight 171lb   Ideal Body Weight (IBW) 156lb     Adj IBW (if applicable)    Usual Body Weight (UBW) 164-165lbs   Weight Change/Trend Gain         Estimated/Assessed Needs        Energy Requirements    Weight for Calculation 77kg   Method for Estimation  25  "kcal/kg   EST Needs (kcal/day) 1925       Protein Requirements    Weight for Calculation 77kg   EST Protein Needs (g/kg) 1.0 - 1.2 gm/kg   EST Daily Needs (g/day) 77-92       Fluid Requirements     Method for Estimation 1 mL/kcal    Estimated Needs (mL/day)        Fluid Deficit    Current Na Level (mEq/L)    Desired Na Level (mEq/L)    Estimated Fluid Deficit (L)       Labs        Pertinent Labs    Results from last 7 days   Lab Units 12/14/23  0548 12/13/23  1613 12/13/23 0536 12/12/23  0509 12/11/23  0514 12/10/23  0458   SODIUM mmol/L 137  --  137 134*   < > 138   POTASSIUM mmol/L 3.7 4.0 3.6 4.4   < > 3.7   CHLORIDE mmol/L 102  --  106 104   < > 107   CO2 mmol/L 26.0  --  23.5 19.0*   < > 21.0*   BUN mg/dL 5*  --  5* 4*   < > 7   CREATININE mg/dL 0.57  --  0.66 0.66   < > 0.75   CALCIUM mg/dL 9.1  --  8.8 9.1   < > 9.1   BILIRUBIN mg/dL  --   --   --   --   --  0.3   ALK PHOS U/L  --   --   --   --   --  57   ALT (SGPT) U/L  --   --   --   --   --  31   AST (SGOT) U/L  --   --   --   --   --  54*   GLUCOSE mg/dL 102*  --  120* 142*   < > 82    < > = values in this interval not displayed.     Results from last 7 days   Lab Units 12/14/23  0548 12/11/23  0514 12/10/23  0458   HEMOGLOBIN g/dL 10.7*   < > 9.8*   HEMATOCRIT % 32.9*   < > 30.4*   WBC 10*3/mm3 3.10*   < > 3.32*   ALBUMIN g/dL  --   --  3.9    < > = values in this interval not displayed.     Results from last 7 days   Lab Units 12/14/23  0548 12/13/23  0536 12/12/23  0509 12/11/23  0514 12/10/23  0458   INR   --   --   --   --  1.05   PLATELETS 10*3/mm3 207 187 251 199 217     SARS-CoV-2, DANIELLA   Date Value Ref Range Status   08/08/2022 NEGATIVE Negative Final     No results found for: \"HGBA1C\"       Medications            Scheduled Medications amLODIPine, 10 mg, Per J Tube, Daily  budesonide-formoterol, 2 puff, Inhalation, BID - RT  escitalopram, 5 mg, Per J Tube, Daily  gabapentin, 100 mg, Oral, Q8H  hydroxychloroquine, 200 mg, Per J Tube, " BID  norethindrone-ethinyl estradiol-ferrous fumarate, 1 tablet, Per J Tube, Daily  pantoprazole, 80 mg, Oral, BID  promethazine, 12.5 mg, Oral, Q8H  simethicone, 80 mg, Oral, 4x Daily  sodium chloride, 10 mL, Intravenous, Q12H  sodium chloride, 10 mL, Intravenous, Q12H  sodium chloride, 10 mL, Intravenous, Q12H        Infusions dextrose 5 % and sodium chloride 0.9 % with KCl 20 mEq, 20 mL/hr, Last Rate: 20 mL/hr (12/13/23 1818)        PRN Medications   acetaminophen **OR** acetaminophen **OR** acetaminophen    [DISCONTINUED] senna-docusate sodium **AND** [DISCONTINUED] polyethylene glycol **AND** [DISCONTINUED] bisacodyl **AND** bisacodyl    dicyclomine    heparin    HYDROmorphone    [COMPLETED] HYDROmorphone **AND** naloxone    ondansetron    oxyCODONE-acetaminophen    Potassium Replacement - Follow Nurse / BPA Driven Protocol    simethicone    sodium chloride    sodium chloride    sodium chloride    sodium chloride    sodium chloride    sodium chloride    sodium chloride     Physical Findings          Physical Appearance alert, oriented   Oral/Mouth Cavity WDL   Edema  not assessed   Gastrointestinal nausea, last bowel movement: pending   Skin  surgical incision: abd   Tubes/Drains/Lines jejunostomy    NFPE No clinical signs of muscle wasting or fat loss   --  Current Nutrition Orders & Evaluation of Intake       Oral Nutrition     Food Allergies NKFA   Current PO Diet Diet: Liquid Diets; Clear Liquid; Fluid Consistency: Thin (IDDSI 0)   Supplement    PO Evaluation     Trending % PO Intake    --  Nutrition Diagnosis        Nutrition Dx Problem 1 Problem: Altered GI Function  Etiology: Medical Diagnosis - gastroparesis  Signs/Symptoms: EN Intake/DeliveryJtube feeding dependent    Comment:      INTERVENTION / PLAN OF CARE  Intervention Goal        Intervention Goal(s) Maintain nutrition status, Reduce/improve symptoms, Meet estimated needs, Disease management/therapy, Tolerate PO , Initiate TF/PN, Tolerate TF/PN at  goal, and No significant weight loss     Nutrition Intervention        RD Action Supplement provided, Continue to monitor, Care plan reviewed, and Recommend/order: EN     Prescription         Diet     Supplement/Snack    EN/PN     Prescription Ordered       Enteral Prescription:     Enteral Route Jejunostomy    TF Delivery Method Continuous    Enteral Product Svet[employer] [place] Peptide 1.5    Modular None    Propofol Rate/Kcal     TF Start Rate 10    TF Goal Rate 50    Free Water Flush 80 mL q 4 hr (manual flushes per RN)    TF Provision at Goal: 1800 kcal, 89 gm protein, 840 mL free water + 480 mL water flushes         Calories 91 % needs met         Protein  100 % needs met         Fluid (mL) 1320mL    Prescription Ordered Yes     Monitor/Evaluation        Monitor Per protocol   Discharge Plan Pending clinical course   Education Will instruct as appropriate     RD to follow per protocol.       Electronically signed by:  Gloria Walker  12/14/23 12:17 EST

## 2023-12-14 NOTE — PLAN OF CARE
Goal Outcome Evaluation:  Plan of Care Reviewed With: patient, sibling        Progress: improving  Outcome Evaluation: Pt agreed to PT session, kaushal OOB tfer w/MOD 1, into bed w/MIN 1, did use rail and HOB elevated for both, pt kaushal amb ~75ft, req 3 standing rests to complete, but she said she had a goal and was going to do it, plans home w/fiance , will need rwx at DC      Anticipated Discharge Disposition (PT): home with assist

## 2023-12-14 NOTE — PROGRESS NOTES
Name: Henrietta Garrett ADMIT: 12/10/2023   : 1995  PCP: Fanny Jarrett MD    MRN: [id number] LOS: 0 days   AGE/SEX: 28 y.o. female  ROOM: [facility]     Subjective   Subjective   Patient looks generally weak & more comfortable today.  No nausea on exam yet avoiding some tube feed advancement for fear of intolerance.  No recent vomiting.  Significant other at bedside.  Postoperative tenderness expected finding.  Discussed with patient & significant other importance of ambulating halls as often as tolerated.      Denies chest pain or shortness of breath.    No flatulence or BM as of yet.    No overnight issues.       Objective   Objective   Vital Signs  Temp:  [96.6 °F (35.9 °C)-98.2 °F (36.8 °C)] 97 °F (36.1 °C)  Heart Rate:  [61-73] 73  Resp:  [16] 16  BP: (101-115)/(61-67) 101/62  SpO2:  [98 %-99 %] 99 %  on   ;   Device (Oxygen Therapy): room air  Body mass index is 23.89 kg/m².    Physical Exam  Constitutional:       General: She is not in acute distress.     Appearance: She is not toxic-appearing.      Comments: Generally weak   Cardiovascular:      Rate and Rhythm: Normal rate.      Heart sounds: Normal heart sounds.   Pulmonary:      Effort: Pulmonary effort is normal.      Breath sounds: Normal breath sounds.   Abdominal:      Palpations: Abdomen is soft.      Tenderness: There is abdominal tenderness (expected post-operative tenderness).      Comments: Feeding tube   Musculoskeletal:      Right lower leg: No edema.      Left lower leg: No edema.   Skin:     General: Skin is warm and dry.   Neurological:      Mental Status: She is alert and oriented to person, place, and time.      Cranial Nerves: No cranial nerve deficit.     *Physical exam performed on 2023 as per above    Results Review     I reviewed the patient's new clinical results.  Results from last 7 days   Lab Units 23  0548 23  0536 23  0509 23  0514   WBC 10*3/mm3 3.10* 3.90 3.54 2.15*   HEMOGLOBIN  "g/dL 10.7* 9.7* 11.2* 10.1*   PLATELETS 10*3/mm3 207 187 251 199     Results from last 7 days   Lab Units 12/14/23  0548 12/13/23  1613 12/13/23  0536 12/12/23  0509 12/11/23  0514   SODIUM mmol/L 137  --  137 134* 135*   POTASSIUM mmol/L 3.7 4.0 3.6 4.4 3.5   CHLORIDE mmol/L 102  --  106 104 105   CO2 mmol/L 26.0  --  23.5 19.0* 18.9*   BUN mg/dL 5*  --  5* 4* 6   CREATININE mg/dL 0.57  --  0.66 0.66 0.58   GLUCOSE mg/dL 102*  --  120* 142* 73   EGFR mL/min/1.73 127.1  --  122.7 122.7 126.6     Results from last 7 days   Lab Units 12/10/23  0458   ALBUMIN g/dL 3.9   BILIRUBIN mg/dL 0.3   ALK PHOS U/L 57   AST (SGOT) U/L 54*   ALT (SGPT) U/L 31     Results from last 7 days   Lab Units 12/14/23  0548 12/13/23  0536 12/12/23  0509 12/11/23  0514 12/10/23  0458   CALCIUM mg/dL 9.1 8.8 9.1 8.6 9.1   ALBUMIN g/dL  --   --   --   --  3.9       No results found for: \"HGBA1C\", \"POCGLU\"    No radiology results for the last day    I have personally reviewed all medications:  Scheduled Medications  amLODIPine, 10 mg, Per J Tube, Daily  budesonide-formoterol, 2 puff, Inhalation, BID - RT  escitalopram, 5 mg, Per J Tube, Daily  gabapentin, 100 mg, Oral, Q8H  hydroxychloroquine, 200 mg, Per J Tube, BID  norethindrone-ethinyl estradiol-ferrous fumarate, 1 tablet, Per J Tube, Daily  pantoprazole, 80 mg, Oral, BID  promethazine, 12.5 mg, Oral, Q8H  simethicone, 80 mg, Oral, 4x Daily  sodium chloride, 10 mL, Intravenous, Q12H  sodium chloride, 10 mL, Intravenous, Q12H  sodium chloride, 10 mL, Intravenous, Q12H    Infusions  dextrose 5 % and sodium chloride 0.9 % with KCl 20 mEq, 20 mL/hr, Last Rate: 20 mL/hr (12/13/23 1818)    Diet  Diet: Liquid Diets; Clear Liquid; Fluid Consistency: Thin (IDDSI 0)    I have personally reviewed:  [x]  Laboratory   []  Microbiology   []  Radiology   []  EKG/Telemetry  []  Cardiology/Vascular   []  Pathology    []  Records       Assessment/Plan     Active Hospital Problems    Diagnosis  POA    " **Abdominal pain [R10.9]  Unknown    Calculus of gallbladder with acute on chronic cholecystitis without obstruction [K80.12]  Unknown    Anemia [D64.9]  Unknown    Abnormal CT of the abdomen [R93.5]  Unknown    Jejunostomy tube present [Z93.4]  Not Applicable    Gastroesophageal reflux disease [K21.9]  Yes    Gastroparesis [K31.84]  Yes    Nausea & vomiting [R11.2]  Yes    Systemic lupus erythematosus [M32.9]  Yes    Fibromyalgia [M79.7]  Yes      Resolved Hospital Problems   No resolved problems to display.       28 y.o. female admitted with Abdominal pain.    General surgery following calculus of gallbladder with chronic cholecystitis (Path report:  Cholelithiasis & attenuated epithelium without significant inflammation) s/p cholecystectomy with intraoperative cholangiogram on 12/12/2023.  Plan to discontinue as needed IV Dilaudid & reduce frequency (every 4 hours PRN to every 6 hours PRN) of percocet 7.5 mg PRN pain (wean as tolerated) to facilitate return of baseline bowel function in the setting of chronic gastroparesis.  Avoid sedation--continuous pulse oximetry.  Plan to discontinue IVFs & as patient tolerating TF (RD following & goal 50 mL/hr with free water 80 mL every 4 hours) & some diet clears.  Perioperative cefazolin completed.  Trial simethicone for abdominal cramps, bloated sensation, & nausea approved by general surgery also; could premedicate with Zofran PRN if nausea develops.  General surgery ordering abdominal XR given negative flatulence.    Gastroenterology evaluated gastroparesis & signed off  with recommendation for follow-up with Dr. Quintana at [facility].  PPI.  Antiemetics as needed continued.  Phenergan per J-tube.    Anemia: Iron profile 40 with iron saturation 12 decreased from prior 18 (4/2019).  Stool Hemoccult ordered on admission without collection thus far given no BM to date.  Would avoid supplemental iron for now given anticipated constipation / slow return of  bowel function in the setting of gastroparesis s/p cholecystectomy.  Hgb trends stable.    Hypertension:  Blood pressure soft today.  Amlodipine continued with hold parameters.    Plaquenil continued for SLE with chronic leukopenia.    Gabapentin continued for fibromyalgia.    Lexapro continued for depression.    SCDs for DVT prophylaxis.  Full code.  Discussed with patient and family, & RN.  Anticipate discharge hopefully home with family in 1-2 days pending return of baseline bowel function      MITCHELL Mejias  [facility]  12/14/23  12:54 EST

## 2023-12-14 NOTE — PROGRESS NOTES
Chief Complaint:    S/P Laparoscopic cholecystectomy with intraoperative cholangiogram, POD 3    Subjective:    The patient is feeling better today.  She has less nausea and is not vomiting.  She has not passed flatus or had a bowel movement.  She is tolerating tube feeds but only at 25 cc/h.    Objective:    Temp:  [96.6 °F (35.9 °C)-98.2 °F (36.8 °C)] 97 °F (36.1 °C)  Heart Rate:  [61-73] 73  Resp:  [16] 16  BP: (101-115)/(61-67) 101/62    Physical Exam  Constitutional:       Appearance: She is not ill-appearing or toxic-appearing.   Abdominal:      Palpations: Abdomen is soft.      Tenderness: There is generalized abdominal tenderness (Mildly tender).   Neurological:      Mental Status: She is alert.   Psychiatric:         Behavior: Behavior is cooperative.         Results:    WBC is 3.10.  H/H is 10.7/32.9.  BUN is 5 and creatinine 0.57.    Impression/Plan:    The patient is POD 3 from a laparoscopic cholecystectomy with intraoperative cholangiogram for cholelithiasis, chronic cholecystitis, and hydrops of the gallbladder.    She does not feel that she can advance her tube feeds because they caused too much nausea.  She has not passed flatus or had a bowel movement.  Her abdomen is not distended on exam.  I will check abdominal x-rays.    Madhu Zabala Jr., M.D.

## 2023-12-14 NOTE — PLAN OF CARE
"Goal Outcome Evaluation:  Plan of Care Reviewed With: patient        Progress: no change  Outcome Evaluation: VSS. PO pain and nausea medication with relief. Bed alarm on due to pt being lethargic. [facility] did not have the \"Kahuna peptide 1.5cal/mL\", closest replacement was \"Pressflip standard 1.4cal/mL\". Pt stated this replacement was ok for the time being. IVF at KVO. J tube flushed q4.    Pt did not want to advance the rate of her tube feed due to uncertainty of how her body would react. Tube feed still at 25mL/hr.    Ambulated in givens this AM       "

## 2023-12-14 NOTE — PAYOR COMM NOTE
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 mm subxiphoid port was placed and a 5 mm right lateral abdominal port was placed all with laparoscopic guidance in the same fashion as the umbilical port.  The gallbladder was quite distended.  An attempt was made elevated over the liver but the liver was not mobile due to adhesions to the abdominal wall.  These adhesions were lysed using Bovie electrocautery and this allowed elevation of the liver.  The gallbladder was first aspirated with an aspirating needle and the contents were clear, consistent with hydrops of the gallbladder.  The gallbladder also had a thickened wall consistent with chronic cholecystitis.  The gallbladder was grasped and elevated over the liver.  The gallbladder was then grasped at the infundibulum and traction was applied to open the triangle of Calot.  The triangle of Calot was carefully inspected and an enlarged lymph node was clearly identified.  This lymph node was taken down using Bovie electrocautery and the cystic artery was identified deep to this lymph node.  Just to the left side of the artery the cystic duct was identified.  The cystic  duct was wrapped in scar tissue making the dissection somewhat difficult.  A cautery hook was used to free the cystic duct from the scar tissue.  To improve access to the cystic duct, it was elected to divide the cystic artery first.  The structure was surrounded and divided after placing 2 clips proximally and dividing distally using Bovie electrocautery.  This allowed elevation of the gallbladder and extension of the cystic duct for improved access.  A cholangiocatheter was introduced into the abdomen through an Angiocath.  A small nick was created in the cystic duct and the cholangiocatheter was inserted into the cystic duct and secured with a clip.  A cholangiogram was then performed that confirmed our impression of the anatomy, showed a normal-sized common bile duct with no filling defects, and rapid drainage of contrast material into the duodenum.  The clip was removed and the cholangiocatheter was removed.  The cystic duct was then divided after placing 2 clips proximally, 1 clip distally, and divided with the scissors.  The gallbladder was then removed from the gallbladder fossa using both electrocautery without difficulty.  It was placed in an Endo Catch bag and then removed from the subxiphoid port.  The gallbladder fossa was inspected and hemostasis was noted be adequate.  The area was irrigated with irrigation fluid and there is no evidence of bile leakage or bleeding.  The operative bed was very clean.  The irrigation fluid was suctioned.  The abdomen was deflated and the laparoscopic equipment was removed.  The fascia of the subxiphoid port was closed with a 0 Vicryl in a figure-of-eight fashion.  All skin incisions were closed with a 4-0 Monocryl in a subcuticular fashion followed by Mastisol Steri-Strips.  The sponge, needle, and instrument counts were correct at the end of the case.  The patient tolerated procedure well and was transferred to the recovery in stable condition.    Estimated Blood  Loss:      minimal    Specimens:     None    Madhu Zabala Jr., M.D.     Electronically signed by Madhu Zabala Jr., MD at 23 1534          Physician Progress Notes (last 72 hours)        Madhu Zabala Jr., MD at 23 1130          Chief Complaint:    S/P Laparoscopic cholecystectomy with intraoperative cholangiogram, POD 3    Subjective:    The patient is feeling better today.  She has less nausea and is not vomiting.  She has not passed flatus or had a bowel movement.  She is tolerating tube feeds but only at 25 cc/h.    Objective:    Temp:  [96.6 °F (35.9 °C)-98.2 °F (36.8 °C)] 97 °F (36.1 °C)  Heart Rate:  [61-73] 73  Resp:  [16] 16  BP: (101-115)/(61-67) 101/62    Physical Exam  Constitutional:       Appearance: She is not ill-appearing or toxic-appearing.   Abdominal:      Palpations: Abdomen is soft.      Tenderness: There is generalized abdominal tenderness (Mildly tender).   Neurological:      Mental Status: She is alert.   Psychiatric:         Behavior: Behavior is cooperative.         Results:    WBC is 3.10.  H/H is 10.7/32.9.  BUN is 5 and creatinine 0.57.    Impression/Plan:    The patient is POD 3 from a laparoscopic cholecystectomy with intraoperative cholangiogram for cholelithiasis, chronic cholecystitis, and hydrops of the gallbladder.    She does not feel that she can advance her tube feeds because they caused too much nausea.  She has not passed flatus or had a bowel movement.  Her abdomen is not distended on exam.  I will check abdominal x-rays.    Madhu Zabala Jr., M.D.     Electronically signed by Madhu Zabala Jr., MD at 23 8244       Roney Mattson APRN at 23 9962              Name: Henrietta Garrett ADMIT: 12/10/2023   : 1995  PCP: Fanny Jarrett MD    MRN: [id number] LOS: 0 days   AGE/SEX: 28 y.o. female  [facility]     Subjective   Subjective   Patient looks generally weak & somewhat uncomfortable today.  Nausea without vomiting.  No  apparent distress today.  No family at bedside.  Postoperative tenderness persists with gentle palpation.  Ambulating halls earlier with assistance.  Denies chest pain or shortness of breath.      Objective   Objective   Vital Signs  Temp:  [96.9 °F (36.1 °C)-98.6 °F (37 °C)] 98.2 °F (36.8 °C)  Heart Rate:  [54-78] 78  Resp:  [16] 16  BP: ()/(61-78) 116/73  SpO2:  [98 %-100 %] 98 %  on   ;   Device (Oxygen Therapy): room air  Body mass index is 23.89 kg/m².    Physical Exam  Constitutional:       General: She is not in acute distress.     Appearance: She is not toxic-appearing.      Comments: Generally weak   Cardiovascular:      Rate and Rhythm: Normal rate.      Heart sounds: Normal heart sounds.   Pulmonary:      Effort: Pulmonary effort is normal.      Breath sounds: Normal breath sounds.   Abdominal:      Palpations: Abdomen is soft.      Tenderness: There is abdominal tenderness (RUQ).      Comments: Feeding tube   Musculoskeletal:      Right lower leg: No edema.      Left lower leg: No edema.   Skin:     General: Skin is warm and dry.   Neurological:      Mental Status: She is alert and oriented to person, place, and time.      Cranial Nerves: No cranial nerve deficit.     *Physical exam performed on 12/13/2023 as per above    Results Review     I reviewed the patient's new clinical results.  Results from last 7 days   Lab Units 12/13/23  0536 12/12/23  0509 12/11/23  0514 12/10/23  0458   WBC 10*3/mm3 3.90 3.54 2.15* 3.32*   HEMOGLOBIN g/dL 9.7* 11.2* 10.1* 9.8*   PLATELETS 10*3/mm3 187 251 199 217     Results from last 7 days   Lab Units 12/13/23  0536 12/12/23  0509 12/11/23  0514 12/10/23  0458   SODIUM mmol/L 137 134* 135* 138   POTASSIUM mmol/L 3.6 4.4 3.5 3.7   CHLORIDE mmol/L 106 104 105 107   CO2 mmol/L 23.5 19.0* 18.9* 21.0*   BUN mg/dL 5* 4* 6 7   CREATININE mg/dL 0.66 0.66 0.58 0.75   GLUCOSE mg/dL 120* 142* 73 82   EGFR mL/min/1.73 122.7 122.7 126.6 111.4     Results from last 7 days   Lab  "Units 12/10/23  0458   ALBUMIN g/dL 3.9   BILIRUBIN mg/dL 0.3   ALK PHOS U/L 57   AST (SGOT) U/L 54*   ALT (SGPT) U/L 31     Results from last 7 days   Lab Units 12/13/23  0536 12/12/23  0509 12/11/23  0514 12/10/23  0458   CALCIUM mg/dL 8.8 9.1 8.6 9.1   ALBUMIN g/dL  --   --   --  3.9       No results found for: \"HGBA1C\", \"POCGLU\"    No radiology results for the last day    I have personally reviewed all medications:  Scheduled Medications  amLODIPine, 10 mg, Per J Tube, Daily  budesonide-formoterol, 2 puff, Inhalation, BID - RT  escitalopram, 5 mg, Per J Tube, Daily  gabapentin, 100 mg, Oral, Q8H  hydroxychloroquine, 200 mg, Per J Tube, BID  norethindrone-ethinyl estradiol-ferrous fumarate, 1 tablet, Per J Tube, Daily  pantoprazole, 80 mg, Oral, BID  potassium chloride, 40 mEq, Oral, Q4H  promethazine, 12.5 mg, Oral, Q8H  sodium chloride, 10 mL, Intravenous, Q12H  sodium chloride, 10 mL, Intravenous, Q12H  sodium chloride, 10 mL, Intravenous, Q12H    Infusions  dextrose 5 % and sodium chloride 0.9 % with KCl 20 mEq, 50 mL/hr, Last Rate: 50 mL/hr (12/13/23 1119)    Diet  Diet: Liquid Diets; Clear Liquid; Fluid Consistency: Thin (IDDSI 0)    I have personally reviewed:  [x]  Laboratory   []  Microbiology   []  Radiology   []  EKG/Telemetry  []  Cardiology/Vascular   []  Pathology    []  Records      Assessment/Plan     Active Hospital Problems    Diagnosis  POA    **Abdominal pain [R10.9]  Unknown    Calculus of gallbladder with acute on chronic cholecystitis without obstruction [K80.12]  Unknown    Anemia [D64.9]  Unknown    Abnormal CT of the abdomen [R93.5]  Unknown    Jejunostomy tube present [Z93.4]  Not Applicable    Gastroesophageal reflux disease [K21.9]  Yes    Gastroparesis [K31.84]  Yes    Nausea & vomiting [R11.2]  Yes    Systemic lupus erythematosus [M32.9]  Yes    Fibromyalgia [M79.7]  Yes      Resolved Hospital Problems   No resolved problems to display.       28 y.o. female admitted with Abdominal " pain.    General surgery following calculus of gallbladder with acute on chronic cholecystitis s/p cholecystectomy with intraoperative cholangiogram on 12/12/2023.  Continue wean as needed IV Dilaudid from 1 mg TID PRN to 0.5 mg TID PRN & utilize percocet PRN pain for now.  Avoid sedation--continuous pulse oximetry.  Plan to reduce rate D5 NS with KCL 20 mEq from 50 mL/hr to KVO.  Perioperative cefazolin completed.  RD following S/P resumption TF on 12/12/2023 with goal 50 mL/hr with free water 80 mL every 4 hours.  Will trial simethicone for abdominal cramps, bloated sensation, & nausea approved by general surgery today.    Gastroenterology evaluated gastroparesis & signed off  with recommendation for follow-up with Dr. Quintana at [facility].  PPI.  Antiemetics as needed continued.  Phenergan per J-tube.    Anemia: Iron profile 40 with iron saturation 12 decreased from prior 18 (4/2019).  Stool Hemoccult ordered without collection thus far--no BM to date.  Would avoid supplemental iron for now given acute cholecystitis in the setting of gastroparesis & anticipate constipation / slow return of bowel function.     Hypertension:  Blood pressure trends more consistent today.  Amlodipine continued with hold parameters.    Plaquenil continued for SLE.    Gabapentin continued for fibromyalgia.    Lexapro continued for depression.    SCDs for DVT prophylaxis.  Full code.  Discussed with patient and family, & RN.  Anticipate discharge hopefully home with family in 1-2 days      MITCHELL Mejias  [facility]  12/13/23  13:57 EST        Electronically signed by Roney Mattson APRN at 12/13/23 6184       Madhu Zabala Jr., MD at 12/13/23 8863          Chief Complaint:    S/P Laparoscopic cholecystectomy with intraoperative cholangiogram, POD 2    Subjective:    The patient has expected postop abdominal pain.  She is also having significant nausea related to her  gastroparesis.    Objective:    Temp:  [96.9 °F (36.1 °C)-98.6 °F (37 °C)] 98.2 °F (36.8 °C)  Heart Rate:  [54-78] 78  Resp:  [16] 16  BP: ()/(61-78) 116/73    Physical Exam  Constitutional:       Appearance: She is not ill-appearing or toxic-appearing.   Abdominal:      Palpations: Abdomen is soft.      Tenderness: There is no abdominal tenderness.   Neurological:      Mental Status: She is alert.   Psychiatric:         Behavior: Behavior is cooperative.       BMI is within normal parameters. No other follow-up for BMI required.        Results:    WBC is 3.90.  H/H 9.7/30.1.  BUN is 5 and creatinine 0.66.    Impression/Plan:    The patient is POD 2 from a laparoscopic cholecystectomy with intraoperative cholangiogram for cholelithiasis, chronic cholecystitis, and hydrops of the gallbladder.  She is on tube feeds and these should be advanced to target.    She is having persistent nausea and an NG tube was discussed with her.  She was advised that if an NG tube significantly improved her symptoms, she may be a good candidate for a PEG to decompress her stomach.    Madhu Zabala Jr., M.D.     Electronically signed by Madhu Zabala Jr., MD at 23 1140       Roney Mattson APRN at 23 1020              Name: Henrietta Garrett ADMIT: 12/10/2023   : 1995  PCP: Fanny Jarrett MD    MRN: [id number] LOS: 0 days   AGE/SEX: 28 y.o. female  ROOM: [facility]     Subjective   Subjective   Patient looks more comfortable and in no apparent distress today.  Mother at bedside requesting tube feeds given lack of intake for 4 days.  Postoperative tenderness today.  Ambulating halls today with assistance.  Denies chest pain or shortness of breath.      Objective   Objective   Vital Signs  Temp:  [97 °F (36.1 °C)-98.8 °F (37.1 °C)] 97.4 °F (36.3 °C)  Heart Rate:  [50-69] 55  Resp:  [13-18] 16  BP: (103-135)/(61-84) 135/84  SpO2:  [97 %-100 %] 100 %  on  Flow (L/min):  [2] 2;   Device (Oxygen Therapy):  "room air  Body mass index is 23.89 kg/m².    Physical Exam  Constitutional:       General: She is not in acute distress.     Appearance: She is not toxic-appearing.      Comments: Generally weak   Cardiovascular:      Rate and Rhythm: Normal rate.      Heart sounds: Normal heart sounds.   Pulmonary:      Effort: Pulmonary effort is normal.      Breath sounds: Normal breath sounds.   Abdominal:      Palpations: Abdomen is soft.      Tenderness: There is abdominal tenderness (RUQ).      Comments: Feeding tube   Musculoskeletal:      Right lower leg: No edema.      Left lower leg: No edema.   Skin:     General: Skin is warm and dry.   Neurological:      Mental Status: She is alert and oriented to person, place, and time.      Cranial Nerves: No cranial nerve deficit.     *physical exam performed on 12/12/2023 as per above    Results Review     I reviewed the patient's new clinical results.  Results from last 7 days   Lab Units 12/12/23  0509 12/11/23  0514 12/10/23  0458   WBC 10*3/mm3 3.54 2.15* 3.32*   HEMOGLOBIN g/dL 11.2* 10.1* 9.8*   PLATELETS 10*3/mm3 251 199 217     Results from last 7 days   Lab Units 12/12/23  0509 12/11/23  0514 12/10/23  0458   SODIUM mmol/L 134* 135* 138   POTASSIUM mmol/L 4.4 3.5 3.7   CHLORIDE mmol/L 104 105 107   CO2 mmol/L 19.0* 18.9* 21.0*   BUN mg/dL 4* 6 7   CREATININE mg/dL 0.66 0.58 0.75   GLUCOSE mg/dL 142* 73 82   EGFR mL/min/1.73 122.7 126.6 111.4     Results from last 7 days   Lab Units 12/10/23  0458   ALBUMIN g/dL 3.9   BILIRUBIN mg/dL 0.3   ALK PHOS U/L 57   AST (SGOT) U/L 54*   ALT (SGPT) U/L 31     Results from last 7 days   Lab Units 12/12/23  0509 12/11/23  0514 12/10/23  0458   CALCIUM mg/dL 9.1 8.6 9.1   ALBUMIN g/dL  --   --  3.9       No results found for: \"HGBA1C\", \"POCGLU\"    No radiology results for the last day    I have personally reviewed all medications:  Scheduled Medications  amLODIPine, 10 mg, Per J Tube, Daily  budesonide-formoterol, 2 puff, Inhalation, " BID - RT  escitalopram, 5 mg, Per J Tube, Daily  gabapentin, 100 mg, Oral, Q8H  hydroxychloroquine, 200 mg, Per J Tube, BID  norethindrone-ethinyl estradiol-ferrous fumarate, 1 tablet, Per J Tube, Daily  pantoprazole, 80 mg, Oral, BID  promethazine, 12.5 mg, Oral, Q8H  sodium chloride, 10 mL, Intravenous, Q12H  sodium chloride, 10 mL, Intravenous, Q12H  sodium chloride, 10 mL, Intravenous, Q12H    Infusions  dextrose 5 % and sodium chloride 0.9 % with KCl 20 mEq, 50 mL/hr, Last Rate: 50 mL/hr (12/12/23 1046)    Diet  NPO Diet NPO Type: Strict NPO    I have personally reviewed:  [x]  Laboratory   []  Microbiology   []  Radiology   []  EKG/Telemetry  []  Cardiology/Vascular   []  Pathology    []  Records      Assessment/Plan     Active Hospital Problems    Diagnosis  POA    **Abdominal pain [R10.9]  Unknown    Calculus of gallbladder with acute on chronic cholecystitis without obstruction [K80.12]  Unknown    Anemia [D64.9]  Unknown    Abnormal CT of the abdomen [R93.5]  Unknown    Jejunostomy tube present [Z93.4]  Not Applicable    Gastroesophageal reflux disease [K21.9]  Yes    Gastroparesis [K31.84]  Yes    Nausea & vomiting [R11.2]  Yes    Systemic lupus erythematosus [M32.9]  Yes    Fibromyalgia [M79.7]  Yes      Resolved Hospital Problems   No resolved problems to display.       28 y.o. female admitted with Abdominal pain.    General surgery following calculus of gallbladder with acute on chronic cholecystitis s/p cholecystectomy with intraoperative cholangiogram on 12/12/2023.  Plan to wean as needed IV Dilaudid & utilize percocet PRN pain for now.  Avoid sedation--continuous pulse oximetry.  IV fluids switched to D5 NS with KCL 20 mEq.  Perioperative cefazolin completed.  RD following plan resume TF today given general surgery approval.    Gastroenterology evaluated gastroparesis & signed off  with recommendation for follow-up with Dr. Quintana at [facility].  PPI.  Antiemetics as needed  continued.  Phenergan per J-tube.    Anemia: Iron profile 40 with iron saturation 12 decreased from prior 18 (4/2019).  Stool Hemoccult ordered without collection thus far--on BM to date.  Would avoid supplemental iron for now given acute cholecystitis in the setting of gastroparesis.      Hypertension:  Blood pressure trends more consistent today.  Amlodipine continued with hold parameters.    Plaquenil continued for SLE.    Gabapentin continued for fibromyalgia.    Lexapro continued for depression.    SCDs for DVT prophylaxis.  Full code.  Discussed with patient and family, & RN.  Anticipate discharge hopefully home with family in 1-2 days      MITCHELL Mejias  [facility]  12/12/23  14:26 EST        Electronically signed by Roney Mattson APRN at 12/12/23 1511       Aida Coleman PA-C at 12/12/23 0922       Attestation signed by Oscar Edward MD at 12/12/23 1048    I have reviewed this documentation and agree.                  [facility]  Inpatient Progress Note    Reason for Follow Up:  Nausea/abdominal pain    Subjective     Interval History:   S/p lap CCY w/ Dr. Zabala 12/11. Negative IOC.   Pt reports she is feeling better than yesterday, however is still c/o abdominal pain. Pain is diffuse. No vomiting, but is c/o nausea. Phenergan is helping significantly.       Current Facility-Administered Medications:     acetaminophen (TYLENOL) tablet 650 mg, 650 mg, Oral, Q4H PRN, 650 mg at 12/11/23 0345 **OR** acetaminophen (TYLENOL) 160 MG/5ML oral solution 650 mg, 650 mg, Oral, Q4H PRN, 650 mg at 12/10/23 0617 **OR** acetaminophen (TYLENOL) suppository 650 mg, 650 mg, Rectal, Q4H PRN, Madhu Zabala Jr., MD    amLODIPine (NORVASC) tablet 10 mg, 10 mg, Per J Tube, Daily, Madhu Zabala Jr., MD, 10 mg at 12/12/23 0843    [DISCONTINUED] sennosides-docusate (PERICOLACE) 8.6-50 MG per tablet 2 tablet, 2 tablet, Oral, BID **AND** [DISCONTINUED]  polyethylene glycol (MIRALAX) packet 17 g, 17 g, Oral, Daily PRN **AND** [DISCONTINUED] bisacodyl (DULCOLAX) EC tablet 5 mg, 5 mg, Oral, Daily PRN **AND** bisacodyl (DULCOLAX) suppository 10 mg, 10 mg, Rectal, Daily PRN, Madhu Zabala Jr., MD    budesonide-formoterol (SYMBICORT) 160-4.5 MCG/ACT inhaler 2 puff, 2 puff, Inhalation, BID - RT, Madhu Zabala Jr., MD, 2 puff at 12/11/23 2007    dextrose 5 % and sodium chloride 0.9 % with KCl 20 mEq/L infusion, 50 mL/hr, Intravenous, Continuous, Roney Mattson APRN, Last Rate: 75 mL/hr at 12/12/23 0100, 75 mL/hr at 12/12/23 0100    dicyclomine (BENTYL) capsule 10 mg, 10 mg, Oral, Q8H PRN, Madhu Zabala Jr., MD, 10 mg at 12/11/23 0905    escitalopram (LEXAPRO) tablet 5 mg, 5 mg, Per J Tube, Daily, Madhu Zabala Jr., MD, 5 mg at 12/12/23 0843    gabapentin (NEURONTIN) capsule 100 mg, 100 mg, Oral, Q8H, Madhu Zabala Jr., MD, 100 mg at 12/12/23 0633    heparin injection 500 Units, 5 mL, Intravenous, PRN, Madhu Zabala Jr., MD    HYDROmorphone (DILAUDID) injection 1 mg, 1 mg, Intravenous, Q4H PRN, Madhu Zabala Jr., MD, 1 mg at 12/12/23 0502    hydroxychloroquine (PLAQUENIL) tablet 200 mg, 200 mg, Per J Tube, BID, Madhu Zabala Jr., MD, 200 mg at 12/11/23 2212    [COMPLETED] HYDROmorphone (DILAUDID) injection 0.5 mg, 0.5 mg, Intravenous, Q3H PRN, 0.5 mg at 12/10/23 1240 **AND** naloxone (NARCAN) injection 0.4 mg, 0.4 mg, Intravenous, Q5 Min PRN, Madhu Zabala Jr., MD    norethindrone-ethinyl estradiol-ferrous fumarate (LOESTIN 24 FE) 1-20 MG-MCG(24) per tablet 1 tablet, 1 tablet, Per J Tube, Daily, Madhu Zabala Jr., MD    ondansetron (ZOFRAN) injection 4 mg, 4 mg, Intravenous, Q6H PRN, Madhu Zabala Jr., MD, 4 mg at 12/11/23 0349    oxyCODONE-acetaminophen (PERCOCET) 7.5-325 MG per tablet 1 tablet, 1 tablet, Oral, Q4H PRN, Madhu Zabala Jr., MD, 1 tablet at 12/12/23 0831    pantoprazole (PROTONIX) EC tablet 80 mg, 80 mg, Oral, BID, Madhu Zabala  MD Mary, 80 mg at 12/12/23 0831    Potassium Replacement - Follow Nurse / BPA Driven Protocol, , Does not apply, Vj HARDING Anthony Jr., MD    promethazine (PHENERGAN) tablet 12.5 mg, 12.5 mg, Oral, Q8H, Madhu Zabala Jr., MD, 12.5 mg at 12/12/23 0501    sodium chloride 0.9 % flush 10 mL, 10 mL, Intravenous, Q12H, Madhu Zabala Jr., MD, 10 mL at 12/10/23 2002    sodium chloride 0.9 % flush 10 mL, 10 mL, Intravenous, PRNVj Anthony Jr., MD    sodium chloride 0.9 % flush 10 mL, 10 mL, Intravenous, Q12H, Madhu Zabala Jr., MD, 10 mL at 12/11/23 1946    sodium chloride 0.9 % flush 10 mL, 10 mL, Intravenous, Vj HARDING Anthony Jr., MD    sodium chloride 0.9 % flush 10 mL, 10 mL, Intravenous, Q12H, Madhu Zabala Jr., MD, 10 mL at 12/11/23 1947    sodium chloride 0.9 % flush 10 mL, 10 mL, Intravenous, Vj HARDING Anthony Jr., MD    sodium chloride 0.9 % flush 20 mL, 20 mL, Intravenous, Vj HARDING Anthony Jr., MD    sodium chloride 0.9 % infusion 40 mL, 40 mL, Intravenous, Vj HARDING Anthony Jr., MD    sodium chloride 0.9 % infusion 40 mL, 40 mL, Intravenous, Vj HARDING Anthony Jr., MD    sodium chloride 0.9 % infusion 40 mL, 40 mL, Intravenous, PRNVj Anthony Jr., MD        Objective     Vital Signs  Temp:  [97 °F (36.1 °C)-98.8 °F (37.1 °C)] 97.6 °F (36.4 °C)  Heart Rate:  [50-69] 52  Resp:  [13-18] 16  BP: (103-133)/(61-84) 121/75  Body mass index is 23.89 kg/m².    Intake/Output Summary (Last 24 hours) at 12/12/2023 0922  Last data filed at 12/11/2023 1945  Gross per 24 hour   Intake 785.72 ml   Output --   Net 785.72 ml     No intake/output data recorded.     Physical Exam:   General: patient awake, alert and cooperative   Eyes: Normal lids and lashes, no scleral icterus   Abdomen: Soft, but mild difuse abd pain. Surgical incisions are non-inflamed.    Results Review:     I reviewed the patient's new clinical results.    Results from last 7 days   Lab Units 12/12/23  0509 12/11/23  0514  12/10/23  0458   WBC 10*3/mm3 3.54 2.15* 3.32*   HEMOGLOBIN g/dL 11.2* 10.1* 9.8*   HEMATOCRIT % 34.6 31.8* 30.4*   PLATELETS 10*3/mm3 251 199 217     Results from last 7 days   Lab Units 12/12/23  0509 12/11/23  0514 12/10/23  0458   SODIUM mmol/L 134* 135* 138   POTASSIUM mmol/L 4.4 3.5 3.7   CHLORIDE mmol/L 104 105 107   CO2 mmol/L 19.0* 18.9* 21.0*   BUN mg/dL 4* 6 7   CREATININE mg/dL 0.66 0.58 0.75   CALCIUM mg/dL 9.1 8.6 9.1   BILIRUBIN mg/dL  --   --  0.3   ALK PHOS U/L  --   --  57   ALT (SGPT) U/L  --   --  31   AST (SGOT) U/L  --   --  54*   GLUCOSE mg/dL 142* 73 82     Results from last 7 days   Lab Units 12/10/23  0458   INR  1.05     Lab Results   Lab Value Date/Time    LIPASE 44 12/09/2023 2058    LIPASE 18 06/26/2023 0642    LIPASE 19 06/23/2023 0653    LIPASE 30 06/16/2023 0101    LIPASE 80 01/05/2020 1030    LIPASE 53 07/20/2018 1151       Radiology:  FL Cholangiogram Operative   Final Result      US Gallbladder   Final Result   Single 1.3 cm gallstone within the gallbladder without   cholecystitis or biliary dilatation.       This report was finalized on 12/10/2023 1:47 PM by Dr. Cami Holder M.D   on Workstation: [id number]          CT Outside Films   Final Result          Assessment & Plan     Active Hospital Problems    Diagnosis     **Abdominal pain     Calculus of gallbladder with acute on chronic cholecystitis without obstruction     Anemia     Abnormal CT of the abdomen     Jejunostomy tube present     Gastroesophageal reflux disease     Gastroparesis     Nausea & vomiting     Systemic lupus erythematosus     Fibromyalgia          Assessment:   J-tube in place  Abdominal pain right upper quadrant- u/s showing gallstone. S/p lap CCY 12/11  Gastroparesis  Autonomic dysfunction of the GI tract  Chronic idiopathic constipation  Irritable bowel syndrome  Fibromyalgia    Plan:   Continue phenergan for nausea. It wouldn't be completely unexpected if there is some exacerbation of symptoms related to  gastroparesis as she recently just had surgery.  Okay to start tube feeds from GI perspective as well  Encourage ambulation/out of bed as much as possible  Outpatient f/u w/ Dr. Quintana at [facility] who manages her chronic GI symptoms.   No further recs from GI, we will s/o, please call with any questions or concerns.     I discussed the patients findings and my recommendations with patient.         Aida Coleman PA-C  [facility]  [address] [place] [address]  Office: [phone]            Electronically signed by Oscar Edward MD at 12/12/23 7159       Madhu Zabala Jr., MD at 12/12/23 0764          Chief Complaint:    S/P Laparoscopic cholecystectomy with intraoperative cholangiogram, POD 1    Subjective:    The patient is complaining of expected postop abdominal pain.  She is getting some improvement with the Dilaudid but the oxycodone is working better.  She has nausea and is receiving Phenergan.    Objective:    Temp:  [97 °F (36.1 °C)-98.8 °F (37.1 °C)] 97.6 °F (36.4 °C)  Heart Rate:  [50-69] 52  Resp:  [13-18] 16  BP: (103-133)/(61-84) 121/75    Physical Exam  Constitutional:       Appearance: She is ill-appearing. She is not toxic-appearing.   Abdominal:      Palpations: Abdomen is soft.      Tenderness: There is generalized abdominal tenderness (Appropriate postop tenderness).   Neurological:      Mental Status: She is alert.   Psychiatric:         Behavior: Behavior is cooperative.       BMI is within normal parameters. No other follow-up for BMI required.      Results:    WBC is 3.54.  H/H is 11.2/34.6.  BUN is 4 and creatinine 0.66.    Impression/Plan:    The patient is POD 1 from a laparoscopic cholecystectomy with intraoperative cholangiogram for chronic cholecystitis with hydrops of the gallbladder.  She has expected postop pain but is clinically stable.  She is ready for the tube feeds.    I have consulted nutrition to restart tube feeds.   She normally takes [employer] peptide which had to be ordered previously.  I will leave tube feeding options up to nutrition but they should be started today.    Madhu Zabala Jr., M.D.     Electronically signed by Madhu Zabala Jr., MD at 23 0832       Roney Mattson APRN at 23 1253              Name: Henrietta Garrett ADMIT: 12/10/2023   : 1995  PCP: Fanny Jarrett MD    MRN: [id number] LOS: 0 days   AGE/SEX: 28 y.o. female  ROOM: [facility]     Subjective   Subjective   Patient looks uncomfortable and in no apparent distress.  Family and RN at bedside.  Chronic left lower quadrant abdominal pain with acute right upper quadrant abdominal pain.  Nausea without vomiting.  Denies chest pain or shortness of breath.      Objective   Objective   Vital Signs  Temp:  [96.8 °F (36 °C)-97.8 °F (36.6 °C)] 97.8 °F (36.6 °C)  Heart Rate:  [53-69] 69  Resp:  [16-18] 16  BP: ()/(59-75) 127/75  SpO2:  [98 %-99 %] 98 %  on   ;   Device (Oxygen Therapy): room air  Body mass index is 23.89 kg/m².    Physical Exam  Constitutional:       General: She is not in acute distress.     Appearance: She is not toxic-appearing.      Comments: Uncomfortable and generally weak   Cardiovascular:      Rate and Rhythm: Normal rate.      Heart sounds: Normal heart sounds.   Pulmonary:      Effort: Pulmonary effort is normal.      Breath sounds: Normal breath sounds.   Abdominal:      Palpations: Abdomen is soft.      Tenderness: There is abdominal tenderness (RUQ).   Musculoskeletal:      Right lower leg: No edema.      Left lower leg: No edema.   Skin:     General: Skin is warm and dry.   Neurological:      Mental Status: She is alert and oriented to person, place, and time.      Cranial Nerves: No cranial nerve deficit.       Results Review     I reviewed the patient's new clinical results.  Results from last 7 days   Lab Units 23  0514 12/10/23  0458   WBC 10*3/mm3 2.15* 3.32*   HEMOGLOBIN g/dL 10.1* 9.8*  "  PLATELETS 10*3/mm3 199 217     Results from last 7 days   Lab Units 12/11/23  0514 12/10/23  0458   SODIUM mmol/L 135* 138   POTASSIUM mmol/L 3.5 3.7   CHLORIDE mmol/L 105 107   CO2 mmol/L 18.9* 21.0*   BUN mg/dL 6 7   CREATININE mg/dL 0.58 0.75   GLUCOSE mg/dL 73 82   EGFR mL/min/1.73 126.6 111.4     Results from last 7 days   Lab Units 12/10/23  0458   ALBUMIN g/dL 3.9   BILIRUBIN mg/dL 0.3   ALK PHOS U/L 57   AST (SGOT) U/L 54*   ALT (SGPT) U/L 31     Results from last 7 days   Lab Units 12/11/23  0514 12/10/23  0458   CALCIUM mg/dL 8.6 9.1   ALBUMIN g/dL  --  3.9       No results found for: \"HGBA1C\", \"POCGLU\"    US Gallbladder    Result Date: 12/10/2023  Single 1.3 cm gallstone within the gallbladder without cholecystitis or biliary dilatation.  This report was finalized on 12/10/2023 1:47 PM by Dr. Cami Holder M.D on Workstation: [id number]       I have personally reviewed all medications:  Scheduled Medications  amLODIPine, 10 mg, Per J Tube, Daily  budesonide-formoterol, 2 puff, Inhalation, BID - RT  ceFAZolin, 2,000 mg, Intravenous, On Call to OR  escitalopram, 5 mg, Per J Tube, Daily  gabapentin, 100 mg, Oral, Q8H  hydroxychloroquine, 200 mg, Per J Tube, BID  norethindrone-ethinyl estradiol-ferrous fumarate, 1 tablet, Per J Tube, Daily  pantoprazole, 80 mg, Oral, BID  promethazine, 12.5 mg, Oral, Q8H  sodium chloride, 10 mL, Intravenous, Q12H  sodium chloride, 10 mL, Intravenous, Q12H  sodium chloride, 10 mL, Intravenous, Q12H    Infusions  lactated ringers, 125 mL/hr    Diet  NPO Diet NPO Type: Sips with Meds    I have personally reviewed:  [x]  Laboratory   []  Microbiology   []  Radiology   []  EKG/Telemetry  []  Cardiology/Vascular   []  Pathology    []  Records      Assessment/Plan     Active Hospital Problems    Diagnosis  POA    **Abdominal pain [R10.9]  Unknown    Calculus of gallbladder with acute on chronic cholecystitis without obstruction [K80.12]  Unknown    Anemia [D64.9]  Unknown    " Abnormal CT of the abdomen [R93.5]  Unknown    Jejunostomy tube present [Z93.4]  Not Applicable    Gastroesophageal reflux disease [K21.9]  Yes    Gastroparesis [K31.84]  Yes    Nausea & vomiting [R11.2]  Yes    Systemic lupus erythematosus [M32.9]  Yes    Fibromyalgia [M79.7]  Yes      Resolved Hospital Problems   No resolved problems to display.       28 y.o. female admitted with Abdominal pain.    General surgery following calculus of gallbladder with acute on chronic cholecystitis and recommend cholecystectomy with intraoperative cholangiogram this admission.  Continue symptom management with as needed IV Dilaudid.  Avoid sedation--continuous pulse oximetry.  IV fluids switched to LR for metabolic acidosis with bicarbonate losses.  Ordering blood cultures x 2.  Perioperative cefazolin.    Gastroenterology following gastroparesis recommends follow-up with Dr. Quintana at [facility].  PPI.  Antiemetics as needed continued.  Phenergan per J-tube.    Anemia: Iron profile 40 with iron saturation 12 decreased from prior 18 (4/2019).  Stool Hemoccult ordered.  Would avoid supplemental iron for now given acute cholecystitis in the setting of gastroparesis.      Hypertension with somewhat labile blood pressures in the setting of IV Dilaudid as needed for acute right upper quadrant abdominal pain.  Amlodipine continued with hold parameters.    Plaquenil continued for SLE.    Gabapentin continued for fibromyalgia.    Lexapro continued for depression.    SCDs for DVT prophylaxis.  Full code.  Discussed with patient and family, & RN.  Anticipate discharge home with HH vs SNU facility timing yet to be determined.      MITCHELL Mejias  [facility]  12/11/23  12:53 EST        Electronically signed by Roney Mattson APRN at 12/11/23 1301       Madhu Zabala Jr., MD at 12/11/23 1201          Chief Complaint:    Abdominal pain    Subjective:    The patient is well-known to me with  multiple medical problems and a complicated dysfunctional GI tract with severe gastroparesis that requires a J-tube for feeds.  She has known gallbladder disease in the past and has been having episodes of right upper quadrant abdominal pain.  She now has constant pain in the right upper quadrant.  A CT scan of the abdomen pelvis shows a distended gallbladder and a right upper quadrant ultrasound shows a gallstone but no evidence of cholecystitis.    Objective:    Temp:  [96.8 °F (36 °C)-97.8 °F (36.6 °C)] 97.8 °F (36.6 °C)  Heart Rate:  [53-69] 69  Resp:  [16-18] 16  BP: ()/(59-75) 127/75    Physical Exam  Constitutional:       Appearance: She is ill-appearing. She is not toxic-appearing.   Abdominal:      Palpations: Abdomen is soft.      Tenderness: There is generalized abdominal tenderness and tenderness in the right upper quadrant.      Comments: The abdomen is soft and mildly tender diffusely and moderately tender in the right upper quadrant.   Neurological:      Mental Status: She is alert.   Psychiatric:         Behavior: Behavior is cooperative.       BMI is within normal parameters. No other follow-up for BMI required.      Results:    WBC is 2.15.  H/H is 10.1/31.8.  BUN is 16 creatinine 0.58.    Assessment/Plan:    The patient has multiple medical problems and severe gastroparesis with persistent right upper quadrant abdominal pain.  The gallbladder has a gallstone and is distended and may be the source of her persistent pain.  This was discussed with the patient and her mother.  It is appropriate to proceed with a cholecystectomy.  We will plan to schedule a laparoscopic cholecystectomy with intraoperative cholangiogram when surgery is available.  The patient understands the indications, alternatives, risks, and benefits of the procedure and wishes to proceed.     She had a CT scan of the abdomen and pelvis performed at an outlying facility that showed swirling of the mesentery.  That study shows  no evidence of a small bowel obstruction and she does not have distention on exam to suggest an obstructive process.  She also has no clinical evidence of ischemia.  I do not think the swirling is clinically significant at this point but we will need to follow her clinical course.    Madhu Zabala Jr., M.D.     Electronically signed by Madhu Zabala Jr., MD at 12/11/23 1206       Consult Notes (last 72 hours)  Notes from 12/11/23 1157 through 12/14/23 1157   No notes of this type exist for this encounter.

## 2023-12-14 NOTE — DISCHARGE PLACEMENT REQUEST
"Henrietta Garrett (28 y.o. Female)       Date of Birth   1995    Social Security Number       Address   [id number] [place] [address]    Home Phone   [phone]    MRN   [id number]       Episcopal   Non-Adventism    Marital Status   Single                            Admission Date   12/10/23    Admission Type   Urgent    Admitting Provider   Zachery Gallegos MD    Attending Provider   Jadon Lewis MD    Department, Room/Bed   [address], 91/[address]       Discharge Date       Discharge Disposition       Discharge Destination                                 Attending Provider: Jadon Lewis MD    Allergies: Anesthetics, Amide, Ciprofloxacin, Compazine [Prochlorperazine], Domperidone, Droperidol, Haldol [Haloperidol], Metoclopramide, Sulfa Antibiotics    Isolation: None   Infection: None   Code Status: CPR    Ht: 180.3 cm (71\")   Wt: 77.7 kg (171 lb 4.8 oz)    Admission Cmt: None   Principal Problem: Abdominal pain [R10.9]                   Active Insurance as of 12/10/2023       Primary Coverage       Payor Plan Insurance Group Employer/Plan Group    [employer] [facility] [employer] [facility] PPO [id number]       Payor Plan Address Payor Plan Phone Number Payor Plan Fax Number Effective Dates    [id number] [phone]  1/1/2023 - None Entered    [facility] [id number]         Subscriber Name Subscriber Birth Date Member ID       NETTA SINGH [birth year]/1963 [id number]                     Emergency Contacts        (Rel.) Home Phone Work Phone Mobile Phone    Miley Singh (Mother) [phone] -- [phone]    Flaco Redman -- -- --                "
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
## 2023-12-15 VITALS
BODY MASS INDEX: 23.89 KG/M2 | HEART RATE: 70 BPM | WEIGHT: 171.3 LBS | OXYGEN SATURATION: 99 % | DIASTOLIC BLOOD PRESSURE: 67 MMHG | SYSTOLIC BLOOD PRESSURE: 109 MMHG | RESPIRATION RATE: 18 BRPM | TEMPERATURE: 97.1 F

## 2023-12-15 LAB
ANION GAP SERPL CALCULATED.3IONS-SCNC: 11.7 MMOL/L (ref 5–15)
BUN SERPL-MCNC: 8 MG/DL (ref 6–20)
BUN/CREAT SERPL: 14.8 (ref 7–25)
CALCIUM SPEC-SCNC: 9.2 MG/DL (ref 8.6–10.5)
CHLORIDE SERPL-SCNC: 100 MMOL/L (ref 98–107)
CO2 SERPL-SCNC: 26.3 MMOL/L (ref 22–29)
CREAT SERPL-MCNC: 0.54 MG/DL (ref 0.57–1)
DEPRECATED RDW RBC AUTO: 40.5 FL (ref 37–54)
EGFRCR SERPLBLD CKD-EPI 2021: 128.8 ML/MIN/1.73
ERYTHROCYTE [DISTWIDTH] IN BLOOD BY AUTOMATED COUNT: 13.7 % (ref 12.3–15.4)
GLUCOSE SERPL-MCNC: 97 MG/DL (ref 65–99)
HCT VFR BLD AUTO: 32.9 % (ref 34–46.6)
HGB BLD-MCNC: 10.8 G/DL (ref 12–15.9)
MCH RBC QN AUTO: 26.8 PG (ref 26.6–33)
MCHC RBC AUTO-ENTMCNC: 32.8 G/DL (ref 31.5–35.7)
MCV RBC AUTO: 81.6 FL (ref 79–97)
PLATELET # BLD AUTO: 212 10*3/MM3 (ref 140–450)
PMV BLD AUTO: 11.1 FL (ref 6–12)
POTASSIUM SERPL-SCNC: 3.6 MMOL/L (ref 3.5–5.2)
RBC # BLD AUTO: 4.03 10*6/MM3 (ref 3.77–5.28)
SODIUM SERPL-SCNC: 138 MMOL/L (ref 136–145)
WBC NRBC COR # BLD AUTO: 2.78 10*3/MM3 (ref 3.4–10.8)

## 2023-12-15 PROCEDURE — 63710000001 PROMETHAZINE PER 12.5 MG: Performed by: SURGERY

## 2023-12-15 PROCEDURE — 99024 POSTOP FOLLOW-UP VISIT: CPT | Performed by: SURGERY

## 2023-12-15 PROCEDURE — 85027 COMPLETE CBC AUTOMATED: CPT | Performed by: NURSE PRACTITIONER

## 2023-12-15 PROCEDURE — 80048 BASIC METABOLIC PNL TOTAL CA: CPT | Performed by: NURSE PRACTITIONER

## 2023-12-15 PROCEDURE — 25010000002 HEPARIN LOCK FLUSH PER 10 UNITS: Performed by: SURGERY

## 2023-12-15 RX ORDER — POTASSIUM CHLORIDE 1.5 G/1.58G
40 POWDER, FOR SOLUTION ORAL EVERY 4 HOURS
Status: COMPLETED | OUTPATIENT
Start: 2023-12-15 | End: 2023-12-15

## 2023-12-15 RX ORDER — OXYCODONE AND ACETAMINOPHEN 7.5; 325 MG/1; MG/1
1 TABLET ORAL EVERY 4 HOURS PRN
Qty: 16 TABLET | Refills: 0 | Status: SHIPPED | OUTPATIENT
Start: 2023-12-15 | End: 2023-12-15 | Stop reason: HOSPADM

## 2023-12-15 RX ORDER — OXYCODONE AND ACETAMINOPHEN 7.5; 325 MG/1; MG/1
1 TABLET ORAL EVERY 12 HOURS PRN
Status: DISCONTINUED | OUTPATIENT
Start: 2023-12-15 | End: 2023-12-15 | Stop reason: HOSPADM

## 2023-12-15 RX ORDER — OXYCODONE AND ACETAMINOPHEN 7.5; 325 MG/1; MG/1
1 TABLET ORAL EVERY 12 HOURS PRN
Qty: 6 TABLET | Refills: 0 | Status: SHIPPED | OUTPATIENT
Start: 2023-12-15 | End: 2023-12-18

## 2023-12-15 RX ORDER — CALCIUM CARBONATE 500 MG/1
1 TABLET, CHEWABLE ORAL 3 TIMES DAILY PRN
Status: DISCONTINUED | OUTPATIENT
Start: 2023-12-15 | End: 2023-12-15 | Stop reason: HOSPADM

## 2023-12-15 RX ADMIN — GABAPENTIN 100 MG: 100 CAPSULE ORAL at 05:33

## 2023-12-15 RX ADMIN — ESCITALOPRAM 5 MG: 5 TABLET, FILM COATED ORAL at 09:21

## 2023-12-15 RX ADMIN — PROMETHAZINE HYDROCHLORIDE 12.5 MG: 12.5 TABLET ORAL at 03:56

## 2023-12-15 RX ADMIN — PANTOPRAZOLE SODIUM 80 MG: 40 TABLET, DELAYED RELEASE ORAL at 09:21

## 2023-12-15 RX ADMIN — OXYCODONE AND ACETAMINOPHEN 1 TABLET: 7.5; 325 TABLET ORAL at 15:58

## 2023-12-15 RX ADMIN — Medication 10 ML: at 09:35

## 2023-12-15 RX ADMIN — PROMETHAZINE HYDROCHLORIDE 12.5 MG: 12.5 TABLET ORAL at 12:30

## 2023-12-15 RX ADMIN — POTASSIUM CHLORIDE 40 MEQ: 1.5 POWDER, FOR SOLUTION ORAL at 12:35

## 2023-12-15 RX ADMIN — POTASSIUM CHLORIDE 40 MEQ: 1.5 POWDER, FOR SOLUTION ORAL at 09:22

## 2023-12-15 RX ADMIN — OXYCODONE AND ACETAMINOPHEN 1 TABLET: 7.5; 325 TABLET ORAL at 03:56

## 2023-12-15 RX ADMIN — HEPARIN 500 UNITS: 100 SYRINGE at 15:58

## 2023-12-15 RX ADMIN — HYDROXYCHLOROQUINE SULFATE 200 MG: 200 TABLET ORAL at 09:21

## 2023-12-15 RX ADMIN — SIMETHICONE 80 MG: 20 SUSPENSION/ DROPS ORAL at 09:21

## 2023-12-15 RX ADMIN — GABAPENTIN 100 MG: 100 CAPSULE ORAL at 14:26

## 2023-12-15 NOTE — PROGRESS NOTES
Continued Stay Note  [facility]     Patient Name: Henrietta Garrett  MRN: [id number]  Today's Date: 12/15/2023    Admit Date: 12/10/2023    Plan: Home with fiance (FU on walker delivery to bedside)   Discharge Plan       Row Name 12/15/23 1318       Plan    Plan Home with fiance (FU on walker delivery to bedside)    Plan Comments Per PT, walker needed at DC. Order obatined. Spoke to Marcello with [facility] to inform of order and poss DC tomorrow.                   Discharge Codes    No documentation.                 Expected Discharge Date and Time       Expected Discharge Date Expected Discharge Time    Dec 16, 2023               Melody Calle, RN

## 2023-12-15 NOTE — PROGRESS NOTES
Name: Henrietta Garrett ADMIT: 12/10/2023   : 1995  PCP: Fanny Jarrett MD    MRN: [id number] LOS: 1 days   AGE/SEX: 28 y.o. female  ROOM: [facility]     Subjective   Subjective   Patient looks to be improving daily yet generally weak.  Comfortable appearing.  No nausea & tolerating conservative tube feed advancement.  No recent vomiting.  Significant other at bedside.  Postoperative tenderness expected finding--tolerated.  Patient & significant ambulating halls this morning without complication & recommend continued as often as tolerated.      Denies chest pain or shortness of breath.    No flatulence or BM as of yet.    No overnight issues.       Objective   Objective   Vital Signs  Temp:  [96.9 °F (36.1 °C)-97.9 °F (36.6 °C)] 97.1 °F (36.2 °C)  Heart Rate:  [62-81] 70  Resp:  [16-18] 18  BP: ()/(58-75) 109/67  SpO2:  [99 %-100 %] 99 %  on   ;   Device (Oxygen Therapy): room air  Body mass index is 23.89 kg/m².    Physical Exam  Constitutional:       General: She is not in acute distress.     Appearance: She is not toxic-appearing.      Comments: Generally weak   Cardiovascular:      Rate and Rhythm: Normal rate.      Heart sounds: Normal heart sounds.   Pulmonary:      Effort: Pulmonary effort is normal.      Breath sounds: Normal breath sounds.   Abdominal:      Palpations: Abdomen is soft.      Tenderness: There is abdominal tenderness (expected post-operative tenderness).      Comments: Feeding tube   Musculoskeletal:      Right lower leg: No edema.      Left lower leg: No edema.   Skin:     General: Skin is warm and dry.   Neurological:      Mental Status: She is alert and oriented to person, place, and time.      Cranial Nerves: No cranial nerve deficit.     *Physical exam performed on 12/15/2023 as per above    Results Review     I reviewed the patient's new clinical results.  Results from last 7 days   Lab Units 12/15/23  0537 23  0548 23  0536 23  0509   WBC 10*3/mm3  "2.78* 3.10* 3.90 3.54   HEMOGLOBIN g/dL 10.8* 10.7* 9.7* 11.2*   PLATELETS 10*3/mm3 212 207 187 251     Results from last 7 days   Lab Units 12/15/23  0537 12/14/23  0548 12/13/23  1613 12/13/23  0536 12/12/23  0509   SODIUM mmol/L 138 137  --  137 134*   POTASSIUM mmol/L 3.6 3.7 4.0 3.6 4.4   CHLORIDE mmol/L 100 102  --  106 104   CO2 mmol/L 26.3 26.0  --  23.5 19.0*   BUN mg/dL 8 5*  --  5* 4*   CREATININE mg/dL 0.54* 0.57  --  0.66 0.66   GLUCOSE mg/dL 97 102*  --  120* 142*   EGFR mL/min/1.73 128.8 127.1  --  122.7 122.7     Results from last 7 days   Lab Units 12/10/23  0458   ALBUMIN g/dL 3.9   BILIRUBIN mg/dL 0.3   ALK PHOS U/L 57   AST (SGOT) U/L 54*   ALT (SGPT) U/L 31     Results from last 7 days   Lab Units 12/15/23  0537 12/14/23  0548 12/13/23  0536 12/12/23  0509 12/11/23  0514 12/10/23  0458   CALCIUM mg/dL 9.2 9.1 8.8 9.1   < > 9.1   ALBUMIN g/dL  --   --   --   --   --  3.9    < > = values in this interval not displayed.       No results found for: \"HGBA1C\", \"POCGLU\"    No radiology results for the last day    I have personally reviewed all medications:  Scheduled Medications  amLODIPine, 10 mg, Per J Tube, Daily  budesonide-formoterol, 2 puff, Inhalation, BID - RT  escitalopram, 5 mg, Per J Tube, Daily  gabapentin, 100 mg, Oral, Q8H  hydroxychloroquine, 200 mg, Per J Tube, BID  norethindrone-ethinyl estradiol-ferrous fumarate, 1 tablet, Per J Tube, Daily  pantoprazole, 80 mg, Oral, BID  potassium chloride, 40 mEq, Oral, Q4H  promethazine, 12.5 mg, Oral, Q8H  simethicone, 80 mg, Oral, 4x Daily  sodium chloride, 10 mL, Intravenous, Q12H  sodium chloride, 10 mL, Intravenous, Q12H  sodium chloride, 10 mL, Intravenous, Q12H    Infusions     Diet  Diet: Liquid Diets; Clear Liquid; Fluid Consistency: Thin (IDDSI 0)    I have personally reviewed:  [x]  Laboratory   []  Microbiology   []  Radiology   []  EKG/Telemetry  []  Cardiology/Vascular   []  Pathology    []  Records       Assessment/Plan     Active " Hospital Problems    Diagnosis  POA    **Abdominal pain [R10.9]  Unknown    Calculus of gallbladder with acute on chronic cholecystitis without obstruction [K80.12]  Unknown    Anemia [D64.9]  Unknown    Abnormal CT of the abdomen [R93.5]  Unknown    Jejunostomy tube present [Z93.4]  Not Applicable    Gastroesophageal reflux disease [K21.9]  Yes    Gastroparesis [K31.84]  Yes    Nausea & vomiting [R11.2]  Yes    Systemic lupus erythematosus [M32.9]  Yes    Fibromyalgia [M79.7]  Yes      Resolved Hospital Problems   No resolved problems to display.       28 y.o. female admitted with Abdominal pain.    General surgery following calculus of gallbladder with chronic cholecystitis (Path report:  Cholelithiasis & attenuated epithelium without significant inflammation) s/p cholecystectomy with intraoperative cholangiogram on 12/12/2023.  Continue to reduce frequency (6 hours PRN to BID PRN) of percocet 7.5 mg as needed for pain (wean as tolerated) to facilitate return of baseline bowel function in the setting of chronic gastroparesis--discussed with patient who understands.  Avoid sedation--continuous pulse oximetry.  Tolerating TF 35 mL/hr (RD following & goal 50 mL/hr with free water 80 mL every 4 hours) & diet clears.  Perioperative cefazolin completed (afebrile).  Trial simethicone PRN for abdominal cramps, bloated sensation, & nausea approved by general surgery also could premedicate with Zofran PRN if nausea develops.  General surgery following abdominal XR given negative flatulence (persists) with progression of gas towards splenic flexure.  TUMS PRN ordered as well.    Gastroenterology evaluated gastroparesis & signed off  with recommendation for follow-up with Dr. Quintana at [facility].  PPI.  Antiemetics as needed continued.  Phenergan per J-tube.    Anemia: Iron profile 40 with iron saturation 12 decreased from prior 18 (4/2019).  Stool Hemoccult ordered on admission without collection thus far  given no BM to date.  Would avoid supplemental iron for now given anticipated constipation / slow return of bowel function in the setting of gastroparesis s/p cholecystectomy.  Hgb trends stable.    Hypertension:  Blood pressure soft today.  Amlodipine continued with hold parameters.    Plaquenil continued for SLE with chronic leukopenia.    Gabapentin continued for fibromyalgia.    Lexapro continued for depression.    SCDs for DVT prophylaxis.  Full code.  Discussed with patient and family, & RN.  Anticipate discharge hopefully home with family pending return of baseline bowel function vs general surgery approval      MITCHELL Mejias  [facility]  12/15/23  10:17 EST

## 2023-12-15 NOTE — NURSING NOTE
Pt given discharge instructions both verbal and written. Port heparin locked and de-accessed. Meds to be sent up via meds to beds. Walker to be delivered before pt leaves. Work note given to pt for boyfriend and mother. Follow up appts discussed. Pt states she has rheumatology appt in two days and will discuss restarting steroids then. Will follow up with Dr Zabala as well, pt to call to make appt. Will be transported home via boyfriend.

## 2023-12-15 NOTE — PLAN OF CARE
Goal Outcome Evaluation:  Plan of Care Reviewed With: patient        Progress: improving  Outcome Evaluation: VSS. Pt less lethargic tonight. PRN and scheduled pain medication with relief. [employer] peptide 1.5 infusing, j-tube flushed q4. Dressing to j-tube changed.       Tube feed rate advanced to 35 as patient stated that was all she could tolerate  Ambulated in givens this AM with walker

## 2023-12-15 NOTE — PROGRESS NOTES
Continued Stay Note  [facility]     Patient Name: Henrietta Garrett  MRN: [id number]  Today's Date: 12/15/2023    Admit Date: 12/10/2023    Plan: Home with fijason (FU on walker delivery to bedside)   Discharge Plan       Row Name 12/15/23 1546       Plan    Plan Comments Spoke to Marcello with [facility] to inform of DC today. Stated she will bring Walker to bedside.      Row Name 12/15/23 1318       Plan    Plan Home with robbinjason (FU on walker delivery to bedside)    Plan Comments Per PT, walker needed at DC. Order obatined. Spoke to Marcello with [facility] to inform of order and poss DC tomorrow.                   Discharge Codes    No documentation.                 Expected Discharge Date and Time       Expected Discharge Date Expected Discharge Time    Dec 15, 2023               Melody Calle, RN

## 2023-12-15 NOTE — PLAN OF CARE
Goal Outcome Evaluation:  Plan of Care Reviewed With: patient        Progress: improving  Outcome Evaluation: VSS, on room air, ambulated in room and with PT, assist x1 and walker when ambulating, pain controlled w/ percocet, [employer] Peptide 1.5 infusing at 30 mL and flushed q4h per order, xray complete, tolerating very small amounts of clear liquids, family remains at bedside, pt seems eager for discharge home, pt reports nausea is better today.

## 2023-12-15 NOTE — PROGRESS NOTES
Chief Complaint:    S/P Laparoscopic cholecystectomy with intraoperative cholangiogram, POD 4    Subjective:    The patient is feeling better today.  She has less nausea.  She has been able to increase her tube feed rate.  She has not passed flatus or had a bowel movement but her abdominal x-rays showed a nonobstructive pattern.  She states that she goes more than a week between bowel movements at times due to her GI dysmotility.    Objective:    Temp:  [96.9 °F (36.1 °C)-97.9 °F (36.6 °C)] 97.1 °F (36.2 °C)  Heart Rate:  [62-81] 70  Resp:  [16-18] 18  BP: ()/(58-75) 109/67    Physical Exam  Constitutional:       Appearance: She is not ill-appearing or toxic-appearing.   Abdominal:      Palpations: Abdomen is soft.      Tenderness: There is generalized abdominal tenderness (Appropriate postop tenderness).   Neurological:      Mental Status: She is alert.   Psychiatric:         Behavior: Behavior is cooperative.       BMI is within normal parameters. No other follow-up for BMI required.        Results:    WBC is 2.78.  H/H is 10.8/32.9.  BUN is 8 creatinine 0.54.    Impression/Plan:    The patient is POD 4 from a laparoscopic cholecystectomy with intraoperative cholangiogram for cholelithiasis with chronic cholecystitis and hydrops of the gallbladder.  She has improved and is tolerating tube feeds.  She is ready for discharge from a surgical standpoint.    Madhu Zabala Jr., M.D.

## 2023-12-16 ENCOUNTER — READMISSION MANAGEMENT (OUTPATIENT)
Dept: CALL CENTER | Facility: HOSPITAL | Age: 28
End: 2023-12-16
Payer: COMMERCIAL

## 2023-12-16 NOTE — OUTREACH NOTE
Prep Survey      Flowsheet Row Responses   [facility] facility patient discharged from? [facility]   Is LACE score < 7 ? No   Eligibility Readm Mgmt   Discharge diagnosis CHOLECYSTECTOMY LAPAROSCOPIC INTRAOPERATIVE CHOLANGIOGRAM   Does the patient have one of the following disease processes/diagnoses(primary or secondary)? General Surgery   Does the patient have Home health ordered? No   Is there a DME ordered? Yes   What DME was ordered? [facility] Walker, [facility] [place] - Tube feeds (Continued)   Prep survey completed? Yes            [facility] - Registered Nurse

## 2023-12-18 NOTE — PROGRESS NOTES
Case Management Discharge Note      Final Note: Discharged home. Melody Calle RN    Provided Post Acute Provider List?: N/A  Provided Post Acute Provider Quality & Resource List?: N/A    Selected Continued Care - Discharged on 12/15/2023 Admission date: 12/10/2023 - Discharge disposition: Home or Self Care         Transportation Services  Private: Car    Final Discharge Disposition Code: 01 - home or self-care

## 2023-12-18 NOTE — PAYOR COMM NOTE
"Henrietta Garrett BEENA (28 y.o. Female)          DC SUMMARY FOR [id number]    CONTACT GUALBERTO ARON  P# [phone]  F# [phone]       Date of Birth   1995    Social Security Number       [place]   [address] [place] [address]    Home Phone   [phone]    MRN   [id number]       Mu-ism   Non-Sikhism    Marital Status   Single                            Admission Date   12/10/23    Admission Type   Urgent    Admitting Provider   Zachery Gallegos MD    Attending Provider       Department, Room/Bed   [address], 91/1       Discharge Date   12/15/2023    Discharge Disposition   Home or Self Care    Discharge Destination                                 Attending Provider: (none)   Allergies: Anesthetics, Amide, Ciprofloxacin, Compazine [Prochlorperazine], Domperidone, Droperidol, Haldol [Haloperidol], Metoclopramide, Sulfa Antibiotics    Isolation: None   Infection: None   Code Status: Prior    Ht: 180.3 cm (71\")   Wt: 77.7 kg (171 lb 4.8 oz)    Admission Cmt: None   Principal Problem: Abdominal pain [R10.9]                   Active Insurance as of 12/10/2023       Primary Coverage       Payor Plan Insurance Group Employer/Plan Group    ANTH [facility] [place] BLUE [facility] PPO [id number]       Payor Plan Address Payor Plan Phone Number Payor Plan Fax Number Effective Dates    [address] [phone]  1/1/2023 - None Entered    [place] [address]         Subscriber Name Subscriber Birth Date Member ID       NETTA SINGH [birth year]1963 [id number]                     Emergency Contacts        (Rel.) Home Phone Work Phone Mobile Phone    Miley Singh (Mother) [phone] -- [phone]    Flaco Redman -- -- --                 Physician Progress Notes (last 72 hours)        Madhu Zabala Jr., MD at 12/15/23 1511          Chief Complaint:    S/P Laparoscopic cholecystectomy with intraoperative cholangiogram, POD " 4    Subjective:    The patient is feeling better today.  She has less nausea.  She has been able to increase her tube feed rate.  She has not passed flatus or had a bowel movement but her abdominal x-rays showed a nonobstructive pattern.  She states that she goes more than a week between bowel movements at times due to her GI dysmotility.    Objective:    Temp:  [96.9 °F (36.1 °C)-97.9 °F (36.6 °C)] 97.1 °F (36.2 °C)  Heart Rate:  [62-81] 70  Resp:  [16-18] 18  BP: ()/(58-75) 109/67    Physical Exam  Constitutional:       Appearance: She is not ill-appearing or toxic-appearing.   Abdominal:      Palpations: Abdomen is soft.      Tenderness: There is generalized abdominal tenderness (Appropriate postop tenderness).   Neurological:      Mental Status: She is alert.   Psychiatric:         Behavior: Behavior is cooperative.       BMI is within normal parameters. No other follow-up for BMI required.        Results:    WBC is 2.78.  H/H is 10.8/32.9.  BUN is 8 creatinine 0.54.    Impression/Plan:    The patient is POD 4 from a laparoscopic cholecystectomy with intraoperative cholangiogram for cholelithiasis with chronic cholecystitis and hydrops of the gallbladder.  She has improved and is tolerating tube feeds.  She is ready for discharge from a surgical standpoint.    Madhu Zabala Jr., M.D.     Electronically signed by Madhu Zabala Jr., MD at 12/15/23 1513       Roney Mattson APRN at 12/15/23 0805              Name: Henrietta Garrett ADMIT: 12/10/2023   : 1995  PCP: Fanny Jarrett MD    MRN: [id number] LOS: 1 days   AGE/SEX: 28 y.o. female  ROOM: [facility]/     Subjective   Subjective   Patient looks to be improving daily yet generally weak.  Comfortable appearing.  No nausea & tolerating conservative tube feed advancement.  No recent vomiting.  Significant other at bedside.  Postoperative tenderness expected finding--tolerated.  Patient & significant ambulating halls this morning without  complication & recommend continued as often as tolerated.      Denies chest pain or shortness of breath.    No flatulence or BM as of yet.    No overnight issues.      Objective   Objective   Vital Signs  Temp:  [96.9 °F (36.1 °C)-97.9 °F (36.6 °C)] 97.1 °F (36.2 °C)  Heart Rate:  [62-81] 70  Resp:  [16-18] 18  BP: ()/(58-75) 109/67  SpO2:  [99 %-100 %] 99 %  on   ;   Device (Oxygen Therapy): room air  Body mass index is 23.89 kg/m².    Physical Exam  Constitutional:       General: She is not in acute distress.     Appearance: She is not toxic-appearing.      Comments: Generally weak   Cardiovascular:      Rate and Rhythm: Normal rate.      Heart sounds: Normal heart sounds.   Pulmonary:      Effort: Pulmonary effort is normal.      Breath sounds: Normal breath sounds.   Abdominal:      Palpations: Abdomen is soft.      Tenderness: There is abdominal tenderness (expected post-operative tenderness).      Comments: Feeding tube   Musculoskeletal:      Right lower leg: No edema.      Left lower leg: No edema.   Skin:     General: Skin is warm and dry.   Neurological:      Mental Status: She is alert and oriented to person, place, and time.      Cranial Nerves: No cranial nerve deficit.     *Physical exam performed on 12/15/2023 as per above    Results Review     I reviewed the patient's new clinical results.  Results from last 7 days   Lab Units 12/15/23  0537 12/14/23  0548 12/13/23  0536 12/12/23  0509   WBC 10*3/mm3 2.78* 3.10* 3.90 3.54   HEMOGLOBIN g/dL 10.8* 10.7* 9.7* 11.2*   PLATELETS 10*3/mm3 212 207 187 251     Results from last 7 days   Lab Units 12/15/23  0537 12/14/23  0548 12/13/23  1613 12/13/23  0536 12/12/23  0509   SODIUM mmol/L 138 137  --  137 134*   POTASSIUM mmol/L 3.6 3.7 4.0 3.6 4.4   CHLORIDE mmol/L 100 102  --  106 104   CO2 mmol/L 26.3 26.0  --  23.5 19.0*   BUN mg/dL 8 5*  --  5* 4*   CREATININE mg/dL 0.54* 0.57  --  0.66 0.66   GLUCOSE mg/dL 97 102*  --  120* 142*   EGFR mL/min/1.73  "128.8 127.1  --  122.7 122.7     Results from last 7 days   Lab Units 12/10/23  0458   ALBUMIN g/dL 3.9   BILIRUBIN mg/dL 0.3   ALK PHOS U/L 57   AST (SGOT) U/L 54*   ALT (SGPT) U/L 31     Results from last 7 days   Lab Units 12/15/23  0537 12/14/23  0548 12/13/23  0536 12/12/23  0509 12/11/23  0514 12/10/23  0458   CALCIUM mg/dL 9.2 9.1 8.8 9.1   < > 9.1   ALBUMIN g/dL  --   --   --   --   --  3.9    < > = values in this interval not displayed.       No results found for: \"HGBA1C\", \"POCGLU\"    No radiology results for the last day    I have personally reviewed all medications:  Scheduled Medications  amLODIPine, 10 mg, Per J Tube, Daily  budesonide-formoterol, 2 puff, Inhalation, BID - RT  escitalopram, 5 mg, Per J Tube, Daily  gabapentin, 100 mg, Oral, Q8H  hydroxychloroquine, 200 mg, Per J Tube, BID  norethindrone-ethinyl estradiol-ferrous fumarate, 1 tablet, Per J Tube, Daily  pantoprazole, 80 mg, Oral, BID  potassium chloride, 40 mEq, Oral, Q4H  promethazine, 12.5 mg, Oral, Q8H  simethicone, 80 mg, Oral, 4x Daily  sodium chloride, 10 mL, Intravenous, Q12H  sodium chloride, 10 mL, Intravenous, Q12H  sodium chloride, 10 mL, Intravenous, Q12H    Infusions     Diet  Diet: Liquid Diets; Clear Liquid; Fluid Consistency: Thin (IDDSI 0)    I have personally reviewed:  [x]  Laboratory   []  Microbiology   []  Radiology   []  EKG/Telemetry  []  Cardiology/Vascular   []  Pathology    []  Records      Assessment/Plan     Active Hospital Problems    Diagnosis  POA    **Abdominal pain [R10.9]  Unknown    Calculus of gallbladder with acute on chronic cholecystitis without obstruction [K80.12]  Unknown    Anemia [D64.9]  Unknown    Abnormal CT of the abdomen [R93.5]  Unknown    Jejunostomy tube present [Z93.4]  Not Applicable    Gastroesophageal reflux disease [K21.9]  Yes    Gastroparesis [K31.84]  Yes    Nausea & vomiting [R11.2]  Yes    Systemic lupus erythematosus [M32.9]  Yes    Fibromyalgia [M79.7]  Yes      Resolved " Hospital Problems   No resolved problems to display.       28 y.o. female admitted with Abdominal pain.    General surgery following calculus of gallbladder with chronic cholecystitis (Path report:  Cholelithiasis & attenuated epithelium without significant inflammation) s/p cholecystectomy with intraoperative cholangiogram on 12/12/2023.  Continue to reduce frequency (6 hours PRN to BID PRN) of percocet 7.5 mg as needed for pain (wean as tolerated) to facilitate return of baseline bowel function in the setting of chronic gastroparesis--discussed with patient who understands.  Avoid sedation--continuous pulse oximetry.  Tolerating TF 35 mL/hr (RD following & goal 50 mL/hr with free water 80 mL every 4 hours) & diet clears.  Perioperative cefazolin completed (afebrile).  Trial simethicone PRN for abdominal cramps, bloated sensation, & nausea approved by general surgery also could premedicate with Zofran PRN if nausea develops.  General surgery following abdominal XR given negative flatulence (persists) with progression of gas towards splenic flexure.  TUMS PRN ordered as well.    Gastroenterology evaluated gastroparesis & signed off  with recommendation for follow-up with Dr. Quintana at [facility].  PPI.  Antiemetics as needed continued.  Phenergan per J-tube.    Anemia: Iron profile 40 with iron saturation 12 decreased from prior 18 (4/2019).  Stool Hemoccult ordered on admission without collection thus far given no BM to date.  Would avoid supplemental iron for now given anticipated constipation / slow return of bowel function in the setting of gastroparesis s/p cholecystectomy.  Hgb trends stable.    Hypertension:  Blood pressure soft today.  Amlodipine continued with hold parameters.    Plaquenil continued for SLE with chronic leukopenia.    Gabapentin continued for fibromyalgia.    Lexapro continued for depression.    SCDs for DVT prophylaxis.  Full code.  Discussed with patient and family, &  RN.  Anticipate discharge hopefully home with family pending return of baseline bowel function vs general surgery approval      MITCHELL Mejias  [facility]  12/15/23  10:17 EST        Electronically signed by Roney Mattson APRN at 12/15/23 1021       Consult Notes (last 72 hours)  Notes from 12/15/23 0715 through 12/18/23 0715   No notes of this type exist for this encounter.       Discharge Summary    No notes of this type exist for this encounter.     Danelle Zhang, RN   Registered Nurse  [facility]     Nursing Note     Signed     Date of Service: 12/15/23 1613  Creation Time: 12/15/23 1613     Signed         Pt given discharge instructions both verbal and written. Port heparin locked and de-accessed. Meds to be sent up via meds to beds. Walker to be delivered before pt leaves. Work note given to pt for boyfriend and mother. Follow up appts discussed. Pt states she has rheumatology appt in two days and will discuss restarting steroids then. Will follow up with Dr Zabala as well, pt to call to make appt. Will be transported home via boyfriend.

## 2023-12-20 ENCOUNTER — READMISSION MANAGEMENT (OUTPATIENT)
Dept: CALL CENTER | Facility: HOSPITAL | Age: 28
End: 2023-12-20
Payer: COMMERCIAL

## 2023-12-20 NOTE — OUTREACH NOTE
General Surgery Week 1 Survey      Flowsheet Row Responses   [facility] facility patient discharged from? [place]   Does the patient have one of the following disease processes/diagnoses(primary or secondary)? General Surgery   Week 1 attempt successful? No   Unsuccessful attempts Attempt 1            Mariana Zarco Registered Nurse

## 2023-12-22 NOTE — DISCHARGE SUMMARY
Patient Name: Henrietta Garrett  : 1995  MRN: [id number]    Date of Admission: 12/10/2023  Date of Discharge:  2023  Primary Care Physician: Fanny Jarrett MD      Chief Complaint:   No chief complaint on file.      Discharge Diagnoses     Active Hospital Problems    Diagnosis  POA    **Abdominal pain [R10.9]  Unknown    Calculus of gallbladder with acute on chronic cholecystitis without obstruction [K80.12]  Unknown    Anemia [D64.9]  Unknown    Abnormal CT of the abdomen [R93.5]  Unknown    Jejunostomy tube present [Z93.4]  Not Applicable    Gastroesophageal reflux disease [K21.9]  Yes    Gastroparesis [K31.84]  Yes    Nausea & vomiting [R11.2]  Yes    Systemic lupus erythematosus [M32.9]  Yes    Fibromyalgia [M79.7]  Yes      Resolved Hospital Problems   No resolved problems to display.        Hospital Course     Ms. Garrett is a 28 y.o. female with a history of asthma, CPRS 1, EDS, depression, gastroparesis, fibromyalgia who presented to [facility] initially complaining of abdominal pain.  Please see the admitting history and physical for further details.  She was found to have calculus of gallbladder with chronic cholecystitis without obstruction and was admitted to the hospital for further evaluation and treatment.      General surgery performed cholecystectomy with intraoperative cholangiogram on 2023.  Pathology reports findings cholelithiasis and attenuated epithelium without significant inflammation.  Slow return of bowel function in the setting of chronic gastroparesis.  Abdominal pain tolerated as well as resumption of tube feeds.  General surgery approved discharge home on 12/15/2023.    Gastroenterology evaluated gastroparesis and recommend follow-up with Dr. Quintana [facility].    Discussed recommendation for follow-up primary care provider for continued monitoring of GT.  Serum hemoglobin stable with trend.    Patient tolerating tube feeds  and minimal physical activity--eager to discharge home with family on 12/15/2023 and agreeable plan for follow-up as previously discussed.    Day of Discharge       Physical Exam:     Body mass index is 23.89 kg/m².    Physical Exam  Constitutional:       General: She is not in acute distress.     Appearance: She is not toxic-appearing.      Comments: Generally weak     HENT:      Head: Normocephalic and atraumatic.   Eyes:      Extraocular Movements: Extraocular movements intact.      Conjunctiva/sclera: Conjunctivae normal.   Cardiovascular:      Rate and Rhythm: Normal rate.      Heart sounds: Normal heart sounds.   Pulmonary:      Effort: Pulmonary effort is normal.      Breath sounds: Normal breath sounds.   Abdominal:      Palpations: Abdomen is soft.      Comments: Unremarkable feeding tube site & hypoactive    Musculoskeletal:      Cervical back: Normal range of motion and neck supple.      Right lower leg: No edema.      Left lower leg: No edema.   Skin:     General: Skin is warm and dry.   Neurological:      Mental Status: She is alert and oriented to person, place, and time. Mental status is at baseline.   Psychiatric:         Behavior: Behavior normal.         Thought Content: Thought content normal.         Consultants     Consult Orders (all) (From admission, onward)       Start     Ordered    12/13/23 0918  Inpatient Nutrition Consult  Once        Comments: Continuous feeding (from bottle, not bag) & water flush - need assistance with running on pump.   Provider:  (Not yet assigned)    12/13/23 0920    12/11/23 1729  Inpatient Nutrition Consult  Once        Provider:  (Not yet assigned)    12/11/23 1728    12/10/23 0702  Inpatient Gastroenterology Consult  [place]         Specialty:  Gastroenterology  Provider:  Malini Barone MD    12/10/23 0327    12/10/23 0702  Inpatient General Surgery Consult  [place]         Specialty:  General Surgery  Provider:  Madhu Zabala Jr., MD    12/10/23 0335                   Procedures     CHOLECYSTECTOMY LAPAROSCOPIC INTRAOPERATIVE CHOLANGIOGRAM    Imaging Results (All)       Procedure Component Value Units Date/Time    XR Abdomen Flat & Upright [[id number]] Collected: 12/14/23 1656     Updated: 12/14/23 1701    Narrative:      PROCEDURE:  XR ABDOMEN FLAT AND UPRIGHT-     HISTORY: Ileus.     COMPARISON: CT abdomen and pelvis 12/9/2023     FINDINGS:       5 views of the abdomen/pelvis were obtained.  There is a partially imaged right chest port. The visualized lung bases  and pleural spaces are clear. There are surgical clips projecting over  the right upper quadrant. There is a percutaneous enterostomy tube  projecting over the lower left hemiabdomen. There is gaseous distention  of the splenic flexure, progressed from 12/9/2023 when accounting for  differences in modality. No dilated small bowel loops are identified.  The visualized osseous fractures are age-appropriate.     This report was finalized on 12/14/2023 4:58 PM by Dr. Lorraine Hardy M.D  on Workstation: [id number]       FL Cholangiogram Operative [[id number]] Collected: 12/11/23 1519     Updated: 12/11/23 1635    Narrative:      INTRAOPERATIVE CHOLANGIOGRAM 12/11/2023     HISTORY: Laparoscopic cholecystectomy with possible stones.     Contrast is seen in the intrahepatic, common hepatic and common bile  ducts. The intrahepatic ducts are incompletely opacified. No bile duct  stones, strictures or biliary dilatation is seen. There is some reflux  into the pancreatic duct. There is free spill of contrast into the  duodenum.        FLUOROSCOPY TIME:  8 seconds  ,  43   images. 2 mGy.     This report was finalized on 12/11/2023 4:32 PM by Dr. Javier Plasencia M.D on Workstation: [id number]       CT Outside Films [[id number]] Resulted: 12/10/23 1546     Updated: 12/10/23 1548    Narrative:      This procedure was auto-finalized with no dictation required.    US Gallbladder [[id number]] Collected: 12/10/23 1344     Updated:  "12/10/23 7200    Narrative:      RIGHT UPPER QUADRANT ULTRASOUND     HISTORY: 28-year-old female with right upper quadrant pain.     TECHNIQUE: Real-time ultrasound of the right upper quadrant was  performed. Confirmatory images were obtained.     FINDINGS: There is a single 1.3 cm gallstone within the gallbladder.  There is no cholecystitis or biliary dilatation. The common bile duct  measures 4 mm. The liver and right kidney as visualized appear  unremarkable. Pancreatic body appears unremarkable. Pancreatic head and  tail are not seen.       Impression:      Single 1.3 cm gallstone within the gallbladder without  cholecystitis or biliary dilatation.     This report was finalized on 12/10/2023 1:47 PM by Dr. Cami Holder M.D  on Workstation: [id number]             Results for orders placed during the hospital encounter of 08/20/22    Duplex Venous Upper Extremity - Right    Interpretation Summary  · Normal right upper extremity venous duplex scan.    Results for orders placed during the hospital encounter of 09/25/23    Adult Transthoracic Echo Complete W/ Cont if Necessary Per Protocol    Interpretation Summary    Left ventricular systolic function is normal. Calculated left ventricular EF = 59.8%    Left ventricular diastolic function was normal.    Normal echo    Pertinent Labs                         Invalid input(s): \"LDLCALC\"          Test Results Pending at Discharge       Discharge Details        Discharge Medications        Continue These Medications        Instructions Start Date   amLODIPine 10 MG tablet  Commonly known as: NORVASC   10 mg, Oral, Daily      Blisovi 24 Fe 1-20 MG-MCG(24) per tablet  Generic drug: norethindrone-ethinyl estradiol-ferrous fumarate   1 tablet, Oral, Daily      Calmoseptine 0.44-20.6 % ointment  Generic drug: Menthol-Zinc Oxide   1 application , Topical, 2 Times Daily      dicyclomine 10 MG/5ML syrup  Commonly known as: BENTYL   10 mg, Oral, 4 Times Daily Before Meals & " Nightly      EPINEPHrine 0.3 MG/0.3ML solution auto-injector injection  Commonly known as: EPIPEN   0.3 mg, Intramuscular, As Needed      escitalopram 5 MG tablet  Commonly known as: LEXAPRO   5 mg, Per J Tube, Daily      gabapentin 100 MG capsule  Commonly known as: NEURONTIN   300 mg, Oral, 3 Times Daily      Gammaplex 10 GM/200ML solution infusion  Generic drug: immune globulin (human)   800 mL, 0 Refill(s)      hydroxychloroquine 200 MG tablet  Commonly known as: PLAQUENIL   200 mg, Oral, 2 Times Daily      lactulose 10 GM/15ML solution  Commonly known as: CHRONULAC   10 g, Per J Tube, As Needed      leucovorin 5 MG tablet   1 tablet, Oral, Weekly, Mondays      methocarbamol 750 MG tablet  Commonly known as: ROBAXIN   750 mg, Oral, 3 Times Daily PRN      methotrexate PF 50 MG/2ML chemo syringe   50 mg, Intramuscular, Weekly, Wednesdays      metoprolol succinate XL 25 MG 24 hr tablet  Commonly known as: TOPROL-XL   0.5 tablets, Oral, Nightly      multivitamin with minerals tablet tablet   1 tablet, Oral, Daily      Nitro-Bid 2 % ointment  Generic drug: nitroglycerin   APPLY BY TRANSDERMAL ROUTE TO FINGER WEBS FOR RAYNAUD'S EVERY 12 HOURS AND REMOVE AT BEDTIME      pantoprazole 40 MG EC tablet  Commonly known as: PROTONIX   2 tablets, Oral, 2 Times Daily      promethazine-dextromethorphan 6.25-15 MG/5ML syrup  Commonly known as: PROMETHAZINE-DM   5 mL, Per J Tube, 4 Times Daily PRN      Senexon-S 8.6-50 MG per tablet  Generic drug: sennosides-docusate   2 tablets, Per J Tube, 2 Times Daily, Hold for loose stool      Symbicort 160-4.5 MCG/ACT inhaler  Generic drug: budesonide-formoterol   2 puffs, Inhalation, 2 Times Daily - RT             Stop These Medications      predniSONE 20 MG tablet  Commonly known as: DELTASONE            ASK your doctor about these medications        Instructions Start Date   oxyCODONE-acetaminophen 7.5-325 MG per tablet  Commonly known as: PERCOCET  Ask about: Should I take this  medication?   1 tablet, Oral, Every 12 Hours PRN               Allergies   Allergen Reactions    Anesthetics, Amide Nausea And Vomiting    Ciprofloxacin Other (See Comments)     EHERLIS STANDLIS? ILLNESS. UNABLE TO TAKE     Compazine [Prochlorperazine] Dystonia    Domperidone Other (See Comments)     Bad reaction per pt report    Droperidol Other (See Comments)     Tardive dyskinesia    Haldol [Haloperidol] Other (See Comments)     Muscle spasms      Metoclopramide Nausea And Vomiting    Sulfa Antibiotics Other (See Comments)     Unable to take as causes leukopenia       Discharge Disposition:  Home or Self Care      Discharge Diet:  No active diet order      Discharge Activity:       CODE STATUS:    Code Status and Medical Interventions:   Ordered at: 12/10/23 0911     Code Status (Patient has no pulse and is not breathing):    CPR (Attempt to Resuscitate)     Medical Interventions (Patient has pulse or is breathing):    Full Support       No future appointments.   Follow-up Information       Fanny Jarrett MD .    Specialty: Family Medicine  Contact information:  [address] [facility] [id number] [phone]               Madhu Zabala Jr., MD. Schedule an appointment as soon as possible for a visit.    Specialty: General Surgery  Why: For wound re-check  Contact information:  [address]  [facility] [address]  [facility] [id number] [phone]                             Time Spent on Discharge:  Greater than 30 minutes      MITCHELL Mejias  [facility]  12/22/23  11:36 EST

## 2023-12-27 ENCOUNTER — READMISSION MANAGEMENT (OUTPATIENT)
Dept: CALL CENTER | Facility: HOSPITAL | Age: 28
End: 2023-12-27
Payer: COMMERCIAL

## 2023-12-27 NOTE — OUTREACH NOTE
General Surgery Week 1 Survey      Flowsheet Row Responses   [facility] patient discharged from? [place]   Does the patient have one of the following disease processes/diagnoses(primary or secondary)? General Surgery   Week 1 attempt successful? No   Unsuccessful attempts Attempt 2            Rox POLLOCK - Registered Nurse

## 2024-01-03 ENCOUNTER — READMISSION MANAGEMENT (OUTPATIENT)
Dept: CALL CENTER | Facility: HOSPITAL | Age: 29
End: 2024-01-03
Payer: COMMERCIAL

## 2024-01-03 NOTE — OUTREACH NOTE
General Surgery Week 3 Survey      Flowsheet Row Responses   [facility] patient discharged from? [place]   Does the patient have one of the following disease processes/diagnoses(primary or secondary)? General Surgery   Week 3 attempt successful? No   Unsuccessful attempts Attempt 1            Lul MCGUIRE - Registered Nurse

## 2024-02-01 ENCOUNTER — OFFICE VISIT (OUTPATIENT)
Dept: SURGERY | Facility: CLINIC | Age: 29
End: 2024-02-01
Payer: COMMERCIAL

## 2024-02-01 VITALS
HEIGHT: 71 IN | DIASTOLIC BLOOD PRESSURE: 74 MMHG | WEIGHT: 174.4 LBS | BODY MASS INDEX: 24.42 KG/M2 | SYSTOLIC BLOOD PRESSURE: 106 MMHG

## 2024-02-01 DIAGNOSIS — Z93.4 JEJUNOSTOMY TUBE PRESENT: ICD-10-CM

## 2024-02-01 DIAGNOSIS — K31.84 GASTROPARESIS: Primary | ICD-10-CM
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
## 2024-02-15 ENCOUNTER — ANESTHESIA (OUTPATIENT)
Dept: GASTROENTEROLOGY | Facility: HOSPITAL | Age: 29
End: 2024-02-15
Payer: COMMERCIAL

## 2024-02-15 ENCOUNTER — ANESTHESIA EVENT (OUTPATIENT)
Dept: GASTROENTEROLOGY | Facility: HOSPITAL | Age: 29
End: 2024-02-15
Payer: COMMERCIAL

## 2024-02-15 ENCOUNTER — HOSPITAL ENCOUNTER (OUTPATIENT)
Facility: HOSPITAL | Age: 29
Setting detail: HOSPITAL OUTPATIENT SURGERY
Discharge: HOME | End: 2024-02-15
Attending: SURGERY | Admitting: SURGERY
Payer: COMMERCIAL

## 2024-02-15 VITALS
OXYGEN SATURATION: 96 % | RESPIRATION RATE: 16 BRPM | BODY MASS INDEX: 24.3 KG/M2 | WEIGHT: 173.6 LBS | HEART RATE: 66 BPM | SYSTOLIC BLOOD PRESSURE: 125 MMHG | HEIGHT: 71 IN | DIASTOLIC BLOOD PRESSURE: 93 MMHG | TEMPERATURE: 97.8 F

## 2024-02-15 DIAGNOSIS — K31.84 GASTROPARESIS: ICD-10-CM

## 2024-02-15 LAB
B-HCG UR QL: NEGATIVE
EXPIRATION DATE: NORMAL
INTERNAL NEGATIVE CONTROL: NEGATIVE
INTERNAL POSITIVE CONTROL: POSITIVE
Lab: NORMAL

## 2024-02-15 PROCEDURE — 25010000002 HEPARIN LOCK FLUSH PER 10 UNITS: Performed by: SURGERY

## 2024-02-15 PROCEDURE — 25010000002 FENTANYL CITRATE (PF) 50 MCG/ML SOLUTION: Performed by: ANESTHESIOLOGY

## 2024-02-15 PROCEDURE — 25010000002 PROPOFOL 10 MG/ML EMULSION: Performed by: NURSE ANESTHETIST, CERTIFIED REGISTERED

## 2024-02-15 PROCEDURE — 43246 EGD PLACE GASTROSTOMY TUBE: CPT | Performed by: SURGERY

## 2024-02-15 PROCEDURE — 25010000002 CEFAZOLIN IN DEXTROSE 2000 MG/ 100 ML SOLUTION: Performed by: SURGERY

## 2024-02-15 PROCEDURE — 25810000003 LACTATED RINGERS PER 1000 ML: Performed by: SURGERY

## 2024-02-15 PROCEDURE — 81025 URINE PREGNANCY TEST: CPT | Performed by: SURGERY

## 2024-02-15 PROCEDURE — 25010000002 ONDANSETRON PER 1 MG: Performed by: NURSE ANESTHETIST, CERTIFIED REGISTERED

## 2024-02-15 RX ORDER — HYDROCODONE BITARTRATE AND ACETAMINOPHEN 5; 325 MG/1; MG/1
1 TABLET ORAL ONCE AS NEEDED
Status: DISCONTINUED | OUTPATIENT
Start: 2024-02-15 | End: 2024-02-15 | Stop reason: HOSPADM

## 2024-02-15 RX ORDER — FENTANYL CITRATE 50 UG/ML
50 INJECTION, SOLUTION INTRAMUSCULAR; INTRAVENOUS
Status: DISCONTINUED | OUTPATIENT
Start: 2024-02-15 | End: 2024-02-15 | Stop reason: HOSPADM

## 2024-02-15 RX ORDER — DIPHENHYDRAMINE HYDROCHLORIDE 50 MG/ML
12.5 INJECTION INTRAMUSCULAR; INTRAVENOUS
Status: DISCONTINUED | OUTPATIENT
Start: 2024-02-15 | End: 2024-02-15 | Stop reason: HOSPADM

## 2024-02-15 RX ORDER — HEPARIN SODIUM (PORCINE) LOCK FLUSH IV SOLN 100 UNIT/ML 100 UNIT/ML
100 SOLUTION INTRAVENOUS ONCE
Status: COMPLETED | OUTPATIENT
Start: 2024-02-15 | End: 2024-02-15

## 2024-02-15 RX ORDER — PROPOFOL 10 MG/ML
VIAL (ML) INTRAVENOUS AS NEEDED
Status: DISCONTINUED | OUTPATIENT
Start: 2024-02-15 | End: 2024-02-15 | Stop reason: SURG

## 2024-02-15 RX ORDER — FENTANYL CITRATE 50 UG/ML
50 INJECTION, SOLUTION INTRAMUSCULAR; INTRAVENOUS ONCE
Status: COMPLETED | OUTPATIENT
Start: 2024-02-15 | End: 2024-02-15

## 2024-02-15 RX ORDER — CEFAZOLIN SODIUM 2 G/100ML
2000 INJECTION, SOLUTION INTRAVENOUS ONCE
Status: COMPLETED | OUTPATIENT
Start: 2024-02-15 | End: 2024-02-15

## 2024-02-15 RX ORDER — ONDANSETRON 2 MG/ML
4 INJECTION INTRAMUSCULAR; INTRAVENOUS ONCE AS NEEDED
Status: DISCONTINUED | OUTPATIENT
Start: 2024-02-15 | End: 2024-02-15 | Stop reason: HOSPADM

## 2024-02-15 RX ORDER — OXYCODONE HYDROCHLORIDE AND ACETAMINOPHEN 5; 325 MG/1; MG/1
TABLET ORAL
Qty: 10 TABLET | Refills: 0 | Status: SHIPPED | OUTPATIENT
Start: 2024-02-15

## 2024-02-15 RX ORDER — OXYCODONE AND ACETAMINOPHEN 7.5; 325 MG/1; MG/1
1 TABLET ORAL EVERY 4 HOURS PRN
Status: DISCONTINUED | OUTPATIENT
Start: 2024-02-15 | End: 2024-02-15 | Stop reason: HOSPADM

## 2024-02-15 RX ORDER — HYDROMORPHONE HYDROCHLORIDE 2 MG/ML
0.5 INJECTION, SOLUTION INTRAMUSCULAR; INTRAVENOUS; SUBCUTANEOUS
Status: DISCONTINUED | OUTPATIENT
Start: 2024-02-15 | End: 2024-02-15 | Stop reason: HOSPADM

## 2024-02-15 RX ORDER — SODIUM CHLORIDE, SODIUM LACTATE, POTASSIUM CHLORIDE, CALCIUM CHLORIDE 600; 310; 30; 20 MG/100ML; MG/100ML; MG/100ML; MG/100ML
30 INJECTION, SOLUTION INTRAVENOUS CONTINUOUS PRN
Status: DISCONTINUED | OUTPATIENT
Start: 2024-02-15 | End: 2024-02-15 | Stop reason: HOSPADM

## 2024-02-15 RX ORDER — ONDANSETRON 2 MG/ML
INJECTION INTRAMUSCULAR; INTRAVENOUS AS NEEDED
Status: DISCONTINUED | OUTPATIENT
Start: 2024-02-15 | End: 2024-02-15 | Stop reason: SURG

## 2024-02-15 RX ADMIN — PROPOFOL 200 MCG/KG/MIN: 10 INJECTION, EMULSION INTRAVENOUS at 08:39

## 2024-02-15 RX ADMIN — PROPOFOL 100 MG: 10 INJECTION, EMULSION INTRAVENOUS at 08:38

## 2024-02-15 RX ADMIN — CEFAZOLIN SODIUM 2000 MG: 2 INJECTION, SOLUTION INTRAVENOUS at 08:41

## 2024-02-15 RX ADMIN — FENTANYL CITRATE 50 MCG: 50 INJECTION, SOLUTION INTRAMUSCULAR; INTRAVENOUS at 09:45

## 2024-02-15 RX ADMIN — PROPOFOL 50 MG: 10 INJECTION, EMULSION INTRAVENOUS at 08:40

## 2024-02-15 RX ADMIN — SODIUM CHLORIDE, POTASSIUM CHLORIDE, SODIUM LACTATE AND CALCIUM CHLORIDE 30 ML/HR: 600; 310; 30; 20 INJECTION, SOLUTION INTRAVENOUS at 08:31

## 2024-02-15 RX ADMIN — ONDANSETRON 4 MG: 2 INJECTION INTRAMUSCULAR; INTRAVENOUS at 08:34

## 2024-02-15 RX ADMIN — HEPARIN 100 UNITS: 100 SYRINGE at 10:35

## 2024-02-15 RX ADMIN — PROPOFOL 50 MG: 10 INJECTION, EMULSION INTRAVENOUS at 08:39

## 2024-02-15 RX ADMIN — PROPOFOL 50 MG: 10 INJECTION, EMULSION INTRAVENOUS at 08:44

## 2024-02-15 RX ADMIN — PROPOFOL 50 MG: 10 INJECTION, EMULSION INTRAVENOUS at 08:59

## 2024-02-15 NOTE — OP NOTE
Surgeon:     Madhu Zabala Jr., M.D.    Preoperative Diagnosis:     1.  Gastroparesis    Postoperative Diagnosis:     1.  Gastroparesis    Procedure Performed:     1.  EGD with PEG  2.  J-tube replacement    Indications:     The patient is a pleasant 29-year-old female with known severe gastroparesis.  She has all of her feedings through a J-tube but continues to have ongoing problems with nausea.  She presents for a PEG and replacement of her existing J-tube.  The patient understands the indications, alternatives, risks, and benefits of the procedure and wishes to proceed.    Procedure:     The patient was identified, taken to the endoscopy suite, and place in the left side down decubitus procedure.  The patient underwent a MAC anesthesia and was appropriately monitored through the case by the anesthesia personnel.  The gastroscope was introduced into the oropharynx and advanced are carefully down the esophagus and into the stomach.  The stomach was insufflated and inspected.  The esophagus,stomach, and duodenum were normal in appearance.  An area was selected for PEG tube placement based on light reflex and impulse.  The area corresponding to this on the abdominal wall was prepped and draped in the standard surgical fashion and then infiltrated with 1% lidocaine without epinephrine.  An incision was made with the scalpel and carried through the skin into the subcutaneous tissues tissue.  An Angiocath was placed to the incision and advanced into the stomach with direct visualization intragastrically using the gastroscope.  A guidewire was advanced through the Angiocath, grasped with the gastroscope, and brought out through the mouth.  The PEG tube was then placed over the guidewire in the standard fashion and brought out through the abdominal wall.  The gastroscope was returned to the stomach with a PEG tube was in good position with no bleeding.  The PEG tube was secured with the apparatus provided in the kit.   A dressing was applied.  The J-tube was then switched out with a 19 Maori replacement G-tube with 1 cc of water added to the balloon.  The patient tolerated the procedure well.     Findings:    G-tube and J-tube in good position.    Recommendations:     1.  Resume tube feeds.    Madhu Zabala Jr., M.D.

## 2024-02-18 ENCOUNTER — HOSPITAL ENCOUNTER (OUTPATIENT)
Facility: HOSPITAL | Age: 29
Discharge: HOME | End: 2024-02-19
Attending: EMERGENCY MEDICINE | Admitting: SURGERY
Payer: COMMERCIAL

## 2024-02-18 ENCOUNTER — APPOINTMENT (OUTPATIENT)
Dept: GENERAL RADIOLOGY | Facility: HOSPITAL | Age: 29
End: 2024-02-18
Payer: COMMERCIAL

## 2024-02-18 DIAGNOSIS — T85.528A DISLODGED JEJUNOSTOMY TUBE: Primary | ICD-10-CM

## 2024-02-18 LAB
ALBUMIN SERPL-MCNC: 4.2 G/DL (ref 3.5–5.2)
ALBUMIN/GLOB SERPL: 1.1 G/DL
ALP SERPL-CCNC: 48 U/L (ref 39–117)
ALT SERPL W P-5'-P-CCNC: 10 U/L (ref 1–33)
ANION GAP SERPL CALCULATED.3IONS-SCNC: 9 MMOL/L (ref 5–15)
AST SERPL-CCNC: 10 U/L (ref 1–32)
BASOPHILS # BLD AUTO: 0.01 10*3/MM3 (ref 0–0.2)
BASOPHILS NFR BLD AUTO: 0.2 % (ref 0–1.5)
BILIRUB SERPL-MCNC: 0.2 MG/DL (ref 0–1.2)
BUN SERPL-MCNC: 7 MG/DL (ref 6–20)
BUN/CREAT SERPL: 10.8 (ref 7–25)
CALCIUM SPEC-SCNC: 9.3 MG/DL (ref 8.6–10.5)
CHLORIDE SERPL-SCNC: 106 MMOL/L (ref 98–107)
CO2 SERPL-SCNC: 23 MMOL/L (ref 22–29)
CREAT SERPL-MCNC: 0.65 MG/DL (ref 0.57–1)
DEPRECATED RDW RBC AUTO: 43.4 FL (ref 37–54)
EGFRCR SERPLBLD CKD-EPI 2021: 122.4 ML/MIN/1.73
EOSINOPHIL # BLD AUTO: 0.03 10*3/MM3 (ref 0–0.4)
EOSINOPHIL NFR BLD AUTO: 0.5 % (ref 0.3–6.2)
ERYTHROCYTE [DISTWIDTH] IN BLOOD BY AUTOMATED COUNT: 14.8 % (ref 12.3–15.4)
GLOBULIN UR ELPH-MCNC: 4 GM/DL
GLUCOSE SERPL-MCNC: 102 MG/DL (ref 65–99)
HCT VFR BLD AUTO: 33.2 % (ref 34–46.6)
HGB BLD-MCNC: 10.4 G/DL (ref 12–15.9)
IMM GRANULOCYTES # BLD AUTO: 0.02 10*3/MM3 (ref 0–0.05)
IMM GRANULOCYTES NFR BLD AUTO: 0.4 % (ref 0–0.5)
LYMPHOCYTES # BLD AUTO: 1.29 10*3/MM3 (ref 0.7–3.1)
LYMPHOCYTES NFR BLD AUTO: 23.6 % (ref 19.6–45.3)
MCH RBC QN AUTO: 25.2 PG (ref 26.6–33)
MCHC RBC AUTO-ENTMCNC: 31.3 G/DL (ref 31.5–35.7)
MCV RBC AUTO: 80.4 FL (ref 79–97)
MONOCYTES # BLD AUTO: 0.41 10*3/MM3 (ref 0.1–0.9)
MONOCYTES NFR BLD AUTO: 7.5 % (ref 5–12)
NEUTROPHILS NFR BLD AUTO: 3.71 10*3/MM3 (ref 1.7–7)
NEUTROPHILS NFR BLD AUTO: 67.8 % (ref 42.7–76)
NRBC BLD AUTO-RTO: 0 /100 WBC (ref 0–0.2)
PLATELET # BLD AUTO: 250 10*3/MM3 (ref 140–450)
PMV BLD AUTO: 10.2 FL (ref 6–12)
POTASSIUM SERPL-SCNC: 3.7 MMOL/L (ref 3.5–5.2)
PROT SERPL-MCNC: 8.2 G/DL (ref 6–8.5)
RBC # BLD AUTO: 4.13 10*6/MM3 (ref 3.77–5.28)
SODIUM SERPL-SCNC: 138 MMOL/L (ref 136–145)
WBC NRBC COR # BLD AUTO: 5.47 10*3/MM3 (ref 3.4–10.8)

## 2024-02-18 PROCEDURE — 0 DIATRIZOATE MEGLUMINE & SODIUM PER 1 ML: Performed by: PHYSICIAN ASSISTANT

## 2024-02-18 PROCEDURE — 25010000002 HYDROMORPHONE PER 4 MG: Performed by: EMERGENCY MEDICINE

## 2024-02-18 PROCEDURE — 36415 COLL VENOUS BLD VENIPUNCTURE: CPT

## 2024-02-18 PROCEDURE — 25010000002 HYDROMORPHONE PER 4 MG: Performed by: SURGERY

## 2024-02-18 PROCEDURE — 96376 TX/PRO/DX INJ SAME DRUG ADON: CPT

## 2024-02-18 PROCEDURE — 96374 THER/PROPH/DIAG INJ IV PUSH: CPT

## 2024-02-18 PROCEDURE — 99222 1ST HOSP IP/OBS MODERATE 55: CPT | Performed by: SURGERY

## 2024-02-18 PROCEDURE — 85025 COMPLETE CBC W/AUTO DIFF WBC: CPT | Performed by: EMERGENCY MEDICINE

## 2024-02-18 PROCEDURE — 99285 EMERGENCY DEPT VISIT HI MDM: CPT

## 2024-02-18 PROCEDURE — 80053 COMPREHEN METABOLIC PANEL: CPT | Performed by: EMERGENCY MEDICINE

## 2024-02-18 PROCEDURE — G0378 HOSPITAL OBSERVATION PER HR: HCPCS

## 2024-02-18 RX ORDER — NALOXONE HCL 0.4 MG/ML
0.4 VIAL (ML) INJECTION
Status: DISCONTINUED | OUTPATIENT
Start: 2024-02-18 | End: 2024-02-19 | Stop reason: HOSPADM

## 2024-02-18 RX ORDER — HEPARIN SODIUM (PORCINE) LOCK FLUSH IV SOLN 100 UNIT/ML 100 UNIT/ML
5 SOLUTION INTRAVENOUS AS NEEDED
Status: DISCONTINUED | OUTPATIENT
Start: 2024-02-18 | End: 2024-02-19 | Stop reason: HOSPADM

## 2024-02-18 RX ORDER — SODIUM CHLORIDE 0.9 % (FLUSH) 0.9 %
10 SYRINGE (ML) INJECTION AS NEEDED
Status: DISCONTINUED | OUTPATIENT
Start: 2024-02-18 | End: 2024-02-19 | Stop reason: HOSPADM

## 2024-02-18 RX ORDER — ONDANSETRON 2 MG/ML
4 INJECTION INTRAMUSCULAR; INTRAVENOUS EVERY 6 HOURS PRN
Status: DISCONTINUED | OUTPATIENT
Start: 2024-02-18 | End: 2024-02-19 | Stop reason: HOSPADM

## 2024-02-18 RX ORDER — SODIUM CHLORIDE 0.9 % (FLUSH) 0.9 %
10 SYRINGE (ML) INJECTION EVERY 12 HOURS SCHEDULED
Status: DISCONTINUED | OUTPATIENT
Start: 2024-02-18 | End: 2024-02-19 | Stop reason: HOSPADM

## 2024-02-18 RX ORDER — SODIUM CHLORIDE 9 MG/ML
40 INJECTION, SOLUTION INTRAVENOUS AS NEEDED
Status: DISCONTINUED | OUTPATIENT
Start: 2024-02-18 | End: 2024-02-19 | Stop reason: HOSPADM

## 2024-02-18 RX ORDER — HYDROMORPHONE HYDROCHLORIDE 1 MG/ML
0.5 INJECTION, SOLUTION INTRAMUSCULAR; INTRAVENOUS; SUBCUTANEOUS ONCE
Status: COMPLETED | OUTPATIENT
Start: 2024-02-18 | End: 2024-02-18

## 2024-02-18 RX ORDER — SODIUM CHLORIDE 0.9 % (FLUSH) 0.9 %
20 SYRINGE (ML) INJECTION AS NEEDED
Status: DISCONTINUED | OUTPATIENT
Start: 2024-02-18 | End: 2024-02-19 | Stop reason: HOSPADM

## 2024-02-18 RX ORDER — HYDROMORPHONE HYDROCHLORIDE 1 MG/ML
0.5 INJECTION, SOLUTION INTRAMUSCULAR; INTRAVENOUS; SUBCUTANEOUS
Status: DISCONTINUED | OUTPATIENT
Start: 2024-02-18 | End: 2024-02-19 | Stop reason: HOSPADM

## 2024-02-18 RX ADMIN — DIATRIZOATE MEGLUMINE AND DIATRIZOATE SODIUM 30 ML: 660; 100 LIQUID ORAL; RECTAL at 18:16

## 2024-02-18 RX ADMIN — Medication 10 ML: at 20:18

## 2024-02-18 RX ADMIN — HYDROMORPHONE HYDROCHLORIDE 0.5 MG: 1 INJECTION, SOLUTION INTRAMUSCULAR; INTRAVENOUS; SUBCUTANEOUS at 20:16

## 2024-02-18 RX ADMIN — HYDROMORPHONE HYDROCHLORIDE 0.5 MG: 1 INJECTION, SOLUTION INTRAMUSCULAR; INTRAVENOUS; SUBCUTANEOUS at 22:33
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
## 2024-02-18 NOTE — ED NOTES
Pt To Er via PV. Pt states she had both G and J tube placed on Thursday. States her J tube has fallen out today and was advised to come to ER for evaluation.

## 2024-02-18 NOTE — ED PROVIDER NOTES
MD ATTESTATION NOTE  I supervised care provided by the midlevel provider. We have discussed this patient's history, physical exam, and treatment plan. I have reviewed the midlevel provider's note and I agree with the midlevel provider's findings and plan of care.   SHARED VISIT: This visit was performed by BOTH a physician and an APC. The substantive portion of the medical decision making was performed by this attesting physician who made or approved the management plan and takes responsibility for patient management. All studies in the APC note (if performed) were independently interpreted by me.   I have personally had a face to face encounter with the patient.   See my attending note.    PCP: Fanny Jarrett MD  Patient Care Team:  Fanny Jarrett MD as PCP - General (Family Medicine)  Lorraine Terrazas MD as Referring Physician (Orthopedic Surgery)  Kee Skinner MD as Consulting Physician (Hematology and Oncology)  Rui Molina MD as Consulting Physician (Rheumatology)     Henrietta Garrett is a 29 y.o. female who presents to the ED c/o problem with J-tube.  Patient reports that she underwent J-tube exchange on Wednesday with Dr. Zabala.  Patient reports that she used her J-tube without difficulty yesterday.  Patient reports that when she went to put her medicines at approximately 4:00 this afternoon she noticed that the J-tube had been pulled out some, had pain when attempting to flush it.    On exam:  General: NAD.  Head: NCAT.  ENT: nares patent, no scleral icterus  Neck: Supple, trachea midline.  Cardiac: regular rate and rhythm.  Lungs: normal effort, clear to auscultation bilaterally  Abdomen: Soft, nondistended, G and J-tube in upper abdomen with trace surrounding erythema, no signs of infection, mild tenderness at ostomies, no rebound tenderness, no guarding or rigidity.   Extremities: Moves all extremities well, no peripheral edema  Neuro: alert, MAEW, follows commands  Psych:  calm, cooperative  Skin: Warm, dry.    Medical Decision Making:  After the initial H&P, I discussed pertinent information from history and physical exam with patient/family.  Discussed differential diagnosis.  Discussed plan for ED evaluation/work-up/treatment.  All questions answered.  Patient/family is agreeable with plan.    ED Course as of 02/18/24 2309   Sun Feb 18, 2024   8959 I discussed patient with Dr. Cortes, general surgery.  She recommends a KUB with Gastrografin to assess the position of the J-tube and then will rediscuss [KA]   1914 KUB with Gastrografin in the J-tube was ordered.  RN inadvertently injected the contrast into the G-tube obsucring the ability to assess the J-tube.    I attempted to flush the J-tube with tap water and it caused immediate pain with only a small mount of water and there was water leakage from the J-tube insertion site.    I discussed this with Dr. Cortes.  She has an emergent surgery to perform and will come and evaluate the patient in the emergency department afterward.    Have updated the patient and she is agreeable [KA]   1922 Dr. Cortes is at the bedside to evaluate the patient [KA]   1946 Dr. Cortes was able to place the J-tube but does recommend to be upsized.  She would like the patient admitted to the hospital to Dr. Zabala and he will see her tomorrow.  Patient is agreeable with this plan. [KA]      ED Course User Index  [KA] Ivonne Soto PA-C       Diagnosis  Final diagnoses:   Dislodged jejunostomy tube          Manuel Gonzales MD  02/18/24 2309

## 2024-02-19 ENCOUNTER — ANESTHESIA EVENT (OUTPATIENT)
Dept: PERIOP | Facility: HOSPITAL | Age: 29
End: 2024-02-19
Payer: COMMERCIAL

## 2024-02-19 ENCOUNTER — ANESTHESIA (OUTPATIENT)
Dept: PERIOP | Facility: HOSPITAL | Age: 29
End: 2024-02-19
Payer: COMMERCIAL

## 2024-02-19 VITALS
SYSTOLIC BLOOD PRESSURE: 108 MMHG | OXYGEN SATURATION: 100 % | HEIGHT: 71 IN | BODY MASS INDEX: 24.22 KG/M2 | TEMPERATURE: 97 F | HEART RATE: 50 BPM | RESPIRATION RATE: 16 BRPM | WEIGHT: 173 LBS | DIASTOLIC BLOOD PRESSURE: 65 MMHG

## 2024-02-19 PROCEDURE — 25010000002 ONDANSETRON PER 1 MG: Performed by: SURGERY

## 2024-02-19 PROCEDURE — 96376 TX/PRO/DX INJ SAME DRUG ADON: CPT

## 2024-02-19 PROCEDURE — 49451 REPLACE DUOD/JEJ TUBE PERC: CPT | Performed by: SURGERY

## 2024-02-19 PROCEDURE — G0378 HOSPITAL OBSERVATION PER HR: HCPCS

## 2024-02-19 PROCEDURE — 25010000002 HYDROMORPHONE PER 4 MG: Performed by: SURGERY

## 2024-02-19 PROCEDURE — 96375 TX/PRO/DX INJ NEW DRUG ADDON: CPT

## 2024-02-19 PROCEDURE — 25810000003 LACTATED RINGERS PER 1000 ML: Performed by: STUDENT IN AN ORGANIZED HEALTH CARE EDUCATION/TRAINING PROGRAM

## 2024-02-19 PROCEDURE — 25010000002 HEPARIN LOCK FLUSH PER 10 UNITS: Performed by: SURGERY

## 2024-02-19 PROCEDURE — 25010000002 PROPOFOL 10 MG/ML EMULSION: Performed by: REGISTERED NURSE

## 2024-02-19 RX ORDER — EPHEDRINE SULFATE 50 MG/ML
5 INJECTION, SOLUTION INTRAVENOUS ONCE AS NEEDED
Status: DISCONTINUED | OUTPATIENT
Start: 2024-02-19 | End: 2024-02-19 | Stop reason: HOSPADM

## 2024-02-19 RX ORDER — ONDANSETRON 2 MG/ML
4 INJECTION INTRAMUSCULAR; INTRAVENOUS ONCE AS NEEDED
Status: DISCONTINUED | OUTPATIENT
Start: 2024-02-19 | End: 2024-02-19 | Stop reason: HOSPADM

## 2024-02-19 RX ORDER — FLUMAZENIL 0.1 MG/ML
0.2 INJECTION INTRAVENOUS AS NEEDED
Status: DISCONTINUED | OUTPATIENT
Start: 2024-02-19 | End: 2024-02-19 | Stop reason: HOSPADM

## 2024-02-19 RX ORDER — PROPOFOL 10 MG/ML
VIAL (ML) INTRAVENOUS AS NEEDED
Status: DISCONTINUED | OUTPATIENT
Start: 2024-02-19 | End: 2024-02-19 | Stop reason: SURG

## 2024-02-19 RX ORDER — MAGNESIUM HYDROXIDE 1200 MG/15ML
LIQUID ORAL AS NEEDED
Status: DISCONTINUED | OUTPATIENT
Start: 2024-02-19 | End: 2024-02-19 | Stop reason: HOSPADM

## 2024-02-19 RX ORDER — KETAMINE HCL IN NACL, ISO-OSM 100MG/10ML
SYRINGE (ML) INJECTION AS NEEDED
Status: DISCONTINUED | OUTPATIENT
Start: 2024-02-19 | End: 2024-02-19 | Stop reason: SURG

## 2024-02-19 RX ORDER — FENTANYL CITRATE 50 UG/ML
50 INJECTION, SOLUTION INTRAMUSCULAR; INTRAVENOUS
Status: DISCONTINUED | OUTPATIENT
Start: 2024-02-19 | End: 2024-02-19 | Stop reason: HOSPADM

## 2024-02-19 RX ORDER — OXYCODONE AND ACETAMINOPHEN 7.5; 325 MG/1; MG/1
1 TABLET ORAL EVERY 4 HOURS PRN
Status: DISCONTINUED | OUTPATIENT
Start: 2024-02-19 | End: 2024-02-19 | Stop reason: HOSPADM

## 2024-02-19 RX ORDER — LIDOCAINE HYDROCHLORIDE 10 MG/ML
0.5 INJECTION, SOLUTION INFILTRATION; PERINEURAL ONCE AS NEEDED
Status: DISCONTINUED | OUTPATIENT
Start: 2024-02-19 | End: 2024-02-19 | Stop reason: HOSPADM

## 2024-02-19 RX ORDER — FENTANYL CITRATE 50 UG/ML
50 INJECTION, SOLUTION INTRAMUSCULAR; INTRAVENOUS ONCE AS NEEDED
Status: DISCONTINUED | OUTPATIENT
Start: 2024-02-19 | End: 2024-02-19 | Stop reason: HOSPADM

## 2024-02-19 RX ORDER — LIDOCAINE HYDROCHLORIDE 20 MG/ML
INJECTION, SOLUTION INFILTRATION; PERINEURAL AS NEEDED
Status: DISCONTINUED | OUTPATIENT
Start: 2024-02-19 | End: 2024-02-19 | Stop reason: SURG

## 2024-02-19 RX ORDER — IPRATROPIUM BROMIDE AND ALBUTEROL SULFATE 2.5; .5 MG/3ML; MG/3ML
3 SOLUTION RESPIRATORY (INHALATION) ONCE AS NEEDED
Status: DISCONTINUED | OUTPATIENT
Start: 2024-02-19 | End: 2024-02-19 | Stop reason: HOSPADM

## 2024-02-19 RX ORDER — SODIUM CHLORIDE 0.9 % (FLUSH) 0.9 %
3 SYRINGE (ML) INJECTION EVERY 12 HOURS SCHEDULED
Status: DISCONTINUED | OUTPATIENT
Start: 2024-02-19 | End: 2024-02-19 | Stop reason: HOSPADM

## 2024-02-19 RX ORDER — DIPHENHYDRAMINE HYDROCHLORIDE 50 MG/ML
12.5 INJECTION INTRAMUSCULAR; INTRAVENOUS
Status: DISCONTINUED | OUTPATIENT
Start: 2024-02-19 | End: 2024-02-19 | Stop reason: HOSPADM

## 2024-02-19 RX ORDER — SODIUM CHLORIDE 0.9 % (FLUSH) 0.9 %
3-10 SYRINGE (ML) INJECTION AS NEEDED
Status: DISCONTINUED | OUTPATIENT
Start: 2024-02-19 | End: 2024-02-19 | Stop reason: HOSPADM

## 2024-02-19 RX ORDER — LABETALOL HYDROCHLORIDE 5 MG/ML
5 INJECTION, SOLUTION INTRAVENOUS
Status: DISCONTINUED | OUTPATIENT
Start: 2024-02-19 | End: 2024-02-19 | Stop reason: HOSPADM

## 2024-02-19 RX ORDER — HYDROCODONE BITARTRATE AND ACETAMINOPHEN 5; 325 MG/1; MG/1
1 TABLET ORAL ONCE AS NEEDED
Status: DISCONTINUED | OUTPATIENT
Start: 2024-02-19 | End: 2024-02-19 | Stop reason: HOSPADM

## 2024-02-19 RX ORDER — MIDAZOLAM HYDROCHLORIDE 1 MG/ML
1 INJECTION INTRAMUSCULAR; INTRAVENOUS
Status: DISCONTINUED | OUTPATIENT
Start: 2024-02-19 | End: 2024-02-19 | Stop reason: HOSPADM

## 2024-02-19 RX ORDER — NALOXONE HCL 0.4 MG/ML
0.2 VIAL (ML) INJECTION AS NEEDED
Status: DISCONTINUED | OUTPATIENT
Start: 2024-02-19 | End: 2024-02-19 | Stop reason: HOSPADM

## 2024-02-19 RX ORDER — HYDROMORPHONE HYDROCHLORIDE 1 MG/ML
0.5 INJECTION, SOLUTION INTRAMUSCULAR; INTRAVENOUS; SUBCUTANEOUS
Status: DISCONTINUED | OUTPATIENT
Start: 2024-02-19 | End: 2024-02-19 | Stop reason: HOSPADM

## 2024-02-19 RX ORDER — HYDRALAZINE HYDROCHLORIDE 20 MG/ML
5 INJECTION INTRAMUSCULAR; INTRAVENOUS
Status: DISCONTINUED | OUTPATIENT
Start: 2024-02-19 | End: 2024-02-19 | Stop reason: HOSPADM

## 2024-02-19 RX ORDER — FAMOTIDINE 10 MG/ML
20 INJECTION, SOLUTION INTRAVENOUS ONCE
Status: COMPLETED | OUTPATIENT
Start: 2024-02-19 | End: 2024-02-19

## 2024-02-19 RX ORDER — SODIUM CHLORIDE, SODIUM LACTATE, POTASSIUM CHLORIDE, CALCIUM CHLORIDE 600; 310; 30; 20 MG/100ML; MG/100ML; MG/100ML; MG/100ML
9 INJECTION, SOLUTION INTRAVENOUS CONTINUOUS
Status: DISCONTINUED | OUTPATIENT
Start: 2024-02-19 | End: 2024-02-19 | Stop reason: HOSPADM

## 2024-02-19 RX ADMIN — Medication 10 ML: at 08:35

## 2024-02-19 RX ADMIN — PROPOFOL INJECTABLE EMULSION 40 MG: 10 INJECTION, EMULSION INTRAVENOUS at 10:30

## 2024-02-19 RX ADMIN — PROPOFOL 175 MCG/KG/MIN: 10 INJECTION, EMULSION INTRAVENOUS at 10:30

## 2024-02-19 RX ADMIN — HYDROMORPHONE HYDROCHLORIDE 0.5 MG: 1 INJECTION, SOLUTION INTRAMUSCULAR; INTRAVENOUS; SUBCUTANEOUS at 02:14

## 2024-02-19 RX ADMIN — ONDANSETRON 4 MG: 2 INJECTION INTRAMUSCULAR; INTRAVENOUS at 08:34

## 2024-02-19 RX ADMIN — HYDROMORPHONE HYDROCHLORIDE 0.5 MG: 1 INJECTION, SOLUTION INTRAMUSCULAR; INTRAVENOUS; SUBCUTANEOUS at 08:34

## 2024-02-19 RX ADMIN — FAMOTIDINE 20 MG: 10 INJECTION INTRAVENOUS at 09:59

## 2024-02-19 RX ADMIN — Medication 20 MG: at 10:30

## 2024-02-19 RX ADMIN — ONDANSETRON 4 MG: 2 INJECTION INTRAMUSCULAR; INTRAVENOUS at 02:14

## 2024-02-19 RX ADMIN — Medication 20 MG: at 10:50

## 2024-02-19 RX ADMIN — HEPARIN 500 UNITS: 100 SYRINGE at 14:09

## 2024-02-19 RX ADMIN — Medication 10 MG: at 10:42

## 2024-02-19 RX ADMIN — LIDOCAINE HYDROCHLORIDE 60 MG: 20 INJECTION, SOLUTION INFILTRATION; PERINEURAL at 10:30

## 2024-02-19 RX ADMIN — SODIUM CHLORIDE, POTASSIUM CHLORIDE, SODIUM LACTATE AND CALCIUM CHLORIDE 9 ML/HR: 600; 310; 30; 20 INJECTION, SOLUTION INTRAVENOUS at 09:59

## 2024-02-19 RX ADMIN — HYDROMORPHONE HYDROCHLORIDE 0.5 MG: 1 INJECTION, SOLUTION INTRAMUSCULAR; INTRAVENOUS; SUBCUTANEOUS at 06:37

## 2024-02-19 NOTE — PLAN OF CARE
Goal Outcome Evaluation:  Plan of Care Reviewed With: patient        Progress: improving  Outcome Evaluation: J-tube replaced in OR this AM. Patient returned from PACU with c/o 9/10 pain, falling asleep between care, medicated recently in PACU. J-tube gauze dressing intact, tube clamped. IVF infusing through mediport. VSS on room air. Fiance at bedside. Discharge home once awake.

## 2024-02-19 NOTE — PROGRESS NOTES
Continued Stay Note  [facility]     Patient Name: Henrietta Garrett  MRN: [id number]  Today's Date: 2/19/2024    Admit Date: 2/18/2024    Plan: Home no needs   Discharge Plan       Row Name 02/19/24 1414       Plan    Plan Home no needs    Plan Comments Discharge order noted. Met with patient who confirmed DC plan is to return home. Stated she is current with [facility] and uses [facility] for tube feeds  Significant other at bedside will assist as needed and will provide transportation at DC. Asked about drainage bags. Informed RN. Denies any other needs/equipment.                   Discharge Codes    No documentation.                 Expected Discharge Date and Time       Expected Discharge Date Expected Discharge Time    Feb 19, 2024               Melody Calle, RN

## 2024-02-19 NOTE — H&P
General Surgery H&P    CC: Dislodged jejunostomy tube    HPI:   The patient is a very pleasant 29 y.o. female that presented to the hospital with severe pain during injection of medications through her J-tube which has been in place for a long period of time due to her severe gastroparesis.  She just underwent PEG tube placement for gastric decompression and J-tube exchange under sedation 3 days ago by Dr. Panda Zabala in our endoscopy unit.  She noticed after going home that the tube's external bumper was located 4 cm at the skin surface but over the last 2 days has gradually retracted back to about 1 cm.  She called our answering service and was instructed to come to the emergency room.  In the ER, the tube was flushed and the flushed solution came immediately out of the skin stoma with pain noted on flushing.    Past Medical History:   Tomas-Danlos syndrome  Fibromyalgia  Gastroparesis  Complex regional pain syndrome  Rheumatoid arthritis  Lupus  Sjogren's syndrome  Raynaud's syndrome    Past Surgical History:   Laparoscopic cholecystectomy with cholangiogram (December 2023, Dr. Zabala)  EGD with PEG placement and J-tube exchange (2/15/2024, Dr. Zabala)  Port placement  Tooth extraction  Third toe surgery  EGD and colonoscopy (December 2020)    Medications:  Reviewed in epic    Allergies: Multiple allergies including domperidone, Compazine, ciprofloxacin, Reglan, and Haldol    Social History: Single, non-smoker, no regular alcohol use    Family History: Mother with history of rheumatoid arthritis, father with history of diabetes, sister with history of Tomas-Danlos syndrome    Review of Systems:  A comprehensive review of systems was negative except for the following positives: Anxiety, abdominal pain, and chronic nausea    Physical Exam:   Vitals:    02/18/24 1901   BP: 152/84   Pulse: 87   Resp:    Temp:    SpO2: 97%     Height: 180 cm  Weight: 78 kg  BMI: 24  GENERAL: Extremely anxious and tearful  HEENT:  normocephalic, atraumatic, no scleral icterus, moist mucous membranes  NECK: Supple, there is no thyromegaly or lymphadenopathy  RESPIRATORY: clear to auscultation bilaterally, no wheezes  CARDIOVASCULAR: regular rate and rhythm   GASTROINTESTINAL: Soft, nondistended, PEG tube in left upper quadrant is capped, 16 Swiss J-tube is barely through the skin of the left mid abdomen with the external bumper situated at 1 cm from the tip of the tube  MUSCULOSKELETAL: no cyanosis, clubbing, or edema  NEUROLOGIC: Extremely anxious and agitated, alert and oriented  SKIN: Her extremities are somewhat cyanotic along her hands/fingers, which worsens to a purple discoloration when she is gripping her significant other's hand at bedside during J-tube exchange  BMI is within normal parameters. No other follow-up for BMI required.    Assessment and plan:     The patient is a 29 y.o. female with a dislodged jejunostomy tube, used primarily for medication administration and tube feedings due to her severe gastroparesis    I was able to remove the 16 Swiss jejunostomy tube and placed a 12 Swiss tube through her fascia into the small bowel.  She did not tolerate this very well due to severe anxiety and is highly concerned of the 12 Swiss tube will become occluded with crushed medication administration.  She would like to stay overnight and have the tube exchanged/dilated back up to a 16 Swiss in the morning.  I spoke with Dr. Zabala who will speak with the patient in the morning regarding the tube exchange either in interventional radiology over a wire with fluoroscopic guidance or in the operating room with propofol sedation.  She can use the J-tube tonight for tube feeds but should be n.p.o. at midnight and her tube feeds should be held at midnight.    Delmy Cortes MD  General, Robotic, and Endoscopic Surgery  [facility]    [address], [address]  [place] [address]  P: [phone]  F: [phone]

## 2024-02-19 NOTE — PLAN OF CARE
Goal Outcome Evaluation:  Plan of Care Reviewed With: patient        Progress: no change  Outcome Evaluation: Pt was admitted because her j-tube became dislodged and they replaced it in ER with f/c tubing but she will have surgery today to replace the actual j-tube. Normally the pt takes only po clear liquids by mouth secondary to gastroparesis and Tomas Danlos syndrome. She can only tolerate one type and brand of feeding. She did not bring any from home and has been NPO for surgery. Her mom is at her beside. Dilaudid for pain and zofran for nausea. Pt also  has a g-tube that was clamped but she asked for drainage bag to help with nausea. Enfit drainage system connected to bsd  No exact order for consent so she will still need one.

## 2024-02-19 NOTE — ANESTHESIA POSTPROCEDURE EVALUATION
"Patient: Henrietta Garrett    Procedure Summary       Date: 02/19/24 Room / Location: [facility] OR  / [facility] MAIN OR    Anesthesia Start: 1020 Anesthesia Stop:     Procedure: JEJUNOSTOMY TUBE EXCHANGE (Abdomen) Diagnosis:       Dislodged jejunostomy tube      (Dislodged jejunostomy tube [T85.528A])    Surgeons: Madhu Zabala Jr., MD Provider: Peace Michele MD    Anesthesia Type: MAC ASA Status: 3            Anesthesia Type: MAC    Vitals  Vitals Value Taken Time   /66 02/19/24 1115   Temp     Pulse 58 02/19/24 1119   Resp     SpO2 100 % 02/19/24 1119   Vitals shown include unfiled device data.        Post Anesthesia Care and Evaluation    Patient location during evaluation: bedside  Patient participation: complete - patient participated  Level of consciousness: awake  Pain management: adequate    Airway patency: patent  Anesthetic complications: No anesthetic complications    Cardiovascular status: acceptable  Respiratory status: acceptable  Hydration status: acceptable    Comments: */69 (BP Location: Right arm, Patient Position: Lying)   Pulse 66   Temp 36.4 °C (97.6 °F) (Oral)   Resp 16   Ht 180.3 cm (71\")   Wt 78.5 kg (173 lb)   SpO2 99%   BMI 24.13 kg/m²       "

## 2024-02-19 NOTE — CONSULTS
"Nutrition Services    Patient Name:  Henrietta Garrett  YOB: 1995  MRN: [id number]  Admit Date:  2/18/2024    Assessment Date:  02/19/24    CLINICAL NUTRITION ASSESSMENT    Comments: Consult for tube feeding assessment    29yoF admitted for dislodged J-tube, replaced w/ 12 Fr in the ED. 12 Fr tube replaced by 16 Fr today in the OR. Hx of Tomas-Danlos syndrome, gastroparesis, lupus, Sjogren's syndrome, Raynaud's syndrome. Per chart review, pt only takes clear liquids PO and only tolerates [employer] formula. Pt currently on [employer] 1.5 formula. Visited pt in the room. Pt getting discharged. No TF recs at this time.     RD to follow per protocol    Encounter Information         Reason for Encounter TF assessment    Admitting Diagnosis Dislodged j-tube    Pertinent Medical History Tomas-Danlos syndrome, gastroparesis, lupus, Sjogren's syndrome, Raynaud's syndrome     Current Issues      Current Nutrition Orders & Evaluation of Intake       Oral Nutrition     Food Allergies    Current PO Diet NPO Diet NPO Type: Strict NPO   Supplement    PO Evaluation      % PO Intake NPO   --  Anthropometrics          Height    Weight Height: 180.3 cm (71\")  Weight: 78.5 kg (173 lb) (02/18/24 1749)    BMI kg/m2 Body mass index is 24.13 kg/m².    BMI Category Normal/Healthy (18.4 - 24.9)    Weight History  Wt Readings from Last 15 Encounters:   02/18/24 1749 78.5 kg (173 lb)   02/15/24 0803 78.7 kg (173 lb 9.6 oz)   02/01/24 1415 79.1 kg (174 lb 6.4 oz)   12/10/23 0253 77.7 kg (171 lb 4.8 oz)   10/03/23 0959 76.2 kg (168 lb)   09/25/23 0713 73.9 kg (163 lb)   07/15/23 0619 75.3 kg (166 lb 0.1 oz)   07/14/23 0549 75.6 kg (166 lb 10.7 oz)   07/12/23 0354 73.9 kg (163 lb)   07/07/23 0630 70.2 kg (154 lb 11.2 oz)   07/06/23 0736 74.8 kg (164 lb 12.8 oz)   07/05/23 0110 73.5 kg (162 lb 0.6 oz)   07/04/23 2247 72.6 kg (160 lb)   06/27/23 2125 73.5 kg (162 lb)   06/27/23 1621 73.5 kg (162 lb)   06/26/23 0605 73.5 kg (162 lb) " "  06/19/23 2250 72.6 kg (160 lb)   06/16/23 0014 72.6 kg (160 lb)   06/08/23 0842 75.8 kg (167 lb 3.2 oz)   08/24/22 1247 79.5 kg (175 lb 4.8 oz)        Estimated/Assessed Needs        Energy Requirements    Weight for Calculation 78.5 kg   Method for Estimation  20 kcal/kg, 25 kcal/kg   EST Needs (kcal/day) 1570 - 1963 kcal        Protein Requirements    Weight for Calculation 78.5 kg   EST Protein Needs (g/kg) 1.0 - 1.2 gm/kg   EST Daily Needs (g/day) 78 - 94 g        Fluid Requirements     Method for Estimation 1 mL/kcal    Estimated Needs (mL/day)          Physical Findings          Physical Appearance alert, oriented   Oral/Mouth Cavity WDL   Edema  no edema   Gastrointestinal hypoactive bowel sounds   Skin  skin intact   Tubes/Drains/Lines PEG-J   NFPE Not indicated at this time     Labs        Pertinent Labs Reviewed, listed below     Results from last 7 days   Lab Units 02/18/24 2015   SODIUM mmol/L 138   POTASSIUM mmol/L 3.7   CHLORIDE mmol/L 106   CO2 mmol/L 23.0   BUN mg/dL 7   CREATININE mg/dL 0.65   CALCIUM mg/dL 9.3   BILIRUBIN mg/dL 0.2   ALK PHOS U/L 48   ALT (SGPT) U/L 10   AST (SGOT) U/L 10   GLUCOSE mg/dL 102*     Results from last 7 days   Lab Units 02/18/24 2015   HEMOGLOBIN g/dL 10.4*   HEMATOCRIT % 33.2*   WBC 10*3/mm3 5.47   ALBUMIN g/dL 4.2     Results from last 7 days   Lab Units 02/18/24 2015   PLATELETS 10*3/mm3 250     SARS-CoV-2, DANIELLA   Date Value Ref Range Status   08/08/2022 NEGATIVE Negative Final     No results found for: \"HGBA1C\"       Medications            Scheduled Medications sodium chloride, 10 mL, Intravenous, Q12H        Infusions lactated ringers, 9 mL/hr, Last Rate: 9 mL/hr (02/19/24 1020)        PRN Medications   heparin    HYDROmorphone **AND** naloxone    ondansetron    Access VAD **AND** sodium chloride    sodium chloride    sodium chloride    sodium chloride     PES STATEMENT / NUTRITION DIAGNOSIS      Nutrition Dx Problem  Problem: Altered GI Function  Etiology: " Medical Diagnosis - gastroparesis  Signs/Symptoms: EN Intake/Delivery     J tube feeding dependent     PLAN OF CARE  Intervention Goal        Intervention Goal(s) Maintain nutrition status, Meet estimated needs, Disease management/therapy, Initiate TF/PN, Tolerate TF/PN at goal, and Maintain weight     Nutrition Intervention         RD Action Await initiation of EN/PN, Continue to monitor, Care plan reviewed, and Other (comment): pt discharging today       Monitor/Evaluation        Monitor Per protocol     RD to follow up per protocol.    Electronically signed by:  Gloria Walker  02/19/24 12:35 EST

## 2024-02-19 NOTE — OP NOTE
Surgeon: Madhu Zabala Jr., M.D.    Pre-Operative Diagnosis:     Dislodged jejunostomy tube [T85.528A]    Post-Operative Diagnosis:    Dislodged J-tube    Procedure Performed:     J-tube replacement    Indications:     The patient is a pleasant 29-year-old female with severe gastroparesis who has a G-tube and a J-tube.  The J-tube became dislodged and was replaced with a 12 Barbadian J-tube.  She needs a 16 Barbadian J-tube for her medications.  She presents for J-tube replacement.  The patient understands the indications, alternatives, risks, and benefits of the procedure and wishes to proceed.     Procedure:     The patient was identified and taken to the operating room where she was placed in the supine position on the operative table.  She underwent a MAC anesthesia and was appropriate monitored throughout the case by the anesthesia personnel.  The J-tube site was prepped with Betadine.  The replacement J-tube was removed by deflating the balloon and removing it.  The site was infiltrated with 0.5% Marcaine with epinephrine.  A Hegar dilator was used to dilate the tract to a 16 Barbadian.  A 16 Barbadian replacement G-tube was placed the tract and the balloon was inflated with 1.5 cc of sterile water.  The bumper was secured at the skin site and a dressing was applied.  The patient tolerated procedure well.    Estimated Blood Loss:      none    Specimens:     None    Madhu Zabala Jr., M.D.   
No

## 2024-02-19 NOTE — ED NOTES
"Nursing report ED to floor  Henrietta Garrett  29 y.o.  female    HPI :   Chief Complaint   Patient presents with    J tube replacement     Henrietta Garrett is a 29 y.o. female with a medical history of gastroparesis, Raynaud's, Tomas-Danlos, lupus who presents to the ED c/o acute pain at her J-tube site.  She states that 3 days ago she underwent a procedure with Dr. Zabala where her J-tube was exchanged and a G-tube was placed.  Today when she tried to take medication through her J-tube and flush it she had severe pain.  She states she called the surgeon on-call and was referred to the ER for further evaluation.  She takes very little p.o. and supplements with tube feeds.     Admitting doctor:   Madhu Zabala Jr., MD    Admitting diagnosis:   There were no encounter diagnoses.    Code status:   Current Code Status       Date Active Code Status Order ID Comments User Context       Prior            Allergies:   Anesthetics, amide; Ciprofloxacin; Compazine [prochlorperazine]; Domperidone; Droperidol; Haldol [haloperidol]; Metoclopramide; and Sulfa antibiotics    Isolation:   No active isolations    Intake and Output  No intake or output data in the 24 hours ending 02/18/24 1958    Weight:       02/18/24  1749   Weight: 78.5 kg (173 lb)       Most recent vitals:   Vitals:    02/18/24 1749 02/18/24 1750 02/18/24 1801 02/18/24 1901   BP: (!) 154/102  125/81 152/84   BP Location: Right arm      Patient Position: Lying      Pulse: 72  72 87   Resp: 20      Temp:       TempSrc:       SpO2:  97% 98% 97%   Weight: 78.5 kg (173 lb)      Height: 180.3 cm (71\")          Active LDAs/IV Access:   Lines, Drains & Airways       Active LDAs       Name Placement date Placement time Site Days    Gastrostomy/Enterostomy Jejunostomy 16 Fr. LUQ 06/25/23  0924  LUQ  238    Gastrostomy/Enterostomy Percutaneous endoscopic gastrostomy (PEG) 20 Fr. LUQ 02/15/24  0854  LUQ  3    Single Lumen Implantable Port Right Subclavian --  --  Subclavian  -- "                    Labs (abnormal labs have a star):   Labs Reviewed   COMPREHENSIVE METABOLIC PANEL   CBC WITH AUTO DIFFERENTIAL   CBC AND DIFFERENTIAL    Narrative:     The following orders were created for panel order CBC & Differential.  Procedure                               Abnormality         Status                     ---------                               -----------         ------                     CBC Auto Differential[[id number]]                                                         Please view results for these tests on the individual orders.       EKG:   No orders to display       Meds given in ED:   Medications   sodium chloride 0.9 % flush 10 mL (has no administration in time range)   HYDROmorphone (DILAUDID) injection 0.5 mg (has no administration in time range)   sodium chloride 0.9 % flush 10 mL (has no administration in time range)   sodium chloride 0.9 % flush 10 mL (has no administration in time range)   sodium chloride 0.9 % flush 20 mL (has no administration in time range)   sodium chloride 0.9 % infusion 40 mL (has no administration in time range)   heparin injection 500 Units (has no administration in time range)   diatrizoate meglumine-sodium (GASTROGRAFIN) 66-10 % oral solution 30 mL (30 mL Per J Tube Given 2/18/24 1816)       Imaging results:  No radiology results for the last day    Ambulatory status:   - Ax1    Social issues:   Social History     Socioeconomic History    Marital status: Single    Number of children: 0    Years of education: High School   Tobacco Use    Smoking status: Never    Smokeless tobacco: Never   Vaping Use    Vaping Use: Never used   Substance and Sexual Activity    Alcohol use: No    Drug use: No    Sexual activity: Defer       NIH Stroke Scale:         Rose Jennings RN  02/18/24 19:58 CLINT Rose #[phone] with any questions

## 2024-02-19 NOTE — ANESTHESIA PREPROCEDURE EVALUATION
Anesthesia Evaluation     Patient summary reviewed and Nursing notes reviewed   history of anesthetic complications:  PONV               Airway   Mallampati: II  TM distance: >3 FB  Neck ROM: full  Dental    (+) poor dentition    Pulmonary    (+) asthma,  Cardiovascular - negative cardio ROS        Neuro/Psych  (+) psychiatric history Depression    ROS Comment: CRPS  GI/Hepatic/Renal/Endo    (+) GERD, GI bleeding     Musculoskeletal     (+) chronic pain  Abdominal    Substance History - negative use     OB/GYN negative ob/gyn ROS         Other        ROS/Med Hx Other: Tomas-Danlos syndrome  Fibromyalgia  Gastroparesis  Complex regional pain syndrome  Rheumatoid arthritis  Lupus  Sjogren's syndrome  Raynaud's syndrome                    Anesthesia Plan    ASA 3     MAC   total IV anesthesia  (I have reviewed the patient's history with the patient and the chart, including all pertinent laboratory results and imaging. I have explained the risks of anesthesia including but not limited to dental damage, corneal abrasion, nerve injury, MI, stroke, and death. Questions asked and answered. Anesthetic plan discussed with patient and team as indicated. Patient expressed understanding of the above.    ? Reaction to amides previously (although has received lidocaine); has port)  intravenous induction     Anesthetic plan, risks, benefits, and alternatives have been provided, discussed and informed consent has been obtained with: patient.      CODE STATUS:    Level Of Support Discussed With: Patient  Code Status (Patient has no pulse and is not breathing): CPR (Attempt to Resuscitate)  Medical Interventions (Patient has pulse or is breathing): Full Support

## 2024-02-20 ENCOUNTER — READMISSION MANAGEMENT (OUTPATIENT)
Dept: CALL CENTER | Facility: HOSPITAL | Age: 29
End: 2024-02-20
Payer: COMMERCIAL

## 2024-02-20 NOTE — OUTREACH NOTE
Medical Week 1 Survey      Flowsheet Row Responses   [facility] patient discharged from? [place]   Does the patient have one of the following disease processes/diagnoses(primary or secondary)? Other   Week 1 attempt successful? No   Unsuccessful attempts Attempt 1            Irena SAEED - Licensed Nurse

## 2024-02-20 NOTE — PROGRESS NOTES
Case Management Discharge Note      Final Note: Discharged home. Melody Calle RN         Selected Continued Care - Discharged on 2/19/2024 Admission date: 2/18/2024 - Discharge disposition: Home or Self Care         Transportation Services  Private: Car    Final Discharge Disposition Code: 01 - home or self-care

## 2024-02-20 NOTE — OUTREACH NOTE
Prep Survey      Flowsheet Row Responses   [facility] facility patient discharged from? [place]   Is LACE score < 7 ? No   Eligibility Readm Mgmt   Discharge diagnosis Dislodged jejunostomy tube   Does the patient have one of the following disease processes/diagnoses(primary or secondary)? Other   Does the patient have Home health ordered? No   Is there a DME ordered? No   Medication alerts for this patient see avs   Prep survey completed? Yes            Zulema SAAVEDRA - Registered Nurse

## 2024-02-22 ENCOUNTER — TELEPHONE (OUTPATIENT)
Dept: SURGERY | Facility: CLINIC | Age: 29
End: 2024-02-22
Payer: COMMERCIAL

## 2024-02-22 ENCOUNTER — TELEPHONE (OUTPATIENT)
Dept: WOUND CARE | Facility: HOSPITAL | Age: 29
End: 2024-02-22
Payer: COMMERCIAL

## 2024-02-22 ENCOUNTER — READMISSION MANAGEMENT (OUTPATIENT)
Dept: CALL CENTER | Facility: HOSPITAL | Age: 29
End: 2024-02-22
Payer: COMMERCIAL

## 2024-02-22 NOTE — TELEPHONE ENCOUNTER
Wound/ostomy - our dept received a call from patient outreach call center regarding a phone call with a patient's mother about patient in need of drainage bags for her G tube that is to gravity drainage, that she was suppose to have received some before d/c but none were provided. Chart was reviewed, patient has gastroparesis and had a PEG placed as an outpatient on 2-15-24 per Dr. Madhu Zabala, she is fed via J tube and G tube is to gravity for gastric decompression. Patient returned 2/18-2/19 for malfunctioning J tube and this was repaired. CCP saw patient at that admission and notes that tube feeds are through [facility] and that patient asked about drainage bags and RN was informed.     2 drainage bags have been gathered along with a post it note attached with phone number for [facility] and also [facility] to reach out to for ongoing supply needs, will plan on leaving outside wound/ostomy office for someone to come and .     Call was placed to patient's mother Miley Singh [phone] but it goes directly to , message left requesting a return phone call in regards to some medical supplies.

## 2024-02-22 NOTE — TELEPHONE ENCOUNTER
Patient called stating she needed gravity bags for [employer]. I advised her that she should contact [employer] or [employer]. She will provide them with the needed information and they can fax the orders to our office. Patient verbalized understanding.

## 2024-02-22 NOTE — OUTREACH NOTE
Medical Week 1 Survey      Flowsheet Row Responses   [facility] patient discharged from? [place]   Does the patient have one of the following disease processes/diagnoses(primary or secondary)? Other   Week 1 attempt successful? Yes   Call start time 1038   Call end time 1051   Is patient permission given to speak with other caregiver? Yes   Person spoke with today (if not patient) and relationship Mother-[place].   Meds reviewed with patient/caregiver? Yes   Is the patient having any side effects they believe may be caused by any medication additions or changes? No   Does the patient have all medications ordered at discharge? Yes   Is the patient taking all medications as directed (includes completed medication regime)? Yes   Has home health visited the patient within 72 hours of discharge? N/A   DME comments Has jejunostomy with tube feedings. Mother states was not given drainage bags for J tube at discharge. Call placed to Marleny ostomy nurse at , who states will contact mother of patient to discuss supply of drainage bags.   Psychosocial issues? No   Did the patient receive a copy of their discharge instructions? Yes   Nursing interventions Reviewed instructions with patient   What is the patient's perception of their health status since discharge? Same   Is the patient/caregiver able to teach back signs and symptoms related to disease process for when to call PCP? Yes   Is the patient/caregiver able to teach back signs and symptoms related to disease process for when to call 911? Yes   Is the patient/caregiver able to teach back the hierarchy of who to call/visit for symptoms/problems? PCP, Specialist, Home health nurse, Urgent Care, ED, 911 Yes   If the patient is a current smoker, are they able to teach back resources for cessation? Not a smoker   Week 1 call completed? Yes   Would this patient benefit from a Referral to [facility] Social Work? No   Is the patient interested in additional calls from  "an ambulatory ? No   Wrap up additional comments Brief call with mother of patient. States patient is \"okay\", slowly improving. States tube feedings going well. States had requested drainage bags for J tube, but none were given at discharge. Denies any other needs at this time. Call placed to Marleny ostomy nurse at , who states will contact mother to discuss drainage bags.   Call end time 1051            Iram MCELROY - Registered Nurse  "

## 2024-02-23 NOTE — TELEPHONE ENCOUNTER
CWOCN- Patient had called back yesterday afternoon advising that she needs a drainage bag that is ENFIT compatible. I have located the pouch that will connect to patient's tube. Avanos 500 ML (EDB-500). Additionally online I found a 1000ML pouch that is listed for gastric drainage (EDS-500). These are all ENFIT compatible. I have sent pictures of these to patient's phone via text and also reached out by phone- I left her a vm as it went straight to . There was no answer at her mother's phone either. I have not heard back from her.  I will leave a pouch in our mailbox outside of the ostomy office if she wants to have her family member pick one up. I gave her directions on the phone yesterday- the [facility] office is on the 6th floor of the [facility]

## 2024-02-26 ENCOUNTER — APPOINTMENT (OUTPATIENT)
Dept: GENERAL RADIOLOGY | Facility: HOSPITAL | Age: 29
End: 2024-02-26
Payer: COMMERCIAL

## 2024-02-26 ENCOUNTER — READMISSION MANAGEMENT (OUTPATIENT)
Dept: CALL CENTER | Facility: HOSPITAL | Age: 29
End: 2024-02-26
Payer: COMMERCIAL

## 2024-02-26 ENCOUNTER — HOSPITAL ENCOUNTER (OUTPATIENT)
Facility: HOSPITAL | Age: 29
Setting detail: OBSERVATION
Discharge: HOME | End: 2024-02-27
Attending: EMERGENCY MEDICINE | Admitting: EMERGENCY MEDICINE
Payer: COMMERCIAL

## 2024-02-26 ENCOUNTER — APPOINTMENT (OUTPATIENT)
Dept: CT IMAGING | Facility: HOSPITAL | Age: 29
End: 2024-02-26
Payer: COMMERCIAL

## 2024-02-26 DIAGNOSIS — K94.19 JEJUNOSTOMY TUBE SITE PAIN: Primary | ICD-10-CM

## 2024-02-26 DIAGNOSIS — T85.528A DISLODGED JEJUNOSTOMY TUBE: ICD-10-CM

## 2024-02-26 LAB
ALBUMIN SERPL-MCNC: 4 G/DL (ref 3.5–5.2)
ALBUMIN/GLOB SERPL: 1 G/DL
ALP SERPL-CCNC: 47 U/L (ref 39–117)
ALT SERPL W P-5'-P-CCNC: 9 U/L (ref 1–33)
ANION GAP SERPL CALCULATED.3IONS-SCNC: 11 MMOL/L (ref 5–15)
AST SERPL-CCNC: 13 U/L (ref 1–32)
BASOPHILS # BLD AUTO: 0.02 10*3/MM3 (ref 0–0.2)
BASOPHILS NFR BLD AUTO: 0.5 % (ref 0–1.5)
BILIRUB SERPL-MCNC: 0.2 MG/DL (ref 0–1.2)
BUN SERPL-MCNC: 7 MG/DL (ref 6–20)
BUN/CREAT SERPL: 9.1 (ref 7–25)
CALCIUM SPEC-SCNC: 9.6 MG/DL (ref 8.6–10.5)
CHLORIDE SERPL-SCNC: 104 MMOL/L (ref 98–107)
CO2 SERPL-SCNC: 24 MMOL/L (ref 22–29)
CREAT SERPL-MCNC: 0.77 MG/DL (ref 0.57–1)
DEPRECATED RDW RBC AUTO: 44.2 FL (ref 37–54)
EGFRCR SERPLBLD CKD-EPI 2021: 107.2 ML/MIN/1.73
EOSINOPHIL # BLD AUTO: 0.1 10*3/MM3 (ref 0–0.4)
EOSINOPHIL NFR BLD AUTO: 2.7 % (ref 0.3–6.2)
ERYTHROCYTE [DISTWIDTH] IN BLOOD BY AUTOMATED COUNT: 14.7 % (ref 12.3–15.4)
GLOBULIN UR ELPH-MCNC: 4 GM/DL
GLUCOSE SERPL-MCNC: 97 MG/DL (ref 65–99)
HCG SERPL QL: NEGATIVE
HCT VFR BLD AUTO: 33.5 % (ref 34–46.6)
HGB BLD-MCNC: 10.6 G/DL (ref 12–15.9)
IMM GRANULOCYTES # BLD AUTO: 0.01 10*3/MM3 (ref 0–0.05)
IMM GRANULOCYTES NFR BLD AUTO: 0.3 % (ref 0–0.5)
LYMPHOCYTES # BLD AUTO: 1.63 10*3/MM3 (ref 0.7–3.1)
LYMPHOCYTES NFR BLD AUTO: 44.7 % (ref 19.6–45.3)
MCH RBC QN AUTO: 25.8 PG (ref 26.6–33)
MCHC RBC AUTO-ENTMCNC: 31.6 G/DL (ref 31.5–35.7)
MCV RBC AUTO: 81.5 FL (ref 79–97)
MONOCYTES # BLD AUTO: 0.38 10*3/MM3 (ref 0.1–0.9)
MONOCYTES NFR BLD AUTO: 10.4 % (ref 5–12)
NEUTROPHILS NFR BLD AUTO: 1.51 10*3/MM3 (ref 1.7–7)
NEUTROPHILS NFR BLD AUTO: 41.4 % (ref 42.7–76)
NRBC BLD AUTO-RTO: 0 /100 WBC (ref 0–0.2)
PLATELET # BLD AUTO: 265 10*3/MM3 (ref 140–450)
PMV BLD AUTO: 10.4 FL (ref 6–12)
POTASSIUM SERPL-SCNC: 3.7 MMOL/L (ref 3.5–5.2)
PROT SERPL-MCNC: 8 G/DL (ref 6–8.5)
RBC # BLD AUTO: 4.11 10*6/MM3 (ref 3.77–5.28)
SODIUM SERPL-SCNC: 139 MMOL/L (ref 136–145)
WBC NRBC COR # BLD AUTO: 3.65 10*3/MM3 (ref 3.4–10.8)

## 2024-02-26 PROCEDURE — G0378 HOSPITAL OBSERVATION PER HR: HCPCS

## 2024-02-26 PROCEDURE — 25010000002 ONDANSETRON PER 1 MG: Performed by: PHYSICIAN ASSISTANT

## 2024-02-26 PROCEDURE — 74018 RADEX ABDOMEN 1 VIEW: CPT

## 2024-02-26 PROCEDURE — 94640 AIRWAY INHALATION TREATMENT: CPT

## 2024-02-26 PROCEDURE — 85025 COMPLETE CBC W/AUTO DIFF WBC: CPT | Performed by: PHYSICIAN ASSISTANT

## 2024-02-26 PROCEDURE — 25010000002 HYDROMORPHONE PER 4 MG: Performed by: EMERGENCY MEDICINE

## 2024-02-26 PROCEDURE — 99213 OFFICE O/P EST LOW 20 MIN: CPT | Performed by: SURGERY

## 2024-02-26 PROCEDURE — 25510000001 IOPAMIDOL 61 % SOLUTION: Performed by: EMERGENCY MEDICINE

## 2024-02-26 PROCEDURE — 74177 CT ABD & PELVIS W/CONTRAST: CPT

## 2024-02-26 PROCEDURE — 94799 UNLISTED PULMONARY SVC/PX: CPT

## 2024-02-26 PROCEDURE — 25010000002 ONDANSETRON PER 1 MG: Performed by: STUDENT IN AN ORGANIZED HEALTH CARE EDUCATION/TRAINING PROGRAM

## 2024-02-26 PROCEDURE — 25010000002 HYDROMORPHONE 1 MG/ML SOLUTION: Performed by: STUDENT IN AN ORGANIZED HEALTH CARE EDUCATION/TRAINING PROGRAM

## 2024-02-26 PROCEDURE — 80053 COMPREHEN METABOLIC PANEL: CPT | Performed by: PHYSICIAN ASSISTANT

## 2024-02-26 PROCEDURE — 84703 CHORIONIC GONADOTROPIN ASSAY: CPT | Performed by: PHYSICIAN ASSISTANT

## 2024-02-26 PROCEDURE — 99285 EMERGENCY DEPT VISIT HI MDM: CPT

## 2024-02-26 PROCEDURE — 25010000002 MORPHINE PER 10 MG: Performed by: EMERGENCY MEDICINE

## 2024-02-26 PROCEDURE — 96375 TX/PRO/DX INJ NEW DRUG ADDON: CPT

## 2024-02-26 PROCEDURE — 96376 TX/PRO/DX INJ SAME DRUG ADON: CPT

## 2024-02-26 PROCEDURE — 96374 THER/PROPH/DIAG INJ IV PUSH: CPT

## 2024-02-26 RX ORDER — SODIUM CHLORIDE 9 MG/ML
40 INJECTION, SOLUTION INTRAVENOUS AS NEEDED
Status: DISCONTINUED | OUTPATIENT
Start: 2024-02-26 | End: 2024-02-27 | Stop reason: HOSPADM

## 2024-02-26 RX ORDER — HYDROMORPHONE HYDROCHLORIDE 1 MG/ML
0.5 INJECTION, SOLUTION INTRAMUSCULAR; INTRAVENOUS; SUBCUTANEOUS ONCE
Status: COMPLETED | OUTPATIENT
Start: 2024-02-26 | End: 2024-02-26

## 2024-02-26 RX ORDER — GABAPENTIN 300 MG/1
300 CAPSULE ORAL 3 TIMES DAILY
Status: DISCONTINUED | OUTPATIENT
Start: 2024-02-26 | End: 2024-02-27 | Stop reason: HOSPADM

## 2024-02-26 RX ORDER — ONDANSETRON 4 MG/1
4 TABLET, ORALLY DISINTEGRATING ORAL EVERY 6 HOURS PRN
Status: DISCONTINUED | OUTPATIENT
Start: 2024-02-26 | End: 2024-02-27 | Stop reason: HOSPADM

## 2024-02-26 RX ORDER — ONDANSETRON 4 MG/1
4 TABLET, ORALLY DISINTEGRATING ORAL EVERY 6 HOURS PRN
Status: DISCONTINUED | OUTPATIENT
Start: 2024-02-26 | End: 2024-02-26

## 2024-02-26 RX ORDER — SODIUM CHLORIDE 0.9 % (FLUSH) 0.9 %
10 SYRINGE (ML) INJECTION EVERY 12 HOURS SCHEDULED
Status: DISCONTINUED | OUTPATIENT
Start: 2024-02-26 | End: 2024-02-27 | Stop reason: HOSPADM

## 2024-02-26 RX ORDER — ONDANSETRON 2 MG/ML
4 INJECTION INTRAMUSCULAR; INTRAVENOUS EVERY 6 HOURS PRN
Status: DISCONTINUED | OUTPATIENT
Start: 2024-02-26 | End: 2024-02-26

## 2024-02-26 RX ORDER — HYDROXYCHLOROQUINE SULFATE 200 MG/1
200 TABLET, FILM COATED ORAL 2 TIMES DAILY
Status: DISCONTINUED | OUTPATIENT
Start: 2024-02-26 | End: 2024-02-27 | Stop reason: HOSPADM

## 2024-02-26 RX ORDER — ONDANSETRON 2 MG/ML
4 INJECTION INTRAMUSCULAR; INTRAVENOUS EVERY 6 HOURS PRN
Status: DISCONTINUED | OUTPATIENT
Start: 2024-02-26 | End: 2024-02-27 | Stop reason: HOSPADM

## 2024-02-26 RX ORDER — BUDESONIDE AND FORMOTEROL FUMARATE DIHYDRATE 160; 4.5 UG/1; UG/1
2 AEROSOL RESPIRATORY (INHALATION)
Status: DISCONTINUED | OUTPATIENT
Start: 2024-02-26 | End: 2024-02-27 | Stop reason: HOSPADM

## 2024-02-26 RX ORDER — HYDROMORPHONE HYDROCHLORIDE 1 MG/ML
0.5 INJECTION, SOLUTION INTRAMUSCULAR; INTRAVENOUS; SUBCUTANEOUS
Status: DISCONTINUED | OUTPATIENT
Start: 2024-02-26 | End: 2024-02-26

## 2024-02-26 RX ORDER — AMLODIPINE BESYLATE 10 MG/1
10 TABLET ORAL DAILY
Status: DISCONTINUED | OUTPATIENT
Start: 2024-02-26 | End: 2024-02-27 | Stop reason: HOSPADM

## 2024-02-26 RX ORDER — SODIUM CHLORIDE 0.9 % (FLUSH) 0.9 %
10 SYRINGE (ML) INJECTION AS NEEDED
Status: DISCONTINUED | OUTPATIENT
Start: 2024-02-26 | End: 2024-02-27 | Stop reason: HOSPADM

## 2024-02-26 RX ORDER — MORPHINE SULFATE 2 MG/ML
4 INJECTION, SOLUTION INTRAMUSCULAR; INTRAVENOUS ONCE
Status: COMPLETED | OUTPATIENT
Start: 2024-02-26 | End: 2024-02-26

## 2024-02-26 RX ORDER — ONDANSETRON 2 MG/ML
4 INJECTION INTRAMUSCULAR; INTRAVENOUS ONCE
Status: COMPLETED | OUTPATIENT
Start: 2024-02-26 | End: 2024-02-26

## 2024-02-26 RX ORDER — PANTOPRAZOLE SODIUM 40 MG/1
80 TABLET, DELAYED RELEASE ORAL 2 TIMES DAILY
Status: DISCONTINUED | OUTPATIENT
Start: 2024-02-26 | End: 2024-02-27 | Stop reason: HOSPADM

## 2024-02-26 RX ADMIN — MORPHINE SULFATE 4 MG: 2 INJECTION, SOLUTION INTRAMUSCULAR; INTRAVENOUS at 00:52

## 2024-02-26 RX ADMIN — IOPAMIDOL 85 ML: 612 INJECTION, SOLUTION INTRAVENOUS at 01:44

## 2024-02-26 RX ADMIN — HYDROMORPHONE HYDROCHLORIDE 0.5 MG: 1 INJECTION, SOLUTION INTRAMUSCULAR; INTRAVENOUS; SUBCUTANEOUS at 09:44

## 2024-02-26 RX ADMIN — BUDESONIDE AND FORMOTEROL FUMARATE DIHYDRATE 2 PUFF: 160; 4.5 AEROSOL RESPIRATORY (INHALATION) at 11:07

## 2024-02-26 RX ADMIN — Medication 10 ML: at 09:44

## 2024-02-26 RX ADMIN — ONDANSETRON 4 MG: 2 INJECTION INTRAMUSCULAR; INTRAVENOUS at 00:52

## 2024-02-26 RX ADMIN — HYDROMORPHONE HYDROCHLORIDE 1 MG: 1 INJECTION, SOLUTION INTRAMUSCULAR; INTRAVENOUS; SUBCUTANEOUS at 18:26

## 2024-02-26 RX ADMIN — HYDROMORPHONE HYDROCHLORIDE 0.5 MG: 1 INJECTION, SOLUTION INTRAMUSCULAR; INTRAVENOUS; SUBCUTANEOUS at 14:17

## 2024-02-26 RX ADMIN — HYDROMORPHONE HYDROCHLORIDE 0.5 MG: 1 INJECTION, SOLUTION INTRAMUSCULAR; INTRAVENOUS; SUBCUTANEOUS at 11:53

## 2024-02-26 RX ADMIN — HYDROMORPHONE HYDROCHLORIDE 0.5 MG: 1 INJECTION, SOLUTION INTRAMUSCULAR; INTRAVENOUS; SUBCUTANEOUS at 02:28

## 2024-02-26 RX ADMIN — PANTOPRAZOLE SODIUM 80 MG: 40 TABLET, DELAYED RELEASE ORAL at 20:46

## 2024-02-26 RX ADMIN — GABAPENTIN 300 MG: 300 CAPSULE ORAL at 20:46

## 2024-02-26 RX ADMIN — HYDROXYCHLOROQUINE SULFATE 200 MG: 200 TABLET ORAL at 20:46

## 2024-02-26 RX ADMIN — HYDROMORPHONE HYDROCHLORIDE 0.5 MG: 1 INJECTION, SOLUTION INTRAMUSCULAR; INTRAVENOUS; SUBCUTANEOUS at 16:24

## 2024-02-26 RX ADMIN — HYDROMORPHONE HYDROCHLORIDE 0.5 MG: 1 INJECTION, SOLUTION INTRAMUSCULAR; INTRAVENOUS; SUBCUTANEOUS at 07:26

## 2024-02-26 RX ADMIN — HYDROMORPHONE HYDROCHLORIDE 0.5 MG: 1 INJECTION, SOLUTION INTRAMUSCULAR; INTRAVENOUS; SUBCUTANEOUS at 04:28

## 2024-02-26 RX ADMIN — HYDROMORPHONE HYDROCHLORIDE 1 MG: 1 INJECTION, SOLUTION INTRAMUSCULAR; INTRAVENOUS; SUBCUTANEOUS at 21:45

## 2024-02-26 RX ADMIN — ONDANSETRON 4 MG: 2 INJECTION INTRAMUSCULAR; INTRAVENOUS at 12:12

## 2024-02-26 RX ADMIN — Medication 10 ML: at 20:47

## 2024-02-26 NOTE — PLAN OF CARE
Goal Outcome Evaluation:              Outcome Evaluation: pt still c/o abdomonal pain, pt to stay overnight for pain control, general surgery is following,pt has no further questions at this time, pt is resting at this time

## 2024-02-26 NOTE — ED PROVIDER NOTES
MD ATTESTATION NOTE    SHARED VISIT: This visit was performed by BOTH a physician and an APC. The substantive portion of the medical decision making was performed by this attesting physician who made or approved the management plan and takes responsibility for patient management. All studies in the APC note (if performed) were independently interpreted by me.     The FABIOLA and I have discussed this patient's history, physical exam, and treatment plan.  I have reviewed the documentation and affirm the documentation and agree with the treatment and plan.  The attached note describes my personal findings.      Independent Historians: Patient    A complete HPI/ROS/PMH/PSH/SH/FH are unobtainable due to: None    Chronic or social conditions impacting patient care (social determinants of health): None    Henrietta Garrett is a 29 y.o. female who presents to the ED c/o acute abdominal pain related to her J-tube site.  Patient had G-tube replacement in the operating room by Dr. Zabala on February 19.  Patient has had pain since its placement.  She has been doing some flushes and taking medications through the tube with a lot of pain since the initial attempt at flushing it.  Tonight she attempted to start feeds through it as she was instructed and had significant pain as if the tube is not actually in.  Patient has had multiple tubes in the past due to severe gastroparesis.          On exam:  GENERAL: Cooperative and conversant but in pain female, alert  SKIN: Warm, dry  HENT: Normocephalic, atraumatic  RESPIRATORY: Relaxed breathing  ABDOMEN: soft, G-tube site clean and dry with no surrounding erythema and no tenderness to palpation, J-tube site however with surrounding erythema and significant pain with even light touch around the tube or with any tube manipulation, nondistended  NEURO: alert, moves all extremities, follows commands                                                             Labs  Recent Results (from the past  24 hour(s))   Comprehensive Metabolic Panel    Collection Time: 02/26/24 12:49 AM    Specimen: Blood   Result Value Ref Range    Glucose 97 65 - 99 mg/dL    BUN 7 6 - 20 mg/dL    Creatinine 0.77 0.57 - 1.00 mg/dL    Sodium 139 136 - 145 mmol/L    Potassium 3.7 3.5 - 5.2 mmol/L    Chloride 104 98 - 107 mmol/L    CO2 24.0 22.0 - 29.0 mmol/L    Calcium 9.6 8.6 - 10.5 mg/dL    Total Protein 8.0 6.0 - 8.5 g/dL    Albumin 4.0 3.5 - 5.2 g/dL    ALT (SGPT) 9 1 - 33 U/L    AST (SGOT) 13 1 - 32 U/L    Alkaline Phosphatase 47 39 - 117 U/L    Total Bilirubin 0.2 0.0 - 1.2 mg/dL    Globulin 4.0 gm/dL    A/G Ratio 1.0 g/dL    BUN/Creatinine Ratio 9.1 7.0 - 25.0    Anion Gap 11.0 5.0 - 15.0 mmol/L    eGFR 107.2 >60.0 mL/min/1.73   hCG, Serum, Qualitative    Collection Time: 02/26/24 12:49 AM    Specimen: Blood   Result Value Ref Range    HCG Qualitative Negative Negative   CBC Auto Differential    Collection Time: 02/26/24 12:49 AM    Specimen: Blood   Result Value Ref Range    WBC 3.65 3.40 - 10.80 10*3/mm3    RBC 4.11 3.77 - 5.28 10*6/mm3    Hemoglobin 10.6 (L) 12.0 - 15.9 g/dL    Hematocrit 33.5 (L) 34.0 - 46.6 %    MCV 81.5 79.0 - 97.0 fL    MCH 25.8 (L) 26.6 - 33.0 pg    MCHC 31.6 31.5 - 35.7 g/dL    RDW 14.7 12.3 - 15.4 %    RDW-SD 44.2 37.0 - 54.0 fl    MPV 10.4 6.0 - 12.0 fL    Platelets 265 140 - 450 10*3/mm3    Neutrophil % 41.4 (L) 42.7 - 76.0 %    Lymphocyte % 44.7 19.6 - 45.3 %    Monocyte % 10.4 5.0 - 12.0 %    Eosinophil % 2.7 0.3 - 6.2 %    Basophil % 0.5 0.0 - 1.5 %    Immature Grans % 0.3 0.0 - 0.5 %    Neutrophils, Absolute 1.51 (L) 1.70 - 7.00 10*3/mm3    Lymphocytes, Absolute 1.63 0.70 - 3.10 10*3/mm3    Monocytes, Absolute 0.38 0.10 - 0.90 10*3/mm3    Eosinophils, Absolute 0.10 0.00 - 0.40 10*3/mm3    Basophils, Absolute 0.02 0.00 - 0.20 10*3/mm3    Immature Grans, Absolute 0.01 0.00 - 0.05 10*3/mm3    nRBC 0.0 0.0 - 0.2 /100 WBC       Radiology  CT Abdomen Pelvis With Contrast    Result Date:  2/26/2024  Patient: PENNY TALBOT  Time Out: 01:53 Exam(s): CT ABDOMEN + PELVIS With Contrast EXAM:   CT Abdomen and Pelvis With Intravenous Contrast CLINICAL HISTORY:     Pain around J-tube site, not freely movable, patient feels as if it is stuck in the abdominal wall. TECHNIQUE:   Axial computed tomography images of the abdomen and pelvis with intravenous contrast.  CTDI is  20.02 mGy and DLP is 1102.8 mGy-cm.  This CT exam was performed according to the principle of ALARA (As Low As Reasonably Achievable) by using one or more of the following dose reduction techniques: automated exposure control, adjustment of the mA and or kV according to patient size, and or use of iterative reconstruction technique. COMPARISON:   CT abdomen and pelvis without contrast dated 7 5 2023 FINDINGS:   Lung bases:  Unremarkable.  No mass.  No consolidation.  ABDOMEN:   Liver:  Unremarkable.  No mass.   Gallbladder and bile ducts:  Cholecystectomy.  No ductal dilation.   Pancreas:  Unremarkable.  No mass.  No ductal dilation.   Spleen:  Unremarkable.  No splenomegaly.   Adrenals:  Unremarkable.  No mass.   Kidneys and ureters:  Unremarkable.  No solid mass.  No hydronephrosis.   Stomach and bowel:  Mild retained oral contents in the stomach.  No gastric mucosal thickening.  No bowel obstruction.  No asymmetric bowel mucosal abnormality.  PELVIS:   Appendix:  No findings to suggest acute appendicitis.   Bladder:  Unremarkable.  No mass.   Reproductive:  Unremarkable as visualized.  ABDOMEN and PELVIS:   Intraperitoneal space:  Unremarkable.  No free air.  No significant fluid collection.   Bones joints:  No acute fracture.  No dislocation.   Soft tissues:  Unremarkable.   Vasculature:  Unremarkable.  No abdominal aortic aneurysm.   Lymph nodes:  Unremarkable.  No enlarged lymph nodes.   Tubes, lines and devices:  The previously noted jejunostomy tube has been exchange for a balloon type tube.  The percutaneous jejunostomy distal  balloon is inflated in the deep aspect of the anterior abdominal wall with the tip along the margin of the small bowel.  A percutaneous gastrostomy tube is noted in position. IMPRESSION:     1.  The previously noted jejunostomy tube has been exchange for a balloon type tube.  The percutaneous jejunostomy distal balloon is inflated in the deep aspect of the anterior abdominal wall with the tip along the margin of the small bowel. 2.  A percutaneous gastrostomy tube is noted in position.  No bowel obstruction.     Electronically signed by Zachery Velasco MD on 02-26-24 at 0153     Medical Decision Making:  ED Course as of 02/26/24 0346   Mon Feb 26, 2024   0106 WBC: 3.65 [CC]   0106 Hemoglobin(!): 10.6  At baseline [CC]   0133 HCG Qualitative: Negative [CC]   0155 CT Abdomen Pelvis With Contrast  My independent interpretation of the CT abdomen pelvis is J-tube balloon appears to be in part of the abdominal wall and partly in the bowel. [CC]   0215 Discussed the case with Dr. Vasquez with general surgery.  He recommends removing the fluid from the balloon and advancing the tube about 1 cm to see if it relieves the patient's pain.  I discussed my concern about the narrow bowel seen on CT but he said it would be okay to advance the tube a centimeter. [CC]   0241 I removed 1-1/2 cc of fluid from the J-tube bulb and attempted to advance 1 cm but it did not advance at all even with removal of the fluid.  Put the fluid back in the bulb and patient is still having a significant amount of pain.  She had no relief of pain even with removing the fluid from the bulb.  Will plan for admission for consultation with Dr. Zabala. [CC]      ED Course User Index  [CC] Elise Eric PA-C       MDM: Plan to evaluate patient's tube further with imaging.  Patient has had significant pain since its placement.  Suspect malpositioning.  Will reassess.    Procedures:  Procedures        PPE: I followed hospital protocols for proper PPE  based on patient presentation including use of N95 mask for suspected infectious respiratory conditions.  Proper hand hygiene was performed both before and after the patient encounter.          Diagnosis  Final diagnoses:   Jejunostomy tube site pain   Dislodged jejunostomy tube       Note Disclaimer: At [facility], we believe that sharing information builds trust and better relationships. You are receiving this note because you recently visited [facility]. It is possible you will see health information before a provider has talked with you about it. This kind of information can be easy to misunderstand. To help you fully understand what it means for your health, we urge you to discuss this note with your provider.       Sofi Gallardo MD  02/26/24 7757
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
## 2024-02-26 NOTE — OUTREACH NOTE
Medical Week 2 Survey      Flowsheet Row Responses   [facility] patient discharged from? [facility]   Does the patient have one of the following disease processes/diagnoses(primary or secondary)? Other   Week 2 attempt successful? No   Unsuccessful attempts Attempt 1   Revoke Readmitted            Zulema SAAVEDRA - Registered Nurse

## 2024-02-26 NOTE — PROGRESS NOTES
ED OBSERVATION PROGRESS/DISCHARGE SUMMARY    Date of Admission: 2/26/2024   LOS: 0 days   PCP: Fanny Jarrett MD      Subjective:  Patient continues to have abdominal pain at the site of the G-tube.    Hospital Outcome:   29-year-old female with severe gastroparesis with J-tube in place as source of nutrition who was admitted to the observation unit for a dislodged J-tube.  In the ER, CT imaging revealed displaced J-tube at the distal balloon inflated in the deep aspect of the anterior abdominal wall with the tip along the margin of the small bowel.  ER provider spoke with general surgery and was advised to attempt to reposition tube at bedside but was unsuccessful therefore admitted to the observation unit overnight.    Dr. Zabala with  general surgery saw and evaluated the patient and was able to easily reposition the tube at bedside.  A fluoroscopic study was performed that shows a J-tube in good position.  Patient is still severe pain requiring IV pain medications and will plan to keep overnight for to ensure better pain control.  Dr. Zabala to follow.    ROS:  General: no fevers, chills  Respiratory: no cough, dyspnea  Cardiovascular: no chest pain, palpitations  Abdomen: No abdominal pain, nausea, vomiting, or diarrhea  Neurologic: No focal weakness    Objective   Physical Exam:  I have reviewed the vital signs.  Temp:  [97.9 °F (36.6 °C)-98.2 °F (36.8 °C)] 97.9 °F (36.6 °C)  Heart Rate:  [54-72] 54  Resp:  [16-18] 16  BP: (106-142)/(57-83) 109/57  General Appearance:    Alert, cooperative, no distress  Head:    Normocephalic, atraumatic  Eyes:    Sclerae anicteric  Neck:   Supple, no mass  Lungs: Clear to auscultation bilaterally, respirations unlabored  Heart: Regular rate and rhythm, S1 and S2 normal, no murmur, rub or gallop  Abdomen:  Soft, bowel sounds active, nondistended, J-tube site clean without evidence of infection, patient has some tenderness to palpation near the J-tube  site  Extremities: No clubbing, cyanosis, or edema to lower extremities  Pulses:  2+ and symmetric in distal lower extremities  Skin: No rashes   Neurologic: Oriented x3, Normal strength to extremities    Results Review:    I have reviewed the labs, radiology results and diagnostic studies.    Results from last 7 days   Lab Units 02/26/24  0049   WBC 10*3/mm3 3.65   HEMOGLOBIN g/dL 10.6*   HEMATOCRIT % 33.5*   PLATELETS 10*3/mm3 265     Results from last 7 days   Lab Units 02/26/24  0049   SODIUM mmol/L 139   POTASSIUM mmol/L 3.7   CHLORIDE mmol/L 104   CO2 mmol/L 24.0   BUN mg/dL 7   CREATININE mg/dL 0.77   CALCIUM mg/dL 9.6   BILIRUBIN mg/dL 0.2   ALK PHOS U/L 47   ALT (SGPT) U/L 9   AST (SGOT) U/L 13   GLUCOSE mg/dL 97     Imaging Results (Last 24 Hours)       Procedure Component Value Units Date/Time    XR Abdomen KUB [[id number]] Collected: 02/26/24 1455     Updated: 02/26/24 1455    Narrative:      X-RAY ABDOMEN KUB     HISTORY: 29-year-old female with a percutaneous J-tube. Check placement.     FINDINGS: KUB following 30 mL injection of Gastrografin shows the  Gastrografin to be intraluminal within the jejunum. The distal 2 cm of  the J-tube is folded over on itself, but is patent.       CT Abdomen Pelvis With Contrast [[id number]] Collected: 02/26/24 0154     Updated: 02/26/24 0154    Narrative:        Patient: PENNY TALBOT  Time Out: 01:53  Exam(s): CT ABDOMEN + PELVIS With Contrast     EXAM:    CT Abdomen and Pelvis With Intravenous Contrast    CLINICAL HISTORY:      Pain around J-tube site, not freely movable, patient feels as if it   is stuck in the abdominal wall.    TECHNIQUE:    Axial computed tomography images of the abdomen and pelvis with   intravenous contrast.  CTDI is  20.02 mGy and DLP is 1102.8 mGy-cm.  This   CT exam was performed according to the principle of ALARA (As Low As   Reasonably Achievable) by using one or more of the following dose   reduction techniques: automated exposure  control, adjustment of the mA   and or kV according to patient size, and or use of iterative   reconstruction technique.    COMPARISON:    CT abdomen and pelvis without contrast dated 7 5 2023    FINDINGS:    Lung bases:  Unremarkable.  No mass.  No consolidation.     ABDOMEN:    Liver:  Unremarkable.  No mass.    Gallbladder and bile ducts:  Cholecystectomy.  No ductal dilation.    Pancreas:  Unremarkable.  No mass.  No ductal dilation.    Spleen:  Unremarkable.  No splenomegaly.    Adrenals:  Unremarkable.  No mass.    Kidneys and ureters:  Unremarkable.  No solid mass.  No hydronephrosis.    Stomach and bowel:  Mild retained oral contents in the stomach.  No   gastric mucosal thickening.  No bowel obstruction.  No asymmetric bowel   mucosal abnormality.     PELVIS:    Appendix:  No findings to suggest acute appendicitis.    Bladder:  Unremarkable.  No mass.    Reproductive:  Unremarkable as visualized.     ABDOMEN and PELVIS:    Intraperitoneal space:  Unremarkable.  No free air.  No significant   fluid collection.    Bones joints:  No acute fracture.  No dislocation.    Soft tissues:  Unremarkable.    Vasculature:  Unremarkable.  No abdominal aortic aneurysm.    Lymph nodes:  Unremarkable.  No enlarged lymph nodes.    Tubes, lines and devices:  The previously noted jejunostomy tube has   been exchange for a balloon type tube.  The percutaneous jejunostomy   distal balloon is inflated in the deep aspect of the anterior abdominal   wall with the tip along the margin of the small bowel.  A percutaneous   gastrostomy tube is noted in position.    IMPRESSION:       1.  The previously noted jejunostomy tube has been exchange for a balloon   type tube.  The percutaneous jejunostomy distal balloon is inflated in   the deep aspect of the anterior abdominal wall with the tip along the   margin of the small bowel.  2.  A percutaneous gastrostomy tube is noted in position.  No bowel   obstruction.      Impression:           Electronically signed by Zachery Velasco MD on 02-26-24 at 0153            I have reviewed the medications.  ---------------------------------------------------------------------------------------------  Assessment & Plan   Assessment/Problem List    Abdominal pain      Plan:      Abdominal pain  Dislodged J-tube  -CT of the abdomen pelvis shows that the J-tube balloon is partially in the abdominal wall with that type along the margin of the small bowel  -General surgery following, repositioned J-tube at bedside  -Fluoroscopy study shows G-tube in well position  -Analgesic as needed    Disposition: Continue pain control overnight, general surgery following      56 minutes have been spent by [facility] providers in the care of this patient while under observation status.    Appropriate PPE worn during patient encounter.  Hand hygeine performed before and after seeing the patient.      Edel Fernandez PA-C 02/26/24 15:44 EST
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
## 2024-02-27 VITALS
HEART RATE: 66 BPM | TEMPERATURE: 97.9 F | BODY MASS INDEX: 23.1 KG/M2 | DIASTOLIC BLOOD PRESSURE: 72 MMHG | WEIGHT: 165 LBS | RESPIRATION RATE: 16 BRPM | HEIGHT: 71 IN | OXYGEN SATURATION: 98 % | SYSTOLIC BLOOD PRESSURE: 118 MMHG

## 2024-02-27 DIAGNOSIS — T85.528A DISLODGED JEJUNOSTOMY TUBE: ICD-10-CM

## 2024-02-27 PROCEDURE — 96376 TX/PRO/DX INJ SAME DRUG ADON: CPT

## 2024-02-27 PROCEDURE — 94799 UNLISTED PULMONARY SVC/PX: CPT

## 2024-02-27 PROCEDURE — 94664 DEMO&/EVAL PT USE INHALER: CPT

## 2024-02-27 PROCEDURE — 25010000002 HYDROMORPHONE 1 MG/ML SOLUTION: Performed by: STUDENT IN AN ORGANIZED HEALTH CARE EDUCATION/TRAINING PROGRAM

## 2024-02-27 PROCEDURE — G0378 HOSPITAL OBSERVATION PER HR: HCPCS

## 2024-02-27 PROCEDURE — 94761 N-INVAS EAR/PLS OXIMETRY MLT: CPT

## 2024-02-27 PROCEDURE — 99213 OFFICE O/P EST LOW 20 MIN: CPT | Performed by: SURGERY

## 2024-02-27 RX ADMIN — Medication 10 ML: at 07:50

## 2024-02-27 RX ADMIN — PANTOPRAZOLE SODIUM 80 MG: 40 TABLET, DELAYED RELEASE ORAL at 09:44

## 2024-02-27 RX ADMIN — HYDROMORPHONE HYDROCHLORIDE 1 MG: 1 INJECTION, SOLUTION INTRAMUSCULAR; INTRAVENOUS; SUBCUTANEOUS at 00:50

## 2024-02-27 RX ADMIN — AMLODIPINE BESYLATE 10 MG: 10 TABLET ORAL at 09:44

## 2024-02-27 RX ADMIN — HYDROMORPHONE HYDROCHLORIDE 1 MG: 1 INJECTION, SOLUTION INTRAMUSCULAR; INTRAVENOUS; SUBCUTANEOUS at 04:57

## 2024-02-27 RX ADMIN — BUDESONIDE AND FORMOTEROL FUMARATE DIHYDRATE 2 PUFF: 160; 4.5 AEROSOL RESPIRATORY (INHALATION) at 06:50

## 2024-02-27 RX ADMIN — GABAPENTIN 300 MG: 300 CAPSULE ORAL at 09:44

## 2024-02-27 RX ADMIN — HYDROXYCHLOROQUINE SULFATE 200 MG: 200 TABLET ORAL at 09:44

## 2024-02-27 RX ADMIN — HYDROMORPHONE HYDROCHLORIDE 1 MG: 1 INJECTION, SOLUTION INTRAMUSCULAR; INTRAVENOUS; SUBCUTANEOUS at 07:45

## 2024-02-27 NOTE — PROGRESS NOTES
Chief Complaint:    Pain at J-tube site    Subjective:    The patient has chronic pain at the J-tube site that is slightly improved today.  The J-tube was advanced further into her jejunum and a contrast study confirmed appropriate placement.  The balloon has been left deflated and she is doing fine with that.    Objective:    Temp:  [97.9 °F (36.6 °C)-98.2 °F (36.8 °C)] 97.9 °F (36.6 °C)  Heart Rate:  [54-70] 66  Resp:  [14-16] 16  BP: ()/(57-72) 118/72    Physical Exam  Constitutional:       Appearance: She is not ill-appearing or toxic-appearing.   Abdominal:      Comments: G-tube site is clean.   Neurological:      Mental Status: She is alert.   Psychiatric:         Behavior: Behavior is cooperative.       BMI is within normal parameters. No other follow-up for BMI required.        Results:    There are no new labs today.    Assessment/Plan:    The patient had a dislodged J-tube that was repositioned.  It is in good position at this point.  She has chronic pain at the J-tube site that may be related to pressure due to the balloon.  We will try to manage without a balloon.  She is ready for discharge to home.    I have sent a prescription for Hycet to her pharmacy.    Madhu Zabala Jr., M.D.

## 2024-02-27 NOTE — PLAN OF CARE
Goal Outcome Evaluation:   J-tube functioning. Pt will follow-up with Dr. Vj farmer to continue care.         Problem: Adult Inpatient Plan of Care  Goal: Plan of Care Review  Outcome: Met  Goal: Patient-Specific Goal (Individualized)  Outcome: Met  Goal: Absence of Hospital-Acquired Illness or Injury  Outcome: Met  Intervention: Identify and Manage Fall Risk  Recent Flowsheet Documentation  Taken 2/27/2024 0745 by Luciano Robertson, RN  Safety Promotion/Fall Prevention:   safety round/check completed   room organization consistent   lighting adjusted   nonskid shoes/slippers when out of bed   activity supervised   clutter free environment maintained  Intervention: Prevent Skin Injury  Recent Flowsheet Documentation  Taken 2/27/2024 0745 by Luciano Robertson, RN  Body Position: position changed independently  Intervention: Prevent and Manage VTE (Venous Thromboembolism) Risk  Recent Flowsheet Documentation  Taken 2/27/2024 0745 by Luciano Robertson, RN  Activity Management:   up ad isa   activity encouraged  Intervention: Prevent Infection  Recent Flowsheet Documentation  Taken 2/27/2024 0745 by Luciano Robertson RN  Infection Prevention:   rest/sleep promoted   single patient room provided  Goal: Optimal Comfort and Wellbeing  Outcome: Met  Goal: Readiness for Transition of Care  Outcome: Met     Problem: Pain Acute  Goal: Acceptable Pain Control and Functional Ability  Outcome: Met  Intervention: Prevent or Manage Pain  Recent Flowsheet Documentation  Taken 2/27/2024 0745 by Luciano Robertson RN  Sleep/Rest Enhancement: awakenings minimized  Medication Review/Management: medications reviewed

## 2024-02-27 NOTE — PLAN OF CARE
Goal Outcome Evaluation:  Plan of Care Reviewed With: patient        Progress: improving  Outcome Evaluation: Patient's pain is being treated with 1 mg dilaudid around the clock. alert and oriented, room air and ax1 to the bathroom. J-tube site intact but tender. G-tube site intact with slipt gauze in place. Family at beside no new complaints.

## 2024-02-27 NOTE — PROGRESS NOTES
HARDEEP REESE ATTESTATION NOTE    The FABIOLA and I have discussed this patient's history, physical exam, and treatment plan.  I have reviewed the documentation and personally had a face to face interaction with the patient. I affirm the documentation and agree with the treatment and plan.  The attached note describes my personal findings.      I provided a substantive portion of the care of this patient. I personally performed the physical exam, in its entirety.    Henrietta Garrett is a 29 y.o. female who presented to the emergency department yesterday for a displaced G-tube.  She was admitted to the observation unit to facilitate her care.  General surgery has evaluated her today and adjusted her tube.  He has sent Hycet to the pharmacy.  The patient reports pain from the manipulation procedure but otherwise has no complaints.  She does request some drainage bags to go home with and I have arranged for this to occur.  Plan discharge.  She is agreeable.      On exam:  GENERAL: Awake, alert, no acute distress  SKIN: Warm, dry  HENT: Normocephalic, atraumatic  EYES: no scleral icterus  CV: regular rhythm, regular rate  RESPIRATORY: normal effort, lungs clear  ABDOMEN: soft, mild tenderness, nondistended  MUSCULOSKELETAL: no deformity  NEURO: alert, moves all extremities, follows commands        Labs  No results found for this or any previous visit (from the past 24 hour(s)).    Radiology  XR Abdomen KUB    Result Date: 2/26/2024  X-RAY ABDOMEN KUB  HISTORY: 29-year-old female with a percutaneous J-tube. Check placement.  FINDINGS: KUB following 30 mL injection of Gastrografin shows the Gastrografin to be intraluminal within the jejunum. The distal 2 cm of the J-tube is folded over on itself, but is patent.             Note Disclaimer: At [facility], we believe that sharing information builds trust and better relationships. You are receiving this note because you recently visited [facility]. It is possible you will see  health information before a provider has talked with you about it. This kind of information can be easy to misunderstand. To help you fully understand what it means for your health, we urge you to discuss this note with your provider.

## 2024-02-27 NOTE — DISCHARGE SUMMARY
ED OBSERVATION PROGRESS/DISCHARGE SUMMARY    Date of Admission: 2/26/2024   LOS: 0 days   PCP: Fanny Jarrett MD      Subjective patient reports she is still having pain this morning but it is better controlled.  She does deal with chronic pain especially at the site of her J-tube.    Hospital Outcome:   29-year-old female admitted to the observation unit with a complaint of abdominal pain and a dislodged J-tube.  Patient was seen by general surgery and J-tube was repositioned at the bedside.  Lab work done was at baseline for the patient.  KUB done after repositioning shows shows the Gastrografin to be intraluminal within the jejunum. The distal 2 cm of the J-tube is folded over on itself, but is patent.    2/27: Patient was seen by Dr. Zabala again this morning.  She has chronic pain at the J-tube site that may be related to pressure due to the balloon.  Dr. Zabala plans to exchange her J-tube but this will be done on outpatient basis.  Dr. Zabala sent prescription for Hycet to the patient's pharmacy.  Patient is ready for discharge home, usual return to ER precautions advised, patient expressed understanding is in agreement with plan.    ROS:  General: no fevers, chills  Respiratory: no cough, dyspnea  Cardiovascular: no chest pain, palpitations  Abdomen: No abdominal pain, nausea, vomiting, or diarrhea  Neurologic: No focal weakness    Objective   Physical Exam:  I have reviewed the vital signs.  Temp:  [97.9 °F (36.6 °C)-98.2 °F (36.8 °C)] 98.1 °F (36.7 °C)  Heart Rate:  [54-64] 64  Resp:  [14-16] 16  BP: ()/(57-70) 110/66  General Appearance:    Alert, cooperative, no distress  Head:    Normocephalic, atraumatic  Eyes:    Sclerae anicteric  Neck:   Supple, no mass  Lungs: Clear to auscultation bilaterally, respirations unlabored  Heart: Regular rate and rhythm, S1 and S2 normal, no murmur, rub or gallop  Abdomen:  Soft, non-tender, bowel sounds active, nondistended  Extremities: No clubbing,  cyanosis, or edema to lower extremities  Pulses:  2+ and symmetric in distal lower extremities  Skin: No rashes   Neurologic: Oriented x3, Normal strength to extremities    Results Review:    I have reviewed the labs, radiology results and diagnostic studies.    Results from last 7 days   Lab Units 02/26/24  0049   WBC 10*3/mm3 3.65   HEMOGLOBIN g/dL 10.6*   HEMATOCRIT % 33.5*   PLATELETS 10*3/mm3 265     Results from last 7 days   Lab Units 02/26/24  0049   SODIUM mmol/L 139   POTASSIUM mmol/L 3.7   CHLORIDE mmol/L 104   CO2 mmol/L 24.0   BUN mg/dL 7   CREATININE mg/dL 0.77   CALCIUM mg/dL 9.6   BILIRUBIN mg/dL 0.2   ALK PHOS U/L 47   ALT (SGPT) U/L 9   AST (SGOT) U/L 13   GLUCOSE mg/dL 97     Imaging Results (Last 24 Hours)       Procedure Component Value Units Date/Time    XR Abdomen KUB [[id number]] Collected: 02/26/24 1455     Updated: 02/26/24 1455    Narrative:      X-RAY ABDOMEN KUB     HISTORY: 29-year-old female with a percutaneous J-tube. Check placement.     FINDINGS: KUB following 30 mL injection of Gastrografin shows the  Gastrografin to be intraluminal within the jejunum. The distal 2 cm of  the J-tube is folded over on itself, but is patent.               I have reviewed the medications.  ---------------------------------------------------------------------------------------------  Assessment & Plan   Assessment/Problem List    Abdominal pain      Plan:    Abdominal pain  Dislodged J-tube  -CT of the abdomen pelvis shows that the J-tube balloon is partially in the abdominal wall with that type along the margin of the small bowel  -General surgery following, Dr Zabala repositioned J-tube at bedside  -KUB done after repositioning shows shows the Gastrografin to be intraluminal within the jejunum  -Hycet prescription sent by general surgery  -Follow-up outpatient with general surgery    Disposition: Home    Follow-up after Discharge: PCP, general surgery    This note will serve as a discharge  summary    VIVIANA Borges 02/27/24 05:17 EST    I have worn appropriate PPE during this patient encounter, sanitized my hands both with entering and exiting patient's room.      33 minutes has been spent by [facility] providers in the care of this patient while under observation status

## 2024-02-28 ENCOUNTER — READMISSION MANAGEMENT (OUTPATIENT)
Dept: CALL CENTER | Facility: HOSPITAL | Age: 29
End: 2024-02-28
Payer: COMMERCIAL

## 2024-02-28 NOTE — OUTREACH NOTE
Prep Survey      Flowsheet Row Responses   [facility] patient discharged from? [place]   Is LACE score < 7 ? No   Eligibility [facility]   Date of Admission 02/26/24   Date of Discharge 02/27/24   Discharge Disposition Home or Self Care   Discharge diagnosis Abdominal pain   Does the patient have one of the following disease processes/diagnoses(primary or secondary)? Other   Does the patient have Home health ordered? No   Is there a DME ordered? No   Medication alerts for this patient see avs   Prep survey completed? Yes            Zulema SAAVEDRA - Registered Nurse

## 2024-02-29 ENCOUNTER — TRANSITIONAL CARE MANAGEMENT TELEPHONE ENCOUNTER (OUTPATIENT)
Dept: CALL CENTER | Facility: HOSPITAL | Age: 29
End: 2024-02-29
Payer: COMMERCIAL

## 2024-02-29 NOTE — OUTREACH NOTE
Call Center TCM Note      Flowsheet Row Responses   [facility] patient discharged from? [place]   Does the patient have one of the following disease processes/diagnoses(primary or secondary)? Other   TCM attempt successful? No   Unsuccessful attempts Attempt 2   Call Status Left message            Rain Riddle RN    2/29/2024, 16:50 EST

## 2024-02-29 NOTE — OUTREACH NOTE
Call Center TCM Note      Flowsheet Row Responses   [facility] patient discharged from? [place]   Does the patient have one of the following disease processes/diagnoses(primary or secondary)? Other   TCM attempt successful? No   Unsuccessful attempts Attempt 1   Call Status Left message            Rain Riddle RN    2/29/2024, 14:42 EST

## 2024-03-01 ENCOUNTER — TRANSITIONAL CARE MANAGEMENT TELEPHONE ENCOUNTER (OUTPATIENT)
Dept: CALL CENTER | Facility: HOSPITAL | Age: 29
End: 2024-03-01
Payer: COMMERCIAL

## 2024-03-01 NOTE — OUTREACH NOTE
Call Center TCM Note      Flowsheet Row Responses   [facility] patient discharged from? [place]   Does the patient have one of the following disease processes/diagnoses(primary or secondary)? Other   TCM attempt successful? No   Unsuccessful attempts Attempt 3            Jenny Kenney RN    3/1/2024, 09:42 EST

## 2024-04-09 ENCOUNTER — OFFICE VISIT (OUTPATIENT)
Dept: FAMILY MEDICINE CLINIC | Facility: CLINIC | Age: 29
End: 2024-04-09
Payer: COMMERCIAL

## 2024-04-09 VITALS
SYSTOLIC BLOOD PRESSURE: 139 MMHG | DIASTOLIC BLOOD PRESSURE: 94 MMHG | HEIGHT: 71 IN | WEIGHT: 165 LBS | BODY MASS INDEX: 23.1 KG/M2

## 2024-04-09 DIAGNOSIS — M79.671 PAIN IN BOTH FEET: ICD-10-CM

## 2024-04-09 DIAGNOSIS — G90.A POSTURAL ORTHOSTATIC TACHYCARDIA SYNDROME: ICD-10-CM

## 2024-04-09 DIAGNOSIS — I73.00 RAYNAUD'S DISEASE WITHOUT GANGRENE: ICD-10-CM

## 2024-04-09 DIAGNOSIS — K31.84 GASTROPARESIS: ICD-10-CM

## 2024-04-09 DIAGNOSIS — F32.A DEPRESSIVE DISORDER: Primary | ICD-10-CM

## 2024-04-09 DIAGNOSIS — G89.29 OTHER CHRONIC PAIN: ICD-10-CM

## 2024-04-09 DIAGNOSIS — Z00.00 ANNUAL PHYSICAL EXAM: ICD-10-CM

## 2024-04-09 DIAGNOSIS — M32.9 SYSTEMIC LUPUS ERYTHEMATOSUS, UNSPECIFIED SLE TYPE, UNSPECIFIED ORGAN INVOLVEMENT STATUS: ICD-10-CM

## 2024-04-09 DIAGNOSIS — J45.990 EXERCISE-INDUCED ASTHMA: ICD-10-CM

## 2024-04-09 DIAGNOSIS — Z76.89 ENCOUNTER TO ESTABLISH CARE WITH NEW DOCTOR: ICD-10-CM

## 2024-04-09 DIAGNOSIS — Z30.09 ENCOUNTER FOR COUNSELING REGARDING CONTRACEPTION: ICD-10-CM

## 2024-04-09 DIAGNOSIS — M05.9 SEROPOSITIVE RHEUMATOID ARTHRITIS: ICD-10-CM

## 2024-04-09 DIAGNOSIS — Z93.4 JEJUNOSTOMY TUBE PRESENT: ICD-10-CM

## 2024-04-09 DIAGNOSIS — M79.672 PAIN IN BOTH FEET: ICD-10-CM

## 2024-04-09 PROBLEM — L81.7 PROGRESSIVE PIGMENTARY DERMATOSIS OF SCHAMBERG: Status: ACTIVE | Noted: 2018-05-04

## 2024-04-09 PROBLEM — G70.00 MYASTHENIA GRAVIS: Status: ACTIVE | Noted: 2024-04-09

## 2024-04-09 PROBLEM — R76.8 AUTOANTIBODY TITER POSITIVE: Status: ACTIVE | Noted: 2022-02-06

## 2024-04-09 PROBLEM — E16.2 HYPOGLYCEMIA: Status: ACTIVE | Noted: 2023-09-21

## 2024-04-09 PROBLEM — K04.7 PERIAPICAL ABSCESS WITHOUT SINUS: Status: RESOLVED | Noted: 2023-07-12 | Resolved: 2024-04-09

## 2024-04-09 PROBLEM — T85.528A DISLODGED JEJUNOSTOMY TUBE: Status: RESOLVED | Noted: 2024-02-18 | Resolved: 2024-04-09

## 2024-04-09 PROBLEM — R76.8 INCREASED IMMUNOGLOBULIN: Status: ACTIVE | Noted: 2022-02-06

## 2024-04-09 PROBLEM — R21 RASH: Status: ACTIVE | Noted: 2018-05-04

## 2024-04-09 PROBLEM — R06.00 DYSPNEA: Status: ACTIVE | Noted: 2018-04-27

## 2024-04-09 PROBLEM — K94.13 MALFUNCTION OF JEJUNOSTOMY TUBE: Status: RESOLVED | Noted: 2023-07-05 | Resolved: 2024-04-09

## 2024-04-09 PROBLEM — R76.9 ABNORMAL IMMUNOLOGICAL FINDING IN SERUM: Status: ACTIVE | Noted: 2022-02-06

## 2024-04-09 PROBLEM — K80.12 CALCULUS OF GALLBLADDER WITH ACUTE ON CHRONIC CHOLECYSTITIS WITHOUT OBSTRUCTION: Status: RESOLVED | Noted: 2023-12-11 | Resolved: 2024-04-09

## 2024-04-09 PROBLEM — L03.211 FACIAL CELLULITIS: Status: RESOLVED | Noted: 2023-07-12 | Resolved: 2024-04-09

## 2024-04-09 PROBLEM — R10.9 ABDOMINAL PAIN: Status: ACTIVE | Noted: 2023-07-04

## 2024-04-09 RX ORDER — ESCITALOPRAM OXALATE 5 MG/1
5 TABLET ORAL DAILY
Qty: 90 TABLET | Refills: 3 | Status: SHIPPED | OUTPATIENT
Start: 2024-04-09

## 2024-04-09 RX ORDER — NORETHINDRONE ACETATE AND ETHINYL ESTRADIOL 1MG-20(24)
1 KIT ORAL DAILY
Qty: 84 TABLET | Refills: 3 | Status: SHIPPED | OUTPATIENT
Start: 2024-04-09

## 2024-04-09 RX ORDER — PREDNISONE 5 MG/1
TABLET ORAL
COMMUNITY
Start: 2024-04-08

## 2024-04-09 NOTE — PATIENT INSTRUCTIONS
No changes- PCP will managed birth control and lexapro.     Referrals: pain medicine, podiatry; you should receive a call within 1 week to schedule the appointment.  If you do not hear anything please let us know.    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 David does it in clinic on Tuesdays, cash pay
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
## 2024-04-12 ENCOUNTER — PATIENT ROUNDING (BHMG ONLY) (OUTPATIENT)
Dept: FAMILY MEDICINE CLINIC | Facility: CLINIC | Age: 29
End: 2024-04-12
Payer: COMMERCIAL

## 2024-04-12 NOTE — PROGRESS NOTES
Good morning Ms. Garrett,    My name is Malini, I am a medical assistant here at [facility]. We hope your recent visit with us went well.    If you have any issues or concerns please don't hesitate to reach out! Have a great day.    SRAVAN Nayak

## 2024-05-24 ENCOUNTER — HOSPITAL ENCOUNTER (EMERGENCY)
Facility: HOSPITAL | Age: 29
Discharge: HOME | End: 2024-05-25
Attending: EMERGENCY MEDICINE
Payer: COMMERCIAL

## 2024-05-24 DIAGNOSIS — K94.19 JEJUNOSTOMY TUBE SITE PAIN: ICD-10-CM

## 2024-05-24 DIAGNOSIS — K94.12 JEJUNOSTOMY SITE INFECTION: Primary | ICD-10-CM

## 2024-05-24 PROCEDURE — 96365 THER/PROPH/DIAG IV INF INIT: CPT

## 2024-05-24 PROCEDURE — 96375 TX/PRO/DX INJ NEW DRUG ADDON: CPT

## 2024-05-24 PROCEDURE — 99285 EMERGENCY DEPT VISIT HI MDM: CPT

## 2024-05-24 RX ORDER — SODIUM CHLORIDE 0.9 % (FLUSH) 0.9 %
10 SYRINGE (ML) INJECTION AS NEEDED
Status: DISCONTINUED | OUTPATIENT
Start: 2024-05-24 | End: 2024-05-25 | Stop reason: HOSPADM

## 2024-05-25 ENCOUNTER — APPOINTMENT (OUTPATIENT)
Dept: CT IMAGING | Facility: HOSPITAL | Age: 29
End: 2024-05-25
Payer: COMMERCIAL

## 2024-05-25 VITALS
HEIGHT: 71 IN | SYSTOLIC BLOOD PRESSURE: 100 MMHG | TEMPERATURE: 98.7 F | HEART RATE: 68 BPM | WEIGHT: 168 LBS | DIASTOLIC BLOOD PRESSURE: 60 MMHG | RESPIRATION RATE: 18 BRPM | BODY MASS INDEX: 23.52 KG/M2 | OXYGEN SATURATION: 98 %

## 2024-05-25 LAB
ALBUMIN SERPL-MCNC: 3.9 G/DL (ref 3.5–5.2)
ALBUMIN/GLOB SERPL: 1 G/DL
ALP SERPL-CCNC: 39 U/L (ref 39–117)
ALT SERPL W P-5'-P-CCNC: 9 U/L (ref 1–33)
ANION GAP SERPL CALCULATED.3IONS-SCNC: 8.5 MMOL/L (ref 5–15)
AST SERPL-CCNC: 13 U/L (ref 1–32)
BACTERIA UR QL AUTO: ABNORMAL /HPF
BASOPHILS # BLD AUTO: 0.02 10*3/MM3 (ref 0–0.2)
BASOPHILS NFR BLD AUTO: 0.5 % (ref 0–1.5)
BILIRUB SERPL-MCNC: <0.2 MG/DL (ref 0–1.2)
BILIRUB UR QL STRIP: NEGATIVE
BUN SERPL-MCNC: 12 MG/DL (ref 6–20)
BUN/CREAT SERPL: 18.2 (ref 7–25)
CALCIUM SPEC-SCNC: 9.4 MG/DL (ref 8.6–10.5)
CHLORIDE SERPL-SCNC: 103 MMOL/L (ref 98–107)
CLARITY UR: CLEAR
CO2 SERPL-SCNC: 24.5 MMOL/L (ref 22–29)
COLOR UR: YELLOW
CREAT SERPL-MCNC: 0.66 MG/DL (ref 0.57–1)
D-LACTATE SERPL-SCNC: 1 MMOL/L (ref 0.5–2)
DEPRECATED RDW RBC AUTO: 45.5 FL (ref 37–54)
EGFRCR SERPLBLD CKD-EPI 2021: 122 ML/MIN/1.73
EOSINOPHIL # BLD AUTO: 0.36 10*3/MM3 (ref 0–0.4)
EOSINOPHIL NFR BLD AUTO: 8.8 % (ref 0.3–6.2)
ERYTHROCYTE [DISTWIDTH] IN BLOOD BY AUTOMATED COUNT: 15.5 % (ref 12.3–15.4)
GLOBULIN UR ELPH-MCNC: 4 GM/DL
GLUCOSE SERPL-MCNC: 109 MG/DL (ref 65–99)
GLUCOSE UR STRIP-MCNC: NEGATIVE MG/DL
HCG SERPL QL: NEGATIVE
HCT VFR BLD AUTO: 31.3 % (ref 34–46.6)
HGB BLD-MCNC: 10.1 G/DL (ref 12–15.9)
HGB UR QL STRIP.AUTO: NEGATIVE
HOLD SPECIMEN: NORMAL
HYALINE CASTS UR QL AUTO: ABNORMAL /LPF
IMM GRANULOCYTES # BLD AUTO: 0.01 10*3/MM3 (ref 0–0.05)
IMM GRANULOCYTES NFR BLD AUTO: 0.2 % (ref 0–0.5)
KETONES UR QL STRIP: NEGATIVE
LEUKOCYTE ESTERASE UR QL STRIP.AUTO: ABNORMAL
LIPASE SERPL-CCNC: 27 U/L (ref 13–60)
LYMPHOCYTES # BLD AUTO: 1.43 10*3/MM3 (ref 0.7–3.1)
LYMPHOCYTES NFR BLD AUTO: 34.8 % (ref 19.6–45.3)
MCH RBC QN AUTO: 26.1 PG (ref 26.6–33)
MCHC RBC AUTO-ENTMCNC: 32.3 G/DL (ref 31.5–35.7)
MCV RBC AUTO: 80.9 FL (ref 79–97)
MONOCYTES # BLD AUTO: 0.46 10*3/MM3 (ref 0.1–0.9)
MONOCYTES NFR BLD AUTO: 11.2 % (ref 5–12)
NEUTROPHILS NFR BLD AUTO: 1.83 10*3/MM3 (ref 1.7–7)
NEUTROPHILS NFR BLD AUTO: 44.5 % (ref 42.7–76)
NITRITE UR QL STRIP: NEGATIVE
NRBC BLD AUTO-RTO: 0 /100 WBC (ref 0–0.2)
PH UR STRIP.AUTO: 6.5 [PH] (ref 5–8)
PLATELET # BLD AUTO: 220 10*3/MM3 (ref 140–450)
PMV BLD AUTO: 10.5 FL (ref 6–12)
POTASSIUM SERPL-SCNC: 3.9 MMOL/L (ref 3.5–5.2)
PROT SERPL-MCNC: 7.9 G/DL (ref 6–8.5)
PROT UR QL STRIP: NEGATIVE
RBC # BLD AUTO: 3.87 10*6/MM3 (ref 3.77–5.28)
RBC # UR STRIP: ABNORMAL /HPF
REF LAB TEST METHOD: ABNORMAL
SODIUM SERPL-SCNC: 136 MMOL/L (ref 136–145)
SP GR UR STRIP: 1.01 (ref 1–1.03)
SQUAMOUS #/AREA URNS HPF: ABNORMAL /HPF
UROBILINOGEN UR QL STRIP: ABNORMAL
WBC # UR STRIP: ABNORMAL /HPF
WBC NRBC COR # BLD AUTO: 4.11 10*3/MM3 (ref 3.4–10.8)
WHOLE BLOOD HOLD COAG: NORMAL
WHOLE BLOOD HOLD SPECIMEN: NORMAL

## 2024-05-25 PROCEDURE — 96375 TX/PRO/DX INJ NEW DRUG ADDON: CPT

## 2024-05-25 PROCEDURE — 80053 COMPREHEN METABOLIC PANEL: CPT | Performed by: PHYSICIAN ASSISTANT

## 2024-05-25 PROCEDURE — 83605 ASSAY OF LACTIC ACID: CPT | Performed by: PHYSICIAN ASSISTANT

## 2024-05-25 PROCEDURE — 85025 COMPLETE CBC W/AUTO DIFF WBC: CPT | Performed by: PHYSICIAN ASSISTANT

## 2024-05-25 PROCEDURE — 25010000002 PIPERACILLIN SOD-TAZOBACTAM PER 1 G: Performed by: PHYSICIAN ASSISTANT

## 2024-05-25 PROCEDURE — 74177 CT ABD & PELVIS W/CONTRAST: CPT

## 2024-05-25 PROCEDURE — 83690 ASSAY OF LIPASE: CPT | Performed by: PHYSICIAN ASSISTANT

## 2024-05-25 PROCEDURE — 25510000001 IOPAMIDOL 61 % SOLUTION: Performed by: EMERGENCY MEDICINE

## 2024-05-25 PROCEDURE — 25010000002 ONDANSETRON PER 1 MG: Performed by: PHYSICIAN ASSISTANT

## 2024-05-25 PROCEDURE — 84703 CHORIONIC GONADOTROPIN ASSAY: CPT | Performed by: PHYSICIAN ASSISTANT

## 2024-05-25 PROCEDURE — 25010000002 MORPHINE PER 10 MG: Performed by: EMERGENCY MEDICINE

## 2024-05-25 PROCEDURE — 96365 THER/PROPH/DIAG IV INF INIT: CPT

## 2024-05-25 PROCEDURE — 81001 URINALYSIS AUTO W/SCOPE: CPT | Performed by: PHYSICIAN ASSISTANT

## 2024-05-25 RX ORDER — MORPHINE SULFATE 2 MG/ML
4 INJECTION, SOLUTION INTRAMUSCULAR; INTRAVENOUS ONCE
Status: COMPLETED | OUTPATIENT
Start: 2024-05-25 | End: 2024-05-25

## 2024-05-25 RX ORDER — ONDANSETRON 2 MG/ML
4 INJECTION INTRAMUSCULAR; INTRAVENOUS ONCE
Status: COMPLETED | OUTPATIENT
Start: 2024-05-25 | End: 2024-05-25

## 2024-05-25 RX ORDER — AMOXICILLIN AND CLAVULANATE POTASSIUM 250; 62.5 MG/5ML; MG/5ML
500 POWDER, FOR SUSPENSION ORAL 2 TIMES DAILY
Qty: 140 ML | Refills: 0 | Status: SHIPPED | OUTPATIENT
Start: 2024-05-25 | End: 2024-05-25

## 2024-05-25 RX ORDER — AMOXICILLIN AND CLAVULANATE POTASSIUM 250; 62.5 MG/5ML; MG/5ML
500 POWDER, FOR SUSPENSION ORAL 2 TIMES DAILY
Qty: 140 ML | Refills: 0 | Status: SHIPPED | OUTPATIENT
Start: 2024-05-25 | End: 2024-06-01

## 2024-05-25 RX ADMIN — PIPERACILLIN AND TAZOBACTAM 3.38 G: 3; .375 INJECTION, POWDER, FOR SOLUTION INTRAVENOUS at 02:22

## 2024-05-25 RX ADMIN — IOPAMIDOL 85 ML: 612 INJECTION, SOLUTION INTRAVENOUS at 01:12

## 2024-05-25 RX ADMIN — ONDANSETRON 4 MG: 2 INJECTION INTRAMUSCULAR; INTRAVENOUS at 01:04

## 2024-05-25 RX ADMIN — MORPHINE SULFATE 4 MG: 2 INJECTION, SOLUTION INTRAMUSCULAR; INTRAVENOUS at 01:04
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
## 2024-05-25 NOTE — ED NOTES
Pt arrived to ER via p/v from home. C/O of fever  and feeding tube issues x 2 days. Pt has daily TPN   Fair

## 2024-05-25 NOTE — ED PROVIDER NOTES
EMERGENCY DEPARTMENT MD ATTESTATION NOTE    Room Number:  34/34  PCP: Kenya David MD  Independent Historians: Patient and Family    HPI:  A complete HPI/ROS/PMH/PSH/SH/FH are unobtainable due to: None    Chronic or social conditions impacting patient care (Social Determinants of Health): None      Context: Henrietta Garrett is a 29 y.o. female with a medical history of gastroparesis, CPRS who presents to the ED c/o acute fever and pain around her J-tube site.  The patient reports she has a G-tube and a J-tube for gastroparesis.  She reports she had a fever earlier today.  She states that for the last 3 days she has had some pain around her J-tube site and noticed some purulence.  She states her fever was 100.6.  She states she took Tylenol about 4 hours prior to coming in the emergency room.  She states her pain is severe.        Review of prior external notes (non-ED) -and- Review of prior external test results outside of this encounter:  Laboratory evaluation 2/26/2024 shows normal CBC, normal CMP    Prescription drug monitoring program review:           PHYSICAL EXAM    I have reviewed the triage vital signs and nursing notes.    ED Triage Vitals   Temp Heart Rate Resp BP SpO2   05/24/24 2257 05/24/24 2358 05/24/24 2303 05/24/24 2331 05/24/24 2358   98.7 °F (37.1 °C) 75 18 150/82 100 %      Temp src Heart Rate Source Patient Position BP Location FiO2 (%)   05/24/24 2257 -- 05/24/24 2331 05/24/24 2331 --   Tympanic  Sitting Left arm        Physical Exam  GENERAL: Awake, alert, no acute distress  SKIN: Warm, dry  HENT: Normocephalic, atraumatic  EYES: no scleral icterus  CV: regular rhythm, regular rate  RESPIRATORY: normal effort, lungs clear  ABDOMEN: soft, nontender, nondistended.  G-tube and J-tube sites are intact.  She does have some mild purulence around her J-tube site with some tenderness.  No significant erythema surrounding it.  MUSCULOSKELETAL: no deformity  NEURO: alert, moves all extremities, follows  commands            MEDICATIONS GIVEN IN ER  Medications   sodium chloride 0.9 % flush 10 mL (has no administration in time range)   piperacillin-tazobactam (ZOSYN) 3.375 g IVPB in 100 mL NS MBP (CD) (3.375 g Intravenous New Bag 5/25/24 0222)   morphine injection 4 mg (4 mg Intravenous Given 5/25/24 0104)   ondansetron (ZOFRAN) injection 4 mg (4 mg Intravenous Given 5/25/24 0104)   iopamidol (ISOVUE-300) 61 % injection 85 mL (85 mL Intravenous Given 5/25/24 0112)         ORDERS PLACED DURING THIS VISIT:  Orders Placed This Encounter   Procedures    CT Abdomen Pelvis With Contrast    [place] Draw    Comprehensive Metabolic Panel    Lipase    Urinalysis With Microscopic If Indicated (No Culture) - Urine, Clean Catch    Lactic Acid, Plasma    hCG, Serum, Qualitative    CBC Auto Differential    Urinalysis, Microscopic Only - Urine, Clean Catch    Insert Peripheral IV    CBC & Differential    Green Top (Gel)    Lavender Top    Light Blue Top         PROCEDURES  Procedures            PROGRESS, DATA ANALYSIS, CONSULTS, AND MEDICAL DECISION MAKING  All labs have been independently interpreted by me.  All radiology studies have been reviewed by me. All EKG's have been independently viewed and interpreted by me.  Discussion below represents my analysis of pertinent findings related to patient's condition, differential diagnosis, treatment plan and final disposition.    Differential diagnosis includes but is not limited to sepsis, bacteremia, cellulitis, abscess.    Clinical Scores:                   ED Course as of 05/25/24 0247   Sat May 25, 2024   0022 I discussed the case with Dr. Dunaway and they agree to evaluate the patient at the bedside.    [CC]   0103 Lactate: 1.0 [CC]   0120 Bacteria, UA(!): 2+ [CC]   0120 Squamous Epithelial Cells, UA(!): 7-12 [CC]   0121 CT Abdomen Pelvis With Contrast  My independent interpretation of the CT of the abdomen pelvis is no bowel obstruction [CC]   0138 CT Abdomen Pelvis With  Contrast  My independent interpretation of the imaging study is G-tube in the stomach and J-tube in the small bowel. [TR]   0152 Had a long shared decision-making conversation with the patient.  I discussed with her that she does have some purulence around the J-tube site but there is no drainable fluid collection seen on CT.  Labs are all reassuring at this point and her vital signs are all reassuring.  I offered her admission to the hospital for IV antibiotics but the patient would prefer to try to go home and take  antibiotics through her G-tube.  Will give 1 dose of IV antibiotics here in the ED and will be discharging her home with liquid antibiotics to put through the G-tube and encouraged her to follow-up with GI early next week.  Strict return precautions were given. [CC]      ED Course User Index  [CC] Elise Eric PA-C  [TR] Juan Pablo Dunaway MD       MDM: Plan to obtain laboratory evaluation as well as CT imaging of the abdomen pelvis given the purulence and tenderness at her J-tube site.  She is afebrile here.  She has no tachycardia.  She has no hypotension.  Plan to obtain labs to help evaluate for sepsis.      COMPLEXITY OF CARE  Admission was considered but after careful review of the patient's presentation, physical examination, diagnostic results, and response to treatment the patient may be safely discharged with outpatient follow-up.    Please note that portions of this document were completed with a voice recognition program.    Note Disclaimer: At [facility], we believe that sharing information builds trust and better relationships. You are receiving this note because you recently visited [facility]. It is possible you will see health information before a provider has talked with you about it. This kind of information can be easy to misunderstand. To help you fully understand what it means for your health, we urge you to discuss this note with your provider.         Juan Pablo Dunaway,  MD  05/25/24 0242

## 2024-06-10 ENCOUNTER — OFFICE VISIT (OUTPATIENT)
Dept: PAIN MEDICINE | Facility: CLINIC | Age: 29
End: 2024-06-10
Payer: COMMERCIAL

## 2024-06-10 VITALS
RESPIRATION RATE: 18 BRPM | TEMPERATURE: 97.1 F | BODY MASS INDEX: 25.45 KG/M2 | SYSTOLIC BLOOD PRESSURE: 148 MMHG | DIASTOLIC BLOOD PRESSURE: 79 MMHG | HEIGHT: 71 IN | HEART RATE: 90 BPM | WEIGHT: 181.8 LBS

## 2024-06-10 DIAGNOSIS — G90.521 COMPLEX REGIONAL PAIN SYNDROME TYPE 1 OF RIGHT LOWER EXTREMITY: ICD-10-CM

## 2024-06-10 DIAGNOSIS — Q79.60 EHLERS-DANLOS SYNDROME: Primary | ICD-10-CM

## 2024-06-10 DIAGNOSIS — G89.4 CHRONIC PAIN SYNDROME: ICD-10-CM

## 2024-06-10 DIAGNOSIS — M05.9 SEROPOSITIVE RHEUMATOID ARTHRITIS: ICD-10-CM

## 2024-06-10 DIAGNOSIS — M32.9 SYSTEMIC LUPUS ERYTHEMATOSUS, UNSPECIFIED SLE TYPE, UNSPECIFIED ORGAN INVOLVEMENT STATUS: ICD-10-CM

## 2024-06-10 DIAGNOSIS — M32.8 LUPUS ERYTHEMATOSUS OVERLAP SYNDROME: ICD-10-CM

## 2024-06-10 LAB
POC AMPHETAMINES: NEGATIVE
POC BARBITURATES: NEGATIVE
POC BENZODIAZEPHINES: NEGATIVE
POC COCAINE: NEGATIVE
POC METHADONE: NEGATIVE
POC METHAMPHETAMINE SCREEN URINE: NEGATIVE
POC OPIATES: NEGATIVE
POC OXYCODONE: NEGATIVE
POC PHENCYCLIDINE: NEGATIVE
POC PROPOXYPHENE: NEGATIVE
POC THC: NEGATIVE
POC TRICYCLIC ANTIDEPRESSANTS: NEGATIVE

## 2024-06-10 PROCEDURE — 99204 OFFICE O/P NEW MOD 45 MIN: CPT | Performed by: ANESTHESIOLOGY

## 2024-06-10 PROCEDURE — 80305 DRUG TEST PRSMV DIR OPT OBS: CPT | Performed by: ANESTHESIOLOGY

## 2024-06-10 RX ORDER — HYDROCODONE BITARTRATE AND ACETAMINOPHEN 7.5; 325 MG/1; MG/1
1 TABLET ORAL EVERY 6 HOURS PRN
Qty: 120 TABLET | Refills: 0 | Status: SHIPPED | OUTPATIENT
Start: 2024-06-10
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
## 2024-06-12 ENCOUNTER — TELEPHONE (OUTPATIENT)
Dept: SURGERY | Facility: CLINIC | Age: 29
End: 2024-06-12

## 2024-06-12 ENCOUNTER — OFFICE VISIT (OUTPATIENT)
Dept: SURGERY | Facility: CLINIC | Age: 29
End: 2024-06-12
Payer: COMMERCIAL

## 2024-06-12 ENCOUNTER — TELEPHONE (OUTPATIENT)
Dept: SURGERY | Facility: CLINIC | Age: 29
End: 2024-06-12
Payer: COMMERCIAL

## 2024-06-12 VITALS
WEIGHT: 178.6 LBS | DIASTOLIC BLOOD PRESSURE: 78 MMHG | BODY MASS INDEX: 25 KG/M2 | SYSTOLIC BLOOD PRESSURE: 142 MMHG | HEIGHT: 71 IN

## 2024-06-12 DIAGNOSIS — L02.211 ABSCESS OF ABDOMINAL WALL: Primary | ICD-10-CM

## 2024-06-12 PROCEDURE — 99214 OFFICE O/P EST MOD 30 MIN: CPT | Performed by: PHYSICIAN ASSISTANT

## 2024-06-12 RX ORDER — AMOXICILLIN AND CLAVULANATE POTASSIUM 875; 125 MG/1; MG/1
1 TABLET, FILM COATED ORAL 2 TIMES DAILY
Qty: 20 TABLET | Refills: 0 | Status: SHIPPED | OUTPATIENT
Start: 2024-06-12 | End: 2024-06-22

## 2024-06-12 RX ORDER — AMOXICILLIN AND CLAVULANATE POTASSIUM 250; 62.5 MG/5ML; MG/5ML
500 POWDER, FOR SUSPENSION ORAL 2 TIMES DAILY
Qty: 200 ML | Refills: 0 | Status: SHIPPED | OUTPATIENT
Start: 2024-06-12 | End: 2024-06-12

## 2024-06-12 NOTE — TELEPHONE ENCOUNTER
Patient called in stating her J-tube site is red,swollen, and draining, pus.  She also reports based on a recent CT scan her G-tube has attached to her abdominal wall and needs to be removed.  Offered patient and appointment with Kee Carrasco PA-C for today at 11:00.  Patient accepted appointment, but said she was not sure if she could make it.  She will call back to reschedule if necessary.

## 2024-06-12 NOTE — TELEPHONE ENCOUNTER
PT calling because her pharmacy does not have the liquid antibiotic in stock wondering if you could switch to a tablet and she can crush it?
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
## 2024-06-15 PROBLEM — M32.8: Status: ACTIVE | Noted: 2024-06-15

## 2024-06-18 ENCOUNTER — OFFICE VISIT (OUTPATIENT)
Dept: SURGERY | Facility: CLINIC | Age: 29
End: 2024-06-18
Payer: COMMERCIAL

## 2024-06-18 VITALS
BODY MASS INDEX: 24.36 KG/M2 | WEIGHT: 174 LBS | SYSTOLIC BLOOD PRESSURE: 134 MMHG | DIASTOLIC BLOOD PRESSURE: 72 MMHG | HEIGHT: 71 IN

## 2024-06-18 DIAGNOSIS — K94.23 GASTROSTOMY MALFUNCTION: ICD-10-CM

## 2024-06-18 DIAGNOSIS — K31.84 GASTROPARESIS: Primary | ICD-10-CM

## 2024-06-18 DIAGNOSIS — K94.12 JEJUNOSTOMY SITE INFECTION: ICD-10-CM

## 2024-06-18 PROCEDURE — 99214 OFFICE O/P EST MOD 30 MIN: CPT | Performed by: SURGERY
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
## 2024-06-24 ENCOUNTER — TELEPHONE (OUTPATIENT)
Dept: SURGERY | Facility: CLINIC | Age: 29
End: 2024-06-24
Payer: COMMERCIAL

## 2024-07-02 ENCOUNTER — TELEPHONE (OUTPATIENT)
Dept: SURGERY | Facility: CLINIC | Age: 29
End: 2024-07-02
Payer: COMMERCIAL

## 2024-07-02 ENCOUNTER — APPOINTMENT (OUTPATIENT)
Dept: CT IMAGING | Facility: HOSPITAL | Age: 29
End: 2024-07-02
Payer: COMMERCIAL

## 2024-07-02 ENCOUNTER — HOSPITAL ENCOUNTER (EMERGENCY)
Facility: HOSPITAL | Age: 29
Discharge: HOME | End: 2024-07-02
Attending: EMERGENCY MEDICINE
Payer: COMMERCIAL

## 2024-07-02 VITALS
SYSTOLIC BLOOD PRESSURE: 122 MMHG | HEIGHT: 71 IN | WEIGHT: 170 LBS | HEART RATE: 77 BPM | TEMPERATURE: 98.4 F | RESPIRATION RATE: 18 BRPM | DIASTOLIC BLOOD PRESSURE: 80 MMHG | OXYGEN SATURATION: 98 % | BODY MASS INDEX: 23.8 KG/M2

## 2024-07-02 DIAGNOSIS — K31.84 GASTROPARESIS: ICD-10-CM

## 2024-07-02 DIAGNOSIS — R10.9 ABDOMINAL DISCOMFORT: Primary | ICD-10-CM

## 2024-07-02 LAB
ALBUMIN SERPL-MCNC: 4 G/DL (ref 3.5–5.2)
ALBUMIN/GLOB SERPL: 0.9 G/DL
ALP SERPL-CCNC: 43 U/L (ref 39–117)
ALT SERPL W P-5'-P-CCNC: 8 U/L (ref 1–33)
ANION GAP SERPL CALCULATED.3IONS-SCNC: 9 MMOL/L (ref 5–15)
AST SERPL-CCNC: 11 U/L (ref 1–32)
BASOPHILS # BLD AUTO: 0.01 10*3/MM3 (ref 0–0.2)
BASOPHILS NFR BLD AUTO: 0.2 % (ref 0–1.5)
BILIRUB SERPL-MCNC: 0.2 MG/DL (ref 0–1.2)
BUN SERPL-MCNC: 11 MG/DL (ref 6–20)
BUN/CREAT SERPL: 14.3 (ref 7–25)
CALCIUM SPEC-SCNC: 9.2 MG/DL (ref 8.6–10.5)
CHLORIDE SERPL-SCNC: 104 MMOL/L (ref 98–107)
CO2 SERPL-SCNC: 24 MMOL/L (ref 22–29)
CREAT SERPL-MCNC: 0.77 MG/DL (ref 0.57–1)
DEPRECATED RDW RBC AUTO: 44.1 FL (ref 37–54)
EGFRCR SERPLBLD CKD-EPI 2021: 107.2 ML/MIN/1.73
EOSINOPHIL # BLD AUTO: 0.15 10*3/MM3 (ref 0–0.4)
EOSINOPHIL NFR BLD AUTO: 2.6 % (ref 0.3–6.2)
ERYTHROCYTE [DISTWIDTH] IN BLOOD BY AUTOMATED COUNT: 15 % (ref 12.3–15.4)
GLOBULIN UR ELPH-MCNC: 4.3 GM/DL
GLUCOSE SERPL-MCNC: 98 MG/DL (ref 65–99)
HCT VFR BLD AUTO: 30.4 % (ref 34–46.6)
HGB BLD-MCNC: 9.6 G/DL (ref 12–15.9)
IMM GRANULOCYTES # BLD AUTO: 0.01 10*3/MM3 (ref 0–0.05)
IMM GRANULOCYTES NFR BLD AUTO: 0.2 % (ref 0–0.5)
LIPASE SERPL-CCNC: 28 U/L (ref 13–60)
LYMPHOCYTES # BLD AUTO: 1.67 10*3/MM3 (ref 0.7–3.1)
LYMPHOCYTES NFR BLD AUTO: 29 % (ref 19.6–45.3)
MCH RBC QN AUTO: 25.7 PG (ref 26.6–33)
MCHC RBC AUTO-ENTMCNC: 31.6 G/DL (ref 31.5–35.7)
MCV RBC AUTO: 81.3 FL (ref 79–97)
MONOCYTES # BLD AUTO: 0.63 10*3/MM3 (ref 0.1–0.9)
MONOCYTES NFR BLD AUTO: 11 % (ref 5–12)
NEUTROPHILS NFR BLD AUTO: 3.28 10*3/MM3 (ref 1.7–7)
NEUTROPHILS NFR BLD AUTO: 57 % (ref 42.7–76)
NRBC BLD AUTO-RTO: 0 /100 WBC (ref 0–0.2)
PLATELET # BLD AUTO: 245 10*3/MM3 (ref 140–450)
PMV BLD AUTO: 9.9 FL (ref 6–12)
POTASSIUM SERPL-SCNC: 4.2 MMOL/L (ref 3.5–5.2)
PROT SERPL-MCNC: 8.3 G/DL (ref 6–8.5)
RBC # BLD AUTO: 3.74 10*6/MM3 (ref 3.77–5.28)
SODIUM SERPL-SCNC: 137 MMOL/L (ref 136–145)
WBC NRBC COR # BLD AUTO: 5.75 10*3/MM3 (ref 3.4–10.8)

## 2024-07-02 PROCEDURE — 74177 CT ABD & PELVIS W/CONTRAST: CPT

## 2024-07-02 PROCEDURE — 25510000001 IOPAMIDOL 61 % SOLUTION: Performed by: EMERGENCY MEDICINE

## 2024-07-02 PROCEDURE — 25810000003 LACTATED RINGERS SOLUTION: Performed by: EMERGENCY MEDICINE

## 2024-07-02 PROCEDURE — 80053 COMPREHEN METABOLIC PANEL: CPT | Performed by: EMERGENCY MEDICINE

## 2024-07-02 PROCEDURE — 99285 EMERGENCY DEPT VISIT HI MDM: CPT

## 2024-07-02 PROCEDURE — 85025 COMPLETE CBC W/AUTO DIFF WBC: CPT | Performed by: EMERGENCY MEDICINE

## 2024-07-02 PROCEDURE — 83690 ASSAY OF LIPASE: CPT | Performed by: EMERGENCY MEDICINE

## 2024-07-02 RX ADMIN — IOPAMIDOL 85 ML: 612 INJECTION, SOLUTION INTRAVENOUS at 15:39

## 2024-07-02 RX ADMIN — SODIUM CHLORIDE, POTASSIUM CHLORIDE, SODIUM LACTATE AND CALCIUM CHLORIDE 1000 ML: 600; 310; 30; 20 INJECTION, SOLUTION INTRAVENOUS at 14:40

## 2024-07-02 NOTE — ED TRIAGE NOTES
Pt to ER  from  home due to severe abd pain x 3 days. Pt stated she got her feeding tube taken out a week and half ago. Pt states she has some nausea and diarrhea.
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
## 2024-07-02 NOTE — TELEPHONE ENCOUNTER
Patient called stating she had a dull, hollow feeling in her stomach after having her J tube removed in office on 06/18. She denies a fever but stated she felt disorientated and was wanting to know if she should proceed to the ER. Spoke with Dr. Zabala he advised if the patient felt like she needed to be seen in the ER, she should proceed to the ER. Patient voiced understanding and will be going to ER.

## 2024-07-03 ENCOUNTER — HOSPITAL ENCOUNTER (INPATIENT)
Facility: HOSPITAL | Age: 29
LOS: 14 days | Discharge: HOME | End: 2024-07-17
Attending: EMERGENCY MEDICINE | Admitting: STUDENT IN AN ORGANIZED HEALTH CARE EDUCATION/TRAINING PROGRAM
Payer: COMMERCIAL

## 2024-07-03 DIAGNOSIS — K94.13 MALFUNCTION OF JEJUNOSTOMY TUBE: ICD-10-CM

## 2024-07-03 DIAGNOSIS — K31.84 GASTROPARESIS: ICD-10-CM

## 2024-07-03 DIAGNOSIS — L03.311 ABDOMINAL WALL CELLULITIS: ICD-10-CM

## 2024-07-03 DIAGNOSIS — R10.13 EPIGASTRIC PAIN: Primary | ICD-10-CM

## 2024-07-03 LAB
ALBUMIN SERPL-MCNC: 3.8 G/DL (ref 3.5–5.2)
ALBUMIN/GLOB SERPL: 0.9 G/DL
ALP SERPL-CCNC: 46 U/L (ref 39–117)
ALT SERPL W P-5'-P-CCNC: 9 U/L (ref 1–33)
ANION GAP SERPL CALCULATED.3IONS-SCNC: 11.6 MMOL/L (ref 5–15)
AST SERPL-CCNC: 16 U/L (ref 1–32)
BASOPHILS # BLD AUTO: 0.01 10*3/MM3 (ref 0–0.2)
BASOPHILS NFR BLD AUTO: 0.2 % (ref 0–1.5)
BILIRUB SERPL-MCNC: 0.2 MG/DL (ref 0–1.2)
BUN SERPL-MCNC: 7 MG/DL (ref 6–20)
BUN/CREAT SERPL: 10.8 (ref 7–25)
CALCIUM SPEC-SCNC: 9 MG/DL (ref 8.6–10.5)
CHLORIDE SERPL-SCNC: 104 MMOL/L (ref 98–107)
CO2 SERPL-SCNC: 21.4 MMOL/L (ref 22–29)
CREAT SERPL-MCNC: 0.65 MG/DL (ref 0.57–1)
D-LACTATE SERPL-SCNC: 1.6 MMOL/L (ref 0.5–2)
DEPRECATED RDW RBC AUTO: 43.6 FL (ref 37–54)
EGFRCR SERPLBLD CKD-EPI 2021: 122.4 ML/MIN/1.73
EOSINOPHIL # BLD AUTO: 0.11 10*3/MM3 (ref 0–0.4)
EOSINOPHIL NFR BLD AUTO: 2.1 % (ref 0.3–6.2)
ERYTHROCYTE [DISTWIDTH] IN BLOOD BY AUTOMATED COUNT: 15.1 % (ref 12.3–15.4)
GLOBULIN UR ELPH-MCNC: 4.3 GM/DL
GLUCOSE SERPL-MCNC: 111 MG/DL (ref 65–99)
HCG SERPL QL: NEGATIVE
HCT VFR BLD AUTO: 30.5 % (ref 34–46.6)
HGB BLD-MCNC: 9.6 G/DL (ref 12–15.9)
IMM GRANULOCYTES # BLD AUTO: 0.01 10*3/MM3 (ref 0–0.05)
IMM GRANULOCYTES NFR BLD AUTO: 0.2 % (ref 0–0.5)
INR PPP: 1.05 (ref 0.9–1.1)
LIPASE SERPL-CCNC: 21 U/L (ref 13–60)
LYMPHOCYTES # BLD AUTO: 0.98 10*3/MM3 (ref 0.7–3.1)
LYMPHOCYTES NFR BLD AUTO: 18.8 % (ref 19.6–45.3)
MAGNESIUM SERPL-MCNC: 1.9 MG/DL (ref 1.6–2.6)
MCH RBC QN AUTO: 25.3 PG (ref 26.6–33)
MCHC RBC AUTO-ENTMCNC: 31.5 G/DL (ref 31.5–35.7)
MCV RBC AUTO: 80.5 FL (ref 79–97)
MONOCYTES # BLD AUTO: 0.45 10*3/MM3 (ref 0.1–0.9)
MONOCYTES NFR BLD AUTO: 8.6 % (ref 5–12)
NEUTROPHILS NFR BLD AUTO: 3.66 10*3/MM3 (ref 1.7–7)
NEUTROPHILS NFR BLD AUTO: 70.1 % (ref 42.7–76)
NRBC BLD AUTO-RTO: 0 /100 WBC (ref 0–0.2)
PLATELET # BLD AUTO: 263 10*3/MM3 (ref 140–450)
PMV BLD AUTO: 10.4 FL (ref 6–12)
POTASSIUM SERPL-SCNC: 3.8 MMOL/L (ref 3.5–5.2)
PROCALCITONIN SERPL-MCNC: <0.02 NG/ML (ref 0–0.25)
PROT SERPL-MCNC: 8.1 G/DL (ref 6–8.5)
PROTHROMBIN TIME: 13.9 SECONDS (ref 11.7–14.2)
RBC # BLD AUTO: 3.79 10*6/MM3 (ref 3.77–5.28)
SODIUM SERPL-SCNC: 137 MMOL/L (ref 136–145)
WBC NRBC COR # BLD AUTO: 5.22 10*3/MM3 (ref 3.4–10.8)

## 2024-07-03 PROCEDURE — 84703 CHORIONIC GONADOTROPIN ASSAY: CPT | Performed by: EMERGENCY MEDICINE

## 2024-07-03 PROCEDURE — 25010000002 HYDROMORPHONE PER 4 MG: Performed by: INTERNAL MEDICINE

## 2024-07-03 PROCEDURE — 25810000003 SODIUM CHLORIDE 0.9 % SOLUTION: Performed by: EMERGENCY MEDICINE

## 2024-07-03 PROCEDURE — 87040 BLOOD CULTURE FOR BACTERIA: CPT | Performed by: EMERGENCY MEDICINE

## 2024-07-03 PROCEDURE — 99285 EMERGENCY DEPT VISIT HI MDM: CPT

## 2024-07-03 PROCEDURE — 83605 ASSAY OF LACTIC ACID: CPT | Performed by: EMERGENCY MEDICINE

## 2024-07-03 PROCEDURE — 85610 PROTHROMBIN TIME: CPT | Performed by: EMERGENCY MEDICINE

## 2024-07-03 PROCEDURE — 85025 COMPLETE CBC W/AUTO DIFF WBC: CPT | Performed by: EMERGENCY MEDICINE

## 2024-07-03 PROCEDURE — 25010000002 HYDROMORPHONE PER 4 MG: Performed by: EMERGENCY MEDICINE

## 2024-07-03 PROCEDURE — 84145 PROCALCITONIN (PCT): CPT | Performed by: EMERGENCY MEDICINE

## 2024-07-03 PROCEDURE — 25010000002 ONDANSETRON PER 1 MG: Performed by: EMERGENCY MEDICINE

## 2024-07-03 PROCEDURE — 83735 ASSAY OF MAGNESIUM: CPT | Performed by: EMERGENCY MEDICINE

## 2024-07-03 PROCEDURE — 25010000002 PIPERACILLIN SOD-TAZOBACTAM PER 1 G: Performed by: EMERGENCY MEDICINE

## 2024-07-03 PROCEDURE — 83690 ASSAY OF LIPASE: CPT | Performed by: EMERGENCY MEDICINE

## 2024-07-03 PROCEDURE — 25010000002 PIPERACILLIN SOD-TAZOBACTAM PER 1 G: Performed by: STUDENT IN AN ORGANIZED HEALTH CARE EDUCATION/TRAINING PROGRAM

## 2024-07-03 PROCEDURE — 80053 COMPREHEN METABOLIC PANEL: CPT | Performed by: EMERGENCY MEDICINE

## 2024-07-03 PROCEDURE — 99222 1ST HOSP IP/OBS MODERATE 55: CPT | Performed by: SURGERY

## 2024-07-03 PROCEDURE — 36415 COLL VENOUS BLD VENIPUNCTURE: CPT

## 2024-07-03 RX ORDER — AMLODIPINE BESYLATE 10 MG/1
10 TABLET ORAL DAILY
Status: DISCONTINUED | OUTPATIENT
Start: 2024-07-03 | End: 2024-07-08

## 2024-07-03 RX ORDER — HYDROMORPHONE HYDROCHLORIDE 1 MG/ML
0.5 INJECTION, SOLUTION INTRAMUSCULAR; INTRAVENOUS; SUBCUTANEOUS ONCE
Status: COMPLETED | OUTPATIENT
Start: 2024-07-03 | End: 2024-07-03

## 2024-07-03 RX ORDER — SODIUM CHLORIDE 9 MG/ML
40 INJECTION, SOLUTION INTRAVENOUS AS NEEDED
Status: DISCONTINUED | OUTPATIENT
Start: 2024-07-03 | End: 2024-07-17 | Stop reason: HOSPADM

## 2024-07-03 RX ORDER — PANTOPRAZOLE SODIUM 40 MG/1
40 TABLET, DELAYED RELEASE ORAL 2 TIMES DAILY
Status: DISCONTINUED | OUTPATIENT
Start: 2024-07-03 | End: 2024-07-03

## 2024-07-03 RX ORDER — DICYCLOMINE HYDROCHLORIDE 10 MG/1
10 CAPSULE ORAL 4 TIMES DAILY
Status: DISCONTINUED | OUTPATIENT
Start: 2024-07-03 | End: 2024-07-17 | Stop reason: HOSPADM

## 2024-07-03 RX ORDER — AMOXICILLIN 250 MG
2 CAPSULE ORAL 2 TIMES DAILY PRN
Status: DISCONTINUED | OUTPATIENT
Start: 2024-07-03 | End: 2024-07-17 | Stop reason: HOSPADM

## 2024-07-03 RX ORDER — ESCITALOPRAM OXALATE 5 MG/1
5 TABLET ORAL DAILY
Status: DISCONTINUED | OUTPATIENT
Start: 2024-07-03 | End: 2024-07-17 | Stop reason: HOSPADM

## 2024-07-03 RX ORDER — HYDROCODONE BITARTRATE AND ACETAMINOPHEN 7.5; 325 MG/1; MG/1
1 TABLET ORAL EVERY 6 HOURS PRN
Status: DISCONTINUED | OUTPATIENT
Start: 2024-07-03 | End: 2024-07-04

## 2024-07-03 RX ORDER — SODIUM CHLORIDE 9 MG/ML
125 INJECTION, SOLUTION INTRAVENOUS CONTINUOUS
Status: ACTIVE | OUTPATIENT
Start: 2024-07-03 | End: 2024-07-06

## 2024-07-03 RX ORDER — GABAPENTIN 100 MG/1
200 CAPSULE ORAL 3 TIMES DAILY
Status: DISCONTINUED | OUTPATIENT
Start: 2024-07-03 | End: 2024-07-17 | Stop reason: HOSPADM

## 2024-07-03 RX ORDER — BISACODYL 5 MG/1
5 TABLET, DELAYED RELEASE ORAL DAILY PRN
Status: DISCONTINUED | OUTPATIENT
Start: 2024-07-03 | End: 2024-07-17 | Stop reason: HOSPADM

## 2024-07-03 RX ORDER — SODIUM CHLORIDE 0.9 % (FLUSH) 0.9 %
10 SYRINGE (ML) INJECTION AS NEEDED
Status: DISCONTINUED | OUTPATIENT
Start: 2024-07-03 | End: 2024-07-17 | Stop reason: HOSPADM

## 2024-07-03 RX ORDER — HYDROXYCHLOROQUINE SULFATE 200 MG/1
200 TABLET, FILM COATED ORAL 2 TIMES DAILY
Status: DISCONTINUED | OUTPATIENT
Start: 2024-07-03 | End: 2024-07-17 | Stop reason: HOSPADM

## 2024-07-03 RX ORDER — BISACODYL 10 MG
10 SUPPOSITORY, RECTAL RECTAL DAILY PRN
Status: DISCONTINUED | OUTPATIENT
Start: 2024-07-03 | End: 2024-07-17 | Stop reason: HOSPADM

## 2024-07-03 RX ORDER — HYDROMORPHONE HYDROCHLORIDE 1 MG/ML
0.5 INJECTION, SOLUTION INTRAMUSCULAR; INTRAVENOUS; SUBCUTANEOUS ONCE AS NEEDED
Status: COMPLETED | OUTPATIENT
Start: 2024-07-03 | End: 2024-07-03

## 2024-07-03 RX ORDER — POLYETHYLENE GLYCOL 3350 17 G/17G
17 POWDER, FOR SOLUTION ORAL DAILY PRN
Status: DISCONTINUED | OUTPATIENT
Start: 2024-07-03 | End: 2024-07-17 | Stop reason: HOSPADM

## 2024-07-03 RX ORDER — ONDANSETRON 2 MG/ML
4 INJECTION INTRAMUSCULAR; INTRAVENOUS ONCE
Status: COMPLETED | OUTPATIENT
Start: 2024-07-03 | End: 2024-07-03

## 2024-07-03 RX ORDER — SODIUM CHLORIDE 0.9 % (FLUSH) 0.9 %
10 SYRINGE (ML) INJECTION EVERY 12 HOURS SCHEDULED
Status: DISCONTINUED | OUTPATIENT
Start: 2024-07-03 | End: 2024-07-17 | Stop reason: HOSPADM

## 2024-07-03 RX ORDER — PANTOPRAZOLE SODIUM 40 MG/10ML
40 INJECTION, POWDER, LYOPHILIZED, FOR SOLUTION INTRAVENOUS EVERY 12 HOURS SCHEDULED
Status: DISCONTINUED | OUTPATIENT
Start: 2024-07-03 | End: 2024-07-17 | Stop reason: HOSPADM

## 2024-07-03 RX ADMIN — HYDROMORPHONE HYDROCHLORIDE 0.5 MG: 1 INJECTION, SOLUTION INTRAMUSCULAR; INTRAVENOUS; SUBCUTANEOUS at 13:01

## 2024-07-03 RX ADMIN — HYDROCODONE BITARTRATE AND ACETAMINOPHEN 1 TABLET: 7.5; 325 TABLET ORAL at 16:36

## 2024-07-03 RX ADMIN — PIPERACILLIN AND TAZOBACTAM 3.38 G: 3; .375 INJECTION, POWDER, FOR SOLUTION INTRAVENOUS at 20:46

## 2024-07-03 RX ADMIN — HYDROCODONE BITARTRATE AND ACETAMINOPHEN 1 TABLET: 7.5; 325 TABLET ORAL at 23:36

## 2024-07-03 RX ADMIN — ONDANSETRON 4 MG: 2 INJECTION INTRAMUSCULAR; INTRAVENOUS at 10:12

## 2024-07-03 RX ADMIN — GABAPENTIN 200 MG: 100 CAPSULE ORAL at 23:36

## 2024-07-03 RX ADMIN — HYDROMORPHONE HYDROCHLORIDE 0.5 MG: 1 INJECTION, SOLUTION INTRAMUSCULAR; INTRAVENOUS; SUBCUTANEOUS at 10:13

## 2024-07-03 RX ADMIN — SODIUM CHLORIDE 125 ML/HR: 9 INJECTION, SOLUTION INTRAVENOUS at 12:30

## 2024-07-03 RX ADMIN — DICYCLOMINE HYDROCHLORIDE 10 MG: 10 CAPSULE ORAL at 23:35

## 2024-07-03 RX ADMIN — ESCITALOPRAM 5 MG: 5 TABLET, FILM COATED ORAL at 23:36

## 2024-07-03 RX ADMIN — SODIUM CHLORIDE 500 ML: 9 INJECTION, SOLUTION INTRAVENOUS at 10:16

## 2024-07-03 RX ADMIN — GABAPENTIN 200 MG: 100 CAPSULE ORAL at 18:14

## 2024-07-03 RX ADMIN — PIPERACILLIN AND TAZOBACTAM 3.38 G: 3; .375 INJECTION, POWDER, FOR SOLUTION INTRAVENOUS at 14:24

## 2024-07-03 RX ADMIN — Medication 10 ML: at 20:46

## 2024-07-03 RX ADMIN — DICYCLOMINE HYDROCHLORIDE 10 MG: 10 CAPSULE ORAL at 18:14

## 2024-07-03 RX ADMIN — PANTOPRAZOLE SODIUM 40 MG: 40 INJECTION, POWDER, FOR SOLUTION INTRAVENOUS at 20:46

## 2024-07-03 RX ADMIN — HYDROMORPHONE HYDROCHLORIDE 0.5 MG: 1 INJECTION, SOLUTION INTRAMUSCULAR; INTRAVENOUS; SUBCUTANEOUS at 20:47

## 2024-07-03 RX ADMIN — HYDROXYCHLOROQUINE SULFATE 200 MG: 200 TABLET, FILM COATED ORAL at 20:45

## 2024-07-03 RX ADMIN — AMLODIPINE BESYLATE 10 MG: 10 TABLET ORAL at 23:36
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
## 2024-07-03 NOTE — ED TRIAGE NOTES
Patient to ED via pv from home. Patient c/o nausea and bleeding from her stoma and pain around stoma site that began this morning.
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
## 2024-07-03 NOTE — PLAN OF CARE
Goal Outcome Evaluation:  Plan of Care Reviewed With: patient        Progress: improving  Outcome Evaluation: Received the pt from ER, VSS, family at bedside, port is accessed- pt pt the home health nurse changed the dressing yesterday, the pt does not want it deaccessed. ivf infusing per order, still need urine sample, c/o of abdominal pain
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
## 2024-07-03 NOTE — ED NOTES
Nursing report ED to floor  Henrietta Garrett  29 y.o.  female    HPI :  HPI (Adult)  Stated Reason for Visit: bleeding from stoma, nausea    Chief Complaint  Chief Complaint   Patient presents with    Abdominal Pain    Nausea       Admitting doctor:   Aureliano Melchor MD    Admitting diagnosis:   The primary encounter diagnosis was Epigastric pain at stoma. Diagnoses of Gastroparesis and Abdominal wall cellulitis were also pertinent to this visit.    Code status:   Current Code Status       Date Active Code Status Order ID Comments User Context       7/3/2024 1502 CPR (Attempt to Resuscitate) [id number]  Aureliano Melchor MD ED        Question Answer    Code Status (Patient has no pulse and is not breathing) CPR (Attempt to Resuscitate)    Medical Interventions (Patient has pulse or is breathing) Full Support                    Allergies:   Anesthetics, amide; Ciprofloxacin; Compazine [prochlorperazine]; Domperidone; Droperidol; Haldol [haloperidol]; Metoclopramide; and Sulfa antibiotics    Isolation:   No active isolations    Intake and Output    Intake/Output Summary (Last 24 hours) at 7/3/2024 1546  Last data filed at 7/3/2024 1226  Gross per 24 hour   Intake 500 ml   Output --   Net 500 ml       Weight:       07/03/24  0937   Weight: 77.1 kg (170 lb)       Most recent vitals:   Vitals:    07/03/24 1321 07/03/24 1322 07/03/24 1507 07/03/24 1508   BP: 111/63  114/73    BP Location:       Patient Position:       Pulse: 65 66 75 78   Resp:       Temp:       TempSrc:       SpO2: 97% 97% 99%    Weight:       Height:           Active LDAs/IV Access:   Lines, Drains & Airways       Active LDAs       Name Placement date Placement time Site Days    Peripheral IV 07/03/24 1003 Right Antecubital 07/03/24  1003  Antecubital  less than 1    Gastrostomy/Enterostomy Percutaneous endoscopic gastrostomy (PEG) 20 Fr. LUQ 02/15/24  0854  LUQ  139    Gastrostomy/Enterostomy Jejunostomy 1 16 Fr. LUQ 02/19/24  1050  LUQ  135    Single Lumen  "Implantable Port Right Subclavian --  --  Subclavian  --                    Labs (abnormal labs have a star):   Labs Reviewed   COMPREHENSIVE METABOLIC PANEL - Abnormal; Notable for the following components:       Result Value    Glucose 111 (*)     CO2 21.4 (*)     All other components within normal limits    Narrative:     GFR Normal >60  Chronic Kidney Disease <60  Kidney Failure <15     CBC WITH AUTO DIFFERENTIAL - Abnormal; Notable for the following components:    Hemoglobin 9.6 (*)     Hematocrit 30.5 (*)     MCH 25.3 (*)     Lymphocyte % 18.8 (*)     All other components within normal limits   PROTIME-INR - Normal   LIPASE - Normal   HCG, SERUM, QUALITATIVE - Normal   LACTIC ACID, PLASMA - Normal   PROCALCITONIN - Normal    Narrative:     As a Marker for Sepsis (Non-Neonates):    1. <0.5 ng/mL represents a low risk of severe sepsis and/or septic shock.  2. >2 ng/mL represents a high risk of severe sepsis and/or septic shock.    As a Marker for Lower Respiratory Tract Infections that require antibiotic therapy:    PCT on Admission    Antibiotic Therapy       6-12 Hrs later    >0.5                Strongly Recommended  >0.25 - <0.5        Recommended   0.1 - 0.25          Discouraged              Remeasure/reassess PCT  <0.1                Strongly Discouraged     Remeasure/reassess PCT    As 28 day mortality risk marker: \"Change in Procalcitonin Result\" (>80% or <=80%) if Day 0 (or Day 1) and Day 4 values are available. Refer to http://www.[employer]-pct-calculator.com    Change in PCT <=80%  A decrease of PCT levels below or equal to 80% defines a positive change in PCT test result representing a higher risk for 28-day all-cause mortality of patients diagnosed with severe sepsis for septic shock.    Change in PCT >80%  A decrease of PCT levels of more than 80% defines a negative change in PCT result representing a lower risk for 28-day all-cause mortality of patients diagnosed with severe sepsis or septic shock. "      MAGNESIUM - Normal   BLOOD CULTURE   BLOOD CULTURE   URINALYSIS W/ MICROSCOPIC IF INDICATED (NO CULTURE)   BASIC METABOLIC PANEL   CBC WITH AUTO DIFFERENTIAL   CBC AND DIFFERENTIAL    Narrative:     The following orders were created for panel order CBC & Differential.  Procedure                               Abnormality         Status                     ---------                               -----------         ------                     CBC Auto Differential[[id number]]        Abnormal            Final result                 Please view results for these tests on the individual orders.   CBC AND DIFFERENTIAL    Narrative:     The following orders were created for panel order CBC & Differential.  Procedure                               Abnormality         Status                     ---------                               -----------         ------                     CBC Auto Differential[[id number]]                                                         Please view results for these tests on the individual orders.       EKG:   No orders to display       Meds given in ED:   Medications   sodium chloride 0.9 % flush 10 mL (has no administration in time range)   sodium chloride 0.9 % infusion (125 mL/hr Intravenous Currently Infusing 7/3/24 7399)   sodium chloride 0.9 % flush 10 mL (has no administration in time range)   sodium chloride 0.9 % flush 10 mL (has no administration in time range)   sodium chloride 0.9 % infusion 40 mL (has no administration in time range)   sennosides-docusate (PERICOLACE) 8.6-50 MG per tablet 2 tablet (has no administration in time range)     And   polyethylene glycol (MIRALAX) packet 17 g (has no administration in time range)     And   bisacodyl (DULCOLAX) EC tablet 5 mg (has no administration in time range)     And   bisacodyl (DULCOLAX) suppository 10 mg (has no administration in time range)   Potassium Replacement - Follow Nurse / BPA Driven Protocol (has no administration in  time range)   Magnesium Standard Dose Replacement - Follow Nurse / BPA Driven Protocol (has no administration in time range)   Phosphorus Replacement - Follow Nurse / BPA Driven Protocol (has no administration in time range)   Calcium Replacement - Follow Nurse / BPA Driven Protocol (has no administration in time range)   Pharmacy to Dose Zosyn (has no administration in time range)   piperacillin-tazobactam (ZOSYN) 3.375 g IVPB in 100 mL NS MBP (CD) (has no administration in time range)   sodium chloride 0.9 % bolus 500 mL (0 mL Intravenous Stopped 7/3/24 1226)   HYDROmorphone (DILAUDID) injection 0.5 mg (0.5 mg Intravenous Given 7/3/24 1013)   ondansetron (ZOFRAN) injection 4 mg (4 mg Intravenous Given 7/3/24 1012)   HYDROmorphone (DILAUDID) injection 0.5 mg (0.5 mg Intravenous Given 7/3/24 1301)   piperacillin-tazobactam (ZOSYN) 3.375 g IVPB in 100 mL NS MBP (CD) (0 g Intravenous Stopped 7/3/24 1457)       Imaging results:  CT Abdomen Pelvis With Contrast    Result Date: 7/2/2024  1.  Findings suggestive of constipation. 2.  Previously seen jejunostomy tube has been removed with extensive soft tissue thickening extending from the small bowel to the skin surface. A focus of air overlying the tract extending from the small bowel through the subcutaneous tissues. CT findings are nonspecific and correlation with patient history is recommended to exclude an enterocutaneous fistula and/or developing infection. 3.  Prominent and mildly enlarged abdominal pelvic nodes. An aortocaval node as increased in size since 2/26/2024. While nonspecific, continued attention follow-up with CT abdomen and pelvis in 3 months is recommended to ensure resolution. 4.  Percutaneous gastrostomy tube is not located within the stomach and appears to have been further retracted since 2/26/2024 with the majority of the balloon appearing to be outside of the stomach wall. A tract of air extending from the gastric lumen into the gastrostomy  balloon, as seen on 5/25/2024. 5.  Other findings as above.   This report was finalized on 7/2/2024 4:44 PM by Dr. Domingo Sims M.D on Workstation: [id number]       Ambulatory status:   - Up with assist x's 1    Social issues:   Social History     Socioeconomic History    Marital status: Single    Number of children: 0    Years of education: High School   Tobacco Use    Smoking status: Never    Smokeless tobacco: Never   Vaping Use    Vaping status: Never Used   Substance and Sexual Activity    Alcohol use: No    Drug use: No    Sexual activity: Not Currently     Partners: Male     Birth control/protection: Birth control pill       Peripheral Neurovascular  Peripheral Neurovascular (Adult)  Peripheral Neurovascular WDL: WDL    Neuro Cognitive  Neuro Cognitive (Adult)  Cognitive/Neuro/Behavioral WDL: .WDL except, mood/behavior  Mood/Behavior: restless, anxious    Learning  Learning Assessment (Adult)  Learning Readiness and Ability: no barriers identified  Education Provided  Education Outcome Evaluation: acceptance expressed    Respiratory  Respiratory WDL  Respiratory WDL: WDL    Abdominal Pain       Pain Assessments  Pain (Adult)  (0-10) Pain Rating: Rest: 8  Patient requested Medication Prescribed for Lower Pain Scale: No  Pain Location: abdomen  Pain Side/Orientation: anterior, upper, medial  Pain Description: dull, sharp  Pain Radiation to: back  Nonverbal Indicators of Pain: restless  Pain Management Interventions: see MAR, quiet environment facilitated  Response to Pain Interventions: interventions effective per patient    NIH Stroke Scale       Pamela Martinez RN  07/03/24 15:46 EDT
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
## 2024-07-04 LAB
ALBUMIN SERPL-MCNC: 3.3 G/DL (ref 3.5–5.2)
ALBUMIN/GLOB SERPL: 0.9 G/DL
ALP SERPL-CCNC: 43 U/L (ref 39–117)
ALT SERPL W P-5'-P-CCNC: 10 U/L (ref 1–33)
ANION GAP SERPL CALCULATED.3IONS-SCNC: ABNORMAL MMOL/L
AST SERPL-CCNC: 14 U/L (ref 1–32)
BASOPHILS # BLD MANUAL: 0 10*3/MM3 (ref 0–0.2)
BASOPHILS NFR BLD MANUAL: 0 % (ref 0–1.5)
BILIRUB SERPL-MCNC: 0.3 MG/DL (ref 0–1.2)
BILIRUB UR QL STRIP: NEGATIVE
BUN SERPL-MCNC: 6 MG/DL (ref 6–20)
BUN/CREAT SERPL: 9.1 (ref 7–25)
CALCIUM SPEC-SCNC: 9.5 MG/DL (ref 8.6–10.5)
CHLORIDE SERPL-SCNC: 107 MMOL/L (ref 98–107)
CLARITY UR: CLEAR
CO2 SERPL-SCNC: ABNORMAL MMOL/L
COLOR UR: YELLOW
CREAT SERPL-MCNC: 0.66 MG/DL (ref 0.57–1)
DEPRECATED RDW RBC AUTO: 46.3 FL (ref 37–54)
EGFRCR SERPLBLD CKD-EPI 2021: 122 ML/MIN/1.73
EOSINOPHIL # BLD MANUAL: 0 10*3/MM3 (ref 0–0.4)
EOSINOPHIL NFR BLD MANUAL: 0 % (ref 0.3–6.2)
ERYTHROCYTE [DISTWIDTH] IN BLOOD BY AUTOMATED COUNT: 15 % (ref 12.3–15.4)
GLOBULIN UR ELPH-MCNC: 3.7 GM/DL
GLUCOSE SERPL-MCNC: 83 MG/DL (ref 65–99)
GLUCOSE UR STRIP-MCNC: NEGATIVE MG/DL
HCT VFR BLD AUTO: 26.9 % (ref 34–46.6)
HGB BLD-MCNC: 8.3 G/DL (ref 12–15.9)
HGB UR QL STRIP.AUTO: NEGATIVE
HYPOCHROMIA BLD QL: ABNORMAL
KETONES UR QL STRIP: NEGATIVE
LEUKOCYTE ESTERASE UR QL STRIP.AUTO: NEGATIVE
LYMPHOCYTES # BLD MANUAL: 1.26 10*3/MM3 (ref 0.7–3.1)
LYMPHOCYTES NFR BLD MANUAL: 12.5 % (ref 5–12)
MAGNESIUM SERPL-MCNC: 2 MG/DL (ref 1.6–2.6)
MCH RBC QN AUTO: 25.5 PG (ref 26.6–33)
MCHC RBC AUTO-ENTMCNC: 30.9 G/DL (ref 31.5–35.7)
MCV RBC AUTO: 82.5 FL (ref 79–97)
MONOCYTES # BLD: 0.38 10*3/MM3 (ref 0.1–0.9)
NEUTROPHILS # BLD AUTO: 1.39 10*3/MM3 (ref 1.7–7)
NEUTROPHILS NFR BLD MANUAL: 45.8 % (ref 42.7–76)
NITRITE UR QL STRIP: NEGATIVE
NRBC BLD AUTO-RTO: 0 /100 WBC (ref 0–0.2)
PH UR STRIP.AUTO: 6 [PH] (ref 5–8)
PHOSPHATE SERPL-MCNC: 2.9 MG/DL (ref 2.5–4.5)
PLAT MORPH BLD: NORMAL
PLATELET # BLD AUTO: 220 10*3/MM3 (ref 140–450)
PMV BLD AUTO: 10.5 FL (ref 6–12)
POIKILOCYTOSIS BLD QL SMEAR: ABNORMAL
POLYCHROMASIA BLD QL SMEAR: ABNORMAL
POTASSIUM SERPL-SCNC: 3.5 MMOL/L (ref 3.5–5.2)
POTASSIUM SERPL-SCNC: 3.7 MMOL/L (ref 3.5–5.2)
PROT SERPL-MCNC: 7 G/DL (ref 6–8.5)
PROT UR QL STRIP: NEGATIVE
RBC # BLD AUTO: 3.26 10*6/MM3 (ref 3.77–5.28)
SMUDGE CELLS BLD QL SMEAR: ABNORMAL
SODIUM SERPL-SCNC: 137 MMOL/L (ref 136–145)
SP GR UR STRIP: 1.01 (ref 1–1.03)
UROBILINOGEN UR QL STRIP: NORMAL
VARIANT LYMPHS NFR BLD MANUAL: 41.7 % (ref 19.6–45.3)
WBC NRBC COR # BLD AUTO: 3.03 10*3/MM3 (ref 3.4–10.8)

## 2024-07-04 PROCEDURE — 97530 THERAPEUTIC ACTIVITIES: CPT

## 2024-07-04 PROCEDURE — 25810000003 SODIUM CHLORIDE 0.9 % SOLUTION: Performed by: EMERGENCY MEDICINE

## 2024-07-04 PROCEDURE — 99231 SBSQ HOSP IP/OBS SF/LOW 25: CPT | Performed by: SURGERY

## 2024-07-04 PROCEDURE — 83735 ASSAY OF MAGNESIUM: CPT | Performed by: STUDENT IN AN ORGANIZED HEALTH CARE EDUCATION/TRAINING PROGRAM

## 2024-07-04 PROCEDURE — 80053 COMPREHEN METABOLIC PANEL: CPT | Performed by: STUDENT IN AN ORGANIZED HEALTH CARE EDUCATION/TRAINING PROGRAM

## 2024-07-04 PROCEDURE — 84132 ASSAY OF SERUM POTASSIUM: CPT | Performed by: STUDENT IN AN ORGANIZED HEALTH CARE EDUCATION/TRAINING PROGRAM

## 2024-07-04 PROCEDURE — 25010000002 HYDROMORPHONE PER 4 MG: Performed by: NURSE PRACTITIONER

## 2024-07-04 PROCEDURE — 84100 ASSAY OF PHOSPHORUS: CPT | Performed by: STUDENT IN AN ORGANIZED HEALTH CARE EDUCATION/TRAINING PROGRAM

## 2024-07-04 PROCEDURE — 97162 PT EVAL MOD COMPLEX 30 MIN: CPT

## 2024-07-04 PROCEDURE — 85025 COMPLETE CBC W/AUTO DIFF WBC: CPT | Performed by: STUDENT IN AN ORGANIZED HEALTH CARE EDUCATION/TRAINING PROGRAM

## 2024-07-04 PROCEDURE — 25010000002 ONDANSETRON PER 1 MG: Performed by: NURSE PRACTITIONER

## 2024-07-04 PROCEDURE — 81003 URINALYSIS AUTO W/O SCOPE: CPT | Performed by: EMERGENCY MEDICINE

## 2024-07-04 PROCEDURE — 25010000002 HYDROMORPHONE PER 4 MG: Performed by: STUDENT IN AN ORGANIZED HEALTH CARE EDUCATION/TRAINING PROGRAM

## 2024-07-04 PROCEDURE — 25010000002 PROMETHAZINE PER 50 MG: Performed by: SURGERY

## 2024-07-04 PROCEDURE — 85007 BL SMEAR W/DIFF WBC COUNT: CPT | Performed by: STUDENT IN AN ORGANIZED HEALTH CARE EDUCATION/TRAINING PROGRAM

## 2024-07-04 PROCEDURE — 25010000002 PIPERACILLIN SOD-TAZOBACTAM PER 1 G: Performed by: STUDENT IN AN ORGANIZED HEALTH CARE EDUCATION/TRAINING PROGRAM

## 2024-07-04 RX ORDER — ONDANSETRON 2 MG/ML
4 INJECTION INTRAMUSCULAR; INTRAVENOUS EVERY 6 HOURS PRN
Status: DISCONTINUED | OUTPATIENT
Start: 2024-07-04 | End: 2024-07-17 | Stop reason: HOSPADM

## 2024-07-04 RX ORDER — HYDROMORPHONE HYDROCHLORIDE 1 MG/ML
0.5 INJECTION, SOLUTION INTRAMUSCULAR; INTRAVENOUS; SUBCUTANEOUS
Status: DISCONTINUED | OUTPATIENT
Start: 2024-07-04 | End: 2024-07-05

## 2024-07-04 RX ORDER — HYDROMORPHONE HYDROCHLORIDE 1 MG/ML
0.5 INJECTION, SOLUTION INTRAMUSCULAR; INTRAVENOUS; SUBCUTANEOUS ONCE
Status: COMPLETED | OUTPATIENT
Start: 2024-07-04 | End: 2024-07-04

## 2024-07-04 RX ADMIN — HYDROCODONE BITARTRATE AND ACETAMINOPHEN 1 TABLET: 7.5; 325 TABLET ORAL at 07:47

## 2024-07-04 RX ADMIN — PIPERACILLIN AND TAZOBACTAM 3.38 G: 3; .375 INJECTION, POWDER, FOR SOLUTION INTRAVENOUS at 03:41

## 2024-07-04 RX ADMIN — PIPERACILLIN AND TAZOBACTAM 3.38 G: 3; .375 INJECTION, POWDER, FOR SOLUTION INTRAVENOUS at 12:29

## 2024-07-04 RX ADMIN — ESCITALOPRAM 5 MG: 5 TABLET, FILM COATED ORAL at 09:01

## 2024-07-04 RX ADMIN — HYDROMORPHONE HYDROCHLORIDE 0.5 MG: 1 INJECTION, SOLUTION INTRAMUSCULAR; INTRAVENOUS; SUBCUTANEOUS at 03:13

## 2024-07-04 RX ADMIN — GABAPENTIN 200 MG: 100 CAPSULE ORAL at 21:32

## 2024-07-04 RX ADMIN — DICYCLOMINE HYDROCHLORIDE 10 MG: 10 CAPSULE ORAL at 12:30

## 2024-07-04 RX ADMIN — HYDROXYCHLOROQUINE SULFATE 200 MG: 200 TABLET, FILM COATED ORAL at 09:01

## 2024-07-04 RX ADMIN — DICYCLOMINE HYDROCHLORIDE 10 MG: 10 CAPSULE ORAL at 09:01

## 2024-07-04 RX ADMIN — HYDROMORPHONE HYDROCHLORIDE 0.5 MG: 1 INJECTION, SOLUTION INTRAMUSCULAR; INTRAVENOUS; SUBCUTANEOUS at 17:50

## 2024-07-04 RX ADMIN — SODIUM CHLORIDE 125 ML/HR: 9 INJECTION, SOLUTION INTRAVENOUS at 10:56

## 2024-07-04 RX ADMIN — HYDROMORPHONE HYDROCHLORIDE 0.5 MG: 1 INJECTION, SOLUTION INTRAMUSCULAR; INTRAVENOUS; SUBCUTANEOUS at 19:48

## 2024-07-04 RX ADMIN — HYDROMORPHONE HYDROCHLORIDE 0.5 MG: 1 INJECTION, SOLUTION INTRAMUSCULAR; INTRAVENOUS; SUBCUTANEOUS at 22:02

## 2024-07-04 RX ADMIN — HYDROXYCHLOROQUINE SULFATE 200 MG: 200 TABLET, FILM COATED ORAL at 21:34

## 2024-07-04 RX ADMIN — HYDROMORPHONE HYDROCHLORIDE 0.5 MG: 1 INJECTION, SOLUTION INTRAMUSCULAR; INTRAVENOUS; SUBCUTANEOUS at 13:09

## 2024-07-04 RX ADMIN — GABAPENTIN 200 MG: 100 CAPSULE ORAL at 15:55

## 2024-07-04 RX ADMIN — SODIUM CHLORIDE 125 ML/HR: 9 INJECTION, SOLUTION INTRAVENOUS at 19:37

## 2024-07-04 RX ADMIN — ONDANSETRON 4 MG: 2 INJECTION INTRAMUSCULAR; INTRAVENOUS at 09:05

## 2024-07-04 RX ADMIN — DICYCLOMINE HYDROCHLORIDE 10 MG: 10 CAPSULE ORAL at 17:50

## 2024-07-04 RX ADMIN — SODIUM CHLORIDE 125 ML/HR: 9 INJECTION, SOLUTION INTRAVENOUS at 02:28

## 2024-07-04 RX ADMIN — HYDROMORPHONE HYDROCHLORIDE 0.5 MG: 1 INJECTION, SOLUTION INTRAMUSCULAR; INTRAVENOUS; SUBCUTANEOUS at 11:04

## 2024-07-04 RX ADMIN — HYDROMORPHONE HYDROCHLORIDE 0.5 MG: 1 INJECTION, SOLUTION INTRAMUSCULAR; INTRAVENOUS; SUBCUTANEOUS at 08:53

## 2024-07-04 RX ADMIN — HYDROMORPHONE HYDROCHLORIDE 0.5 MG: 1 INJECTION, SOLUTION INTRAMUSCULAR; INTRAVENOUS; SUBCUTANEOUS at 15:55

## 2024-07-04 RX ADMIN — Medication 10 ML: at 21:34

## 2024-07-04 RX ADMIN — GABAPENTIN 200 MG: 100 CAPSULE ORAL at 09:01

## 2024-07-04 RX ADMIN — PROMETHAZINE HYDROCHLORIDE 12.5 MG: 25 INJECTION INTRAMUSCULAR; INTRAVENOUS at 13:46

## 2024-07-04 RX ADMIN — Medication 10 ML: at 08:57

## 2024-07-04 RX ADMIN — ONDANSETRON 4 MG: 2 INJECTION INTRAMUSCULAR; INTRAVENOUS at 03:13

## 2024-07-04 RX ADMIN — PANTOPRAZOLE SODIUM 40 MG: 40 INJECTION, POWDER, FOR SOLUTION INTRAVENOUS at 21:26

## 2024-07-04 RX ADMIN — DICYCLOMINE HYDROCHLORIDE 10 MG: 10 CAPSULE ORAL at 21:32

## 2024-07-04 RX ADMIN — PANTOPRAZOLE SODIUM 40 MG: 40 INJECTION, POWDER, FOR SOLUTION INTRAVENOUS at 08:56

## 2024-07-04 RX ADMIN — PROMETHAZINE HYDROCHLORIDE 12.5 MG: 25 INJECTION INTRAMUSCULAR; INTRAVENOUS at 19:43

## 2024-07-04 RX ADMIN — PIPERACILLIN AND TAZOBACTAM 3.38 G: 3; .375 INJECTION, POWDER, FOR SOLUTION INTRAVENOUS at 21:26

## 2024-07-04 NOTE — PROGRESS NOTES
Chief Complaint:    Possible J-tube infection    Subjective:    The patient continues to have pain which is a somewhat chronic issue.  Her G-tube is known to be dislodged and she has pain and drainage from the J-tube site.    Objective:    Temp:  [96.3 °F (35.7 °C)-98.6 °F (37 °C)] 97.3 °F (36.3 °C)  Heart Rate:  [58-78] 70  Resp:  [16] 16  BP: (101-126)/(57-77) 115/61    Physical Exam  Constitutional:       Appearance: She is ill-appearing. She is not toxic-appearing.   Abdominal:      Palpations: Abdomen is soft.      Tenderness: There is generalized abdominal tenderness (Mildly tender).   Neurological:      Mental Status: She is alert.   Psychiatric:         Behavior: Behavior is cooperative.           Results:    WBC is 3.03.  H/H is 8.3/26.9.  BUN is 16 creatinine 0.66.    Assessment/Plan:    The patient has a somewhat dislodged G-tube and a J-tube that was removed and is draining.  She was scheduled to replace the G-tube and endoscopy tomorrow morning.  This was discussed with her.  We have decided to cancel that procedure and, instead, proceed to surgery at which time we can replace the G-tube and takedown the previous jejunostomy with repair of that small bowel and open the abdominal tract to allow drainage and initiate dressing changes.  She is in agreement with this plan to try to address both of her problems.  So she is now scheduled for a da Brooke robot-assisted laparoscopic small bowel resection and G-tube replacement.  The patient understands the indications, alternatives, risks, and benefits of the procedure and wishes to proceed.     Madhu Zabala Jr., M.D.

## 2024-07-04 NOTE — CASE MANAGEMENT/SOCIAL WORK
Discharge Planning Assessment  [facility]     Patient Name: Henrietta Garrett  MRN: [id number]  Today's Date: 7/4/2024    Admit Date: 7/3/2024    Plan: Home with family and current with [facility]    Discharge Needs Assessment       Row Name 07/04/24 1055       Living Environment    People in Home significant other    Current Living Arrangements home    Potentially Unsafe Housing Conditions none    Primary Care Provided by self    Provides Primary Care For no one    Family Caregiver if Needed significant other;parent(s)       Resource/Environmental Concerns    Resource/Environmental Concerns none    Transportation Concerns none       Transition Planning    Patient/Family Anticipates Transition to home;home with family;home with help/services    Patient/Family Anticipated Services at Transition none    Transportation Anticipated family or friend will provide       Discharge Needs Assessment    Equipment Currently Used at Home wheelchair;cane, straight;walker, rolling    Anticipated Changes Related to Illness none    Equipment Needed After Discharge none    Provided Post Acute Provider List? N/A    Provided Post Acute Provider Quality & Resource List? N/A                   Discharge Plan       Row Name 07/04/24 1055       Plan    Plan Home with family and current with [facility]    Plan Comments CCP contacted patient. Introduced self. Patients' pharmacy is [facility]. Patients PCP is Kenya David. Patient lives at home with significant other. She is currently using [facility] at home for IV antibiotics. CCP put in the referral. She does get TPN through a port to her right chest. Patient plans home with significant other at discharge. Family can transport. CCP following.                  Continued Care and Services - Admitted Since 7/3/2024       Home Medical Care       Service Provider Request Status Selected Services Address Phone Fax Patient Preferred    [facility] Pending - Request Sent N/A [id number]  [place] [phone] --                     Demographic Summary       Row Name 07/04/24 1054       General Information    Admission Type inpatient    Arrived From emergency department    Referral Source admission list    Reason for Consult discharge planning    Preferred Language English                   Functional Status       Row Name 07/04/24 1055       Functional Status    Usual Activity Tolerance fair    Current Activity Tolerance fair       Functional Status, IADL    Medications assistive equipment    Meal Preparation assistive equipment    Housekeeping assistive equipment    Laundry assistive equipment    Shopping assistive equipment       Mental Status    General Appearance WDL WDL       Mental Status Summary    Recent Changes in Mental Status/Cognitive Functioning no changes       Employment/    Employment Status unemployed                   Psychosocial    No documentation.                  Abuse/Neglect    No documentation.                  Legal    No documentation.                  Substance Abuse    No documentation.                  Patient Forms    No documentation.

## 2024-07-04 NOTE — PLAN OF CARE
Goal Outcome Evaluation:  Plan of Care Reviewed With: patient        Progress: no change  Outcome Evaluation: not much PO intake. IVF's and IV antibiotic continue.  GTube clamped.  Instead of endoscopy/GT exchange, patient now to have robotic lap bowel resection and GTube in OR tomorrow.  comfort better with IV dilaudid.  reports zofran not effective and she uses IV phenergan at home.  prn order for IV phenergan initiated.  UA collected and sent.  walked short distance in [place] with PT.  up with assist due to weakness.  parent at bedside all shift.

## 2024-07-04 NOTE — DISCHARGE PLACEMENT REQUEST
"Henrietta Garrett (29 y.o. Female)       Date of Birth   1995    Social Security Number       Address   [address]  [address] [place] [address]    Home Phone   [phone]    MRN   [id number]       Latter-day   Non-Adventist    Marital Status   Single                            Admission Date   7/3/24    Admission Type   Emergency    Admitting Provider   Aureliano Melchor MD    Attending Provider   Aureliano Melchor MD    Department, Room/Bed   [facility] [address] [place], 80/1       Discharge Date       Discharge Disposition       Discharge Destination                                 Attending Provider: Aureliano Melchor MD    Allergies: Anesthetics, Amide, Ciprofloxacin, Compazine [Prochlorperazine], Domperidone, Droperidol, Haldol [Haloperidol], Metoclopramide, Sulfa Antibiotics    Isolation: None   Infection: None   Code Status: CPR    Ht: 155.4 cm (61.2\")   Wt: 77.1 kg (170 lb)    Admission Cmt: None   Principal Problem: Abdominal wall cellulitis [L03.311]                   Active Insurance as of 7/3/2024       Primary Coverage       Payor Plan Insurance Group Employer/Plan Group    [employer] [facility] [employer] [facility] PPO [id number]       Payor Plan Address Payor Plan Phone Number Payor Plan Fax Number Effective Dates    [id number] [phone]  1/1/2023 - None Entered    [facility] [id number]         Subscriber Name Subscriber Birth Date Member ID       NETTA ROBLES [birth year]1963 [id number]                     Emergency Contacts        (Rel.) Home Phone Work Phone Mobile Phone    FranciscoMiley (Mother) [phone] -- [phone]    Flaco Redman (Significant Other) -- -- [phone]                "

## 2024-07-04 NOTE — PLAN OF CARE
Goal Outcome Evaluation:  Plan of Care Reviewed With: patient        Progress: no change  Outcome Evaluation: Dsg at old j-tube site. Dsg at g-tube site. G-tube is plugged. We have been in need of a urien specimen all shift. Bladder scan done this am with 800 urine per bladder scan. Pt attempting to urinate this am. Family at bedside. Pt reluctantly taking po meds. She states they do no good secondary to her gastropareisis. Couple doses of iv dilaudid received from [facility]. She is on zosyn. No temps/WBD normal on admission. She is suppose to have her g-tube replaced on 7/5      Pt voided 600cc this am. She however failed to clean off for the clean catch specimen. We will still need to send the urine for u/a
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
## 2024-07-04 NOTE — PLAN OF CARE
Goal Outcome Evaluation:  Plan of Care Reviewed With: patient        Progress: improving  Outcome Evaluation: Henrietta Garrett is a 30 y/o F admitted to [facility] for abdominal wall cellulitis with recent G-tube and J-tube placement(removed 6/18/24). PMH includes Tomas-Danos syndrome, SLE, fibromyalgia, RA, POTS, Sjogren's syndrome, anemia, myasthenia gravis. Pt lives with SO in apartment, who provides min-mod A with all ADLs, household and community duties. She uses w/c in community for longer bouts of distance and will use rwx/SPC in house if needed. Pt completed bed mobility with CGAx1, STS min Ax1, and amb ~20ft with mild unsteadiness with no overt LOB using rwx and step-to gait pattern. Pt c/o pain and dizziness throughout tx, but correlates it to the inc in medication she is on as of now. She returned to supine in bed with all needs met and call light in reach. Recommend home with HHPT, home with 24/7, or IRF depending on pt progress while skilled acute PT. Pt remains appropriate for skilled acute PT at this time to improve functional mobility, strength deficits, and endurance to return home safely.      Anticipated Discharge Disposition (PT): home with home health, home with 24/7 care, inpatient rehabilitation facility

## 2024-07-04 NOTE — PROGRESS NOTES
Name: Henrietta Garrett ADMIT: 7/3/2024   : 1995  PCP: Kenya David MD    MRN: [id number] LOS: 1 days   AGE/SEX: 29 y.o. female  ROOM: [facility]     Subjective     Continues to endorse pain and states that she does not do well with PO medications   Objective   Objective   Vital Signs  Temp:  [96.3 °F (35.7 °C)-98.6 °F (37 °C)] 97.3 °F (36.3 °C)  Heart Rate:  [58-78] 70  Resp:  [16] 16  BP: (101-126)/(57-77) 115/61  SpO2:  [97 %-99 %] 98 %  on   ;   Device (Oxygen Therapy): room air  Body mass index is 31.91 kg/m².  Physical Exam  Constitutional:       General: She is not in acute distress.     Appearance: She is ill-appearing.   HENT:      Head: Normocephalic and atraumatic.   Cardiovascular:      Rate and Rhythm: Normal rate and regular rhythm.   Pulmonary:      Effort: Pulmonary effort is normal. No respiratory distress.   Abdominal:      Comments: G tube with peristomal erythema  J tube stoma eruthema     Skin:     General: Skin is warm and dry.   Neurological:      General: No focal deficit present.      Mental Status: She is alert and oriented to person, place, and time.         Results Review     I reviewed the patient's new clinical results.  Results from last 7 days   Lab Units 24  0353 24  1004 24  1439   WBC 10*3/mm3 3.03* 5.22 5.75   HEMOGLOBIN g/dL 8.3* 9.6* 9.6*   PLATELETS 10*3/mm3 220 263 245     Results from last 7 days   Lab Units 24  1358 24  0353 24  1004 24  1439   SODIUM mmol/L  --  137 137 137   POTASSIUM mmol/L 3.7 3.5 3.8 4.2   CHLORIDE mmol/L  --  107 104 104   CO2 mmol/L  --   --  21.4* 24.0   BUN mg/dL  --  6 7 11   CREATININE mg/dL  --  0.66 0.65 0.77   GLUCOSE mg/dL  --  83 111* 98   Estimated Creatinine Clearance: 118.7 mL/min (by C-G formula based on SCr of 0.66 mg/dL).  Results from last 7 days   Lab Units 24  0353 24  1004 24  1439   ALBUMIN g/dL 3.3* 3.8 4.0   BILIRUBIN mg/dL 0.3 0.2 0.2   ALK PHOS U/L 43 46 43   AST  "(SGOT) U/L 14 16 11   ALT (SGPT) U/L 10 9 8     Results from last 7 days   Lab Units 07/04/24  0353 07/03/24  1004 07/02/24  1439   CALCIUM mg/dL 9.5 9.0 9.2   ALBUMIN g/dL 3.3* 3.8 4.0   MAGNESIUM mg/dL 2.0 1.9  --    PHOSPHORUS mg/dL 2.9  --   --      Results from last 7 days   Lab Units 07/03/24  1015 07/03/24  1004   PROCALCITONIN ng/mL  --  <0.02   LACTATE mmol/L 1.6  --      COVID19   Date Value Ref Range Status   07/22/2022 Not Detected Not Detected - Ref. Range Final     SARS-CoV-2, DANIELLA   Date Value Ref Range Status   08/08/2022 NEGATIVE Negative Final     No results found for: \"HGBA1C\", \"POCGLU\"  Results for orders placed or performed during the hospital encounter of 07/03/24   Blood Culture - Blood, Arm, Right    Specimen: Arm, Right; Blood   Result Value Ref Range    Blood Culture No growth at 24 hours          CT Abdomen Pelvis With Contrast  Narrative: CT ABDOMEN AND PELVIS WITH IV CONTRAST     HISTORY: Abdominal pain     TECHNIQUE: Radiation dose reduction techniques were utilized, including  automated exposure control and exposure modulation based on body size.   3 mm images were obtained through the abdomen and pelvis after the  administration of IV contrast.     COMPARISON: Multiple CT abdomen and pelvis dating back to 6/16/2023     FINDINGS:     Please note evaluation is suboptimal due to beam hardening and streak  artifact and patient's arms being along their side. A percutaneous  gastrostomy tube is present. The bulb of the gastrostomy tube does not  appear to be located within the gastric lumen, as before, and is  contiguous with the wall. There is a tract of air which extends to the  gastrostomy tube bulb. Findings are grossly similar to on 5/25/2024. A  few air-fluid levels are present within the small bowel which is  otherwise nondistended. The appendix is unremarkable. Mild intrahepatic  bile duct dilation is present status post cholecystectomy, as before.  The pancreas, spleen and adrenal " glands have an unremarkable  postcontrast CT appearance. There appears to be a subtle subcentimeter  right renal lesion which is too small to characterize. No  hydronephrosis.     Prominent and enlarged abdominal pelvic adenopathy is present. Enlarged  brett hepatic node measuring 1.3 cm in short axis dimension, grossly  unchanged since 6/16/2023. Prominent aortocaval node measuring 0.8 cm in  short axis dimension, as before. An anterior pericaval node more  inferiorly has increased in size, measuring 0.8 cm in short axis  dimension (previously 0.5 cm on 2/26/2024. Asymmetric right adnexal  hypodense lesion measuring up to 2.3 cm is present, as before. No free  intraperitoneal air is seen..     Large amount of hyperdense stool is present throughout the colon.     No suspicious lytic or blastic osseous lesion. The previously seen  jejunostomy tube overlying the left lower quadrant has been removed.  There is ill-defined soft tissue thickening and fat stranding extending  through the subcutaneous tissues and abutting the adjacent small bowel  in this area. A small focus of gas is present along the tract and not  definitively located in the bowel.     Impression: 1.  Findings suggestive of constipation.  2.  Previously seen jejunostomy tube has been removed with extensive  soft tissue thickening extending from the small bowel to the skin  surface. A focus of air overlying the tract extending from the small  bowel through the subcutaneous tissues. CT findings are nonspecific and  correlation with patient history is recommended to exclude an  enterocutaneous fistula and/or developing infection.  3.  Prominent and mildly enlarged abdominal pelvic nodes. An aortocaval  node as increased in size since 2/26/2024. While nonspecific, continued  attention follow-up with CT abdomen and pelvis in 3 months is  recommended to ensure resolution.  4.  Percutaneous gastrostomy tube is not located within the stomach and  appears to  have been further retracted since 2/26/2024 with the majority  of the balloon appearing to be outside of the stomach wall. A tract of  air extending from the gastric lumen into the gastrostomy balloon, as  seen on 5/25/2024.  5.  Other findings as above.        This report was finalized on 7/2/2024 4:44 PM by Dr. Domingo Sims M.D  on Workstation: [id number]       Scheduled Medications  amLODIPine, 10 mg, Oral, Daily  dicyclomine, 10 mg, Oral, 4x Daily  escitalopram, 5 mg, Per J Tube, Daily  gabapentin, 200 mg, Oral, TID  hydroxychloroquine, 200 mg, Oral, BID  pantoprazole, 40 mg, Intravenous, Q12H  piperacillin-tazobactam, 3.375 g, Intravenous, Q8H  sodium chloride, 10 mL, Intravenous, Q12H    Infusions  sodium chloride, 125 mL/hr, Last Rate: 125 mL/hr (07/04/24 1056)    Diet  Diet: Regular/House; Fluid Consistency: Thin (IDDSI 0)       Assessment/Plan     Active Hospital Problems    Diagnosis  POA    **Abdominal wall cellulitis [L03.311]  Yes    Lupus erythematosus overlap syndrome [M32.8]  Yes    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       29 y.o. female admitted with Abdominal wall cellulitis.    Abdominal wall cellulitis  Started on zosyn. Continue, follow cultures       Gastroparesis   TPN. Nutrition consult      Dislodged G-tube  To be removed and replaced with Dr. Zabala tomorrow      Lupus  Resume Plaquenil     Hypertension  Amlodipine      SCDs for DVT prophylaxis.  Full code.  Discussed with patient.  Anticipate discharge home in 2-3 days.      Aureliano Melchor MD  [facility]  07/04/24  14:24 EDT
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
## 2024-07-05 ENCOUNTER — ANESTHESIA (OUTPATIENT)
Dept: PERIOP | Facility: HOSPITAL | Age: 29
End: 2024-07-05
Payer: COMMERCIAL

## 2024-07-05 ENCOUNTER — ANESTHESIA EVENT (OUTPATIENT)
Dept: PERIOP | Facility: HOSPITAL | Age: 29
End: 2024-07-05
Payer: COMMERCIAL

## 2024-07-05 LAB
ANION GAP SERPL CALCULATED.3IONS-SCNC: 8.3 MMOL/L (ref 5–15)
BUN SERPL-MCNC: 3 MG/DL (ref 6–20)
BUN/CREAT SERPL: 4.6 (ref 7–25)
CALCIUM SPEC-SCNC: 8 MG/DL (ref 8.6–10.5)
CHLORIDE SERPL-SCNC: 106 MMOL/L (ref 98–107)
CO2 SERPL-SCNC: 20.7 MMOL/L (ref 22–29)
CREAT SERPL-MCNC: 0.65 MG/DL (ref 0.57–1)
DEPRECATED RDW RBC AUTO: 45.2 FL (ref 37–54)
EGFRCR SERPLBLD CKD-EPI 2021: 122.4 ML/MIN/1.73
EOSINOPHIL # BLD MANUAL: 0.03 10*3/MM3 (ref 0–0.4)
EOSINOPHIL NFR BLD MANUAL: 1 % (ref 0.3–6.2)
ERYTHROCYTE [DISTWIDTH] IN BLOOD BY AUTOMATED COUNT: 15 % (ref 12.3–15.4)
GLUCOSE SERPL-MCNC: 83 MG/DL (ref 65–99)
HCT VFR BLD AUTO: 26.3 % (ref 34–46.6)
HGB BLD-MCNC: 8.3 G/DL (ref 12–15.9)
HYPOCHROMIA BLD QL: ABNORMAL
LYMPHOCYTES # BLD MANUAL: 0.82 10*3/MM3 (ref 0.7–3.1)
LYMPHOCYTES NFR BLD MANUAL: 11 % (ref 5–12)
MCH RBC QN AUTO: 25.9 PG (ref 26.6–33)
MCHC RBC AUTO-ENTMCNC: 31.6 G/DL (ref 31.5–35.7)
MCV RBC AUTO: 81.9 FL (ref 79–97)
MICROCYTES BLD QL: ABNORMAL
MONOCYTES # BLD: 0.31 10*3/MM3 (ref 0.1–0.9)
NEUTROPHILS # BLD AUTO: 1.67 10*3/MM3 (ref 1.7–7)
NEUTROPHILS NFR BLD MANUAL: 59 % (ref 42.7–76)
NRBC BLD AUTO-RTO: 0 /100 WBC (ref 0–0.2)
OVALOCYTES BLD QL SMEAR: ABNORMAL
PLAT MORPH BLD: NORMAL
PLATELET # BLD AUTO: 211 10*3/MM3 (ref 140–450)
PMV BLD AUTO: 10.6 FL (ref 6–12)
POTASSIUM SERPL-SCNC: 3.6 MMOL/L (ref 3.5–5.2)
RBC # BLD AUTO: 3.21 10*6/MM3 (ref 3.77–5.28)
SODIUM SERPL-SCNC: 135 MMOL/L (ref 136–145)
VARIANT LYMPHS NFR BLD MANUAL: 29 % (ref 19.6–45.3)
WBC MORPH BLD: NORMAL
WBC NRBC COR # BLD AUTO: 2.83 10*3/MM3 (ref 3.4–10.8)

## 2024-07-05 PROCEDURE — 25810000003 SODIUM CHLORIDE 0.9 % SOLUTION: Performed by: EMERGENCY MEDICINE

## 2024-07-05 PROCEDURE — 0DP68UZ REMOVAL OF FEEDING DEVICE FROM STOMACH, VIA NATURAL OR ARTIFICIAL OPENING ENDOSCOPIC: ICD-10-PCS | Performed by: HOSPITALIST

## 2024-07-05 PROCEDURE — 25010000002 HYDROMORPHONE PER 4 MG: Performed by: STUDENT IN AN ORGANIZED HEALTH CARE EDUCATION/TRAINING PROGRAM

## 2024-07-05 PROCEDURE — 0DBA0ZZ EXCISION OF JEJUNUM, OPEN APPROACH: ICD-10-PCS | Performed by: HOSPITALIST

## 2024-07-05 PROCEDURE — 25010000002 HYDROMORPHONE PER 4 MG: Performed by: NURSE ANESTHETIST, CERTIFIED REGISTERED

## 2024-07-05 PROCEDURE — 25010000002 PROPOFOL 200 MG/20ML EMULSION: Performed by: NURSE ANESTHETIST, CERTIFIED REGISTERED

## 2024-07-05 PROCEDURE — 85007 BL SMEAR W/DIFF WBC COUNT: CPT | Performed by: STUDENT IN AN ORGANIZED HEALTH CARE EDUCATION/TRAINING PROGRAM

## 2024-07-05 PROCEDURE — 88307 TISSUE EXAM BY PATHOLOGIST: CPT | Performed by: SURGERY

## 2024-07-05 PROCEDURE — 25810000003 SODIUM CHLORIDE 0.9 % SOLUTION: Performed by: SURGERY

## 2024-07-05 PROCEDURE — 25010000002 HYDROMORPHONE 1 MG/ML SOLUTION: Performed by: NURSE ANESTHETIST, CERTIFIED REGISTERED

## 2024-07-05 PROCEDURE — 25010000002 PROMETHAZINE PER 50 MG: Performed by: SURGERY

## 2024-07-05 PROCEDURE — 80048 BASIC METABOLIC PNL TOTAL CA: CPT | Performed by: STUDENT IN AN ORGANIZED HEALTH CARE EDUCATION/TRAINING PROGRAM

## 2024-07-05 PROCEDURE — 25010000002 DEXAMETHASONE SODIUM PHOSPHATE 20 MG/5ML SOLUTION: Performed by: NURSE ANESTHETIST, CERTIFIED REGISTERED

## 2024-07-05 PROCEDURE — 0DH63UZ INSERTION OF FEEDING DEVICE INTO STOMACH, PERCUTANEOUS APPROACH: ICD-10-PCS | Performed by: HOSPITALIST

## 2024-07-05 PROCEDURE — 85025 COMPLETE CBC W/AUTO DIFF WBC: CPT | Performed by: STUDENT IN AN ORGANIZED HEALTH CARE EDUCATION/TRAINING PROGRAM

## 2024-07-05 PROCEDURE — 0 BUPIVACAINE LIPOSOME 1.3 % SUSPENSION: Performed by: SURGERY

## 2024-07-05 PROCEDURE — 25810000003 LACTATED RINGERS PER 1000 ML: Performed by: NURSE ANESTHETIST, CERTIFIED REGISTERED

## 2024-07-05 PROCEDURE — 25010000002 PIPERACILLIN SOD-TAZOBACTAM PER 1 G: Performed by: SURGERY

## 2024-07-05 PROCEDURE — 25010000002 ONDANSETRON PER 1 MG: Performed by: NURSE PRACTITIONER

## 2024-07-05 PROCEDURE — 25010000002 SUGAMMADEX 200 MG/2ML SOLUTION: Performed by: ANESTHESIOLOGY

## 2024-07-05 PROCEDURE — 44202 LAP ENTERECTOMY: CPT

## 2024-07-05 PROCEDURE — 25010000002 PIPERACILLIN SOD-TAZOBACTAM PER 1 G: Performed by: STUDENT IN AN ORGANIZED HEALTH CARE EDUCATION/TRAINING PROGRAM

## 2024-07-05 PROCEDURE — 25010000002 ONDANSETRON PER 1 MG: Performed by: SURGERY

## 2024-07-05 PROCEDURE — 25010000002 POTASSIUM CHLORIDE 10 MEQ/100ML SOLUTION: Performed by: STUDENT IN AN ORGANIZED HEALTH CARE EDUCATION/TRAINING PROGRAM

## 2024-07-05 PROCEDURE — C9290 INJ, BUPIVACAINE LIPOSOME: HCPCS | Performed by: SURGERY

## 2024-07-05 PROCEDURE — 25010000002 FENTANYL CITRATE (PF) 50 MCG/ML SOLUTION: Performed by: NURSE ANESTHETIST, CERTIFIED REGISTERED

## 2024-07-05 PROCEDURE — 43246 EGD PLACE GASTROSTOMY TUBE: CPT | Performed by: SURGERY

## 2024-07-05 PROCEDURE — 44202 LAP ENTERECTOMY: CPT | Performed by: SURGERY

## 2024-07-05 DEVICE — PROXIMATE RELOADABLE LINEAR CUTTER WITH SAFETY LOCK-OUT, 75MM
Type: IMPLANTABLE DEVICE | Site: ABDOMEN | Status: FUNCTIONAL
Brand: PROXIMATE

## 2024-07-05 DEVICE — PROXIMATE LINEAR CUTTER RELOAD, BLUE, 75MM
Type: IMPLANTABLE DEVICE | Site: ABDOMEN | Status: FUNCTIONAL
Brand: PROXIMATE

## 2024-07-05 DEVICE — HORIZON TI ML 6 CLIPS/CART
Type: IMPLANTABLE DEVICE | Site: ABDOMEN | Status: FUNCTIONAL
Brand: WECK

## 2024-07-05 RX ORDER — POTASSIUM CHLORIDE 7.45 MG/ML
10 INJECTION INTRAVENOUS
Status: DISPENSED | OUTPATIENT
Start: 2024-07-05 | End: 2024-07-05

## 2024-07-05 RX ORDER — ONDANSETRON 2 MG/ML
4 INJECTION INTRAMUSCULAR; INTRAVENOUS ONCE AS NEEDED
Status: DISCONTINUED | OUTPATIENT
Start: 2024-07-05 | End: 2024-07-05 | Stop reason: HOSPADM

## 2024-07-05 RX ORDER — SODIUM CHLORIDE 0.9 % (FLUSH) 0.9 %
3 SYRINGE (ML) INJECTION EVERY 12 HOURS SCHEDULED
Status: CANCELLED | OUTPATIENT
Start: 2024-07-05

## 2024-07-05 RX ORDER — NALOXONE HCL 0.4 MG/ML
0.2 VIAL (ML) INJECTION AS NEEDED
Status: DISCONTINUED | OUTPATIENT
Start: 2024-07-05 | End: 2024-07-05 | Stop reason: HOSPADM

## 2024-07-05 RX ORDER — HYDROMORPHONE HCL/0.9% NACL/PF 10 MG/50ML
PATIENT CONTROLLED ANALGESIA SYRINGE INTRAVENOUS CONTINUOUS
Status: DISCONTINUED | OUTPATIENT
Start: 2024-07-05 | End: 2024-07-06

## 2024-07-05 RX ORDER — IPRATROPIUM BROMIDE AND ALBUTEROL SULFATE 2.5; .5 MG/3ML; MG/3ML
3 SOLUTION RESPIRATORY (INHALATION) ONCE AS NEEDED
Status: DISCONTINUED | OUTPATIENT
Start: 2024-07-05 | End: 2024-07-05 | Stop reason: HOSPADM

## 2024-07-05 RX ORDER — SUCCINYLCHOLINE/SOD CL,ISO/PF 200MG/10ML
SYRINGE (ML) INTRAVENOUS AS NEEDED
Status: DISCONTINUED | OUTPATIENT
Start: 2024-07-05 | End: 2024-07-05 | Stop reason: SURG

## 2024-07-05 RX ORDER — MAGNESIUM HYDROXIDE 1200 MG/15ML
LIQUID ORAL AS NEEDED
Status: DISCONTINUED | OUTPATIENT
Start: 2024-07-05 | End: 2024-07-05 | Stop reason: HOSPADM

## 2024-07-05 RX ORDER — SODIUM CHLORIDE 9 MG/ML
30 INJECTION, SOLUTION INTRAVENOUS CONTINUOUS PRN
Status: DISCONTINUED | OUTPATIENT
Start: 2024-07-05 | End: 2024-07-17 | Stop reason: HOSPADM

## 2024-07-05 RX ORDER — HYDROMORPHONE HYDROCHLORIDE 1 MG/ML
0.5 INJECTION, SOLUTION INTRAMUSCULAR; INTRAVENOUS; SUBCUTANEOUS
Status: DISCONTINUED | OUTPATIENT
Start: 2024-07-05 | End: 2024-07-05 | Stop reason: HOSPADM

## 2024-07-05 RX ORDER — EPHEDRINE SULFATE 50 MG/ML
5 INJECTION, SOLUTION INTRAVENOUS ONCE AS NEEDED
Status: DISCONTINUED | OUTPATIENT
Start: 2024-07-05 | End: 2024-07-05 | Stop reason: HOSPADM

## 2024-07-05 RX ORDER — OXYCODONE AND ACETAMINOPHEN 7.5; 325 MG/1; MG/1
1 TABLET ORAL EVERY 4 HOURS PRN
Status: DISCONTINUED | OUTPATIENT
Start: 2024-07-05 | End: 2024-07-05 | Stop reason: HOSPADM

## 2024-07-05 RX ORDER — SODIUM CHLORIDE, SODIUM LACTATE, POTASSIUM CHLORIDE, CALCIUM CHLORIDE 600; 310; 30; 20 MG/100ML; MG/100ML; MG/100ML; MG/100ML
9 INJECTION, SOLUTION INTRAVENOUS CONTINUOUS
Status: CANCELLED | OUTPATIENT
Start: 2024-07-05

## 2024-07-05 RX ORDER — MIDAZOLAM HYDROCHLORIDE 1 MG/ML
1 INJECTION INTRAMUSCULAR; INTRAVENOUS
Status: CANCELLED | OUTPATIENT
Start: 2024-07-05

## 2024-07-05 RX ORDER — NALOXONE HCL 0.4 MG/ML
0.1 VIAL (ML) INJECTION
Status: DISCONTINUED | OUTPATIENT
Start: 2024-07-05 | End: 2024-07-17 | Stop reason: HOSPADM

## 2024-07-05 RX ORDER — BUPIVACAINE HYDROCHLORIDE AND EPINEPHRINE 5; 5 MG/ML; UG/ML
INJECTION, SOLUTION EPIDURAL; INTRACAUDAL; PERINEURAL AS NEEDED
Status: DISCONTINUED | OUTPATIENT
Start: 2024-07-05 | End: 2024-07-05 | Stop reason: HOSPADM

## 2024-07-05 RX ORDER — FENTANYL CITRATE 50 UG/ML
INJECTION, SOLUTION INTRAMUSCULAR; INTRAVENOUS AS NEEDED
Status: DISCONTINUED | OUTPATIENT
Start: 2024-07-05 | End: 2024-07-05 | Stop reason: SURG

## 2024-07-05 RX ORDER — DIPHENHYDRAMINE HYDROCHLORIDE 50 MG/ML
12.5 INJECTION INTRAMUSCULAR; INTRAVENOUS
Status: DISCONTINUED | OUTPATIENT
Start: 2024-07-05 | End: 2024-07-05 | Stop reason: HOSPADM

## 2024-07-05 RX ORDER — HYDRALAZINE HYDROCHLORIDE 20 MG/ML
5 INJECTION INTRAMUSCULAR; INTRAVENOUS
Status: DISCONTINUED | OUTPATIENT
Start: 2024-07-05 | End: 2024-07-05 | Stop reason: HOSPADM

## 2024-07-05 RX ORDER — SODIUM CHLORIDE 0.9 % (FLUSH) 0.9 %
3-10 SYRINGE (ML) INJECTION AS NEEDED
Status: CANCELLED | OUTPATIENT
Start: 2024-07-05

## 2024-07-05 RX ORDER — FENTANYL CITRATE 50 UG/ML
50 INJECTION, SOLUTION INTRAMUSCULAR; INTRAVENOUS
Status: DISCONTINUED | OUTPATIENT
Start: 2024-07-05 | End: 2024-07-05 | Stop reason: HOSPADM

## 2024-07-05 RX ORDER — LABETALOL HYDROCHLORIDE 5 MG/ML
5 INJECTION, SOLUTION INTRAVENOUS
Status: DISCONTINUED | OUTPATIENT
Start: 2024-07-05 | End: 2024-07-05 | Stop reason: HOSPADM

## 2024-07-05 RX ORDER — FLUMAZENIL 0.1 MG/ML
0.2 INJECTION INTRAVENOUS AS NEEDED
Status: DISCONTINUED | OUTPATIENT
Start: 2024-07-05 | End: 2024-07-05 | Stop reason: HOSPADM

## 2024-07-05 RX ORDER — HYDROCODONE BITARTRATE AND ACETAMINOPHEN 5; 325 MG/1; MG/1
1 TABLET ORAL ONCE AS NEEDED
Status: DISCONTINUED | OUTPATIENT
Start: 2024-07-05 | End: 2024-07-05 | Stop reason: HOSPADM

## 2024-07-05 RX ORDER — ROCURONIUM BROMIDE 10 MG/ML
INJECTION, SOLUTION INTRAVENOUS AS NEEDED
Status: DISCONTINUED | OUTPATIENT
Start: 2024-07-05 | End: 2024-07-05 | Stop reason: SURG

## 2024-07-05 RX ORDER — SODIUM CHLORIDE, SODIUM LACTATE, POTASSIUM CHLORIDE, CALCIUM CHLORIDE 600; 310; 30; 20 MG/100ML; MG/100ML; MG/100ML; MG/100ML
INJECTION, SOLUTION INTRAVENOUS CONTINUOUS PRN
Status: DISCONTINUED | OUTPATIENT
Start: 2024-07-05 | End: 2024-07-05 | Stop reason: SURG

## 2024-07-05 RX ORDER — FAMOTIDINE 10 MG/ML
20 INJECTION, SOLUTION INTRAVENOUS ONCE
Status: CANCELLED | OUTPATIENT
Start: 2024-07-05 | End: 2024-07-05

## 2024-07-05 RX ORDER — LIDOCAINE HYDROCHLORIDE 10 MG/ML
0.5 INJECTION, SOLUTION INFILTRATION; PERINEURAL ONCE AS NEEDED
Status: CANCELLED | OUTPATIENT
Start: 2024-07-05

## 2024-07-05 RX ORDER — FENTANYL CITRATE 50 UG/ML
50 INJECTION, SOLUTION INTRAMUSCULAR; INTRAVENOUS ONCE AS NEEDED
Status: CANCELLED | OUTPATIENT
Start: 2024-07-05

## 2024-07-05 RX ORDER — PROPOFOL 10 MG/ML
INJECTION, EMULSION INTRAVENOUS AS NEEDED
Status: DISCONTINUED | OUTPATIENT
Start: 2024-07-05 | End: 2024-07-05 | Stop reason: SURG

## 2024-07-05 RX ORDER — DEXAMETHASONE SODIUM PHOSPHATE 4 MG/ML
INJECTION, SOLUTION INTRA-ARTICULAR; INTRALESIONAL; INTRAMUSCULAR; INTRAVENOUS; SOFT TISSUE AS NEEDED
Status: DISCONTINUED | OUTPATIENT
Start: 2024-07-05 | End: 2024-07-05 | Stop reason: SURG

## 2024-07-05 RX ADMIN — HYDROMORPHONE HYDROCHLORIDE 0.5 MG: 1 INJECTION, SOLUTION INTRAMUSCULAR; INTRAVENOUS; SUBCUTANEOUS at 00:39

## 2024-07-05 RX ADMIN — PROPOFOL 160 MG: 10 INJECTION, EMULSION INTRAVENOUS at 14:18

## 2024-07-05 RX ADMIN — HYDROMORPHONE HYDROCHLORIDE 0.5 MG: 1 INJECTION, SOLUTION INTRAMUSCULAR; INTRAVENOUS; SUBCUTANEOUS at 16:57

## 2024-07-05 RX ADMIN — ONDANSETRON 4 MG: 2 INJECTION INTRAMUSCULAR; INTRAVENOUS at 16:01

## 2024-07-05 RX ADMIN — Medication 10 ML: at 09:08

## 2024-07-05 RX ADMIN — SODIUM CHLORIDE, POTASSIUM CHLORIDE, SODIUM LACTATE AND CALCIUM CHLORIDE: 600; 310; 30; 20 INJECTION, SOLUTION INTRAVENOUS at 14:04

## 2024-07-05 RX ADMIN — ONDANSETRON 4 MG: 2 INJECTION INTRAMUSCULAR; INTRAVENOUS at 22:56

## 2024-07-05 RX ADMIN — DEXAMETHASONE SODIUM PHOSPHATE 6 MG: 4 INJECTION, SOLUTION INTRAMUSCULAR; INTRAVENOUS at 14:25

## 2024-07-05 RX ADMIN — HYDROMORPHONE HYDROCHLORIDE 0.5 MG: 1 INJECTION, SOLUTION INTRAMUSCULAR; INTRAVENOUS; SUBCUTANEOUS at 14:58

## 2024-07-05 RX ADMIN — FENTANYL CITRATE 50 MCG: 50 INJECTION, SOLUTION INTRAMUSCULAR; INTRAVENOUS at 14:30

## 2024-07-05 RX ADMIN — HYDROXYCHLOROQUINE SULFATE 200 MG: 200 TABLET, FILM COATED ORAL at 09:08

## 2024-07-05 RX ADMIN — PIPERACILLIN AND TAZOBACTAM 3.38 G: 3; .375 INJECTION, POWDER, FOR SOLUTION INTRAVENOUS at 04:25

## 2024-07-05 RX ADMIN — SUGAMMADEX 200 MG: 100 INJECTION, SOLUTION INTRAVENOUS at 16:14

## 2024-07-05 RX ADMIN — POTASSIUM CHLORIDE 10 MEQ: 7.46 INJECTION, SOLUTION INTRAVENOUS at 11:42

## 2024-07-05 RX ADMIN — ROCURONIUM BROMIDE 20 MG: 10 INJECTION, SOLUTION INTRAVENOUS at 14:21

## 2024-07-05 RX ADMIN — HYDROMORPHONE HYDROCHLORIDE 0.5 MG: 1 INJECTION, SOLUTION INTRAMUSCULAR; INTRAVENOUS; SUBCUTANEOUS at 09:07

## 2024-07-05 RX ADMIN — SODIUM CHLORIDE 125 ML/HR: 9 INJECTION, SOLUTION INTRAVENOUS at 18:44

## 2024-07-05 RX ADMIN — FENTANYL CITRATE 50 MCG: 50 INJECTION, SOLUTION INTRAMUSCULAR; INTRAVENOUS at 16:48

## 2024-07-05 RX ADMIN — HYDROMORPHONE HYDROCHLORIDE 0.5 MG: 1 INJECTION, SOLUTION INTRAMUSCULAR; INTRAVENOUS; SUBCUTANEOUS at 17:07

## 2024-07-05 RX ADMIN — Medication: at 17:14

## 2024-07-05 RX ADMIN — PANTOPRAZOLE SODIUM 40 MG: 40 INJECTION, POWDER, FOR SOLUTION INTRAVENOUS at 20:12

## 2024-07-05 RX ADMIN — PANTOPRAZOLE SODIUM 40 MG: 40 INJECTION, POWDER, FOR SOLUTION INTRAVENOUS at 09:07

## 2024-07-05 RX ADMIN — POTASSIUM CHLORIDE 10 MEQ: 7.46 INJECTION, SOLUTION INTRAVENOUS at 10:25

## 2024-07-05 RX ADMIN — SODIUM CHLORIDE 125 ML/HR: 9 INJECTION, SOLUTION INTRAVENOUS at 23:27

## 2024-07-05 RX ADMIN — PIPERACILLIN AND TAZOBACTAM 3.38 G: 3; .375 INJECTION, POWDER, FOR SOLUTION INTRAVENOUS at 12:13

## 2024-07-05 RX ADMIN — PIPERACILLIN AND TAZOBACTAM 3.38 G: 3; .375 INJECTION, POWDER, FOR SOLUTION INTRAVENOUS at 20:12

## 2024-07-05 RX ADMIN — POTASSIUM CHLORIDE 10 MEQ: 7.46 INJECTION, SOLUTION INTRAVENOUS at 09:06

## 2024-07-05 RX ADMIN — Medication 100 MG: at 14:18

## 2024-07-05 RX ADMIN — PROMETHAZINE HYDROCHLORIDE 12.5 MG: 25 INJECTION INTRAMUSCULAR; INTRAVENOUS at 12:43

## 2024-07-05 RX ADMIN — PROMETHAZINE HYDROCHLORIDE 12.5 MG: 25 INJECTION INTRAMUSCULAR; INTRAVENOUS at 01:44

## 2024-07-05 RX ADMIN — SODIUM CHLORIDE 125 ML/HR: 9 INJECTION, SOLUTION INTRAVENOUS at 05:24

## 2024-07-05 RX ADMIN — HYDROMORPHONE HYDROCHLORIDE 0.5 MG: 1 INJECTION, SOLUTION INTRAMUSCULAR; INTRAVENOUS; SUBCUTANEOUS at 11:42

## 2024-07-05 RX ADMIN — ROCURONIUM BROMIDE 20 MG: 10 INJECTION, SOLUTION INTRAVENOUS at 14:31

## 2024-07-05 RX ADMIN — HYDROMORPHONE HYDROCHLORIDE 0.5 MG: 1 INJECTION, SOLUTION INTRAMUSCULAR; INTRAVENOUS; SUBCUTANEOUS at 04:38

## 2024-07-05 NOTE — SIGNIFICANT NOTE
07/05/24 1108   OTHER   Discipline physical therapist   Rehab Time/Intention   Session Not Performed other (see comments)  (Noted plans for surgery today; will follow up tomorrow as appropriate.)   Recommendation   PT - Next Appointment 07/06/24

## 2024-07-05 NOTE — PROGRESS NOTES
Name: Henrietta Garrett ADMIT: 7/3/2024   : 1995  PCP: Kenya David MD    MRN: [id number] LOS: 2 days   AGE/SEX: 29 y.o. female  ROOM: [facility] OR     Subjective     Continues to endorse abominal pain.  Objective   Objective   Vital Signs  Temp:  [96.8 °F (36 °C)-98.9 °F (37.2 °C)] 98.9 °F (37.2 °C)  Heart Rate:  [60-67] 66  Resp:  [16] 16  BP: (101-131)/(59-75) 131/75  SpO2:  [94 %-100 %] 100 %  on   ;   Device (Oxygen Therapy): room air  Body mass index is 31.91 kg/m².  Physical Exam  Constitutional:       General: She is not in acute distress.     Appearance: She is ill-appearing.   HENT:      Head: Normocephalic and atraumatic.   Eyes:      Extraocular Movements: Extraocular movements intact.      Pupils: Pupils are equal, round, and reactive to light.   Pulmonary:      Effort: Pulmonary effort is normal. No respiratory distress.   Neurological:      Mental Status: She is alert and oriented to person, place, and time.         Results Review     I reviewed the patient's new clinical results.  Results from last 7 days   Lab Units 24  0436 24  0353 24  1004 24  1439   WBC 10*3/mm3 2.83* 3.03* 5.22 5.75   HEMOGLOBIN g/dL 8.3* 8.3* 9.6* 9.6*   PLATELETS 10*3/mm3 211 220 263 245     Results from last 7 days   Lab Units 24  0436 24  1358 24  0353 24  1004 24  1439   SODIUM mmol/L 135*  --  137 137 137   POTASSIUM mmol/L 3.6 3.7 3.5 3.8 4.2   CHLORIDE mmol/L 106  --  107 104 104   CO2 mmol/L 20.7*  --   --  21.4* 24.0   BUN mg/dL 3*  --  6 7 11   CREATININE mg/dL 0.65  --  0.66 0.65 0.77   GLUCOSE mg/dL 83  --  83 111* 98   Estimated Creatinine Clearance: 120.6 mL/min (by C-G formula based on SCr of 0.65 mg/dL).  Results from last 7 days   Lab Units 24  0353 24  1004 24  1439   ALBUMIN g/dL 3.3* 3.8 4.0   BILIRUBIN mg/dL 0.3 0.2 0.2   ALK PHOS U/L 43 46 43   AST (SGOT) U/L 14 16 11   ALT (SGPT) U/L 10 9 8     Results from last 7  "days   Lab Units 07/05/24  0436 07/04/24  0353 07/03/24  1004 07/02/24  1439   CALCIUM mg/dL 8.0* 9.5 9.0 9.2   ALBUMIN g/dL  --  3.3* 3.8 4.0   MAGNESIUM mg/dL  --  2.0 1.9  --    PHOSPHORUS mg/dL  --  2.9  --   --      Results from last 7 days   Lab Units 07/03/24  1015 07/03/24  1004   PROCALCITONIN ng/mL  --  <0.02   LACTATE mmol/L 1.6  --      COVID19   Date Value Ref Range Status   07/22/2022 Not Detected Not Detected - Ref. Range Final     SARS-CoV-2, DANIELLA   Date Value Ref Range Status   08/08/2022 NEGATIVE Negative Final     No results found for: \"HGBA1C\", \"POCGLU\"  Results for orders placed or performed during the hospital encounter of 07/03/24   Blood Culture - Blood, Arm, Right    Specimen: Arm, Right; Blood   Result Value Ref Range    Blood Culture No growth at 2 days          CT Abdomen Pelvis With Contrast  Narrative: CT ABDOMEN AND PELVIS WITH IV CONTRAST     HISTORY: Abdominal pain     TECHNIQUE: Radiation dose reduction techniques were utilized, including  automated exposure control and exposure modulation based on body size.   3 mm images were obtained through the abdomen and pelvis after the  administration of IV contrast.     COMPARISON: Multiple CT abdomen and pelvis dating back to 6/16/2023     FINDINGS:     Please note evaluation is suboptimal due to beam hardening and streak  artifact and patient's arms being along their side. A percutaneous  gastrostomy tube is present. The bulb of the gastrostomy tube does not  appear to be located within the gastric lumen, as before, and is  contiguous with the wall. There is a tract of air which extends to the  gastrostomy tube bulb. Findings are grossly similar to on 5/25/2024. A  few air-fluid levels are present within the small bowel which is  otherwise nondistended. The appendix is unremarkable. Mild intrahepatic  bile duct dilation is present status post cholecystectomy, as before.  The pancreas, spleen and adrenal glands have an " unremarkable  postcontrast CT appearance. There appears to be a subtle subcentimeter  right renal lesion which is too small to characterize. No  hydronephrosis.     Prominent and enlarged abdominal pelvic adenopathy is present. Enlarged  brett hepatic node measuring 1.3 cm in short axis dimension, grossly  unchanged since 6/16/2023. Prominent aortocaval node measuring 0.8 cm in  short axis dimension, as before. An anterior pericaval node more  inferiorly has increased in size, measuring 0.8 cm in short axis  dimension (previously 0.5 cm on 2/26/2024. Asymmetric right adnexal  hypodense lesion measuring up to 2.3 cm is present, as before. No free  intraperitoneal air is seen..     Large amount of hyperdense stool is present throughout the colon.     No suspicious lytic or blastic osseous lesion. The previously seen  jejunostomy tube overlying the left lower quadrant has been removed.  There is ill-defined soft tissue thickening and fat stranding extending  through the subcutaneous tissues and abutting the adjacent small bowel  in this area. A small focus of gas is present along the tract and not  definitively located in the bowel.     Impression: 1.  Findings suggestive of constipation.  2.  Previously seen jejunostomy tube has been removed with extensive  soft tissue thickening extending from the small bowel to the skin  surface. A focus of air overlying the tract extending from the small  bowel through the subcutaneous tissues. CT findings are nonspecific and  correlation with patient history is recommended to exclude an  enterocutaneous fistula and/or developing infection.  3.  Prominent and mildly enlarged abdominal pelvic nodes. An aortocaval  node as increased in size since 2/26/2024. While nonspecific, continued  attention follow-up with CT abdomen and pelvis in 3 months is  recommended to ensure resolution.  4.  Percutaneous gastrostomy tube is not located within the stomach and  appears to have been further  retracted since 2/26/2024 with the majority  of the balloon appearing to be outside of the stomach wall. A tract of  air extending from the gastric lumen into the gastrostomy balloon, as  seen on 5/25/2024.  5.  Other findings as above.        This report was finalized on 7/2/2024 4:44 PM by Dr. Domingo Sims M.D  on Workstation: [id number]       Scheduled Medications  amLODIPine, 10 mg, Oral, Daily  [Transfer Hold] dicyclomine, 10 mg, Oral, 4x Daily  [Transfer Hold] escitalopram, 5 mg, Per J Tube, Daily  gabapentin, 200 mg, Oral, TID  [Transfer Hold] hydroxychloroquine, 200 mg, Oral, BID  [Transfer Hold] pantoprazole, 40 mg, Intravenous, Q12H  piperacillin-tazobactam, 3.375 g, Intravenous, Q8H  [Transfer Hold] sodium chloride, 10 mL, Intravenous, Q12H    Infusions  sodium chloride, 125 mL/hr, Last Rate: 125 mL/hr (07/05/24 0524)    Diet  NPO Diet NPO Type: Strict NPO       Assessment/Plan     Active Hospital Problems    Diagnosis  POA    **Abdominal wall cellulitis [L03.311]  Yes    Lupus erythematosus overlap syndrome [M32.8]  Yes    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       29 y.o. female admitted with Abdominal wall cellulitis.    Abdominal wall cellulitis  Started on zosyn. Continue, follow cultures    Blood cultures NGTD     Gastroparesis   TPN. Nutrition consulted. To be started after surgery      Dislodged G-tube  To be removed and replaced with Dr. Vj zimmer      Lupus  Resume Plaquenil     Hypertension  Amlodipine. Blood pressures acceptable       SCDs for DVT prophylaxis.  Full code.  Discussed with patient.  Anticipate discharge home in 2-3 days.      Aureliano Melchor MD  [facility]  07/05/24  13:39 EDT

## 2024-07-05 NOTE — ANESTHESIA PREPROCEDURE EVALUATION
Anesthesia Evaluation     history of anesthetic complications:  PONV  NPO Solid Status: > 8 hours             Airway   Mallampati: II  TM distance: >3 FB  Neck ROM: full  No difficulty expected  Dental    (+) poor dentition    Comment: Some severe gingival disease and decayed teeth    Pulmonary    (+) asthma,shortness of breath  Cardiovascular         Neuro/Psych  (+) headaches, numbness, psychiatric history Anxiety  GI/Hepatic/Renal/Endo    (+) GERD, GI bleeding     ROS Comment: gastroparesis    Musculoskeletal         ROS comment: Ehrlos Danlos and myasthenia gravis Rheumatoid arthritis Sjogrens  Abdominal    Substance History      OB/GYN          Other   arthritis,                 Anesthesia Plan    ASA 3     general     intravenous induction     Anesthetic plan, risks, benefits, and alternatives have been provided, discussed and informed consent has been obtained with: patient.    CODE STATUS:    Code Status (Patient has no pulse and is not breathing): CPR (Attempt to Resuscitate)  Medical Interventions (Patient has pulse or is breathing): Full Support

## 2024-07-05 NOTE — PLAN OF CARE
Problem: Adult Inpatient Plan of Care  Goal: Plan of Care Review  Outcome: Ongoing, Not Progressing  Flowsheets  Taken 7/5/2024 0339 by Vicki Bagley, RN  Outcome Evaluation: Pt quiet, flat, c/o of constant general abdominal pain and towards the Lt side and back, requiring IV Dilaudid almost every 2 hours, also c/o constant nausea but no vomiting,  on IV Phenergan every 6 hours, not eating well at all, had sips of sprite with her oral meds, slept between care, assisted to the bathroom, scheduled for surgery today at 1630 with Dr Zabala, had been NPO since MN, consent already signed, chlorhexidine wipes done, VSS, voiding, Pt reports no BM since 4 days, mom at bedside  Taken 7/4/2024 1630 by Joseline Rangel RN  Progress: no change   Goal Outcome Evaluation:              Outcome Evaluation: Pt quiet, flat, c/o of constant general abdominal pain and towards the Lt side and back, requiring IV Dilaudid almost every 2 hours, also c/o constant nausea but no vomiting,  on IV Phenergan every 6 hours, not eating well at all, had sips of sprite with her oral meds, slept between care, assisted to the bathroom, scheduled for surgery today at 1630 with Dr Zabala, had been NPO since MN, consent already signed, chlorhexidine wipes done, VSS, voiding, Pt reports no BM since 4 days, mom at bedside

## 2024-07-05 NOTE — OP NOTE
Surgeon: Madhu Zabala Jr., M.D.    Assistant: Delmy Alamo PA-C    An assistant was necessary and provided valuable retraction and exposure, as well as camera holding, assistance with bowel manipulation, following the suture for the anastomosis, suction and irrigation, and wound closure.    Pre-Operative Diagnosis:     1.  Displaced G-tube  2.  Infection of previous J-tube site    Post-Operative Diagnosis:    1.  Displaced G-tube  2.  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  G-tube removal with PEG  2.  Laparoscopic assisted takedown of J-tube and small bowel resection    Indications:     The patient is a pleasant 29-year-old female with multiple medical problems who has severe gastroparesis.  She is unable to eat and has a G-tube for gastric decompression.  The G-tube has become displaced and is located in the gastric wall.  She had a J-tube in place for nutritional support but was never able to reach target rate for tube feeds due to small bowel dysmotility.  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  She presents for G-tube removal with PEG and laparoscopic assisted small bowel resection.  The patient understands the indications, alternatives, risks, and benefits of the procedure and wishes to proceed.     Procedure:     The patient was identified and taken to the operating room where she was placed in the supine position on the operating room.  She underwent general endotracheal anesthesia and was appropriate monitored throughout the case by the anesthesia personnel.      Attention was first turned to the G-tube.  A bite block was placed.  Gastroscope was introduced into the oropharynx and advanced very carefully down the esophagus and into the stomach.  The stomach was insufflated and inspected.  The site of the G-tube could be visualized and the flange was in a  completely submucosal position with a small opening allowing communication with the gastric lumen.  The mucosa had a normal appearance otherwise with no ulceration.  The PEG was removed and a guidewire was placed through the previous PEG site and easily passed into the stomach.  The guidewire was grasped with a snare and brought out through the mouth.  A new PEG was placed over the guidewire and brought to the abdominal wall in the standard fashion.  The gastroscope was returned to the stomach and the PEG appeared to be in good position with a normal appearance.  It was secured at the skin with the apparatus provided.    Attention was then turned to addressing the enterocutaneous fistula related to the previous J-tube.  The abdomen is prepped and draped in the standard surgical fashion.  Each laparoscopic incision was infiltrated with 0.5% Marcaine with epinephrine prior to making the incision.  A small right upper quadrant incision was made with a scalpel carried through the skin into the subcutaneous tissue.  A 5 mm Optiview port was then placed with laparoscopic guidance.  The abdomen was insufflated with low pressures encountered initially.  Once fully insufflated a laparoscope was inserted and the abdomen was inspected.  The site of the jejunostomy was easily identified.  There were some adhesions around the jejunum but otherwise no significant intra-abdominal adhesions.  A 5 mm port was placed in the right lower quadrant and a second 5 mm port was placed in the lower midline.  These were both placed by making a skin incision carried through the skin into the subcutaneous tissue and inserting the port through the incision into the abdomen with direct visitation in spite of using the laparoscope.  The adhesions from the omentum were then taken down from the abdominal wall to fully expose the jejunum.  The jejunum was then taken down from the abdominal wall and there was in fact an opening at the jejunostomy  site.  A limited epigastric midline incision was made with a scalpel carried through the skin into the subcutaneous tissue.  The subcutaneous tissue was divided using Bovie cautery down to the fascia which was incised and the abdomen was entered.  An Jamie wound protector was placed.  The jejunum at the GE junction that was pulled out of the incision and inspected.  There was jejunotomy at the site of the previous J-tube site.  The mesentery on either side of this jejunotomy was freed from the bowel wall and the bowel was divided with a CHARLOTTE stapling device.  The mesentery between these 2 points was divided with a hemostat and 2-0 Vicryl tie.  The staple lines of the remaining small bowel were oversewn with 3-0 silk suture.  A side-to-side functional end-to-end anastomosis was then performed in 2 layers with an inner layer of 3-0 Vicryl suture and an outer layer of 3-0 silk suture.  This provided an intact, widely patent, viable anastomosis.  The small bowel was returned to the abdomen.  The fascia was then reapproximated with #1 PDS sutures in a running fashion.  The fascia and subcu tissue was infiltrated with 0.5% Marcaine with epinephrine and Exparel.  All skin incisions were closed with a 4-0 Monocryl in a subcuticular fashion followed by Mastisol and Steri-Strips.  The sponge, needle, and instrument counts were correct at the end of the case.  The patient tolerated the procedure well and was transferred to the recovery room in stable condition.    Estimated Blood Loss:      minimal    Specimens:     Order Name Source Comment Collection Info Order Time   TISSUE PATHOLOGY EXAM Small Intestine, Jejunum SMALL INTESTINE, JEJUNUM Collected By: Madhu Zabala Jr., MD 7/5/2024  3:28 PM     Release to patient   Routine Release            Madhu Zabala Jr., M.D.

## 2024-07-05 NOTE — PROGRESS NOTES
"[facility] Clinical Pharmacy Services: PCA Consult     Henrietta Garrett has a pharmacy consult to assess opioid medication per Dr. Zabala's request based on the current protocol \"Pharmacist to discontinue PRN opioid pain medications at connection of PCA. If there is no basal rate on PCA, long-acting opioids may be continued. Scheduled opioids for pain are allowed while weaning patient off of PCA but PRN opioids should be limited to breakthrough pain only.\"     The following PRN medications have been discontinued per the protocol above:     HYDROmorphone (DILAUDID) injection 1 mg    Joan Pettit [facility]  Clinical Pharmacist    "

## 2024-07-05 NOTE — PAYOR COMM NOTE
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      PORTACATH PLACEMENT      SHOULDER SURGERY Bilateral     X3    SMALL INTESTINE SURGERY      TEETH EXTRACTION N/A 07/13/2023    Procedure: TOOTH EXTRACTION;  Surgeon: Trae Escoto DMD;  Location:  [facility];  Service: Oral Surgery;  Laterality: N/A;    TOE SURGERY Right 2011    3rd toe     UPPER GASTROINTESTINAL ENDOSCOPY  12/11/1920    Dr. Barone         FAMILY HISTORY  Family History   Problem Relation Age of Onset    Rheum arthritis Mother     Arthritis Mother     Diabetes type II Father     Hyperlipidemia Father     Diabetes Father     Asthma Sister     Migraines Sister     Tomas-Danlos syndrome Sister     Asthma Brother     No Known Problems Brother     Cancer Maternal Uncle         throat cancer     Arthritis Maternal Uncle     Leukemia Maternal Grandmother     Stroke Maternal Grandmother     Heart disease Maternal Grandmother     Rheum arthritis Maternal Grandmother     Prostate cancer Maternal Grandfather     Stroke Maternal Grandfather     Diabetes Maternal Grandfather     Cancer Paternal Grandmother     Colon cancer Paternal Grandmother     Breast cancer Neg Hx     Malig Hyperthermia Neg Hx          SOCIAL HISTORY  Social History     Socioeconomic History    Marital status: Single    Number of children: 0    Years of  education: High School   Tobacco Use    Smoking status: Never    Smokeless tobacco: Never   Vaping Use    Vaping status: Never Used   Substance and Sexual Activity    Alcohol use: No    Drug use: No    Sexual activity: Not Currently     Partners: Male     Birth control/protection: Birth control pill         ALLERGIES  Anesthetics, amide; Ciprofloxacin; Compazine [prochlorperazine]; Domperidone; Droperidol; Haldol [haloperidol]; Metoclopramide; and Sulfa antibiotics        REVIEW OF SYSTEMS  Review of Systems     All systems reviewed and negative except for those discussed in HPI.       PHYSICAL EXAM    I have reviewed the triage vital signs and nursing notes.    ED Triage Vitals [07/03/24 0842]   Temp Pulse Resp BP SpO2   99.5 °F (37.5 °C) -- -- -- --      Temp src Heart Rate Source Patient Position BP Location FiO2 (%)   Tympanic -- -- -- --       GENERAL: This is a young female that looks older than her stated age.  She appears chronically ill and frail.  She does not appear septic or toxic.  Vital signs on my initial evaluation have been reviewed.  She does have a low-grade.  Blood pressure, heart rate and oxygen saturation are normal.  Temperature 99.5.  HENT: nares patent  Head/neck/ face are symmetric without gross deformity, signs of trauma, or swelling  EYES: no scleral icterus, no conjunctival pallor.  NECK: Supple, no meningismus  CV: regular rhythm, regular rate with intact distal pulses.  RESPIRATORY: normal effort and no respiratory distress.  Clear to auscultation bilaterally  ABDOMEN: soft and tenderness located to her abdomen at the stoma site.  There is some dried purulent drainage.  When I press down I cannot elucidate any pus.  There is some dried bleeding in that area as well but no active bleeding.  She has tenderness in that region.  There is no obvious rash.  There is no guarding she has positive bowel sounds.  MUSCULOSKELETAL: no deformity.  Intact distal pulses that are equal strong and  symmetric.  NEURO: alert and appropriate, moves all extremities, follows commands.  No focal motor or sensory changes  SKIN: warm, dry    Vital signs and nursing notes reviewed.        LAB RESULTS  Recent Results (from the past 24 hour(s))   Comprehensive Metabolic Panel    Collection Time: 07/03/24 10:04 AM    Specimen: Blood   Result Value Ref Range    Glucose 111 (H) 65 - 99 mg/dL    BUN 7 6 - 20 mg/dL    Creatinine 0.65 0.57 - 1.00 mg/dL    Sodium 137 136 - 145 mmol/L    Potassium 3.8 3.5 - 5.2 mmol/L    Chloride 104 98 - 107 mmol/L    CO2 21.4 (L) 22.0 - 29.0 mmol/L    Calcium 9.0 8.6 - 10.5 mg/dL    Total Protein 8.1 6.0 - 8.5 g/dL    Albumin 3.8 3.5 - 5.2 g/dL    ALT (SGPT) 9 1 - 33 U/L    AST (SGOT) 16 1 - 32 U/L    Alkaline Phosphatase 46 39 - 117 U/L    Total Bilirubin 0.2 0.0 - 1.2 mg/dL    Globulin 4.3 gm/dL    A/G Ratio 0.9 g/dL    BUN/Creatinine Ratio 10.8 7.0 - 25.0    Anion Gap 11.6 5.0 - 15.0 mmol/L    eGFR 122.4 >60.0 mL/min/1.73   Protime-INR    Collection Time: 07/03/24 10:04 AM    Specimen: Blood   Result Value Ref Range    Protime 13.9 11.7 - 14.2 Seconds    INR 1.05 0.90 - 1.10   Lipase    Collection Time: 07/03/24 10:04 AM    Specimen: Blood   Result Value Ref Range    Lipase 21 13 - 60 U/L   hCG, Serum, Qualitative    Collection Time: 07/03/24 10:04 AM    Specimen: Blood   Result Value Ref Range    HCG Qualitative Negative Negative   Procalcitonin    Collection Time: 07/03/24 10:04 AM    Specimen: Blood   Result Value Ref Range    Procalcitonin <0.02 0.00 - 0.25 ng/mL   Magnesium    Collection Time: 07/03/24 10:04 AM    Specimen: Blood   Result Value Ref Range    Magnesium 1.9 1.6 - 2.6 mg/dL   CBC Auto Differential    Collection Time: 07/03/24 10:04 AM    Specimen: Blood   Result Value Ref Range    WBC 5.22 3.40 - 10.80 10*3/mm3    RBC 3.79 3.77 - 5.28 10*6/mm3    Hemoglobin 9.6 (L) 12.0 - 15.9 g/dL    Hematocrit 30.5 (L) 34.0 - 46.6 %    MCV 80.5 79.0 - 97.0 fL    MCH 25.3 (L) 26.6 - 33.0  pg    MCHC 31.5 31.5 - 35.7 g/dL    RDW 15.1 12.3 - 15.4 %    RDW-SD 43.6 37.0 - 54.0 fl    MPV 10.4 6.0 - 12.0 fL    Platelets 263 140 - 450 10*3/mm3    Neutrophil % 70.1 42.7 - 76.0 %    Lymphocyte % 18.8 (L) 19.6 - 45.3 %    Monocyte % 8.6 5.0 - 12.0 %    Eosinophil % 2.1 0.3 - 6.2 %    Basophil % 0.2 0.0 - 1.5 %    Immature Grans % 0.2 0.0 - 0.5 %    Neutrophils, Absolute 3.66 1.70 - 7.00 10*3/mm3    Lymphocytes, Absolute 0.98 0.70 - 3.10 10*3/mm3    Monocytes, Absolute 0.45 0.10 - 0.90 10*3/mm3    Eosinophils, Absolute 0.11 0.00 - 0.40 10*3/mm3    Basophils, Absolute 0.01 0.00 - 0.20 10*3/mm3    Immature Grans, Absolute 0.01 0.00 - 0.05 10*3/mm3    nRBC 0.0 0.0 - 0.2 /100 WBC   Lactic Acid, Plasma    Collection Time: 07/03/24 10:15 AM    Specimen: Blood   Result Value Ref Range    Lactate 1.6 0.5 - 2.0 mmol/L       Ordered the above labs and independently reviewed the results.        RADIOLOGY  No Radiology Exams Resulted Within Past 24 Hours    I ordered the above noted radiological studies. Reviewed by me and discussed with radiologist.  See dictation for official radiology interpretation.      PROCEDURES    Procedures      MEDICATIONS GIVEN IN ER    Medications   sodium chloride 0.9 % flush 10 mL (has no administration in time range)   sodium chloride 0.9 % infusion (125 mL/hr Intravenous Currently Infusing 7/3/24 7582)   sodium chloride 0.9 % flush 10 mL (has no administration in time range)   sodium chloride 0.9 % flush 10 mL (has no administration in time range)   sodium chloride 0.9 % infusion 40 mL (has no administration in time range)   sennosides-docusate (PERICOLACE) 8.6-50 MG per tablet 2 tablet (has no administration in time range)     And   polyethylene glycol (MIRALAX) packet 17 g (has no administration in time range)     And   bisacodyl (DULCOLAX) EC tablet 5 mg (has no administration in time range)     And   bisacodyl (DULCOLAX) suppository 10 mg (has no administration in time range)    Potassium Replacement - Follow Nurse / BPA Driven Protocol (has no administration in time range)   Magnesium Standard Dose Replacement - Follow Nurse / BPA Driven Protocol (has no administration in time range)   Phosphorus Replacement - Follow Nurse / BPA Driven Protocol (has no administration in time range)   Calcium Replacement - Follow Nurse / BPA Driven Protocol (has no administration in time range)   Pharmacy to Dose Zosyn (has no administration in time range)   piperacillin-tazobactam (ZOSYN) 3.375 g IVPB in 100 mL NS MBP (CD) (has no administration in time range)   sodium chloride 0.9 % bolus 500 mL (0 mL Intravenous Stopped 7/3/24 1226)   HYDROmorphone (DILAUDID) injection 0.5 mg (0.5 mg Intravenous Given 7/3/24 1013)   ondansetron (ZOFRAN) injection 4 mg (4 mg Intravenous Given 7/3/24 1012)   HYDROmorphone (DILAUDID) injection 0.5 mg (0.5 mg Intravenous Given 7/3/24 1301)   piperacillin-tazobactam (ZOSYN) 3.375 g IVPB in 100 mL NS MBP (CD) (0 g Intravenous Stopped 7/3/24 1457)         All labs have been independently reviewed by me.  All radiology studies have been reviewed by me and I discussed with radiologist dictating the report when indicated below.  All EKG's independently viewed and interpreted by me.  Discussion below represents my analysis of pertinent findings related to patient's condition, differential diagnosis, treatment plan and final disposition.        PROGRESS, DATA ANALYSIS, CONSULTS, AND MEDICAL DECISION MAKING    This is a patient that presents with persistent and worsening abdominal pain which now has some bleeding and purulent drainage.  Recently had an infection at the jejunostomy site in the jejunostomy tube was removed 2 weeks ago.  Dr. Zabala from general surgery has been caring for her for this.  She finished antibiotics 10 days ago.  Her symptoms are worsened and again her visiting nurse practitioner told her to come here to the emergency department and get admitted and she  needed antibiotics for this infection.  I talked with her and the mother at length I informed them about the lab work from yesterday as well as the CT scan report.  Informed of the test that we will order.  All questions answered at this time.      ED Course as of 07/03/24 1614   Wed Jul 03, 2024   1307 I did discuss the case with Dr. Panda Zabala who is on for general surgery.  He knows this patient very well.  He states that this patient frequently complains of abdominal pain.  He is happy to consult on this patient.  He would like medicine to admit the patient.  She has multiple other comorbidities.  Agrees with starting antibiotics. [MM]   1332 I did discuss the case with Dr. Melchor who is on for [facility].  Informed him of the patient's presenting symptoms and results of the test.  He agrees to admit the patient to the hospital.  All questions answered [MM]   1334 Dr. Zabala looked at the CT scan and he is going into see the patient right at this time.  He wants me to hold on the antibiotics till he sees and evaluates the patient at this time. [MM]   1354 Dr. Zabala has seen and evaluated this patient.  He goes ahead and recommends starting the antibiotics on this patient. [MM]      ED Course User Index  [MM] Liam Hays MD       AS OF 16:14 EDT VITALS:    BP - 114/73  HR - 78  TEMP - 98.6 °F (37 °C) (Oral)  02 SATS - 99%    SOCIAL DETERMINANTS OF HEALTH THAT IMPACT OR LIMIT CARE (For example..Homelessness,safe discharge, inability to obtain care, follow up, or prescriptions):      DIAGNOSIS  Final diagnoses:   Epigastric pain at stoma   Gastroparesis   Abdominal wall cellulitis         DISPOSITION  I have reviewed the test results with my patient and explained the current treatment plan.  I answered all the patient's questions and concerns.  The patient is hemodynamically stable and is in no distress and is appropriate to be admitted to a medical/surgical floor bed at this time.            DICTATED UTILIZING DRAGON  DICTATION    Note Disclaimer: At [facility], we believe that sharing information builds trust and better relationships. You are receiving this note because you recently visited [facility]. It is possible you will see health information before a provider has talked with you about it. This kind of information can be easy to misunderstand. To help you fully understand what it means for your health, we urge you to discuss this note with your provider.       Liam Hays MD  07/03/24 1614      Electronically signed by Liam Hays MD at 07/03/24 1614       Earnest Rao, RN at 07/03/24 0841          Patient to ED via pv from home. Patient c/o nausea and bleeding from her stoma and pain around stoma site that began this morning.     Electronically signed by Earnest Rao RN at 07/03/24 0842       Vital Signs (last 3 days)       Date/Time Temp Temp src Pulse Resp BP Patient Position SpO2    07/05/24 0920 98 (36.7) Oral 66 16 111/67 Lying 98    07/05/24 0528 96.8 (36) Oral 60 16 101/59 Lying 97    07/04/24 2120 97.1 (36.2) Oral 67 16 104/64 Sitting 98    07/04/24 1719 98.8 (37.1) Oral 62 16 123/74 Lying 94    07/04/24 1000 97.3 (36.3) Oral 70 16 115/61 Lying 98    07/04/24 0500 97.2 (36.2) Oral 62 16 101/57 Lying 97    07/03/24 2140 96.3 (35.7) Oral 58 16 126/71 Lying 98    07/03/24 1802 97.4 (36.3) Oral 67 16 125/77 Lying --    07/03/24 1722 -- -- 64 -- -- -- --    07/03/24 1721 -- -- 61 -- 124/62 -- --    07/03/24 1651 -- -- 64 -- 116/76 -- --    07/03/24 1605 98.6 (37) Oral -- -- -- -- --    07/03/24 1508 -- -- 78 -- -- -- --    07/03/24 1507 -- -- 75 -- 114/73 -- 99    07/03/24 1322 -- -- 66 -- -- -- 97    07/03/24 1321 -- -- 65 -- 111/63 -- 97    07/03/24 1222 -- -- 65 -- -- -- 98    07/03/24 1221 -- -- 64 -- 123/67 -- 98    07/03/24 1145 -- -- 69 -- -- -- 100    07/03/24 1051 -- -- 68 -- 117/71 -- 100    07/03/24 1050 -- -- 78 -- -- -- 100    07/03/24 1021 -- -- 70 -- 124/81 -- 99    07/03/24 09:37:51  -- -- 92 18 138/89 Lying 95    07/03/24 0842 99.5 (37.5) Tympanic -- -- -- -- --          Oxygen Therapy (last 3 days)       Date/Time SpO2 Device (Oxygen Therapy) Flow (L/min) Oxygen Concentration (%) ETCO2 (mmHg)    07/05/24 0920 98 room air -- -- --    07/05/24 0850 -- room air -- -- --    07/05/24 0528 97 room air -- -- --    07/04/24 2120 98 room air -- -- --    07/04/24 1948 -- room air -- -- --    07/04/24 1719 94 room air -- -- --    07/04/24 1000 98 room air -- -- --    07/04/24 0853 -- room air -- -- --    07/04/24 0500 97 room air -- -- --    07/03/24 2140 98 room air -- -- --    07/03/24 2020 -- room air -- -- --    07/03/24 1814 -- room air -- -- --    07/03/24 1802 -- room air -- -- --    07/03/24 1507 99 -- -- -- --    07/03/24 1322 97 -- -- -- --    07/03/24 1321 97 -- -- -- --    07/03/24 1222 98 -- -- -- --    07/03/24 1221 98 -- -- -- --    07/03/24 1145 100 -- -- -- --    07/03/24 1051 100 -- -- -- --    07/03/24 1050 100 -- -- -- --    07/03/24 1021 99 -- -- -- --    07/03/24 09:37:51 95 room air -- -- --          Lines, Drains & Airways       Active LDAs       Name Placement date Placement time Site Days    Peripheral IV 07/03/24 1003 Right Antecubital 07/03/24  1003  Antecubital  2    Gastrostomy/Enterostomy Percutaneous endoscopic gastrostomy (PEG) 20 Fr. LUQ 02/15/24  0854  LUQ  141    Gastrostomy/Enterostomy Jejunostomy 1 16 Fr. LUQ 02/19/24  1050  LUQ  137    Single Lumen Implantable Port Right Subclavian --  --  Subclavian  --              Inactive LDAs       Name Placement date Placement time Removal date Removal time Site Days    [REMOVED] Peripheral IV 07/02/24 1439 Right Antecubital 07/02/24  1439  07/02/24  1800  Antecubital  less than 1                  Facility-Administered Medications as of 7/5/2024   Medication Dose Route Frequency Provider Last Rate Last Admin    amLODIPine (NORVASC) tablet 10 mg  10 mg Oral Daily Aureliano Melchor MD   10 mg at 07/03/24 5051    sennosides-docusate  (PERICOLACE) 8.6-50 MG per tablet 2 tablet  2 tablet Oral BID PRN Aureliano Melchor MD        And    polyethylene glycol (MIRALAX) packet 17 g  17 g Oral Daily PRN Aureliano Melchor MD        And    bisacodyl (DULCOLAX) EC tablet 5 mg  5 mg Oral Daily PRN Aureliano Melchor MD        And    bisacodyl (DULCOLAX) suppository 10 mg  10 mg Rectal Daily PRN Aureliano Melchor MD        Calcium Replacement - Follow Nurse / BPA Driven Protocol   Does not apply PRN Aureliano Melchor MD        dicyclomine (BENTYL) capsule 10 mg  10 mg Oral 4x Daily Aureliano Melchor MD   10 mg at 07/04/24 2132    escitalopram (LEXAPRO) tablet 5 mg  5 mg Per J Tube Daily Aureliano Melchor MD   5 mg at 07/04/24 0901    gabapentin (NEURONTIN) capsule 200 mg  200 mg Oral TID Aureliano Melchor MD   200 mg at 07/04/24 2132    [COMPLETED] HYDROmorphone (DILAUDID) injection 0.5 mg  0.5 mg Intravenous Once Liam Hays MD   0.5 mg at 07/03/24 1013    [COMPLETED] HYDROmorphone (DILAUDID) injection 0.5 mg  0.5 mg Intravenous Once Liam Hays MD   0.5 mg at 07/03/24 1301    [COMPLETED] HYDROmorphone (DILAUDID) injection 0.5 mg  0.5 mg Intravenous Once PRN Wilfrid Graves MD   0.5 mg at 07/03/24 2047    [COMPLETED] HYDROmorphone (DILAUDID) injection 0.5 mg  0.5 mg Intravenous Once Marleny Barksdale APRN   0.5 mg at 07/04/24 0313    HYDROmorphone (DILAUDID) injection 0.5 mg  0.5 mg Intravenous Q2H PRN Aureliano Melchor MD   0.5 mg at 07/05/24 1142    hydroxychloroquine (PLAQUENIL) tablet 200 mg  200 mg Oral BID Aureliano Melchor MD   200 mg at 07/05/24 0908    Magnesium Standard Dose Replacement - Follow Nurse / BPA Driven Protocol   Does not apply PRN Aureliano Melchor MD        [COMPLETED] ondansetron (ZOFRAN) injection 4 mg  4 mg Intravenous Once Liam Hays MD   4 mg at 07/03/24 1012    ondansetron (ZOFRAN) injection 4 mg  4 mg Intravenous Q6H PRN Marleny Barksdale APRN   4 mg at 07/04/24 0905    pantoprazole (PROTONIX) injection 40 mg  40 mg Intravenous Q12H Izabela Landis  D, APRN   40 mg at 24 0907    Phosphorus Replacement - Follow Nurse / BPA Driven Protocol   Does not apply PRN Aureliano Melchor MD        [COMPLETED] piperacillin-tazobactam (ZOSYN) 3.375 g IVPB in 100 mL NS MBP (CD)  3.375 g Intravenous Once Liam Hays MD   Stopped at 24 1457    piperacillin-tazobactam (ZOSYN) 3.375 g IVPB in 100 mL NS MBP (CD)  3.375 g Intravenous Q8H Aureliano Melchor MD 0 mL/hr at 24 0856 3.375 g at 24 1213    [] potassium chloride 10 mEq in 100 mL IVPB  10 mEq Intravenous Q1H Aureliano Melchor  mL/hr at 24 1142 10 mEq at 24 1142    Potassium Replacement - Follow Nurse / BPA Driven Protocol   Does not apply PRN Aureliaon Melchor MD        promethazine (PHENERGAN) 12.5 mg in sodium chloride 0.9 % 50 mL  12.5 mg Intravenous Q6H PRN Madhu Zabala Jr., MD   12.5 mg at 24 1243    [COMPLETED] sodium chloride 0.9 % bolus 500 mL  500 mL Intravenous Once Liam Hays MD   Stopped at 24 1226    sodium chloride 0.9 % flush 10 mL  10 mL Intravenous PRN Liam Hays MD        sodium chloride 0.9 % flush 10 mL  10 mL Intravenous Q12H Aureliano Melchor MD   10 mL at 24 0908    sodium chloride 0.9 % flush 10 mL  10 mL Intravenous PRN Aureliano Melchor MD        sodium chloride 0.9 % infusion 40 mL  40 mL Intravenous PRN Aureliano Melchor MD        sodium chloride 0.9 % infusion  125 mL/hr Intravenous Continuous Liam Hays  mL/hr at 24 0524 125 mL/hr at 24 0524     Orders (last 72 hrs)        Start     Ordered    24 1814  Potassium  Timed         24 0813    24 1241  NPO Diet NPO Type: Strict NPO  Diet Effective Now         24 1240    24 0900  potassium chloride 10 mEq in 100 mL IVPB  Every 1 Hour         24 0813    24 0823  Daily CHG Bath While Central Line in Place  Daily       24 0822    24 0626  Manual Differential  Once         24 0625    24 0600  Basic Metabolic Panel   Daily       07/04/24 2146    07/05/24 0600  CBC & Differential  Daily       07/04/24 2146    07/05/24 0600  CBC Auto Differential  PROCEDURE ONCE         07/04/24 2201 07/05/24 0523  Manual Differential  Once,   Status:  Canceled         07/05/24 0522    07/04/24 1358  Potassium  Once         07/04/24 1357    07/04/24 1103  promethazine (PHENERGAN) 12.5 mg in sodium chloride 0.9 % 50 mL  Every 6 Hours PRN         07/04/24 1105    07/04/24 1103  Obtain Informed Consent  Once         07/04/24 1103    07/04/24 1059  Case request  Once         07/04/24 1101    07/04/24 0831  Inpatient Nutrition Consult  Once        Provider:  (Not yet assigned)    07/04/24 0830    07/04/24 0828  HYDROmorphone (DILAUDID) injection 0.5 mg  Every 2 Hours PRN         07/04/24 0828    07/04/24 0600  CBC (No Diff)  Morning Draw,   Status:  Canceled         07/03/24 1502    07/04/24 0600  Comprehensive Metabolic Panel  Morning Draw         07/03/24 1502    07/04/24 0600  Magnesium  Morning Draw         07/03/24 1502    07/04/24 0600  Phosphorus  Morning Draw         07/03/24 1502    07/04/24 0600  CBC Auto Differential  PROCEDURE ONCE         07/03/24 2202    07/04/24 0438  Manual Differential  Once         07/04/24 0437    07/04/24 0345  HYDROmorphone (DILAUDID) injection 0.5 mg  Once         07/04/24 0249    07/04/24 0319  Initiate & Follow Hypercapnic Monitoring Guideline for Opioid Administration via EtCO2 and / or SpO2  Continuous        Comments: Follow Hypercapnic Monitoring Guideline As Outlined in Process Instructions (Open Order Report to View Full Instructions)    07/04/24 0318 07/04/24 0319  Opioid Administration - Document EtCO2 and / or SpO2 With Each Set of Vitals & Any Change in Patient Status  Per Order Details        Comments: With Each Set of Vitals & Any Change in Patient Status    07/04/24 0318    07/04/24 0319  Opioid Administration - Notify Provider Hypercapnic Monitoring  Continuous        Comments: Open Order  Report to View Parameters Requiring Provider Notification    07/04/24 0318    07/04/24 0319  Opioid Administration - Continuous Pulse Oximetry (SpO2)  Continuous         07/04/24 0318    07/04/24 0250  PT Consult: Eval & Treat Functional Mobility Below Baseline  Once         07/04/24 0249 07/04/24 0248  ondansetron (ZOFRAN) injection 4 mg  Every 6 Hours PRN         07/04/24 0249 07/03/24 2115  pantoprazole (PROTONIX) injection 40 mg  Every 12 Hours Scheduled         07/03/24 2023 07/03/24 2100  sodium chloride 0.9 % flush 10 mL  Every 12 Hours Scheduled         07/03/24 1502 07/03/24 2100  hydroxychloroquine (PLAQUENIL) tablet 200 mg  2 Times Daily         07/03/24 1627 07/03/24 2100  pantoprazole (PROTONIX) EC tablet 40 mg  2 Times Daily,   Status:  Discontinued         07/03/24 1627 07/03/24 2030  piperacillin-tazobactam (ZOSYN) 3.375 g IVPB in 100 mL NS MBP (CD)  Every 8 Hours         07/03/24 1505    07/03/24 2019  HYDROmorphone (DILAUDID) injection 0.5 mg  Once As Needed         07/03/24 2020 07/03/24 1800  Oral Care  2 Times Daily       07/03/24 1502    07/03/24 1800  dicyclomine (BENTYL) capsule 10 mg  4 Times Daily         07/03/24 1627    07/03/24 1643  amLODIPine (NORVASC) tablet 10 mg  Daily         07/03/24 1627    07/03/24 1643  escitalopram (LEXAPRO) tablet 5 mg  Daily         07/03/24 1627    07/03/24 1643  gabapentin (NEURONTIN) capsule 200 mg  3 Times Daily         07/03/24 1627    07/03/24 1626  HYDROcodone-acetaminophen (NORCO) 7.5-325 MG per tablet 1 tablet  Every 6 Hours PRN,   Status:  Discontinued         07/03/24 1627    07/03/24 1600  Vital Signs  Every 4 Hours       07/03/24 1502    07/03/24 1503  Basic Metabolic Panel  Daily,   Status:  Canceled       07/03/24 1502    07/03/24 1503  CBC & Differential  Daily,   Status:  Canceled       07/03/24 1502    07/03/24 1503  CBC Auto Differential  PROCEDURE ONCE,   Status:  Canceled         07/03/24 1502    07/03/24 1500   "Pharmacy to Dose Zosyn  Continuous PRN,   Status:  Discontinued         07/03/24 1502    07/03/24 1500  Diet: Regular/House; Fluid Consistency: Thin (IDDSI 0)  Diet Effective Now,   Status:  Canceled         07/03/24 1502    07/03/24 1500  Intake & Output  Every Shift       07/03/24 1502    07/03/24 1500  Weigh Patient  Once         07/03/24 1502    07/03/24 1500  Insert Peripheral IV  Once         07/03/24 1502    07/03/24 1500  Saline Lock & Maintain IV Access  Continuous         07/03/24 1502    07/03/24 1500  Place Sequential Compression Device  Once         07/03/24 1502    07/03/24 1500  Maintain Sequential Compression Device  Continuous         07/03/24 1502    07/03/24 1500  Code Status and Medical Interventions:  Continuous         07/03/24 1502    07/03/24 1459  sodium chloride 0.9 % flush 10 mL  As Needed         07/03/24 1502    07/03/24 1459  sodium chloride 0.9 % infusion 40 mL  As Needed         07/03/24 1502    07/03/24 1459  sennosides-docusate (PERICOLACE) 8.6-50 MG per tablet 2 tablet  2 Times Daily PRN        Placed in \"And\" Linked Group    07/03/24 1502    07/03/24 1459  polyethylene glycol (MIRALAX) packet 17 g  Daily PRN        Placed in \"And\" Linked Group    07/03/24 1502    07/03/24 1459  bisacodyl (DULCOLAX) EC tablet 5 mg  Daily PRN        Placed in \"And\" Linked Group    07/03/24 1502    07/03/24 1459  bisacodyl (DULCOLAX) suppository 10 mg  Daily PRN        Placed in \"And\" Linked Group    07/03/24 1502    07/03/24 1459  Potassium Replacement -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 500 mL  Freq: Once Route: IV  Last Dose: Stopped (07/03/24 1226)  Start: 07/03/24 0953 End: 07/03/24 1226    1016   1226           sodium chloride 0.9 % flush 10 mL  Dose: 10 mL  Freq: Every 12 Hours Scheduled Route: IV  Start: 07/03/24 2100 2046        0857   2134       0908   2100                     Continuous Meds Sorted by Name  for Henrietta Garrett as of 7/3/24 through 7/5/24  Legend:       Medications 07/03/24 07/04/24 07/05/24   Pharmacy to Dose Zosyn  Freq: Continuous PRN Route: XX  PRN Reason: Consult  Indications of Use: SKIN AND SOFT TISSUE INFECTION  Start: 07/03/24 1500 End: 07/04/24 0827     0827-D/C'd         sodium chloride 0.9 % infusion  Rate: 125 mL/hr Dose: 125 mL/hr  Freq: Continuous Route: IV  Last Dose: 125 mL/hr (07/05/24 0524)  Start: 07/03/24 0953    1230   1457       0226   0228   1051     1056   1754   1936     1937        0523   0524                     PRN Meds Sorted by Name  for Garrett Henrietta N as of 7/3/24 through 7/5/24  Legend:       Medications 07/03/24 07/04/24 07/05/24    sennosides-docusate (PERICOLACE) 8.6-50 MG per tablet 2 tablet  Dose: 2 tablet  Freq: 2 Times Daily PRN Route: PO  PRN Reason: Constipation  Start: 07/03/24 1459   Admin Instructions:            And   polyethylene glycol (MIRALAX) packet 17 g  Dose: 17 g  Freq: Daily PRN Route: PO  PRN Reason: Constipation  PRN Comment: Use if senna-docusate is ineffective  Start: 07/03/24 1453   Admin Instructions:            And   bisacodyl (DULCOLAX) EC tablet 5 mg  Dose: 5 mg  Freq: Daily PRN Route: PO  PRN Reason: Constipation  PRN Comment: Use if polyethylene  glycol is ineffective  Start: 07/03/24 1459   Admin Instructions:            And   bisacodyl (DULCOLAX) suppository 10 mg  Dose: 10 mg  Freq: Daily PRN Route: RE  PRN Reason: Constipation  PRN Comment: Use if bisacodyl oral is ineffective  Start: 07/03/24 1459   Admin Instructions:            Calcium Replacement - Follow Nurse / BPA Driven Protocol  Freq: As Needed Route: XX  PRN Reason: Other  Start: 07/03/24 1459   Admin Instructions:            HYDROcodone-acetaminophen (NORCO) 7.5-325 MG per tablet 1 tablet  Dose: 1 tablet  Freq: Every 6 Hours PRN Route: PO  PRN Reason: Moderate Pain  Start: 07/03/24 1626 End: 07/04/24 0828   Admin Instructions:       1636   2336       0747   0828-D/C'd        HYDROmorphone (DILAUDID) injection 0.5 mg  Dose: 0.5 mg  Freq: Every 2 Hours PRN Route: IV  PRN Reason: Severe Pain  Start: 07/04/24 0828 End: 07/09/24 0827   Admin Instructions:        0853   1104   1309     1555   1750   1948     2202        0039   0438   0907     1142          HYDROmorphone (DILAUDID) injection 0.5 mg  Dose: 0.5 mg  Freq: Once As Needed Route: IV  PRN Reason: Severe Pain  Start: 07/03/24 2019 End: 07/03/24 2047   Admin Instructions:       2047            Magnesium Standard Dose Replacement - Follow Nurse / BPA Driven Protocol  Freq: As Needed Route: XX  PRN Reason: Other  Start: 07/03/24 1459   Admin Instructions:            ondansetron (ZOFRAN) injection 4 mg  Dose: 4 mg  Freq: Every 6 Hours PRN Route: IV  PRN Reasons: Nausea,Vomiting  Start: 07/04/24 0248   Admin Instructions:        0313   0905          Pharmacy to Dose Zosyn  Freq: Continuous PRN Route: XX  PRN Reason: Consult  Indications of Use: SKIN AND SOFT TISSUE INFECTION  Start: 07/03/24 1500 End: 07/04/24 0827     0827-D/C'd         Phosphorus Replacement - Follow Nurse / BPA Driven Protocol  Freq: As Needed Route: XX  PRN Reason: Other  Start: 07/03/24 1459   Admin Instructions:            Potassium Replacement - Follow Nurse / BPA Driven  Protocol  Freq: As Needed Route: XX  PRN Reason: Other  Start: 07/03/24 1459   Admin Instructions:            promethazine (PHENERGAN) 12.5 mg in sodium chloride 0.9 % 50 mL  Dose: 12.5 mg  Freq: Every 6 Hours PRN Route: IV  PRN Reasons: Nausea,Vomiting  Start: 07/04/24 1103   Admin Instructions:      Order specific questions:        1346   1943       0144   1243         sodium chloride 0.9 % flush 10 mL  Dose: 10 mL  Freq: As Needed Route: IV  PRN Reason: Line Care  Start: 07/03/24 1459          sodium chloride 0.9 % flush 10 mL  Dose: 10 mL  Freq: As Needed Route: IV  PRN Reason: Line Care  Start: 07/03/24 0937         sodium chloride 0.9 % infusion 40 mL  Dose: 40 mL  Freq: As Needed Route: IV  PRN Reason: Line Care  Start: 07/03/24 1459   Admin Instructions:

## 2024-07-05 NOTE — PLAN OF CARE
Problem: Adult Inpatient Plan of Care  Goal: Plan of Care Review  7/5/2024 0345 by Vicki Bagley RN  Outcome: Ongoing, Not Progressing  Flowsheets (Taken 7/5/2024 0345)  Outcome Evaluation: Pt did have a BM around midnight,   Goal Outcome Evaluation:              Outcome Evaluation: Pt did have a BM around midnight,

## 2024-07-05 NOTE — ANESTHESIA PROCEDURE NOTES
Airway  Urgency: elective    Date/Time: 7/5/2024 2:19 PM  Airway not difficult    General Information and Staff    Patient location during procedure: OR  Anesthesiologist: Peace Michele MD  CRNA/CAA: Antonia Matthews CRNA    Indications and Patient Condition  Indications for airway management: airway protection    Preoxygenated: yes  Mask difficulty assessment: 2 - vent by mask + OA or adjuvant +/- NMBA    Final Airway Details  Final airway type: endotracheal airway      Successful airway: ETT  Cuffed: yes   Successful intubation technique: direct laryngoscopy and RSI  Facilitating devices/methods: intubating stylet and cricoid pressure  Endotracheal tube insertion site: oral  Blade: Sergio  Blade size: 3  ETT size (mm): 7.0  Cormack-Lehane Classification: grade I - full view of glottis  Placement verified by: chest auscultation and capnometry   Inital cuff pressure (cm H2O): 19  Cuff volume (mL): 8  Measured from: lips  ETT/EBT  to lips (cm): 21  Number of attempts at approach: 1  Assessment: lips, teeth, and gum same as pre-op and atraumatic intubation    Additional Comments  Limited neck mobility Estefanía Miller

## 2024-07-05 NOTE — ANESTHESIA POSTPROCEDURE EVALUATION
Patient: Henrietta Garrett    Procedure Summary       Date: 07/05/24 Room / Location: [facility] OR [address] [place] [address] MAIN OR    Anesthesia Start: 1404 Anesthesia Stop: 1637    Procedures:       LAPAROSCOPIC SMALL BOWEL RESECTION WITH POSSIBLE G-TUBE PLACEMENT (Abdomen)      ESOPHAGOGASTRODUODENOSCOPY WITH PERCUTANEOUS ENDOSCOPIC GASTROSTOMY TUBE INSERTION (Esophagus) Diagnosis:       Abdominal wall cellulitis      (Abdominal wall cellulitis [L03.311])    Surgeons: Madhu Zabala Jr., MD Provider: Azeb Warner MD    Anesthesia Type: general ASA Status: 3            Anesthesia Type: general    Vitals  Vitals Value Taken Time   /70 07/05/24 1635   Temp     Pulse 74 07/05/24 1637   Resp     SpO2 96 % 07/05/24 1637   Vitals shown include unfiled device data.        Post Anesthesia Care and Evaluation    Patient location during evaluation: bedside  Patient participation: complete - patient participated  Level of consciousness: awake  Pain management: adequate    Airway patency: patent  Anesthetic complications: No anesthetic complications    Cardiovascular status: acceptable  Respiratory status: acceptable  Hydration status: acceptable

## 2024-07-06 LAB
ANION GAP SERPL CALCULATED.3IONS-SCNC: 10 MMOL/L (ref 5–15)
BASOPHILS # BLD AUTO: 0.01 10*3/MM3 (ref 0–0.2)
BASOPHILS NFR BLD AUTO: 0.3 % (ref 0–1.5)
BUN SERPL-MCNC: 5 MG/DL (ref 6–20)
BUN/CREAT SERPL: 9.6 (ref 7–25)
CALCIUM SPEC-SCNC: 8.9 MG/DL (ref 8.6–10.5)
CHLORIDE SERPL-SCNC: 106 MMOL/L (ref 98–107)
CO2 SERPL-SCNC: 20 MMOL/L (ref 22–29)
CREAT SERPL-MCNC: 0.52 MG/DL (ref 0.57–1)
DEPRECATED RDW RBC AUTO: 43.3 FL (ref 37–54)
EGFRCR SERPLBLD CKD-EPI 2021: 129.2 ML/MIN/1.73
EOSINOPHIL # BLD AUTO: 0 10*3/MM3 (ref 0–0.4)
EOSINOPHIL NFR BLD AUTO: 0 % (ref 0.3–6.2)
ERYTHROCYTE [DISTWIDTH] IN BLOOD BY AUTOMATED COUNT: 14.8 % (ref 12.3–15.4)
GLUCOSE SERPL-MCNC: 109 MG/DL (ref 65–99)
HCT VFR BLD AUTO: 28.8 % (ref 34–46.6)
HGB BLD-MCNC: 9.1 G/DL (ref 12–15.9)
IMM GRANULOCYTES # BLD AUTO: 0.01 10*3/MM3 (ref 0–0.05)
IMM GRANULOCYTES NFR BLD AUTO: 0.3 % (ref 0–0.5)
LYMPHOCYTES # BLD AUTO: 1.25 10*3/MM3 (ref 0.7–3.1)
LYMPHOCYTES NFR BLD AUTO: 32.4 % (ref 19.6–45.3)
MAGNESIUM SERPL-MCNC: 2 MG/DL (ref 1.6–2.6)
MCH RBC QN AUTO: 25.6 PG (ref 26.6–33)
MCHC RBC AUTO-ENTMCNC: 31.6 G/DL (ref 31.5–35.7)
MCV RBC AUTO: 80.9 FL (ref 79–97)
MONOCYTES # BLD AUTO: 0.32 10*3/MM3 (ref 0.1–0.9)
MONOCYTES NFR BLD AUTO: 8.3 % (ref 5–12)
NEUTROPHILS NFR BLD AUTO: 2.27 10*3/MM3 (ref 1.7–7)
NEUTROPHILS NFR BLD AUTO: 58.7 % (ref 42.7–76)
NRBC BLD AUTO-RTO: 0 /100 WBC (ref 0–0.2)
PHOSPHATE SERPL-MCNC: 3.8 MG/DL (ref 2.5–4.5)
PLATELET # BLD AUTO: 242 10*3/MM3 (ref 140–450)
PMV BLD AUTO: 10.4 FL (ref 6–12)
POTASSIUM SERPL-SCNC: 4.2 MMOL/L (ref 3.5–5.2)
RBC # BLD AUTO: 3.56 10*6/MM3 (ref 3.77–5.28)
SODIUM SERPL-SCNC: 136 MMOL/L (ref 136–145)
TRIGL SERPL-MCNC: 77 MG/DL (ref 0–150)
WBC NRBC COR # BLD AUTO: 3.86 10*3/MM3 (ref 3.4–10.8)

## 2024-07-06 PROCEDURE — 25010000002 CALCIUM GLUCONATE PER 10 ML: Performed by: SURGERY

## 2024-07-06 PROCEDURE — 84478 ASSAY OF TRIGLYCERIDES: CPT | Performed by: SURGERY

## 2024-07-06 PROCEDURE — 97530 THERAPEUTIC ACTIVITIES: CPT

## 2024-07-06 PROCEDURE — 83735 ASSAY OF MAGNESIUM: CPT | Performed by: INTERNAL MEDICINE

## 2024-07-06 PROCEDURE — 25010000002 ENOXAPARIN PER 10 MG: Performed by: STUDENT IN AN ORGANIZED HEALTH CARE EDUCATION/TRAINING PROGRAM

## 2024-07-06 PROCEDURE — 25010000002 PIPERACILLIN SOD-TAZOBACTAM PER 1 G: Performed by: SURGERY

## 2024-07-06 PROCEDURE — 25010000002 DIPHENHYDRAMINE PER 50 MG: Performed by: STUDENT IN AN ORGANIZED HEALTH CARE EDUCATION/TRAINING PROGRAM

## 2024-07-06 PROCEDURE — 84100 ASSAY OF PHOSPHORUS: CPT | Performed by: INTERNAL MEDICINE

## 2024-07-06 PROCEDURE — 25810000003 SODIUM CHLORIDE 0.9 % SOLUTION: Performed by: SURGERY

## 2024-07-06 PROCEDURE — 25010000002 MAGNESIUM SULFATE PER 500 MG OF MAGNESIUM: Performed by: SURGERY

## 2024-07-06 PROCEDURE — 80048 BASIC METABOLIC PNL TOTAL CA: CPT | Performed by: SURGERY

## 2024-07-06 PROCEDURE — 97164 PT RE-EVAL EST PLAN CARE: CPT

## 2024-07-06 PROCEDURE — 25010000002 POTASSIUM CHLORIDE PER 2 MEQ OF POTASSIUM: Performed by: SURGERY

## 2024-07-06 PROCEDURE — 25010000002 ONDANSETRON PER 1 MG: Performed by: SURGERY

## 2024-07-06 PROCEDURE — 99024 POSTOP FOLLOW-UP VISIT: CPT | Performed by: SURGERY

## 2024-07-06 PROCEDURE — 85025 COMPLETE CBC W/AUTO DIFF WBC: CPT | Performed by: SURGERY

## 2024-07-06 RX ORDER — DIPHENHYDRAMINE HYDROCHLORIDE 50 MG/ML
50 INJECTION INTRAMUSCULAR; INTRAVENOUS EVERY 6 HOURS PRN
Status: DISCONTINUED | OUTPATIENT
Start: 2024-07-06 | End: 2024-07-17 | Stop reason: HOSPADM

## 2024-07-06 RX ORDER — ENOXAPARIN SODIUM 100 MG/ML
40 INJECTION SUBCUTANEOUS EVERY 24 HOURS
Status: DISCONTINUED | OUTPATIENT
Start: 2024-07-06 | End: 2024-07-17 | Stop reason: HOSPADM

## 2024-07-06 RX ORDER — HYDROMORPHONE HCL/0.9% NACL/PF 10 MG/50ML
PATIENT CONTROLLED ANALGESIA SYRINGE INTRAVENOUS CONTINUOUS
Status: DISCONTINUED | OUTPATIENT
Start: 2024-07-06 | End: 2024-07-08

## 2024-07-06 RX ADMIN — Medication 10 ML: at 08:52

## 2024-07-06 RX ADMIN — DIPHENHYDRAMINE HYDROCHLORIDE 50 MG: 50 INJECTION, SOLUTION INTRAMUSCULAR; INTRAVENOUS at 17:58

## 2024-07-06 RX ADMIN — I.V. FAT EMULSION 20 G: 20 EMULSION INTRAVENOUS at 17:58

## 2024-07-06 RX ADMIN — PANTOPRAZOLE SODIUM 40 MG: 40 INJECTION, POWDER, FOR SOLUTION INTRAVENOUS at 21:35

## 2024-07-06 RX ADMIN — PIPERACILLIN AND TAZOBACTAM 3.38 G: 3; .375 INJECTION, POWDER, FOR SOLUTION INTRAVENOUS at 21:34

## 2024-07-06 RX ADMIN — SODIUM CHLORIDE 125 ML/HR: 9 INJECTION, SOLUTION INTRAVENOUS at 07:37

## 2024-07-06 RX ADMIN — GABAPENTIN 200 MG: 100 CAPSULE ORAL at 08:44

## 2024-07-06 RX ADMIN — PIPERACILLIN AND TAZOBACTAM 3.38 G: 3; .375 INJECTION, POWDER, FOR SOLUTION INTRAVENOUS at 13:23

## 2024-07-06 RX ADMIN — HYDROXYCHLOROQUINE SULFATE 200 MG: 200 TABLET, FILM COATED ORAL at 08:44

## 2024-07-06 RX ADMIN — GABAPENTIN 200 MG: 100 CAPSULE ORAL at 21:35

## 2024-07-06 RX ADMIN — CALCIUM GLUCONATE: 98 INJECTION, SOLUTION INTRAVENOUS at 17:18

## 2024-07-06 RX ADMIN — DIPHENHYDRAMINE HYDROCHLORIDE 50 MG: 50 INJECTION, SOLUTION INTRAMUSCULAR; INTRAVENOUS at 09:35

## 2024-07-06 RX ADMIN — HYDROXYCHLOROQUINE SULFATE 200 MG: 200 TABLET, FILM COATED ORAL at 21:35

## 2024-07-06 RX ADMIN — DICYCLOMINE HYDROCHLORIDE 10 MG: 10 CAPSULE ORAL at 08:44

## 2024-07-06 RX ADMIN — Medication: at 21:39

## 2024-07-06 RX ADMIN — ENOXAPARIN SODIUM 40 MG: 100 INJECTION SUBCUTANEOUS at 17:17

## 2024-07-06 RX ADMIN — Medication: at 07:41

## 2024-07-06 RX ADMIN — GABAPENTIN 200 MG: 100 CAPSULE ORAL at 17:17

## 2024-07-06 RX ADMIN — ONDANSETRON 4 MG: 2 INJECTION INTRAMUSCULAR; INTRAVENOUS at 21:53

## 2024-07-06 RX ADMIN — PANTOPRAZOLE SODIUM 40 MG: 40 INJECTION, POWDER, FOR SOLUTION INTRAVENOUS at 08:44

## 2024-07-06 RX ADMIN — ESCITALOPRAM 5 MG: 5 TABLET, FILM COATED ORAL at 08:44

## 2024-07-06 RX ADMIN — DICYCLOMINE HYDROCHLORIDE 10 MG: 10 CAPSULE ORAL at 13:23

## 2024-07-06 RX ADMIN — DICYCLOMINE HYDROCHLORIDE 10 MG: 10 CAPSULE ORAL at 21:35

## 2024-07-06 RX ADMIN — PIPERACILLIN AND TAZOBACTAM 3.38 G: 3; .375 INJECTION, POWDER, FOR SOLUTION INTRAVENOUS at 04:46

## 2024-07-06 RX ADMIN — DICYCLOMINE HYDROCHLORIDE 10 MG: 10 CAPSULE ORAL at 17:17

## 2024-07-06 RX ADMIN — AMLODIPINE BESYLATE 10 MG: 10 TABLET ORAL at 08:44

## 2024-07-06 RX ADMIN — ONDANSETRON 4 MG: 2 INJECTION INTRAMUSCULAR; INTRAVENOUS at 08:44

## 2024-07-06 RX ADMIN — Medication 10 ML: at 21:35

## 2024-07-06 NOTE — PROGRESS NOTES
"General Surgery  Progress Note    CC: Follow-up gastroparesis    POD#1 robotic takedown of jejunostomy tract fistula with small bowel resection and PEG tube revision    S: She complains of severe pain, minimally improved with the PCA.  She denies any nausea or vomiting over her usual chronic nausea secondary to gastroparesis.    O:/90 (BP Location: Left arm, Patient Position: Sitting)   Pulse 66   Temp 97.1 °F (36.2 °C) (Oral)   Resp 16   Ht 155.4 cm (61.2\")   Wt 77.1 kg (170 lb)   LMP 11/01/2023   SpO2 98%   BMI 31.91 kg/m²     GENERAL: alert, well appearing, and in no distress  HEENT: normocephalic, atraumatic, moist mucous membranes, clear sclerae   CHEST: clear to auscultation, no wheezes, rales or rhonchi, symmetric air entry  CARDIAC: regular rate and rhythm    ABDOMEN: Soft, nondistended, incisions covered and dry, PEG tube in left upper quadrant with external bumper located at 6 cm  EXTREMITIES: no cyanosis, clubbing, or edema   SKIN: Warm and moist, no rashes    LABS  Results from last 7 days   Lab Units 07/06/24  0453 07/05/24  0436 07/04/24  0353   WBC 10*3/mm3 3.86 2.83* 3.03*   HEMOGLOBIN g/dL 9.1* 8.3* 8.3*   HEMATOCRIT % 28.8* 26.3* 26.9*   PLATELETS 10*3/mm3 242 211 220   MONOCYTES % %  --  11.0 12.5*   EOSINOPHIL % %  --  1.0 0.0*     Results from last 7 days   Lab Units 07/06/24  0453 07/05/24  0436 07/04/24  1358 07/04/24  0353 07/03/24  1004 07/02/24  1439   SODIUM mmol/L 136 135*  --  137 137 137   POTASSIUM mmol/L 4.2 3.6 3.7 3.5 3.8 4.2   CHLORIDE mmol/L 106 106  --  107 104 104   CO2 mmol/L 20.0* 20.7*  --   --  21.4* 24.0   BUN mg/dL 5* 3*  --  6 7 11   CREATININE mg/dL 0.52* 0.65  --  0.66 0.65 0.77   CALCIUM mg/dL 8.9 8.0*  --  9.5 9.0 9.2   BILIRUBIN mg/dL  --   --   --  0.3 0.2 0.2   ALK PHOS U/L  --   --   --  43 46 43   ALT (SGPT) U/L  --   --   --  10 9 8   AST (SGOT) U/L  --   --   --  14 16 11   GLUCOSE mg/dL 109* 83  --  83 111* 98     Results from last 7 days   Lab " Units 07/03/24  1004   INR  1.05         A/P: 29 y.o. female POD#1 robotic takedown of jejunostomy tract fistula with small bowel resection and PEG tube revision    I will add a basal rate to her PCA for improved pain control.  I have also resumed her TPN.  Continue current care otherwise.    Delmy Cortes MD  General, Robotic, and Endoscopic Surgery  [facility]    [address], [address]  [place] [address]  P: [phone]  F: [phone]

## 2024-07-06 NOTE — PROGRESS NOTES
Name: Henrietta Garrett ADMIT: 7/3/2024   : 1995  PCP: Kenya David MD    MRN: [id number] LOS: 3 days   AGE/SEX: 29 y.o. female  ROOM: [facility]/     Subjective     Still in pain. States she also gets a significant reaction to the tegederm that covers her port.     Objective   Objective   Vital Signs  Temp:  [97.1 °F (36.2 °C)-98.1 °F (36.7 °C)] 97.1 °F (36.2 °C)  Heart Rate:  [54-77] 66  Resp:  [16] 16  BP: (114-139)/(70-90) 139/90  SpO2:  [95 %-98 %] 98 %  on  Flow (L/min):  [2] 2;   Device (Oxygen Therapy): room air  Body mass index is 31.91 kg/m².  Physical Exam  Constitutional:       General: She is not in acute distress.     Appearance: She is ill-appearing.   HENT:      Head: Normocephalic and atraumatic.   Eyes:      Extraocular Movements: Extraocular movements intact.      Pupils: Pupils are equal, round, and reactive to light.   Cardiovascular:      Rate and Rhythm: Normal rate and regular rhythm.   Pulmonary:      Effort: Pulmonary effort is normal. No respiratory distress.   Abdominal:      General: There is no distension.      Tenderness: There is abdominal tenderness.   Neurological:      Mental Status: She is alert and oriented to person, place, and time.         Results Review     I reviewed the patient's new clinical results.  Results from last 7 days   Lab Units 24  0453 24  0436 24  0353 24  1004   WBC 10*3/mm3 3.86 2.83* 3.03* 5.22   HEMOGLOBIN g/dL 9.1* 8.3* 8.3* 9.6*   PLATELETS 10*3/mm3 242 211 220 263     Results from last 7 days   Lab Units 24  0453 24  0436 24  1358 24  0353 24  1004 24  1439   SODIUM mmol/L 136 135*  --  137 137 137   POTASSIUM mmol/L 4.2 3.6 3.7 3.5 3.8 4.2   CHLORIDE mmol/L 106 106  --  107 104 104   CO2 mmol/L 20.0* 20.7*  --   --  21.4* 24.0   BUN mg/dL 5* 3*  --  6 7 11   CREATININE mg/dL 0.52* 0.65  --  0.66 0.65 0.77   GLUCOSE mg/dL 109* 83  --  83 111* 98   Estimated Creatinine Clearance: 150.7  "mL/min (A) (by C-G formula based on SCr of 0.52 mg/dL (L)).  Results from last 7 days   Lab Units 07/04/24  0353 07/03/24  1004 07/02/24  1439   ALBUMIN g/dL 3.3* 3.8 4.0   BILIRUBIN mg/dL 0.3 0.2 0.2   ALK PHOS U/L 43 46 43   AST (SGOT) U/L 14 16 11   ALT (SGPT) U/L 10 9 8     Results from last 7 days   Lab Units 07/06/24  0453 07/05/24  0436 07/04/24  0353 07/03/24  1004 07/02/24  1439   CALCIUM mg/dL 8.9 8.0* 9.5 9.0 9.2   ALBUMIN g/dL  --   --  3.3* 3.8 4.0   MAGNESIUM mg/dL 2.0  --  2.0 1.9  --    PHOSPHORUS mg/dL 3.8  --  2.9  --   --      Results from last 7 days   Lab Units 07/03/24  1015 07/03/24  1004   PROCALCITONIN ng/mL  --  <0.02   LACTATE mmol/L 1.6  --      COVID19   Date Value Ref Range Status   07/22/2022 Not Detected Not Detected - Ref. Range Final     SARS-CoV-2, DANIELLA   Date Value Ref Range Status   08/08/2022 NEGATIVE Negative Final     No results found for: \"HGBA1C\", \"POCGLU\"  Results for orders placed or performed during the hospital encounter of 07/03/24   Blood Culture - Blood, Arm, Right    Specimen: Arm, Right; Blood   Result Value Ref Range    Blood Culture No growth at 3 days          CT Abdomen Pelvis With Contrast  Narrative: CT ABDOMEN AND PELVIS WITH IV CONTRAST     HISTORY: Abdominal pain     TECHNIQUE: Radiation dose reduction techniques were utilized, including  automated exposure control and exposure modulation based on body size.   3 mm images were obtained through the abdomen and pelvis after the  administration of IV contrast.     COMPARISON: Multiple CT abdomen and pelvis dating back to 6/16/2023     FINDINGS:     Please note evaluation is suboptimal due to beam hardening and streak  artifact and patient's arms being along their side. A percutaneous  gastrostomy tube is present. The bulb of the gastrostomy tube does not  appear to be located within the gastric lumen, as before, and is  contiguous with the wall. There is a tract of air which extends to the  gastrostomy tube " bulb. Findings are grossly similar to on 5/25/2024. A  few air-fluid levels are present within the small bowel which is  otherwise nondistended. The appendix is unremarkable. Mild intrahepatic  bile duct dilation is present status post cholecystectomy, as before.  The pancreas, spleen and adrenal glands have an unremarkable  postcontrast CT appearance. There appears to be a subtle subcentimeter  right renal lesion which is too small to characterize. No  hydronephrosis.     Prominent and enlarged abdominal pelvic adenopathy is present. Enlarged  brett hepatic node measuring 1.3 cm in short axis dimension, grossly  unchanged since 6/16/2023. Prominent aortocaval node measuring 0.8 cm in  short axis dimension, as before. An anterior pericaval node more  inferiorly has increased in size, measuring 0.8 cm in short axis  dimension (previously 0.5 cm on 2/26/2024. Asymmetric right adnexal  hypodense lesion measuring up to 2.3 cm is present, as before. No free  intraperitoneal air is seen..     Large amount of hyperdense stool is present throughout the colon.     No suspicious lytic or blastic osseous lesion. The previously seen  jejunostomy tube overlying the left lower quadrant has been removed.  There is ill-defined soft tissue thickening and fat stranding extending  through the subcutaneous tissues and abutting the adjacent small bowel  in this area. A small focus of gas is present along the tract and not  definitively located in the bowel.     Impression: 1.  Findings suggestive of constipation.  2.  Previously seen jejunostomy tube has been removed with extensive  soft tissue thickening extending from the small bowel to the skin  surface. A focus of air overlying the tract extending from the small  bowel through the subcutaneous tissues. CT findings are nonspecific and  correlation with patient history is recommended to exclude an  enterocutaneous fistula and/or developing infection.  3.  Prominent and mildly enlarged  abdominal pelvic nodes. An aortocaval  node as increased in size since 2/26/2024. While nonspecific, continued  attention follow-up with CT abdomen and pelvis in 3 months is  recommended to ensure resolution.  4.  Percutaneous gastrostomy tube is not located within the stomach and  appears to have been further retracted since 2/26/2024 with the majority  of the balloon appearing to be outside of the stomach wall. A tract of  air extending from the gastric lumen into the gastrostomy balloon, as  seen on 5/25/2024.  5.  Other findings as above.        This report was finalized on 7/2/2024 4:44 PM by Dr. Domingo Sims M.D  on Workstation: [id number]       Scheduled Medications  amLODIPine, 10 mg, Oral, Daily  dicyclomine, 10 mg, Oral, 4x Daily  escitalopram, 5 mg, Per J Tube, Daily  Fat Emulsion Plant Based, 200 mL, Intravenous, Q24H (TPN)  gabapentin, 200 mg, Oral, TID  hydroxychloroquine, 200 mg, Oral, BID  pantoprazole, 40 mg, Intravenous, Q12H  piperacillin-tazobactam, 3.375 g, Intravenous, Q8H  sodium chloride, 10 mL, Intravenous, Q12H    Infusions  Adult Central 2-in-1 TPN,   HYDROmorphone HCl-NaCl,   Pharmacy Consult,   Pharmacy to Dose TPN,   sodium chloride, 125 mL/hr, Last Rate: 125 mL/hr (07/06/24 0737)  sodium chloride, 30 mL/hr    Diet  NPO Diet NPO Type: Strict NPO  Adult Central 2-in-1 TPN       Assessment/Plan     Active Hospital Problems    Diagnosis  POA    **Abdominal wall cellulitis [L03.311]  Yes    Lupus erythematosus overlap syndrome [M32.8]  Yes    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       29 y.o. female admitted with Abdominal wall cellulitis.    Abdominal wall cellulitis  Abdominal pain  Started on zosyn. Continue, follow cultures    Blood cultures NGTD  Now on dilaudid PCA     Gastroparesis   TPN. Nutrition consulted. To be started after surgery       Dislodged G-tube  S/p revision 7/5/24     Lupus  Resume Plaquenil     Hypertension  Amlodipine. Blood pressures acceptable       Lovenox for DVT prophylaxis.  Full code.  Discussed with patient.  Anticipate discharge to be determined       Aureliano Melchor MD  [facility]  07/06/24  15:57 EDT

## 2024-07-06 NOTE — PROGRESS NOTES
"[facility] Clinical Pharmacy Services: Total Parental Nutrition Initial Consult    Indication: Inaccessible GI Tract  Route: central  Type: standard    Relevant clinical data and objective history reviewed:  29 y.o. female 155.4 cm (61.2\") 77.1 kg (170 lb)    Results from last 7 days   Lab Units 07/06/24  0453 07/04/24  1358 07/04/24  0353   SODIUM mmol/L 136   < > 137   POTASSIUM mmol/L 4.2   < > 3.5   CHLORIDE mmol/L 106   < > 107   CO2 mmol/L 20.0*   < >  --    BUN mg/dL 5*   < > 6   CREATININE mg/dL 0.52*   < > 0.66   CALCIUM mg/dL 8.9   < > 9.5   ALBUMIN g/dL  --   --  3.3*   BILIRUBIN mg/dL  --   --  0.3   ALK PHOS U/L  --   --  43   ALT (SGPT) U/L  --   --  10   AST (SGOT) U/L  --   --  14   GLUCOSE mg/dL 109*   < > 83   MAGNESIUM mg/dL 2.0  --  2.0   PHOSPHORUS mg/dL 3.8  --  2.9   TRIGLYCERIDES mg/dL 77  --   --     < > = values in this interval not displayed.        Estimated Creatinine Clearance: 150.7 mL/min (A) (by C-G formula based on SCr of 0.52 mg/dL (L)).    Active fluid orders: NS @ 125 ml/hr -- will stop when TPN is started    Dietary Orders (From admission, onward)       Start     Ordered    07/05/24 1241  NPO Diet NPO Type: Strict NPO  Diet Effective Now        Question:  NPO Type  Answer:  Strict NPO    07/05/24 1240                  Assessment  Ideal Body Weight: 47.7 kg  Body Weight Used for Calculations: 77.1 kg  Daily Est. [place]on: 1560 kCal/Day  Estimated Fluid Requirements: 1560 mL/day    Goal   Protein: 95 grams/kg/day (1.2 grams/day)   Dextrose: 780 Kcal/day   Lipids: 200 ml of 20% lipid infusion 7 days/week    Plan  Will start TPN at half goal and will taper up as appropriate. Will initiate TPN with the following macros:   Protein/Dextrose/Lipids: 45/400/200    Volume: 1560 ml (65 mL/hr over 24 hrs daily)    Based on the above labs, will add the following electrolytes/additives to the TPN.    Sodium Chloride: 50 mEq   Sodium Acetate: 20 mEq   Sodium Phosphate: 0 mEq   Potassium " Chloride: 30 mEq   Potassium Acetate: 20 mEq   Potassium Phosphate: 15 mEq   Calcium Gluconate: 10 mEq   Magnesium Sulfate: 9 mEq   MVI for TPN   Trace Elements    Labs to be ordered: CMP, Mag, Phos    Pharmacy will continue to follow.     Emerald Watkins [facility]  Clinical Pharmacist

## 2024-07-06 NOTE — PLAN OF CARE
Goal Outcome Evaluation:              Outcome Evaluation: VSS. Still rate pain high even with high usage of PCA Dilaudid.  voiding  freely. refused to ambulate in the givens last night.  lap sites x 3 with steristrips CDI.  PEG tube clamped.  to monitor electrolytes per protocol.

## 2024-07-06 NOTE — PLAN OF CARE
Goal Outcome Evaluation:  Plan of Care Reviewed With: patient        Progress: no change  Outcome Evaluation: Initiated TPN, lipids and Lovenox today, added basal dose to Dilaudid PCA, encouraged ambulating, sat up in chair for an hour, vss, afebrile, may have meds with sips of sprite, lap sites x3 are clean and dry, g-tube is clamped.

## 2024-07-06 NOTE — PLAN OF CARE
Goal Outcome Evaluation:              Outcome Evaluation: Pt is a 30 y/o F admitted to [facility] for abdominal wall cellulitis. Re-evaluation today as pt s/p lap small bowel resection and G-tube removal. On 7/5/24. PMH includes Tomas-Danos syndrome, SLE, fibromyalgia, RA, POTS, Sjogren's syndrome, anemia, myasthenia gravis. Pt lives with SO in apartment with elevator access, who provides min-mod A with all ADLs, household and community duties. She uses w/c in community for longer bouts of distance and will use rwx/SPC in house if needed. Pt completed  STS min Ax1, and amb ~10ft with FWW, with antalgic step-to gait pattern. Distance limited by pain.Recommend home with assist and HHPT, or IRF depending on pt progress while skilled acute PT. Pt remains appropriate for skilled acute PT at this time to improve functional mobility, strength deficits, and endurance to return home safely.      Anticipated Discharge Disposition (PT): home with home health, home with 24/7 care, inpatient rehabilitation facility
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
## 2024-07-07 LAB
ALBUMIN SERPL-MCNC: 3.4 G/DL (ref 3.5–5.2)
ALBUMIN/GLOB SERPL: 0.9 G/DL
ALP SERPL-CCNC: 52 U/L (ref 39–117)
ALT SERPL W P-5'-P-CCNC: 18 U/L (ref 1–33)
ANION GAP SERPL CALCULATED.3IONS-SCNC: 5 MMOL/L (ref 5–15)
AST SERPL-CCNC: 14 U/L (ref 1–32)
BASOPHILS # BLD AUTO: 0.02 10*3/MM3 (ref 0–0.2)
BASOPHILS NFR BLD AUTO: 0.4 % (ref 0–1.5)
BILIRUB SERPL-MCNC: 0.2 MG/DL (ref 0–1.2)
BUN SERPL-MCNC: 8 MG/DL (ref 6–20)
BUN/CREAT SERPL: 15.7 (ref 7–25)
CALCIUM SPEC-SCNC: 9 MG/DL (ref 8.6–10.5)
CHLORIDE SERPL-SCNC: 106 MMOL/L (ref 98–107)
CO2 SERPL-SCNC: 26 MMOL/L (ref 22–29)
CREAT SERPL-MCNC: 0.51 MG/DL (ref 0.57–1)
DEPRECATED RDW RBC AUTO: 43.4 FL (ref 37–54)
EGFRCR SERPLBLD CKD-EPI 2021: 129.8 ML/MIN/1.73
EOSINOPHIL # BLD AUTO: 0.21 10*3/MM3 (ref 0–0.4)
EOSINOPHIL NFR BLD AUTO: 4.3 % (ref 0.3–6.2)
ERYTHROCYTE [DISTWIDTH] IN BLOOD BY AUTOMATED COUNT: 14.8 % (ref 12.3–15.4)
GLOBULIN UR ELPH-MCNC: 3.8 GM/DL
GLUCOSE SERPL-MCNC: 104 MG/DL (ref 65–99)
HCT VFR BLD AUTO: 27.6 % (ref 34–46.6)
HGB BLD-MCNC: 8.5 G/DL (ref 12–15.9)
IMM GRANULOCYTES # BLD AUTO: 0.01 10*3/MM3 (ref 0–0.05)
IMM GRANULOCYTES NFR BLD AUTO: 0.2 % (ref 0–0.5)
LYMPHOCYTES # BLD AUTO: 1.74 10*3/MM3 (ref 0.7–3.1)
LYMPHOCYTES NFR BLD AUTO: 35.9 % (ref 19.6–45.3)
MAGNESIUM SERPL-MCNC: 1.9 MG/DL (ref 1.6–2.6)
MCH RBC QN AUTO: 24.9 PG (ref 26.6–33)
MCHC RBC AUTO-ENTMCNC: 30.8 G/DL (ref 31.5–35.7)
MCV RBC AUTO: 80.9 FL (ref 79–97)
MONOCYTES # BLD AUTO: 0.47 10*3/MM3 (ref 0.1–0.9)
MONOCYTES NFR BLD AUTO: 9.7 % (ref 5–12)
NEUTROPHILS NFR BLD AUTO: 2.4 10*3/MM3 (ref 1.7–7)
NEUTROPHILS NFR BLD AUTO: 49.5 % (ref 42.7–76)
NRBC BLD AUTO-RTO: 0 /100 WBC (ref 0–0.2)
PHOSPHATE SERPL-MCNC: 2.3 MG/DL (ref 2.5–4.5)
PLATELET # BLD AUTO: 266 10*3/MM3 (ref 140–450)
PMV BLD AUTO: 10.6 FL (ref 6–12)
POTASSIUM SERPL-SCNC: 3.8 MMOL/L (ref 3.5–5.2)
PROT SERPL-MCNC: 7.2 G/DL (ref 6–8.5)
RBC # BLD AUTO: 3.41 10*6/MM3 (ref 3.77–5.28)
SODIUM SERPL-SCNC: 137 MMOL/L (ref 136–145)
WBC NRBC COR # BLD AUTO: 4.85 10*3/MM3 (ref 3.4–10.8)

## 2024-07-07 PROCEDURE — 83735 ASSAY OF MAGNESIUM: CPT | Performed by: SURGERY

## 2024-07-07 PROCEDURE — 80053 COMPREHEN METABOLIC PANEL: CPT | Performed by: SURGERY

## 2024-07-07 PROCEDURE — 99024 POSTOP FOLLOW-UP VISIT: CPT | Performed by: SURGERY

## 2024-07-07 PROCEDURE — 25010000002 CALCIUM GLUCONATE PER 10 ML: Performed by: SURGERY

## 2024-07-07 PROCEDURE — 25010000002 DIPHENHYDRAMINE PER 50 MG: Performed by: STUDENT IN AN ORGANIZED HEALTH CARE EDUCATION/TRAINING PROGRAM

## 2024-07-07 PROCEDURE — 25010000002 PIPERACILLIN SOD-TAZOBACTAM PER 1 G: Performed by: SURGERY

## 2024-07-07 PROCEDURE — 85025 COMPLETE CBC W/AUTO DIFF WBC: CPT | Performed by: SURGERY

## 2024-07-07 PROCEDURE — 84100 ASSAY OF PHOSPHORUS: CPT | Performed by: SURGERY

## 2024-07-07 PROCEDURE — 25010000002 ONDANSETRON PER 1 MG: Performed by: SURGERY

## 2024-07-07 PROCEDURE — 25010000002 POTASSIUM CHLORIDE PER 2 MEQ OF POTASSIUM: Performed by: SURGERY

## 2024-07-07 PROCEDURE — 25010000002 ENOXAPARIN PER 10 MG: Performed by: STUDENT IN AN ORGANIZED HEALTH CARE EDUCATION/TRAINING PROGRAM

## 2024-07-07 PROCEDURE — 25010000002 MAGNESIUM SULFATE PER 500 MG OF MAGNESIUM: Performed by: SURGERY

## 2024-07-07 RX ADMIN — GABAPENTIN 200 MG: 100 CAPSULE ORAL at 15:49

## 2024-07-07 RX ADMIN — PIPERACILLIN AND TAZOBACTAM 3.38 G: 3; .375 INJECTION, POWDER, FOR SOLUTION INTRAVENOUS at 21:25

## 2024-07-07 RX ADMIN — Medication: at 22:00

## 2024-07-07 RX ADMIN — DICYCLOMINE HYDROCHLORIDE 10 MG: 10 CAPSULE ORAL at 21:25

## 2024-07-07 RX ADMIN — GABAPENTIN 200 MG: 100 CAPSULE ORAL at 21:25

## 2024-07-07 RX ADMIN — PANTOPRAZOLE SODIUM 40 MG: 40 INJECTION, POWDER, FOR SOLUTION INTRAVENOUS at 21:25

## 2024-07-07 RX ADMIN — DIPHENHYDRAMINE HYDROCHLORIDE 50 MG: 50 INJECTION, SOLUTION INTRAMUSCULAR; INTRAVENOUS at 08:38

## 2024-07-07 RX ADMIN — HYDROXYCHLOROQUINE SULFATE 200 MG: 200 TABLET, FILM COATED ORAL at 08:37

## 2024-07-07 RX ADMIN — DICYCLOMINE HYDROCHLORIDE 10 MG: 10 CAPSULE ORAL at 18:12

## 2024-07-07 RX ADMIN — GABAPENTIN 200 MG: 100 CAPSULE ORAL at 08:37

## 2024-07-07 RX ADMIN — Medication 10 ML: at 21:26

## 2024-07-07 RX ADMIN — I.V. FAT EMULSION 20 G: 20 EMULSION INTRAVENOUS at 18:12

## 2024-07-07 RX ADMIN — CALCIUM GLUCONATE: 98 INJECTION, SOLUTION INTRAVENOUS at 18:12

## 2024-07-07 RX ADMIN — PANTOPRAZOLE SODIUM 40 MG: 40 INJECTION, POWDER, FOR SOLUTION INTRAVENOUS at 08:38

## 2024-07-07 RX ADMIN — ESCITALOPRAM 5 MG: 5 TABLET, FILM COATED ORAL at 12:24

## 2024-07-07 RX ADMIN — AMLODIPINE BESYLATE 10 MG: 10 TABLET ORAL at 08:37

## 2024-07-07 RX ADMIN — PIPERACILLIN AND TAZOBACTAM 3.38 G: 3; .375 INJECTION, POWDER, FOR SOLUTION INTRAVENOUS at 05:06

## 2024-07-07 RX ADMIN — PIPERACILLIN AND TAZOBACTAM 3.38 G: 3; .375 INJECTION, POWDER, FOR SOLUTION INTRAVENOUS at 12:12

## 2024-07-07 RX ADMIN — DICYCLOMINE HYDROCHLORIDE 10 MG: 10 CAPSULE ORAL at 12:12

## 2024-07-07 RX ADMIN — HYDROXYCHLOROQUINE SULFATE 200 MG: 200 TABLET, FILM COATED ORAL at 21:25

## 2024-07-07 RX ADMIN — Medication: at 09:01

## 2024-07-07 RX ADMIN — DIPHENHYDRAMINE HYDROCHLORIDE 50 MG: 50 INJECTION, SOLUTION INTRAMUSCULAR; INTRAVENOUS at 18:12

## 2024-07-07 RX ADMIN — Medication 10 ML: at 08:40

## 2024-07-07 RX ADMIN — ENOXAPARIN SODIUM 40 MG: 100 INJECTION SUBCUTANEOUS at 18:13

## 2024-07-07 RX ADMIN — DICYCLOMINE HYDROCHLORIDE 10 MG: 10 CAPSULE ORAL at 08:38

## 2024-07-07 RX ADMIN — ONDANSETRON 4 MG: 2 INJECTION INTRAMUSCULAR; INTRAVENOUS at 15:49

## 2024-07-07 RX ADMIN — I.V. FAT EMULSION 20 G: 20 EMULSION INTRAVENOUS at 01:06

## 2024-07-07 NOTE — PLAN OF CARE
Goal Outcome Evaluation:  Plan of Care Reviewed With: patient        Progress: no change  Outcome Evaluation: TPN and lipids infusing via mediport, received iv antibiotic, lap sites with steri strips are dry and intact, medial abdominal wound dressed with gauze and silicone tape, g-tube clamped, vss, afebrile, encouraged increasing ambulation in givens, voiding, falls precautions maintained.

## 2024-07-07 NOTE — PROGRESS NOTES
Name: Henrietta Garrett ADMIT: 7/3/2024   : 1995  PCP: Kenya David MD    MRN: [id number] LOS: 4 days   AGE/SEX: 29 y.o. female  ROOM: [facility]     Subjective     Examined at bedside. Remains in pain.     Objective   Objective   Vital Signs  Temp:  [97 °F (36.1 °C)-97.4 °F (36.3 °C)] 97.4 °F (36.3 °C)  Heart Rate:  [57-64] 60  Resp:  [16] 16  BP: (104-120)/(62-74) 104/62  SpO2:  [96 %-100 %] 96 %  on   ;   Device (Oxygen Therapy): room air  Body mass index is 23.71 kg/m².  Physical Exam  Constitutional:       General: She is not in acute distress.     Appearance: She is ill-appearing.   HENT:      Head: Normocephalic and atraumatic.   Eyes:      Extraocular Movements: Extraocular movements intact.      Pupils: Pupils are equal, round, and reactive to light.   Cardiovascular:      Rate and Rhythm: Normal rate and regular rhythm.   Pulmonary:      Effort: Pulmonary effort is normal. No respiratory distress.   Abdominal:      General: There is no distension.      Tenderness: There is abdominal tenderness.   Neurological:      Mental Status: She is alert and oriented to person, place, and time.         Results Review     I reviewed the patient's new clinical results.  Results from last 7 days   Lab Units 24  0508 24  0453 24  0436 24  0353   WBC 10*3/mm3 4.85 3.86 2.83* 3.03*   HEMOGLOBIN g/dL 8.5* 9.1* 8.3* 8.3*   PLATELETS 10*3/mm3 266 242 211 220     Results from last 7 days   Lab Units 24  0508 24  0453 24  0436 24  1358 24  0353 24  1004   SODIUM mmol/L 137 136 135*  --  137 137   POTASSIUM mmol/L 3.8 4.2 3.6 3.7 3.5 3.8   CHLORIDE mmol/L 106 106 106  --  107 104   CO2 mmol/L 26.0 20.0* 20.7*  --   --  21.4*   BUN mg/dL 8 5* 3*  --  6 7   CREATININE mg/dL 0.51* 0.52* 0.65  --  0.66 0.65   GLUCOSE mg/dL 104* 109* 83  --  83 111*   Estimated Creatinine Clearance: 198.1 mL/min (A) (by C-G formula based on SCr of 0.51 mg/dL (L)).  Results from last 7  "days   Lab Units 07/07/24  0508 07/04/24  0353 07/03/24  1004 07/02/24  1439   ALBUMIN g/dL 3.4* 3.3* 3.8 4.0   BILIRUBIN mg/dL 0.2 0.3 0.2 0.2   ALK PHOS U/L 52 43 46 43   AST (SGOT) U/L 14 14 16 11   ALT (SGPT) U/L 18 10 9 8     Results from last 7 days   Lab Units 07/07/24  0508 07/06/24  0453 07/05/24  0436 07/04/24  0353 07/03/24  1004 07/02/24  1439   CALCIUM mg/dL 9.0 8.9 8.0* 9.5 9.0 9.2   ALBUMIN g/dL 3.4*  --   --  3.3* 3.8 4.0   MAGNESIUM mg/dL 1.9 2.0  --  2.0 1.9  --    PHOSPHORUS mg/dL 2.3* 3.8  --  2.9  --   --      Results from last 7 days   Lab Units 07/03/24  1015 07/03/24  1004   PROCALCITONIN ng/mL  --  <0.02   LACTATE mmol/L 1.6  --      COVID19   Date Value Ref Range Status   07/22/2022 Not Detected Not Detected - Ref. Range Final     SARS-CoV-2, DANIELLA   Date Value Ref Range Status   08/08/2022 NEGATIVE Negative Final     No results found for: \"HGBA1C\", \"POCGLU\"  Results for orders placed or performed during the hospital encounter of 07/03/24   Blood Culture - Blood, Arm, Right    Specimen: Arm, Right; Blood   Result Value Ref Range    Blood Culture No growth at 4 days          CT Abdomen Pelvis With Contrast  Narrative: CT ABDOMEN AND PELVIS WITH IV CONTRAST     HISTORY: Abdominal pain     TECHNIQUE: Radiation dose reduction techniques were utilized, including  automated exposure control and exposure modulation based on body size.   3 mm images were obtained through the abdomen and pelvis after the  administration of IV contrast.     COMPARISON: Multiple CT abdomen and pelvis dating back to 6/16/2023     FINDINGS:     Please note evaluation is suboptimal due to beam hardening and streak  artifact and patient's arms being along their side. A percutaneous  gastrostomy tube is present. The bulb of the gastrostomy tube does not  appear to be located within the gastric lumen, as before, and is  contiguous with the wall. There is a tract of air which extends to the  gastrostomy tube bulb. Findings are " grossly similar to on 5/25/2024. A  few air-fluid levels are present within the small bowel which is  otherwise nondistended. The appendix is unremarkable. Mild intrahepatic  bile duct dilation is present status post cholecystectomy, as before.  The pancreas, spleen and adrenal glands have an unremarkable  postcontrast CT appearance. There appears to be a subtle subcentimeter  right renal lesion which is too small to characterize. No  hydronephrosis.     Prominent and enlarged abdominal pelvic adenopathy is present. Enlarged  brett hepatic node measuring 1.3 cm in short axis dimension, grossly  unchanged since 6/16/2023. Prominent aortocaval node measuring 0.8 cm in  short axis dimension, as before. An anterior pericaval node more  inferiorly has increased in size, measuring 0.8 cm in short axis  dimension (previously 0.5 cm on 2/26/2024. Asymmetric right adnexal  hypodense lesion measuring up to 2.3 cm is present, as before. No free  intraperitoneal air is seen..     Large amount of hyperdense stool is present throughout the colon.     No suspicious lytic or blastic osseous lesion. The previously seen  jejunostomy tube overlying the left lower quadrant has been removed.  There is ill-defined soft tissue thickening and fat stranding extending  through the subcutaneous tissues and abutting the adjacent small bowel  in this area. A small focus of gas is present along the tract and not  definitively located in the bowel.     Impression: 1.  Findings suggestive of constipation.  2.  Previously seen jejunostomy tube has been removed with extensive  soft tissue thickening extending from the small bowel to the skin  surface. A focus of air overlying the tract extending from the small  bowel through the subcutaneous tissues. CT findings are nonspecific and  correlation with patient history is recommended to exclude an  enterocutaneous fistula and/or developing infection.  3.  Prominent and mildly enlarged abdominal pelvic  nodes. An aortocaval  node as increased in size since 2/26/2024. While nonspecific, continued  attention follow-up with CT abdomen and pelvis in 3 months is  recommended to ensure resolution.  4.  Percutaneous gastrostomy tube is not located within the stomach and  appears to have been further retracted since 2/26/2024 with the majority  of the balloon appearing to be outside of the stomach wall. A tract of  air extending from the gastric lumen into the gastrostomy balloon, as  seen on 5/25/2024.  5.  Other findings as above.        This report was finalized on 7/2/2024 4:44 PM by Dr. Domingo Sims M.D  on Workstation: [id number]       Scheduled Medications  amLODIPine, 10 mg, Oral, Daily  dicyclomine, 10 mg, Oral, 4x Daily  enoxaparin, 40 mg, Subcutaneous, Q24H  escitalopram, 5 mg, Per J Tube, Daily  Fat Emulsion Plant Based, 200 mL, Intravenous, Q24H (TPN)  gabapentin, 200 mg, Oral, TID  hydroxychloroquine, 200 mg, Oral, BID  pantoprazole, 40 mg, Intravenous, Q12H  piperacillin-tazobactam, 3.375 g, Intravenous, Q8H  sodium chloride, 10 mL, Intravenous, Q12H    Infusions  Adult Central 2-in-1 TPN, , Last Rate: 65 mL/hr at 07/06/24 1718  Adult Central 2-in-1 TPN,   HYDROmorphone HCl-NaCl,   Pharmacy Consult,   Pharmacy to Dose TPN,   sodium chloride, 30 mL/hr    Diet  NPO Diet NPO Type: Strict NPO  Adult Central 2-in-1 TPN  Adult Central 2-in-1 TPN       Assessment/Plan     Active Hospital Problems    Diagnosis  POA    **Abdominal wall cellulitis [L03.311]  Yes    Lupus erythematosus overlap syndrome [M32.8]  Yes    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       29 y.o. female admitted with Abdominal wall cellulitis.    Abdominal wall cellulitis  Abdominal pain  Started on zosyn. Continue, follow cultures (negative).    Blood cultures NGTD  Now on dilaudid PCA     Gastroparesis    TPN. Nutrition consulted. To be started after surgery      Dislodged G-tube  S/p revision 7/5/24     Lupus  Resume Plaquenil     Hypertension  Amlodipine. Blood pressures acceptable       Lovenox for DVT prophylaxis.  Full code.  Discussed with patient.  Anticipate discharge to be determined       Aureliano Melchor MD  [facility]  07/07/24  14:23 EDT
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
## 2024-07-07 NOTE — PROGRESS NOTES
[facility] Clinical Pharmacy Services: TPN Daily Progress Note    TPN Day # 2   Indication: Inaccessible GI Tract  Route: central  Type: standard    Subjective/Objective  Results from last 7 days   Lab Units 24  0508 24  0453   SODIUM mmol/L 137 136   POTASSIUM mmol/L 3.8 4.2   CHLORIDE mmol/L 106 106   CO2 mmol/L 26.0 20.0*   BUN mg/dL 8 5*   CREATININE mg/dL 0.51* 0.52*   CALCIUM mg/dL 9.0 8.9   ALBUMIN g/dL 3.4*  --    BILIRUBIN mg/dL 0.2  --    ALK PHOS U/L 52  --    ALT (SGPT) U/L 18  --    AST (SGOT) U/L 14  --    GLUCOSE mg/dL 104* 109*   MAGNESIUM mg/dL 1.9 2.0   PHOSPHORUS mg/dL 2.3* 3.8   TRIGLYCERIDES mg/dL  --  77        Diet Orders (active) (From admission, onward)       Start     Ordered    24 1241  NPO Diet NPO Type: Strict NPO  Diet Effective Now         24 1240                  Additional insulin administration while previous TPN infusin units  Additional electrolyte administration while previous TPN infusing: none  Acid suppression: pantoprazole 40 mg IV daily    Goal TPN Formula Recommendations: 95/780/200 AA/Dex/Lipids; 1560 kcal/day  Current TPN Formula: 45/400/200 AA/Dex/Lipids    Assessment/Plan      Plan    Will continue to taper TPN up as appropriate. Will increase to the following macros:   Protein/Dextrose/Lipids: 70/600/200    Volume: 1560 ml (65 mL/hr over 24 hrs daily)    Based on the above labs, will add the following electrolytes/additives to the TPN.    Sodium Chloride: 50 mEq   Sodium Acetate: 20 mEq   Sodium Phosphate: 0 mEq   Potassium Chloride: 40 mEq (increased from 30 mEq)   Potassium Acetate: 10 mEq (decreased from 20 mEq)   Potassium Phosphate: 30 mEq (increased from 15 mEq)   Calcium Gluconate: 10 mEq   Magnesium Sulfate: 9 mEq   MVI for TPN   Trace Elements    Labs to be ordered: CMP, Mag, Phos    [facility]  Clinical Pharmacist

## 2024-07-07 NOTE — PROGRESS NOTES
"General Surgery  Progress Note    CC: Follow-up gastroparesis    POD#2 robotic takedown of jejunostomy tract fistula with small bowel resection and PEG tube revision    S: She again complains of 10 out of 10 pain, and shreaked when I removed some gauze and tape from over her former J-tube site.    O:/62 (BP Location: Left arm, Patient Position: Lying)   Pulse 60   Temp 97.4 °F (36.3 °C) (Oral)   Resp 16   Ht 180.3 cm (71\")   Wt 77.1 kg (170 lb)   LMP 11/01/2023   SpO2 96%   BMI 23.71 kg/m²     GENERAL: alert, well appearing, and in no distress  HEENT: normocephalic, atraumatic, moist mucous membranes, clear sclerae   CHEST: clear to auscultation, no wheezes, rales or rhonchi, symmetric air entry  CARDIAC: regular rate and rhythm    ABDOMEN: Soft, nondistended, incisions clean/dry/intact with Steri-Strips, PEG tube in left upper quadrant with external bumper located at 6 cm, former J-tube site is open at the skin but too small to pack, there is no surrounding erythema  EXTREMITIES: no cyanosis, clubbing, or edema   SKIN: Warm and moist, no rashes    LABS  Results from last 7 days   Lab Units 07/07/24  0508 07/06/24  0453 07/05/24  0436 07/04/24  0353   WBC 10*3/mm3 4.85 3.86 2.83* 3.03*   HEMOGLOBIN g/dL 8.5* 9.1* 8.3* 8.3*   HEMATOCRIT % 27.6* 28.8* 26.3* 26.9*   PLATELETS 10*3/mm3 266 242 211 220   MONOCYTES % %  --   --  11.0 12.5*   EOSINOPHIL % %  --   --  1.0 0.0*     Results from last 7 days   Lab Units 07/07/24  0508 07/06/24  0453 07/05/24  0436 07/04/24  1358 07/04/24  0353 07/03/24  1004   SODIUM mmol/L 137 136 135*  --  137 137   POTASSIUM mmol/L 3.8 4.2 3.6   < > 3.5 3.8   CHLORIDE mmol/L 106 106 106  --  107 104   CO2 mmol/L 26.0 20.0* 20.7*  --   --  21.4*   BUN mg/dL 8 5* 3*  --  6 7   CREATININE mg/dL 0.51* 0.52* 0.65  --  0.66 0.65   CALCIUM mg/dL 9.0 8.9 8.0*  --  9.5 9.0   BILIRUBIN mg/dL 0.2  --   --   --  0.3 0.2   ALK PHOS U/L 52  --   --   --  43 46   ALT (SGPT) U/L 18  --   --   " --  10 9   AST (SGOT) U/L 14  --   --   --  14 16   GLUCOSE mg/dL 104* 109* 83  --  83 111*    < > = values in this interval not displayed.     Results from last 7 days   Lab Units 07/03/24  1004   INR  1.05         A/P: 29 y.o. female POD#2 robotic takedown of jejunostomy tract fistula with small bowel resection and PEG tube revision    Continue same with TPN for longstanding/chronic gastroparesis and malabsorption.  Her small open wound of the abdominal wall is too small to pack, so I will have the nurses simply cover it with a dry gauze and change as needed for saturation.  Hopefully tomorrow we can remove the basal rate from her PCA and start to wean her settings.    Delmy Cortes MD  General, Robotic, and Endoscopic Surgery  [facility]    [address], [address]  [place] [address]  P: [phone]  F: [phone]

## 2024-07-07 NOTE — PLAN OF CARE
Goal Outcome Evaluation:  Plan of Care Reviewed With: patient        Progress: no change  Outcome Evaluation: TPN/Lipids infusing per mediport. IVFs kvo with pca dilaudid in peripheral line. G-tube clamped. Dsg over old site and old j-tube site. Mother at bedside. Continues NPO/ice and sips of lemon/lime soda with meds. Note left for MD about dsg changes(pt thinks he had ordered dsg changes)

## 2024-07-08 LAB
ALBUMIN SERPL-MCNC: 3.3 G/DL (ref 3.5–5.2)
ALBUMIN/GLOB SERPL: 0.9 G/DL
ALP SERPL-CCNC: 51 U/L (ref 39–117)
ALT SERPL W P-5'-P-CCNC: 14 U/L (ref 1–33)
ANION GAP SERPL CALCULATED.3IONS-SCNC: 8.7 MMOL/L (ref 5–15)
AST SERPL-CCNC: 9 U/L (ref 1–32)
BACTERIA SPEC AEROBE CULT: NORMAL
BACTERIA SPEC AEROBE CULT: NORMAL
BILIRUB SERPL-MCNC: <0.2 MG/DL (ref 0–1.2)
BUN SERPL-MCNC: 6 MG/DL (ref 6–20)
BUN/CREAT SERPL: 10.9 (ref 7–25)
CALCIUM SPEC-SCNC: 8.4 MG/DL (ref 8.6–10.5)
CHLORIDE SERPL-SCNC: 101 MMOL/L (ref 98–107)
CO2 SERPL-SCNC: 24.3 MMOL/L (ref 22–29)
CREAT SERPL-MCNC: 0.55 MG/DL (ref 0.57–1)
EGFRCR SERPLBLD CKD-EPI 2021: 127.4 ML/MIN/1.73
GLOBULIN UR ELPH-MCNC: 3.7 GM/DL
GLUCOSE BLDC GLUCOMTR-MCNC: 106 MG/DL (ref 70–130)
GLUCOSE BLDC GLUCOMTR-MCNC: 76 MG/DL (ref 70–130)
GLUCOSE SERPL-MCNC: 107 MG/DL (ref 65–99)
LAB AP CASE REPORT: NORMAL
LAB AP CLINICAL INFORMATION: NORMAL
MAGNESIUM SERPL-MCNC: 1.7 MG/DL (ref 1.6–2.6)
PATH REPORT.FINAL DX SPEC: NORMAL
PATH REPORT.GROSS SPEC: NORMAL
PHOSPHATE SERPL-MCNC: 3.5 MG/DL (ref 2.5–4.5)
POTASSIUM SERPL-SCNC: 3.9 MMOL/L (ref 3.5–5.2)
PROT SERPL-MCNC: 7 G/DL (ref 6–8.5)
SODIUM SERPL-SCNC: 134 MMOL/L (ref 136–145)

## 2024-07-08 PROCEDURE — 25010000002 ONDANSETRON PER 1 MG: Performed by: SURGERY

## 2024-07-08 PROCEDURE — 83735 ASSAY OF MAGNESIUM: CPT | Performed by: SURGERY

## 2024-07-08 PROCEDURE — 82948 REAGENT STRIP/BLOOD GLUCOSE: CPT

## 2024-07-08 PROCEDURE — 25010000002 ENOXAPARIN PER 10 MG: Performed by: STUDENT IN AN ORGANIZED HEALTH CARE EDUCATION/TRAINING PROGRAM

## 2024-07-08 PROCEDURE — 25010000002 HYDROMORPHONE 1 MG/ML SOLUTION: Performed by: STUDENT IN AN ORGANIZED HEALTH CARE EDUCATION/TRAINING PROGRAM

## 2024-07-08 PROCEDURE — 25010000002 MAGNESIUM SULFATE PER 500 MG OF MAGNESIUM: Performed by: SURGERY

## 2024-07-08 PROCEDURE — 80053 COMPREHEN METABOLIC PANEL: CPT | Performed by: SURGERY

## 2024-07-08 PROCEDURE — 97530 THERAPEUTIC ACTIVITIES: CPT

## 2024-07-08 PROCEDURE — 25010000002 DIPHENHYDRAMINE PER 50 MG: Performed by: STUDENT IN AN ORGANIZED HEALTH CARE EDUCATION/TRAINING PROGRAM

## 2024-07-08 PROCEDURE — 25010000002 CALCIUM GLUCONATE PER 10 ML: Performed by: SURGERY

## 2024-07-08 PROCEDURE — 99024 POSTOP FOLLOW-UP VISIT: CPT | Performed by: SURGERY

## 2024-07-08 PROCEDURE — 25010000002 POTASSIUM CHLORIDE PER 2 MEQ OF POTASSIUM: Performed by: SURGERY

## 2024-07-08 PROCEDURE — 84100 ASSAY OF PHOSPHORUS: CPT | Performed by: SURGERY

## 2024-07-08 RX ORDER — HYDROMORPHONE HYDROCHLORIDE 1 MG/ML
0.5 INJECTION, SOLUTION INTRAMUSCULAR; INTRAVENOUS; SUBCUTANEOUS
Status: DISCONTINUED | OUTPATIENT
Start: 2024-07-08 | End: 2024-07-08

## 2024-07-08 RX ORDER — HYDROMORPHONE HCL/0.9% NACL/PF 10 MG/50ML
PATIENT CONTROLLED ANALGESIA SYRINGE INTRAVENOUS CONTINUOUS
Status: DISPENSED | OUTPATIENT
Start: 2024-07-08 | End: 2024-07-08

## 2024-07-08 RX ADMIN — DIPHENHYDRAMINE HYDROCHLORIDE 50 MG: 50 INJECTION, SOLUTION INTRAMUSCULAR; INTRAVENOUS at 00:50

## 2024-07-08 RX ADMIN — ESCITALOPRAM 5 MG: 5 TABLET, FILM COATED ORAL at 12:47

## 2024-07-08 RX ADMIN — HYDROXYCHLOROQUINE SULFATE 200 MG: 200 TABLET, FILM COATED ORAL at 22:39

## 2024-07-08 RX ADMIN — Medication: at 11:30

## 2024-07-08 RX ADMIN — DICYCLOMINE HYDROCHLORIDE 10 MG: 10 CAPSULE ORAL at 12:47

## 2024-07-08 RX ADMIN — GABAPENTIN 200 MG: 100 CAPSULE ORAL at 22:38

## 2024-07-08 RX ADMIN — DIPHENHYDRAMINE HYDROCHLORIDE 50 MG: 50 INJECTION, SOLUTION INTRAMUSCULAR; INTRAVENOUS at 10:23

## 2024-07-08 RX ADMIN — DIPHENHYDRAMINE HYDROCHLORIDE 50 MG: 50 INJECTION, SOLUTION INTRAMUSCULAR; INTRAVENOUS at 19:06

## 2024-07-08 RX ADMIN — DICYCLOMINE HYDROCHLORIDE 10 MG: 10 CAPSULE ORAL at 19:06

## 2024-07-08 RX ADMIN — Medication 10 ML: at 09:05

## 2024-07-08 RX ADMIN — PANTOPRAZOLE SODIUM 40 MG: 40 INJECTION, POWDER, FOR SOLUTION INTRAVENOUS at 09:04

## 2024-07-08 RX ADMIN — ENOXAPARIN SODIUM 40 MG: 100 INJECTION SUBCUTANEOUS at 19:06

## 2024-07-08 RX ADMIN — CALCIUM GLUCONATE: 98 INJECTION, SOLUTION INTRAVENOUS at 18:40

## 2024-07-08 RX ADMIN — PANTOPRAZOLE SODIUM 40 MG: 40 INJECTION, POWDER, FOR SOLUTION INTRAVENOUS at 22:38

## 2024-07-08 RX ADMIN — Medication 10 ML: at 22:39

## 2024-07-08 RX ADMIN — AMLODIPINE BESYLATE 10 MG: 10 TABLET ORAL at 12:47

## 2024-07-08 RX ADMIN — DICYCLOMINE HYDROCHLORIDE 10 MG: 10 CAPSULE ORAL at 09:04

## 2024-07-08 RX ADMIN — DICYCLOMINE HYDROCHLORIDE 10 MG: 10 CAPSULE ORAL at 22:38

## 2024-07-08 RX ADMIN — GABAPENTIN 200 MG: 100 CAPSULE ORAL at 09:04

## 2024-07-08 RX ADMIN — I.V. FAT EMULSION 20 G: 20 EMULSION INTRAVENOUS at 00:44

## 2024-07-08 RX ADMIN — GABAPENTIN 200 MG: 100 CAPSULE ORAL at 16:46

## 2024-07-08 RX ADMIN — HYDROXYCHLOROQUINE SULFATE 200 MG: 200 TABLET, FILM COATED ORAL at 09:05

## 2024-07-08 RX ADMIN — ONDANSETRON 4 MG: 2 INJECTION INTRAMUSCULAR; INTRAVENOUS at 14:54

## 2024-07-08 RX ADMIN — HYDROMORPHONE HYDROCHLORIDE 1 MG: 1 INJECTION, SOLUTION INTRAMUSCULAR; INTRAVENOUS; SUBCUTANEOUS at 22:39

## 2024-07-08 RX ADMIN — I.V. FAT EMULSION 40 G: 20 EMULSION INTRAVENOUS at 18:40

## 2024-07-08 NOTE — PLAN OF CARE
Goal Outcome Evaluation:  Plan of Care Reviewed With: patient, spouse        Progress: no change  Outcome Evaluation: VSS, on room air, voiding without issue, PCA in use w/ some relief, though patient always rates pain at a 7. Slept on and off, zofran for nausea w/ relief, G-tube to bedside drainage per order, PCA dose increased per order, lap sites are CDI and approximated. pt up to bathroom w/ assist x1 and walker, j-tube site dressing is CDI, patient NPO except sips with meds, falls precautions maintained

## 2024-07-08 NOTE — PROGRESS NOTES
Continued Stay Note  [facility]     Patient Name: Henrietta Garrett  MRN: [id number]  Today's Date: 7/8/2024    Admit Date: 7/3/2024    Plan: Home with family assist. Current with [facility]   Discharge Plan       Row Name 07/08/24 1411       Plan    Plan Home with family assist. Current with [facility]    Plan Comments Introduced self and role of CCP. Patient confirmed DC plan is to return to home. Stated she is independent with ADL's and uses no DMEs. SO will assist as needed and will provide transportation at DC. Denies any needs/equipment. Patient is current with [facility].                   Discharge Codes    No documentation.                 Expected Discharge Date and Time       Expected Discharge Date Expected Discharge Time    Jul 10, 2024               Melody Calle RN

## 2024-07-08 NOTE — PROGRESS NOTES
Name: Henrietta Garrett ADMIT: 7/3/2024   : 1995  PCP: Kenya David MD    MRN: [id number] LOS: 5 days   AGE/SEX: 29 y.o. female  ROOM: [facility]     Subjective     Still having severe pain.     Objective   Objective   Vital Signs  Temp:  [97.1 °F (36.2 °C)-98.1 °F (36.7 °C)] 97.6 °F (36.4 °C)  Heart Rate:  [66-83] 66  Resp:  [16] 16  BP: ()/(60-72) 98/60  SpO2:  [96 %-98 %] 97 %  on   ;   Device (Oxygen Therapy): room air  Body mass index is 23.71 kg/m².  Physical Exam  Constitutional:       General: She is not in acute distress.     Appearance: She is ill-appearing.   HENT:      Head: Normocephalic and atraumatic.   Eyes:      Extraocular Movements: Extraocular movements intact.      Pupils: Pupils are equal, round, and reactive to light.   Cardiovascular:      Rate and Rhythm: Normal rate and regular rhythm.   Pulmonary:      Effort: Pulmonary effort is normal. No respiratory distress.   Abdominal:      General: There is no distension.      Tenderness: There is abdominal tenderness.   Neurological:      Mental Status: She is alert and oriented to person, place, and time.         Results Review     I reviewed the patient's new clinical results.  Results from last 7 days   Lab Units 24  0508 24  0453 24  0436 24  0353   WBC 10*3/mm3 4.85 3.86 2.83* 3.03*   HEMOGLOBIN g/dL 8.5* 9.1* 8.3* 8.3*   PLATELETS 10*3/mm3 266 242 211 220     Results from last 7 days   Lab Units 24  0543 24  0508 24  0453 24  0436   SODIUM mmol/L 134* 137 136 135*   POTASSIUM mmol/L 3.9 3.8 4.2 3.6   CHLORIDE mmol/L 101 106 106 106   CO2 mmol/L 24.3 26.0 20.0* 20.7*   BUN mg/dL 6 8 5* 3*   CREATININE mg/dL 0.55* 0.51* 0.52* 0.65   GLUCOSE mg/dL 107* 104* 109* 83   Estimated Creatinine Clearance: 183.7 mL/min (A) (by C-G formula based on SCr of 0.55 mg/dL (L)).  Results from last 7 days   Lab Units 24  0543 24  0508 24  0353 24  1004   ALBUMIN g/dL 3.3* 3.4*  3.3* 3.8   BILIRUBIN mg/dL <0.2 0.2 0.3 0.2   ALK PHOS U/L 51 52 43 46   AST (SGOT) U/L 9 14 14 16   ALT (SGPT) U/L 14 18 10 9     Results from last 7 days   Lab Units 07/08/24  0543 07/07/24  0508 07/06/24  0453 07/05/24  0436 07/04/24  0353 07/03/24  1004   CALCIUM mg/dL 8.4* 9.0 8.9 8.0* 9.5 9.0   ALBUMIN g/dL 3.3* 3.4*  --   --  3.3* 3.8   MAGNESIUM mg/dL 1.7 1.9 2.0  --  2.0 1.9   PHOSPHORUS mg/dL 3.5 2.3* 3.8  --  2.9  --      Results from last 7 days   Lab Units 07/03/24  1015 07/03/24  1004   PROCALCITONIN ng/mL  --  <0.02   LACTATE mmol/L 1.6  --      COVID19   Date Value Ref Range Status   07/22/2022 Not Detected Not Detected - Ref. Range Final     SARS-CoV-2, DANIELLA   Date Value Ref Range Status   08/08/2022 NEGATIVE Negative Final     Glucose   Date/Time Value Ref Range Status   07/08/2024 0647 106 70 - 130 mg/dL Final   07/08/2024 0031 76 70 - 130 mg/dL Final     Results for orders placed or performed during the hospital encounter of 07/03/24   Blood Culture - Blood, Arm, Right    Specimen: Arm, Right; Blood   Result Value Ref Range    Blood Culture No growth at 5 days          CT Abdomen Pelvis With Contrast  Narrative: CT ABDOMEN AND PELVIS WITH IV CONTRAST     HISTORY: Abdominal pain     TECHNIQUE: Radiation dose reduction techniques were utilized, including  automated exposure control and exposure modulation based on body size.   3 mm images were obtained through the abdomen and pelvis after the  administration of IV contrast.     COMPARISON: Multiple CT abdomen and pelvis dating back to 6/16/2023     FINDINGS:     Please note evaluation is suboptimal due to beam hardening and streak  artifact and patient's arms being along their side. A percutaneous  gastrostomy tube is present. The bulb of the gastrostomy tube does not  appear to be located within the gastric lumen, as before, and is  contiguous with the wall. There is a tract of air which extends to the  gastrostomy tube bulb. Findings are grossly  similar to on 5/25/2024. A  few air-fluid levels are present within the small bowel which is  otherwise nondistended. The appendix is unremarkable. Mild intrahepatic  bile duct dilation is present status post cholecystectomy, as before.  The pancreas, spleen and adrenal glands have an unremarkable  postcontrast CT appearance. There appears to be a subtle subcentimeter  right renal lesion which is too small to characterize. No  hydronephrosis.     Prominent and enlarged abdominal pelvic adenopathy is present. Enlarged  brett hepatic node measuring 1.3 cm in short axis dimension, grossly  unchanged since 6/16/2023. Prominent aortocaval node measuring 0.8 cm in  short axis dimension, as before. An anterior pericaval node more  inferiorly has increased in size, measuring 0.8 cm in short axis  dimension (previously 0.5 cm on 2/26/2024. Asymmetric right adnexal  hypodense lesion measuring up to 2.3 cm is present, as before. No free  intraperitoneal air is seen..     Large amount of hyperdense stool is present throughout the colon.     No suspicious lytic or blastic osseous lesion. The previously seen  jejunostomy tube overlying the left lower quadrant has been removed.  There is ill-defined soft tissue thickening and fat stranding extending  through the subcutaneous tissues and abutting the adjacent small bowel  in this area. A small focus of gas is present along the tract and not  definitively located in the bowel.     Impression: 1.  Findings suggestive of constipation.  2.  Previously seen jejunostomy tube has been removed with extensive  soft tissue thickening extending from the small bowel to the skin  surface. A focus of air overlying the tract extending from the small  bowel through the subcutaneous tissues. CT findings are nonspecific and  correlation with patient history is recommended to exclude an  enterocutaneous fistula and/or developing infection.  3.  Prominent and mildly enlarged abdominal pelvic nodes. An  aortocaval  node as increased in size since 2/26/2024. While nonspecific, continued  attention follow-up with CT abdomen and pelvis in 3 months is  recommended to ensure resolution.  4.  Percutaneous gastrostomy tube is not located within the stomach and  appears to have been further retracted since 2/26/2024 with the majority  of the balloon appearing to be outside of the stomach wall. A tract of  air extending from the gastric lumen into the gastrostomy balloon, as  seen on 5/25/2024.  5.  Other findings as above.        This report was finalized on 7/2/2024 4:44 PM by Dr. Domingo Sims M.D  on Workstation: [id number]       Scheduled Medications  amLODIPine, 10 mg, Oral, Daily  dicyclomine, 10 mg, Oral, 4x Daily  enoxaparin, 40 mg, Subcutaneous, Q24H  escitalopram, 5 mg, Per J Tube, Daily  Fat Emulsion Plant Based, 200 mL, Intravenous, Q24H (TPN)  gabapentin, 200 mg, Oral, TID  hydroxychloroquine, 200 mg, Oral, BID  pantoprazole, 40 mg, Intravenous, Q12H  sodium chloride, 10 mL, Intravenous, Q12H    Infusions  Adult Central 2-in-1 TPN, , Last Rate: 65 mL/hr at 07/07/24 1812  Adult Central 2-in-1 TPN,   HYDROmorphone HCl-NaCl,   Pharmacy to Dose TPN,   sodium chloride, 30 mL/hr    Diet  NPO Diet NPO Type: Strict NPO  Adult Central 2-in-1 TPN  Adult Central 2-in-1 TPN       Assessment/Plan     Active Hospital Problems    Diagnosis  POA    **Abdominal wall cellulitis [L03.311]  Yes    Lupus erythematosus overlap syndrome [M32.8]  Yes    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       29 y.o. female admitted with Abdominal wall cellulitis.    Abdominal wall cellulitis  Abdominal pain  Started on zosyn. Continue, follow cultures (negative).    Blood cultures NGTD  Now on dilaudid PCA. Added PRN dilaudid as well.      Gastroparesis   On TPN    Dislodged G-tube  S/p revision 7/5/24      Lupus  Resume Plaquenil     Hypertension  Dc amlodipine. BP on the lower side       Lovenox for DVT prophylaxis.  Full code.  Discussed with patient.  Anticipate discharge to be determined       Aureliano Melchor MD  [facility]  07/08/24  13:34 EDT

## 2024-07-08 NOTE — PROGRESS NOTES
Chief Complaint:    S/P Laparoscopic assisted small bowel resection and PEG, POD 3    Subjective:    The patient continues to have expected postop abdominal pain that is poorly controlled with her present regimen of Dilaudid.  She also has nausea, which is normal for her.    Objective:    Temp:  [97.1 °F (36.2 °C)-97.8 °F (36.6 °C)] 97.7 °F (36.5 °C)  Heart Rate:  [60-74] 73  Resp:  [16] 16  BP: (104-112)/(61-72) 106/61    Physical Exam  Constitutional:       Appearance: She is not ill-appearing or toxic-appearing.   Abdominal:      Palpations: Abdomen is soft.      Tenderness: There is generalized abdominal tenderness (Appropriate postop tenderness).   Neurological:      Mental Status: She is alert.   Psychiatric:         Behavior: Behavior is cooperative.       BMI is within normal parameters. No other follow-up for BMI required.        Results:    BUN is 16 creatinine 0.55.    Impression/Plan:    The patient is POD 3 from a laparoscopic assisted small bowel resection and PEG.  She has inadequate pain control and will be started on as needed Dilaudid for breakthrough pain.  I will also increase her basal Dilaudid.    She is having nausea, likely related to the gastroparesis.  We replaced her G-tube to gravity drainage to try to prevent the stomach.    She was discussed with Dr. Melchor.    Madhu Zabala Jr., M.D.

## 2024-07-08 NOTE — PROGRESS NOTES
[facility]: TPN Daily Progress Note    TPN Day # 3   Indication: Inaccessible GI Tract  Route: central  Type: standard    Subjective/Objective  Results from last 7 days   Lab Units 24  0543 24  0508 24  0453   SODIUM mmol/L 134*   < > 136   POTASSIUM mmol/L 3.9   < > 4.2   CHLORIDE mmol/L 101   < > 106   CO2 mmol/L 24.3   < > 20.0*   BUN mg/dL 6   < > 5*   CREATININE mg/dL 0.55*   < > 0.52*   CALCIUM mg/dL 8.4*   < > 8.9   ALBUMIN g/dL 3.3*   < >  --    BILIRUBIN mg/dL <0.2   < >  --    ALK PHOS U/L 51   < >  --    ALT (SGPT) U/L 14   < >  --    AST (SGOT) U/L 9   < >  --    GLUCOSE mg/dL 107*   < > 109*   MAGNESIUM mg/dL 1.7   < > 2.0   PHOSPHORUS mg/dL 3.5   < > 3.8   TRIGLYCERIDES mg/dL  --   --  77    < > = values in this interval not displayed.        Diet Orders (active) (From admission, onward)       Start     Ordered    24 1241  NPO Diet NPO Type: Strict NPO  Diet Effective Now         24 1240                  Additional insulin administration while previous TPN infusin units  Additional electrolyte administration while previous TPN infusing: none  Acid suppression: pantoprazole 40 mg IV daily    Goal TPN Formula Recommendations: 95/780/200 AA/Dex/Lipids; 1560 kcal/day  Current TPN Formula: 70/600/200 AA/Dex/Lipids    Assessment/Plan      Plan  Will continue to taper TPN up as appropriate. Will increase to the following macros:   Protein/Dextrose/Lipids: 95/780/200    Volume: 1560 ml (65 mL/hr over 24 hrs daily)    Based on the above labs, will add the following electrolytes/additives to the TPN.    Sodium Chloride: 60 mEq (increased from 50 mEq)   Sodium Acetate: 20 mEq   Sodium Phosphate: 0 mEq   Potassium Chloride: 40 mEq   Potassium Acetate: 10 mEq    Potassium Phosphate: 30 mEq    Calcium Gluconate: 10 mEq   Magnesium Sulfate: 9 mEq   MVI for TPN   Trace Elements    Labs to be ordered: CMP, Mag, Phos    Tao Anaya [facility]  Clinical  Pharmacist

## 2024-07-08 NOTE — PLAN OF CARE
Goal Outcome Evaluation:  Plan of Care Reviewed With: patient, family           Outcome Evaluation: Patient seen for PT session this AM. Patient supine in bed upon arrival. Patient reports abdominal pain this date. Patient utilized PCA pump throughout session. Patient sat up to EOB with SBA and heavy use of bed rails. Increased time. Patient performed STS from EOB with CGA. Patient ambulated 70ft with rwx and CGA. Gait very slow with patient using step to gait pattern with reports of increased abdominal pain during each step. Patient reports MD is changing her pain medication. Encouraged patient to continue getting up and mobilizing with nsg as able. Acute PT will continue to monitor.      Anticipated Discharge Disposition (PT): home with assist, home with home health
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
## 2024-07-09 ENCOUNTER — APPOINTMENT (OUTPATIENT)
Dept: GENERAL RADIOLOGY | Facility: HOSPITAL | Age: 29
End: 2024-07-09
Payer: COMMERCIAL

## 2024-07-09 LAB
ALBUMIN SERPL-MCNC: 2.4 G/DL (ref 3.5–5.2)
ALBUMIN SERPL-MCNC: 3.6 G/DL (ref 3.5–5.2)
ALBUMIN/GLOB SERPL: 0.9 G/DL
ALBUMIN/GLOB SERPL: 0.9 G/DL
ALP SERPL-CCNC: 32 U/L (ref 39–117)
ALP SERPL-CCNC: 45 U/L (ref 39–117)
ALT SERPL W P-5'-P-CCNC: 12 U/L (ref 1–33)
ALT SERPL W P-5'-P-CCNC: 8 U/L (ref 1–33)
ANION GAP SERPL CALCULATED.3IONS-SCNC: 8 MMOL/L (ref 5–15)
ANION GAP SERPL CALCULATED.3IONS-SCNC: 8.1 MMOL/L (ref 5–15)
AST SERPL-CCNC: 7 U/L (ref 1–32)
AST SERPL-CCNC: 8 U/L (ref 1–32)
BILIRUB SERPL-MCNC: <0.2 MG/DL (ref 0–1.2)
BILIRUB SERPL-MCNC: <0.2 MG/DL (ref 0–1.2)
BUN SERPL-MCNC: 6 MG/DL (ref 6–20)
BUN SERPL-MCNC: 9 MG/DL (ref 6–20)
BUN/CREAT SERPL: 17 (ref 7–25)
BUN/CREAT SERPL: 20 (ref 7–25)
CALCIUM SPEC-SCNC: 6 MG/DL (ref 8.6–10.5)
CALCIUM SPEC-SCNC: 9.2 MG/DL (ref 8.6–10.5)
CHLORIDE SERPL-SCNC: 100 MMOL/L (ref 98–107)
CHLORIDE SERPL-SCNC: 115 MMOL/L (ref 98–107)
CO2 SERPL-SCNC: 17.9 MMOL/L (ref 22–29)
CO2 SERPL-SCNC: 27 MMOL/L (ref 22–29)
CREAT SERPL-MCNC: 0.3 MG/DL (ref 0.57–1)
CREAT SERPL-MCNC: 0.53 MG/DL (ref 0.57–1)
EGFRCR SERPLBLD CKD-EPI 2021: 128.6 ML/MIN/1.73
EGFRCR SERPLBLD CKD-EPI 2021: 147.5 ML/MIN/1.73
GLOBULIN UR ELPH-MCNC: 2.6 GM/DL
GLOBULIN UR ELPH-MCNC: 3.8 GM/DL
GLUCOSE SERPL-MCNC: 104 MG/DL (ref 65–99)
GLUCOSE SERPL-MCNC: 81 MG/DL (ref 65–99)
MAGNESIUM SERPL-MCNC: 1.3 MG/DL (ref 1.6–2.6)
MAGNESIUM SERPL-MCNC: 1.7 MG/DL (ref 1.6–2.6)
PHOSPHATE SERPL-MCNC: 3 MG/DL (ref 2.5–4.5)
POTASSIUM SERPL-SCNC: 2.7 MMOL/L (ref 3.5–5.2)
POTASSIUM SERPL-SCNC: 3.9 MMOL/L (ref 3.5–5.2)
PROT SERPL-MCNC: 5 G/DL (ref 6–8.5)
PROT SERPL-MCNC: 7.4 G/DL (ref 6–8.5)
SODIUM SERPL-SCNC: 135 MMOL/L (ref 136–145)
SODIUM SERPL-SCNC: 141 MMOL/L (ref 136–145)

## 2024-07-09 PROCEDURE — 84100 ASSAY OF PHOSPHORUS: CPT | Performed by: SURGERY

## 2024-07-09 PROCEDURE — 25010000002 ENOXAPARIN PER 10 MG: Performed by: STUDENT IN AN ORGANIZED HEALTH CARE EDUCATION/TRAINING PROGRAM

## 2024-07-09 PROCEDURE — 83735 ASSAY OF MAGNESIUM: CPT | Performed by: STUDENT IN AN ORGANIZED HEALTH CARE EDUCATION/TRAINING PROGRAM

## 2024-07-09 PROCEDURE — 80053 COMPREHEN METABOLIC PANEL: CPT | Performed by: SURGERY

## 2024-07-09 PROCEDURE — 25010000002 POTASSIUM CHLORIDE PER 2 MEQ OF POTASSIUM: Performed by: SURGERY

## 2024-07-09 PROCEDURE — 25010000002 CALCIUM GLUCONATE PER 10 ML: Performed by: SURGERY

## 2024-07-09 PROCEDURE — 25010000002 DIPHENHYDRAMINE PER 50 MG: Performed by: STUDENT IN AN ORGANIZED HEALTH CARE EDUCATION/TRAINING PROGRAM

## 2024-07-09 PROCEDURE — 83735 ASSAY OF MAGNESIUM: CPT | Performed by: SURGERY

## 2024-07-09 PROCEDURE — 99024 POSTOP FOLLOW-UP VISIT: CPT | Performed by: SURGERY

## 2024-07-09 PROCEDURE — 80053 COMPREHEN METABOLIC PANEL: CPT | Performed by: STUDENT IN AN ORGANIZED HEALTH CARE EDUCATION/TRAINING PROGRAM

## 2024-07-09 PROCEDURE — 25010000002 ONDANSETRON PER 1 MG: Performed by: SURGERY

## 2024-07-09 PROCEDURE — 97530 THERAPEUTIC ACTIVITIES: CPT

## 2024-07-09 PROCEDURE — 25010000002 HYDROMORPHONE 1 MG/ML SOLUTION: Performed by: STUDENT IN AN ORGANIZED HEALTH CARE EDUCATION/TRAINING PROGRAM

## 2024-07-09 PROCEDURE — 25010000002 MAGNESIUM SULFATE PER 500 MG OF MAGNESIUM: Performed by: SURGERY

## 2024-07-09 PROCEDURE — 71045 X-RAY EXAM CHEST 1 VIEW: CPT

## 2024-07-09 RX ORDER — MAGNESIUM SULFATE HEPTAHYDRATE 40 MG/ML
2 INJECTION, SOLUTION INTRAVENOUS
Status: DISCONTINUED | OUTPATIENT
Start: 2024-07-09 | End: 2024-07-09

## 2024-07-09 RX ORDER — HYDROMORPHONE HCL/0.9% NACL/PF 10 MG/50ML
PATIENT CONTROLLED ANALGESIA SYRINGE INTRAVENOUS CONTINUOUS
Status: DISCONTINUED | OUTPATIENT
Start: 2024-07-09 | End: 2024-07-10

## 2024-07-09 RX ORDER — CALCIUM GLUCONATE 20 MG/ML
2000 INJECTION, SOLUTION INTRAVENOUS
Status: DISCONTINUED | OUTPATIENT
Start: 2024-07-09 | End: 2024-07-09

## 2024-07-09 RX ORDER — POTASSIUM CHLORIDE 7.45 MG/ML
10 INJECTION INTRAVENOUS
Status: DISCONTINUED | OUTPATIENT
Start: 2024-07-09 | End: 2024-07-09

## 2024-07-09 RX ORDER — NALOXONE HCL 0.4 MG/ML
0.1 VIAL (ML) INJECTION
Status: DISCONTINUED | OUTPATIENT
Start: 2024-07-09 | End: 2024-07-17 | Stop reason: HOSPADM

## 2024-07-09 RX ORDER — CALCIUM GLUCONATE 20 MG/ML
1000 INJECTION, SOLUTION INTRAVENOUS
Status: DISCONTINUED | OUTPATIENT
Start: 2024-07-09 | End: 2024-07-09

## 2024-07-09 RX ADMIN — CALCIUM GLUCONATE: 98 INJECTION, SOLUTION INTRAVENOUS at 18:51

## 2024-07-09 RX ADMIN — DICYCLOMINE HYDROCHLORIDE 10 MG: 10 CAPSULE ORAL at 10:17

## 2024-07-09 RX ADMIN — Medication: at 08:22

## 2024-07-09 RX ADMIN — Medication: at 23:28

## 2024-07-09 RX ADMIN — HYDROMORPHONE HYDROCHLORIDE 1 MG: 1 INJECTION, SOLUTION INTRAMUSCULAR; INTRAVENOUS; SUBCUTANEOUS at 01:14

## 2024-07-09 RX ADMIN — DIPHENHYDRAMINE HYDROCHLORIDE 50 MG: 50 INJECTION, SOLUTION INTRAMUSCULAR; INTRAVENOUS at 12:00

## 2024-07-09 RX ADMIN — ONDANSETRON 4 MG: 2 INJECTION INTRAMUSCULAR; INTRAVENOUS at 13:31

## 2024-07-09 RX ADMIN — ESCITALOPRAM 5 MG: 5 TABLET, FILM COATED ORAL at 10:17

## 2024-07-09 RX ADMIN — GABAPENTIN 200 MG: 100 CAPSULE ORAL at 16:51

## 2024-07-09 RX ADMIN — DICYCLOMINE HYDROCHLORIDE 10 MG: 10 CAPSULE ORAL at 20:46

## 2024-07-09 RX ADMIN — DIPHENHYDRAMINE HYDROCHLORIDE 50 MG: 50 INJECTION, SOLUTION INTRAMUSCULAR; INTRAVENOUS at 06:14

## 2024-07-09 RX ADMIN — Medication 10 ML: at 20:47

## 2024-07-09 RX ADMIN — DIPHENHYDRAMINE HYDROCHLORIDE 50 MG: 50 INJECTION, SOLUTION INTRAMUSCULAR; INTRAVENOUS at 18:44

## 2024-07-09 RX ADMIN — HYDROMORPHONE HYDROCHLORIDE 1 MG: 1 INJECTION, SOLUTION INTRAMUSCULAR; INTRAVENOUS; SUBCUTANEOUS at 05:43

## 2024-07-09 RX ADMIN — PANTOPRAZOLE SODIUM 40 MG: 40 INJECTION, POWDER, FOR SOLUTION INTRAVENOUS at 10:21

## 2024-07-09 RX ADMIN — ENOXAPARIN SODIUM 40 MG: 100 INJECTION SUBCUTANEOUS at 18:50

## 2024-07-09 RX ADMIN — HYDROXYCHLOROQUINE SULFATE 200 MG: 200 TABLET, FILM COATED ORAL at 10:17

## 2024-07-09 RX ADMIN — GABAPENTIN 200 MG: 100 CAPSULE ORAL at 10:17

## 2024-07-09 RX ADMIN — HYDROXYCHLOROQUINE SULFATE 200 MG: 200 TABLET, FILM COATED ORAL at 20:46

## 2024-07-09 RX ADMIN — Medication 10 ML: at 10:18

## 2024-07-09 RX ADMIN — I.V. FAT EMULSION 40 G: 20 EMULSION INTRAVENOUS at 18:52

## 2024-07-09 RX ADMIN — DICYCLOMINE HYDROCHLORIDE 10 MG: 10 CAPSULE ORAL at 18:44

## 2024-07-09 RX ADMIN — DICYCLOMINE HYDROCHLORIDE 10 MG: 10 CAPSULE ORAL at 13:22

## 2024-07-09 RX ADMIN — PANTOPRAZOLE SODIUM 40 MG: 40 INJECTION, POWDER, FOR SOLUTION INTRAVENOUS at 20:46

## 2024-07-09 RX ADMIN — GABAPENTIN 200 MG: 100 CAPSULE ORAL at 20:46

## 2024-07-09 NOTE — PAYOR COMM NOTE
"Henrietta Garrett BEENA (29 y.o. Female)                       ATTENTION; CONTINUED CLINICALS STARTING  CASE REF [id number]                       REPLY TO [facility] DEPT  [phone] OR CALL             Date of Birth   1995    Social Security Number       Address   [address]  [address] [place] [address]    Home Phone   [phone]    MRN   [id number]       Congregation   Non-Jainism    Marital Status   Single                            Admission Date   7/3/24    Admission Type   Emergency    Admitting Provider   Aureliano Melchor MD    Attending Provider   Aureliano Melchor MD    Department, Room/Bed   [facility] [address], P680/1       Discharge Date       Discharge Disposition       Discharge Destination                                 Attending Provider: Aureliano Melchor MD    Allergies: Anesthetics, Amide, Ciprofloxacin, Compazine [Prochlorperazine], Domperidone, Droperidol, Haldol [Haloperidol], Metoclopramide, Sulfa Antibiotics    Isolation: None   Infection: None   Code Status: CPR    Ht: 180.3 cm (71\")   Wt: 77.1 kg (170 lb)    Admission Cmt: None   Principal Problem: Abdominal wall cellulitis [L03.311]                   Active Insurance as of 7/3/2024       Primary Coverage       Payor Plan Insurance Group Employer/Plan Group    [employer] BLUE CROSS [employer] BLUE CROSS BLUE [employer]IELD PPO [id number]       Payor Plan Address Payor Plan Phone Number Payor Plan Fax Number Effective Dates    [address] [phone]  1/1/2023 - None Entered    [place] [address]         Subscriber Name Subscriber Birth Date Member ID       NETTA SINGH MARQUEZ [birth year]1963 [id number]                     Emergency Contacts        (Rel.) Home Phone Work Phone Mobile Phone    Miley Singh (Mother) [phone] -- [phone]    Flaco Redman (Significant Other) -- -- [phone]              Vital Signs (last 4 days)       Date/Time Temp Temp src Pulse Resp BP Patient Position SpO2    " 07/09/24 0557 97.4 (36.3) Oral 67 16 117/65 Lying 100    07/09/24 0111 97.4 (36.3) Oral 66 16 108/69 Lying 98    07/08/24 2132 97.8 (36.6) Oral 67 16 97/56 Lying 96    07/08/24 1728 99.1 (37.3) Oral 69 16 113/67 Lying 97    07/08/24 1325 97.6 (36.4) Oral 66 16 98/60 Lying 97    07/08/24 1251 -- -- -- 16 -- -- --    07/08/24 0922 98.1 (36.7) Oral 83 16 120/65 Lying 97    07/08/24 0616 97.7 (36.5) Oral 73 16 106/61 Lying 97    07/07/24 2122 97.8 (36.6) Oral 66 16 110/67 Lying 98    07/07/24 1713 97.6 (36.4) Oral 68 16 112/72 Lying 96    07/07/24 1430 97.1 (36.2) Oral 74 16 110/67 Lying 98    07/07/24 0930 97.4 (36.3) Oral 60 16 104/62 Lying 96    07/07/24 0524 97.1 (36.2) Oral 60 16 109/73 Lying 98    07/06/24 2124 97 (36.1) Oral 57 16 109/67 Lying 100    07/06/24 1735 97.4 (36.3) Oral 64 16 120/74 Lying 97    07/06/24 1400 97.1 (36.2) Oral 57 16 108/68 Lying 98    07/06/24 0940 -- -- 66 16 139/90 Sitting --    07/06/24 0534 97.1 (36.2) Oral 54 16 122/78 Lying 98    07/06/24 0133 97.8 (36.6) Oral 56 16 114/72 Lying 98    07/05/24 2124 97.7 (36.5) Oral 64 16 129/82 Lying 97    07/05/24 1823 97.4 (36.3) Oral 73 16 129/84 Lying 97    07/05/24 1800 -- -- 70 -- 128/85 -- 97    07/05/24 1745 -- -- 70 -- 132/74 -- 97    07/05/24 1730 97.9 (36.6) Oral 77 16 134/80 Lying 98    07/05/24 1715 -- -- 68 16 126/73 Lying 97    07/05/24 1700 -- -- 75 -- 135/82 -- 96    07/05/24 1645 -- -- 70 16 132/76 -- 96    07/05/24 1640 -- -- 73 16 131/73 Lying 95    07/05/24 1635 98.1 (36.7) Oral 73 16 133/70 Lying 96    07/05/24 1306 98.9 (37.2) Oral 66 16 131/75 Lying 100    07/05/24 0920 98 (36.7) Oral 66 16 111/67 Lying 98    07/05/24 0528 96.8 (36) Oral 60 16 101/59 Lying 97          Oxygen Therapy (last 4 days)       Date/Time SpO2 Device (Oxygen Therapy) Flow (L/min) Oxygen Concentration (%) ETCO2 (mmHg)    07/09/24 0557 100 room air -- -- --    07/09/24 0111 98 room air -- -- --    07/08/24 2228 -- room air -- -- --    07/08/24 2132 96  room air -- -- --    07/08/24 1728 97 room air -- -- --    07/08/24 1325 97 room air -- -- --    07/08/24 0922 97 room air -- -- --    07/08/24 0850 -- room air -- -- --    07/08/24 0616 97 room air -- -- --    07/07/24 2125 -- room air -- -- --    07/07/24 2122 98 room air -- -- --    07/07/24 1713 96 room air -- -- --    07/07/24 1430 98 room air -- -- --    07/07/24 0930 96 room air -- -- --    07/07/24 0843 -- room air -- -- --    07/07/24 0524 98 room air -- -- --    07/06/24 2135 -- room air -- -- --    07/06/24 2124 100 room air -- -- --    07/06/24 1735 97 room air -- -- --    07/06/24 1400 98 room air -- -- --    07/06/24 0940 -- room air -- -- --    07/06/24 0840 -- room air -- -- --    07/06/24 0534 98 room air -- -- --    07/06/24 0133 98 room air -- -- --    07/05/24 2124 97 room air -- -- --    07/05/24 2000 -- room air -- -- --    07/05/24 1823 97 nasal cannula 2 -- --    07/05/24 1800 97 nasal cannula 2 -- --    07/05/24 1745 97 nasal cannula 2 -- --    07/05/24 1730 98 nasal cannula 2 -- --    07/05/24 1715 97 nasal cannula 2 -- --    07/05/24 1700 96 nasal cannula 2 -- --    07/05/24 1645 96 nasal cannula 2 -- --    07/05/24 1640 95 nasal cannula 2 -- --    07/05/24 1635 96 nasal cannula 2 -- --    07/05/24 1306 100 room air -- -- --    07/05/24 0920 98 room air -- -- --    07/05/24 0850 -- room air -- -- --    07/05/24 0528 97 room air -- -- --          Lines, Drains & Airways       Active LDAs       Name Placement date Placement time Site Days    Peripheral IV 07/03/24 1003 Right Antecubital 07/03/24  1003  Antecubital  5    Gastrostomy/Enterostomy Percutaneous endoscopic gastrostomy (PEG) 20 Fr. LUQ 02/15/24  0854  LUQ  144    Gastrostomy/Enterostomy Jejunostomy 1 16 Fr. LUQ 02/19/24  1050  LUQ  140    Gastrostomy/Enterostomy Percutaneous endoscopic gastrostomy (PEG) LUQ 07/05/24  --  LUQ  4    Single Lumen Implantable Port Right Subclavian --  --  Subclavian  --                  Orders (last 72  "hrs)        Start     Ordered    07/10/24 0600  Calcium  Morning Draw         07/09/24 0753    07/10/24 0000  Magnesium  Morning Draw         07/09/24 0753    07/09/24 1953  Potassium  Timed         07/09/24 0753    07/09/24 0945  calcium gluconate 2000-675 MG/100ML NACL IVPB  Every 2 Hours        Placed in \"Followed by\" Linked Group    07/09/24 0753    07/09/24 0845  magnesium sulfate 2g/50 mL (PREMIX) infusion  Every 2 Hours         07/09/24 0753    07/09/24 0845  calcium gluconate 1000 Mg/50ml 0.675% NaCl IV SOLN  Every 1 Hour        Placed in \"Followed by\" Linked Group    07/09/24 0753    07/09/24 0845  potassium chloride 10 mEq in 100 mL IVPB  Every 1 Hour         07/09/24 0753    07/09/24 0802  Comprehensive Metabolic Panel  Once        Comments: Recheck - labs abnormally low this morning      07/09/24 0802    07/09/24 0700  HYDROmorphone (DILAUDID) PCA 0.2 mg/ml 50 mL syringe  Continuous         07/09/24 0608    07/09/24 0608  naloxone (NARCAN) injection 0.1 mg  Every 5 Minutes PRN         07/09/24 0608    07/09/24 0606  Pharmacy Consult  Continuous PRN         07/09/24 0608    07/09/24 0557  Opioid Administration - Continuous Pulse Oximetry (SpO2)  Continuous         07/09/24 0608    07/08/24 1800  Adult Central 2-in-1 TPN  Continuous TPN        Note to Pharmacy: TPN #3    07/08/24 1214    07/08/24 1225  HYDROmorphone (DILAUDID) injection 1 mg  Every 2 Hours PRN         07/08/24 1225    07/08/24 1224  HYDROmorphone (DILAUDID) injection 1 mg  Every 2 Hours PRN,   Status:  Discontinued         07/08/24 1224    07/08/24 1015  HYDROmorphone (DILAUDID) PCA 0.2 mg/ml 50 mL syringe  Continuous         07/08/24 0922    07/08/24 0928  Please place G-tube to gravity drainage.  Nursing Communication  Once        Comments: Please place G-tube to gravity drainage.    07/08/24 0927    07/08/24 0917  HYDROmorphone (DILAUDID) injection 1 mg  Every 2 Hours PRN,   Status:  Discontinued         07/08/24 0917 07/08/24 0916  " HYDROmorphone (DILAUDID) injection 0.5 mg  Every 2 Hours PRN,   Status:  Discontinued         07/08/24 0916    07/08/24 0649  POC Glucose Once  PROCEDURE ONCE        Comments: Complete no more than 45 minutes prior to patient eating      07/08/24 0647    07/08/24 0033  POC Glucose Once  PROCEDURE ONCE        Comments: Complete no more than 45 minutes prior to patient eating      07/08/24 0031    07/07/24 1904  Patient may take sips of sprite with meds.  Nursing Communication  Once        Comments: Patient may take sips of sprite with meds.    07/07/24 1904    07/07/24 1800  Adult Central 2-in-1 TPN  Continuous TPN        Note to Pharmacy: Bag #2    07/07/24 0826    07/07/24 0600  Magnesium  Daily       07/06/24 1305    07/07/24 0600  Phosphorus  Daily       07/06/24 1305    07/07/24 0600  Comprehensive Metabolic Panel  Daily       07/06/24 1340    07/07/24 0600  CBC (No Diff)  Morning Draw,   Status:  Canceled         07/06/24 1559    07/07/24 0600  CBC Auto Differential  PROCEDURE ONCE         07/06/24 2201    07/06/24 1800  Adult Central 2-in-1 TPN  Continuous TPN        Note to Pharmacy: Bag #1    07/06/24 1333    07/06/24 1800  Fat Emulsion Plant Based (INTRALIPID,LIPOSYN) 20 % infusion 40 g  Every 24 Hours Scheduled (TPN)         07/06/24 1335    07/06/24 1800  Enoxaparin Sodium (LOVENOX) syringe 40 mg  Every 24 Hours         07/06/24 1559    07/06/24 1345  HYDROmorphone (DILAUDID) PCA 0.2 mg/ml 50 mL syringe  Continuous,   Status:  Discontinued         07/06/24 1245    07/06/24 1304  Triglycerides  Once         07/06/24 1305    07/06/24 1245  Inpatient Nutrition Consult  Once        Provider:  (Not yet assigned)    07/06/24 1245    07/06/24 1244  Pharmacy to Dose TPN  Continuous PRN         07/06/24 1245    07/06/24 0814  diphenhydrAMINE (BENADRYL) injection 50 mg  Every 6 Hours PRN         07/06/24 0815    07/06/24 0600  Clear & Record PCA Settings  2x Daily PCA       07/05/24 1818    07/05/24 1818  Pharmacy  Consult  Continuous PRN,   Status:  Discontinued         07/05/24 1818 07/05/24 1818  naloxone (NARCAN) injection 0.1 mg  Every 5 Minutes PRN         07/05/24 1818    07/05/24 1818  sodium chloride 0.9 % infusion  Continuous PRN         07/05/24 1818    07/05/24 1644  HYDROmorphone (DILAUDID) PCA 0.2 mg/ml 50 mL syringe  Continuous,   Status:  Discontinued         07/05/24 1642    07/05/24 0823  Daily CHG Bath While Central Line in Place  Daily       07/05/24 0822    07/05/24 0600  Basic Metabolic Panel  Daily       07/04/24 2146 07/05/24 0600  CBC & Differential  Daily       07/04/24 2146 07/04/24 1103  promethazine (PHENERGAN) 12.5 mg in sodium chloride 0.9 % 50 mL  Every 6 Hours PRN         07/04/24 1105    07/04/24 0248  ondansetron (ZOFRAN) injection 4 mg  Every 6 Hours PRN         07/04/24 0249    07/03/24 2115  pantoprazole (PROTONIX) injection 40 mg  Every 12 Hours Scheduled         07/03/24 2023    07/03/24 2100  sodium chloride 0.9 % flush 10 mL  Every 12 Hours Scheduled         07/03/24 1502    07/03/24 2100  hydroxychloroquine (PLAQUENIL) tablet 200 mg  2 Times Daily         07/03/24 1627    07/03/24 2030  piperacillin-tazobactam (ZOSYN) 3.375 g IVPB in 100 mL NS MBP (CD)  Every 8 Hours         07/03/24 1505    07/03/24 1800  Oral Care  2 Times Daily       07/03/24 1502    07/03/24 1800  dicyclomine (BENTYL) capsule 10 mg  4 Times Daily         07/03/24 1627    07/03/24 1643  amLODIPine (NORVASC) tablet 10 mg  Daily,   Status:  Discontinued         07/03/24 1627    07/03/24 1643  escitalopram (LEXAPRO) tablet 5 mg  Daily         07/03/24 1627    07/03/24 1643  gabapentin (NEURONTIN) capsule 200 mg  3 Times Daily         07/03/24 1627    07/03/24 1600  Vital Signs  Every 4 Hours       07/03/24 1502    07/03/24 1500  Intake & Output  Every Shift       07/03/24 1502    07/03/24 1459  sodium chloride 0.9 % flush 10 mL  As Needed         07/03/24 1502    07/03/24 1459  sodium chloride 0.9 % infusion  "40 mL  As Needed         07/03/24 1502    07/03/24 1459  sennosides-docusate (PERICOLACE) 8.6-50 MG per tablet 2 tablet  2 Times Daily PRN        Placed in \"And\" Linked Group    07/03/24 1502    07/03/24 1459  polyethylene glycol (MIRALAX) packet 17 g  Daily PRN        Placed in \"And\" Linked Group    07/03/24 1502    07/03/24 1459  bisacodyl (DULCOLAX) EC tablet 5 mg  Daily PRN        Placed in \"And\" Linked Group    07/03/24 1502    07/03/24 1459  bisacodyl (DULCOLAX) suppository 10 mg  Daily PRN        Placed in \"And\" Linked Group    07/03/24 1502    07/03/24 1459  Potassium Replacement - Follow Nurse / BPA Driven Protocol  As Needed         07/03/24 1502    07/03/24 1459  Magnesium Standard Dose Replacement - Follow Nurse / BPA Driven Protocol  As Needed         07/03/24 1502    07/03/24 1459  Phosphorus Replacement - Follow Nurse / BPA Driven Protocol  As Needed         07/03/24 1502    07/03/24 1459  Calcium Replacement - Follow Nurse / BPA Driven Protocol  As Needed         07/03/24 1502    07/03/24 0953  sodium chloride 0.9 % infusion  Continuous         07/03/24 0937    07/03/24 0937  sodium chloride 0.9 % flush 10 mL  As Needed        Placed in \"And\" Linked Group    07/03/24 0937    Unscheduled  Oxygen Therapy- Nasal Cannula; Titrate 1-6 LPM Per SpO2; 90 - 95%  Continuous PRN       07/05/24 1628    Unscheduled  Wound Care  As Needed       07/07/24 1215    Signed and Held  Vital Signs - Per Anesthesia Protocol  As Needed,   Status:  Canceled         Signed and Held    Signed and Held  Oxygen Therapy- Nasal Cannula; Titrate 1-6 LPM Per SpO2; 90 - 95%  Continuous PRN,   Status:  Canceled         Signed and Held    Signed and Held  Continuous Pulse Oximetry  Continuous,   Status:  Canceled         Signed and Held    Signed and Held  POC Glucose PRN  As Needed,   Status:  Canceled        Comments: For all diabetic patients. Notify Anesthesiologist for blood sugar > 180.      Signed and Held    Signed and Held  " POC Urine Pregnancy  STAT,   Status:  Canceled         Signed and Held    Signed and Held  Insert Peripheral IV  Once,   Status:  Canceled         Signed and Held    Signed and Held  Saline Lock & Maintain IV Access  Continuous,   Status:  Canceled         Signed and Held    Signed and Held  sodium chloride 0.9 % flush 3 mL  Every 12 Hours Scheduled,   Status:  Canceled         Signed and Held    Signed and Held  sodium chloride 0.9 % flush 3-10 mL  As Needed,   Status:  Canceled         Signed and Held    Signed and Held  lidocaine (XYLOCAINE) 1 % injection 0.5 mL  Once As Needed,   Status:  Canceled         Signed and Held    Signed and Held  lactated ringers infusion  Continuous,   Status:  Canceled         Signed and Held    Signed and Held  fentaNYL citrate (PF) (SUBLIMAZE) injection 50 mcg  Once As Needed,   Status:  Canceled         Signed and Held    Signed and Held  midazolam (VERSED) injection 1 mg  Every 5 Minutes PRN,   Status:  Canceled         Signed and Held    Signed and Held  famotidine (PEPCID) injection 20 mg  Once,   Status:  Canceled         Signed and Held    Signed and Held  May take Beta Blocker from home with sip of water.  Once,   Status:  Canceled        Comments: Only applicable to patients on home Beta Blocker    Signed and Held                     Operative/Procedure Notes (most recent note)        Madhu Zabala Jr., MD at 07/05/24 5129          Surgeon: Madhu Zabala Jr., MEREN.    Assistant: Delmy Alamo PA-C    An assistant was necessary and provided valuable retraction and exposure, as well as camera holding, assistance with bowel manipulation, following the suture for the anastomosis, suction and irrigation, and wound closure.    Pre-Operative Diagnosis:     1.  Displaced G-tube  2.  Infection of previous J-tube site    Post-Operative Diagnosis:    1.  Displaced G-tube  2.  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  G-tube removal with  PEG  2.  Laparoscopic assisted takedown of J-tube and small bowel resection    Indications:     The patient is a pleasant 29-year-old female with multiple medical problems who has severe gastroparesis.  She is unable to eat and has a G-tube for gastric decompression.  The G-tube has become displaced and is located in the gastric wall.  She had a J-tube in place for nutritional support but was never able to reach target rate for tube feeds due to small bowel dysmotility.  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  She presents for G-tube removal with PEG and laparoscopic assisted small bowel resection.  The patient understands the indications, alternatives, risks, and benefits of the procedure and wishes to proceed.     Procedure:     The patient was identified and taken to the operating room where she was placed in the supine position on the operating room.  She underwent general endotracheal anesthesia and was appropriate monitored throughout the case by the anesthesia personnel.      Attention was first turned to the G-tube.  A bite block was placed.  Gastroscope was introduced into the oropharynx and advanced very carefully down the esophagus and into the stomach.  The stomach was insufflated and inspected.  The site of the G-tube could be visualized and the flange was in a completely submucosal position with a small opening allowing communication with the gastric lumen.  The mucosa had a normal appearance otherwise with no ulceration.  The PEG was removed and a guidewire was placed through the previous PEG site and easily passed into the stomach.  The guidewire was grasped with a snare and brought out through the mouth.  A new PEG was placed over the guidewire and brought to the abdominal wall in the standard fashion.  The gastroscope was returned to the stomach and the PEG appeared to be in good position with  a normal appearance.  It was secured at the skin with the apparatus provided.    Attention was then turned to addressing the enterocutaneous fistula related to the previous J-tube.  The abdomen is prepped and draped in the standard surgical fashion.  Each laparoscopic incision was infiltrated with 0.5% Marcaine with epinephrine prior to making the incision.  A small right upper quadrant incision was made with a scalpel carried through the skin into the subcutaneous tissue.  A 5 mm Optiview port was then placed with laparoscopic guidance.  The abdomen was insufflated with low pressures encountered initially.  Once fully insufflated a laparoscope was inserted and the abdomen was inspected.  The site of the jejunostomy was easily identified.  There were some adhesions around the jejunum but otherwise no significant intra-abdominal adhesions.  A 5 mm port was placed in the right lower quadrant and a second 5 mm port was placed in the lower midline.  These were both placed by making a skin incision carried through the skin into the subcutaneous tissue and inserting the port through the incision into the abdomen with direct visitation in spite of using the laparoscope.  The adhesions from the omentum were then taken down from the abdominal wall to fully expose the jejunum.  The jejunum was then taken down from the abdominal wall and there was in fact an opening at the jejunostomy site.  A limited epigastric midline incision was made with a scalpel carried through the skin into the subcutaneous tissue.  The subcutaneous tissue was divided using Bovie cautery down to the fascia which was incised and the abdomen was entered.  An Jamie wound protector was placed.  The jejunum at the GE junction that was pulled out of the incision and inspected.  There was jejunotomy at the site of the previous J-tube site.  The mesentery on either side of this jejunotomy was freed from the bowel wall and the bowel was divided with a CHARLOTTE  stapling device.  The mesentery between these 2 points was divided with a hemostat and 2-0 Vicryl tie.  The staple lines of the remaining small bowel were oversewn with 3-0 silk suture.  A side-to-side functional end-to-end anastomosis was then performed in 2 layers with an inner layer of 3-0 Vicryl suture and an outer layer of 3-0 silk suture.  This provided an intact, widely patent, viable anastomosis.  The small bowel was returned to the abdomen.  The fascia was then reapproximated with #1 PDS sutures in a running fashion.  The fascia and subcu tissue was infiltrated with 0.5% Marcaine with epinephrine and Exparel.  All skin incisions were closed with a 4-0 Monocryl in a subcuticular fashion followed by Mastisol and Steri-Strips.  The sponge, needle, and instrument counts were correct at the end of the case.  The patient tolerated the procedure well and was transferred to the recovery room in stable condition.    Estimated Blood Loss:      minimal    Specimens:     Order Name Source Comment Collection Info Order Time   TISSUE PATHOLOGY EXAM Small Intestine, Jejunum SMALL INTESTINE, JEJUNUM Collected By: Madhu Zabala Jr., MD 2024  3:28 PM     Release to patient   Routine Release            Madhu Zabala Jr. MEREN.     Electronically signed by Madhu Zabala Jr., MD at 24 1634          Physician Progress Notes (last 4 days)        Aureliano Melchor MD at 24 1334              Name: Henrietta Garrett ADMIT: 7/3/2024   : 1995  PCP: Kenya David MD    MRN: [id number] LOS: 5 days   AGE/SEX: 29 y.o. female  ROOM[facility]     Subjective     Still having severe pain.     Objective   Objective   Vital Signs  Temp:  [97.1 °F (36.2 °C)-98.1 °F (36.7 °C)] 97.6 °F (36.4 °C)  Heart Rate:  [66-83] 66  Resp:  [16] 16  BP: ()/(60-72) 98/60  SpO2:  [96 %-98 %] 97 %  on   ;   Device (Oxygen Therapy): room air  Body mass index is 23.71 kg/m².  Physical Exam  Constitutional:       General: She is not in  acute distress.     Appearance: She is ill-appearing.   HENT:      Head: Normocephalic and atraumatic.   Eyes:      Extraocular Movements: Extraocular movements intact.      Pupils: Pupils are equal, round, and reactive to light.   Cardiovascular:      Rate and Rhythm: Normal rate and regular rhythm.   Pulmonary:      Effort: Pulmonary effort is normal. No respiratory distress.   Abdominal:      General: There is no distension.      Tenderness: There is abdominal tenderness.   Neurological:      Mental Status: She is alert and oriented to person, place, and time.         Results Review     I reviewed the patient's new clinical results.  Results from last 7 days   Lab Units 07/07/24  0508 07/06/24  0453 07/05/24  0436 07/04/24  0353   WBC 10*3/mm3 4.85 3.86 2.83* 3.03*   HEMOGLOBIN g/dL 8.5* 9.1* 8.3* 8.3*   PLATELETS 10*3/mm3 266 242 211 220     Results from last 7 days   Lab Units 07/08/24  0543 07/07/24  0508 07/06/24  0453 07/05/24  0436   SODIUM mmol/L 134* 137 136 135*   POTASSIUM mmol/L 3.9 3.8 4.2 3.6   CHLORIDE mmol/L 101 106 106 106   CO2 mmol/L 24.3 26.0 20.0* 20.7*   BUN mg/dL 6 8 5* 3*   CREATININE mg/dL 0.55* 0.51* 0.52* 0.65   GLUCOSE mg/dL 107* 104* 109* 83   Estimated Creatinine Clearance: 183.7 mL/min (A) (by C-G formula based on SCr of 0.55 mg/dL (L)).  Results from last 7 days   Lab Units 07/08/24  0543 07/07/24  0508 07/04/24  0353 07/03/24  1004   ALBUMIN g/dL 3.3* 3.4* 3.3* 3.8   BILIRUBIN mg/dL <0.2 0.2 0.3 0.2   ALK PHOS U/L 51 52 43 46   AST (SGOT) U/L 9 14 14 16   ALT (SGPT) U/L 14 18 10 9     Results from last 7 days   Lab Units 07/08/24  0543 07/07/24  0508 07/06/24  0453 07/05/24  0436 07/04/24  0353 07/03/24  1004   CALCIUM mg/dL 8.4* 9.0 8.9 8.0* 9.5 9.0   ALBUMIN g/dL 3.3* 3.4*  --   --  3.3* 3.8   MAGNESIUM mg/dL 1.7 1.9 2.0  --  2.0 1.9   PHOSPHORUS mg/dL 3.5 2.3* 3.8  --  2.9  --      Results from last 7 days   Lab Units 07/03/24  1015 07/03/24  1004   PROCALCITONIN ng/mL  --   <0.02   LACTATE mmol/L 1.6  --      COVID19   Date Value Ref Range Status   07/22/2022 Not Detected Not Detected - Ref. Range Final     SARS-CoV-2, DANIELLA   Date Value Ref Range Status   08/08/2022 NEGATIVE Negative Final     Glucose   Date/Time Value Ref Range Status   07/08/2024 0647 106 70 - 130 mg/dL Final   07/08/2024 0031 76 70 - 130 mg/dL Final     Results for orders placed or performed during the hospital encounter of 07/03/24   Blood Culture - Blood, Arm, Right    Specimen: Arm, Right; Blood   Result Value Ref Range    Blood Culture No growth at 5 days          CT Abdomen Pelvis With Contrast  Narrative: CT ABDOMEN AND PELVIS WITH IV CONTRAST     HISTORY: Abdominal pain     TECHNIQUE: Radiation dose reduction techniques were utilized, including  automated exposure control and exposure modulation based on body size.   3 mm images were obtained through the abdomen and pelvis after the  administration of IV contrast.     COMPARISON: Multiple CT abdomen and pelvis dating back to 6/16/2023     FINDINGS:     Please note evaluation is suboptimal due to beam hardening and streak  artifact and patient's arms being along their side. A percutaneous  gastrostomy tube is present. The bulb of the gastrostomy tube does not  appear to be located within the gastric lumen, as before, and is  contiguous with the wall. There is a tract of air which extends to the  gastrostomy tube bulb. Findings are grossly similar to on 5/25/2024. A  few air-fluid levels are present within the small bowel which is  otherwise nondistended. The appendix is unremarkable. Mild intrahepatic  bile duct dilation is present status post cholecystectomy, as before.  The pancreas, spleen and adrenal glands have an unremarkable  postcontrast CT appearance. There appears to be a subtle subcentimeter  right renal lesion which is too small to characterize. No  hydronephrosis.     Prominent and enlarged abdominal pelvic adenopathy is present. Enlarged  brett  hepatic node measuring 1.3 cm in short axis dimension, grossly  unchanged since 6/16/2023. Prominent aortocaval node measuring 0.8 cm in  short axis dimension, as before. An anterior pericaval node more  inferiorly has increased in size, measuring 0.8 cm in short axis  dimension (previously 0.5 cm on 2/26/2024. Asymmetric right adnexal  hypodense lesion measuring up to 2.3 cm is present, as before. No free  intraperitoneal air is seen..     Large amount of hyperdense stool is present throughout the colon.     No suspicious lytic or blastic osseous lesion. The previously seen  jejunostomy tube overlying the left lower quadrant has been removed.  There is ill-defined soft tissue thickening and fat stranding extending  through the subcutaneous tissues and abutting the adjacent small bowel  in this area. A small focus of gas is present along the tract and not  definitively located in the bowel.     Impression: 1.  Findings suggestive of constipation.  2.  Previously seen jejunostomy tube has been removed with extensive  soft tissue thickening extending from the small bowel to the skin  surface. A focus of air overlying the tract extending from the small  bowel through the subcutaneous tissues. CT findings are nonspecific and  correlation with patient history is recommended to exclude an  enterocutaneous fistula and/or developing infection.  3.  Prominent and mildly enlarged abdominal pelvic nodes. An aortocaval  node as increased in size since 2/26/2024. While nonspecific, continued  attention follow-up with CT abdomen and pelvis in 3 months is  recommended to ensure resolution.  4.  Percutaneous gastrostomy tube is not located within the stomach and  appears to have been further retracted since 2/26/2024 with the majority  of the balloon appearing to be outside of the stomach wall. A tract of  air extending from the gastric lumen into the gastrostomy balloon, as  seen on 5/25/2024.  5.  Other findings as above.         This report was finalized on 7/2/2024 4:44 PM by Dr. Domingo Sims M.D  on Workstation: [id number]       Scheduled Medications  amLODIPine, 10 mg, Oral, Daily  dicyclomine, 10 mg, Oral, 4x Daily  enoxaparin, 40 mg, Subcutaneous, Q24H  escitalopram, 5 mg, Per J Tube, Daily  Fat Emulsion Plant Based, 200 mL, Intravenous, Q24H (TPN)  gabapentin, 200 mg, Oral, TID  hydroxychloroquine, 200 mg, Oral, BID  pantoprazole, 40 mg, Intravenous, Q12H  sodium chloride, 10 mL, Intravenous, Q12H    Infusions  Adult Central 2-in-1 TPN, , Last Rate: 65 mL/hr at 07/07/24 1812  Adult Central 2-in-1 TPN,   HYDROmorphone HCl-NaCl,   Pharmacy to Dose TPN,   sodium chloride, 30 mL/hr    Diet  NPO Diet NPO Type: Strict NPO  Adult Central 2-in-1 TPN  Adult Central 2-in-1 TPN      Assessment/Plan     Active Hospital Problems    Diagnosis  POA    **Abdominal wall cellulitis [L03.311]  Yes    Lupus erythematosus overlap syndrome [M32.8]  Yes    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       29 y.o. female admitted with Abdominal wall cellulitis.    Abdominal wall cellulitis  Abdominal pain  Started on zosyn. Continue, follow cultures (negative).    Blood cultures NGTD  Now on dilaudid PCA. Added PRN dilaudid as well.      Gastroparesis   On TPN    Dislodged G-tube  S/p revision 7/5/24     Lupus  Resume Plaquenil     Hypertension  Dc amlodipine. BP on the lower side       Lovenox for DVT prophylaxis.  Full code.  Discussed with patient.  Anticipate discharge to be determined       Aureliano Melchor MD  [facility]  07/08/24  13:34 EDT        Electronically signed by Aureliano Melchor MD at 07/08/24 [id number]       Madhu Zabala Jr., MD at 07/08/24 0938          Chief Complaint:    S/P Laparoscopic assisted small bowel resection and PEG, POD 3    Subjective:    The patient continues to have expected  postop abdominal pain that is poorly controlled with her present regimen of Dilaudid.  She also has nausea, which is normal for her.    Objective:    Temp:  [97.1 °F (36.2 °C)-97.8 °F (36.6 °C)] 97.7 °F (36.5 °C)  Heart Rate:  [60-74] 73  Resp:  [16] 16  BP: (104-112)/(61-72) 106/61    Physical Exam  Constitutional:       Appearance: She is not ill-appearing or toxic-appearing.   Abdominal:      Palpations: Abdomen is soft.      Tenderness: There is generalized abdominal tenderness (Appropriate postop tenderness).   Neurological:      Mental Status: She is alert.   Psychiatric:         Behavior: Behavior is cooperative.       BMI is within normal parameters. No other follow-up for BMI required.        Results:    BUN is 16 creatinine 0.55.    Impression/Plan:    The patient is POD 3 from a laparoscopic assisted small bowel resection and PEG.  She has inadequate pain control and will be started on as needed Dilaudid for breakthrough pain.  I will also increase her basal Dilaudid.    She is having nausea, likely related to the gastroparesis.  We replaced her G-tube to gravity drainage to try to prevent the stomach.    She was discussed with Dr. Melchor.    Madhu Zabala Jr. MEREN.     Electronically signed by Madhu Zabala Jr., MD at 24 0955       Aureliano Melchor MD at 24 1422              Name: Henrietta Garrett ADMIT: 7/3/2024   : 1995  PCP: Kenya David MD    MRN: [id number] LOS: 4 days   AGE/SEX: 29 y.o. female  ROOM: [facility]     Subjective     Examined at bedside. Remains in pain.     Objective   Objective   Vital Signs  Temp:  [97 °F (36.1 °C)-97.4 °F (36.3 °C)] 97.4 °F (36.3 °C)  Heart Rate:  [57-64] 60  Resp:  [16] 16  BP: (104-120)/(62-74) 104/62  SpO2:  [96 %-100 %] 96 %  on   ;   Device (Oxygen Therapy): room air  Body mass index is 23.71 kg/m².  Physical Exam  Constitutional:       General: She is not in acute distress.     Appearance: She is ill-appearing.   HENT:      Head: Normocephalic  and atraumatic.   Eyes:      Extraocular Movements: Extraocular movements intact.      Pupils: Pupils are equal, round, and reactive to light.   Cardiovascular:      Rate and Rhythm: Normal rate and regular rhythm.   Pulmonary:      Effort: Pulmonary effort is normal. No respiratory distress.   Abdominal:      General: There is no distension.      Tenderness: There is abdominal tenderness.   Neurological:      Mental Status: She is alert and oriented to person, place, and time.         Results Review     I reviewed the patient's new clinical results.  Results from last 7 days   Lab Units 07/07/24  0508 07/06/24  0453 07/05/24  0436 07/04/24  0353   WBC 10*3/mm3 4.85 3.86 2.83* 3.03*   HEMOGLOBIN g/dL 8.5* 9.1* 8.3* 8.3*   PLATELETS 10*3/mm3 266 242 211 220     Results from last 7 days   Lab Units 07/07/24  0508 07/06/24  0453 07/05/24  0436 07/04/24  1358 07/04/24  0353 07/03/24  1004   SODIUM mmol/L 137 136 135*  --  137 137   POTASSIUM mmol/L 3.8 4.2 3.6 3.7 3.5 3.8   CHLORIDE mmol/L 106 106 106  --  107 104   CO2 mmol/L 26.0 20.0* 20.7*  --   --  21.4*   BUN mg/dL 8 5* 3*  --  6 7   CREATININE mg/dL 0.51* 0.52* 0.65  --  0.66 0.65   GLUCOSE mg/dL 104* 109* 83  --  83 111*   Estimated Creatinine Clearance: 198.1 mL/min (A) (by C-G formula based on SCr of 0.51 mg/dL (L)).  Results from last 7 days   Lab Units 07/07/24  0508 07/04/24  0353 07/03/24  1004 07/02/24  1439   ALBUMIN g/dL 3.4* 3.3* 3.8 4.0   BILIRUBIN mg/dL 0.2 0.3 0.2 0.2   ALK PHOS U/L 52 43 46 43   AST (SGOT) U/L 14 14 16 11   ALT (SGPT) U/L 18 10 9 8     Results from last 7 days   Lab Units 07/07/24  0508 07/06/24  0453 07/05/24  0436 07/04/24  0353 07/03/24  1004 07/02/24  1439   CALCIUM mg/dL 9.0 8.9 8.0* 9.5 9.0 9.2   ALBUMIN g/dL 3.4*  --   --  3.3* 3.8 4.0   MAGNESIUM mg/dL 1.9 2.0  --  2.0 1.9  --    PHOSPHORUS mg/dL 2.3* 3.8  --  2.9  --   --      Results from last 7 days   Lab Units 07/03/24  1015 07/03/24  1004   PROCALCITONIN ng/mL  --   "<0.02   LACTATE mmol/L 1.6  --      COVID19   Date Value Ref Range Status   07/22/2022 Not Detected Not Detected - Ref. Range Final     SARS-CoV-2, DANIELLA   Date Value Ref Range Status   08/08/2022 NEGATIVE Negative Final     No results found for: \"HGBA1C\", \"POCGLU\"  Results for orders placed or performed during the hospital encounter of 07/03/24   Blood Culture - Blood, Arm, Right    Specimen: Arm, Right; Blood   Result Value Ref Range    Blood Culture No growth at 4 days          CT Abdomen Pelvis With Contrast  Narrative: CT ABDOMEN AND PELVIS WITH IV CONTRAST     HISTORY: Abdominal pain     TECHNIQUE: Radiation dose reduction techniques were utilized, including  automated exposure control and exposure modulation based on body size.   3 mm images were obtained through the abdomen and pelvis after the  administration of IV contrast.     COMPARISON: Multiple CT abdomen and pelvis dating back to 6/16/2023     FINDINGS:     Please note evaluation is suboptimal due to beam hardening and streak  artifact and patient's arms being along their side. A percutaneous  gastrostomy tube is present. The bulb of the gastrostomy tube does not  appear to be located within the gastric lumen, as before, and is  contiguous with the wall. There is a tract of air which extends to the  gastrostomy tube bulb. Findings are grossly similar to on 5/25/2024. A  few air-fluid levels are present within the small bowel which is  otherwise nondistended. The appendix is unremarkable. Mild intrahepatic  bile duct dilation is present status post cholecystectomy, as before.  The pancreas, spleen and adrenal glands have an unremarkable  postcontrast CT appearance. There appears to be a subtle subcentimeter  right renal lesion which is too small to characterize. No  hydronephrosis.     Prominent and enlarged abdominal pelvic adenopathy is present. Enlarged  brett hepatic node measuring 1.3 cm in short axis dimension, grossly  unchanged since 6/16/2023. " Prominent aortocaval node measuring 0.8 cm in  short axis dimension, as before. An anterior pericaval node more  inferiorly has increased in size, measuring 0.8 cm in short axis  dimension (previously 0.5 cm on 2/26/2024. Asymmetric right adnexal  hypodense lesion measuring up to 2.3 cm is present, as before. No free  intraperitoneal air is seen..     Large amount of hyperdense stool is present throughout the colon.     No suspicious lytic or blastic osseous lesion. The previously seen  jejunostomy tube overlying the left lower quadrant has been removed.  There is ill-defined soft tissue thickening and fat stranding extending  through the subcutaneous tissues and abutting the adjacent small bowel  in this area. A small focus of gas is present along the tract and not  definitively located in the bowel.     Impression: 1.  Findings suggestive of constipation.  2.  Previously seen jejunostomy tube has been removed with extensive  soft tissue thickening extending from the small bowel to the skin  surface. A focus of air overlying the tract extending from the small  bowel through the subcutaneous tissues. CT findings are nonspecific and  correlation with patient history is recommended to exclude an  enterocutaneous fistula and/or developing infection.  3.  Prominent and mildly enlarged abdominal pelvic nodes. An aortocaval  node as increased in size since 2/26/2024. While nonspecific, continued  attention follow-up with CT abdomen and pelvis in 3 months is  recommended to ensure resolution.  4.  Percutaneous gastrostomy tube is not located within the stomach and  appears to have been further retracted since 2/26/2024 with the majority  of the balloon appearing to be outside of the stomach wall. A tract of  air extending from the gastric lumen into the gastrostomy balloon, as  seen on 5/25/2024.  5.  Other findings as above.        This report was finalized on 7/2/2024 4:44 PM by Dr. Domingo Sims M.D  on Workstation:  [id number]       Scheduled Medications  amLODIPine, 10 mg, Oral, Daily  dicyclomine, 10 mg, Oral, 4x Daily  enoxaparin, 40 mg, Subcutaneous, Q24H  escitalopram, 5 mg, Per J Tube, Daily  Fat Emulsion Plant Based, 200 mL, Intravenous, Q24H (TPN)  gabapentin, 200 mg, Oral, TID  hydroxychloroquine, 200 mg, Oral, BID  pantoprazole, 40 mg, Intravenous, Q12H  piperacillin-tazobactam, 3.375 g, Intravenous, Q8H  sodium chloride, 10 mL, Intravenous, Q12H    Infusions  Adult Central 2-in-1 TPN, , Last Rate: 65 mL/hr at 07/06/24 1718  Adult Central 2-in-1 TPN,   HYDROmorphone HCl-NaCl,   Pharmacy Consult,   Pharmacy to Dose TPN,   sodium chloride, 30 mL/hr    Diet  NPO Diet NPO Type: Strict NPO  Adult Central 2-in-1 TPN  Adult Central 2-in-1 TPN      Assessment/Plan     Active Hospital Problems    Diagnosis  POA    **Abdominal wall cellulitis [L03.311]  Yes    Lupus erythematosus overlap syndrome [M32.8]  Yes    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       29 y.o. female admitted with Abdominal wall cellulitis.    Abdominal wall cellulitis  Abdominal pain  Started on zosyn. Continue, follow cultures (negative).    Blood cultures NGTD  Now on dilaudid PCA     Gastroparesis   TPN. Nutrition consulted. To be started after surgery      Dislodged G-tube  S/p revision 7/5/24     Lupus  Resume Plaquenil     Hypertension  Amlodipine. Blood pressures acceptable       Lovenox for DVT prophylaxis.  Full code.  Discussed with patient.  Anticipate discharge to be determined       Aureliano Melchor MD  [facility]  07/07/24  14:23 EDT        Electronically signed by Aureliano Melchor MD at 07/07/24 1430       Delmy Cortes MD at 07/07/24 1215          General Surgery  Progress Note    CC: Follow-up gastroparesis    POD#2 robotic takedown of jejunostomy tract fistula with small bowel  "resection and PEG tube revision    S: She again complains of 10 out of 10 pain, and shreaked when I removed some gauze and tape from over her former J-tube site.    O:/62 (BP Location: Left arm, Patient Position: Lying)   Pulse 60   Temp 97.4 °F (36.3 °C) (Oral)   Resp 16   Ht 180.3 cm (71\")   Wt 77.1 kg (170 lb)   LMP 11/01/2023   SpO2 96%   BMI 23.71 kg/m²     GENERAL: alert, well appearing, and in no distress  HEENT: normocephalic, atraumatic, moist mucous membranes, clear sclerae   CHEST: clear to auscultation, no wheezes, rales or rhonchi, symmetric air entry  CARDIAC: regular rate and rhythm    ABDOMEN: Soft, nondistended, incisions clean/dry/intact with Steri-Strips, PEG tube in left upper quadrant with external bumper located at 6 cm, former J-tube site is open at the skin but too small to pack, there is no surrounding erythema  EXTREMITIES: no cyanosis, clubbing, or edema   SKIN: Warm and moist, no rashes    LABS  Results from last 7 days   Lab Units 07/07/24  0508 07/06/24  0453 07/05/24  0436 07/04/24  0353   WBC 10*3/mm3 4.85 3.86 2.83* 3.03*   HEMOGLOBIN g/dL 8.5* 9.1* 8.3* 8.3*   HEMATOCRIT % 27.6* 28.8* 26.3* 26.9*   PLATELETS 10*3/mm3 266 242 211 220   MONOCYTES % %  --   --  11.0 12.5*   EOSINOPHIL % %  --   --  1.0 0.0*     Results from last 7 days   Lab Units 07/07/24  0508 07/06/24  0453 07/05/24  0436 07/04/24  1358 07/04/24  0353 07/03/24  1004   SODIUM mmol/L 137 136 135*  --  137 137   POTASSIUM mmol/L 3.8 4.2 3.6   < > 3.5 3.8   CHLORIDE mmol/L 106 106 106  --  107 104   CO2 mmol/L 26.0 20.0* 20.7*  --   --  21.4*   BUN mg/dL 8 5* 3*  --  6 7   CREATININE mg/dL 0.51* 0.52* 0.65  --  0.66 0.65   CALCIUM mg/dL 9.0 8.9 8.0*  --  9.5 9.0   BILIRUBIN mg/dL 0.2  --   --   --  0.3 0.2   ALK PHOS U/L 52  --   --   --  43 46   ALT (SGPT) U/L 18  --   --   --  10 9   AST (SGOT) U/L 14  --   --   --  14 16   GLUCOSE mg/dL 104* 109* 83  --  83 111*    < > = values in this interval not " displayed.     Results from last 7 days   Lab Units 24  1004   INR  1.05         A/P: 29 y.o. female POD#2 robotic takedown of jejunostomy tract fistula with small bowel resection and PEG tube revision    Continue same with TPN for longstanding/chronic gastroparesis and malabsorption.  Her small open wound of the abdominal wall is too small to pack, so I will have the nurses simply cover it with a dry gauze and change as needed for saturation.  Hopefully tomorrow we can remove the basal rate from her PCA and start to wean her settings.    Delmy Cortes MD  General, Robotic, and Endoscopic Surgery  [facility]    [address], [address]  [place] [address]  P: [phone]  F: [phone]       Electronically signed by Delmy Cortes MD at 24 0377       Aureliano Melchor MD at 24 9538              Name: Henrietta Garrett ADMIT: 7/3/2024   : 1995  PCP: Kenya David MD    MRN: [id number] LOS: 3 days   AGE/SEX: 29 y.o. female  ROOM: [facility]     Subjective     Still in pain. States she also gets a significant reaction to the tegederm that covers her port.     Objective   Objective   Vital Signs  Temp:  [97.1 °F (36.2 °C)-98.1 °F (36.7 °C)] 97.1 °F (36.2 °C)  Heart Rate:  [54-77] 66  Resp:  [16] 16  BP: (114-139)/(70-90) 139/90  SpO2:  [95 %-98 %] 98 %  on  Flow (L/min):  [2] 2;   Device (Oxygen Therapy): room air  Body mass index is 31.91 kg/m².  Physical Exam  Constitutional:       General: She is not in acute distress.     Appearance: She is ill-appearing.   HENT:      Head: Normocephalic and atraumatic.   Eyes:      Extraocular Movements: Extraocular movements intact.      Pupils: Pupils are equal, round, and reactive to light.   Cardiovascular:      Rate and Rhythm: Normal rate and regular rhythm.   Pulmonary:      Effort: Pulmonary effort is normal. No respiratory distress.   Abdominal:      General: There is no distension.      Tenderness: There is abdominal  "tenderness.   Neurological:      Mental Status: She is alert and oriented to person, place, and time.         Results Review     I reviewed the patient's new clinical results.  Results from last 7 days   Lab Units 07/06/24 0453 07/05/24 0436 07/04/24 0353 07/03/24  1004   WBC 10*3/mm3 3.86 2.83* 3.03* 5.22   HEMOGLOBIN g/dL 9.1* 8.3* 8.3* 9.6*   PLATELETS 10*3/mm3 242 211 220 263     Results from last 7 days   Lab Units 07/06/24 0453 07/05/24 0436 07/04/24  1358 07/04/24  0353 07/03/24  1004 07/02/24  1439   SODIUM mmol/L 136 135*  --  137 137 137   POTASSIUM mmol/L 4.2 3.6 3.7 3.5 3.8 4.2   CHLORIDE mmol/L 106 106  --  107 104 104   CO2 mmol/L 20.0* 20.7*  --   --  21.4* 24.0   BUN mg/dL 5* 3*  --  6 7 11   CREATININE mg/dL 0.52* 0.65  --  0.66 0.65 0.77   GLUCOSE mg/dL 109* 83  --  83 111* 98   Estimated Creatinine Clearance: 150.7 mL/min (A) (by C-G formula based on SCr of 0.52 mg/dL (L)).  Results from last 7 days   Lab Units 07/04/24 0353 07/03/24  1004 07/02/24  1439   ALBUMIN g/dL 3.3* 3.8 4.0   BILIRUBIN mg/dL 0.3 0.2 0.2   ALK PHOS U/L 43 46 43   AST (SGOT) U/L 14 16 11   ALT (SGPT) U/L 10 9 8     Results from last 7 days   Lab Units 07/06/24 0453 07/05/24 0436 07/04/24  0353 07/03/24  1004 07/02/24  1439   CALCIUM mg/dL 8.9 8.0* 9.5 9.0 9.2   ALBUMIN g/dL  --   --  3.3* 3.8 4.0   MAGNESIUM mg/dL 2.0  --  2.0 1.9  --    PHOSPHORUS mg/dL 3.8  --  2.9  --   --      Results from last 7 days   Lab Units 07/03/24  1015 07/03/24  1004   PROCALCITONIN ng/mL  --  <0.02   LACTATE mmol/L 1.6  --      COVID19   Date Value Ref Range Status   07/22/2022 Not Detected Not Detected - Ref. Range Final     SARS-CoV-2, DANIELLA   Date Value Ref Range Status   08/08/2022 NEGATIVE Negative Final     No results found for: \"HGBA1C\", \"POCGLU\"  Results for orders placed or performed during the hospital encounter of 07/03/24   Blood Culture - Blood, Arm, Right    Specimen: Arm, Right; Blood   Result Value Ref Range    Blood " Culture No growth at 3 days          CT Abdomen Pelvis With Contrast  Narrative: CT ABDOMEN AND PELVIS WITH IV CONTRAST     HISTORY: Abdominal pain     TECHNIQUE: Radiation dose reduction techniques were utilized, including  automated exposure control and exposure modulation based on body size.   3 mm images were obtained through the abdomen and pelvis after the  administration of IV contrast.     COMPARISON: Multiple CT abdomen and pelvis dating back to 6/16/2023     FINDINGS:     Please note evaluation is suboptimal due to beam hardening and streak  artifact and patient's arms being along their side. A percutaneous  gastrostomy tube is present. The bulb of the gastrostomy tube does not  appear to be located within the gastric lumen, as before, and is  contiguous with the wall. There is a tract of air which extends to the  gastrostomy tube bulb. Findings are grossly similar to on 5/25/2024. A  few air-fluid levels are present within the small bowel which is  otherwise nondistended. The appendix is unremarkable. Mild intrahepatic  bile duct dilation is present status post cholecystectomy, as before.  The pancreas, spleen and adrenal glands have an unremarkable  postcontrast CT appearance. There appears to be a subtle subcentimeter  right renal lesion which is too small to characterize. No  hydronephrosis.     Prominent and enlarged abdominal pelvic adenopathy is present. Enlarged  brett hepatic node measuring 1.3 cm in short axis dimension, grossly  unchanged since 6/16/2023. Prominent aortocaval node measuring 0.8 cm in  short axis dimension, as before. An anterior pericaval node more  inferiorly has increased in size, measuring 0.8 cm in short axis  dimension (previously 0.5 cm on 2/26/2024. Asymmetric right adnexal  hypodense lesion measuring up to 2.3 cm is present, as before. No free  intraperitoneal air is seen..     Large amount of hyperdense stool is present throughout the colon.     No suspicious lytic or  blastic osseous lesion. The previously seen  jejunostomy tube overlying the left lower quadrant has been removed.  There is ill-defined soft tissue thickening and fat stranding extending  through the subcutaneous tissues and abutting the adjacent small bowel  in this area. A small focus of gas is present along the tract and not  definitively located in the bowel.     Impression: 1.  Findings suggestive of constipation.  2.  Previously seen jejunostomy tube has been removed with extensive  soft tissue thickening extending from the small bowel to the skin  surface. A focus of air overlying the tract extending from the small  bowel through the subcutaneous tissues. CT findings are nonspecific and  correlation with patient history is recommended to exclude an  enterocutaneous fistula and/or developing infection.  3.  Prominent and mildly enlarged abdominal pelvic nodes. An aortocaval  node as increased in size since 2/26/2024. While nonspecific, continued  attention follow-up with CT abdomen and pelvis in 3 months is  recommended to ensure resolution.  4.  Percutaneous gastrostomy tube is not located within the stomach and  appears to have been further retracted since 2/26/2024 with the majority  of the balloon appearing to be outside of the stomach wall. A tract of  air extending from the gastric lumen into the gastrostomy balloon, as  seen on 5/25/2024.  5.  Other findings as above.        This report was finalized on 7/2/2024 4:44 PM by Dr. Domingo Sims M.D  on Workstation: [id number]       Scheduled Medications  amLODIPine, 10 mg, Oral, Daily  dicyclomine, 10 mg, Oral, 4x Daily  escitalopram, 5 mg, Per J Tube, Daily  Fat Emulsion Plant Based, 200 mL, Intravenous, Q24H (TPN)  gabapentin, 200 mg, Oral, TID  hydroxychloroquine, 200 mg, Oral, BID  pantoprazole, 40 mg, Intravenous, Q12H  piperacillin-tazobactam, 3.375 g, Intravenous, Q8H  sodium chloride, 10 mL, Intravenous, Q12H    Infusions  Adult Central 2-in-1 TPN,  "  HYDROmorphone HCl-NaCl,   Pharmacy Consult,   Pharmacy to Dose TPN,   sodium chloride, 125 mL/hr, Last Rate: 125 mL/hr (07/06/24 0737)  sodium chloride, 30 mL/hr    Diet  NPO Diet NPO Type: Strict NPO  Adult Central 2-in-1 TPN      Assessment/Plan     Active Hospital Problems    Diagnosis  POA    **Abdominal wall cellulitis [L03.311]  Yes    Lupus erythematosus overlap syndrome [M32.8]  Yes    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       29 y.o. female admitted with Abdominal wall cellulitis.    Abdominal wall cellulitis  Abdominal pain  Started on zosyn. Continue, follow cultures    Blood cultures NGTD  Now on dilaudid PCA     Gastroparesis   TPN. Nutrition consulted. To be started after surgery      Dislodged G-tube  S/p revision 7/5/24     Lupus  Resume Plaquenil     Hypertension  Amlodipine. Blood pressures acceptable       Lovenox for DVT prophylaxis.  Full code.  Discussed with patient.  Anticipate discharge to be determined       Aureliano Melchor MD  [facility]  07/06/24  15:57 EDT        Electronically signed by Aureliano Melchor MD at 07/06/24 1321       Delmy Cortes MD at 07/06/24 1245          General Surgery  Progress Note    CC: Follow-up gastroparesis    POD#1 robotic takedown of jejunostomy tract fistula with small bowel resection and PEG tube revision    S: She complains of severe pain, minimally improved with the PCA.  She denies any nausea or vomiting over her usual chronic nausea secondary to gastroparesis.    O:/90 (BP Location: Left arm, Patient Position: Sitting)   Pulse 66   Temp 97.1 °F (36.2 °C) (Oral)   Resp 16   Ht 155.4 cm (61.2\")   Wt 77.1 kg (170 lb)   LMP 11/01/2023   SpO2 98%   BMI 31.91 kg/m²     GENERAL: alert, well appearing, and in no distress  HEENT: normocephalic, atraumatic, moist mucous membranes, clear " sclerae   CHEST: clear to auscultation, no wheezes, rales or rhonchi, symmetric air entry  CARDIAC: regular rate and rhythm    ABDOMEN: Soft, nondistended, incisions covered and dry, PEG tube in left upper quadrant with external bumper located at 6 cm  EXTREMITIES: no cyanosis, clubbing, or edema   SKIN: Warm and moist, no rashes    LABS  Results from last 7 days   Lab Units 24  0453 24  0436 24  0353   WBC 10*3/mm3 3.86 2.83* 3.03*   HEMOGLOBIN g/dL 9.1* 8.3* 8.3*   HEMATOCRIT % 28.8* 26.3* 26.9*   PLATELETS 10*3/mm3 242 211 220   MONOCYTES % %  --  11.0 12.5*   EOSINOPHIL % %  --  1.0 0.0*     Results from last 7 days   Lab Units 24  0453 24  0436 24  1358 24  0353 24  1004 24  1439   SODIUM mmol/L 136 135*  --  137 137 137   POTASSIUM mmol/L 4.2 3.6 3.7 3.5 3.8 4.2   CHLORIDE mmol/L 106 106  --  107 104 104   CO2 mmol/L 20.0* 20.7*  --   --  21.4* 24.0   BUN mg/dL 5* 3*  --  6 7 11   CREATININE mg/dL 0.52* 0.65  --  0.66 0.65 0.77   CALCIUM mg/dL 8.9 8.0*  --  9.5 9.0 9.2   BILIRUBIN mg/dL  --   --   --  0.3 0.2 0.2   ALK PHOS U/L  --   --   --  43 46 43   ALT (SGPT) U/L  --   --   --  10 9 8   AST (SGOT) U/L  --   --   --  14 16 11   GLUCOSE mg/dL 109* 83  --  83 111* 98     Results from last 7 days   Lab Units 24  1004   INR  1.05         A/P: 29 y.o. female POD#1 robotic takedown of jejunostomy tract fistula with small bowel resection and PEG tube revision    I will add a basal rate to her PCA for improved pain control.  I have also resumed her TPN.  Continue current care otherwise.    Delmy Cortes MD  General, Robotic, and Endoscopic Surgery  [facility]    [address], [address]  [place] [address]  P: [phone]  F: [phone]       Electronically signed by Delmy Cortes MD at 24 0286       Aureliano Melchor MD at 24 0969              Name: Henrietta Garrett ADMIT: 7/3/2024   : 1995  PCP: Joann  MD Kenya    MRN: [id number] LOS: 2 days   AGE/SEX: 29 y.o. female  ROOM: [facility]/MAIN OR     Subjective     Continues to endorse abominal pain.  Objective   Objective   Vital Signs  Temp:  [96.8 °F (36 °C)-98.9 °F (37.2 °C)] 98.9 °F (37.2 °C)  Heart Rate:  [60-67] 66  Resp:  [16] 16  BP: (101-131)/(59-75) 131/75  SpO2:  [94 %-100 %] 100 %  on   ;   Device (Oxygen Therapy): room air  Body mass index is 31.91 kg/m².  Physical Exam  Constitutional:       General: She is not in acute distress.     Appearance: She is ill-appearing.   HENT:      Head: Normocephalic and atraumatic.   Eyes:      Extraocular Movements: Extraocular movements intact.      Pupils: Pupils are equal, round, and reactive to light.   Pulmonary:      Effort: Pulmonary effort is normal. No respiratory distress.   Neurological:      Mental Status: She is alert and oriented to person, place, and time.         Results Review     I reviewed the patient's new clinical results.  Results from last 7 days   Lab Units 07/05/24  0436 07/04/24  0353 07/03/24  1004 07/02/24  1439   WBC 10*3/mm3 2.83* 3.03* 5.22 5.75   HEMOGLOBIN g/dL 8.3* 8.3* 9.6* 9.6*   PLATELETS 10*3/mm3 211 220 263 245     Results from last 7 days   Lab Units 07/05/24  0436 07/04/24  1358 07/04/24  0353 07/03/24  1004 07/02/24  1439   SODIUM mmol/L 135*  --  137 137 137   POTASSIUM mmol/L 3.6 3.7 3.5 3.8 4.2   CHLORIDE mmol/L 106  --  107 104 104   CO2 mmol/L 20.7*  --   --  21.4* 24.0   BUN mg/dL 3*  --  6 7 11   CREATININE mg/dL 0.65  --  0.66 0.65 0.77   GLUCOSE mg/dL 83  --  83 111* 98   Estimated Creatinine Clearance: 120.6 mL/min (by C-G formula based on SCr of 0.65 mg/dL).  Results from last 7 days   Lab Units 07/04/24  0353 07/03/24  1004 07/02/24  1439   ALBUMIN g/dL 3.3* 3.8 4.0   BILIRUBIN mg/dL 0.3 0.2 0.2   ALK PHOS U/L 43 46 43   AST (SGOT) U/L 14 16 11   ALT (SGPT) U/L 10 9 8     Results from last 7 days   Lab Units 07/05/24  0436 07/04/24  0353 07/03/24  1004  "07/02/24  1439   CALCIUM mg/dL 8.0* 9.5 9.0 9.2   ALBUMIN g/dL  --  3.3* 3.8 4.0   MAGNESIUM mg/dL  --  2.0 1.9  --    PHOSPHORUS mg/dL  --  2.9  --   --      Results from last 7 days   Lab Units 07/03/24  1015 07/03/24  1004   PROCALCITONIN ng/mL  --  <0.02   LACTATE mmol/L 1.6  --      COVID19   Date Value Ref Range Status   07/22/2022 Not Detected Not Detected - Ref. Range Final     SARS-CoV-2, DANIELLA   Date Value Ref Range Status   08/08/2022 NEGATIVE Negative Final     No results found for: \"HGBA1C\", \"POCGLU\"  Results for orders placed or performed during the hospital encounter of 07/03/24   Blood Culture - Blood, Arm, Right    Specimen: Arm, Right; Blood   Result Value Ref Range    Blood Culture No growth at 2 days          CT Abdomen Pelvis With Contrast  Narrative: CT ABDOMEN AND PELVIS WITH IV CONTRAST     HISTORY: Abdominal pain     TECHNIQUE: Radiation dose reduction techniques were utilized, including  automated exposure control and exposure modulation based on body size.   3 mm images were obtained through the abdomen and pelvis after the  administration of IV contrast.     COMPARISON: Multiple CT abdomen and pelvis dating back to 6/16/2023     FINDINGS:     Please note evaluation is suboptimal due to beam hardening and streak  artifact and patient's arms being along their side. A percutaneous  gastrostomy tube is present. The bulb of the gastrostomy tube does not  appear to be located within the gastric lumen, as before, and is  contiguous with the wall. There is a tract of air which extends to the  gastrostomy tube bulb. Findings are grossly similar to on 5/25/2024. A  few air-fluid levels are present within the small bowel which is  otherwise nondistended. The appendix is unremarkable. Mild intrahepatic  bile duct dilation is present status post cholecystectomy, as before.  The pancreas, spleen and adrenal glands have an unremarkable  postcontrast CT appearance. There appears to be a subtle " subcentimeter  right renal lesion which is too small to characterize. No  hydronephrosis.     Prominent and enlarged abdominal pelvic adenopathy is present. Enlarged  brett hepatic node measuring 1.3 cm in short axis dimension, grossly  unchanged since 6/16/2023. Prominent aortocaval node measuring 0.8 cm in  short axis dimension, as before. An anterior pericaval node more  inferiorly has increased in size, measuring 0.8 cm in short axis  dimension (previously 0.5 cm on 2/26/2024. Asymmetric right adnexal  hypodense lesion measuring up to 2.3 cm is present, as before. No free  intraperitoneal air is seen..     Large amount of hyperdense stool is present throughout the colon.     No suspicious lytic or blastic osseous lesion. The previously seen  jejunostomy tube overlying the left lower quadrant has been removed.  There is ill-defined soft tissue thickening and fat stranding extending  through the subcutaneous tissues and abutting the adjacent small bowel  in this area. A small focus of gas is present along the tract and not  definitively located in the bowel.     Impression: 1.  Findings suggestive of constipation.  2.  Previously seen jejunostomy tube has been removed with extensive  soft tissue thickening extending from the small bowel to the skin  surface. A focus of air overlying the tract extending from the small  bowel through the subcutaneous tissues. CT findings are nonspecific and  correlation with patient history is recommended to exclude an  enterocutaneous fistula and/or developing infection.  3.  Prominent and mildly enlarged abdominal pelvic nodes. An aortocaval  node as increased in size since 2/26/2024. While nonspecific, continued  attention follow-up with CT abdomen and pelvis in 3 months is  recommended to ensure resolution.  4.  Percutaneous gastrostomy tube is not located within the stomach and  appears to have been further retracted since 2/26/2024 with the majority  of the balloon appearing  to be outside of the stomach wall. A tract of  air extending from the gastric lumen into the gastrostomy balloon, as  seen on 5/25/2024.  5.  Other findings as above.        This report was finalized on 7/2/2024 4:44 PM by Dr. Domingo Sims M.D  on Workstation: [id number]       Scheduled Medications  amLODIPine, 10 mg, Oral, Daily  [Transfer Hold] dicyclomine, 10 mg, Oral, 4x Daily  [Transfer Hold] escitalopram, 5 mg, Per J Tube, Daily  gabapentin, 200 mg, Oral, TID  [Transfer Hold] hydroxychloroquine, 200 mg, Oral, BID  [Transfer Hold] pantoprazole, 40 mg, Intravenous, Q12H  piperacillin-tazobactam, 3.375 g, Intravenous, Q8H  [Transfer Hold] sodium chloride, 10 mL, Intravenous, Q12H    Infusions  sodium chloride, 125 mL/hr, Last Rate: 125 mL/hr (07/05/24 0524)    Diet  NPO Diet NPO Type: Strict NPO      Assessment/Plan     Active Hospital Problems    Diagnosis  POA    **Abdominal wall cellulitis [L03.311]  Yes    Lupus erythematosus overlap syndrome [M32.8]  Yes    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       29 y.o. female admitted with Abdominal wall cellulitis.    Abdominal wall cellulitis  Started on zosyn. Continue, follow cultures    Blood cultures NGTD     Gastroparesis   TPN. Nutrition consulted. To be started after surgery      Dislodged G-tube  To be removed and replaced with Dr. Vj zimmer      Lupus  Resume Plaquenil     Hypertension  Amlodipine. Blood pressures acceptable       SCDs for DVT prophylaxis.  Full code.  Discussed with patient.  Anticipate discharge home in 2-3 days.      Aureliano Melchor MD  [facility]  07/05/24  13:39 EDT        Electronically signed by Aureliano Melchor MD at 07/05/24 1342       Consult Notes (last 4 days)  Notes from 07/05/24 0821 through 07/09/24 0821   No notes of this type exist for this encounter.        All  medication doses during the admission are shown, including meds that are no longer on order.  Scheduled Meds Sorted by Name  for Henrietta Garrett as of 7/3/24 through 7/9/24    1 Day 3 Days 7 Days 10 Days < Today >   Legend:       Medications 07/03/24 07/04/24 07/05/24 07/06/24 07/07/24 07/08/24 07/09/24   amLODIPine (NORVASC) tablet 10 mg  Dose: 10 mg  Freq: Daily Route: PO  Start: 07/03/24 1643 End: 07/08/24 1336   Admin Instructions:       2336      (5941) (9219) [id number]      1244     1336-D/C'd        calcium gluconate 1000 Mg/50ml 0.675% NaCl IV SOLN  Dose: 1,000 mg  Freq: Every 1 Hour Route: IV  Start: 07/09/24 0845 End: 07/09/24 0944          0845     0944-D/C'd      Followed by   calcium gluconate 2000-675 MG/100ML NACL IVPB  Dose: 2,000 mg  Freq: Every 2 Hours Route: IV  Start: 07/09/24 0945 End: 07/09/24 1544          0945     1145     1345     1544-D/C'd      dicyclomine (BENTYL) capsule 10 mg  Dose: 10 mg  Freq: 4 Times Daily Route: PO  Start: 07/03/24 1800    1814     2335      0901     1230     1750     2132      (8723) (0224)     1317 4803     1818 (2134) 0301     1325     1712     2132      0838     1212     1812     2125      0904     1247     1906     2238      0800     1200     1800     2100        Enoxaparin Sodium (LOVENOX) syringe 40 mg  Dose: 40 mg  Freq: Every 24 Hours Route: SC  Indications of Use: PROPHYLAXIS OF VENOUS THROMBOEMBOLISM  Start: 07/06/24 1800   Admin Instructions:          1714 8343      1901      1800        escitalopram (LEXAPRO) tablet 5 mg  Dose: 5 mg  Freq: Daily Route: PER J TUBE  Start: 07/03/24 1643   Admin Instructions:       2336 [C]      0901      (5469)     1312 9735      0848      1224      1247      0900        Fat Emulsion Plant Based (INTRALIPID,LIPOSYN) 20 % infusion 40 g  Dose: 200 mL  Freq: Every 24 Hours Scheduled (TPN) Route: IV  Last Dose: 40 g (07/08/24 1840)  Start: 07/06/24 1800   Admin Instructions:           1758      0106     1812      0044 [C]     8781      1800        gabapentin (NEURONTIN) capsule 200 mg  Dose: 200 mg  Freq: 3 Times Daily Route: PO  Start: 07/03/24 1643   Admin Instructions:       1814     2336      0901     1550     2138 (2019) (5603) (5013) 5286     171     2137      0893     1548     2122      0904     1646     2238      0900     1600     2100        HYDROmorphone (DILAUDID) injection 0.5 mg  Dose: 0.5 mg  Freq: Once Route: IV  Start: 07/04/24 0345 End: 07/04/24 0313   Admin Instructions:        0313             HYDROmorphone (DILAUDID) injection 0.5 mg  Dose: 0.5 mg  Freq: Once Route: IV  Start: 07/03/24 1307 End: 07/03/24 1301   Admin Instructions:       1301              HYDROmorphone (DILAUDID) injection 0.5 mg  Dose: 0.5 mg  Freq: Once Route: IV  Start: 07/03/24 0953 End: 07/03/24 1013   Admin Instructions:       1013              hydroxychloroquine (PLAQUENIL) tablet 200 mg  Dose: 200 mg  Freq: 2 Times Daily Route: PO  Indications of Use: SYSTEMIC LUPUS ERYTHEMATOSUS  Start: 07/03/24 2100   Admin Instructions:       2045      0901     2132      0909     1316 6885 (3019) 1311     2134      0836     2123      0905     2239      0900     2100        magnesium sulfate 2g/50 mL (PREMIX) infusion  Dose: 2 g  Freq: Every 2 Hours Route: IV  Start: 07/09/24 0845 End: 07/09/24 1444          0845     1045     1245     1444-D/C'd      ondansetron (ZOFRAN) injection 4 mg  Dose: 4 mg  Freq: Once Route: IV  Start: 07/03/24 0953 End: 07/03/24 1012   Admin Instructions:       1012              pantoprazole (PROTONIX) EC tablet 40 mg  Dose: 40 mg  Freq: 2 Times Daily Route: PO  Start: 07/03/24 2100 End: 07/03/24 2022   Admin Instructions:       2022-D/C'd            pantoprazole (PROTONIX) injection 40 mg  Dose: 40 mg  Freq: Every 12 Hours Scheduled Route: IV  Indications Comment: gastroparesis  Start: 07/03/24 2115   Admin Instructions:       2046      0869 [id number]       0907     1312     1818     2012      0844     2135      0838     2125      0904     2238      0900     2100        piperacillin-tazobactam (ZOSYN) 3.375 g IVPB in 100 mL NS MBP (CD)  Dose: 3.375 g  Freq: Every 8 Hours Route: IV  Indications of Use: SKIN AND SOFT TISSUE INFECTION  Last Dose: 0 g (07/04/24 0856)  Start: 07/03/24 2030 End: 07/08/24 0125    2046      0341     0856     1229     2126      0425     1213     1415     2012      0446     1323     2134      0506     1212     2125          piperacillin-tazobactam (ZOSYN) 3.375 g IVPB in 100 mL NS MBP (CD)  Dose: 3.375 g  Freq: Once Route: IV  Indications of Use: INTRA-ABDOMINAL INFECTION  Last Dose: Stopped (07/03/24 1457)  Start: 07/03/24 1323 End: 07/03/24 1457    1424 [C]     1457              potassium chloride 10 mEq in 100 mL IVPB  Dose: 10 mEq  Freq: Every 1 Hour Route: IV  Start: 07/09/24 0845 End: 07/09/24 1444   Admin Instructions:             0845     0945     1045     1145     1245     1345     1444-D/C'd      potassium chloride 10 mEq in 100 mL IVPB  Dose: 10 mEq  Freq: Every 1 Hour Route: IV  Last Dose: 10 mEq (07/05/24 1142)  Start: 07/05/24 0900 End: 07/05/24 1259   Admin Instructions:         0906     1025     1142     1200     1259-D/C'd          sodium chloride 0.9 % bolus 500 mL  Dose: 500 mL  Freq: Once Route: IV  Last Dose: Stopped (07/03/24 1226)  Start: 07/03/24 0953 End: 07/03/24 1226    1016     1226              sodium chloride 0.9 % flush 10 mL  Dose: 10 mL  Freq: Every 12 Hours Scheduled Route: IV  Start: 07/03/24 2100 2046      0857     2134      0908     1312     1818     (2136) [C]      0852     2135      0840     2126      0905     2239      0900     2100                    Continuous Meds Sorted by Name  for Henrietta Garrett as of 7/3/24 through 7/9/24  Legend:       Medications 07/03/24 07/04/24 07/05/24 07/06/24 07/07/24 07/08/24 07/09/24   Adult Central 2-in-1 TPN  Rate: 65 mL/hr  Freq: Continuous TPN Route: IV  Start:  07/08/24 1800 End: 07/09/24 1839   Admin Instructions:      Order specific questions:            1840      1839-D/C'd      Adult Central 2-in-1 TPN  Rate: 65 mL/hr  Freq: Continuous TPN Route: IV  Start: 07/07/24 1800 End: 07/08/24 1811   Admin Instructions:      Order specific questions:           1812 1811-D/C'd       Adult Central 2-in-1 TPN  Rate: 65 mL/hr  Freq: Continuous TPN Route: IV  Start: 07/06/24 1800 End: 07/07/24 1717   Admin Instructions:      Order specific questions:          1718 1717-D/C'd        HYDROmorphone (DILAUDID) PCA 0.2 mg/ml 50 mL syringe  Nurse Loading Dose: 0 mg  Patient Bolus Dose: 0.2 mg  Lockout Interval: 8 Minutes  Basal Rate: 0.2 mg/hr  One Hour Dose Limit: 2 mg  Freq: Continuous Route: IV  Start: 07/09/24 0700 End: 07/12/24 0659   Admin Instructions:             0700     0734        HYDROmorphone (DILAUDID) PCA 0.2 mg/ml 50 mL syringe  Nurse Loading Dose: 0 mg  Patient Bolus Dose: 0.2 mg  Lockout Interval: 8 Minutes  Basal Rate: 0.2 mg/hr  One Hour Dose Limit: 2 mg  Freq: Continuous Route: IV  Start: 07/08/24 1015 End: 07/08/24 1805   Admin Instructions:            1130     1805-D/C'd  2017         HYDROmorphone (DILAUDID) PCA 0.2 mg/ml 50 mL syringe  Nurse Loading Dose: 0 mg  Patient Bolus Dose: 0.2 mg  Lockout Interval: 8 Minutes  Basal Rate: 0.1 mg/hr  One Hour Dose Limit: 2 mg  Freq: Continuous Route: IV  Start: 07/06/24 1345 End: 07/08/24 0922   Admin Instructions:          1322     1950     2139      0727     0901     1921     2200      0755     0922-D/C'd       HYDROmorphone (DILAUDID) PCA 0.2 mg/ml 50 mL syringe  Nurse Loading Dose: 0 mg  Patient Bolus Dose: 0.2 mg  Lockout Interval: 8 Minutes  Basal Rate: 0 mg/hr  One Hour Dose Limit: 2 mg  Freq: Continuous Route: IV  Start: 07/05/24 1644 End: 07/06/24 1245   Admin Instructions:         1714     1819     1820     1935     2000     2200      0200     0445     0600     0740     0741     0743     1245-D/C'd          Pharmacy Consult  Freq: Continuous PRN Route: XX  PRN Reason: Consult  Start: 07/09/24 0606   Admin Instructions:      Order specific questions:                Pharmacy Consult  Freq: Continuous PRN Route: XX  PRN Reason: Consult  Start: 07/05/24 1818 End: 07/08/24 1226   Admin Instructions:      Order specific questions:            1226-D/C'd       Pharmacy to Dose TPN  Freq: Continuous PRN Route: XX  PRN Reason: Consult  Start: 07/06/24 1244   Order specific questions:                Pharmacy to Dose Zosyn  Freq: Continuous PRN Route: XX  PRN Reason: Consult  Indications of Use: SKIN AND SOFT TISSUE INFECTION  Start: 07/03/24 1500 End: 07/04/24 0827     0827-D/C'd           sodium chloride 0.9 % infusion  Rate: 30 mL/hr Dose: 30 mL/hr  Freq: Continuous PRN Route: IV  PRN Comment: To maintain IV patency while on PCA pump if no other IV fluid present  Start: 07/05/24 1818             sodium chloride 0.9 % infusion  Rate: 125 mL/hr Dose: 125 mL/hr  Freq: Continuous Route: IV  Last Dose: 125 mL/hr (07/06/24 0737)  Start: 07/03/24 0953 End: 07/06/24 1800   Admin Instructions:       1230     1457      0226     0228     1051     1056     1754     1936     1937      0523     0524     1404     1844     2326     2327      0736     0737     0743     1800-D/C'd                     PRN Meds Sorted by Name  for Henrietta Garrett BEENA as of 7/3/24 through 7/9/24  Legend:       Medications 07/03/24 07/04/24 07/05/24 07/06/24 07/07/24 07/08/24 07/09/24    sennosides-docusate (PERICOLACE) 8.6-50 MG per tablet 2 tablet  Dose: 2 tablet  Freq: 2 Times Daily PRN Route: PO  PRN Reason: Constipation  Start: 07/03/24 1459   Admin Instructions:         1312     1818            And   polyethylene glycol (MIRALAX) packet 17 g  Dose: 17 g  Freq: Daily PRN Route: PO  PRN Reason: Constipation  PRN Comment: Use if senna-docusate is ineffective  Start: 07/03/24 1459   Admin Instructions:         1312     1815            And   bisacodyl (DULCOLAX) EC  tablet 5 mg  Dose: 5 mg  Freq: Daily PRN Route: PO  PRN Reason: Constipation  PRN Comment: Use if polyethylene glycol is ineffective  Start: 07/03/24 1459   Admin Instructions:         1312     1818            And   bisacodyl (DULCOLAX) suppository 10 mg  Dose: 10 mg  Freq: Daily PRN Route: RE  PRN Reason: Constipation  PRN Comment: Use if bisacodyl oral is ineffective  Start: 07/03/24 1459   Admin Instructions:         1312     1818            bupivacaine liposome (EXPAREL) 1.3 % injection  Freq: As Needed  Start: 07/05/24 1459 End: 07/05/24 1631      1459 [C]     1631-D/C'd          bupivacaine-EPINEPHrine PF (MARCAINE w/EPI) 0.5% -[id number] injection  Freq: As Needed  Start: 07/05/24 1458 End: 07/05/24 1631      1458 [C]     1631-D/C'd          Calcium Replacement - Follow Nurse / BPA Driven Protocol  Freq: As Needed Route: XX  PRN Reason: Other  Start: 07/03/24 1459   Admin Instructions:         1312     1818            diphenhydrAMINE (BENADRYL) injection 12.5 mg  Dose: 12.5 mg  Freq: Every 15 Minutes PRN Route: IV  PRN Reason: Itching  PRN Comment: May repeat x 1  Start: 07/05/24 1628 End: 07/05/24 1813   Admin Instructions:         1813-D/C'd          diphenhydrAMINE (BENADRYL) injection 50 mg  Dose: 50 mg  Freq: Every 6 Hours PRN Route: IV  PRN Reason: Allergies  Start: 07/06/24 0814   Admin Instructions:          0935     1758      0838     1812      0050     1023     1906      0614        ePHEDrine injection 5 mg  Dose: 5 mg  Freq: Once As Needed Route: IV  PRN Comment: symptomatic hypotension - Notify attending anesthesiologist if this needs to be given  Start: 07/05/24 1628 End: 07/05/24 1813   Admin Instructions:         1813-D/C'd          fentaNYL citrate (PF) (SUBLIMAZE) injection 50 mcg  Dose: 50 mcg  Freq: Every 5 Minutes PRN Route: IV  PRN Reason: Severe Pain  Start: 07/05/24 1628 End: 07/05/24 1813   Admin Instructions:         1648     1813-D/C'd          flumazenil (ROMAZICON) injection 0.2  mg  Dose: 0.2 mg  Freq: As Needed Route: IV  PRN Comment: for benzodiazepine induced unresponsiveness or sedation  Indications of Use: BENZODIAZEPINE-INDUCED SEDATION  Start: 07/05/24 1628 End: 07/05/24 1813   Admin Instructions:         1813-D/C'd          hydrALAZINE (APRESOLINE) injection 5 mg  Dose: 5 mg  Freq: Every 10 Minutes PRN Route: IV  PRN Reason: High Blood Pressure  PRN Comment: for systolic blood pressure greater than 180 mmHg or diastolic blood pressure greater than 105 mmHg  Start: 07/05/24 1628 End: 07/05/24 1813   Admin Instructions:         1813-D/C'd          HYDROcodone-acetaminophen (NORCO) 5-325 MG per tablet 1 tablet  Dose: 1 tablet  Freq: Once As Needed Route: PO  PRN Reason: Moderate Pain  Start: 07/05/24 1628 End: 07/05/24 1813   Admin Instructions:         1813-D/C'd          HYDROcodone-acetaminophen (NORCO) 7.5-325 MG per tablet 1 tablet  Dose: 1 tablet  Freq: Every 6 Hours PRN Route: PO  PRN Reason: Moderate Pain  Start: 07/03/24 1626 End: 07/04/24 0828   Admin Instructions:       1636     2336      0747     0828-D/C'd           HYDROmorphone (DILAUDID) injection 0.5 mg  Dose: 0.5 mg  Freq: Every 2 Hours PRN Route: IV  PRN Reason: Severe Pain  Start: 07/08/24 0916 End: 07/08/24 0917   Admin Instructions:            0917-D/C'd       HYDROmorphone (DILAUDID) injection 0.5 mg  Dose: 0.5 mg  Freq: Every 5 Minutes PRN Route: IV  PRN Reason: Moderate Pain  Start: 07/05/24 1628 End: 07/05/24 1813   Admin Instructions:         1657     1707     1737     1813-D/C'd          HYDROmorphone (DILAUDID) injection 0.5 mg  Dose: 0.5 mg  Freq: Every 2 Hours PRN Route: IV  PRN Reason: Severe Pain  Start: 07/04/24 0828 End: 07/05/24 1818   Admin Instructions:        0853     1104     1309     1555     1750     1948     2202      0039     0438     0907     1142     1312     1818     1818-D/C'd          HYDROmorphone (DILAUDID) injection 0.5 mg  Dose: 0.5 mg  Freq: Once As Needed Route: IV  PRN Reason:  Severe Pain  Start: 07/03/24 2019 End: 07/03/24 2047   Admin Instructions:       2047              HYDROmorphone (DILAUDID) injection 1 mg  Dose: 1 mg  Freq: Every 2 Hours PRN Route: IV  PRN Reason: Severe Pain  Start: 07/08/24 1225 End: 07/13/24 0915   Admin Instructions:            2239      0114     0543        HYDROmorphone (DILAUDID) injection 1 mg  Dose: 1 mg  Freq: Every 2 Hours PRN Route: IV  PRN Reason: Severe Pain  PRN Comment: breakthrough pain  Start: 07/08/24 1224 End: 07/08/24 1225   Admin Instructions:            1225-D/C'd       HYDROmorphone (DILAUDID) injection 1 mg  Dose: 1 mg  Freq: Every 2 Hours PRN Route: IV  PRN Reason: Severe Pain  Start: 07/08/24 0917 End: 07/08/24 1224   Admin Instructions:            1224-D/C'd       HYDROmorphone (DILAUDID) injection 1 mg  Dose: 1 mg  Freq: Every 2 Hours PRN Route: IV  PRN Reason: Severe Pain  Start: 07/05/24 1818 End: 07/05/24 1838   Admin Instructions:         1838-D/C'd          ipratropium-albuterol (DUO-NEB) nebulizer solution 3 mL  Dose: 3 mL  Freq: Once As Needed Route: NEBULIZATION  PRN Reasons: Wheezing,Shortness of Air  PRN Comment: bronchospasm  Start: 07/05/24 1628 End: 07/05/24 1813   Admin Instructions:         1813-D/C'd          labetalol (NORMODYNE,TRANDATE) injection 5 mg  Dose: 5 mg  Freq: Every 5 Minutes PRN Route: IV  PRN Reason: High Blood Pressure  PRN Comment: for systolic blood pressure greater than 180 mmHg or diastolic blood pressure greater than 105 mmHg  Start: 07/05/24 1628 End: 07/05/24 1813   Admin Instructions:         1813-D/C'd          Magnesium Standard Dose Replacement - Follow Nurse / BPA Driven Protocol  Freq: As Needed Route: XX  PRN Reason: Other  Start: 07/03/24 1459   Admin Instructions:         1312     1818            naloxone (NARCAN) injection 0.1 mg  Dose: 0.1 mg  Freq: Every 5 Minutes PRN Route: IV  PRN Reasons: Opioid Reversal,Respiratory Depression  PRN Comment: see administration instructions  Start:  07/09/24 0608   Admin Instructions:                naloxone (NARCAN) injection 0.1 mg  Dose: 0.1 mg  Freq: Every 5 Minutes PRN Route: IV  PRN Reasons: Opioid Reversal,Respiratory Depression  PRN Comment: see administration instructions  Start: 07/05/24 1818   Admin Instructions:                naloxone (NARCAN) injection 0.2 mg  Dose: 0.2 mg  Freq: As Needed Route: IV  PRN Reasons: Opioid Reversal,Respiratory Depression  PRN Comment: unresponsiveness, decrease oxygen saturation  Indications of Use: ACUTE RESPIRATORY FAILURE,OPIOID-INDUCED RESPIRATORY DEPRESSION  Start: 07/05/24 1628 End: 07/05/24 1813   Admin Instructions:         1813-D/C'd          ondansetron (ZOFRAN) injection 4 mg  Dose: 4 mg  Freq: Once As Needed Route: IV  PRN Reasons: Nausea,Vomiting  Indications of Use: POSTOPERATIVE NAUSEA AND VOMITING  Start: 07/05/24 1628 End: 07/05/24 1813   Admin Instructions:         1813-D/C'd          ondansetron (ZOFRAN) injection 4 mg  Dose: 4 mg  Freq: Every 6 Hours PRN Route: IV  PRN Reasons: Nausea,Vomiting  Start: 07/04/24 0248   Admin Instructions:        0313     0905      1312     1601     1818     2256      0844     2153      1549      1454         oxyCODONE-acetaminophen (PERCOCET) 7.5-325 MG per tablet 1 tablet  Dose: 1 tablet  Freq: Every 4 Hours PRN Route: PO  PRN Reason: Severe Pain  Start: 07/05/24 1628 End: 07/05/24 1813   Admin Instructions:         1813-D/C'd          Pharmacy Consult  Freq: Continuous PRN Route: XX  PRN Reason: Consult  Start: 07/09/24 0606   Admin Instructions:      Order specific questions:                Pharmacy Consult  Freq: Continuous PRN Route: XX  PRN Reason: Consult  Start: 07/05/24 1818 End: 07/08/24 1226   Admin Instructions:      Order specific questions:            1226-D/C'd       Pharmacy to Dose TPN  Freq: Continuous PRN Route: XX  PRN Reason: Consult  Start: 07/06/24 1244   Order specific questions:                Pharmacy to Dose Zosyn  Freq: Continuous PRN  Route: XX  PRN Reason: Consult  Indications of Use: SKIN AND SOFT TISSUE INFECTION  Start: 07/03/24 1500 End: 07/04/24 0827     0827-D/C'd           Phosphorus Replacement - Follow Nurse / BPA Driven Protocol  Freq: As Needed Route: XX  PRN Reason: Other  Start: 07/03/24 1459   Admin Instructions:         1312     1818            Potassium Replacement - Follow Nurse / BPA Driven Protocol  Freq: As Needed Route: XX  PRN Reason: Other  Start: 07/03/24 1459   Admin Instructions:                promethazine (PHENERGAN) 12.5 mg in sodium chloride 0.9 % 50 mL  Dose: 12.5 mg  Freq: Every 6 Hours PRN Route: IV  PRN Reasons: Nausea,Vomiting  Start: 07/04/24 1103   Admin Instructions:      Order specific questions:        1346     1943      0144     1243     1312     1818            sodium chloride (NS) irrigation solution  Freq: As Needed  Start: 07/05/24 1459 End: 07/05/24 1631      1459 [C]     1631-D/C'd          sodium chloride 0.9 % flush 10 mL  Dose: 10 mL  Freq: As Needed Route: IV  PRN Reason: Line Care  Start: 07/03/24 1459      1312     1818             sodium chloride 0.9 % flush 10 mL  Dose: 10 mL  Freq: As Needed Route: IV  PRN Reason: Line Care  Start: 07/03/24 0937      1312     1818            sodium chloride 0.9 % infusion  Rate: 30 mL/hr Dose: 30 mL/hr  Freq: Continuous PRN Route: IV  PRN Comment: To maintain IV patency while on PCA pump if no other IV fluid present  Start: 07/05/24 1818             sodium chloride 0.9 % infusion 40 mL  Dose: 40 mL  Freq: As Needed Route: IV  PRN Reason: Line Care  Start: 07/03/24 1459   Admin Instructions:         1312     1818            sterile water irrigation solution  Freq: As Needed  Start: 07/05/24 1534 End: 07/05/24 1631      1534 [C]     1631-D/C'd

## 2024-07-09 NOTE — THERAPY TREATMENT NOTE
Patient Name: Henrietta Garrett  : 1995    MRN: [id number]                              Today's Date: 2024       Admit Date: 7/3/2024    Visit Dx:     ICD-10-CM ICD-9-CM   1. Epigastric pain at stoma  R10.13 789.06   2. Gastroparesis  K31.84 536.3   3. Abdominal wall cellulitis  L03.311 682.2   4. Malfunction of jejunostomy tube  K94.13 569.62     Patient Active Problem List   Diagnosis    Dyspnea    Iron deficiency anemia    Vitamin D deficiency    Fibromyalgia    Complex regional pain syndrome type 1 of right lower extremity    Seropositive rheumatoid arthritis    Raynaud's disease without gangrene    Irritable bowel syndrome with both constipation and diarrhea    Exercise-induced asthma    Systemic lupus erythematosus    Hemoptysis    Chest wall pain    Abnormal white blood cell (WBC) count    Adverse effect of iron    B12 deficiency    Lymphadenopathy, axillary    Rectal bleeding    Gastroparesis    Postural orthostatic tachycardia syndrome    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[place]    Sjogren's syndrome    Gastroesophageal reflux disease    Jejunostomy tube present    Abdominal pain    Anemia    Abnormal CT of the abdomen    Depressive disorder    Hypoglycemia    Myasthenia gravis    Pain in both feet    Tomas-Danlos syndrome    Lupus erythematosus overlap syndrome    Abdominal wall cellulitis     Past Medical History:   Diagnosis Date    Abnormal CT of the abdomen 12/10/2023    Anemia     Arthritis     RHEUMATOID    Asthma     Burn injury     Calculus of gallbladder with acute on chronic cholecystitis without obstruction 2023    CPRS 1 (complex regional pain syndrome I) of upper limb     Depression     Dislodged jejunostomy tube 2024    EDS (Tomas-Danlos syndrome)     Facial cellulitis 2023    Fibromyalgia 2015    Gastroparesis     GERD (gastroesophageal reflux disease)     Headache     History of hyperkeratosis of skin     HL (hearing loss)     IBS (irritable bowel  syndrome)     Leukopenia, mild     Low back pain     Lupus     Malfunction of jejunostomy tube 07/05/2023    Periapical abscess without sinus 07/12/2023    PONV (postoperative nausea and vomiting)     Poor vision     Raynauds syndrome     Sjogren's disease     SOB (shortness of breath)     WHEN LAYING FLAT     Past Surgical History:   Procedure Laterality Date    CHOLECYSTECTOMY WITH INTRAOPERATIVE CHOLANGIOGRAM N/A 12/11/2023    Procedure: CHOLECYSTECTOMY LAPAROSCOPIC INTRAOPERATIVE CHOLANGIOGRAM;  Surgeon: Madhu Zabala Jr., MD;  Location: [facility] MAIN OR;  Service: General;  Laterality: N/A;    COLON RESECTION N/A 7/5/2024    Procedure: LAPAROSCOPIC SMALL BOWEL RESECTION WITH POSSIBLE G-TUBE PLACEMENT;  Surgeon: Madhu Zabala Jr., MD;  Location: [facility] MAIN OR;  Service: General;  Laterality: N/A;    COLONOSCOPY  12/11/2020    Dr. Barone    DENTAL PROCEDURE      ENDOSCOPY W/ PEG TUBE PLACEMENT N/A 02/15/2024    Procedure: ESOPHAGOGASTRODUODENOSCOPY WITH PERCUTANEOUS ENDOSCOPIC GASTROSTOMY TUBE INSERTION and J-Tube replacemenet;  Surgeon: Madhu Zabala Jr., MD;  Location: [facility] ENDOSCOPY;  Service: General;  Laterality: N/A;  pre: gastroparesis   post: same    ENDOSCOPY W/ PEG TUBE PLACEMENT N/A 7/5/2024    Procedure: ESOPHAGOGASTRODUODENOSCOPY WITH PERCUTANEOUS ENDOSCOPIC GASTROSTOMY TUBE INSERTION;  Surgeon: Madhu Zabala Jr., MD;  Location: [facility] MAIN OR;  Service: General;  Laterality: N/A;    EPIDURAL BLOCK      FRACTURE SURGERY  2013    GASTROSTOMY      GASTROSTOMY FEEDING TUBE INSERTION N/A 02/19/2024    Procedure: JEJUNOSTOMY TUBE EXCHANGE;  Surgeon: Madhu Zabala Jr., MD;  Location: [facility] MAIN OR;  Service: General;  Laterality: N/A;    JEJUNOSTOMY TUBE INSERTION      PORTACATH PLACEMENT      SHOULDER SURGERY Bilateral     X3    SMALL INTESTINE SURGERY      TEETH EXTRACTION N/A 07/13/2023    Procedure: TOOTH EXTRACTION;  Surgeon: Trae Escoto DMD;  Location: [facility] MAIN OR;  Service: Oral  Surgery;  Laterality: N/A;    TOE SURGERY Right 2011    3rd toe     UPPER GASTROINTESTINAL ENDOSCOPY  12/11/1920    Dr. Barone      General Information       Row Name 07/09/24 1327          Physical Therapy Time and Intention    Document Type therapy note (daily note)  -EJ     Mode of Treatment physical therapy  -EJ       Row Name 07/09/24 1327          General Information    Existing Precautions/Restrictions fall  -EJ     Barriers to Rehab medically complex  -EJ               User Key  (r) = Recorded By, (t) = Taken By, (c) = Cosigned By      Initials Name Provider Type    EJ Nicci Cobos, PT Physical Therapist                   Mobility       Row Name 07/09/24 1327          Bed Mobility    Supine-Sit [place] (Bed Mobility) not tested  -EJ     Sit-Supine [place] (Bed Mobility) standby assist  -EJ     Assistive Device (Bed Mobility) bed rails;head of bed elevated  -EJ       Row Name 07/09/24 1327          Sit-Stand Transfer    Sit-Stand [place] (Transfers) contact guard  -EJ     Assistive Device (Sit-Stand Transfers) walker, front-wheeled  -EJ       Row Name 07/09/24 1327          Gait/Stairs (Locomotion)    [place] Level (Gait) contact guard  -EJ     Assistive Device (Gait) walker, front-wheeled  -EJ     Distance in Feet (Gait) 80  -EJ     Deviations/Abnormal Patterns (Gait) gait speed decreased;stride length decreased  -EJ     Bilateral Gait Deviations forward flexed posture;heel strike decreased  -EJ     Comment, (Gait/Stairs) slow pace using step-to gait pattern, limited by pain and weakness, no overt LOB noted  -EJ               User Key  (r) = Recorded By, (t) = Taken By, (c) = Cosigned By      Initials Name Provider Type    EJ Nicci Cobos, PT Physical Therapist                   Obj/Interventions    No documentation.                  Goals/Plan    No documentation.                  Clinical Impression       Row Name 07/09/24 1328          Pain    Pretreatment Pain Rating 8/10   -EJ     Posttreatment Pain Rating 8/10  -EJ     Pain Location - abdomen  -EJ     Pain Intervention(s) Repositioned;Ambulation/increased activity  -EJ       Row Name 07/09/24 1328          Plan of Care Review    Plan of Care Reviewed With patient  -EJ     Outcome Evaluation Pt seen for PT this am. SHe is sitting up on EOB w nsg assistant upon entry to room. Pt c/o abdominal pain at 8/10.  She is able to stand w CGA and Rwx. She ambulated approx 80 ft exhibiting slow pace using step to gait pattern. Limited by pain, fatigue, and weakness. Pt back to bed at end of session and able to return w just SBA. Pt slowly increasing activity at this time. Will continue to progress as tolerated.  -EJ       Row Name 07/09/24 1328          Positioning and Restraints    Pre-Treatment Position in bed  -EJ     Post Treatment Position bed  -EJ     In Bed notified nsg;supine;call light within reach;encouraged to call for assist;exit alarm on  -EJ               User Key  (r) = Recorded By, (t) = Taken By, (c) = Cosigned By      Initials Name Provider Type    Nicci Yu, PT Physical Therapist                   Outcome Measures       Row Name 07/09/24 1330          How much help from another person do you currently need...    Turning from your back to your side while in flat bed without using bedrails? 4  -EJ     Moving from lying on back to sitting on the side of a flat bed without bedrails? 3  -EJ     Moving to and from a bed to a chair (including a wheelchair)? 3  -EJ     Standing up from a chair using your arms (e.g., wheelchair, bedside chair)? 3  -EJ     Climbing 3-5 steps with a railing? 3  -EJ     To walk in hospital room? 3  -EJ     AM-PAC 6 Clicks Score (PT) 19  -EJ     Highest Level of Mobility Goal 6 --> Walk 10 steps or more  -EJ               User Key  (r) = Recorded By, (t) = Taken By, (c) = Cosigned By      Initials Name Provider Type    Nicci Yu, PT Physical Therapist                                  Physical Therapy Education       Title: PT OT SLP Therapies (Done)       Topic: Physical Therapy (Done)       Point: Mobility training (Done)       Learning Progress Summary             Patient Acceptance, E, VU by CEASAR at 7/8/2024 1150    Acceptance, E,TB, VU by  at 7/4/2024 1547   Family Acceptance, E,TB, VU by  at 7/4/2024 1547                         Point: Home exercise program (Done)       Learning Progress Summary             Patient Acceptance, E, VU by CEASAR at 7/8/2024 1150    Acceptance, E,TB, VU by  at 7/4/2024 1547   Family Acceptance, E,TB, VU by  at 7/4/2024 1547                         Point: Body mechanics (Done)       Learning Progress Summary             Patient Acceptance, E, VU by CEASAR at 7/8/2024 1150    Acceptance, E,TB, VU by  at 7/4/2024 1547   Family Acceptance, E,TB, VU by  at 7/4/2024 1547                         Point: Precautions (Done)       Learning Progress Summary             Patient Acceptance, E, VU by CEASAR at 7/8/2024 1150    Acceptance, E,TB, VU by  at 7/4/2024 1547   Family Acceptance, E,TB, VU by  at 7/4/2024 1547                                         User Key       Initials Effective Dates Name Provider Type Discipline    CEASAR 05/02/22 -  Sayra Pineda, PT Physical Therapist PT     05/24/23 -  Meir Yañez, PT Physical Therapist PT                  PT Recommendation and Plan     Plan of Care Reviewed With: patient  Outcome Evaluation: Pt seen for PT this am. SHe is sitting up on EOB w nsg assistant upon entry to room. Pt c/o abdominal pain at 8/10.  She is able to stand w CGA and Rwx. She ambulated approx 80 ft exhibiting slow pace using step to gait pattern. Limited by pain, fatigue, and weakness. Pt back to bed at end of session and able to return w just SBA. Pt slowly increasing activity at this time. Will continue to progress as tolerated.     Time Calculation:         PT Charges       Row Name 07/09/24 1842             Time Calculation    Start Time  1111  -EJ      Stop Time 1124  -EJ      Time Calculation (min) 13 min  -EJ      PT Received On 07/09/24  -EJ      PT - Next Appointment 07/10/24  -EJ                User Key  (r) = Recorded By, (t) = Taken By, (c) = Cosigned By      Initials Name Provider Type    Nicci Yu, PT Physical Therapist                  Therapy Charges for Today       Code Description Service Date Service Provider Modifiers Qty    33921155301  PT THERAPEUTIC ACT EA 15 MIN 7/9/2024 Nicci Cobos, PT GP 1            PT G-Codes  Outcome Measure Options: AM-PAC 6 Clicks Basic Mobility (PT)  AM-PAC 6 Clicks Score (PT): 19       Nicci Cobos, PT  7/9/2024

## 2024-07-09 NOTE — PROGRESS NOTES
[facility] Clinical Pharmacy Services: TPN Daily Progress Note    TPN Day # 4   Indication: Inaccessible GI Tract  Route: central  Type: standard    Subjective/Objective  Results from last 7 days   Lab Units 24  0933 24  0542 24  0508 24  0453   SODIUM mmol/L 135* 141   < > 136   POTASSIUM mmol/L 3.9 2.7*   < > 4.2   CHLORIDE mmol/L 100 115*   < > 106   CO2 mmol/L 27.0 17.9*   < > 20.0*   BUN mg/dL 9 6   < > 5*   CREATININE mg/dL 0.53* 0.30*   < > 0.52*   CALCIUM mg/dL 9.2 6.0*   < > 8.9   ALBUMIN g/dL 3.6 2.4*   < >  --    BILIRUBIN mg/dL <0.2 <0.2   < >  --    ALK PHOS U/L 45 32*   < >  --    ALT (SGPT) U/L 12 8   < >  --    AST (SGOT) U/L 8 7   < >  --    GLUCOSE mg/dL 104* 81   < > 109*   MAGNESIUM mg/dL  --  1.3*   < > 2.0   PHOSPHORUS mg/dL  --  3.0   < > 3.8   TRIGLYCERIDES mg/dL  --   --   --  77    < > = values in this interval not displayed.        Diet Orders (active) (From admission, onward)       Start     Ordered    24 1241  NPO Diet NPO Type: Strict NPO  Diet Effective Now         24 1240                  Additional insulin administration while previous TPN infusin units  Additional electrolyte administration while previous TPN infusing: none  Acid suppression: pantoprazole 40 mg IV daily    Goal TPN Formula Recommendations: 95/780/200 AA/Dex/Lipids; 1560 kcal/day  Current TPN Formula: 70/600/200 AA/Dex/Lipids    Assessment/Plan      Plan  Will continue TPN at the following goal macros:   Protein/Dextrose/Lipids: 95/780/200    Volume: 1560 ml (65 mL/hr over 24 hrs daily)    Based on the above labs, will add the following electrolytes/additives to the TPN.    Sodium Chloride: 70 mEq (increased from 60 mEq)   Sodium Acetate: 20 mEq   Sodium Phosphate: 0 mEq   Potassium Chloride: 40 mEq   Potassium Acetate: 10 mEq    Potassium Phosphate: 30 mEq    Calcium Gluconate: 9 mEq   Magnesium Sulfate: 20 mEq ( increased from 10 mEq)   MVI for TPN   Trace  Elements    Labs to be ordered: CMP, Mag, Phos     Yadi Mendez, [facility]    Clinical Pharmacist

## 2024-07-09 NOTE — PROGRESS NOTES
Chief Complaint:    S/P Laparoscopic assisted small bowel resection and PEG, POD 4    Subjective:    The patient is experiencing better relief of her expected postop abdominal pain.  She is not ready to wean the Dilaudid at this time.  Her nausea has improved.    Objective:    Temp:  [97.4 °F (36.3 °C)-99.1 °F (37.3 °C)] 97.4 °F (36.3 °C)  Heart Rate:  [66-83] 67  Resp:  [16] 16  BP: ()/(56-69) 117/65    Physical Exam  Constitutional:       Appearance: She is ill-appearing. She is not toxic-appearing.   Abdominal:      Palpations: Abdomen is soft.      Tenderness: There is generalized abdominal tenderness (Appropriate postop tenderness).   Neurological:      Mental Status: She is alert.   Psychiatric:         Behavior: Behavior is cooperative.       BMI is within normal parameters. No other follow-up for BMI required.        Results:    BUN is 16 creatinine 0.30.  Albumin is 2.4.    Impression/Plan:    The patient is POD 4 from laparoscopic assisted small bowel resection and PEG.  She is recovering well but continues to have postop abdominal pain that is difficult to manage.  She seems more stable today.  We will continue the present management with plans to start to wean tomorrow.    Madhu Zabala Jr., M.D.

## 2024-07-09 NOTE — PLAN OF CARE
Goal Outcome Evaluation:  Plan of Care Reviewed With: patient           Outcome Evaluation: VSS, PCA pump in use, dilaudid PRN for break through pain, TPN and lipids infusing, voiding freely, Gtube clamped (per pt) had light/neon green thin fluid output, sips with meds, dressing CDI, labs obtained, slept off and on, no nausea, benadryl given d/t port area itching, up with asst and walker, on falls with bed alarm, continuing to monitor.
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
## 2024-07-09 NOTE — PLAN OF CARE
Goal Outcome Evaluation:  Plan of Care Reviewed With: patient        Progress: no change  Outcome Evaluation: vss and afebrile, flat affect, encouraged IS use and TCDB, pt with productive cough, abdomen soft, lap sites CDI, g-tube clamped and unclamped per pt, medicated for nausea x1, monitoring blood sugars,TPN/lipids infusing via mediport, mediport reaccessed today with dressing change, PCA infusing with some relief, encouraged pt to reposition in bed, encouraged pt to mobilize as much as possible, did not want to sit in chair today, ambulated in givens x1, accumax applied to mattress

## 2024-07-09 NOTE — PLAN OF CARE
Goal Outcome Evaluation:  Plan of Care Reviewed With: patient           Outcome Evaluation: Pt seen for PT this am. SHe is sitting up on EOB w nsg assistant upon entry to room. Pt c/o abdominal pain at 8/10.  She is able to stand w CGA and Rwx. She ambulated approx 80 ft exhibiting slow pace using step to gait pattern. Limited by pain, fatigue, and weakness. Pt back to bed at end of session and able to return w just SBA. Pt slowly increasing activity at this time. Will continue to progress as tolerated.

## 2024-07-09 NOTE — PROGRESS NOTES
Name: Henrietta Garrett ADMIT: 7/3/2024   : 1995  PCP: Kenya David MD    MRN: [id number] LOS: 6 days   AGE/SEX: 29 y.o. female  ROOM: [facility]     Subjective     She states that the increase in Dilaudid and helped out with her pain.  She still very uncomfortable.    Objective   Objective   Vital Signs  Temp:  [97.4 °F (36.3 °C)-99.1 °F (37.3 °C)] 98.3 °F (36.8 °C)  Heart Rate:  [66-73] 73  Resp:  [16] 16  BP: ()/(56-69) 105/66  SpO2:  [96 %-100 %] 98 %  on   ;   Device (Oxygen Therapy): room air  Body mass index is 23.71 kg/m².  Physical Exam  Constitutional:       General: She is not in acute distress.     Appearance: She is ill-appearing.   HENT:      Head: Normocephalic and atraumatic.   Pulmonary:      Effort: Pulmonary effort is normal. No respiratory distress.   Neurological:      Mental Status: She is alert and oriented to person, place, and time.         Results Review     I reviewed the patient's new clinical results.  Results from last 7 days   Lab Units 24  0508 24  0453 24  0436 24  0353   WBC 10*3/mm3 4.85 3.86 2.83* 3.03*   HEMOGLOBIN g/dL 8.5* 9.1* 8.3* 8.3*   PLATELETS 10*3/mm3 266 242 211 220     Results from last 7 days   Lab Units 24  0933 24  0542 24  0543 24  0508   SODIUM mmol/L 135* 141 134* 137   POTASSIUM mmol/L 3.9 2.7* 3.9 3.8   CHLORIDE mmol/L 100 115* 101 106   CO2 mmol/L 27.0 17.9* 24.3 26.0   BUN mg/dL 9 6 6 8   CREATININE mg/dL 0.53* 0.30* 0.55* 0.51*   GLUCOSE mg/dL 104* 81 107* 104*   Estimated Creatinine Clearance: 190.6 mL/min (A) (by C-G formula based on SCr of 0.53 mg/dL (L)).  Results from last 7 days   Lab Units 2433 24  0542 24  0543 24  0508   ALBUMIN g/dL 3.6 2.4* 3.3* 3.4*   BILIRUBIN mg/dL <0.2 <0.2 <0.2 0.2   ALK PHOS U/L 45 32* 51 52   AST (SGOT) U/L 8 7 9 14   ALT (SGPT) U/L 12 8 14 18     Results from last 7 days   Lab Units 2433 2442 24  0543  07/07/24  0508 07/06/24  0453   CALCIUM mg/dL 9.2 6.0* 8.4* 9.0 8.9   ALBUMIN g/dL 3.6 2.4* 3.3* 3.4*  --    MAGNESIUM mg/dL 1.7 1.3* 1.7 1.9 2.0   PHOSPHORUS mg/dL  --  3.0 3.5 2.3* 3.8     Results from last 7 days   Lab Units 07/03/24  1015 07/03/24  1004   PROCALCITONIN ng/mL  --  <0.02   LACTATE mmol/L 1.6  --      COVID19   Date Value Ref Range Status   07/22/2022 Not Detected Not Detected - Ref. Range Final     SARS-CoV-2, DANIELLA   Date Value Ref Range Status   08/08/2022 NEGATIVE Negative Final     Glucose   Date/Time Value Ref Range Status   07/08/2024 0647 106 70 - 130 mg/dL Final   07/08/2024 0031 76 70 - 130 mg/dL Final     Results for orders placed or performed during the hospital encounter of 07/03/24   Blood Culture - Blood, Arm, Right    Specimen: Arm, Right; Blood   Result Value Ref Range    Blood Culture No growth at 5 days          XR Chest 1 View  Narrative: XR CHEST 1 VW-7/9/2024     HISTORY: Port placement.     Right subclavian Mediport catheter is seen with its tip overlying the  superior cavoatrial junction. No pneumothorax is seen.     Lungs appear clear. Heart size is within normal limits.     Impression: 1. Mediport catheter placement as described.  2. No pneumothorax is seen.        This report was finalized on 7/9/2024 11:25 AM by Dr. Javier Plasencia M.D on Workstation: [id number]       Scheduled Medications  dicyclomine, 10 mg, Oral, 4x Daily  enoxaparin, 40 mg, Subcutaneous, Q24H  escitalopram, 5 mg, Per J Tube, Daily  Fat Emulsion Plant Based, 200 mL, Intravenous, Q24H (TPN)  gabapentin, 200 mg, Oral, TID  hydroxychloroquine, 200 mg, Oral, BID  pantoprazole, 40 mg, Intravenous, Q12H  sodium chloride, 10 mL, Intravenous, Q12H    Infusions  Adult Central 2-in-1 TPN, , Last Rate: 65 mL/hr at 07/08/24 1840  Adult Central 2-in-1 TPN,   HYDROmorphone HCl-NaCl,   Pharmacy Consult,   Pharmacy to Dose TPN,   sodium chloride, 30 mL/hr    Diet  NPO Diet NPO Type: Strict NPO  Adult Central 2-in-1  TPN  Adult Central 2-in-1 TPN       Assessment/Plan     Active Hospital Problems    Diagnosis  POA    **Abdominal wall cellulitis [L03.311]  Yes    Lupus erythematosus overlap syndrome [M32.8]  Yes    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       29 y.o. female admitted with Abdominal wall cellulitis.    Abdominal wall cellulitis  Abdominal pain  Started on zosyn. Continue, follow cultures (negative).    Blood cultures NGTD  On Dilaudid PCA per general surgery.  I also added as needed Dilaudid.     Gastroparesis   On TPN    Dislodged G-tube  S/p revision 7/5/24     Lupus  Resume Plaquenil     Hypertension  Dc amlodipine. BP on the lower side     Iron deficiency anemia  She takes an iron supplement at home however she is currently NPO      Lovenox for DVT prophylaxis.  Full code.  Discussed with patient.  Anticipate discharge to be determined       Aureliano Melchor MD  [facility]  07/09/24  12:32 EDT

## 2024-07-10 ENCOUNTER — TELEPHONE (OUTPATIENT)
Dept: PAIN MEDICINE | Facility: CLINIC | Age: 29
End: 2024-07-10

## 2024-07-10 LAB
ALBUMIN SERPL-MCNC: 3.6 G/DL (ref 3.5–5.2)
ALBUMIN/GLOB SERPL: 0.9 G/DL
ALP SERPL-CCNC: 45 U/L (ref 39–117)
ALT SERPL W P-5'-P-CCNC: 11 U/L (ref 1–33)
ANION GAP SERPL CALCULATED.3IONS-SCNC: 9 MMOL/L (ref 5–15)
ANISOCYTOSIS BLD QL: ABNORMAL
AST SERPL-CCNC: 12 U/L (ref 1–32)
BASOPHILS # BLD MANUAL: 0.05 10*3/MM3 (ref 0–0.2)
BASOPHILS NFR BLD MANUAL: 1.1 % (ref 0–1.5)
BILIRUB SERPL-MCNC: 0.2 MG/DL (ref 0–1.2)
BUN SERPL-MCNC: 10 MG/DL (ref 6–20)
BUN/CREAT SERPL: 19.2 (ref 7–25)
CALCIUM SPEC-SCNC: 9.3 MG/DL (ref 8.6–10.5)
CHLORIDE SERPL-SCNC: 100 MMOL/L (ref 98–107)
CO2 SERPL-SCNC: 26 MMOL/L (ref 22–29)
CREAT SERPL-MCNC: 0.52 MG/DL (ref 0.57–1)
DACRYOCYTES BLD QL SMEAR: ABNORMAL
DEPRECATED RDW RBC AUTO: 41.7 FL (ref 37–54)
EGFRCR SERPLBLD CKD-EPI 2021: 129.2 ML/MIN/1.73
ELLIPTOCYTES BLD QL SMEAR: ABNORMAL
EOSINOPHIL # BLD MANUAL: 0.09 10*3/MM3 (ref 0–0.4)
EOSINOPHIL NFR BLD MANUAL: 2.1 % (ref 0.3–6.2)
ERYTHROCYTE [DISTWIDTH] IN BLOOD BY AUTOMATED COUNT: 14.5 % (ref 12.3–15.4)
GLOBULIN UR ELPH-MCNC: 4.1 GM/DL
GLUCOSE BLDC GLUCOMTR-MCNC: 100 MG/DL (ref 70–130)
GLUCOSE BLDC GLUCOMTR-MCNC: 107 MG/DL (ref 70–130)
GLUCOSE BLDC GLUCOMTR-MCNC: 86 MG/DL (ref 70–130)
GLUCOSE SERPL-MCNC: 92 MG/DL (ref 65–99)
HCT VFR BLD AUTO: 30.2 % (ref 34–46.6)
HGB BLD-MCNC: 9.5 G/DL (ref 12–15.9)
HYPOCHROMIA BLD QL: ABNORMAL
LYMPHOCYTES # BLD MANUAL: 0.63 10*3/MM3 (ref 0.7–3.1)
LYMPHOCYTES NFR BLD MANUAL: 12.8 % (ref 5–12)
MAGNESIUM SERPL-MCNC: 1.9 MG/DL (ref 1.6–2.6)
MCH RBC QN AUTO: 25.1 PG (ref 26.6–33)
MCHC RBC AUTO-ENTMCNC: 31.5 G/DL (ref 31.5–35.7)
MCV RBC AUTO: 79.7 FL (ref 79–97)
MICROCYTES BLD QL: ABNORMAL
MONOCYTES # BLD: 0.54 10*3/MM3 (ref 0.1–0.9)
NEUTROPHILS # BLD AUTO: 2.92 10*3/MM3 (ref 1.7–7)
NEUTROPHILS NFR BLD MANUAL: 69.1 % (ref 42.7–76)
OVALOCYTES BLD QL SMEAR: ABNORMAL
PHOSPHATE SERPL-MCNC: 4.5 MG/DL (ref 2.5–4.5)
PLAT MORPH BLD: NORMAL
PLATELET # BLD AUTO: 286 10*3/MM3 (ref 140–450)
PMV BLD AUTO: 10.6 FL (ref 6–12)
POIKILOCYTOSIS BLD QL SMEAR: ABNORMAL
POLYCHROMASIA BLD QL SMEAR: ABNORMAL
POTASSIUM SERPL-SCNC: 4 MMOL/L (ref 3.5–5.2)
PROT SERPL-MCNC: 7.7 G/DL (ref 6–8.5)
RBC # BLD AUTO: 3.79 10*6/MM3 (ref 3.77–5.28)
SMUDGE CELLS BLD QL SMEAR: ABNORMAL
SODIUM SERPL-SCNC: 135 MMOL/L (ref 136–145)
VARIANT LYMPHS NFR BLD MANUAL: 14.9 % (ref 19.6–45.3)
WBC NRBC COR # BLD AUTO: 4.22 10*3/MM3 (ref 3.4–10.8)

## 2024-07-10 PROCEDURE — 80053 COMPREHEN METABOLIC PANEL: CPT | Performed by: SURGERY

## 2024-07-10 PROCEDURE — 82948 REAGENT STRIP/BLOOD GLUCOSE: CPT

## 2024-07-10 PROCEDURE — 85007 BL SMEAR W/DIFF WBC COUNT: CPT | Performed by: SURGERY

## 2024-07-10 PROCEDURE — 99024 POSTOP FOLLOW-UP VISIT: CPT | Performed by: SURGERY

## 2024-07-10 PROCEDURE — 84100 ASSAY OF PHOSPHORUS: CPT | Performed by: SURGERY

## 2024-07-10 PROCEDURE — 83735 ASSAY OF MAGNESIUM: CPT | Performed by: SURGERY

## 2024-07-10 PROCEDURE — 25010000002 CALCIUM GLUCONATE PER 10 ML: Performed by: SURGERY

## 2024-07-10 PROCEDURE — 25010000002 POTASSIUM CHLORIDE PER 2 MEQ OF POTASSIUM: Performed by: SURGERY

## 2024-07-10 PROCEDURE — 25010000002 HYDROMORPHONE 1 MG/ML SOLUTION: Performed by: STUDENT IN AN ORGANIZED HEALTH CARE EDUCATION/TRAINING PROGRAM

## 2024-07-10 PROCEDURE — 25010000002 MAGNESIUM SULFATE PER 500 MG OF MAGNESIUM: Performed by: SURGERY

## 2024-07-10 PROCEDURE — 85027 COMPLETE CBC AUTOMATED: CPT | Performed by: SURGERY

## 2024-07-10 PROCEDURE — 25010000002 ONDANSETRON PER 1 MG: Performed by: SURGERY

## 2024-07-10 PROCEDURE — 25010000002 ENOXAPARIN PER 10 MG: Performed by: STUDENT IN AN ORGANIZED HEALTH CARE EDUCATION/TRAINING PROGRAM

## 2024-07-10 PROCEDURE — 25010000002 DIPHENHYDRAMINE PER 50 MG: Performed by: STUDENT IN AN ORGANIZED HEALTH CARE EDUCATION/TRAINING PROGRAM

## 2024-07-10 RX ORDER — FENTANYL 25 UG/1
1 PATCH TRANSDERMAL
Status: DISCONTINUED | OUTPATIENT
Start: 2024-07-10 | End: 2024-07-12

## 2024-07-10 RX ADMIN — DICYCLOMINE HYDROCHLORIDE 10 MG: 10 CAPSULE ORAL at 12:27

## 2024-07-10 RX ADMIN — DICYCLOMINE HYDROCHLORIDE 10 MG: 10 CAPSULE ORAL at 17:59

## 2024-07-10 RX ADMIN — Medication 10 ML: at 08:57

## 2024-07-10 RX ADMIN — GABAPENTIN 200 MG: 100 CAPSULE ORAL at 08:56

## 2024-07-10 RX ADMIN — GABAPENTIN 200 MG: 100 CAPSULE ORAL at 18:00

## 2024-07-10 RX ADMIN — DICYCLOMINE HYDROCHLORIDE 10 MG: 10 CAPSULE ORAL at 22:09

## 2024-07-10 RX ADMIN — CALCIUM GLUCONATE: 98 INJECTION, SOLUTION INTRAVENOUS at 18:43

## 2024-07-10 RX ADMIN — HYDROMORPHONE HYDROCHLORIDE 1 MG: 1 INJECTION, SOLUTION INTRAMUSCULAR; INTRAVENOUS; SUBCUTANEOUS at 17:59

## 2024-07-10 RX ADMIN — I.V. FAT EMULSION 40 G: 20 EMULSION INTRAVENOUS at 18:43

## 2024-07-10 RX ADMIN — HYDROXYCHLOROQUINE SULFATE 200 MG: 200 TABLET, FILM COATED ORAL at 22:09

## 2024-07-10 RX ADMIN — ONDANSETRON 4 MG: 2 INJECTION INTRAMUSCULAR; INTRAVENOUS at 17:59

## 2024-07-10 RX ADMIN — DICYCLOMINE HYDROCHLORIDE 10 MG: 10 CAPSULE ORAL at 08:56

## 2024-07-10 RX ADMIN — ENOXAPARIN SODIUM 40 MG: 100 INJECTION SUBCUTANEOUS at 17:59

## 2024-07-10 RX ADMIN — FENTANYL 1 PATCH: 25 PATCH TRANSDERMAL at 08:56

## 2024-07-10 RX ADMIN — HYDROMORPHONE HYDROCHLORIDE 1 MG: 1 INJECTION, SOLUTION INTRAMUSCULAR; INTRAVENOUS; SUBCUTANEOUS at 10:06

## 2024-07-10 RX ADMIN — ESCITALOPRAM 5 MG: 5 TABLET, FILM COATED ORAL at 12:27

## 2024-07-10 RX ADMIN — DIPHENHYDRAMINE HYDROCHLORIDE 50 MG: 50 INJECTION, SOLUTION INTRAMUSCULAR; INTRAVENOUS at 23:49

## 2024-07-10 RX ADMIN — HYDROMORPHONE HYDROCHLORIDE 1 MG: 1 INJECTION, SOLUTION INTRAMUSCULAR; INTRAVENOUS; SUBCUTANEOUS at 22:17

## 2024-07-10 RX ADMIN — HYDROXYCHLOROQUINE SULFATE 200 MG: 200 TABLET, FILM COATED ORAL at 08:56

## 2024-07-10 RX ADMIN — DIPHENHYDRAMINE HYDROCHLORIDE 50 MG: 50 INJECTION, SOLUTION INTRAMUSCULAR; INTRAVENOUS at 00:20

## 2024-07-10 RX ADMIN — PANTOPRAZOLE SODIUM 40 MG: 40 INJECTION, POWDER, FOR SOLUTION INTRAVENOUS at 08:56

## 2024-07-10 RX ADMIN — Medication 10 ML: at 22:10

## 2024-07-10 RX ADMIN — HYDROMORPHONE HYDROCHLORIDE 1 MG: 1 INJECTION, SOLUTION INTRAMUSCULAR; INTRAVENOUS; SUBCUTANEOUS at 14:51

## 2024-07-10 RX ADMIN — HYDROMORPHONE HYDROCHLORIDE 1 MG: 1 INJECTION, SOLUTION INTRAMUSCULAR; INTRAVENOUS; SUBCUTANEOUS at 12:45

## 2024-07-10 RX ADMIN — PANTOPRAZOLE SODIUM 40 MG: 40 INJECTION, POWDER, FOR SOLUTION INTRAVENOUS at 22:09

## 2024-07-10 RX ADMIN — GABAPENTIN 200 MG: 100 CAPSULE ORAL at 22:09

## 2024-07-10 NOTE — PLAN OF CARE
Goal Outcome Evaluation:  Plan of Care Reviewed With: patient        Progress: improving  Outcome Evaluation: Pt is A&Ox4, VSS, afebrile. Mediport infusing TPN/lipids without difficulty. Family members at bedside. Lap sites CDI, G tube remained clamped all throughout shift per pt request. Pt c/o nausea; Zofran was given. Diluadid was requested q2hrs with pain levels of 8-9/10, alieviates to a level of 6. Encouraged pt to turn in bed and reposition. Voiding well. Pt c/o burning when flushing and giving meds through IV site; IV team called to replace PIV. POC ongoing.

## 2024-07-10 NOTE — TELEPHONE ENCOUNTER
Caller: Henrietta Talbot    Relationship: Self    Best call back number: [phone] (home)     Requested Prescriptions:       Pharmacy where request should be sent:  [id number] [place] [facility] [address] [facility] [address] - [phone] PH - [phone]     Last office visit with prescribing clinician: 6/10/2024   Last telemedicine visit with prescribing clinician: Visit date not found   Next office visit with prescribing clinician: Visit date not found     Additional details provided by patient: MS TALBOT HAD A LAPAROSCOPIC SMALL BOWEL RESECTION WITH POSSIBLE G-TUBE PLACEMENT ON 7/5/24 AND STATED HER DR WANTED TO PRESCRIBE [place] PATCHES FOR HER BUT TOLD HER TO GET IN TOUCH WITH HER PAIN MANAGEMENT DR. SHE WOULD LIKE TO KNOW IF DR BRADLEY WILL PRESCRIBE HER THE PATCHES OR WHAT SHE IS SUPPOSED TO DO    Would you like a call back once the refill request has been completed: [x] Yes [] No    If the office needs to give you a call back, can they leave a voicemail: [x] Yes [] No    Madelaine Palomo Rep   07/10/24 10:40 EDT

## 2024-07-10 NOTE — PAYOR COMM NOTE
"Henrietta Garrett (29 y.o. Female)                        [facility] CASE REF #[id number]                     REPLY TO UR DEPT  [phone] OR CALL    TAMMY ZIEGLER LPBEENA           Date of Birth   1995    Social Security Number       Address   [address]  [address] [place] [address]    Home Phone   [phone]    MRN   [id number]       Caodaism   Non-Worship    Marital Status   Single                            Admission Date   7/3/24    Admission Type   Emergency    Admitting Provider   Aureliano Melchor MD    Attending Provider   Annette Oliver MD    Department, Room/Bed   [facility] [address] [place], P680/1       Discharge Date       Discharge Disposition       Discharge Destination                                 Attending Provider: Annette Oliver MD    Allergies: Anesthetics, Amide, Ciprofloxacin, Compazine [Prochlorperazine], Domperidone, Droperidol, Haldol [Haloperidol], Metoclopramide, Sulfa Antibiotics    Isolation: None   Infection: None   Code Status: CPR    Ht: 180.3 cm (71\")   Wt: 81 kg (178 lb 9.2 oz)    Admission Cmt: None   Principal Problem: Abdominal wall cellulitis [L03.311]                   Active Insurance as of 7/3/2024       Primary Coverage       Payor Plan Insurance Group Employer/Plan Group    [facility] [employer] [facility] [employer] [facility] PPO [id number]       Payor Plan Address Payor Plan Phone Number Payor Plan Fax Number Effective Dates    [address] [phone]  1/1/2023 - None Entered    [place] [address]         Subscriber Name Subscriber Birth Date Member ID       NETTA SINGH MARQUEZ [birth year]1963 [id number]                     Emergency Contacts        (Rel.) Home Phone Work Phone Mobile Phone    Miley Singh (Mother) [phone] -- [phone]    Flaco Redman (Significant Other) -- -- [phone]              Vital Signs (last 2 days)       Date/Time Temp Temp src Pulse Resp BP Patient " Position SpO2    07/10/24 0900 97 (36.1) Oral 83 16 130/60 Sitting 97    07/10/24 0617 97.5 (36.4) Oral 67 16 97/54 Lying 98    07/10/24 0144 98.6 (37) Oral 68 16 101/66 Lying 97    07/09/24 2119 98.1 (36.7) Oral 63 16 93/55 Lying 98    07/09/24 1700 97.8 (36.6) Oral 76 16 98/62 Lying 96    07/09/24 1420 97.7 (36.5) Oral 76 16 104/64 Lying 100    07/09/24 0925 98.3 (36.8) Oral 73 16 105/66 Lying 98    07/09/24 0557 97.4 (36.3) Oral 67 16 117/65 Lying 100    07/09/24 0111 97.4 (36.3) Oral 66 16 108/69 Lying 98    07/08/24 2132 97.8 (36.6) Oral 67 16 97/56 Lying 96    07/08/24 1728 99.1 (37.3) Oral 69 16 113/67 Lying 97    07/08/24 1325 97.6 (36.4) Oral 66 16 98/60 Lying 97    07/08/24 1251 -- -- -- 16 -- -- --    07/08/24 0922 98.1 (36.7) Oral 83 16 120/65 Lying 97    07/08/24 0616 97.7 (36.5) Oral 73 16 106/61 Lying 97          Oxygen Therapy (last 2 days)       Date/Time SpO2 Device (Oxygen Therapy) Flow (L/min) Oxygen Concentration (%) ETCO2 (mmHg)    07/10/24 0900 97 room air -- -- --    07/10/24 0617 98 room air -- -- --    07/10/24 0144 97 room air -- -- --    07/09/24 2119 98 room air -- -- --    07/09/24 2045 -- room air -- -- --    07/09/24 1700 96 room air -- -- --    07/09/24 1420 100 room air -- -- --    07/09/24 0925 98 room air -- -- --    07/09/24 0557 100 room air -- -- --    07/09/24 0111 98 room air -- -- --    07/08/24 2228 -- room air -- -- --    07/08/24 2132 96 room air -- -- --    07/08/24 1728 97 room air -- -- --    07/08/24 1325 97 room air -- -- --    07/08/24 0922 97 room air -- -- --    07/08/24 0850 -- room air -- -- --    07/08/24 0616 97 room air -- -- --          Lines, Drains & Airways       Active LDAs       Name Placement date Placement time Site Days    Peripheral IV 07/09/24 1141 Left;Posterior Hand 07/09/24  1141  Hand  1    Gastrostomy/Enterostomy Percutaneous endoscopic gastrostomy (PEG) 20 Fr. LUQ 02/15/24  0854  LUQ  146    Gastrostomy/Enterostomy Jejunostomy 1 16 Fr. LUQ  "02/19/24  1050  LUQ  142    Gastrostomy/Enterostomy Percutaneous endoscopic gastrostomy (PEG) LUQ 07/05/24  --  LUQ  5    Single Lumen Implantable Port Right Subclavian --  --  Subclavian  --                      Orders (last 24 hrs)        Start     Ordered    07/11/24 0900  Check Fentanyl Patch Placement  Every 12 Hours Scheduled        Placed in \"And\" Linked Group    07/10/24 0719    07/10/24 1800  Adult Central 2-in-1 TPN  Continuous TPN        Note to Pharmacy: TPN #4    07/10/24 1051    07/10/24 0815  fentaNYL (DURAGESIC) 25 MCG/HR patch 1 patch  Every 72 Hours        Placed in \"And\" Linked Group    07/10/24 0719    07/10/24 0618  POC Glucose Once  PROCEDURE ONCE        Comments: Complete no more than 45 minutes prior to patient eating      07/10/24 0616    07/10/24 0600  Calcium  Morning Draw,   Status:  Canceled         07/09/24 0753    07/10/24 0600  CBC & Differential  Morning Draw         07/09/24 0907    07/10/24 0600  CBC (No Diff)  Morning Draw,   Status:  Canceled         07/09/24 1238    07/10/24 0600  Manual Differential  PROCEDURE ONCE         07/09/24 2201    07/10/24 0600  CBC Auto Differential  PROCEDURE ONCE         07/09/24 2201    07/10/24 0023  POC Glucose Once  PROCEDURE ONCE        Comments: Complete no more than 45 minutes prior to patient eating      07/10/24 0021    07/10/24 0000  Magnesium  Morning Draw,   Status:  Canceled         07/09/24 0753    07/09/24 1953  Potassium  Timed,   Status:  Canceled         07/09/24 0753    07/09/24 1800  Adult Central 2-in-1 TPN  Continuous TPN        Note to Pharmacy: TPN #3    07/09/24 1105    07/09/24 1200  POC Glucose Finger Q6H  Every 6 Hours      Comments: Complete no more than 45 minutes prior to patient eating      07/09/24 0843    07/09/24 0700  HYDROmorphone (DILAUDID) PCA 0.2 mg/ml 50 mL syringe  Continuous,   Status:  Discontinued         07/09/24 0608    07/09/24 0608  naloxone (NARCAN) injection 0.1 mg  Every 5 Minutes PRN         " 07/09/24 0608    07/09/24 0606  Pharmacy Consult  Continuous PRN,   Status:  Discontinued         07/09/24 0608    07/08/24 1800  Adult Central 2-in-1 TPN  Continuous TPN        Note to Pharmacy: TPN #3    07/08/24 1214    07/08/24 1225  HYDROmorphone (DILAUDID) injection 1 mg  Every 2 Hours PRN         07/08/24 1225    07/07/24 0600  Magnesium  Daily       07/06/24 1305    07/07/24 0600  Phosphorus  Daily       07/06/24 1305    07/07/24 0600  Comprehensive Metabolic Panel  Daily       07/06/24 1340    07/06/24 1800  Fat Emulsion Plant Based (INTRALIPID,LIPOSYN) 20 % infusion 40 g  Every 24 Hours Scheduled (TPN)         07/06/24 1335    07/06/24 1800  Enoxaparin Sodium (LOVENOX) syringe 40 mg  Every 24 Hours         07/06/24 1559    07/06/24 1244  Pharmacy to Dose TPN  Continuous PRN         07/06/24 1245    07/06/24 0814  diphenhydrAMINE (BENADRYL) injection 50 mg  Every 6 Hours PRN         07/06/24 0815    07/06/24 0600  Clear & Record PCA Settings  2x Daily PCA       07/05/24 1818    07/05/24 1818  naloxone (NARCAN) injection 0.1 mg  Every 5 Minutes PRN         07/05/24 1818    07/05/24 1818  sodium chloride 0.9 % infusion  Continuous PRN         07/05/24 1818    07/05/24 0823  Daily CHG Bath While Central Line in Place  Daily       07/05/24 0822    07/04/24 1103  promethazine (PHENERGAN) 12.5 mg in sodium chloride 0.9 % 50 mL  Every 6 Hours PRN         07/04/24 1105    07/04/24 0248  ondansetron (ZOFRAN) injection 4 mg  Every 6 Hours PRN         07/04/24 0249    07/03/24 2115  pantoprazole (PROTONIX) injection 40 mg  Every 12 Hours Scheduled         07/03/24 2023    07/03/24 2100  sodium chloride 0.9 % flush 10 mL  Every 12 Hours Scheduled         07/03/24 1502    07/03/24 2100  hydroxychloroquine (PLAQUENIL) tablet 200 mg  2 Times Daily         07/03/24 1627    07/03/24 1800  Oral Care  2 Times Daily       07/03/24 1502    07/03/24 1800  dicyclomine (BENTYL) capsule 10 mg  4 Times Daily         07/03/24 1627     "07/03/24 1643  escitalopram (LEXAPRO) tablet 5 mg  Daily         07/03/24 1627    07/03/24 1643  gabapentin (NEURONTIN) capsule 200 mg  3 Times Daily         07/03/24 1627    07/03/24 1600  Vital Signs  Every 4 Hours       07/03/24 1502    07/03/24 1500  Intake & Output  Every Shift       07/03/24 1502    07/03/24 1459  sodium chloride 0.9 % flush 10 mL  As Needed         07/03/24 1502    07/03/24 1459  sodium chloride 0.9 % infusion 40 mL  As Needed         07/03/24 1502    07/03/24 1459  sennosides-docusate (PERICOLACE) 8.6-50 MG per tablet 2 tablet  2 Times Daily PRN        Placed in \"And\" Linked Group    07/03/24 1502    07/03/24 1459  polyethylene glycol (MIRALAX) packet 17 g  Daily PRN        Placed in \"And\" Linked Group    07/03/24 1502    07/03/24 1459  bisacodyl (DULCOLAX) EC tablet 5 mg  Daily PRN        Placed in \"And\" Linked Group    07/03/24 1502    07/03/24 1459  bisacodyl (DULCOLAX) suppository 10 mg  Daily PRN        Placed in \"And\" Linked Group    07/03/24 1502    07/03/24 1459  Potassium Replacement - Follow Nurse / BPA Driven Protocol  As Needed         07/03/24 1502    07/03/24 1459  Magnesium Standard Dose Replacement - Follow Nurse / BPA Driven Protocol  As Needed         07/03/24 1502    07/03/24 1459  Phosphorus Replacement - Follow Nurse / BPA Driven Protocol  As Needed         07/03/24 1502    07/03/24 1459  Calcium Replacement - Follow Nurse / BPA Driven Protocol  As Needed         07/03/24 1502    07/03/24 0937  sodium chloride 0.9 % flush 10 mL  As Needed        Placed in \"And\" Linked Group    07/03/24 0937    Unscheduled  Oxygen Therapy- Nasal Cannula; Titrate 1-6 LPM Per SpO2; 90 - 95%  Continuous PRN       07/05/24 1628    Unscheduled  Wound Care  As Needed       07/07/24 1215    Signed and Held  sodium chloride 0.9 % flush 3 mL  Every 12 Hours Scheduled,   Status:  Canceled         Signed and Held    Signed and Held  sodium chloride 0.9 % flush 3-10 mL  As Needed,   Status:  Canceled "         Signed and Held    Signed and Held  lidocaine (XYLOCAINE) 1 % injection 0.5 mL  Once As Needed,   Status:  Canceled         Signed and Held    Signed and Held  lactated ringers infusion  Continuous,   Status:  Canceled         Signed and Held    Signed and Held  fentaNYL citrate (PF) (SUBLIMAZE) injection 50 mcg  Once As Needed,   Status:  Canceled         Signed and Held    Signed and Held  midazolam (VERSED) injection 1 mg  Every 5 Minutes PRN,   Status:  Canceled         Signed and Held    Signed and Held  famotidine (PEPCID) injection 20 mg  Once,   Status:  Canceled         Signed and Held                        Madhu Zabala Jr., MD   Physician  Surgery     Progress Notes     Signed     Date of Service: 07/09/24 0902  Creation Time: 07/09/24 0902     Signed         Chief Complaint:     S/P Laparoscopic assisted small bowel resection and PEG, POD 4     Subjective:     The patient is experiencing better relief of her expected postop abdominal pain.  She is not ready to wean the Dilaudid at this time.  Her nausea has improved.     Objective:     Temp:  [97.4 °F (36.3 °C)-99.1 °F (37.3 °C)] 97.4 °F (36.3 °C)  Heart Rate:  [66-83] 67  Resp:  [16] 16  BP: ()/(56-69) 117/65     Physical Exam  Constitutional:       Appearance: She is ill-appearing. She is not toxic-appearing.   Abdominal:      Palpations: Abdomen is soft.      Tenderness: There is generalized abdominal tenderness (Appropriate postop tenderness).   Neurological:      Mental Status: She is alert.   Psychiatric:         Behavior: Behavior is cooperative.         BMI is within normal parameters. No other follow-up for BMI required.           Results:     BUN is 16 creatinine 0.30.  Albumin is 2.4.     Impression/Plan:     The patient is POD 4 from laparoscopic assisted small bowel resection and PEG.  She is recovering well but continues to have postop abdominal pain that is difficult to manage.  She seems more stable today.  We will  continue the present management with plans to start to wean tomorrow.     Madhu Zabala Jr., M.D.                     Aureliano Melchor MD   Physician  Hospitalist     Progress Notes     Signed     Date of Service: 24  Creation Time: 24     Signed       Expand All Collapse All[]Expand All by Default         Name: Henrietta Garrett ADMIT: 7/3/2024   : 1995  PCP: Kenya David MD    MRN: [id number] LOS: 6 days   AGE/SEX: 29 y.o. female  ROOM: [facility]      Subjective      She states that the increase in Dilaudid and helped out with her pain.  She still very uncomfortable.     Objective      Objective  Vital Signs  Temp:  [97.4 °F (36.3 °C)-99.1 °F (37.3 °C)] 98.3 °F (36.8 °C)  Heart Rate:  [66-73] 73  Resp:  [16] 16  BP: ()/(56-69) 105/66  SpO2:  [96 %-100 %] 98 %  on   ;   Device (Oxygen Therapy): room air  Body mass index is 23.71 kg/m².  Physical Exam  Constitutional:       General: She is not in acute distress.     Appearance: She is ill-appearing.   HENT:      Head: Normocephalic and atraumatic.   Pulmonary:      Effort: Pulmonary effort is normal. No respiratory distress.   Neurological:      Mental Status: She is alert and oriented to person, place, and time.            Results Review     I reviewed the patient's new clinical results.          Results from last 7 days   Lab Units 24  0508 24  0453 24  0436 24  0353   WBC 10*3/mm3 4.85 3.86 2.83* 3.03*   HEMOGLOBIN g/dL 8.5* 9.1* 8.3* 8.3*   PLATELETS 10*3/mm3 266 242 211 220              Results from last 7 days   Lab Units 24  0933 24  0542 24  0543 24  0508   SODIUM mmol/L 135* 141 134* 137   POTASSIUM mmol/L 3.9 2.7* 3.9 3.8   CHLORIDE mmol/L 100 115* 101 106   CO2 mmol/L 27.0 17.9* 24.3 26.0   BUN mg/dL 9 6 6 8   CREATININE mg/dL 0.53* 0.30* 0.55* 0.51*   GLUCOSE mg/dL 104* 81 107* 104*   Estimated Creatinine Clearance: 190.6 mL/min (A) (by C-G formula based on SCr of 0.53 mg/dL  (L)).          Results from last 7 days   Lab Units 07/09/24  0933 07/09/24  0542 07/08/24  0543 07/07/24  0508   ALBUMIN g/dL 3.6 2.4* 3.3* 3.4*   BILIRUBIN mg/dL <0.2 <0.2 <0.2 0.2   ALK PHOS U/L 45 32* 51 52   AST (SGOT) U/L 8 7 9 14   ALT (SGPT) U/L 12 8 14 18               Results from last 7 days   Lab Units 07/09/24  0933 07/09/24  0542 07/08/24  0543 07/07/24  0508 07/06/24  0453   CALCIUM mg/dL 9.2 6.0* 8.4* 9.0 8.9   ALBUMIN g/dL 3.6 2.4* 3.3* 3.4*  --    MAGNESIUM mg/dL 1.7 1.3* 1.7 1.9 2.0   PHOSPHORUS mg/dL  --  3.0 3.5 2.3* 3.8            Results from last 7 days   Lab Units 07/03/24  1015 07/03/24  1004   PROCALCITONIN ng/mL  --  <0.02   LACTATE mmol/L 1.6  --             COVID19   Date Value Ref Range Status   07/22/2022 Not Detected Not Detected - Ref. Range Final            SARS-CoV-2, DANIELLA   Date Value Ref Range Status   08/08/2022 NEGATIVE Negative Final            Glucose   Date/Time Value Ref Range Status   07/08/2024 0647 106 70 - 130 mg/dL Final   07/08/2024 0031 76 70 - 130 mg/dL Final            Results for orders placed or performed during the hospital encounter of 07/03/24   Blood Culture - Blood, Arm, Right     Specimen: Arm, Right; Blood   Result Value Ref Range     Blood Culture No growth at 5 days              XR Chest 1 View  Narrative: XR CHEST 1 VW-7/9/2024     HISTORY: Port placement.     Right subclavian Mediport catheter is seen with its tip overlying the  superior cavoatrial junction. No pneumothorax is seen.     Lungs appear clear. Heart size is within normal limits.     Impression: 1. Mediport catheter placement as described.  2. No pneumothorax is seen.        This report was finalized on 7/9/2024 11:25 AM by Dr. Javier Plasencia M.D on Workstation: [id number]        Scheduled Medications    Scheduled Medication   dicyclomine, 10 mg, Oral, 4x Daily  enoxaparin, 40 mg, Subcutaneous, Q24H  escitalopram, 5 mg, Per J Tube, Daily  Fat Emulsion Plant Based, 200 mL, Intravenous,  Q24H (TPN)  gabapentin, 200 mg, Oral, TID  hydroxychloroquine, 200 mg, Oral, BID  pantoprazole, 40 mg, Intravenous, Q12H  sodium chloride, 10 mL, Intravenous, Q12H      Infusions    Infusion Medications   Adult Central 2-in-1 TPN, , Last Rate: 65 mL/hr at 07/08/24 1840  Adult Central 2-in-1 TPN,   HYDROmorphone HCl-NaCl,   Pharmacy Consult,   Pharmacy to Dose TPN,   sodium chloride, 30 mL/hr      Diet  NPO Diet NPO Type: Strict NPO  Adult Central 2-in-1 TPN  Adult Central 2-in-1 TPN           Assessment/Plan            Active Hospital Problems     Diagnosis   POA    **Abdominal wall cellulitis [L03.311]   Yes    Lupus erythematosus overlap syndrome [M32.8]   Yes    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         29 y.o. female admitted with Abdominal wall cellulitis.     Abdominal wall cellulitis  Abdominal pain  Started on zosyn. Continue, follow cultures (negative).    Blood cultures NGTD  On Dilaudid PCA per general surgery.  I also added as needed Dilaudid.     Gastroparesis   On TPN     Dislodged G-tube  S/p revision 7/5/24     Lupus  Resume Plaquenil     Hypertension  Dc amlodipine. BP on the lower side      Iron deficiency anemia  She takes an iron supplement at home however she is currently NPO        Lovenox for DVT prophylaxis.  Full code.  Discussed with patient.  Anticipate discharge to be determined         Aureliano Melchor MD  [facility]  07/09/24  12:32 EDT                          Nicci Cobos, PT   Physical Therapist  Physical Therapy     Plan of Care     Signed     Date of Service: 07/09/24 1330  Creation Time: 07/09/24 1330     Signed         Goal Outcome Evaluation:  Plan of Care Reviewed With: patient  Outcome Evaluation: Pt seen for PT this am. SHe is sitting up on EOB w nsg assistant upon entry to room. Pt c/o abdominal pain at  8/10.  She is able to stand w CGA and Rwx. She ambulated approx 80 ft exhibiting slow pace using step to gait pattern. Limited by pain, fatigue, and weakness. Pt back to bed at end of session and able to return w just SBA. Pt slowly increasing activity at this time. Will continue to progress as tolerated.                              All medication doses during the admission are shown, including meds that are no longer on order.  Scheduled Meds Sorted by Name  for Henrietta Garrett as of 7/8/24 through 7/10/24    1 Day 3 Days 7 Days 10 Days < Today >   Legend:       Medications 07/08/24 07/09/24 07/10/24   amLODIPine (NORVASC) tablet 10 mg  Dose: 10 mg  Freq: Daily Route: PO  Start: 07/03/24 1643 End: 07/08/24 1336   Admin Instructions:       1247   1336-D/C'd          calcium gluconate 1000 Mg/50ml 0.675% NaCl IV SOLN  Dose: 1,000 mg  Freq: Every 1 Hour Route: IV  Start: 07/09/24 0845 End: 07/09/24 0944     0944-D/C'd  (1056) [C]           Followed by   calcium gluconate 2000-675 MG/100ML NACL IVPB  Dose: 2,000 mg  Freq: Every 2 Hours Route: IV  Start: 07/09/24 0945 End: 07/09/24 1056        1056-D/C'd         fentaNYL (DURAGESIC) 25 MCG/HR patch 1 patch  Dose: 1 patch  Freq: Every 72 Hours Route: TD  Start: 07/10/24 0815 End: 07/16/24 0814   Admin Instructions:         0856          And   Check Fentanyl Patch Placement  Dose: 1 each  Freq: Every 12 Hours Scheduled Route: XX  Start: 07/11/24 0900         dicyclomine (BENTYL) capsule 10 mg  Dose: 10 mg  Freq: 4 Times Daily Route: PO  Start: 07/03/24 1800    0904   1247   1906     2238        1017   1322   1844     2046        0856   1227   1800     2100          Enoxaparin Sodium (LOVENOX) syringe 40 mg  Dose: 40 mg  Freq: Every 24 Hours Route: SC  Indications of Use: PROPHYLAXIS OF VENOUS THROMBOEMBOLISM  Start: 07/06/24 1800   Admin Instructions:       1906        1850        1800          escitalopram (LEXAPRO) tablet 5 mg  Dose: 5 mg  Freq: Daily Route: PER J  TUBE  Start: 07/03/24 1643   Admin Instructions:       1247        1017        1227          Fat Emulsion Plant Based (INTRALIPID,LIPOSYN) 20 % infusion 40 g  Dose: 200 mL  Freq: Every 24 Hours Scheduled (TPN) Route: IV  Last Dose: 40 g (07/09/24 1852)  Start: 07/06/24 1800   Admin Instructions:       0044 [C]   1840       1852        1800          gabapentin (NEURONTIN) capsule 200 mg  Dose: 200 mg  Freq: 3 Times Daily Route: PO  Start: 07/03/24 1643   Admin Instructions:       0904   1646   2238      1017   1651   2046      0856   1600   2100        HYDROmorphone (DILAUDID) injection 0.5 mg  Dose: 0.5 mg  Freq: Once Route: IV  Start: 07/04/24 0345 End: 07/04/24 0313   Admin Instructions:            HYDROmorphone (DILAUDID) injection 0.5 mg  Dose: 0.5 mg  Freq: Once Route: IV  Start: 07/03/24 1307 End: 07/03/24 1301   Admin Instructions:            HYDROmorphone (DILAUDID) injection 0.5 mg  Dose: 0.5 mg  Freq: Once Route: IV  Start: 07/03/24 0953 End: 07/03/24 1013   Admin Instructions:            hydroxychloroquine (PLAQUENIL) tablet 200 mg  Dose: 200 mg  Freq: 2 Times Daily Route: PO  Indications of Use: SYSTEMIC LUPUS ERYTHEMATOSUS  Start: 07/03/24 2100   Admin Instructions:       0905   2239       1017   2046       0856   2100         magnesium sulfate 2g/50 mL (PREMIX) infusion  Dose: 2 g  Freq: Every 2 Hours Route: IV  Start: 07/09/24 0845 End: 07/09/24 1141     1141-D/C'd  (1150) [C]   1141-D/C'd            ondansetron (ZOFRAN) injection 4 mg  Dose: 4 mg  Freq: Once Route: IV  Start: 07/03/24 0953 End: 07/03/24 1012   Admin Instructions:            pantoprazole (PROTONIX) EC tablet 40 mg  Dose: 40 mg  Freq: 2 Times Daily Route: PO  Start: 07/03/24 2100 End: 07/03/24 2022   Admin Instructions:            pantoprazole (PROTONIX) injection 40 mg  Dose: 40 mg  Freq: Every 12 Hours Scheduled Route: IV  Indications Comment: gastroparesis  Start: 07/03/24 2115   Admin Instructions:       0904   2238       1021    2046       0856   2100         piperacillin-tazobactam (ZOSYN) 3.375 g IVPB in 100 mL NS MBP (CD)  Dose: 3.375 g  Freq: Every 8 Hours Route: IV  Indications of Use: SKIN AND SOFT TISSUE INFECTION  Last Dose: 0 g (07/04/24 0856)  Start: 07/03/24 2030 End: 07/08/24 0125         piperacillin-tazobactam (ZOSYN) 3.375 g IVPB in 100 mL NS MBP (CD)  Dose: 3.375 g  Freq: Once Route: IV  Indications of Use: INTRA-ABDOMINAL INFECTION  Last Dose: Stopped (07/03/24 1457)  Start: 07/03/24 1323 End: 07/03/24 1457         potassium chloride 10 mEq in 100 mL IVPB  Dose: 10 mEq  Freq: Every 1 Hour Route: IV  Start: 07/09/24 0845 End: 07/09/24 1056   Admin Instructions:        (1056) [C]   1056-D/C'd     1056-D/C'd           potassium chloride 10 mEq in 100 mL IVPB  Dose: 10 mEq  Freq: Every 1 Hour Route: IV  Last Dose: 10 mEq (07/05/24 1142)  Start: 07/05/24 0900 End: 07/05/24 1259   Admin Instructions:            sodium chloride 0.9 % bolus 500 mL  Dose: 500 mL  Freq: Once Route: IV  Last Dose: Stopped (07/03/24 1226)  Start: 07/03/24 0953 End: 07/03/24 1226         sodium chloride 0.9 % flush 10 mL  Dose: 10 mL  Freq: Every 12 Hours Scheduled Route: IV  Start: 07/03/24 2100 0905 2239 1018   2047       0857   2100                     Continuous Meds Sorted by Name  for Henrietta Garrett as of 7/8/24 through 7/10/24  Legend:       Medications 07/08/24 07/09/24 07/10/24   Adult Central 2-in-1 TPN  Rate: 65 mL/hr  Freq: Continuous TPN Route: IV  Start: 07/10/24 1800 End: 07/11/24 1759   Admin Instructions:      Order specific questions:         1800          Adult Central 2-in-1 TPN  Rate: 65 mL/hr  Freq: Continuous TPN Route: IV  Start: 07/09/24 1800 End: 07/10/24 1850   Admin Instructions:      Order specific questions:        1851 1850-D/C'd        Adult Central 2-in-1 TPN  Rate: 65 mL/hr  Freq: Continuous TPN Route: IV  Last Dose: Stopped (07/09/24 1842)  Start: 07/08/24 1800 End: 07/09/24 1839   Admin  Instructions:      Order specific questions:       1840        1839-D/C'd  1842           Adult Central 2-in-1 TPN  Rate: 65 mL/hr  Freq: Continuous TPN Route: IV  Start: 07/07/24 1800 End: 07/08/24 1811   Admin Instructions:      Order specific questions:       1811-D/C'd          Adult Central 2-in-1 TPN  Rate: 65 mL/hr  Freq: Continuous TPN Route: IV  Start: 07/06/24 1800 End: 07/07/24 1717   Admin Instructions:      Order specific questions:            HYDROmorphone (DILAUDID) PCA 0.2 mg/ml 50 mL syringe  Nurse Loading Dose: 0 mg  Patient Bolus Dose: 0.2 mg  Lockout Interval: 8 Minutes  Basal Rate: 0.2 mg/hr  One Hour Dose Limit: 2 mg  Freq: Continuous Route: IV  Last Dose: Stopped (07/10/24 0914)  Start: 07/09/24 0700 End: 07/10/24 0719   Admin Instructions:        0734   0822   1925     2328        0719-D/C'd  0744   0719-D/C'd  0914         HYDROmorphone (DILAUDID) PCA 0.2 mg/ml 50 mL syringe  Nurse Loading Dose: 0 mg  Patient Bolus Dose: 0.2 mg  Lockout Interval: 8 Minutes  Basal Rate: 0.2 mg/hr  One Hour Dose Limit: 2 mg  Freq: Continuous Route: IV  Start: 07/08/24 1015 End: 07/08/24 1805   Admin Instructions:       1130   1805-D/C'd  2017           HYDROmorphone (DILAUDID) PCA 0.2 mg/ml 50 mL syringe  Nurse Loading Dose: 0 mg  Patient Bolus Dose: 0.2 mg  Lockout Interval: 8 Minutes  Basal Rate: 0.1 mg/hr  One Hour Dose Limit: 2 mg  Freq: Continuous Route: IV  Start: 07/06/24 1345 End: 07/08/24 0922   Admin Instructions:       0755   0922-D/C'd         HYDROmorphone (DILAUDID) PCA 0.2 mg/ml 50 mL syringe  Nurse Loading Dose: 0 mg  Patient Bolus Dose: 0.2 mg  Lockout Interval: 8 Minutes  Basal Rate: 0 mg/hr  One Hour Dose Limit: 2 mg  Freq: Continuous Route: IV  Start: 07/05/24 1644 End: 07/06/24 1245   Admin Instructions:            Pharmacy Consult  Freq: Continuous PRN Route: XX  PRN Reason: Consult  Start: 07/09/24 0606 End: 07/10/24 0929   Admin Instructions:      Order specific questions:          0929-D/C'd        Pharmacy Consult  Freq: Continuous PRN Route: XX  PRN Reason: Consult  Start: 07/05/24 1818 End: 07/08/24 1226   Admin Instructions:      Order specific questions:       1226-D/C'd          Pharmacy to Dose TPN  Freq: Continuous PRN Route: XX  PRN Reason: Consult  Start: 07/06/24 1244   Order specific questions:            Pharmacy to Dose Zosyn  Freq: Continuous PRN Route: XX  PRN Reason: Consult  Indications of Use: SKIN AND SOFT TISSUE INFECTION  Start: 07/03/24 1500 End: 07/04/24 0827         sodium chloride 0.9 % infusion  Rate: 30 mL/hr Dose: 30 mL/hr  Freq: Continuous PRN Route: IV  PRN Comment: To maintain IV patency while on PCA pump if no other IV fluid present  Start: 07/05/24 1818         sodium chloride 0.9 % infusion  Rate: 125 mL/hr Dose: 125 mL/hr  Freq: Continuous Route: IV  Last Dose: 125 mL/hr (07/06/24 0737)  Start: 07/03/24 0953 End: 07/06/24 1800   Admin Instructions:                        PRN Meds Sorted by Name  for Henrietta Garrett BEENA as of 7/8/24 through 7/10/24  Legend:       Medications 07/08/24 07/09/24 07/10/24    sennosides-docusate (PERICOLACE) 8.6-50 MG per tablet 2 tablet  Dose: 2 tablet  Freq: 2 Times Daily PRN Route: PO  PRN Reason: Constipation  Start: 07/03/24 1459   Admin Instructions:            And   polyethylene glycol (MIRALAX) packet 17 g  Dose: 17 g  Freq: Daily PRN Route: PO  PRN Reason: Constipation  PRN Comment: Use if senna-docusate is ineffective  Start: 07/03/24 1459   Admin Instructions:            And   bisacodyl (DULCOLAX) EC tablet 5 mg  Dose: 5 mg  Freq: Daily PRN Route: PO  PRN Reason: Constipation  PRN Comment: Use if polyethylene glycol is ineffective  Start: 07/03/24 1459   Admin Instructions:            And   bisacodyl (DULCOLAX) suppository 10 mg  Dose: 10 mg  Freq: Daily PRN Route: RE  PRN Reason: Constipation  PRN Comment: Use if bisacodyl oral is ineffective  Start: 07/03/24 145   Admin Instructions:            bupivacaine liposome  (EXPAREL) 1.3 % injection  Freq: As Needed  Start: 07/05/24 1459 End: 07/05/24 1631         bupivacaine-EPINEPHrine PF (MARCAINE w/EPI) 0.5% [id number] injection  Freq: As Needed  Start: 07/05/24 1458 End: 07/05/24 1631         Calcium Replacement - Follow Nurse / BPA Driven Protocol  Freq: As Needed Route: XX  PRN Reason: Other  Start: 07/03/24 1459   Admin Instructions:            diphenhydrAMINE (BENADRYL) injection 12.5 mg  Dose: 12.5 mg  Freq: Every 15 Minutes PRN Route: IV  PRN Reason: Itching  PRN Comment: May repeat x 1  Start: 07/05/24 1628 End: 07/05/24 1813   Admin Instructions:            diphenhydrAMINE (BENADRYL) injection 50 mg  Dose: 50 mg  Freq: Every 6 Hours PRN Route: IV  PRN Reason: Allergies  Start: 07/06/24 0814   Admin Instructions:       0050   1023   1906      0614   1200   1844      0020          ePHEDrine injection 5 mg  Dose: 5 mg  Freq: Once As Needed Route: IV  PRN Comment: symptomatic hypotension - Notify attending anesthesiologist if this needs to be given  Start: 07/05/24 1628 End: 07/05/24 1813   Admin Instructions:            fentaNYL citrate (PF) (SUBLIMAZE) injection 50 mcg  Dose: 50 mcg  Freq: Every 5 Minutes PRN Route: IV  PRN Reason: Severe Pain  Start: 07/05/24 1628 End: 07/05/24 1813   Admin Instructions:            flumazenil (ROMAZICON) injection 0.2 mg  Dose: 0.2 mg  Freq: As Needed Route: IV  PRN Comment: for benzodiazepine induced unresponsiveness or sedation  Indications of Use: BENZODIAZEPINE-INDUCED SEDATION  Start: 07/05/24 1628 End: 07/05/24 1813   Admin Instructions:            hydrALAZINE (APRESOLINE) injection 5 mg  Dose: 5 mg  Freq: Every 10 Minutes PRN Route: IV  PRN Reason: High Blood Pressure  PRN Comment: for systolic blood pressure greater than 180 mmHg or diastolic blood pressure greater than 105 mmHg  Start: 07/05/24 1628 End: 07/05/24 1813   Admin Instructions:            HYDROcodone-acetaminophen (NORCO) 5-325 MG per tablet 1 tablet  Dose: 1  tablet  Freq: Once As Needed Route: PO  PRN Reason: Moderate Pain  Start: 07/05/24 1628 End: 07/05/24 1813   Admin Instructions:            HYDROcodone-acetaminophen (NORCO) 7.5-325 MG per tablet 1 tablet  Dose: 1 tablet  Freq: Every 6 Hours PRN Route: PO  PRN Reason: Moderate Pain  Start: 07/03/24 1626 End: 07/04/24 0828   Admin Instructions:            HYDROmorphone (DILAUDID) injection 0.5 mg  Dose: 0.5 mg  Freq: Every 2 Hours PRN Route: IV  PRN Reason: Severe Pain  Start: 07/08/24 0916 End: 07/08/24 0917   Admin Instructions:       0917-D/C'd          HYDROmorphone (DILAUDID) injection 0.5 mg  Dose: 0.5 mg  Freq: Every 5 Minutes PRN Route: IV  PRN Reason: Moderate Pain  Start: 07/05/24 1628 End: 07/05/24 1813   Admin Instructions:            HYDROmorphone (DILAUDID) injection 0.5 mg  Dose: 0.5 mg  Freq: Every 2 Hours PRN Route: IV  PRN Reason: Severe Pain  Start: 07/04/24 0828 End: 07/05/24 1818   Admin Instructions:            HYDROmorphone (DILAUDID) injection 0.5 mg  Dose: 0.5 mg  Freq: Once As Needed Route: IV  PRN Reason: Severe Pain  Start: 07/03/24 2019 End: 07/03/24 2047   Admin Instructions:            HYDROmorphone (DILAUDID) injection 1 mg  Dose: 1 mg  Freq: Every 2 Hours PRN Route: IV  PRN Reason: Severe Pain  Start: 07/08/24 1225 End: 07/13/24 0915   Admin Instructions:       2239        0114   0543       1006          HYDROmorphone (DILAUDID) injection 1 mg  Dose: 1 mg  Freq: Every 2 Hours PRN Route: IV  PRN Reason: Severe Pain  PRN Comment: breakthrough pain  Start: 07/08/24 1224 End: 07/08/24 1225   Admin Instructions:       1225-D/C'd          HYDROmorphone (DILAUDID) injection 1 mg  Dose: 1 mg  Freq: Every 2 Hours PRN Route: IV  PRN Reason: Severe Pain  Start: 07/08/24 0917 End: 07/08/24 1224   Admin Instructions:       1224-D/C'd          HYDROmorphone (DILAUDID) injection 1 mg  Dose: 1 mg  Freq: Every 2 Hours PRN Route: IV  PRN Reason: Severe Pain  Start: 07/05/24 1818 End: 07/05/24 1838    Admin Instructions:            ipratropium-albuterol (DUO-NEB) nebulizer solution 3 mL  Dose: 3 mL  Freq: Once As Needed Route: NEBULIZATION  PRN Reasons: Wheezing,Shortness of Air  PRN Comment: bronchospasm  Start: 07/05/24 1628 End: 07/05/24 1813   Admin Instructions:            labetalol (NORMODYNE,TRANDATE) injection 5 mg  Dose: 5 mg  Freq: Every 5 Minutes PRN Route: IV  PRN Reason: High Blood Pressure  PRN Comment: for systolic blood pressure greater than 180 mmHg or diastolic blood pressure greater than 105 mmHg  Start: 07/05/24 1628 End: 07/05/24 1813   Admin Instructions:            Magnesium Standard Dose Replacement - Follow Nurse / BPA Driven Protocol  Freq: As Needed Route: XX  PRN Reason: Other  Start: 07/03/24 1459   Admin Instructions:            naloxone (NARCAN) injection 0.1 mg  Dose: 0.1 mg  Freq: Every 5 Minutes PRN Route: IV  PRN Reasons: Opioid Reversal,Respiratory Depression  PRN Comment: see administration instructions  Start: 07/09/24 0608   Admin Instructions:            naloxone (NARCAN) injection 0.1 mg  Dose: 0.1 mg  Freq: Every 5 Minutes PRN Route: IV  PRN Reasons: Opioid Reversal,Respiratory Depression  PRN Comment: see administration instructions  Start: 07/05/24 1818   Admin Instructions:            naloxone (NARCAN) injection 0.2 mg  Dose: 0.2 mg  Freq: As Needed Route: IV  PRN Reasons: Opioid Reversal,Respiratory Depression  PRN Comment: unresponsiveness, decrease oxygen saturation  Indications of Use: ACUTE RESPIRATORY FAILURE,OPIOID-INDUCED RESPIRATORY DEPRESSION  Start: 07/05/24 1628 End: 07/05/24 1813   Admin Instructions:            ondansetron (ZOFRAN) injection 4 mg  Dose: 4 mg  Freq: Once As Needed Route: IV  PRN Reasons: Nausea,Vomiting  Indications of Use: POSTOPERATIVE NAUSEA AND VOMITING  Start: 07/05/24 1628 End: 07/05/24 1813   Admin Instructions:            ondansetron (ZOFRAN) injection 4 mg  Dose: 4 mg  Freq: Every 6 Hours PRN Route: IV  PRN Reasons:  Nausea,Vomiting  Start: 07/04/24 0248   Admin Instructions:       1454        1331           oxyCODONE-acetaminophen (PERCOCET) 7.5-325 MG per tablet 1 tablet  Dose: 1 tablet  Freq: Every 4 Hours PRN Route: PO  PRN Reason: Severe Pain  Start: 07/05/24 1628 End: 07/05/24 1813   Admin Instructions:            Pharmacy Consult  Freq: Continuous PRN Route: XX  PRN Reason: Consult  Start: 07/09/24 0606 End: 07/10/24 0929   Admin Instructions:      Order specific questions:         0929-D/C'd        Pharmacy Consult  Freq: Continuous PRN Route: XX  PRN Reason: Consult  Start: 07/05/24 1818 End: 07/08/24 1226   Admin Instructions:      Order specific questions:       1226-D/C'd          Pharmacy to Dose TPN  Freq: Continuous PRN Route: XX  PRN Reason: Consult  Start: 07/06/24 1244   Order specific questions:            Pharmacy to Dose Zosyn  Freq: Continuous PRN Route: XX  PRN Reason: Consult  Indications of Use: SKIN AND SOFT TISSUE INFECTION  Start: 07/03/24 1500 End: 07/04/24 0827         Phosphorus Replacement - Follow Nurse / BPA Driven Protocol  Freq: As Needed Route: XX  PRN Reason: Other  Start: 07/03/24 1459   Admin Instructions:            Potassium Replacement - Follow Nurse / BPA Driven Protocol  Freq: As Needed Route: XX  PRN Reason: Other  Start: 07/03/24 1459   Admin Instructions:            promethazine (PHENERGAN) 12.5 mg in sodium chloride 0.9 % 50 mL  Dose: 12.5 mg  Freq: Every 6 Hours PRN Route: IV  PRN Reasons: Nausea,Vomiting  Start: 07/04/24 1103   Admin Instructions:      Order specific questions:            sodium chloride (NS) irrigation solution  Freq: As Needed  Start: 07/05/24 1459 End: 07/05/24 1631         sodium chloride 0.9 % flush 10 mL  Dose: 10 mL  Freq: As Needed Route: IV  PRN Reason: Line Care  Start: 07/03/24 1459          sodium chloride 0.9 % flush 10 mL  Dose: 10 mL  Freq: As Needed Route: IV  PRN Reason: Line Care  Start: 07/03/24 0937         sodium chloride 0.9 %  infusion  Rate: 30 mL/hr Dose: 30 mL/hr  Freq: Continuous PRN Route: IV  PRN Comment: To maintain IV patency while on PCA pump if no other IV fluid present  Start: 07/05/24 1818         sodium chloride 0.9 % infusion 40 mL  Dose: 40 mL  Freq: As Needed Route: IV  PRN Reason: Line Care  Start: 07/03/24 1459   Admin Instructions:            sterile water irrigation solution  Freq: As Needed  Start: 07/05/24 1534 End: 07/05/24 1631

## 2024-07-10 NOTE — PROGRESS NOTES
Name: Henrietta Garrett ADMIT: 7/3/2024   : 1995  PCP: Kenya David MD    MRN: [id number] LOS: 7 days   AGE/SEX: 29 y.o. female  ROOM: [facility]/     Subjective     Getting up with nursing staff to go from recliner to bed. Having a lot of pain. Fentanyl patch ordered per surgery placed this am to try and wean from IV pain meds.     Objective   Objective   Vital Signs  Temp:  [97 °F (36.1 °C)-98.6 °F (37 °C)] 97 °F (36.1 °C)  Heart Rate:  [63-83] 83  Resp:  [16] 16  BP: ()/(54-66) 130/60  SpO2:  [96 %-100 %] 97 %  on   ;   Device (Oxygen Therapy): room air  Body mass index is 24.91 kg/m².  Physical Exam  Constitutional:       General: She is not in acute distress.     Appearance: She is ill-appearing.   HENT:      Head: Normocephalic and atraumatic.   Pulmonary:      Effort: Pulmonary effort is normal. No respiratory distress.   Neurological:      Mental Status: She is alert and oriented to person, place, and time.         Results Review     I reviewed the patient's new clinical results.  Results from last 7 days   Lab Units 07/10/24  0545 24  0508 24  0453 24  0436   WBC 10*3/mm3 4.22 4.85 3.86 2.83*   HEMOGLOBIN g/dL 9.5* 8.5* 9.1* 8.3*   PLATELETS 10*3/mm3 286 266 242 211     Results from last 7 days   Lab Units 07/10/24  0545 24  0933 24  0542 24  0543   SODIUM mmol/L 135* 135* 141 134*   POTASSIUM mmol/L 4.0 3.9 2.7* 3.9   CHLORIDE mmol/L 100 100 115* 101   CO2 mmol/L 26.0 27.0 17.9* 24.3   BUN mg/dL 10 9 6 6   CREATININE mg/dL 0.52* 0.53* 0.30* 0.55*   GLUCOSE mg/dL 92 104* 81 107*   Estimated Creatinine Clearance: 204.1 mL/min (A) (by C-G formula based on SCr of 0.52 mg/dL (L)).  Results from last 7 days   Lab Units 07/10/24  0545 24  0933 24  0542 24  0543   ALBUMIN g/dL 3.6 3.6 2.4* 3.3*   BILIRUBIN mg/dL 0.2 <0.2 <0.2 <0.2   ALK PHOS U/L 45 45 32* 51   AST (SGOT) U/L 12 8 7 9   ALT (SGPT) U/L 11 12 8 14     Results from last 7 days   Lab  Units 07/10/24  0545 07/09/24  0933 07/09/24  0542 07/08/24  0543 07/07/24  0508   CALCIUM mg/dL 9.3 9.2 6.0* 8.4* 9.0   ALBUMIN g/dL 3.6 3.6 2.4* 3.3* 3.4*   MAGNESIUM mg/dL 1.9 1.7 1.3* 1.7 1.9   PHOSPHORUS mg/dL 4.5  --  3.0 3.5 2.3*           COVID19   Date Value Ref Range Status   07/22/2022 Not Detected Not Detected - Ref. Range Final     SARS-CoV-2, DANIELLA   Date Value Ref Range Status   08/08/2022 NEGATIVE Negative Final     Glucose   Date/Time Value Ref Range Status   07/10/2024 0616 100 70 - 130 mg/dL Final   07/10/2024 0021 107 70 - 130 mg/dL Final   07/08/2024 0647 106 70 - 130 mg/dL Final   07/08/2024 0031 76 70 - 130 mg/dL Final     Results for orders placed or performed during the hospital encounter of 07/03/24   Blood Culture - Blood, Arm, Right    Specimen: Arm, Right; Blood   Result Value Ref Range    Blood Culture No growth at 5 days          XR Chest 1 View  Narrative: XR CHEST 1 VW-7/9/2024     HISTORY: Port placement.     Right subclavian Mediport catheter is seen with its tip overlying the  superior cavoatrial junction. No pneumothorax is seen.     Lungs appear clear. Heart size is within normal limits.     Impression: 1. Mediport catheter placement as described.  2. No pneumothorax is seen.        This report was finalized on 7/9/2024 11:25 AM by Dr. Javier Plasencia M.D on Workstation: [id number]       Scheduled Medications  fentaNYL, 1 patch, Transdermal, Q72H   And  [START ON 7/11/2024] Check Fentanyl Patch Placement, 1 each, Does not apply, Q12H  dicyclomine, 10 mg, Oral, 4x Daily  enoxaparin, 40 mg, Subcutaneous, Q24H  escitalopram, 5 mg, Per J Tube, Daily  Fat Emulsion Plant Based, 200 mL, Intravenous, Q24H (TPN)  gabapentin, 200 mg, Oral, TID  hydroxychloroquine, 200 mg, Oral, BID  pantoprazole, 40 mg, Intravenous, Q12H  sodium chloride, 10 mL, Intravenous, Q12H    Infusions  Adult Central 2-in-1 TPN, , Last Rate: 65 mL/hr at 07/09/24 1851  Adult Central 2-in-1 TPN,   Pharmacy to Dose  TPN,   sodium chloride, 30 mL/hr    Diet  NPO Diet NPO Type: Strict NPO  Adult Central 2-in-1 TPN  Adult Central 2-in-1 TPN       Assessment/Plan     Active Hospital Problems    Diagnosis  POA    **Abdominal wall cellulitis [L03.311]  Yes    Lupus erythematosus overlap syndrome [M32.8]  Yes    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       29 y.o. female admitted with Abdominal wall cellulitis.    Abdominal wall cellulitis  Abdominal pain  Started on zosyn. Continue, follow cultures (negative).    Blood cultures NGTD  Pain meds per surgery- on fentanyl patch, dilaudid IV push prn; dilaudid PCA dcd this am when fentanyl started     Gastroparesis   On TPN    Dislodged G-tube  S/p revision 7/5/24     Lupus  Resume Plaquenil     Hypertension  Cont to hold amlodipine. BP on the lower side     Iron deficiency anemia  She takes an iron supplement at home however she is currently NPO      Lovenox for DVT prophylaxis.  Full code.  Discussed with patient and RN  Anticipate discharge to be determined       Annette Oliver MD  [facility]  07/10/24  12:48 EDT

## 2024-07-10 NOTE — SIGNIFICANT NOTE
07/10/24 1120   OTHER   Discipline physical therapist   Rehab Time/Intention   Session Not Performed other (see comments)  (pt politely declines at this time, reports she is having increased pain but did ambulate w nsg earlier this am. PT will follow up as time allows, but pt does state she will ambulate again later today w nursing.)   Recommendation   PT - Next Appointment 07/11/24

## 2024-07-10 NOTE — PLAN OF CARE
Goal Outcome Evaluation:  Plan of Care Reviewed With: patient           Outcome Evaluation: VSS, TPN/lipids infusing, PCA pump in use. boyfriend at bed side,Lap sites CDI,  Gtube clampled/unclampled per pt, no nausea, Blood sugars monitored, Mediport in use, accessed and dressing changed 7/9/24, benadryl given for itching, encouraged movement and turning in bed to reduce poss. of skin issues, voiding freely, slept majority of shift, will continue to monitor.

## 2024-07-10 NOTE — PROGRESS NOTES
Chief Complaint:    S/P Laparoscopic assisted small bowel resection and PEG, POD 5    Subjective:    The patient continues to complain of pain that is reasonably well-managed with the PCA and IV Dilaudid.    Objective:    Temp:  [97.5 °F (36.4 °C)-98.6 °F (37 °C)] 97.5 °F (36.4 °C)  Heart Rate:  [63-76] 67  Resp:  [16] 16  BP: ()/(54-66) 97/54    Physical Exam  Constitutional:       Appearance: She is ill-appearing. She is not toxic-appearing.   Neurological:      Mental Status: She is alert.   Psychiatric:         Behavior: Behavior is cooperative.       BMI is within normal parameters. No other follow-up for BMI required.        Results:    WBC is 4.22.  H/H is 9.5/30.2.  Electrolytes are pending.    Impression/Plan:    The patient is POD 5 from a laparoscopic assisted small bowel resection and PEG.  She continues to have ongoing pain management issues related to her chronic pain and surgery.  We had a discussion regarding the need to wean off the PCA.  A fentanyl patch was ordered for her and we will continue Dilaudid every 2 hours as needed.  She will contact her pain management physician to arrange for appropriate pain medications at the time of discharge.    Madhu Zabala Jr., M.D.

## 2024-07-10 NOTE — PROGRESS NOTES
[facility] Clinical Pharmacy Services: TPN Daily Progress Note    TPN Day # 5   Indication: Inaccessible GI Tract  Route: central  Type: standard    Subjective/Objective  Results from last 7 days   Lab Units 07/10/24  0545 24  0508 24  0453   SODIUM mmol/L 135*   < > 136   POTASSIUM mmol/L 4.0   < > 4.2   CHLORIDE mmol/L 100   < > 106   CO2 mmol/L 26.0   < > 20.0*   BUN mg/dL 10   < > 5*   CREATININE mg/dL 0.52*   < > 0.52*   CALCIUM mg/dL 9.3   < > 8.9   ALBUMIN g/dL 3.6   < >  --    BILIRUBIN mg/dL 0.2   < >  --    ALK PHOS U/L 45   < >  --    ALT (SGPT) U/L 11   < >  --    AST (SGOT) U/L 12   < >  --    GLUCOSE mg/dL 92   < > 109*   MAGNESIUM mg/dL 1.9   < > 2.0   PHOSPHORUS mg/dL 4.5   < > 3.8   TRIGLYCERIDES mg/dL  --   --  77    < > = values in this interval not displayed.        Diet Orders (active) (From admission, onward)       Start     Ordered    24 1241  NPO Diet NPO Type: Strict NPO  Diet Effective Now         24 1240                  Additional insulin administration while previous TPN infusin units  Additional electrolyte administration while previous TPN infusing: none  Acid suppression: pantoprazole 40 mg IV daily    Goal TPN Formula Recommendations: 95/780/200 AA/Dex/Lipids; 1560 kcal/day  Current TPN Formula: 95/780/200 AA/Dex/Lipids    Assessment/Plan      Plan  Will continue TPN at the following goal macros:   Protein/Dextrose/Lipids: 95/780/200    Volume: 1560 ml (65 mL/hr over 24 hrs daily)    Based on the above labs, will add the following electrolytes/additives to the TPN.    Sodium Chloride: 80 mEq (increased from 70 mEq)   Sodium Acetate: 20 mEq   Sodium Phosphate: 0 mEq   Potassium Chloride: 40 mEq   Potassium Acetate: 10 mEq    Potassium Phosphate: 22 mEq (decreased from 30 mEq)   Calcium Gluconate: 9 mEq   Magnesium Sulfate: 20 mEq     MVI for TPN   Trace Elements    Labs to be ordered: CMP, Mag, Phos     Yadi Mendez [facility]    Clinical  Pharmacist

## 2024-07-11 LAB
ALBUMIN SERPL-MCNC: 3.6 G/DL (ref 3.5–5.2)
ALBUMIN/GLOB SERPL: 0.9 G/DL
ALP SERPL-CCNC: 45 U/L (ref 39–117)
ALT SERPL W P-5'-P-CCNC: 9 U/L (ref 1–33)
ANION GAP SERPL CALCULATED.3IONS-SCNC: 10 MMOL/L (ref 5–15)
AST SERPL-CCNC: 10 U/L (ref 1–32)
BILIRUB SERPL-MCNC: 0.2 MG/DL (ref 0–1.2)
BUN SERPL-MCNC: 11 MG/DL (ref 6–20)
BUN/CREAT SERPL: 21.2 (ref 7–25)
CALCIUM SPEC-SCNC: 9.3 MG/DL (ref 8.6–10.5)
CHLORIDE SERPL-SCNC: 101 MMOL/L (ref 98–107)
CO2 SERPL-SCNC: 25 MMOL/L (ref 22–29)
CREAT SERPL-MCNC: 0.52 MG/DL (ref 0.57–1)
EGFRCR SERPLBLD CKD-EPI 2021: 129.2 ML/MIN/1.73
GLOBULIN UR ELPH-MCNC: 4.1 GM/DL
GLUCOSE BLDC GLUCOMTR-MCNC: 102 MG/DL (ref 70–130)
GLUCOSE BLDC GLUCOMTR-MCNC: 107 MG/DL (ref 70–130)
GLUCOSE BLDC GLUCOMTR-MCNC: 107 MG/DL (ref 70–130)
GLUCOSE BLDC GLUCOMTR-MCNC: 109 MG/DL (ref 70–130)
GLUCOSE BLDC GLUCOMTR-MCNC: 63 MG/DL (ref 70–130)
GLUCOSE SERPL-MCNC: 102 MG/DL (ref 65–99)
MAGNESIUM SERPL-MCNC: 1.9 MG/DL (ref 1.6–2.6)
PHOSPHATE SERPL-MCNC: 4 MG/DL (ref 2.5–4.5)
POTASSIUM SERPL-SCNC: 3.9 MMOL/L (ref 3.5–5.2)
PROT SERPL-MCNC: 7.7 G/DL (ref 6–8.5)
SODIUM SERPL-SCNC: 136 MMOL/L (ref 136–145)

## 2024-07-11 PROCEDURE — 25010000002 MAGNESIUM SULFATE PER 500 MG OF MAGNESIUM: Performed by: SURGERY

## 2024-07-11 PROCEDURE — 25010000002 ONDANSETRON PER 1 MG: Performed by: SURGERY

## 2024-07-11 PROCEDURE — 25010000002 ENOXAPARIN PER 10 MG: Performed by: STUDENT IN AN ORGANIZED HEALTH CARE EDUCATION/TRAINING PROGRAM

## 2024-07-11 PROCEDURE — 25010000002 POTASSIUM CHLORIDE PER 2 MEQ OF POTASSIUM: Performed by: SURGERY

## 2024-07-11 PROCEDURE — 25010000002 HYDROMORPHONE 1 MG/ML SOLUTION: Performed by: STUDENT IN AN ORGANIZED HEALTH CARE EDUCATION/TRAINING PROGRAM

## 2024-07-11 PROCEDURE — 25010000002 DIPHENHYDRAMINE PER 50 MG: Performed by: STUDENT IN AN ORGANIZED HEALTH CARE EDUCATION/TRAINING PROGRAM

## 2024-07-11 PROCEDURE — 97530 THERAPEUTIC ACTIVITIES: CPT

## 2024-07-11 PROCEDURE — 83735 ASSAY OF MAGNESIUM: CPT | Performed by: SURGERY

## 2024-07-11 PROCEDURE — 25010000002 CALCIUM GLUCONATE PER 10 ML: Performed by: SURGERY

## 2024-07-11 PROCEDURE — 82948 REAGENT STRIP/BLOOD GLUCOSE: CPT

## 2024-07-11 PROCEDURE — 99024 POSTOP FOLLOW-UP VISIT: CPT | Performed by: SURGERY

## 2024-07-11 PROCEDURE — 84100 ASSAY OF PHOSPHORUS: CPT | Performed by: SURGERY

## 2024-07-11 PROCEDURE — 80053 COMPREHEN METABOLIC PANEL: CPT | Performed by: SURGERY

## 2024-07-11 RX ORDER — IBUPROFEN 600 MG/1
1 TABLET ORAL
Status: DISCONTINUED | OUTPATIENT
Start: 2024-07-11 | End: 2024-07-17 | Stop reason: HOSPADM

## 2024-07-11 RX ORDER — NICOTINE POLACRILEX 4 MG
15 LOZENGE BUCCAL
Status: DISCONTINUED | OUTPATIENT
Start: 2024-07-11 | End: 2024-07-17 | Stop reason: HOSPADM

## 2024-07-11 RX ORDER — DEXTROSE MONOHYDRATE 25 G/50ML
25 INJECTION, SOLUTION INTRAVENOUS
Status: DISCONTINUED | OUTPATIENT
Start: 2024-07-11 | End: 2024-07-17 | Stop reason: HOSPADM

## 2024-07-11 RX ADMIN — HYDROXYCHLOROQUINE SULFATE 200 MG: 200 TABLET, FILM COATED ORAL at 09:28

## 2024-07-11 RX ADMIN — GABAPENTIN 200 MG: 100 CAPSULE ORAL at 15:50

## 2024-07-11 RX ADMIN — Medication 10 ML: at 09:28

## 2024-07-11 RX ADMIN — HYDROMORPHONE HYDROCHLORIDE 1 MG: 1 INJECTION, SOLUTION INTRAMUSCULAR; INTRAVENOUS; SUBCUTANEOUS at 04:52

## 2024-07-11 RX ADMIN — HYDROCODONE BITARTRATE AND ACETAMINOPHEN 15 ML: 7.5; 325 SOLUTION ORAL at 14:47

## 2024-07-11 RX ADMIN — HYDROMORPHONE HYDROCHLORIDE 1 MG: 1 INJECTION, SOLUTION INTRAMUSCULAR; INTRAVENOUS; SUBCUTANEOUS at 20:34

## 2024-07-11 RX ADMIN — PANTOPRAZOLE SODIUM 40 MG: 40 INJECTION, POWDER, FOR SOLUTION INTRAVENOUS at 20:04

## 2024-07-11 RX ADMIN — DICYCLOMINE HYDROCHLORIDE 10 MG: 10 CAPSULE ORAL at 18:37

## 2024-07-11 RX ADMIN — GABAPENTIN 200 MG: 100 CAPSULE ORAL at 09:28

## 2024-07-11 RX ADMIN — DEXTROSE MONOHYDRATE 12.5 G: 25 INJECTION, SOLUTION INTRAVENOUS at 23:44

## 2024-07-11 RX ADMIN — GABAPENTIN 200 MG: 100 CAPSULE ORAL at 20:04

## 2024-07-11 RX ADMIN — HYDROMORPHONE HYDROCHLORIDE 1 MG: 1 INJECTION, SOLUTION INTRAMUSCULAR; INTRAVENOUS; SUBCUTANEOUS at 15:50

## 2024-07-11 RX ADMIN — DICYCLOMINE HYDROCHLORIDE 10 MG: 10 CAPSULE ORAL at 09:27

## 2024-07-11 RX ADMIN — HYDROMORPHONE HYDROCHLORIDE 1 MG: 1 INJECTION, SOLUTION INTRAMUSCULAR; INTRAVENOUS; SUBCUTANEOUS at 07:45

## 2024-07-11 RX ADMIN — DICYCLOMINE HYDROCHLORIDE 10 MG: 10 CAPSULE ORAL at 20:34

## 2024-07-11 RX ADMIN — Medication 10 ML: at 20:04

## 2024-07-11 RX ADMIN — ONDANSETRON 4 MG: 2 INJECTION INTRAMUSCULAR; INTRAVENOUS at 09:28

## 2024-07-11 RX ADMIN — HYDROMORPHONE HYDROCHLORIDE 1 MG: 1 INJECTION, SOLUTION INTRAMUSCULAR; INTRAVENOUS; SUBCUTANEOUS at 11:26

## 2024-07-11 RX ADMIN — CALCIUM GLUCONATE: 98 INJECTION, SOLUTION INTRAVENOUS at 18:37

## 2024-07-11 RX ADMIN — HYDROCODONE BITARTRATE AND ACETAMINOPHEN 15 ML: 7.5; 325 SOLUTION ORAL at 09:28

## 2024-07-11 RX ADMIN — I.V. FAT EMULSION 40 G: 20 EMULSION INTRAVENOUS at 19:49

## 2024-07-11 RX ADMIN — ESCITALOPRAM 5 MG: 5 TABLET, FILM COATED ORAL at 09:27

## 2024-07-11 RX ADMIN — HYDROXYCHLOROQUINE SULFATE 200 MG: 200 TABLET, FILM COATED ORAL at 20:04

## 2024-07-11 RX ADMIN — HYDROCODONE BITARTRATE AND ACETAMINOPHEN 15 ML: 7.5; 325 SOLUTION ORAL at 18:37

## 2024-07-11 RX ADMIN — ENOXAPARIN SODIUM 40 MG: 100 INJECTION SUBCUTANEOUS at 18:37

## 2024-07-11 RX ADMIN — PANTOPRAZOLE SODIUM 40 MG: 40 INJECTION, POWDER, FOR SOLUTION INTRAVENOUS at 09:28

## 2024-07-11 RX ADMIN — DIPHENHYDRAMINE HYDROCHLORIDE 50 MG: 50 INJECTION, SOLUTION INTRAMUSCULAR; INTRAVENOUS at 19:52

## 2024-07-11 RX ADMIN — DICYCLOMINE HYDROCHLORIDE 10 MG: 10 CAPSULE ORAL at 11:26

## 2024-07-11 RX ADMIN — HYDROMORPHONE HYDROCHLORIDE 1 MG: 1 INJECTION, SOLUTION INTRAMUSCULAR; INTRAVENOUS; SUBCUTANEOUS at 23:29

## 2024-07-11 NOTE — PLAN OF CARE
Goal Outcome Evaluation:  Plan of Care Reviewed With: patient        Progress: improving  Outcome Evaluation: VSS. lap sites x 3 with steristrips CDI.  G tube clamped.  IV dilaudid given. TPN and lipids infusing  thru her mediport.  blood sugar monitoring.  to monitor electrolytes per protocol. assit x 1 using a walker.
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
## 2024-07-11 NOTE — PLAN OF CARE
Goal Outcome Evaluation:  Plan of Care Reviewed With: patient, family           Outcome Evaluation: Pt seen for PT this am. SHe reports increased pain since she no longer has pain pump, but agreeable to work w therapy. She is able to perform bed mobility w SBA. SHe stood w CGA/min A and Rwx. Pt ambulated approx 100 ft and exhibits slow pace. No unsteadiness noted. Limited by pain. Pt also ambulated to BR to brush her teeth before returning to bed. Pt encouraged to continue ambulating w nsg staff as well. Plans for home at [place]. Will continue to progress activity as tolerated.

## 2024-07-11 NOTE — PROGRESS NOTES
Name: Henrietta Garrett ADMIT: 7/3/2024   : 1995  PCP: Kenya David MD    MRN: [id number] LOS: 8 days   AGE/SEX: 29 y.o. female  ROOM: [facility]/     Subjective     Resting in bed, still complaining of severe pain.  Surgery has added Hycet this morning and she is about to receive her first dose.    Objective   Objective   Vital Signs  Temp:  [97.3 °F (36.3 °C)-99.4 °F (37.4 °C)] 98.2 °F (36.8 °C)  Heart Rate:  [63-78] 63  Resp:  [16] 16  BP: (101-116)/(56-68) 104/63  SpO2:  [94 %-99 %] 97 %  on   ;   Device (Oxygen Therapy): room air  Body mass index is 24.66 kg/m².  Physical Exam  Constitutional:       General: She is not in acute distress.     Appearance: She is ill-appearing.   HENT:      Head: Normocephalic and atraumatic.   Pulmonary:      Effort: Pulmonary effort is normal. No respiratory distress.   Neurological:      Mental Status: She is alert and oriented to person, place, and time.         Results Review     I reviewed the patient's new clinical results.  Results from last 7 days   Lab Units 07/10/24  0545 24  0508 24  0453 24  0436   WBC 10*3/mm3 4.22 4.85 3.86 2.83*   HEMOGLOBIN g/dL 9.5* 8.5* 9.1* 8.3*   PLATELETS 10*3/mm3 286 266 242 211     Results from last 7 days   Lab Units 24  0452 07/10/24  0545 24  0933 24  0542   SODIUM mmol/L 136 135* 135* 141   POTASSIUM mmol/L 3.9 4.0 3.9 2.7*   CHLORIDE mmol/L 101 100 100 115*   CO2 mmol/L 25.0 26.0 27.0 17.9*   BUN mg/dL 11 10 9 6   CREATININE mg/dL 0.52* 0.52* 0.53* 0.30*   GLUCOSE mg/dL 102* 92 104* 81   Estimated Creatinine Clearance: 202.1 mL/min (A) (by C-G formula based on SCr of 0.52 mg/dL (L)).  Results from last 7 days   Lab Units 24  0452 07/10/24  0545 24  0933 24  0542   ALBUMIN g/dL 3.6 3.6 3.6 2.4*   BILIRUBIN mg/dL 0.2 0.2 <0.2 <0.2   ALK PHOS U/L 45 45 45 32*   AST (SGOT) U/L 10 12 8 7   ALT (SGPT) U/L 9 11 12 8     Results from last 7 days   Lab Units 24  0452  07/10/24  0545 07/09/24  0933 07/09/24  0542 07/08/24  0543   CALCIUM mg/dL 9.3 9.3 9.2 6.0* 8.4*   ALBUMIN g/dL 3.6 3.6 3.6 2.4* 3.3*   MAGNESIUM mg/dL 1.9 1.9 1.7 1.3* 1.7   PHOSPHORUS mg/dL 4.0 4.5  --  3.0 3.5           COVID19   Date Value Ref Range Status   07/22/2022 Not Detected Not Detected - Ref. Range Final     SARS-CoV-2, DANIELLA   Date Value Ref Range Status   08/08/2022 NEGATIVE Negative Final     Glucose   Date/Time Value Ref Range Status   07/11/2024 1222 109 70 - 130 mg/dL Final   07/11/2024 0658 107 70 - 130 mg/dL Final   07/11/2024 0038 107 70 - 130 mg/dL Final   07/10/2024 1803 86 70 - 130 mg/dL Final   07/10/2024 0616 100 70 - 130 mg/dL Final   07/10/2024 0021 107 70 - 130 mg/dL Final     Results for orders placed or performed during the hospital encounter of 07/03/24   Blood Culture - Blood, Arm, Right    Specimen: Arm, Right; Blood   Result Value Ref Range    Blood Culture No growth at 5 days          XR Chest 1 View  Narrative: XR CHEST 1 VW-7/9/2024     HISTORY: Port placement.     Right subclavian Mediport catheter is seen with its tip overlying the  superior cavoatrial junction. No pneumothorax is seen.     Lungs appear clear. Heart size is within normal limits.     Impression: 1. Mediport catheter placement as described.  2. No pneumothorax is seen.        This report was finalized on 7/9/2024 11:25 AM by Dr. Javier Plasencia M.D on Workstation: [id number]       Scheduled Medications  fentaNYL, 1 patch, Transdermal, Q72H   And  Check Fentanyl Patch Placement, 1 each, Does not apply, Q12H  dicyclomine, 10 mg, Oral, 4x Daily  enoxaparin, 40 mg, Subcutaneous, Q24H  escitalopram, 5 mg, Per J Tube, Daily  Fat Emulsion Plant Based, 200 mL, Intravenous, Q24H (TPN)  gabapentin, 200 mg, Oral, TID  hydroxychloroquine, 200 mg, Oral, BID  pantoprazole, 40 mg, Intravenous, Q12H  sodium chloride, 10 mL, Intravenous, Q12H    Infusions  Adult Central 2-in-1 TPN, , Last Rate: 65 mL/hr at 07/10/24 1763  Adult  Central 2-in-1 TPN,   Pharmacy to Dose TPN,   sodium chloride, 30 mL/hr    Diet  NPO Diet NPO Type: Strict NPO  Adult Central 2-in-1 TPN  Adult Central 2-in-1 TPN       Assessment/Plan     Active Hospital Problems    Diagnosis  POA    **Abdominal wall cellulitis [L03.311]  Yes    Lupus erythematosus overlap syndrome [M32.8]  Yes    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       29 y.o. female admitted with Abdominal wall cellulitis.    Abdominal wall cellulitis  Abdominal pain  Started on zosyn. Continue, follow cultures (negative).    Blood cultures NGTD  Pain meds per surgery- on fentanyl patch, dilaudid IV push prn; dilaudid PCA dcd this am when fentanyl started     Gastroparesis   On TPN    Dislodged G-tube  S/p revision 7/5/24     Lupus  Resume Plaquenil     Hypertension  Cont to hold amlodipine. BP on the lower side     Iron deficiency anemia  She takes an iron supplement at home however she is currently NPO      Lovenox for DVT prophylaxis.  Full code.  Discussed with patient and RN and family   Anticipate discharge to be determined       Annette Oliver MD  [facility]  07/11/24  13:23 EDT

## 2024-07-11 NOTE — PAYOR COMM NOTE
"Henrietta Garrett (29 y.o. Female)          U/D FOR [id number]    CONTACT F# [phone]         Date of Birth   1995    Social Security Number       Address   [address]  [place] [address]    Home Phone   [phone]    MRN   [id number]       Worship   Non-Rastafari    Marital Status   Single                            Admission Date   7/3/24    Admission Type   Emergency    Admitting Provider   Aureliano Melchor MD    Attending Provider   Annette Oliver MD    Department, Room/Bed   [address], 80/1       Discharge Date       Discharge Disposition       Discharge Destination                                 Attending Provider: Annette Oliver MD    Allergies: Anesthetics, Amide, Ciprofloxacin, Compazine [Prochlorperazine], Domperidone, Droperidol, Haldol [Haloperidol], Metoclopramide, Sulfa Antibiotics    Isolation: None   Infection: None   Code Status: CPR    Ht: 180.3 cm (71\")   Wt: 80.2 kg (176 lb 12.9 oz)    Admission Cmt: None   Principal Problem: Abdominal wall cellulitis [L03.311]                   Active Insurance as of 7/3/2024       Primary Coverage       Payor Plan Insurance Group Employer/Plan Group    [employer]UE [employer]UE [employer]UE [employer] PPO [id number]       Payor Plan Address Payor Plan Phone Number Payor Plan Fax Number Effective Dates    [address] [phone]  1/1/2023 - None Entered    [place] [address]         Subscriber Name Subscriber Birth Date Member ID       NETTA SINGH [birth year]/27/1963 [id number]                     Emergency Contacts        (Rel.) Home Phone Work Phone Mobile Phone    Miley Singh (Mother) [phone] -- [phone]    Flaco Redman (Significant Other) -- -- [phone]              Operative/Procedure Notes (last 48 hours)  Notes from 07/09/24 1608 through 07/11/24 1608   No notes of this type exist for this encounter.          Physician Progress Notes (last 48 " hours)        Annette Oliver MD at 24 1321              Name: Henrietta Garrett ADMIT: 7/3/2024   : 1995  PCP: Kenya David MD    MRN: [id number] LOS: 8 days   AGE/SEX: 29 y.o. female  ROOM: [facility]     Subjective     Resting in bed, still complaining of severe pain.  Surgery has added Hycet this morning and she is about to receive her first dose.    Objective   Objective   Vital Signs  Temp:  [97.3 °F (36.3 °C)-99.4 °F (37.4 °C)] 98.2 °F (36.8 °C)  Heart Rate:  [63-78] 63  Resp:  [16] 16  BP: (101-116)/(56-68) 104/63  SpO2:  [94 %-99 %] 97 %  on   ;   Device (Oxygen Therapy): room air  Body mass index is 24.66 kg/m².  Physical Exam  Constitutional:       General: She is not in acute distress.     Appearance: She is ill-appearing.   HENT:      Head: Normocephalic and atraumatic.   Pulmonary:      Effort: Pulmonary effort is normal. No respiratory distress.   Neurological:      Mental Status: She is alert and oriented to person, place, and time.         Results Review     I reviewed the patient's new clinical results.  Results from last 7 days   Lab Units 07/10/24  0545 24  0508 24  0453 24  0436   WBC 10*3/mm3 4.22 4.85 3.86 2.83*   HEMOGLOBIN g/dL 9.5* 8.5* 9.1* 8.3*   PLATELETS 10*3/mm3 286 266 242 211     Results from last 7 days   Lab Units 24  0452 07/10/24  0545 24  0933 24  0542   SODIUM mmol/L 136 135* 135* 141   POTASSIUM mmol/L 3.9 4.0 3.9 2.7*   CHLORIDE mmol/L 101 100 100 115*   CO2 mmol/L 25.0 26.0 27.0 17.9*   BUN mg/dL 11 10 9 6   CREATININE mg/dL 0.52* 0.52* 0.53* 0.30*   GLUCOSE mg/dL 102* 92 104* 81   Estimated Creatinine Clearance: 202.1 mL/min (A) (by C-G formula based on SCr of 0.52 mg/dL (L)).  Results from last 7 days   Lab Units 24  0452 07/10/24  0545 24  0933 24  0542   ALBUMIN g/dL 3.6 3.6 3.6 2.4*   BILIRUBIN mg/dL 0.2 0.2 <0.2 <0.2   ALK PHOS U/L 45 45 45 32*   AST (SGOT) U/L 10 12 8 7   ALT (SGPT) U/L 9 11 12 8      Results from last 7 days   Lab Units 07/11/24  0452 07/10/24  0545 07/09/24  0933 07/09/24  0542 07/08/24  0543   CALCIUM mg/dL 9.3 9.3 9.2 6.0* 8.4*   ALBUMIN g/dL 3.6 3.6 3.6 2.4* 3.3*   MAGNESIUM mg/dL 1.9 1.9 1.7 1.3* 1.7   PHOSPHORUS mg/dL 4.0 4.5  --  3.0 3.5           COVID19   Date Value Ref Range Status   07/22/2022 Not Detected Not Detected - Ref. Range Final     SARS-CoV-2, DANIELLA   Date Value Ref Range Status   08/08/2022 NEGATIVE Negative Final     Glucose   Date/Time Value Ref Range Status   07/11/2024 1222 109 70 - 130 mg/dL Final   07/11/2024 0658 107 70 - 130 mg/dL Final   07/11/2024 0038 107 70 - 130 mg/dL Final   07/10/2024 1803 86 70 - 130 mg/dL Final   07/10/2024 0616 100 70 - 130 mg/dL Final   07/10/2024 0021 107 70 - 130 mg/dL Final     Results for orders placed or performed during the hospital encounter of 07/03/24   Blood Culture - Blood, Arm, Right    Specimen: Arm, Right; Blood   Result Value Ref Range    Blood Culture No growth at 5 days          XR Chest 1 View  Narrative: XR CHEST 1 VW-7/9/2024     HISTORY: Port placement.     Right subclavian Mediport catheter is seen with its tip overlying the  superior cavoatrial junction. No pneumothorax is seen.     Lungs appear clear. Heart size is within normal limits.     Impression: 1. Mediport catheter placement as described.  2. No pneumothorax is seen.        This report was finalized on 7/9/2024 11:25 AM by Dr. Javier Plasencia M.D on Workstation: [id number]       Scheduled Medications  fentaNYL, 1 patch, Transdermal, Q72H   And  Check Fentanyl Patch Placement, 1 each, Does not apply, Q12H  dicyclomine, 10 mg, Oral, 4x Daily  enoxaparin, 40 mg, Subcutaneous, Q24H  escitalopram, 5 mg, Per J Tube, Daily  Fat Emulsion Plant Based, 200 mL, Intravenous, Q24H (TPN)  gabapentin, 200 mg, Oral, TID  hydroxychloroquine, 200 mg, Oral, BID  pantoprazole, 40 mg, Intravenous, Q12H  sodium chloride, 10 mL, Intravenous, Q12H    Infusions  Adult Central  2-in-1 TPN, , Last Rate: 65 mL/hr at 07/10/24 1843  Adult Central 2-in-1 TPN,   Pharmacy to Dose TPN,   sodium chloride, 30 mL/hr    Diet  NPO Diet NPO Type: Strict NPO  Adult Central 2-in-1 TPN  Adult Central 2-in-1 TPN      Assessment/Plan     Active Hospital Problems    Diagnosis  POA    **Abdominal wall cellulitis [L03.311]  Yes    Lupus erythematosus overlap syndrome [M32.8]  Yes    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       29 y.o. female admitted with Abdominal wall cellulitis.    Abdominal wall cellulitis  Abdominal pain  Started on zosyn. Continue, follow cultures (negative).    Blood cultures NGTD  Pain meds per surgery- on fentanyl patch, dilaudid IV push prn; dilaudid PCA dcd this am when fentanyl started     Gastroparesis   On TPN    Dislodged G-tube  S/p revision 7/5/24     Lupus  Resume Plaquenil     Hypertension  Cont to hold amlodipine. BP on the lower side     Iron deficiency anemia  She takes an iron supplement at home however she is currently NPO      Lovenox for DVT prophylaxis.  Full code.  Discussed with patient and RN and family   Anticipate discharge to be determined       Annette Oliver MD  [facility]  07/11/24  13:23 EDT        Electronically signed by Annette Oliver MD at 07/11/24 1323       Madhu Zabala Jr., MD at 07/11/24 0655          Chief Complaint:    S/P: Laparoscopic assisted small bowel resection and PEG, POD 6    Subjective:    The patient continues to complain pain and does not believe the fentanyl patch is helping enough.  She does get brief relief from IV Dilaudid.  She has nausea that is at its baseline and no vomiting.  She has not had bowel function.    Objective:    Temp:  [97 °F (36.1 °C)-99.4 °F (37.4 °C)] 98.5 °F (36.9 °C)  Heart Rate:  [64-83] 75  Resp:  [16] 16  BP: (101-130)/(56-68)  112/57    Physical Exam  Constitutional:       Appearance: She is not ill-appearing or toxic-appearing.   Abdominal:      Palpations: Abdomen is soft.      Tenderness: There is generalized abdominal tenderness.   Neurological:      Mental Status: She is alert.   Psychiatric:         Behavior: Behavior is cooperative.       BMI is within normal parameters. No other follow-up for BMI required.        Results:    BUN is 11 creatinine 0.53.    Impression/Plan:    The patient is POD 6 from a laparoscopic assisted small bowel resection and PEG.  She continues to have issues with pain which is a chronic problem.  I will add Hycet through the G-tube for pain relief.  Once her pain is adequately controlled, she will be ready for discharge to home.    Madhu Zabala Jr., M.D.     Electronically signed by Madhu Zabala Jr., MD at 24 0659       Annette Oliver MD at 07/10/24 1248              Name: Henrietta Garrett ADMIT: 7/3/2024   : 1995  PCP: Kenya David MD    MRN: [id number] LOS: 7 days   AGE/SEX: 29 y.o. female  ROOM: [facility]     Subjective     Getting up with nursing staff to go from recliner to bed. Having a lot of pain. Fentanyl patch ordered per surgery placed this am to try and wean from IV pain meds.     Objective   Objective   Vital Signs  Temp:  [97 °F (36.1 °C)-98.6 °F (37 °C)] 97 °F (36.1 °C)  Heart Rate:  [63-83] 83  Resp:  [16] 16  BP: ()/(54-66) 130/60  SpO2:  [96 %-100 %] 97 %  on   ;   Device (Oxygen Therapy): room air  Body mass index is 24.91 kg/m².  Physical Exam  Constitutional:       General: She is not in acute distress.     Appearance: She is ill-appearing.   HENT:      Head: Normocephalic and atraumatic.   Pulmonary:      Effort: Pulmonary effort is normal. No respiratory distress.   Neurological:      Mental Status: She is alert and oriented to person, place, and time.         Results Review     I reviewed the patient's new clinical results.  Results from last 7 days   Lab  Units 07/10/24  0545 07/07/24  0508 07/06/24  0453 07/05/24  0436   WBC 10*3/mm3 4.22 4.85 3.86 2.83*   HEMOGLOBIN g/dL 9.5* 8.5* 9.1* 8.3*   PLATELETS 10*3/mm3 286 266 242 211     Results from last 7 days   Lab Units 07/10/24  0545 07/09/24  0933 07/09/24  0542 07/08/24  0543   SODIUM mmol/L 135* 135* 141 134*   POTASSIUM mmol/L 4.0 3.9 2.7* 3.9   CHLORIDE mmol/L 100 100 115* 101   CO2 mmol/L 26.0 27.0 17.9* 24.3   BUN mg/dL 10 9 6 6   CREATININE mg/dL 0.52* 0.53* 0.30* 0.55*   GLUCOSE mg/dL 92 104* 81 107*   Estimated Creatinine Clearance: 204.1 mL/min (A) (by C-G formula based on SCr of 0.52 mg/dL (L)).  Results from last 7 days   Lab Units 07/10/24  0545 07/09/24  0933 07/09/24  0542 07/08/24  0543   ALBUMIN g/dL 3.6 3.6 2.4* 3.3*   BILIRUBIN mg/dL 0.2 <0.2 <0.2 <0.2   ALK PHOS U/L 45 45 32* 51   AST (SGOT) U/L 12 8 7 9   ALT (SGPT) U/L 11 12 8 14     Results from last 7 days   Lab Units 07/10/24  0545 07/09/24  0933 07/09/24  0542 07/08/24  0543 07/07/24  0508   CALCIUM mg/dL 9.3 9.2 6.0* 8.4* 9.0   ALBUMIN g/dL 3.6 3.6 2.4* 3.3* 3.4*   MAGNESIUM mg/dL 1.9 1.7 1.3* 1.7 1.9   PHOSPHORUS mg/dL 4.5  --  3.0 3.5 2.3*           COVID19   Date Value Ref Range Status   07/22/2022 Not Detected Not Detected - Ref. Range Final     SARS-CoV-2, DANIELLA   Date Value Ref Range Status   08/08/2022 NEGATIVE Negative Final     Glucose   Date/Time Value Ref Range Status   07/10/2024 0616 100 70 - 130 mg/dL Final   07/10/2024 0021 107 70 - 130 mg/dL Final   07/08/2024 0647 106 70 - 130 mg/dL Final   07/08/2024 0031 76 70 - 130 mg/dL Final     Results for orders placed or performed during the hospital encounter of 07/03/24   Blood Culture - Blood, Arm, Right    Specimen: Arm, Right; Blood   Result Value Ref Range    Blood Culture No growth at 5 days          XR Chest 1 View  Narrative: XR CHEST 1 VW-7/9/2024     HISTORY: Port placement.     Right subclavian Mediport catheter is seen with its tip overlying the  superior cavoatrial  junction. No pneumothorax is seen.     Lungs appear clear. Heart size is within normal limits.     Impression: 1. Mediport catheter placement as described.  2. No pneumothorax is seen.        This report was finalized on 7/9/2024 11:25 AM by Dr. Javier Plasencia M.D on Workstation: [id number]       Scheduled Medications  fentaNYL, 1 patch, Transdermal, Q72H   And  [START ON 7/11/2024] Check Fentanyl Patch Placement, 1 each, Does not apply, Q12H  dicyclomine, 10 mg, Oral, 4x Daily  enoxaparin, 40 mg, Subcutaneous, Q24H  escitalopram, 5 mg, Per J Tube, Daily  Fat Emulsion Plant Based, 200 mL, Intravenous, Q24H (TPN)  gabapentin, 200 mg, Oral, TID  hydroxychloroquine, 200 mg, Oral, BID  pantoprazole, 40 mg, Intravenous, Q12H  sodium chloride, 10 mL, Intravenous, Q12H    Infusions  Adult Central 2-in-1 TPN, , Last Rate: 65 mL/hr at 07/09/24 1851  Adult Central 2-in-1 TPN,   Pharmacy to Dose TPN,   sodium chloride, 30 mL/hr    Diet  NPO Diet NPO Type: Strict NPO  Adult Central 2-in-1 TPN  Adult Central 2-in-1 TPN      Assessment/Plan     Active Hospital Problems    Diagnosis  POA    **Abdominal wall cellulitis [L03.311]  Yes    Lupus erythematosus overlap syndrome [M32.8]  Yes    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       29 y.o. female admitted with Abdominal wall cellulitis.    Abdominal wall cellulitis  Abdominal pain  Started on zosyn. Continue, follow cultures (negative).    Blood cultures NGTD  Pain meds per surgery- on fentanyl patch, dilaudid IV push prn; dilaudid PCA dcd this am when fentanyl started     Gastroparesis   On TPN    Dislodged G-tube  S/p revision 7/5/24     Lupus  Resume Plaquenil     Hypertension  Cont to hold amlodipine. BP on the lower side     Iron deficiency anemia  She takes an iron supplement at home however she is currently NPO      Lovenox for DVT  prophylaxis.  Full code.  Discussed with patient and RN  Anticipate discharge to be determined       Annette Oliver MD  [facility]  07/10/24  12:48 EDT        Electronically signed by Annette Oliver MD at 07/10/24 1252       Madhu Zabala Jr., MD at 07/10/24 0719          Chief Complaint:    S/P Laparoscopic assisted small bowel resection and PEG, POD 5    Subjective:    The patient continues to complain of pain that is reasonably well-managed with the PCA and IV Dilaudid.    Objective:    Temp:  [97.5 °F (36.4 °C)-98.6 °F (37 °C)] 97.5 °F (36.4 °C)  Heart Rate:  [63-76] 67  Resp:  [16] 16  BP: ()/(54-66) 97/54    Physical Exam  Constitutional:       Appearance: She is ill-appearing. She is not toxic-appearing.   Neurological:      Mental Status: She is alert.   Psychiatric:         Behavior: Behavior is cooperative.       BMI is within normal parameters. No other follow-up for BMI required.        Results:    WBC is 4.22.  H/H is 9.5/30.2.  Electrolytes are pending.    Impression/Plan:    The patient is POD 5 from a laparoscopic assisted small bowel resection and PEG.  She continues to have ongoing pain management issues related to her chronic pain and surgery.  We had a discussion regarding the need to wean off the PCA.  A fentanyl patch was ordered for her and we will continue Dilaudid every 2 hours as needed.  She will contact her pain management physician to arrange for appropriate pain medications at the time of discharge.    Madhu Zabala Jr., M.D.     Electronically signed by Madhu Zabala Jr., MD at 07/10/24 0722       Consult Notes (last 48 hours)  Notes from 07/09/24 1608 through 07/11/24 1608   No notes of this type exist for this encounter.

## 2024-07-11 NOTE — PLAN OF CARE
"Goal Outcome Evaluation:           Progress: no change  Outcome Evaluation: BP trending 90's/60's. up with assist and walker. reporting increased pain since PCA discontinued. rating from 6/10-8/10. started hycet through NG tube and dilaudid IV push. IS at bedside. abd dsg CD&I. Pt reports \"spitting up\" some of her hycet after administration. Per MD pt can have sips of sprite and goldfish crackers, pt has tolerated both. SCDs in place. Pt and family updated on plan of care. tolerating po meds. lovenox adminstered. TPN hung and caps to mediport changed. blood return noted. still waiting on lipids from pharmacy.                               "

## 2024-07-11 NOTE — PROGRESS NOTES
Chief Complaint:    S/P: Laparoscopic assisted small bowel resection and PEG, POD 6    Subjective:    The patient continues to complain pain and does not believe the fentanyl patch is helping enough.  She does get brief relief from IV Dilaudid.  She has nausea that is at its baseline and no vomiting.  She has not had bowel function.    Objective:    Temp:  [97 °F (36.1 °C)-99.4 °F (37.4 °C)] 98.5 °F (36.9 °C)  Heart Rate:  [64-83] 75  Resp:  [16] 16  BP: (101-130)/(56-68) 112/57    Physical Exam  Constitutional:       Appearance: She is not ill-appearing or toxic-appearing.   Abdominal:      Palpations: Abdomen is soft.      Tenderness: There is generalized abdominal tenderness.   Neurological:      Mental Status: She is alert.   Psychiatric:         Behavior: Behavior is cooperative.       BMI is within normal parameters. No other follow-up for BMI required.        Results:    BUN is 11 creatinine 0.53.    Impression/Plan:    The patient is POD 6 from a laparoscopic assisted small bowel resection and PEG.  She continues to have issues with pain which is a chronic problem.  I will add Hycet through the G-tube for pain relief.  Once her pain is adequately controlled, she will be ready for discharge to home.    Madhu Zabala Jr., M.D.

## 2024-07-11 NOTE — PROGRESS NOTES
[facility] Clinical Pharmacy Services: TPN Daily Progress Note    TPN Day # 6   Indication: Inaccessible GI Tract  Route: central  Type: standard    Subjective/Objective  Results from last 7 days   Lab Units 24  0452 24  0508 24  0453   SODIUM mmol/L 136   < > 136   POTASSIUM mmol/L 3.9   < > 4.2   CHLORIDE mmol/L 101   < > 106   CO2 mmol/L 25.0   < > 20.0*   BUN mg/dL 11   < > 5*   CREATININE mg/dL 0.52*   < > 0.52*   CALCIUM mg/dL 9.3   < > 8.9   ALBUMIN g/dL 3.6   < >  --    BILIRUBIN mg/dL 0.2   < >  --    ALK PHOS U/L 45   < >  --    ALT (SGPT) U/L 9   < >  --    AST (SGOT) U/L 10   < >  --    GLUCOSE mg/dL 102*   < > 109*   MAGNESIUM mg/dL 1.9   < > 2.0   PHOSPHORUS mg/dL 4.0   < > 3.8   TRIGLYCERIDES mg/dL  --   --  77    < > = values in this interval not displayed.        Diet Orders (active) (From admission, onward)       Start     Ordered    24 1241  NPO Diet NPO Type: Strict NPO  Diet Effective Now         24 1240                  Additional insulin administration while previous TPN infusin units  Additional electrolyte administration while previous TPN infusing: none  Acid suppression: pantoprazole 40 mg IV daily    Goal TPN Formula Recommendations: 95/780/200 AA/Dex/Lipids; 1560 kcal/day  Current TPN Formula: 95/780/200 AA/Dex/Lipids    Assessment/Plan      Plan  Will continue TPN at the following goal macros:   Protein/Dextrose/Lipids: 95/780/200    Volume: 1560 ml (65 mL/hr over 24 hrs daily)    Based on the above labs, will add the following electrolytes/additives to the TPN.   Labs wnl  repeat tpn from 7/10/24   Sodium Chloride: 80 mEq    Sodium Acetate: 20 mEq   Sodium Phosphate: 0 mEq   Potassium Chloride: 40 mEq   Potassium Acetate: 10 mEq    Potassium Phosphate: 22 mEq    Calcium Gluconate: 9 mEq   Magnesium Sulfate: 20 mEq     MVI for TPN   Trace Elements    Labs to be ordered: CMP, Mag, Phos     Yadi Mendez [facility]    Clinical  Pharmacist

## 2024-07-12 LAB
ALBUMIN SERPL-MCNC: 3.6 G/DL (ref 3.5–5.2)
ALBUMIN/GLOB SERPL: 0.9 G/DL
ALP SERPL-CCNC: 46 U/L (ref 39–117)
ALT SERPL W P-5'-P-CCNC: 7 U/L (ref 1–33)
ANION GAP SERPL CALCULATED.3IONS-SCNC: 9 MMOL/L (ref 5–15)
AST SERPL-CCNC: 12 U/L (ref 1–32)
BILIRUB SERPL-MCNC: 0.2 MG/DL (ref 0–1.2)
BUN SERPL-MCNC: 11 MG/DL (ref 6–20)
BUN/CREAT SERPL: 19.6 (ref 7–25)
CALCIUM SPEC-SCNC: 9.2 MG/DL (ref 8.6–10.5)
CHLORIDE SERPL-SCNC: 101 MMOL/L (ref 98–107)
CO2 SERPL-SCNC: 24 MMOL/L (ref 22–29)
CREAT SERPL-MCNC: 0.56 MG/DL (ref 0.57–1)
EGFRCR SERPLBLD CKD-EPI 2021: 126.9 ML/MIN/1.73
GLOBULIN UR ELPH-MCNC: 4.2 GM/DL
GLUCOSE BLDC GLUCOMTR-MCNC: 106 MG/DL (ref 70–130)
GLUCOSE BLDC GLUCOMTR-MCNC: 110 MG/DL (ref 70–130)
GLUCOSE BLDC GLUCOMTR-MCNC: 114 MG/DL (ref 70–130)
GLUCOSE BLDC GLUCOMTR-MCNC: 99 MG/DL (ref 70–130)
GLUCOSE SERPL-MCNC: 100 MG/DL (ref 65–99)
MAGNESIUM SERPL-MCNC: 1.9 MG/DL (ref 1.6–2.6)
PHOSPHATE SERPL-MCNC: 3.5 MG/DL (ref 2.5–4.5)
POTASSIUM SERPL-SCNC: 4 MMOL/L (ref 3.5–5.2)
PROT SERPL-MCNC: 7.8 G/DL (ref 6–8.5)
SODIUM SERPL-SCNC: 134 MMOL/L (ref 136–145)

## 2024-07-12 PROCEDURE — 25010000002 POTASSIUM CHLORIDE PER 2 MEQ OF POTASSIUM: Performed by: SURGERY

## 2024-07-12 PROCEDURE — 25010000002 CALCIUM GLUCONATE PER 10 ML: Performed by: SURGERY

## 2024-07-12 PROCEDURE — 99024 POSTOP FOLLOW-UP VISIT: CPT | Performed by: SURGERY

## 2024-07-12 PROCEDURE — 82948 REAGENT STRIP/BLOOD GLUCOSE: CPT

## 2024-07-12 PROCEDURE — 25010000002 ENOXAPARIN PER 10 MG: Performed by: STUDENT IN AN ORGANIZED HEALTH CARE EDUCATION/TRAINING PROGRAM

## 2024-07-12 PROCEDURE — 25010000002 MAGNESIUM SULFATE PER 500 MG OF MAGNESIUM: Performed by: SURGERY

## 2024-07-12 PROCEDURE — 97530 THERAPEUTIC ACTIVITIES: CPT

## 2024-07-12 PROCEDURE — 83735 ASSAY OF MAGNESIUM: CPT | Performed by: SURGERY

## 2024-07-12 PROCEDURE — 25010000002 ONDANSETRON PER 1 MG: Performed by: SURGERY

## 2024-07-12 PROCEDURE — 25010000002 DIPHENHYDRAMINE PER 50 MG: Performed by: STUDENT IN AN ORGANIZED HEALTH CARE EDUCATION/TRAINING PROGRAM

## 2024-07-12 PROCEDURE — 25010000002 HYDROMORPHONE 1 MG/ML SOLUTION: Performed by: STUDENT IN AN ORGANIZED HEALTH CARE EDUCATION/TRAINING PROGRAM

## 2024-07-12 PROCEDURE — 84100 ASSAY OF PHOSPHORUS: CPT | Performed by: SURGERY

## 2024-07-12 PROCEDURE — 80053 COMPREHEN METABOLIC PANEL: CPT | Performed by: SURGERY

## 2024-07-12 RX ORDER — FENTANYL 50 UG/1
1 PATCH TRANSDERMAL
Status: DISCONTINUED | OUTPATIENT
Start: 2024-07-12 | End: 2024-07-17 | Stop reason: HOSPADM

## 2024-07-12 RX ADMIN — GABAPENTIN 200 MG: 100 CAPSULE ORAL at 15:25

## 2024-07-12 RX ADMIN — PANTOPRAZOLE SODIUM 40 MG: 40 INJECTION, POWDER, FOR SOLUTION INTRAVENOUS at 08:38

## 2024-07-12 RX ADMIN — DICYCLOMINE HYDROCHLORIDE 10 MG: 10 CAPSULE ORAL at 17:55

## 2024-07-12 RX ADMIN — HYDROMORPHONE HYDROCHLORIDE 1 MG: 1 INJECTION, SOLUTION INTRAMUSCULAR; INTRAVENOUS; SUBCUTANEOUS at 23:43

## 2024-07-12 RX ADMIN — HYDROXYCHLOROQUINE SULFATE 200 MG: 200 TABLET, FILM COATED ORAL at 21:27

## 2024-07-12 RX ADMIN — DICYCLOMINE HYDROCHLORIDE 10 MG: 10 CAPSULE ORAL at 13:20

## 2024-07-12 RX ADMIN — Medication 10 ML: at 08:38

## 2024-07-12 RX ADMIN — HYDROMORPHONE HYDROCHLORIDE 1 MG: 1 INJECTION, SOLUTION INTRAMUSCULAR; INTRAVENOUS; SUBCUTANEOUS at 13:20

## 2024-07-12 RX ADMIN — CALCIUM GLUCONATE: 98 INJECTION, SOLUTION INTRAVENOUS at 17:55

## 2024-07-12 RX ADMIN — FENTANYL 1 PATCH: 50 PATCH, EXTENDED RELEASE TRANSDERMAL at 15:25

## 2024-07-12 RX ADMIN — HYDROMORPHONE HYDROCHLORIDE 1 MG: 1 INJECTION, SOLUTION INTRAMUSCULAR; INTRAVENOUS; SUBCUTANEOUS at 06:02

## 2024-07-12 RX ADMIN — HYDROMORPHONE HYDROCHLORIDE 1 MG: 1 INJECTION, SOLUTION INTRAMUSCULAR; INTRAVENOUS; SUBCUTANEOUS at 08:38

## 2024-07-12 RX ADMIN — GABAPENTIN 200 MG: 100 CAPSULE ORAL at 08:38

## 2024-07-12 RX ADMIN — PANTOPRAZOLE SODIUM 40 MG: 40 INJECTION, POWDER, FOR SOLUTION INTRAVENOUS at 21:23

## 2024-07-12 RX ADMIN — HYDROMORPHONE HYDROCHLORIDE 1 MG: 1 INJECTION, SOLUTION INTRAMUSCULAR; INTRAVENOUS; SUBCUTANEOUS at 21:23

## 2024-07-12 RX ADMIN — I.V. FAT EMULSION 40 G: 20 EMULSION INTRAVENOUS at 17:54

## 2024-07-12 RX ADMIN — HYDROMORPHONE HYDROCHLORIDE 1 MG: 1 INJECTION, SOLUTION INTRAMUSCULAR; INTRAVENOUS; SUBCUTANEOUS at 11:09

## 2024-07-12 RX ADMIN — HYDROXYCHLOROQUINE SULFATE 200 MG: 200 TABLET, FILM COATED ORAL at 08:38

## 2024-07-12 RX ADMIN — HYDROMORPHONE HYDROCHLORIDE 1 MG: 1 INJECTION, SOLUTION INTRAMUSCULAR; INTRAVENOUS; SUBCUTANEOUS at 15:25

## 2024-07-12 RX ADMIN — GABAPENTIN 200 MG: 100 CAPSULE ORAL at 21:27

## 2024-07-12 RX ADMIN — ESCITALOPRAM 5 MG: 5 TABLET, FILM COATED ORAL at 08:38

## 2024-07-12 RX ADMIN — HYDROMORPHONE HYDROCHLORIDE 1 MG: 1 INJECTION, SOLUTION INTRAMUSCULAR; INTRAVENOUS; SUBCUTANEOUS at 17:55

## 2024-07-12 RX ADMIN — Medication 10 ML: at 21:23

## 2024-07-12 RX ADMIN — DICYCLOMINE HYDROCHLORIDE 10 MG: 10 CAPSULE ORAL at 21:27

## 2024-07-12 RX ADMIN — HYDROCODONE BITARTRATE AND ACETAMINOPHEN 15 ML: 7.5; 325 SOLUTION ORAL at 01:23

## 2024-07-12 RX ADMIN — DIPHENHYDRAMINE HYDROCHLORIDE 50 MG: 50 INJECTION, SOLUTION INTRAMUSCULAR; INTRAVENOUS at 18:13

## 2024-07-12 RX ADMIN — ONDANSETRON 4 MG: 2 INJECTION INTRAMUSCULAR; INTRAVENOUS at 01:30

## 2024-07-12 RX ADMIN — DICYCLOMINE HYDROCHLORIDE 10 MG: 10 CAPSULE ORAL at 08:38

## 2024-07-12 RX ADMIN — ENOXAPARIN SODIUM 40 MG: 100 INJECTION SUBCUTANEOUS at 17:55

## 2024-07-12 NOTE — PLAN OF CARE
Goal Outcome Evaluation:  Plan of Care Reviewed With: patient        Progress: no change  Outcome Evaluation: pt had temp of 99.4 at 0100.  also c/o abdominal pain.  prn IV dilaudid given.  Prn hycet also given thru her G tube but pt states it made her nauseous.  TPN and lipids infusing.  lap sies with steristrips.

## 2024-07-12 NOTE — PAYOR COMM NOTE
"Henrietta Garrett (29 y.o. Female)          U/D FOR [id number]      CONTACT F# [phone]         Date of Birth   1995    Social Security Number       Address   [address] [place] [address]    Home Phone   [phone]    MRN   [id number]       Islam   Non-Anabaptist    Marital Status   Single                            Admission Date   7/3/24    Admission Type   Emergency    Admitting Provider   Aureliano Melchor MD    Attending Provider   Devon Mariee MD    Department, Room/Bed   [address], [address]       Discharge Date       Discharge Disposition       Discharge Destination                                 Attending Provider: Devon Mariee MD    Allergies: Anesthetics, Amide, Ciprofloxacin, Compazine [Prochlorperazine], Domperidone, Droperidol, Haldol [Haloperidol], Metoclopramide, Sulfa Antibiotics    Isolation: None   Infection: None   Code Status: CPR    Ht: 180.3 cm (71\")   Wt: 80.2 kg (176 lb 12.9 oz)    Admission Cmt: None   Principal Problem: Abdominal wall cellulitis [L03.311]                   Active Insurance as of 7/3/2024       Primary Coverage       Payor Plan Insurance Group Employer/Plan Group    [facility] [employer] [facility] [employer] BLUE [facility] PPO [id number]       Payor Plan Address Payor Plan Phone Number Payor Plan Fax Number Effective Dates    [address] [phone]  1/1/2023 - None Entered    [place] [address]         Subscriber Name Subscriber Birth Date Member ID       NETTA SINGH [birth year]/27/1963 [id number]                     Emergency Contacts        (Rel.) Home Phone Work Phone Mobile Phone    Miley Singh (Mother) [phone] -- [phone]    Zev Redmanon (Significant Other) -- -- [phone]                 Physician Progress Notes (last 24 hours)        Madhu Zabala Jr., MD at 07/12/24 1429          Chief Complaint:    S/P Laparoscopic assisted small bowel resection and PEG, POD " 7    Subjective:    The patient continues to have issues with pain management.  She was not able to tolerate the Hycet through the G-tube because it caused her vomiting.    Objective:    Temp:  [98.2 °F (36.8 °C)-99.4 °F (37.4 °C)] 98.2 °F (36.8 °C)  Heart Rate:  [71-91] 71  Resp:  [16-18] 16  BP: ()/(52-68) 108/61    Physical Exam  Constitutional:       Appearance: She is ill-appearing. She is not toxic-appearing.   Neurological:      Mental Status: She is alert.   Psychiatric:         Behavior: Behavior is cooperative.       BMI is within normal parameters. No other follow-up for BMI required.        Results:    BUN is 11 creatinine 0.56    Impression/Plan:    The patient is POD 7 from a laparoscopic assisted small bowel resection and PEG.  She continues to have pain management issues, likely related to her chronic narcotic use.  I will increase the dosage of the fentanyl patch hoping this provides improved pain relief.    Madhu Zabala Jr., M.D.     Electronically signed by Madhu Zabala Jr., MD at 07/12/24 1431       Consult Notes (last 24 hours)  Notes from 07/11/24 1711 through 07/12/24 1711   No notes of this type exist for this encounter.

## 2024-07-12 NOTE — PROGRESS NOTES
Chief Complaint:    S/P Laparoscopic assisted small bowel resection and PEG, POD 7    Subjective:    The patient continues to have issues with pain management.  She was not able to tolerate the Hycet through the G-tube because it caused her vomiting.    Objective:    Temp:  [98.2 °F (36.8 °C)-99.4 °F (37.4 °C)] 98.2 °F (36.8 °C)  Heart Rate:  [71-91] 71  Resp:  [16-18] 16  BP: ()/(52-68) 108/61    Physical Exam  Constitutional:       Appearance: She is ill-appearing. She is not toxic-appearing.   Neurological:      Mental Status: She is alert.   Psychiatric:         Behavior: Behavior is cooperative.       BMI is within normal parameters. No other follow-up for BMI required.        Results:    BUN is 11 creatinine 0.56    Impression/Plan:    The patient is POD 7 from a laparoscopic assisted small bowel resection and PEG.  She continues to have pain management issues, likely related to her chronic narcotic use.  I will increase the dosage of the fentanyl patch hoping this provides improved pain relief.    Madhu Zabala Jr., M.D.
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
## 2024-07-12 NOTE — PROGRESS NOTES
Continued Stay Note  [facility]     Patient Name: Hnerietta Garrett  MRN: [id number]  Today's Date: 7/12/2024    Admit Date: 7/3/2024    Plan: HOme with family assist. Current with [facility]   Discharge Plan       Row Name 07/12/24 1431       Plan    Plan HOme with family assist. Current with [facility]    Plan Comments CCP is continuing to follow for poss needs/equipemnt.. Patient is current with [facility]                   Discharge Codes    No documentation.                 Expected Discharge Date and Time       Expected Discharge Date Expected Discharge Time    Jul 15, 2024               Melody Calle RN

## 2024-07-12 NOTE — PROGRESS NOTES
[facility] Clinical Pharmacy Services: TPN Daily Progress Note    TPN Day # 7   Indication: Inaccessible GI Tract  Route: central  Type: standard    Subjective/Objective  Results from last 7 days   Lab Units 24  0444 24  0508 24  0453   SODIUM mmol/L 134*   < > 136   POTASSIUM mmol/L 4.0   < > 4.2   CHLORIDE mmol/L 101   < > 106   CO2 mmol/L 24.0   < > 20.0*   BUN mg/dL 11   < > 5*   CREATININE mg/dL 0.56*   < > 0.52*   CALCIUM mg/dL 9.2   < > 8.9   ALBUMIN g/dL 3.6   < >  --    BILIRUBIN mg/dL 0.2   < >  --    ALK PHOS U/L 46   < >  --    ALT (SGPT) U/L 7   < >  --    AST (SGOT) U/L 12   < >  --    GLUCOSE mg/dL 100*   < > 109*   MAGNESIUM mg/dL 1.9   < > 2.0   PHOSPHORUS mg/dL 3.5   < > 3.8   TRIGLYCERIDES mg/dL  --   --  77    < > = values in this interval not displayed.        Diet Orders (active) (From admission, onward)       Start     Ordered    24 1241  NPO Diet NPO Type: Strict NPO  Diet Effective Now         24 1240                  Additional insulin administration while previous TPN infusin units  Additional electrolyte administration while previous TPN infusing: none  Acid suppression: pantoprazole 40 mg IV daily    Goal TPN Formula Recommendations: 95/780/200 AA/Dex/Lipids; 1560 kcal/day  Current TPN Formula: 95/780/200 AA/Dex/Lipids    Assessment/Plan      Plan  Will continue TPN at the following goal macros:   Protein/Dextrose/Lipids: 95/780/200    Volume: 1560 ml (65 mL/hr over 24 hrs daily)    Based on the above labs, will add the following electrolytes/additives to the TPN.   Labs wnl  repeat tpn from 7/10/24   Sodium Chloride: 90 mEq  (increased from 80 mEq)   Sodium Acetate: 20 mEq   Sodium Phosphate: 0 mEq   Potassium Chloride: 40 mEq   Potassium Acetate: 10 mEq    Potassium Phosphate: 22 mEq    Calcium Gluconate: 9 mEq   Magnesium Sulfate: 20 mEq     MVI for TPN   Trace Elements    Labs to be ordered: CMP, Mag, Phos     Yadi Mendez  RPH    Clinical Pharmacist

## 2024-07-12 NOTE — PLAN OF CARE
Goal Outcome Evaluation:  Afebrile, dressing to abd dry & intact, lap sites x 3 dry & intact with steri-strips, G-Tube clamped, no c/o nausea, requests IV pain med q 2 hrs, states med thru G-tube ineffective, fentanyl patch dose increased, new patch applied, up with assist, boyfriend at bedside, sleeping between care, worked with PT, TPN infusing, no sliding scale coverage needed, voiding w/o difficulty  Plan of Care Reviewed With: patient
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
## 2024-07-12 NOTE — PLAN OF CARE
Goal Outcome Evaluation:  Plan of Care Reviewed With: patient, significant other        Progress: improving  Outcome Evaluation: Pt resting in bed, states she is not feeling well today due to fever but willing to participate in therapy. She req CGA and increased time to sit EOB, she yells in pain with sitting and rates abd pain = 8/10. She stood from slightly elevated EOB with min A using r wx and vc for hand placement. Pt amb 110' with r wx and CGA +1 for equip management. Pace is slow, posture is guarded, balance is fair, endurance is improving but still poor. Pt returned supine in bed with CGA and she used her arms to help raise her legs back into bed. Pt amb slightly increased amb distance today with CGA. Pt safe to amb with nsg staff throughout the day. Cont PT to address functional deficits and prepare for d/c home.      Anticipated Discharge Disposition (PT): home with assist, home with home health

## 2024-07-13 LAB
ALBUMIN SERPL-MCNC: 3.6 G/DL (ref 3.5–5.2)
ALBUMIN/GLOB SERPL: 0.9 G/DL
ALP SERPL-CCNC: 45 U/L (ref 39–117)
ALT SERPL W P-5'-P-CCNC: 8 U/L (ref 1–33)
ANION GAP SERPL CALCULATED.3IONS-SCNC: 6.5 MMOL/L (ref 5–15)
AST SERPL-CCNC: 10 U/L (ref 1–32)
BILIRUB SERPL-MCNC: 0.2 MG/DL (ref 0–1.2)
BUN SERPL-MCNC: 10 MG/DL (ref 6–20)
BUN/CREAT SERPL: 17.5 (ref 7–25)
CALCIUM SPEC-SCNC: 9.4 MG/DL (ref 8.6–10.5)
CHLORIDE SERPL-SCNC: 103 MMOL/L (ref 98–107)
CO2 SERPL-SCNC: 25.5 MMOL/L (ref 22–29)
CREAT SERPL-MCNC: 0.57 MG/DL (ref 0.57–1)
EGFRCR SERPLBLD CKD-EPI 2021: 126.3 ML/MIN/1.73
GLOBULIN UR ELPH-MCNC: 4 GM/DL
GLUCOSE BLDC GLUCOMTR-MCNC: 101 MG/DL (ref 70–130)
GLUCOSE BLDC GLUCOMTR-MCNC: 85 MG/DL (ref 70–130)
GLUCOSE BLDC GLUCOMTR-MCNC: 92 MG/DL (ref 70–130)
GLUCOSE BLDC GLUCOMTR-MCNC: 96 MG/DL (ref 70–130)
GLUCOSE SERPL-MCNC: 95 MG/DL (ref 65–99)
MAGNESIUM SERPL-MCNC: 2 MG/DL (ref 1.6–2.6)
PHOSPHATE SERPL-MCNC: 3.6 MG/DL (ref 2.5–4.5)
POTASSIUM SERPL-SCNC: 4.2 MMOL/L (ref 3.5–5.2)
PROT SERPL-MCNC: 7.6 G/DL (ref 6–8.5)
SODIUM SERPL-SCNC: 135 MMOL/L (ref 136–145)

## 2024-07-13 PROCEDURE — 25010000002 ENOXAPARIN PER 10 MG: Performed by: STUDENT IN AN ORGANIZED HEALTH CARE EDUCATION/TRAINING PROGRAM

## 2024-07-13 PROCEDURE — 25010000002 DIPHENHYDRAMINE PER 50 MG: Performed by: STUDENT IN AN ORGANIZED HEALTH CARE EDUCATION/TRAINING PROGRAM

## 2024-07-13 PROCEDURE — 25010000002 HYDROMORPHONE 1 MG/ML SOLUTION: Performed by: STUDENT IN AN ORGANIZED HEALTH CARE EDUCATION/TRAINING PROGRAM

## 2024-07-13 PROCEDURE — 80053 COMPREHEN METABOLIC PANEL: CPT | Performed by: SURGERY

## 2024-07-13 PROCEDURE — 25010000002 CALCIUM GLUCONATE PER 10 ML: Performed by: SURGERY

## 2024-07-13 PROCEDURE — 84100 ASSAY OF PHOSPHORUS: CPT | Performed by: SURGERY

## 2024-07-13 PROCEDURE — 25010000002 HYDROMORPHONE 1 MG/ML SOLUTION: Performed by: SURGERY

## 2024-07-13 PROCEDURE — 25010000002 POTASSIUM CHLORIDE PER 2 MEQ OF POTASSIUM: Performed by: SURGERY

## 2024-07-13 PROCEDURE — 83735 ASSAY OF MAGNESIUM: CPT | Performed by: SURGERY

## 2024-07-13 PROCEDURE — 82948 REAGENT STRIP/BLOOD GLUCOSE: CPT

## 2024-07-13 PROCEDURE — 25010000002 MAGNESIUM SULFATE PER 500 MG OF MAGNESIUM: Performed by: SURGERY

## 2024-07-13 PROCEDURE — 99024 POSTOP FOLLOW-UP VISIT: CPT | Performed by: SURGERY

## 2024-07-13 RX ORDER — HYDROCODONE BITARTRATE AND ACETAMINOPHEN 7.5; 325 MG/1; MG/1
1 TABLET ORAL EVERY 4 HOURS PRN
Status: DISCONTINUED | OUTPATIENT
Start: 2024-07-13 | End: 2024-07-14

## 2024-07-13 RX ADMIN — HYDROXYCHLOROQUINE SULFATE 200 MG: 200 TABLET, FILM COATED ORAL at 08:39

## 2024-07-13 RX ADMIN — Medication 10 ML: at 20:36

## 2024-07-13 RX ADMIN — DICYCLOMINE HYDROCHLORIDE 10 MG: 10 CAPSULE ORAL at 08:39

## 2024-07-13 RX ADMIN — GABAPENTIN 200 MG: 100 CAPSULE ORAL at 08:39

## 2024-07-13 RX ADMIN — DICYCLOMINE HYDROCHLORIDE 10 MG: 10 CAPSULE ORAL at 20:27

## 2024-07-13 RX ADMIN — DICYCLOMINE HYDROCHLORIDE 10 MG: 10 CAPSULE ORAL at 18:05

## 2024-07-13 RX ADMIN — GABAPENTIN 200 MG: 100 CAPSULE ORAL at 16:12

## 2024-07-13 RX ADMIN — DICYCLOMINE HYDROCHLORIDE 10 MG: 10 CAPSULE ORAL at 12:25

## 2024-07-13 RX ADMIN — PANTOPRAZOLE SODIUM 40 MG: 40 INJECTION, POWDER, FOR SOLUTION INTRAVENOUS at 08:40

## 2024-07-13 RX ADMIN — HYDROMORPHONE HYDROCHLORIDE 1 MG: 1 INJECTION, SOLUTION INTRAMUSCULAR; INTRAVENOUS; SUBCUTANEOUS at 20:27

## 2024-07-13 RX ADMIN — HYDROCODONE BITARTRATE AND ACETAMINOPHEN 1 TABLET: 7.5; 325 TABLET ORAL at 22:48

## 2024-07-13 RX ADMIN — DIPHENHYDRAMINE HYDROCHLORIDE 50 MG: 50 INJECTION, SOLUTION INTRAMUSCULAR; INTRAVENOUS at 00:39

## 2024-07-13 RX ADMIN — HYDROMORPHONE HYDROCHLORIDE 1 MG: 1 INJECTION, SOLUTION INTRAMUSCULAR; INTRAVENOUS; SUBCUTANEOUS at 14:33

## 2024-07-13 RX ADMIN — ESCITALOPRAM 5 MG: 5 TABLET, FILM COATED ORAL at 12:25

## 2024-07-13 RX ADMIN — HYDROMORPHONE HYDROCHLORIDE 1 MG: 1 INJECTION, SOLUTION INTRAMUSCULAR; INTRAVENOUS; SUBCUTANEOUS at 09:57

## 2024-07-13 RX ADMIN — CALCIUM GLUCONATE: 98 INJECTION, SOLUTION INTRAVENOUS at 18:05

## 2024-07-13 RX ADMIN — GABAPENTIN 200 MG: 100 CAPSULE ORAL at 20:27

## 2024-07-13 RX ADMIN — Medication 10 ML: at 08:40

## 2024-07-13 RX ADMIN — ENOXAPARIN SODIUM 40 MG: 100 INJECTION SUBCUTANEOUS at 18:08

## 2024-07-13 RX ADMIN — HYDROXYCHLOROQUINE SULFATE 200 MG: 200 TABLET, FILM COATED ORAL at 20:27

## 2024-07-13 RX ADMIN — HYDROCODONE BITARTRATE AND ACETAMINOPHEN 15 ML: 7.5; 325 SOLUTION ORAL at 12:25

## 2024-07-13 RX ADMIN — HYDROMORPHONE HYDROCHLORIDE 1 MG: 1 INJECTION, SOLUTION INTRAMUSCULAR; INTRAVENOUS; SUBCUTANEOUS at 06:11

## 2024-07-13 RX ADMIN — HYDROMORPHONE HYDROCHLORIDE 1 MG: 1 INJECTION, SOLUTION INTRAMUSCULAR; INTRAVENOUS; SUBCUTANEOUS at 03:30

## 2024-07-13 RX ADMIN — DIPHENHYDRAMINE HYDROCHLORIDE 50 MG: 50 INJECTION, SOLUTION INTRAMUSCULAR; INTRAVENOUS at 18:12

## 2024-07-13 RX ADMIN — I.V. FAT EMULSION 40 G: 20 EMULSION INTRAVENOUS at 18:05
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
## 2024-07-13 NOTE — PROGRESS NOTES
[facility] Clinical Pharmacy Services: TPN Daily Progress Note    TPN Day # 8   Indication: Inaccessible GI Tract  Route: central  Type: standard    Subjective/Objective  Results from last 7 days   Lab Units 07/13/24  0323   SODIUM mmol/L 135*   POTASSIUM mmol/L 4.2   CHLORIDE mmol/L 103   CO2 mmol/L 25.5   BUN mg/dL 10   CREATININE mg/dL 0.57   CALCIUM mg/dL 9.4   ALBUMIN g/dL 3.6   BILIRUBIN mg/dL 0.2   ALK PHOS U/L 45   ALT (SGPT) U/L 8   AST (SGOT) U/L 10   GLUCOSE mg/dL 95   MAGNESIUM mg/dL 2.0   PHOSPHORUS mg/dL 3.6        Diet Orders (active) (From admission, onward)       Start     Ordered    07/05/24 1241  NPO Diet NPO Type: Strict NPO  Diet Effective Now         07/05/24 1240                  Additional insulin administration while previous TPN infusing: none  Additional electrolyte administration while previous TPN infusing: none  Acid suppression: pantoprazole 40 mg IV q12h    Goal TPN Formula Recommendations: 95/780/200 AA/Dex/Lipids; 1560 kcal/day  Current TPN Formula: 95/780/200 AA/Dex/Lipids    Assessment/Plan      Plan  Will continue TPN at the following goal macros:   Protein/Dextrose/Lipids: 95/780/200    Volume: 1560 ml (65 mL/hr over 24 hrs daily)    Labs are stable. Will continue same TPN as yesterday.  Based on the above labs, will add the following electrolytes/additives to the TPN.     Sodium Chloride: 90 mEq     Sodium Acetate: 20 mEq   Potassium Chloride: 40 mEq   Potassium Acetate: 10 mEq    Potassium Phosphate: 22 mEq    Calcium Gluconate: 9 mEq   Magnesium Sulfate: 20 mEq     MVI for TPN   Trace Elements    Labs to be ordered: CMP, Mag, Phos + TG level with AM labs     Tao Anaya, PharmD  7/13/2024

## 2024-07-13 NOTE — PLAN OF CARE
Goal Outcome Evaluation:  Plan of Care Reviewed With: patient        Progress: improving  Outcome Evaluation: VSS- bp can be low at times, ask for medication frequently- given iv dilaudid. Tried Hycet which made her nauseous- pt willing to try a pain pill, walked in givens, falls precautions maintained, TPN infusing per order, Port dressing CDI, Gtube clamped, incisions CDI, monitoring blood sugars, family at bedside, up with assist x1 and a walker

## 2024-07-13 NOTE — PLAN OF CARE
Goal Outcome Evaluation:  Plan of Care Reviewed With: patient        Progress: no change  Outcome Evaluation: VSS. IV dilaudid given multiple times.  TPN and lipid infusing thru he mediport.  lap site x 3 with sterstrips.  G tube clamped.  blood sugar monitoring.  to monitor electrolytes per protocol.

## 2024-07-13 NOTE — PROGRESS NOTES
Colorectal & General Surgery  Progress Note    Patient: Henrietta Garrett  YOB: 1995  MRN: [id number]      Assessment  Henrietta Garrett is a 29 y.o. female now postoperative day 8 from laparoscopic takedown of enterocutaneous fistula and replacement of gastrostomy tube.  Abdominal exam is benign.  Tolerating TPN well.  Gastrostomy tube functioning well.  The only thing keeping her in the hospital at this point is pain control.  I have reduced the frequency of her Dilaudid to every 3 hours.  Continue remainder of pain regimen.    She is okay for discharge from a surgical standpoint once her pain is adequately controlled.    Subjective  Resting comfortably this morning.  Complains of some abdominal pain.  She says that Dilaudid is the only thing that works.    Objective    Vitals:    07/13/24 0556   BP: 98/56   Pulse: 70   Resp: 16   Temp: 97.5 °F (36.4 °C)   SpO2: 98%       Physical Exam  Constitutional: Well-developed well-nourished, no acute distress  Neck: Supple, trachea midline  Respiratory: No increased work of breathing, Symmetric excursion  Cardiovascular: Well pefursed, no jugular venous distention evident   Abdominal: Incisions in good order.  Soft, tender, nondistended.    Skin: Warm, dry, no rash on visualized skin surfaces  Psychiatric: Alert and oriented ×3, normal affect     Laboratory Results  I have personally reviewed CMP with creatinine 0.57, bicarb 25.    Radiology  None to review         Jorge Mcdaniels MD  Colorectal & General Surgery  [facility]    [address], [address]  [place], [place], [address]  P: [phone]  F: [phone]

## 2024-07-14 LAB
ALBUMIN SERPL-MCNC: 3.7 G/DL (ref 3.5–5.2)
ALBUMIN/GLOB SERPL: 0.9 G/DL
ALP SERPL-CCNC: 52 U/L (ref 39–117)
ALT SERPL W P-5'-P-CCNC: 6 U/L (ref 1–33)
ANION GAP SERPL CALCULATED.3IONS-SCNC: 9 MMOL/L (ref 5–15)
AST SERPL-CCNC: 13 U/L (ref 1–32)
BASOPHILS # BLD AUTO: 0.02 10*3/MM3 (ref 0–0.2)
BASOPHILS NFR BLD AUTO: 0.4 % (ref 0–1.5)
BILIRUB SERPL-MCNC: <0.2 MG/DL (ref 0–1.2)
BUN SERPL-MCNC: 10 MG/DL (ref 6–20)
BUN/CREAT SERPL: 19.6 (ref 7–25)
CALCIUM SPEC-SCNC: 9.1 MG/DL (ref 8.6–10.5)
CHLORIDE SERPL-SCNC: 103 MMOL/L (ref 98–107)
CO2 SERPL-SCNC: 25 MMOL/L (ref 22–29)
CREAT SERPL-MCNC: 0.51 MG/DL (ref 0.57–1)
DEPRECATED RDW RBC AUTO: 42.9 FL (ref 37–54)
EGFRCR SERPLBLD CKD-EPI 2021: 129.8 ML/MIN/1.73
EOSINOPHIL # BLD AUTO: 0.35 10*3/MM3 (ref 0–0.4)
EOSINOPHIL NFR BLD AUTO: 6.8 % (ref 0.3–6.2)
ERYTHROCYTE [DISTWIDTH] IN BLOOD BY AUTOMATED COUNT: 14.5 % (ref 12.3–15.4)
GLOBULIN UR ELPH-MCNC: 4 GM/DL
GLUCOSE BLDC GLUCOMTR-MCNC: 101 MG/DL (ref 70–130)
GLUCOSE BLDC GLUCOMTR-MCNC: 83 MG/DL (ref 70–130)
GLUCOSE BLDC GLUCOMTR-MCNC: 95 MG/DL (ref 70–130)
GLUCOSE BLDC GLUCOMTR-MCNC: 98 MG/DL (ref 70–130)
GLUCOSE SERPL-MCNC: 97 MG/DL (ref 65–99)
HCT VFR BLD AUTO: 29.3 % (ref 34–46.6)
HGB BLD-MCNC: 9 G/DL (ref 12–15.9)
IMM GRANULOCYTES # BLD AUTO: 0.01 10*3/MM3 (ref 0–0.05)
IMM GRANULOCYTES NFR BLD AUTO: 0.2 % (ref 0–0.5)
LYMPHOCYTES # BLD AUTO: 1.5 10*3/MM3 (ref 0.7–3.1)
LYMPHOCYTES NFR BLD AUTO: 29 % (ref 19.6–45.3)
MAGNESIUM SERPL-MCNC: 2 MG/DL (ref 1.6–2.6)
MCH RBC QN AUTO: 24.9 PG (ref 26.6–33)
MCHC RBC AUTO-ENTMCNC: 30.7 G/DL (ref 31.5–35.7)
MCV RBC AUTO: 81.2 FL (ref 79–97)
MONOCYTES # BLD AUTO: 0.57 10*3/MM3 (ref 0.1–0.9)
MONOCYTES NFR BLD AUTO: 11 % (ref 5–12)
NEUTROPHILS NFR BLD AUTO: 2.73 10*3/MM3 (ref 1.7–7)
NEUTROPHILS NFR BLD AUTO: 52.6 % (ref 42.7–76)
NRBC BLD AUTO-RTO: 0 /100 WBC (ref 0–0.2)
PHOSPHATE SERPL-MCNC: 3.8 MG/DL (ref 2.5–4.5)
PLATELET # BLD AUTO: 260 10*3/MM3 (ref 140–450)
PMV BLD AUTO: 10.8 FL (ref 6–12)
POTASSIUM SERPL-SCNC: 4 MMOL/L (ref 3.5–5.2)
PROT SERPL-MCNC: 7.7 G/DL (ref 6–8.5)
RBC # BLD AUTO: 3.61 10*6/MM3 (ref 3.77–5.28)
SODIUM SERPL-SCNC: 137 MMOL/L (ref 136–145)
TRIGL SERPL-MCNC: 100 MG/DL (ref 0–150)
WBC NRBC COR # BLD AUTO: 5.18 10*3/MM3 (ref 3.4–10.8)

## 2024-07-14 PROCEDURE — 82948 REAGENT STRIP/BLOOD GLUCOSE: CPT

## 2024-07-14 PROCEDURE — 25010000002 POTASSIUM CHLORIDE PER 2 MEQ OF POTASSIUM: Performed by: SURGERY

## 2024-07-14 PROCEDURE — 25010000002 AMPICILLIN-SULBACTAM PER 1.5 G: Performed by: SURGERY

## 2024-07-14 PROCEDURE — 25010000002 ENOXAPARIN PER 10 MG: Performed by: STUDENT IN AN ORGANIZED HEALTH CARE EDUCATION/TRAINING PROGRAM

## 2024-07-14 PROCEDURE — 84478 ASSAY OF TRIGLYCERIDES: CPT | Performed by: SURGERY

## 2024-07-14 PROCEDURE — 25010000002 MAGNESIUM SULFATE PER 500 MG OF MAGNESIUM: Performed by: SURGERY

## 2024-07-14 PROCEDURE — 83735 ASSAY OF MAGNESIUM: CPT | Performed by: SURGERY

## 2024-07-14 PROCEDURE — 25010000002 CALCIUM GLUCONATE PER 10 ML: Performed by: SURGERY

## 2024-07-14 PROCEDURE — 84100 ASSAY OF PHOSPHORUS: CPT | Performed by: SURGERY

## 2024-07-14 PROCEDURE — 99024 POSTOP FOLLOW-UP VISIT: CPT | Performed by: SURGERY

## 2024-07-14 PROCEDURE — 80053 COMPREHEN METABOLIC PANEL: CPT | Performed by: SURGERY

## 2024-07-14 PROCEDURE — 25010000002 HYDROMORPHONE 1 MG/ML SOLUTION: Performed by: SURGERY

## 2024-07-14 PROCEDURE — 85025 COMPLETE CBC W/AUTO DIFF WBC: CPT | Performed by: HOSPITALIST

## 2024-07-14 PROCEDURE — 25010000002 DIPHENHYDRAMINE PER 50 MG: Performed by: STUDENT IN AN ORGANIZED HEALTH CARE EDUCATION/TRAINING PROGRAM

## 2024-07-14 RX ORDER — HYDROCODONE BITARTRATE AND ACETAMINOPHEN 5; 325 MG/1; MG/1
1 TABLET ORAL EVERY 4 HOURS PRN
Status: DISCONTINUED | OUTPATIENT
Start: 2024-07-14 | End: 2024-07-17 | Stop reason: HOSPADM

## 2024-07-14 RX ORDER — HYDROCODONE BITARTRATE AND ACETAMINOPHEN 7.5; 325 MG/1; MG/1
1 TABLET ORAL EVERY 6 HOURS
Status: DISCONTINUED | OUTPATIENT
Start: 2024-07-14 | End: 2024-07-17 | Stop reason: HOSPADM

## 2024-07-14 RX ADMIN — HYDROMORPHONE HYDROCHLORIDE 1 MG: 1 INJECTION, SOLUTION INTRAMUSCULAR; INTRAVENOUS; SUBCUTANEOUS at 17:20

## 2024-07-14 RX ADMIN — HYDROCODONE BITARTRATE AND ACETAMINOPHEN 1 TABLET: 7.5; 325 TABLET ORAL at 14:07

## 2024-07-14 RX ADMIN — AMPICILLIN SODIUM AND SULBACTAM SODIUM 3 G: 2; 1 INJECTION, POWDER, FOR SOLUTION INTRAMUSCULAR; INTRAVENOUS at 15:34

## 2024-07-14 RX ADMIN — PANTOPRAZOLE SODIUM 40 MG: 40 INJECTION, POWDER, FOR SOLUTION INTRAVENOUS at 09:00

## 2024-07-14 RX ADMIN — HYDROCODONE BITARTRATE AND ACETAMINOPHEN 1 TABLET: 7.5; 325 TABLET ORAL at 20:30

## 2024-07-14 RX ADMIN — DIPHENHYDRAMINE HYDROCHLORIDE 50 MG: 50 INJECTION, SOLUTION INTRAMUSCULAR; INTRAVENOUS at 18:04

## 2024-07-14 RX ADMIN — GABAPENTIN 200 MG: 100 CAPSULE ORAL at 16:14

## 2024-07-14 RX ADMIN — Medication 10 ML: at 22:55

## 2024-07-14 RX ADMIN — DICYCLOMINE HYDROCHLORIDE 10 MG: 10 CAPSULE ORAL at 18:04

## 2024-07-14 RX ADMIN — DICYCLOMINE HYDROCHLORIDE 10 MG: 10 CAPSULE ORAL at 22:54

## 2024-07-14 RX ADMIN — GABAPENTIN 200 MG: 100 CAPSULE ORAL at 08:59

## 2024-07-14 RX ADMIN — HYDROXYCHLOROQUINE SULFATE 200 MG: 200 TABLET, FILM COATED ORAL at 08:59

## 2024-07-14 RX ADMIN — HYDROXYCHLOROQUINE SULFATE 200 MG: 200 TABLET, FILM COATED ORAL at 22:54

## 2024-07-14 RX ADMIN — ENOXAPARIN SODIUM 40 MG: 100 INJECTION SUBCUTANEOUS at 18:04

## 2024-07-14 RX ADMIN — I.V. FAT EMULSION 40 G: 20 EMULSION INTRAVENOUS at 18:03

## 2024-07-14 RX ADMIN — CALCIUM GLUCONATE: 98 INJECTION, SOLUTION INTRAVENOUS at 18:03

## 2024-07-14 RX ADMIN — ESCITALOPRAM 5 MG: 5 TABLET, FILM COATED ORAL at 08:59

## 2024-07-14 RX ADMIN — DIPHENHYDRAMINE HYDROCHLORIDE 50 MG: 50 INJECTION, SOLUTION INTRAMUSCULAR; INTRAVENOUS at 00:22

## 2024-07-14 RX ADMIN — Medication 10 ML: at 09:00

## 2024-07-14 RX ADMIN — HYDROMORPHONE HYDROCHLORIDE 1 MG: 1 INJECTION, SOLUTION INTRAMUSCULAR; INTRAVENOUS; SUBCUTANEOUS at 11:24

## 2024-07-14 RX ADMIN — DICYCLOMINE HYDROCHLORIDE 10 MG: 10 CAPSULE ORAL at 08:59

## 2024-07-14 RX ADMIN — HYDROMORPHONE HYDROCHLORIDE 1 MG: 1 INJECTION, SOLUTION INTRAMUSCULAR; INTRAVENOUS; SUBCUTANEOUS at 23:56

## 2024-07-14 RX ADMIN — AMPICILLIN SODIUM AND SULBACTAM SODIUM 3 G: 2; 1 INJECTION, POWDER, FOR SOLUTION INTRAMUSCULAR; INTRAVENOUS at 20:29

## 2024-07-14 RX ADMIN — PANTOPRAZOLE SODIUM 40 MG: 40 INJECTION, POWDER, FOR SOLUTION INTRAVENOUS at 22:54

## 2024-07-14 RX ADMIN — GABAPENTIN 200 MG: 100 CAPSULE ORAL at 22:53

## 2024-07-14 RX ADMIN — HYDROMORPHONE HYDROCHLORIDE 1 MG: 1 INJECTION, SOLUTION INTRAMUSCULAR; INTRAVENOUS; SUBCUTANEOUS at 00:22

## 2024-07-14 RX ADMIN — HYDROMORPHONE HYDROCHLORIDE 1 MG: 1 INJECTION, SOLUTION INTRAMUSCULAR; INTRAVENOUS; SUBCUTANEOUS at 04:23

## 2024-07-14 RX ADMIN — DIPHENHYDRAMINE HYDROCHLORIDE 50 MG: 50 INJECTION, SOLUTION INTRAMUSCULAR; INTRAVENOUS at 23:56

## 2024-07-14 RX ADMIN — HYDROCODONE BITARTRATE AND ACETAMINOPHEN 1 TABLET: 7.5; 325 TABLET ORAL at 08:59

## 2024-07-14 NOTE — PROGRESS NOTES
[facility] Clinical Pharmacy Services: TPN Daily Progress Note    TPN Day # 9   Indication: Inaccessible GI Tract  Route: central  Type: standard    Subjective/Objective  Results from last 7 days   Lab Units 07/14/24  0311   SODIUM mmol/L 137   POTASSIUM mmol/L 4.0   CHLORIDE mmol/L 103   CO2 mmol/L 25.0   BUN mg/dL 10   CREATININE mg/dL 0.51*   CALCIUM mg/dL 9.1   ALBUMIN g/dL 3.7   BILIRUBIN mg/dL <0.2   ALK PHOS U/L 52   ALT (SGPT) U/L 6   AST (SGOT) U/L 13   GLUCOSE mg/dL 97   MAGNESIUM mg/dL 2.0   PHOSPHORUS mg/dL 3.8   TRIGLYCERIDES mg/dL 100        Diet Orders (active) (From admission, onward)       Start     Ordered    07/05/24 1241  NPO Diet NPO Type: Strict NPO  Diet Effective Now         07/05/24 1240                  Additional insulin administration while previous TPN infusing: none  Additional electrolyte administration while previous TPN infusing: none  Acid suppression: pantoprazole 40 mg IV q12h    Goal TPN Formula Recommendations: 95/780/200 AA/Dex/Lipids; 1560 kcal/day  Current TPN Formula: 95/780/200 AA/Dex/Lipids    Assessment/Plan      Plan  Will continue TPN at the following goal macros:   Protein/Dextrose/Lipids: 95/780/200    Volume: 1560 ml (65 mL/hr over 24 hrs daily)    Labs are stable. Will continue same TPN as yesterday.  Based on the above labs, will add the following electrolytes/additives to the TPN.     Sodium Chloride: 90 mEq     Sodium Acetate: 20 mEq   Potassium Chloride: 40 mEq   Potassium Acetate: 10 mEq    Potassium Phosphate: 22 mEq    Calcium Gluconate: 9 mEq   Magnesium Sulfate: 20 mEq     MVI for TPN   Trace Elements    Labs to be ordered: CMP, Mag, Phos with AM labs  TG level resulted appropriately - continue same lipids.     Tao Anaya, PharmD  7/14/2024
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
## 2024-07-14 NOTE — NURSING NOTE
Continues to ask for prn pain medication frequently, LUQ incision began draining moderate amount of serosanguineous fluid when up to use restroom. Blood sugars stable.

## 2024-07-14 NOTE — PLAN OF CARE
Goal Outcome Evaluation:  Plan of Care Reviewed With: patient        Progress: improving  Outcome Evaluation: VSS- bp can be low at times, family at bedside, small amount of drainage coming from incision- MD made aware, abx ordered, dressing to abdomen ordered, PO and IV pain medication given and pt resting comfortably after pain medication, BM today, port dressing CDI, TPN infusing, monitoring blood sugars, falls precautions maintained, gtube clamped, encouraged pt to get out of bed and sit in chair and walk, pt refused all morning and would only walk 200 feet this evening.

## 2024-07-14 NOTE — PROGRESS NOTES
Colorectal & General Surgery  Progress Note    Patient: Henrietta Garrett  YOB: 1995  MRN: [id number]      Assessment  Henrietta Garrett is a 29 y.o. female now postoperative day 9 for laparoscopic takedown of enterocutaneous fistula and replacement of gastrostomy tube.  Pain control remains her biggest issue.  I reduce the frequency of her Dilaudid every 4 hours and started her on scheduled Norco every 6 hours with additional Norco every 4 hours as needed.    She did develop some drainage from her midline incision overnight.  I opened that incision at the bedside today and found a small amount of serosanguineous drainage.  I did start her on antibiotics for surrounding cellulitis.  Wound packed with wet-to-dry dressing with saline and order placed for nursing staff to do so once per shift.    Subjective  Resting comfortably this morning.  She has some drainage from her incision overnight    Objective    Vitals:    07/14/24 0451   BP: 91/53   Pulse: 61   Resp: 18   Temp: 97.2 °F (36.2 °C)   SpO2: 98%       Physical Exam  Abdomen: Soft, nondistended.  Gastrostomy tube in place and functioning well.  Midline incision and prior G-tube site with some erythema.  No drainage from the J-tube site, but there was significant serosanguineous drainage from the midline incision.    Laboratory Results  I have personally reviewed BMP with creatinine 0.51, albumin 3.7.  CBC with WBC 5, hemoglobin 9, platelets 260.    Radiology  None to review         Jorge Mcdaniels MD  Colorectal & General Surgery  [facility]    [address], [address]  [place], [address]  P: [phone]  F: [phone]

## 2024-07-15 LAB
ALBUMIN SERPL-MCNC: 3.6 G/DL (ref 3.5–5.2)
ALBUMIN/GLOB SERPL: 1 G/DL
ALP SERPL-CCNC: 54 U/L (ref 39–117)
ALT SERPL W P-5'-P-CCNC: 6 U/L (ref 1–33)
ANION GAP SERPL CALCULATED.3IONS-SCNC: 9 MMOL/L (ref 5–15)
AST SERPL-CCNC: 10 U/L (ref 1–32)
BILIRUB SERPL-MCNC: <0.2 MG/DL (ref 0–1.2)
BUN SERPL-MCNC: 11 MG/DL (ref 6–20)
BUN/CREAT SERPL: 21.2 (ref 7–25)
CALCIUM SPEC-SCNC: 9.1 MG/DL (ref 8.6–10.5)
CHLORIDE SERPL-SCNC: 104 MMOL/L (ref 98–107)
CO2 SERPL-SCNC: 24 MMOL/L (ref 22–29)
CREAT SERPL-MCNC: 0.52 MG/DL (ref 0.57–1)
EGFRCR SERPLBLD CKD-EPI 2021: 129.2 ML/MIN/1.73
GLOBULIN UR ELPH-MCNC: 3.7 GM/DL
GLUCOSE BLDC GLUCOMTR-MCNC: 105 MG/DL (ref 70–130)
GLUCOSE BLDC GLUCOMTR-MCNC: 106 MG/DL (ref 70–130)
GLUCOSE BLDC GLUCOMTR-MCNC: 114 MG/DL (ref 70–130)
GLUCOSE BLDC GLUCOMTR-MCNC: 91 MG/DL (ref 70–130)
GLUCOSE SERPL-MCNC: 97 MG/DL (ref 65–99)
MAGNESIUM SERPL-MCNC: 1.9 MG/DL (ref 1.6–2.6)
PHOSPHATE SERPL-MCNC: 3.6 MG/DL (ref 2.5–4.5)
POTASSIUM SERPL-SCNC: 4 MMOL/L (ref 3.5–5.2)
PROT SERPL-MCNC: 7.3 G/DL (ref 6–8.5)
SODIUM SERPL-SCNC: 137 MMOL/L (ref 136–145)

## 2024-07-15 PROCEDURE — 99024 POSTOP FOLLOW-UP VISIT: CPT | Performed by: SURGERY

## 2024-07-15 PROCEDURE — 25010000002 ENOXAPARIN PER 10 MG: Performed by: STUDENT IN AN ORGANIZED HEALTH CARE EDUCATION/TRAINING PROGRAM

## 2024-07-15 PROCEDURE — 25010000002 HYDROMORPHONE 1 MG/ML SOLUTION: Performed by: SURGERY

## 2024-07-15 PROCEDURE — 82948 REAGENT STRIP/BLOOD GLUCOSE: CPT

## 2024-07-15 PROCEDURE — 25010000002 POTASSIUM CHLORIDE PER 2 MEQ OF POTASSIUM: Performed by: SURGERY

## 2024-07-15 PROCEDURE — 25010000002 DIPHENHYDRAMINE PER 50 MG: Performed by: STUDENT IN AN ORGANIZED HEALTH CARE EDUCATION/TRAINING PROGRAM

## 2024-07-15 PROCEDURE — 25010000002 AMPICILLIN-SULBACTAM PER 1.5 G: Performed by: SURGERY

## 2024-07-15 PROCEDURE — 84100 ASSAY OF PHOSPHORUS: CPT | Performed by: SURGERY

## 2024-07-15 PROCEDURE — 83735 ASSAY OF MAGNESIUM: CPT | Performed by: SURGERY

## 2024-07-15 PROCEDURE — 25010000002 CALCIUM GLUCONATE PER 10 ML: Performed by: SURGERY

## 2024-07-15 PROCEDURE — 80053 COMPREHEN METABOLIC PANEL: CPT | Performed by: SURGERY

## 2024-07-15 PROCEDURE — 25010000002 MAGNESIUM SULFATE PER 500 MG OF MAGNESIUM: Performed by: SURGERY

## 2024-07-15 RX ADMIN — CALCIUM GLUCONATE: 98 INJECTION, SOLUTION INTRAVENOUS at 17:38

## 2024-07-15 RX ADMIN — AMPICILLIN SODIUM AND SULBACTAM SODIUM 3 G: 2; 1 INJECTION, POWDER, FOR SOLUTION INTRAMUSCULAR; INTRAVENOUS at 22:11

## 2024-07-15 RX ADMIN — ESCITALOPRAM 5 MG: 5 TABLET, FILM COATED ORAL at 09:18

## 2024-07-15 RX ADMIN — PANTOPRAZOLE SODIUM 40 MG: 40 INJECTION, POWDER, FOR SOLUTION INTRAVENOUS at 09:20

## 2024-07-15 RX ADMIN — HYDROCODONE BITARTRATE AND ACETAMINOPHEN 1 TABLET: 5; 325 TABLET ORAL at 20:03

## 2024-07-15 RX ADMIN — Medication 10 ML: at 09:21

## 2024-07-15 RX ADMIN — HYDROCODONE BITARTRATE AND ACETAMINOPHEN 1 TABLET: 7.5; 325 TABLET ORAL at 13:01

## 2024-07-15 RX ADMIN — Medication 10 ML: at 22:11

## 2024-07-15 RX ADMIN — HYDROMORPHONE HYDROCHLORIDE 1 MG: 1 INJECTION, SOLUTION INTRAMUSCULAR; INTRAVENOUS; SUBCUTANEOUS at 22:12

## 2024-07-15 RX ADMIN — ENOXAPARIN SODIUM 40 MG: 100 INJECTION SUBCUTANEOUS at 17:20

## 2024-07-15 RX ADMIN — AMPICILLIN SODIUM AND SULBACTAM SODIUM 3 G: 2; 1 INJECTION, POWDER, FOR SOLUTION INTRAMUSCULAR; INTRAVENOUS at 09:20

## 2024-07-15 RX ADMIN — DICYCLOMINE HYDROCHLORIDE 10 MG: 10 CAPSULE ORAL at 17:21

## 2024-07-15 RX ADMIN — GABAPENTIN 200 MG: 100 CAPSULE ORAL at 22:11

## 2024-07-15 RX ADMIN — I.V. FAT EMULSION 20 G: 20 EMULSION INTRAVENOUS at 17:39

## 2024-07-15 RX ADMIN — AMPICILLIN SODIUM AND SULBACTAM SODIUM 3 G: 2; 1 INJECTION, POWDER, FOR SOLUTION INTRAMUSCULAR; INTRAVENOUS at 03:10

## 2024-07-15 RX ADMIN — HYDROCODONE BITARTRATE AND ACETAMINOPHEN 1 TABLET: 7.5; 325 TABLET ORAL at 03:10

## 2024-07-15 RX ADMIN — GABAPENTIN 200 MG: 100 CAPSULE ORAL at 09:18

## 2024-07-15 RX ADMIN — DICYCLOMINE HYDROCHLORIDE 10 MG: 10 CAPSULE ORAL at 09:18

## 2024-07-15 RX ADMIN — HYDROXYCHLOROQUINE SULFATE 200 MG: 200 TABLET, FILM COATED ORAL at 22:11

## 2024-07-15 RX ADMIN — HYDROMORPHONE HYDROCHLORIDE 1 MG: 1 INJECTION, SOLUTION INTRAMUSCULAR; INTRAVENOUS; SUBCUTANEOUS at 09:31

## 2024-07-15 RX ADMIN — DICYCLOMINE HYDROCHLORIDE 10 MG: 10 CAPSULE ORAL at 22:11

## 2024-07-15 RX ADMIN — AMPICILLIN SODIUM AND SULBACTAM SODIUM 3 G: 2; 1 INJECTION, POWDER, FOR SOLUTION INTRAMUSCULAR; INTRAVENOUS at 17:20

## 2024-07-15 RX ADMIN — DICYCLOMINE HYDROCHLORIDE 10 MG: 10 CAPSULE ORAL at 13:02

## 2024-07-15 RX ADMIN — HYDROCODONE BITARTRATE AND ACETAMINOPHEN 1 TABLET: 7.5; 325 TABLET ORAL at 06:48

## 2024-07-15 RX ADMIN — PANTOPRAZOLE SODIUM 40 MG: 40 INJECTION, POWDER, FOR SOLUTION INTRAVENOUS at 22:11

## 2024-07-15 RX ADMIN — DIPHENHYDRAMINE HYDROCHLORIDE 50 MG: 50 INJECTION, SOLUTION INTRAMUSCULAR; INTRAVENOUS at 17:20

## 2024-07-15 RX ADMIN — HYDROXYCHLOROQUINE SULFATE 200 MG: 200 TABLET, FILM COATED ORAL at 09:18

## 2024-07-15 RX ADMIN — FENTANYL 1 PATCH: 50 PATCH, EXTENDED RELEASE TRANSDERMAL at 17:21

## 2024-07-15 RX ADMIN — GABAPENTIN 200 MG: 100 CAPSULE ORAL at 17:20
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
## 2024-07-15 NOTE — PROGRESS NOTES
Chief Complaint:    S/P Laparoscopic assisted small bowel resection and G-tube replacement, POD 10    Subjective:    The patient continues to have abdominal pain that is difficult to control.  She believes the increased dose of the fentanyl patch is helping.  She is decreasing the frequency of her Dilaudid.    Objective:    Temp:  [96.4 °F (35.8 °C)-97.5 °F (36.4 °C)] 97.3 °F (36.3 °C)  Heart Rate:  [58-91] 60  Resp:  [16-18] 16  BP: (104-113)/(67-72) 104/67    Physical Exam  Constitutional:       Appearance: She is ill-appearing. She is not toxic-appearing.   Abdominal:      Palpations: Abdomen is soft.      Tenderness: There is generalized abdominal tenderness.      Comments: Incision: Open but clean with base of granulation tissue.   Neurological:      Mental Status: She is alert.   Psychiatric:         Behavior: Behavior is cooperative.       BMI is within normal parameters. No other follow-up for BMI required.        Results:    BUN is 11 creatinine 0.52.  Albumin is 3.6.    Impression/Plan:    The patient is POD 10 from a laparoscopic assisted small bowel resection and G-tube placement.  We are continuing to address pain management issues.  Once she is off Dilaudid, she should be ready for discharge to home.    Madhu Zabala Jr., M.D.

## 2024-07-15 NOTE — PROGRESS NOTES
Continued Stay Note  [facility]     Patient Name: Henrietta Garrett  MRN: [id number]  Today's Date: 7/15/2024    Admit Date: 7/3/2024    Plan: Home with family assist and [facility] (current with)   Discharge Plan       Row Name 07/15/24 1139       Plan    Plan Home with family assist and [facility] (current with)    Plan Comments CCP is continuing to follow for poss needs/equipment                   Discharge Codes    No documentation.                 Expected Discharge Date and Time       Expected Discharge Date Expected Discharge Time    Jul 19, 2024               Melody Calle RN

## 2024-07-15 NOTE — PLAN OF CARE
Goal Outcome Evaluation:  Plan of Care Reviewed With: patient        Progress: improving  Outcome Evaluation: TPN and lipids infusing via mediport, receiving iv antibiotics, g-tube clamped, vss, afebrile, no c/o nausea, tolerating sips of sprite and goldfish, medial abdominal wound changed and is clean and dry, wound bed is moist and pink, encouraged to ambulate in givens, falls precautions maintained.

## 2024-07-15 NOTE — PROGRESS NOTES
[facility]: TPN Daily Progress Note    TPN Day # 10   Indication: Inaccessible GI Tract  Route: central  Type: standard    Subjective/Objective  Results from last 7 days   Lab Units 07/14/24  0311   SODIUM mmol/L 137   POTASSIUM mmol/L 4.0   CHLORIDE mmol/L 103   CO2 mmol/L 25.0   BUN mg/dL 10   CREATININE mg/dL 0.51*   CALCIUM mg/dL 9.1   ALBUMIN g/dL 3.7   BILIRUBIN mg/dL <0.2   ALK PHOS U/L 52   ALT (SGPT) U/L 6   AST (SGOT) U/L 13   GLUCOSE mg/dL 97   MAGNESIUM mg/dL 2.0   PHOSPHORUS mg/dL 3.8   TRIGLYCERIDES mg/dL 100        Diet Orders (active) (From admission, onward)       Start     Ordered    07/05/24 1241  NPO Diet NPO Type: Strict NPO  Diet Effective Now         07/05/24 1240                  Additional insulin administration while previous TPN infusing: none  Additional electrolyte administration while previous TPN infusing: none  Acid suppression: pantoprazole 40 mg IV q12h    Goal TPN Formula Recommendations: 95/780/200 AA/Dex/Lipids; 1560 kcal/day  Current TPN Formula: 95/780/200 AA/Dex/Lipids    Assessment/Plan      Plan  Will continue TPN at the following goal macros:   Protein/Dextrose/Lipids: 95/780/200    Volume: 1560 ml (65 mL/hr over 24 hrs daily)    Labs are stable. Will continue same TPN as yesterday.  Based on the above labs, will add the following electrolytes/additives to the TPN.     Sodium Chloride: 90 mEq     Sodium Acetate: 20 mEq   Potassium Chloride: 40 mEq   Potassium Acetate: 10 mEq    Potassium Phosphate: 22 mEq    Calcium Gluconate: 9 mEq   Magnesium Sulfate: 20 mEq     MVI for TPN   Trace Elements    Labs to be ordered: CMP, Mag, Phos with AM labs    Tao Anaya, PharmD  7/14/2024

## 2024-07-15 NOTE — PLAN OF CARE
Goal Outcome Evaluation:  Plan of Care Reviewed With: patient           Outcome Evaluation: VSS, Family at bedside, dressing to incision changed (wet to  dry, adhered using pt's tape), IV abx given per order, TPN infusing via mediport, port dressing CDI, PO scheduled Norco given, IV dilaudid given PRN, up with asst and walker to toilet, voiding, monitoring blood sugars, on falls with bed alarm, G-tube clamped, NPO with Ice chips, sprite and gold fish per orders. continuing to monitor.

## 2024-07-16 LAB
ALBUMIN SERPL-MCNC: 3.4 G/DL (ref 3.5–5.2)
ALBUMIN/GLOB SERPL: 0.8 G/DL
ALP SERPL-CCNC: 54 U/L (ref 39–117)
ALT SERPL W P-5'-P-CCNC: 8 U/L (ref 1–33)
ANION GAP SERPL CALCULATED.3IONS-SCNC: 11.8 MMOL/L (ref 5–15)
AST SERPL-CCNC: 13 U/L (ref 1–32)
BASOPHILS # BLD AUTO: 0.02 10*3/MM3 (ref 0–0.2)
BASOPHILS NFR BLD AUTO: 0.6 % (ref 0–1.5)
BILIRUB SERPL-MCNC: <0.2 MG/DL (ref 0–1.2)
BUN SERPL-MCNC: 11 MG/DL (ref 6–20)
BUN/CREAT SERPL: 18.3 (ref 7–25)
CALCIUM SPEC-SCNC: 8.8 MG/DL (ref 8.6–10.5)
CHLORIDE SERPL-SCNC: 102 MMOL/L (ref 98–107)
CO2 SERPL-SCNC: 22.2 MMOL/L (ref 22–29)
CREAT SERPL-MCNC: 0.6 MG/DL (ref 0.57–1)
DEPRECATED RDW RBC AUTO: 41.7 FL (ref 37–54)
EGFRCR SERPLBLD CKD-EPI 2021: 124.8 ML/MIN/1.73
EOSINOPHIL # BLD AUTO: 0.33 10*3/MM3 (ref 0–0.4)
EOSINOPHIL NFR BLD AUTO: 9.6 % (ref 0.3–6.2)
ERYTHROCYTE [DISTWIDTH] IN BLOOD BY AUTOMATED COUNT: 14.4 % (ref 12.3–15.4)
GLOBULIN UR ELPH-MCNC: 4.1 GM/DL
GLUCOSE BLDC GLUCOMTR-MCNC: 107 MG/DL (ref 70–130)
GLUCOSE BLDC GLUCOMTR-MCNC: 114 MG/DL (ref 70–130)
GLUCOSE BLDC GLUCOMTR-MCNC: 55 MG/DL (ref 70–130)
GLUCOSE BLDC GLUCOMTR-MCNC: 90 MG/DL (ref 70–130)
GLUCOSE BLDC GLUCOMTR-MCNC: 97 MG/DL (ref 70–130)
GLUCOSE SERPL-MCNC: 94 MG/DL (ref 65–99)
HCT VFR BLD AUTO: 29.5 % (ref 34–46.6)
HGB BLD-MCNC: 9.3 G/DL (ref 12–15.9)
IMM GRANULOCYTES # BLD AUTO: 0.02 10*3/MM3 (ref 0–0.05)
IMM GRANULOCYTES NFR BLD AUTO: 0.6 % (ref 0–0.5)
LYMPHOCYTES # BLD AUTO: 1.3 10*3/MM3 (ref 0.7–3.1)
LYMPHOCYTES NFR BLD AUTO: 37.8 % (ref 19.6–45.3)
MAGNESIUM SERPL-MCNC: 2 MG/DL (ref 1.6–2.6)
MCH RBC QN AUTO: 25.4 PG (ref 26.6–33)
MCHC RBC AUTO-ENTMCNC: 31.5 G/DL (ref 31.5–35.7)
MCV RBC AUTO: 80.6 FL (ref 79–97)
MONOCYTES # BLD AUTO: 0.4 10*3/MM3 (ref 0.1–0.9)
MONOCYTES NFR BLD AUTO: 11.6 % (ref 5–12)
NEUTROPHILS NFR BLD AUTO: 1.37 10*3/MM3 (ref 1.7–7)
NEUTROPHILS NFR BLD AUTO: 39.8 % (ref 42.7–76)
NRBC BLD AUTO-RTO: 0 /100 WBC (ref 0–0.2)
PHOSPHATE SERPL-MCNC: 4 MG/DL (ref 2.5–4.5)
PLATELET # BLD AUTO: 251 10*3/MM3 (ref 140–450)
PMV BLD AUTO: 10.6 FL (ref 6–12)
POTASSIUM SERPL-SCNC: 3.7 MMOL/L (ref 3.5–5.2)
PROT SERPL-MCNC: 7.5 G/DL (ref 6–8.5)
RBC # BLD AUTO: 3.66 10*6/MM3 (ref 3.77–5.28)
SODIUM SERPL-SCNC: 136 MMOL/L (ref 136–145)
WBC NRBC COR # BLD AUTO: 3.44 10*3/MM3 (ref 3.4–10.8)

## 2024-07-16 PROCEDURE — 25010000002 DIPHENHYDRAMINE PER 50 MG: Performed by: STUDENT IN AN ORGANIZED HEALTH CARE EDUCATION/TRAINING PROGRAM

## 2024-07-16 PROCEDURE — 99024 POSTOP FOLLOW-UP VISIT: CPT | Performed by: SURGERY

## 2024-07-16 PROCEDURE — 25010000002 CALCIUM GLUCONATE PER 10 ML: Performed by: SURGERY

## 2024-07-16 PROCEDURE — 80053 COMPREHEN METABOLIC PANEL: CPT | Performed by: SURGERY

## 2024-07-16 PROCEDURE — 97530 THERAPEUTIC ACTIVITIES: CPT

## 2024-07-16 PROCEDURE — 25010000002 HYDROMORPHONE 1 MG/ML SOLUTION: Performed by: SURGERY

## 2024-07-16 PROCEDURE — 25010000002 AMPICILLIN-SULBACTAM PER 1.5 G: Performed by: SURGERY

## 2024-07-16 PROCEDURE — 82948 REAGENT STRIP/BLOOD GLUCOSE: CPT

## 2024-07-16 PROCEDURE — 85025 COMPLETE CBC W/AUTO DIFF WBC: CPT | Performed by: HOSPITALIST

## 2024-07-16 PROCEDURE — 83735 ASSAY OF MAGNESIUM: CPT | Performed by: SURGERY

## 2024-07-16 PROCEDURE — 25010000002 MAGNESIUM SULFATE PER 500 MG OF MAGNESIUM: Performed by: SURGERY

## 2024-07-16 PROCEDURE — 25010000002 ENOXAPARIN PER 10 MG: Performed by: STUDENT IN AN ORGANIZED HEALTH CARE EDUCATION/TRAINING PROGRAM

## 2024-07-16 PROCEDURE — 25010000002 ONDANSETRON PER 1 MG: Performed by: SURGERY

## 2024-07-16 PROCEDURE — 25010000002 POTASSIUM CHLORIDE PER 2 MEQ OF POTASSIUM: Performed by: SURGERY

## 2024-07-16 PROCEDURE — 84100 ASSAY OF PHOSPHORUS: CPT | Performed by: SURGERY

## 2024-07-16 RX ADMIN — HYDROXYCHLOROQUINE SULFATE 200 MG: 200 TABLET, FILM COATED ORAL at 10:29

## 2024-07-16 RX ADMIN — AMPICILLIN SODIUM AND SULBACTAM SODIUM 3 G: 2; 1 INJECTION, POWDER, FOR SOLUTION INTRAMUSCULAR; INTRAVENOUS at 04:43

## 2024-07-16 RX ADMIN — PANTOPRAZOLE SODIUM 40 MG: 40 INJECTION, POWDER, FOR SOLUTION INTRAVENOUS at 10:29

## 2024-07-16 RX ADMIN — AMPICILLIN SODIUM AND SULBACTAM SODIUM 3 G: 2; 1 INJECTION, POWDER, FOR SOLUTION INTRAMUSCULAR; INTRAVENOUS at 10:39

## 2024-07-16 RX ADMIN — DIPHENHYDRAMINE HYDROCHLORIDE 50 MG: 50 INJECTION, SOLUTION INTRAMUSCULAR; INTRAVENOUS at 00:21

## 2024-07-16 RX ADMIN — HYDROMORPHONE HYDROCHLORIDE 1 MG: 1 INJECTION, SOLUTION INTRAMUSCULAR; INTRAVENOUS; SUBCUTANEOUS at 04:43

## 2024-07-16 RX ADMIN — CALCIUM GLUCONATE: 98 INJECTION, SOLUTION INTRAVENOUS at 18:13

## 2024-07-16 RX ADMIN — HYDROCODONE BITARTRATE AND ACETAMINOPHEN 1 TABLET: 7.5; 325 TABLET ORAL at 14:09

## 2024-07-16 RX ADMIN — I.V. FAT EMULSION 20 G: 20 EMULSION INTRAVENOUS at 18:13

## 2024-07-16 RX ADMIN — HYDROMORPHONE HYDROCHLORIDE 1 MG: 1 INJECTION, SOLUTION INTRAMUSCULAR; INTRAVENOUS; SUBCUTANEOUS at 10:29

## 2024-07-16 RX ADMIN — AMPICILLIN SODIUM AND SULBACTAM SODIUM 3 G: 2; 1 INJECTION, POWDER, FOR SOLUTION INTRAMUSCULAR; INTRAVENOUS at 22:08

## 2024-07-16 RX ADMIN — DIPHENHYDRAMINE HYDROCHLORIDE 50 MG: 50 INJECTION, SOLUTION INTRAMUSCULAR; INTRAVENOUS at 17:24

## 2024-07-16 RX ADMIN — GABAPENTIN 200 MG: 100 CAPSULE ORAL at 21:17

## 2024-07-16 RX ADMIN — DICYCLOMINE HYDROCHLORIDE 10 MG: 10 CAPSULE ORAL at 10:29

## 2024-07-16 RX ADMIN — DICYCLOMINE HYDROCHLORIDE 10 MG: 10 CAPSULE ORAL at 17:24

## 2024-07-16 RX ADMIN — HYDROMORPHONE HYDROCHLORIDE 1 MG: 1 INJECTION, SOLUTION INTRAMUSCULAR; INTRAVENOUS; SUBCUTANEOUS at 17:24

## 2024-07-16 RX ADMIN — PANTOPRAZOLE SODIUM 40 MG: 40 INJECTION, POWDER, FOR SOLUTION INTRAVENOUS at 21:17

## 2024-07-16 RX ADMIN — AMPICILLIN SODIUM AND SULBACTAM SODIUM 3 G: 2; 1 INJECTION, POWDER, FOR SOLUTION INTRAMUSCULAR; INTRAVENOUS at 17:26

## 2024-07-16 RX ADMIN — DICYCLOMINE HYDROCHLORIDE 10 MG: 10 CAPSULE ORAL at 21:17

## 2024-07-16 RX ADMIN — HYDROCODONE BITARTRATE AND ACETAMINOPHEN 1 TABLET: 7.5; 325 TABLET ORAL at 08:03

## 2024-07-16 RX ADMIN — ESCITALOPRAM 5 MG: 5 TABLET, FILM COATED ORAL at 10:29

## 2024-07-16 RX ADMIN — I.V. FAT EMULSION 20 G: 20 EMULSION INTRAVENOUS at 00:21

## 2024-07-16 RX ADMIN — DEXTROSE MONOHYDRATE 25 G: 25 INJECTION, SOLUTION INTRAVENOUS at 18:30

## 2024-07-16 RX ADMIN — Medication 10 ML: at 21:17

## 2024-07-16 RX ADMIN — GABAPENTIN 200 MG: 100 CAPSULE ORAL at 10:29

## 2024-07-16 RX ADMIN — HYDROCODONE BITARTRATE AND ACETAMINOPHEN 1 TABLET: 7.5; 325 TABLET ORAL at 01:28

## 2024-07-16 RX ADMIN — HYDROCODONE BITARTRATE AND ACETAMINOPHEN 1 TABLET: 7.5; 325 TABLET ORAL at 21:17

## 2024-07-16 RX ADMIN — ONDANSETRON 4 MG: 2 INJECTION INTRAMUSCULAR; INTRAVENOUS at 18:30

## 2024-07-16 RX ADMIN — HYDROXYCHLOROQUINE SULFATE 200 MG: 200 TABLET, FILM COATED ORAL at 22:08

## 2024-07-16 RX ADMIN — ENOXAPARIN SODIUM 40 MG: 100 INJECTION SUBCUTANEOUS at 17:25

## 2024-07-16 RX ADMIN — Medication 10 ML: at 10:30

## 2024-07-16 RX ADMIN — GABAPENTIN 200 MG: 100 CAPSULE ORAL at 17:24

## 2024-07-16 NOTE — PLAN OF CARE
Goal Outcome Evaluation:            Pt up in bathroom with family asst before PT. She reported amb in hallway yesterday with her mother. Pt amb 175' with cga/sba using rwx. No lob or unsteadiness noted. Dressing fell out of wound over midway with MD present ( Dr. Mariee). Pt became anxious and tearful. She amb back to eob with cga. RN notified and will replace dressing. She reported dizziness upon return to room. Pt reported rwx and bsc are available at home from prior surgeries.
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
## 2024-07-16 NOTE — PROGRESS NOTES
[facility] Clinical Pharmacy Services: TPN Daily Progress Note    TPN Day # 11   Indication: Inaccessible GI Tract  Route: central  Type: standard    Subjective/Objective  Results from last 7 days   Lab Units 07/16/24  0506 07/15/24  0448 07/14/24  0311   SODIUM mmol/L 136   < > 137   POTASSIUM mmol/L 3.7   < > 4.0   CHLORIDE mmol/L 102   < > 103   CO2 mmol/L 22.2   < > 25.0   BUN mg/dL 11   < > 10   CREATININE mg/dL 0.60   < > 0.51*   CALCIUM mg/dL 8.8   < > 9.1   ALBUMIN g/dL 3.4*   < > 3.7   BILIRUBIN mg/dL <0.2   < > <0.2   ALK PHOS U/L 54   < > 52   ALT (SGPT) U/L 8   < > 6   AST (SGOT) U/L 13   < > 13   GLUCOSE mg/dL 94   < > 97   MAGNESIUM mg/dL 2.0   < > 2.0   PHOSPHORUS mg/dL 4.0   < > 3.8   TRIGLYCERIDES mg/dL  --   --  100    < > = values in this interval not displayed.        Diet Orders (active) (From admission, onward)       Start     Ordered    07/05/24 1241  NPO Diet NPO Type: Strict NPO  Diet Effective Now         07/05/24 1240                  Additional insulin administration while previous TPN infusing: none  Additional electrolyte administration while previous TPN infusing: none  Acid suppression: pantoprazole 40 mg IV q12h    Goal TPN Formula Recommendations: 95/780/200 AA/Dex/Lipids; 1560 kcal/day  Current TPN Formula: 95/780/200 AA/Dex/Lipids    Assessment/Plan      Plan  Will continue TPN at the following goal macros:   Protein/Dextrose/Lipids: 95/780/200    Volume: 1560 ml (65 mL/hr over 24 hrs daily)    Labs are stable. Will continue same TPN as yesterday.  Based on the above labs, will add the following electrolytes/additives to the TPN.     Sodium Chloride: 90 mEq     Sodium Acetate: 20 mEq   Potassium Chloride: 40 mEq   Potassium Acetate: 10 mEq    Potassium Phosphate: 22 mEq    Calcium Gluconate: 9 mEq   Magnesium Sulfate: 20 mEq     MVI for TPN   Trace Elements    Labs to be ordered: CMP, Mag, Phos with AM labs    Tao Anaya, PharmD  7/16/2024

## 2024-07-16 NOTE — PROGRESS NOTES
"Continued Stay Note  [facility]     Patient Name: Henrietta Garrett  MRN: [id number]  Today's Date: 7/16/2024    Admit Date: 7/3/2024    Plan: Home with family assist and [facility] (current with for TPN)   Discharge Plan       Row Name 07/16/24 1601       Plan    Plan Home with family assist and [facility] (current with for TPN)    Plan Comments Spoke to Kendra with [facility] who stated patient is current for TPN only. Has not been on TF's since 2023. WIll need order stating \"Resume TPN for home. [facility] to manage with length of need >90 days.\"                   Discharge Codes    No documentation.                 Expected Discharge Date and Time       Expected Discharge Date Expected Discharge Time    Jul 19, 2024               Melody Calle RN    "

## 2024-07-16 NOTE — PLAN OF CARE
Goal Outcome Evaluation:  Plan of Care Reviewed With: patient           Outcome Evaluation: VSS, TPN and Lipids infusing via mediport, dressing CDI to mediport, IV abx given, PO meds taken with sprite, no c/o nausea, scheduled Norco for pain and pt requesting dilaudid between Norco. Gtube clamped, dressing to abd changed per order, encouraged pt to ambulate, use IS, spending most of shift sleeping, fiance at bedside, on falls with bed alarm, will continue to monitor.

## 2024-07-17 ENCOUNTER — OFFICE VISIT (OUTPATIENT)
Dept: PAIN MEDICINE | Facility: CLINIC | Age: 29
End: 2024-07-17
Payer: COMMERCIAL

## 2024-07-17 VITALS
HEIGHT: 71 IN | BODY MASS INDEX: 24.78 KG/M2 | HEART RATE: 77 BPM | TEMPERATURE: 97.4 F | DIASTOLIC BLOOD PRESSURE: 59 MMHG | RESPIRATION RATE: 16 BRPM | SYSTOLIC BLOOD PRESSURE: 97 MMHG | OXYGEN SATURATION: 99 % | WEIGHT: 177.03 LBS

## 2024-07-17 VITALS
BODY MASS INDEX: 24.69 KG/M2 | SYSTOLIC BLOOD PRESSURE: 124 MMHG | DIASTOLIC BLOOD PRESSURE: 92 MMHG | HEIGHT: 71 IN | RESPIRATION RATE: 18 BRPM | TEMPERATURE: 97.1 F | HEART RATE: 75 BPM

## 2024-07-17 DIAGNOSIS — G89.18 POSTOPERATIVE ABDOMINAL PAIN: ICD-10-CM

## 2024-07-17 DIAGNOSIS — G89.4 CHRONIC PAIN SYNDROME: Primary | ICD-10-CM

## 2024-07-17 DIAGNOSIS — M05.9 SEROPOSITIVE RHEUMATOID ARTHRITIS: ICD-10-CM

## 2024-07-17 DIAGNOSIS — Q79.60 EHLERS-DANLOS SYNDROME: ICD-10-CM

## 2024-07-17 DIAGNOSIS — M32.9 SYSTEMIC LUPUS ERYTHEMATOSUS, UNSPECIFIED SLE TYPE, UNSPECIFIED ORGAN INVOLVEMENT STATUS: ICD-10-CM

## 2024-07-17 DIAGNOSIS — G90.521 COMPLEX REGIONAL PAIN SYNDROME TYPE 1 OF RIGHT LOWER EXTREMITY: ICD-10-CM

## 2024-07-17 DIAGNOSIS — R10.9 POSTOPERATIVE ABDOMINAL PAIN: ICD-10-CM

## 2024-07-17 LAB
ALBUMIN SERPL-MCNC: 3.5 G/DL (ref 3.5–5.2)
ALBUMIN/GLOB SERPL: 0.9 G/DL
ALP SERPL-CCNC: 56 U/L (ref 39–117)
ALT SERPL W P-5'-P-CCNC: 9 U/L (ref 1–33)
ANION GAP SERPL CALCULATED.3IONS-SCNC: 8 MMOL/L (ref 5–15)
AST SERPL-CCNC: 13 U/L (ref 1–32)
BILIRUB SERPL-MCNC: <0.2 MG/DL (ref 0–1.2)
BUN SERPL-MCNC: 11 MG/DL (ref 6–20)
BUN/CREAT SERPL: 18 (ref 7–25)
CALCIUM SPEC-SCNC: 9.2 MG/DL (ref 8.6–10.5)
CHLORIDE SERPL-SCNC: 104 MMOL/L (ref 98–107)
CO2 SERPL-SCNC: 24 MMOL/L (ref 22–29)
CREAT SERPL-MCNC: 0.61 MG/DL (ref 0.57–1)
EGFRCR SERPLBLD CKD-EPI 2021: 124.3 ML/MIN/1.73
GLOBULIN UR ELPH-MCNC: 4.1 GM/DL
GLUCOSE BLDC GLUCOMTR-MCNC: 100 MG/DL (ref 70–130)
GLUCOSE SERPL-MCNC: 93 MG/DL (ref 65–99)
MAGNESIUM SERPL-MCNC: 2.1 MG/DL (ref 1.6–2.6)
PHOSPHATE SERPL-MCNC: 3.7 MG/DL (ref 2.5–4.5)
POTASSIUM SERPL-SCNC: 3.9 MMOL/L (ref 3.5–5.2)
PROT SERPL-MCNC: 7.6 G/DL (ref 6–8.5)
SODIUM SERPL-SCNC: 136 MMOL/L (ref 136–145)

## 2024-07-17 PROCEDURE — 80053 COMPREHEN METABOLIC PANEL: CPT | Performed by: SURGERY

## 2024-07-17 PROCEDURE — 25010000002 HYDROMORPHONE 1 MG/ML SOLUTION: Performed by: SURGERY

## 2024-07-17 PROCEDURE — 25010000002 AMPICILLIN-SULBACTAM PER 1.5 G: Performed by: SURGERY

## 2024-07-17 PROCEDURE — 82948 REAGENT STRIP/BLOOD GLUCOSE: CPT

## 2024-07-17 PROCEDURE — 83735 ASSAY OF MAGNESIUM: CPT | Performed by: SURGERY

## 2024-07-17 PROCEDURE — 99024 POSTOP FOLLOW-UP VISIT: CPT | Performed by: SURGERY

## 2024-07-17 PROCEDURE — 25010000002 DIPHENHYDRAMINE PER 50 MG: Performed by: STUDENT IN AN ORGANIZED HEALTH CARE EDUCATION/TRAINING PROGRAM

## 2024-07-17 PROCEDURE — 84100 ASSAY OF PHOSPHORUS: CPT | Performed by: SURGERY

## 2024-07-17 RX ORDER — AMOXICILLIN AND CLAVULANATE POTASSIUM 875; 125 MG/1; MG/1
1 TABLET, FILM COATED ORAL 2 TIMES DAILY
Qty: 8 TABLET | Refills: 0 | Status: SHIPPED | OUTPATIENT
Start: 2024-07-17 | End: 2024-07-23

## 2024-07-17 RX ORDER — HYDROMORPHONE HYDROCHLORIDE 2 MG/1
2 TABLET ORAL EVERY 4 HOURS PRN
Qty: 50 TABLET | Refills: 0 | Status: SHIPPED | OUTPATIENT
Start: 2024-07-17 | End: 2024-07-25

## 2024-07-17 RX ORDER — NALOXONE HYDROCHLORIDE 4 MG/.1ML
SPRAY NASAL
Qty: 2 EACH | Refills: 0 | Status: SHIPPED | OUTPATIENT
Start: 2024-07-17

## 2024-07-17 RX ORDER — NALOXONE HCL 0.4 MG/ML
0.1 VIAL (ML) INJECTION
Start: 2024-07-17

## 2024-07-17 RX ADMIN — GABAPENTIN 200 MG: 100 CAPSULE ORAL at 09:06

## 2024-07-17 RX ADMIN — HYDROCODONE BITARTRATE AND ACETAMINOPHEN 1 TABLET: 7.5; 325 TABLET ORAL at 09:06

## 2024-07-17 RX ADMIN — DICYCLOMINE HYDROCHLORIDE 10 MG: 10 CAPSULE ORAL at 09:06

## 2024-07-17 RX ADMIN — DIPHENHYDRAMINE HYDROCHLORIDE 50 MG: 50 INJECTION, SOLUTION INTRAMUSCULAR; INTRAVENOUS at 00:41

## 2024-07-17 RX ADMIN — PANTOPRAZOLE SODIUM 40 MG: 40 INJECTION, POWDER, FOR SOLUTION INTRAVENOUS at 09:07

## 2024-07-17 RX ADMIN — HYDROMORPHONE HYDROCHLORIDE 1 MG: 1 INJECTION, SOLUTION INTRAMUSCULAR; INTRAVENOUS; SUBCUTANEOUS at 06:09

## 2024-07-17 RX ADMIN — HYDROCODONE BITARTRATE AND ACETAMINOPHEN 1 TABLET: 7.5; 325 TABLET ORAL at 04:23

## 2024-07-17 RX ADMIN — AMPICILLIN SODIUM AND SULBACTAM SODIUM 3 G: 2; 1 INJECTION, POWDER, FOR SOLUTION INTRAMUSCULAR; INTRAVENOUS at 04:23

## 2024-07-17 RX ADMIN — HYDROXYCHLOROQUINE SULFATE 200 MG: 200 TABLET, FILM COATED ORAL at 09:06

## 2024-07-17 RX ADMIN — I.V. FAT EMULSION 20 G: 20 EMULSION INTRAVENOUS at 00:35

## 2024-07-17 RX ADMIN — AMPICILLIN SODIUM AND SULBACTAM SODIUM 3 G: 2; 1 INJECTION, POWDER, FOR SOLUTION INTRAMUSCULAR; INTRAVENOUS at 10:01

## 2024-07-17 RX ADMIN — ESCITALOPRAM 5 MG: 5 TABLET, FILM COATED ORAL at 09:06

## 2024-07-17 NOTE — DISCHARGE SUMMARY
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        Hospital Course       29-year-old woman with lupus, gastroparesis, Tomas-Danlos.  She had a separate G-tube and a J-tube placed by Dr. Zabala.  The J-tube was removed previously, and the G-tube was replaced this admission.  She completed a 5-day course of Zosyn for \"abdominal wall cellulitis\".  Cultures were negative still  Pain control is a huge issue.  She is on Dilaudid PCA and as needed Dilaudid.  We are not really doing anything at this point.       Postop day 6 from laparoscopic small bowel resection/PEG-main thing for her is pain control, Dr. Zabala is managing all pain meds.  Edited by: Annette Oliver MD at 2024 [id number]      Physical Exam:  Temp:  [97.5 °F (36.4 °C)-97.9 °F (36.6 °C)] 97.9 °F (36.6 °C)  Heart Rate:  [62-70] 67  Resp:  [14-16] 14  BP: ()/(58-66) 101/66  Body mass index is 24.69 kg/m².  Physical Exam    Consultants     Consult Orders (all) (From admission, onward)       Start     Ordered    24 1245  Inpatient Nutrition Consult  Once        Provider:  (Not yet assigned)    24 1245    24 0831  Inpatient Nutrition Consult  Once        Provider:  (Not yet assigned)    24 0830    24 1309  [facility] (on-call MD unless specified) Details  Once        Specialty:  Hospitalist  Provider:  (Not yet assigned)    24 1308    24 1251  Surgery (on-call MD unless specified)  Once      "   Specialty:  General Surgery  Provider:  (Not yet assigned)    07/03/24 1250                  Procedures     Imaging Results (All)       Procedure Component Value Units Date/Time    XR Chest 1 View [[id number]] Collected: 07/09/24 1125     Updated: 07/09/24 1128    Narrative:      XR CHEST 1 VW-7/9/2024     HISTORY: Port placement.     Right subclavian Mediport catheter is seen with its tip overlying the  superior cavoatrial junction. No pneumothorax is seen.     Lungs appear clear. Heart size is within normal limits.       Impression:      1. Mediport catheter placement as described.  2. No pneumothorax is seen.        This report was finalized on 7/9/2024 11:25 AM by Dr. Javier Plasencia M.D on Workstation: [id number]               Pertinent Labs     Results from last 7 days   Lab Units 07/16/24  0506 07/14/24  0311   WBC 10*3/mm3 3.44 5.18   HEMOGLOBIN g/dL 9.3* 9.0*   PLATELETS 10*3/mm3 251 260     Results from last 7 days   Lab Units 07/17/24  0316 07/16/24  0506 07/15/24  0448 07/14/24  0311   SODIUM mmol/L 136 136 137 137   POTASSIUM mmol/L 3.9 3.7 4.0 4.0   CHLORIDE mmol/L 104 102 104 103   CO2 mmol/L 24.0 22.2 24.0 25.0   BUN mg/dL 11 11 11 10   CREATININE mg/dL 0.61 0.60 0.52* 0.51*   GLUCOSE mg/dL 93 94 97 97   Estimated Creatinine Clearance: 172.5 mL/min (by C-G formula based on SCr of 0.61 mg/dL).  Results from last 7 days   Lab Units 07/17/24  0316 07/16/24  0506 07/15/24  0448 07/14/24  0311   ALBUMIN g/dL 3.5 3.4* 3.6 3.7   BILIRUBIN mg/dL <0.2 <0.2 <0.2 <0.2   ALK PHOS U/L 56 54 54 52   AST (SGOT) U/L 13 13 10 13   ALT (SGPT) U/L 9 8 6 6     Results from last 7 days   Lab Units 07/17/24  0316 07/16/24  0506 07/15/24  0448 07/14/24  0311   CALCIUM mg/dL 9.2 8.8 9.1 9.1   ALBUMIN g/dL 3.5 3.4* 3.6 3.7   MAGNESIUM mg/dL 2.1 2.0 1.9 2.0   PHOSPHORUS mg/dL 3.7 4.0 3.6 3.8           Results from last 7 days   Lab Units 07/14/24  0311   TRIGLYCERIDES mg/dL 100           Test Results Pending at Discharge        Discharge Details        Discharge Medications        New Medications        Instructions Start Date   ampicillin-sulbactam (UNASYN) 3 gm IVPB in 100 mL NS (MBP)   3 g, Intravenous, Every 6 Hours      naloxone 0.4 MG/ML injection  Commonly known as: NARCAN   0.1 mg, Intravenous, Every 5 Minutes PRN             Continue These Medications        Instructions Start Date   amLODIPine 10 MG tablet  Commonly known as: NORVASC   10 mg, Oral, Daily      Blisovi 24 Fe 1-20 MG-MCG(24) per tablet  Generic drug: norethindrone-ethinyl estradiol-ferrous fumarate   1 tablet, Oral, Daily      Calmoseptine 0.44-20.6 % ointment  Generic drug: Menthol-Zinc Oxide   1 application , Topical, 2 Times Daily      dicyclomine 10 MG/5ML syrup  Commonly known as: BENTYL   10 mg, Oral, 4 Times Daily Before Meals & Nightly      EPINEPHrine 0.3 MG/0.3ML solution auto-injector injection  Commonly known as: EPIPEN   0.3 mg, Intramuscular, As Needed      escitalopram 5 MG tablet  Commonly known as: LEXAPRO   5 mg, Per J Tube, Daily      gabapentin 100 MG capsule  Commonly known as: NEURONTIN   200 mg, Oral, 3 Times Daily      Gammaplex 10 GM/200ML solution infusion  Generic drug: immune globulin (human)   Infuse  into a venous catheter 1 (One) Time Per Week.      HYDROcodone-acetaminophen 7.5-325 MG per tablet  Commonly known as: NORCO   1 tablet, Oral, Every 6 Hours PRN      hydroxychloroquine 200 MG tablet  Commonly known as: PLAQUENIL   200 mg, Oral, 2 Times Daily      IBUPROFEN PO       lactulose 10 GM/15ML solution  Commonly known as: CHRONULAC   10 g, Per J Tube, As Needed      leucovorin 5 MG tablet   1 tablet, Oral, Weekly, Mondays      methotrexate PF 50 MG/2ML chemo syringe   50 mg, Intramuscular, Weekly, Wednesdays      multivitamin with minerals tablet tablet   1 tablet, Oral, Daily      Nitro-Bid 2 % ointment  Generic drug: nitroglycerin   APPLY BY TRANSDERMAL ROUTE TO FINGER WEBS FOR RAYNAUD'S EVERY 12 HOURS AND REMOVE AT  BEDTIME      pantoprazole 40 MG EC tablet  Commonly known as: PROTONIX   40 mg, Oral, 2 Times Daily      predniSONE 5 MG tablet  Commonly known as: DELTASONE   Take 1 tablet by mouth Daily As Needed.      promethazine-dextromethorphan 6.25-15 MG/5ML syrup  Commonly known as: PROMETHAZINE-DM   20 mL, Per J Tube, Every 6 Hours PRN      Symbicort 160-4.5 MCG/ACT inhaler  Generic drug: budesonide-formoterol   2 puffs, Inhalation, 2 Times Daily - RT               Allergies   Allergen Reactions    Anesthetics, Amide Nausea And Vomiting    Ciprofloxacin Other (See Comments)     EHERLIS STANDLIS? ILLNESS. UNABLE TO TAKE     Compazine [Prochlorperazine] Dystonia    Domperidone Other (See Comments)     Bad reaction per pt report    Droperidol Other (See Comments)     Tardive dyskinesia    Haldol [Haloperidol] Other (See Comments)     Muscle spasms      Metoclopramide Nausea And Vomiting    Sulfa Antibiotics Other (See Comments)     Unable to take as causes leukopenia         Discharge Disposition:  Home or Self Care    Discharge Diet:  Diet Order   Procedures    NPO Diet NPO Type: Strict NPO       Discharge Activity:       CODE STATUS:    Code Status and Medical Interventions:   Ordered at: 07/03/24 1502     Code Status (Patient has no pulse and is not breathing):    CPR (Attempt to Resuscitate)     Medical Interventions (Patient has pulse or is breathing):    Full Support       Future Appointments   Date Time Provider Department Center   7/17/2024  1:20 PM Barb Paredes APRN [facility]   10/14/2024  8:45 AM Kenya David MD [facility]      Follow-up Information       Kenya David MD .    Specialties: Family Medicine, Acupuncturist, Urgent Care  Contact information:  [address] [facility] [address] [phone]                             Time Spent on Discharge:  Greater than 30 minutes      Aureliano Melchor MD  [facility]  07/17/24  10:03 EDT

## 2024-07-17 NOTE — PROGRESS NOTES
Chief Complaint:    S/P Laparoscopic assisted small bowel resection and G-tube placement, POD 12    Subjective:    The patient continues to have chronic pain.  She is doing well from a surgical standpoint and has an appointment with her pain management physician today at 1:00 PM.    Objective:    Temp:  [97.4 °F (36.3 °C)-97.9 °F (36.6 °C)] 97.4 °F (36.3 °C)  Heart Rate:  [48-70] 48  Resp:  [14-16] 16  BP: ()/(58-66) 97/59    Physical Exam  Constitutional:       Appearance: She is not ill-appearing or toxic-appearing.   Neurological:      Mental Status: She is alert.   Psychiatric:         Behavior: Behavior is cooperative.       BMI is within normal parameters. No other follow-up for BMI required.        Results:    BUN is 11 and creatinine 0.61.  Albumin is 3.5.    Impression/Plan:    The patient is POD 12 from a laparoscopic assisted small bowel resection and G-tube placement.  She is ready for discharge to home from a surgical standpoint.    Madhu Zabala Jr., M.D.

## 2024-07-17 NOTE — CASE MANAGEMENT/SOCIAL WORK
Case Management Discharge Note      Final Note: Home with TPN from [facility] and [facility] for labs. Has wound care supplies and denies further needs.    Provided Post Acute Provider List?: N/A  Provided Post Acute Provider Quality & Resource List?: N/A    Selected Continued Care - Discharged on 7/17/2024 Admission date: 7/3/2024 - Discharge disposition: Home or Self Care      Destination    No services have been selected for the patient.                Durable Medical Equipment    No services have been selected for the patient.                Dialysis/Infusion    No services have been selected for the patient.                Home Medical Care    No services have been selected for the patient.                Therapy    No services have been selected for the patient.                Community Resources    No services have been selected for the patient.                Community & DME    No services have been selected for the patient.                    Transportation Services  Private: Car    Final Discharge Disposition Code: 01 - home or self-care

## 2024-07-17 NOTE — DISCHARGE INSTRUCTIONS
Wound Care Instructions:  Wash hands thoroughly  Remove old dressing  Flush wound with saline  Wet guaze with saline  Pack wound with wet guaze   Place dry guaze overtop and secure with tape    -Twice a day dressing change-

## 2024-07-17 NOTE — PAYOR COMM NOTE
"Gerry Henrietta HARGROVE (29 y.o. 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[employer]    Marital Status   Single                            Admission Date   7/3/24    Admission Type   Emergency    Admitting Provider   Aureliano Melchor MD    Attending Provider       Department, Room/Bed   [address], P680/1       Discharge Date   2024    Discharge Disposition   Home or Self Care    Discharge Destination                                 Attending Provider: (none)   Allergies: Anesthetics, Amide, Ciprofloxacin, Compazine [Prochlorperazine], Domperidone, Droperidol, Haldol [Haloperidol], Metoclopramide, Sulfa Antibiotics    Isolation: None   Infection: None   Code Status: CPR    Ht: 180.3 cm (71\")   Wt: 80.3 kg (177 lb 0.5 oz)    Admission Cmt: None   Principal Problem: Abdominal wall cellulitis [L03.311]                   Active Insurance as of 7/3/2024       Primary Coverage       Payor Plan Insurance Group Employer/Plan Group    [employer] BLUE CROSS [employer] BLUE [employer] PPO [id number]       Payor Plan Address Payor Plan Phone Number Payor Plan Fax Number Effective Dates    [address] [phone]  2023 - None Entered    [facility] [id number]         Subscriber Name Subscriber Birth Date Member ID       NETTA SINGH 1963 [id number]                     Emergency Contacts        (Rel.) Home Phone Work Phone Mobile Phone    Miley Singh (Mother) [phone] -- [phone]    Flaco Redman (Significant Other) -- -- [phone]                 Discharge Summary        Aureliano Melchor MD at 24 1003              Patient Name: Henrietta Garrett  : 1995  MRN: [id number]    Date of Admission: 7/3/2024  Date of Discharge:  " "7/17/2024  Primary Care Physician: Kenya David MD      Chief Complaint:   Abdominal Pain and Nausea      Discharge Diagnoses     Active Hospital Problems    Diagnosis  POA    **Abdominal wall cellulitis [L03.311]  Yes    Lupus erythematosus overlap syndrome [M32.8]  Yes    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        Hospital Course       29-year-old woman with lupus, gastroparesis, Tomas-Danlos.  She had a separate G-tube and a J-tube placed by Dr. Zabala.  The J-tube was removed previously, and the G-tube was replaced this admission.  She completed a 5-day course of Zosyn for \"abdominal wall cellulitis\".  Cultures were negative still  Pain control is a huge issue.  She is on Dilaudid PCA and as needed Dilaudid.  We are not really doing anything at this point.     7/11  Postop day 6 from laparoscopic small bowel resection/PEG-main thing for her is pain control, Dr. Zabala is managing all pain meds.  Edited by: Annette Oliver MD at 7/11/2024 8001      Physical Exam:  Temp:  [97.5 °F (36.4 °C)-97.9 °F (36.6 °C)] 97.9 °F (36.6 °C)  Heart Rate:  [62-70] 67  Resp:  [14-16] 14  BP: ()/(58-66) 101/66  Body mass index is 24.69 kg/m².  Physical Exam    Consultants     Consult Orders (all) (From admission, onward)       Start     Ordered    07/06/24 1245  Inpatient Nutrition Consult  Once        Provider:  (Not yet assigned)    07/06/24 1245    07/04/24 0831  Inpatient Nutrition Consult  Once        Provider:  (Not yet assigned)    07/04/24 0830    07/03/24 1309  [facility] (on-call MD unless specified) Details  Once        Specialty:  Hospitalist  Provider:  (Not yet assigned)    07/03/24 1308    07/03/24 1251  Surgery (on-call MD unless specified)  Once        Specialty:  General Surgery  Provider:  (Not yet assigned)    07/03/24 1250                  Procedures     Imaging " Results (All)       Procedure Component Value Units Date/Time    XR Chest 1 View [[id number]] Collected: 07/09/24 1125     Updated: 07/09/24 1128    Narrative:      XR CHEST 1 VW-7/9/2024     HISTORY: Port placement.     Right subclavian Mediport catheter is seen with its tip overlying the  superior cavoatrial junction. No pneumothorax is seen.     Lungs appear clear. Heart size is within normal limits.       Impression:      1. Mediport catheter placement as described.  2. No pneumothorax is seen.        This report was finalized on 7/9/2024 11:25 AM by Dr. Javier Plasencia M.D on Workstation: [id number]               Pertinent Labs     Results from last 7 days   Lab Units 07/16/24  0506 07/14/24  0311   WBC 10*3/mm3 3.44 5.18   HEMOGLOBIN g/dL 9.3* 9.0*   PLATELETS 10*3/mm3 251 260     Results from last 7 days   Lab Units 07/17/24 0316 07/16/24  0506 07/15/24  0448 07/14/24  0311   SODIUM mmol/L 136 136 137 137   POTASSIUM mmol/L 3.9 3.7 4.0 4.0   CHLORIDE mmol/L 104 102 104 103   CO2 mmol/L 24.0 22.2 24.0 25.0   BUN mg/dL 11 11 11 10   CREATININE mg/dL 0.61 0.60 0.52* 0.51*   GLUCOSE mg/dL 93 94 97 97   Estimated Creatinine Clearance: 172.5 mL/min (by C-G formula based on SCr of 0.61 mg/dL).  Results from last 7 days   Lab Units 07/17/24  0316 07/16/24  0506 07/15/24  0448 07/14/24  0311   ALBUMIN g/dL 3.5 3.4* 3.6 3.7   BILIRUBIN mg/dL <0.2 <0.2 <0.2 <0.2   ALK PHOS U/L 56 54 54 52   AST (SGOT) U/L 13 13 10 13   ALT (SGPT) U/L 9 8 6 6     Results from last 7 days   Lab Units 07/17/24  0316 07/16/24  0506 07/15/24  0448 07/14/24  0311   CALCIUM mg/dL 9.2 8.8 9.1 9.1   ALBUMIN g/dL 3.5 3.4* 3.6 3.7   MAGNESIUM mg/dL 2.1 2.0 1.9 2.0   PHOSPHORUS mg/dL 3.7 4.0 3.6 3.8           Results from last 7 days   Lab Units 07/14/24  0311   TRIGLYCERIDES mg/dL 100           Test Results Pending at Discharge       Discharge Details        Discharge Medications        New Medications        Instructions Start Date    ampicillin-sulbactam (UNASYN) 3 gm IVPB in 100 mL NS (MBP)   3 g, Intravenous, Every 6 Hours      naloxone 0.4 MG/ML injection  Commonly known as: NARCAN   0.1 mg, Intravenous, Every 5 Minutes PRN             Continue These Medications        Instructions Start Date   amLODIPine 10 MG tablet  Commonly known as: NORVASC   10 mg, Oral, Daily      Blisovi 24 Fe 1-20 MG-MCG(24) per tablet  Generic drug: norethindrone-ethinyl estradiol-ferrous fumarate   1 tablet, Oral, Daily      Calmoseptine 0.44-20.6 % ointment  Generic drug: Menthol-Zinc Oxide   1 application , Topical, 2 Times Daily      dicyclomine 10 MG/5ML syrup  Commonly known as: BENTYL   10 mg, Oral, 4 Times Daily Before Meals & Nightly      EPINEPHrine 0.3 MG/0.3ML solution auto-injector injection  Commonly known as: EPIPEN   0.3 mg, Intramuscular, As Needed      escitalopram 5 MG tablet  Commonly known as: LEXAPRO   5 mg, Per J Tube, Daily      gabapentin 100 MG capsule  Commonly known as: NEURONTIN   200 mg, Oral, 3 Times Daily      Gammaplex 10 GM/200ML solution infusion  Generic drug: immune globulin (human)   Infuse  into a venous catheter 1 (One) Time Per Week.      HYDROcodone-acetaminophen 7.5-325 MG per tablet  Commonly known as: NORCO   1 tablet, Oral, Every 6 Hours PRN      hydroxychloroquine 200 MG tablet  Commonly known as: PLAQUENIL   200 mg, Oral, 2 Times Daily      IBUPROFEN PO       lactulose 10 GM/15ML solution  Commonly known as: CHRONULAC   10 g, Per J Tube, As Needed      leucovorin 5 MG tablet   1 tablet, Oral, Weekly, Mondays      methotrexate PF 50 MG/2ML chemo syringe   50 mg, Intramuscular, Weekly, Wednesdays      multivitamin with minerals tablet tablet   1 tablet, Oral, Daily      Nitro-Bid 2 % ointment  Generic drug: nitroglycerin   APPLY BY TRANSDERMAL ROUTE TO FINGER WEBS FOR RAYNAUD'S EVERY 12 HOURS AND REMOVE AT BEDTIME      pantoprazole 40 MG EC tablet  Commonly known as: PROTONIX   40 mg, Oral, 2 Times Daily       predniSONE 5 MG tablet  Commonly known as: DELTASONE   Take 1 tablet by mouth Daily As Needed.      promethazine-dextromethorphan 6.25-15 MG/5ML syrup  Commonly known as: PROMETHAZINE-DM   20 mL, Per J Tube, Every 6 Hours PRN      Symbicort 160-4.5 MCG/ACT inhaler  Generic drug: budesonide-formoterol   2 puffs, Inhalation, 2 Times Daily - RT               Allergies   Allergen Reactions    Anesthetics, Amide Nausea And Vomiting    Ciprofloxacin Other (See Comments)     EHERLIS STANDLIS? ILLNESS. UNABLE TO TAKE     Compazine [Prochlorperazine] Dystonia    Domperidone Other (See Comments)     Bad reaction per pt report    Droperidol Other (See Comments)     Tardive dyskinesia    Haldol [Haloperidol] Other (See Comments)     Muscle spasms      Metoclopramide Nausea And Vomiting    Sulfa Antibiotics Other (See Comments)     Unable to take as causes leukopenia         Discharge Disposition:  Home or Self Care    Discharge Diet:  Diet Order   Procedures    NPO Diet NPO Type: Strict NPO       Discharge Activity:       CODE STATUS:    Code Status and Medical Interventions:   Ordered at: 07/03/24 1502     Code Status (Patient has no pulse and is not breathing):    CPR (Attempt to Resuscitate)     Medical Interventions (Patient has pulse or is breathing):    Full Support       Future Appointments   Date Time Provider Department Center   7/17/2024  1:20 PM Barb Paredes APRN [facility]   10/14/2024  8:45 AM Kenya David MD [facility]      Follow-up Information       Kenya David MD .    Specialties: Family Medicine, Acupuncturist, Urgent Care  Contact information:  [address] [facility] [address] [phone]                             Time Spent on Discharge:  Greater than 30 minutes      Aureliano Melchor MD  [facility]  07/17/24  10:03 EDT                Electronically signed by Aureliano Melchor MD at 07/17/24 6452

## 2024-07-17 NOTE — PLAN OF CARE
Goal Outcome Evaluation:  Plan of Care Reviewed With: patient        Progress: improving  Outcome Evaluation: VSS. TPN and lipids infusing via mediport. Dressing CDI to abdomen. IV abx as ordered. Pain controlled with scheduled pain medication. G tube clamped. Monitoring blood sugars. Lap sites CDI. Up with assist x1 and walker. Falls precautions in place.

## 2024-07-17 NOTE — PLAN OF CARE
Goal Outcome Evaluation:  Plan of Care Reviewed With: patient        Progress: improving    Pt is A&Ox4, VSS except HR at 48 this AM, afebrile, no c/o N/V/D. Port infusing TPN/lipids without difficulty. PIV infusing UNASYN without difficulty. Lap sites x3 CDI. G Tube clamped per pt request. Pain managed with scheduled Norco. Pt voiding well and repositioning q2. Medications delivered via home pharmacy. Case management verified home health. Pt is ready to go.         Pt given wound care instructions and supplies for BID dressing change at home. Pt also sent home with printed WC instructions. PIV removed. TPN stopped, port flushed, clamped, and capped. Green cap replaced.

## 2024-07-17 NOTE — PROGRESS NOTES
CHIEF COMPLAINT  Follow-up for pain. Ms. Garrett has a colon resection surgery done on 7/5/2024.     Subjective   Henrietta Garrett is a 29 y.o. female  who presents for follow-up.  She has a history of diffuse pain d/t autoimmune overlap syndrome as well as severe gastroparesis resulting in need for J-tube for tube feeds and G-tube to be able to be placed to drainage with flares in her gastroparesis. She was discharged from the hospital today following admission for J-tube infection and subsequent small bowel resection with G-tube placement performed on 7/5/2024.     Prior to her hospitalization she was evaluated by Dr. Dailey and started on Norco 7.5/325 4/day for her diffuse pain. She notes that this regimen decreased her pain by ~20% at the time. Today her pain is 8/10VAS in severity and she appears very uncomfortable in the office today.  Her primary pain complaint is her abdominal pain, but she also reports pain in her RUE d/t issues with a IV in her right arm while hospitalized as well as increased low back pain.  She states that while she was hospitalized a fentanyl patch was trialed and she did not receive any relief with this. She states that she was being treated with IV dilaudid and morphine while hospitalized.     She is on TPN and lipids for her nutrition, she is able to swallow pills. It is unclear how well she processes oral medications, but unfortunately she notes that fentanyl patches have not been helpful for her pain in the past.     She was at previous pain management and was placed on high dose Gabapentin at the time (she states that this was ~10 years ago), she notes this was not helpful to her pain.     Surgical history:  7/5/2024-laparoscopic assisted takedown of J-tube and small bowel resection, G-tube removal with PEG  2/19/2024-J-tube replacement  2/15/2024-EGD with PEG, J-tube replacement  12/11/2023-laparoscopic cholecystectomy    She is a patient of Dr. Terrazas (orthopedics) and reports  history of multiple shoulder surgeries.    Pain  This is a chronic (8/10VAS in severity) problem. The current episode started more than 1 year ago. The problem occurs constantly. Progression since onset: worsened since last office visit. Associated symptoms include arthralgias, myalgias and neck pain. Pertinent negatives include no numbness or weakness. Nothing aggravates the symptoms. She has tried oral narcotics, NSAIDs and rest (antineuropathics) for the symptoms. The treatment provided mild relief.      PEG Assessment   What number best describes your pain on average in the past week?9  What number best describes how, during the past week, pain has interfered with your enjoyment of life?10  What number best describes how, during the past week, pain has interfered with your general activity?  10    Review of Pertinent Medical Data ---    Hospital admit from 7/3/2024 to 7/17/2024 reviewed.  J-tube removed on 6/18/2024.  Patient presented to hospital with abdominal pain, persistent nausea, and episodes of vomiting.  G-tube was not placed to drainage because the G-tube was located in the wall of the stomach outside the lumen.  General surgery consulted.  G-tube removed on 7/5/2024.  Drainage of midline incision that was opened at bedside with drainage of small amount of serosanguineous fluid.  Pain control was extremely difficult.  Pain was managed well in the hospital, should be discharged home on 717.  She has an appointment with pain management on the day of discharge.    Office visit from 6/18/2024 Dr. Madhu Zabala reviewed.  Patient with multiple medical problems with severe gastroparesis.  She is G-tube was placed for decompression of her stomach and the gastroparesis symptomatic.  She had a J-tube for nutritional support but is not been able to maintain adequate nutrition due to not tolerating tube feeds at target rate.  GI at [facility] recently stopped J-tube feedings and placed on TPN.  She has pain and  "drainage from her J-tube site and is being treated for chronic infection.  G-tube may be somewhat out of position on recent CT scan of the abdomen and pelvis.  Recommend G-tube changed out with EGD.  J-tube removed in office.    Office visit from 6/10/2024 with Dr. Dailey reviewed.  Patient referred by Dr. David (PCP) and [facility]  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  She has CRPS 1 in the right leg, allodynia of that has subsided and has not flared up.  She follows with Dr. Lorraine Terrazas (orthopedist) and has completed multiple rounds of PT.  She has multiple feeding tubes in the past and is on TPN permanently.  She has a history of severe gastroparesis, POTS, and her stander syndrome.  She is a highly complex patient with \"overlap syndrome\" with features of rheumatoid arthritis, MCTD, and lupus.  She has a positive JUAN MANUEL, RNP, Rao and chromatin, and rheumatoid factor.  She has Raynaud's phenomenon and sicca symptoms.  She is on methotrexate, Plaquenil, Orencia, prednisone, and IVIG.  Per rheumatology she has poor prognosis with high disease activity.  Patient situation is difficult with a long-term poor prognosis.  Concerned about infection risk and also concerned about her need for TPN.  Regarding therapeutic options from our practice there is no reasonable consideration for interventional options.  Functional goals are modest with being able to attend ADLs and make it to doctors appointments and treatments.  Routine UDS performed.  Norco 7.5/325 every 6 hours as needed prescribed.  Continue gabapentin prescriber Dr. Quintana, could consider increasing this if needed.    CT ABDOMEN AND PELVIS WITH IV CONTRAST     HISTORY: Abdominal pain     TECHNIQUE: Radiation dose reduction techniques were utilized, including  automated exposure control and exposure modulation based on body size.   3 mm images were obtained through the abdomen and pelvis after " the  administration of IV contrast.     COMPARISON: Multiple CT abdomen and pelvis dating back to 6/16/2023     FINDINGS:     Please note evaluation is suboptimal due to beam hardening and streak  artifact and patient's arms being along their side. A percutaneous  gastrostomy tube is present. The bulb of the gastrostomy tube does not  appear to be located within the gastric lumen, as before, and is  contiguous with the wall. There is a tract of air which extends to the  gastrostomy tube bulb. Findings are grossly similar to on 5/25/2024. A  few air-fluid levels are present within the small bowel which is  otherwise nondistended. The appendix is unremarkable. Mild intrahepatic  bile duct dilation is present status post cholecystectomy, as before.  The pancreas, spleen and adrenal glands have an unremarkable  postcontrast CT appearance. There appears to be a subtle subcentimeter  right renal lesion which is too small to characterize. No  hydronephrosis.     Prominent and enlarged abdominal pelvic adenopathy is present. Enlarged  brett hepatic node measuring 1.3 cm in short axis dimension, grossly  unchanged since 6/16/2023. Prominent aortocaval node measuring 0.8 cm in  short axis dimension, as before. An anterior pericaval node more  inferiorly has increased in size, measuring 0.8 cm in short axis  dimension (previously 0.5 cm on 2/26/2024. Asymmetric right adnexal  hypodense lesion measuring up to 2.3 cm is present, as before. No free  intraperitoneal air is seen..     Large amount of hyperdense stool is present throughout the colon.     No suspicious lytic or blastic osseous lesion. The previously seen  jejunostomy tube overlying the left lower quadrant has been removed.  There is ill-defined soft tissue thickening and fat stranding extending  through the subcutaneous tissues and abutting the adjacent small bowel  in this area. A small focus of gas is present along the tract and not  definitively located in the  bowel.     IMPRESSION:  1.  Findings suggestive of constipation.  2.  Previously seen jejunostomy tube has been removed with extensive  soft tissue thickening extending from the small bowel to the skin  surface. A focus of air overlying the tract extending from the small  bowel through the subcutaneous tissues. CT findings are nonspecific and  correlation with patient history is recommended to exclude an  enterocutaneous fistula and/or developing infection.  3.  Prominent and mildly enlarged abdominal pelvic nodes. An aortocaval  node as increased in size since 2/26/2024. While nonspecific, continued  attention follow-up with CT abdomen and pelvis in 3 months is  recommended to ensure resolution.  4.  Percutaneous gastrostomy tube is not located within the stomach and  appears to have been further retracted since 2/26/2024 with the majority  of the balloon appearing to be outside of the stomach wall. A tract of  air extending from the gastric lumen into the gastrostomy balloon, as  seen on 5/25/2024.  5.  Other findings as above.        This report was finalized on 7/2/2024 4:44 PM by Dr. Domingo Sims M.D  on Workstation: [email or web address]    The following portions of the patient's history were reviewed and updated as appropriate: allergies, current medications, past family history, past medical history, past social history, past surgical history, and problem list.    Review of Systems   Gastrointestinal:  Positive for constipation and diarrhea.   Genitourinary:  Negative for difficulty urinating.   Musculoskeletal:  Positive for arthralgias, myalgias and neck pain.   Neurological:  Negative for weakness and numbness.   Psychiatric/Behavioral:  Positive for sleep disturbance. Negative for suicidal ideas. The patient is nervous/anxious.      --  The aforementioned information the Chief Complaint section and above subjective data including any HPI data, and also the Review of Systems data, has been personally reviewed and  "affirmed.  --    Vitals:    07/17/24 1324   BP: 124/92   Pulse: 75   Resp: 18   Temp: 97.1 °F (36.2 °C)   Height: 180.3 cm (71\")   PainSc:   8   PainLoc: Generalized     Objective   Physical Exam  Vitals and nursing note reviewed.   Constitutional:       General: She is in acute distress.      Appearance: She is well-developed. She is ill-appearing.   Eyes:      General: Lids are normal.   Cardiovascular:      Rate and Rhythm: Normal rate.   Pulmonary:      Effort: Pulmonary effort is normal.   Chest:       Abdominal:      Tenderness: There is abdominal tenderness.      Comments:   Midline abdominal dressing is C/D/I  G-tube noted--clamped   Musculoskeletal:      Right upper arm: Edema (echymosis) and tenderness present.   Neurological:      Mental Status: She is alert and oriented to person, place, and time.   Psychiatric:         Mood and Affect: Mood normal.         Speech: Speech normal.         Behavior: Behavior normal.         Judgment: Judgment normal.       Assessment & Plan   Diagnoses and all orders for this visit:    1. Chronic pain syndrome (Primary)    2. Tomas-Danlos syndrome    3. Complex regional pain syndrome type 1 of right lower extremity    4. Seropositive rheumatoid arthritis    5. Systemic lupus erythematosus, unspecified SLE type, unspecified organ involvement status    6. Postoperative abdominal pain      --- I spent 66 minutes caring for Henrietta on this date of service. This time includes time spent by me in the following activities: preparing for the visit, reviewing tests, performing a medically appropriate examination and/or evaluation, counseling and educating the patient/family/caregiver, referring and communicating with other health care professionals, documenting information in the medical record, obtaining a separately obtained history, and reviewing a separately obtained history     Henrietta Garrett reports a pain score of 8.  Given her pain assessment as noted, treatment options " were discussed and the following options were decided upon as a follow-up plan to address the patient's pain: prescription for opiod analgesics.    --- The urine drug screen confirmation from 6/10/2024 has been reviewed and the result is appropriate based on patient history and VENTURA report  --- CSA signed on 6/10/2024  --- Discussed plan of care with Dr. Dailey. D/t her severe level of pain at this time we will stop her Norco 7.5/325 and start Dilaudid 2 mg PO every 4 hours PRN with plans to see back in the office in 7 days. Reviewed with Ms. Garrett and her mother that we will not remain on this regimen long-term and our goal will be to begin weaning her opioid next week as her post-operative pain improves. She states understanding  --- Narcan prescribed. Reviewed the importance of filling this prescription d/t the risk of respiratory depression or overdose with the use of opioids.   --- Reviewed that unfortunately we are limited in our ability to safely control severe pain in the outpatient setting. Reviewed that if the above regimen does not control her pain then our recommendation is that she return to the Emergency Department.   --- Continue with other specialists.   --- Follow-up 7 days.      [facility] REPORT  As part of the patient's treatment plan, I am prescribing controlled substances. The patient has been made aware of appropriate use of such medications, including potential risk of somnolence, limited ability to drive and/or work safely, and the potential for dependence or overdose. It has also been made clear that these medications are for use by this patient only, without concomitant use of alcohol or other substances unless prescribed.     Patient has completed prescribing agreement detailing terms of continued prescribing of controlled substances, including monitoring VENTURA reports, urine drug screening, and pill counts if necessary. The patient is aware that inappropriate use will results in  cessation of prescribing such medications.    As the clinician, I personally reviewed the VENTURA from 7/17/2024 while the patient was in the office today.    History and physical exam exhibit continued safe and appropriate use of controlled substances.    Dictated utilizing Dragon dictation.

## 2024-07-17 NOTE — DISCHARGE SUMMARY
Patient Name: Henrietta Garrett  : 1995  MRN: [id number]    Date of Admission: 7/3/2024  Date of Discharge:  2024  Primary Care Physician: Kenya David MD      Chief Complaint:   Abdominal Pain and Nausea      Discharge Diagnoses     Active Hospital Problems    Diagnosis  POA    **Abdominal wall cellulitis [L03.311]  Yes    Lupus erythematosus overlap syndrome [M32.8]  Yes    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        Hospital Course         This is a 29-year-old woman with a history of lupus, Sjogren's, rheumatoid arthritis, Schamberg disease, Tomas-Danlos syndrome, IBS, severe gastroparesis.  She had a G-tube that was placed for decompression of her stomach when the gastroparesis became symptomatic and a J-tube placed for additional support.  Right-sided port has previously been placed for TPN.  The J-tube was removed on 2024.  She presented to hospital with abdominal pain, persistent nausea, and episodes of vomiting.  The G-tube drain was not placed to gravity because the G-tube was located in the wall of the stomach outside the lumen.  General surgery was consulted.  The G-tube was removed on 2024 and the patient completed 5 days of antibiotics.  There was some drainage from the midline incision that was opened at bedside with drainage of a small amount of serosanguineous fluid.  Another short course of antibiotics was completed for surrounding cellulitis.  Pain control was extremely difficult with this patient.  Her pain was managed well in the hospital but was admitted and should be discharged home on .  She has an appointment with pain management on the day of discharge as well.      Physical Exam:  Temp:  [97.1 °F (36.2 °C)-97.9 °F (36.6 °C)] 97.1 °F (36.2 °C)  Heart Rate:  [48-77] 75  Resp:  [14-18] 18  BP: ()/(58-92) 124/92  Body  mass index is 24.69 kg/m².  Physical Exam  Constitutional:       Appearance: She is ill-appearing.   HENT:      Head: Normocephalic and atraumatic.   Cardiovascular:      Rate and Rhythm: Normal rate and regular rhythm.   Pulmonary:      Effort: Pulmonary effort is normal. No respiratory distress.   Musculoskeletal:      Right lower leg: No edema.      Left lower leg: No edema.   Neurological:      Mental Status: She is alert and oriented to person, place, and time.         Consultants     Consult Orders (all) (From admission, onward)       Start     Ordered    07/06/24 1245  Inpatient Nutrition Consult  Once        Provider:  (Not yet assigned)    07/06/24 1245    07/04/24 0831  Inpatient Nutrition Consult  Once        Provider:  (Not yet assigned)    07/04/24 0830    07/03/24 1309  [facility] (on-call MD unless specified) Details  Once        Specialty:  Hospitalist  Provider:  (Not yet assigned)    07/03/24 1308    07/03/24 1251  Surgery (on-call MD unless specified)  Once        Specialty:  General Surgery  Provider:  (Not yet assigned)    07/03/24 1250                  Procedures     Imaging Results (All)       Procedure Component Value Units Date/Time    XR Chest 1 View [[id number]] Collected: 07/09/24 1125     Updated: 07/09/24 1128    Narrative:      XR CHEST 1 VW-7/9/2024     HISTORY: Port placement.     Right subclavian Mediport catheter is seen with its tip overlying the  superior cavoatrial junction. No pneumothorax is seen.     Lungs appear clear. Heart size is within normal limits.       Impression:      1. Mediport catheter placement as described.  2. No pneumothorax is seen.        This report was finalized on 7/9/2024 11:25 AM by Dr. Javier Plasencia M.D on Workstation: [id number]               Pertinent Labs     Results from last 7 days   Lab Units 07/16/24  0506 07/14/24  0311   WBC 10*3/mm3 3.44 5.18   HEMOGLOBIN g/dL 9.3* 9.0*   PLATELETS 10*3/mm3 251 260     Results from last 7 days   Lab Units  07/17/24 0316 07/16/24  0506 07/15/24  0448 07/14/24  0311   SODIUM mmol/L 136 136 137 137   POTASSIUM mmol/L 3.9 3.7 4.0 4.0   CHLORIDE mmol/L 104 102 104 103   CO2 mmol/L 24.0 22.2 24.0 25.0   BUN mg/dL 11 11 11 10   CREATININE mg/dL 0.61 0.60 0.52* 0.51*   GLUCOSE mg/dL 93 94 97 97   Estimated Creatinine Clearance: 172.5 mL/min (by C-G formula based on SCr of 0.61 mg/dL).  Results from last 7 days   Lab Units 07/17/24  0316 07/16/24  0506 07/15/24  0448 07/14/24  0311   ALBUMIN g/dL 3.5 3.4* 3.6 3.7   BILIRUBIN mg/dL <0.2 <0.2 <0.2 <0.2   ALK PHOS U/L 56 54 54 52   AST (SGOT) U/L 13 13 10 13   ALT (SGPT) U/L 9 8 6 6     Results from last 7 days   Lab Units 07/17/24 0316 07/16/24  0506 07/15/24  0448 07/14/24  0311   CALCIUM mg/dL 9.2 8.8 9.1 9.1   ALBUMIN g/dL 3.5 3.4* 3.6 3.7   MAGNESIUM mg/dL 2.1 2.0 1.9 2.0   PHOSPHORUS mg/dL 3.7 4.0 3.6 3.8           Results from last 7 days   Lab Units 07/14/24 0311   TRIGLYCERIDES mg/dL 100           Test Results Pending at Discharge       Discharge Details        Discharge Medications        New Medications        Instructions Start Date   amoxicillin-clavulanate 875-125 MG per tablet  Commonly known as: AUGMENTIN   1 tablet, Oral, 2 Times Daily      naloxone 0.4 MG/ML injection  Commonly known as: NARCAN   0.1 mg, Intravenous, Every 5 Minutes PRN             Continue These Medications        Instructions Start Date   Blisovi 24 Fe 1-20 MG-MCG(24) per tablet  Generic drug: norethindrone-ethinyl estradiol-ferrous fumarate   1 tablet, Oral, Daily      Calmoseptine 0.44-20.6 % ointment  Generic drug: Menthol-Zinc Oxide   1 application , Topical, 2 Times Daily      dicyclomine 10 MG/5ML syrup  Commonly known as: BENTYL   10 mg, Oral, 4 Times Daily Before Meals & Nightly      EPINEPHrine 0.3 MG/0.3ML solution auto-injector injection  Commonly known as: EPIPEN   0.3 mg, Intramuscular, As Needed      escitalopram 5 MG tablet  Commonly known as: LEXAPRO   5 mg, Per J Tube,  Daily      gabapentin 100 MG capsule  Commonly known as: NEURONTIN   200 mg, Oral, 3 Times Daily      Gammaplex 10 GM/200ML solution infusion  Generic drug: immune globulin (human)   Infuse  into a venous catheter 1 (One) Time Per Week.      HYDROcodone-acetaminophen 7.5-325 MG per tablet  Commonly known as: NORCO   1 tablet, Oral, Every 6 Hours PRN      hydroxychloroquine 200 MG tablet  Commonly known as: PLAQUENIL   200 mg, Oral, 2 Times Daily      IBUPROFEN PO       lactulose 10 GM/15ML solution  Commonly known as: CHRONULAC   10 g, Per J Tube, As Needed      leucovorin 5 MG tablet   1 tablet, Oral, Weekly, Mondays      methotrexate PF 50 MG/2ML chemo syringe   50 mg, Intramuscular, Weekly, Wednesdays      multivitamin with minerals tablet tablet   1 tablet, Oral, Daily      Nitro-Bid 2 % ointment  Generic drug: nitroglycerin   APPLY BY TRANSDERMAL ROUTE TO FINGER WEBS FOR RAYNAUD'S EVERY 12 HOURS AND REMOVE AT BEDTIME      pantoprazole 40 MG EC tablet  Commonly known as: PROTONIX   40 mg, Oral, 2 Times Daily      predniSONE 5 MG tablet  Commonly known as: DELTASONE   Take 1 tablet by mouth Daily As Needed.      promethazine-dextromethorphan 6.25-15 MG/5ML syrup  Commonly known as: PROMETHAZINE-DM   20 mL, Per J Tube, Every 6 Hours PRN      Symbicort 160-4.5 MCG/ACT inhaler  Generic drug: budesonide-formoterol   2 puffs, Inhalation, 2 Times Daily - RT             Stop These Medications      amLODIPine 10 MG tablet  Commonly known as: NORVASC              Allergies   Allergen Reactions    Anesthetics, Amide Nausea And Vomiting    Ciprofloxacin Other (See Comments)     EHERLIS STANDLIS? ILLNESS. UNABLE TO TAKE     Compazine [Prochlorperazine] Dystonia    Domperidone Other (See Comments)     Bad reaction per pt report    Droperidol Other (See Comments)     Tardive dyskinesia    Haldol [Haloperidol] Other (See Comments)     Muscle spasms      Metoclopramide Nausea And Vomiting    Sulfa Antibiotics Other (See  Comments)     Unable to take as causes leukopenia         Discharge Disposition:  Home or Self Care    Discharge Diet:  Diet Order   Procedures    NPO Diet NPO Type: Strict NPO       Discharge Activity: as tolerated      CODE STATUS:    Code Status and Medical Interventions:   Ordered at: 07/03/24 1502     Code Status (Patient has no pulse and is not breathing):    CPR (Attempt to Resuscitate)     Medical Interventions (Patient has pulse or is breathing):    Full Support       Future Appointments   Date Time Provider Department Center   10/14/2024  8:45 AM [facility]      Follow-up Information       Kenya David MD .    Specialties: Family Medicine, Acupuncturist, Urgent Care  Contact information:  [address] [facility] [id number] [phone]               Madhu Zabala Jr., MD. Schedule an appointment as soon as possible for a visit in 1 week(s).    Specialty: General Surgery  Why: For wound re-check  Contact information:  [address] [facility] [address]  [facility] [id number] [phone]                             Time Spent on Discharge:  Greater than 30 minutes      Aureliano Melchor MD  [facility]  07/17/24  13:39 EDT

## 2024-07-18 ENCOUNTER — READMISSION MANAGEMENT (OUTPATIENT)
Dept: CALL CENTER | Facility: HOSPITAL | Age: 29
End: 2024-07-18
Payer: COMMERCIAL

## 2024-07-18 NOTE — OUTREACH NOTE
Prep Survey      Flowsheet Row Responses   [facility] patient discharged from? [place]   Is LACE score < 7 ? No   Eligibility [facility]   Date of Admission 07/03/24   Date of Discharge 07/17/24   Discharge diagnosis LAPAROSCOPIC SMALL BOWEL RESECTION WITH POSSIBLE G-TUBE PLACEMENT   Does the patient have one of the following disease processes/diagnoses(primary or secondary)? General Surgery   Does the patient have Home health ordered? No   Is there a DME ordered? Yes   What DME was ordered? TPN from [employer]/wound care supplies   Comments regarding appointments plan [facility] for labs   Prep survey completed? Yes            Velvet GILLETTE - Registered Nurse

## 2024-07-22 ENCOUNTER — TRANSITIONAL CARE MANAGEMENT TELEPHONE ENCOUNTER (OUTPATIENT)
Dept: CALL CENTER | Facility: HOSPITAL | Age: 29
End: 2024-07-22
Payer: COMMERCIAL

## 2024-07-22 NOTE — OUTREACH NOTE
Call Center TCM Note      Flowsheet Row Responses   [facility] patient discharged from? [place]   Does the patient have one of the following disease processes/diagnoses(primary or secondary)? General Surgery   TCM attempt successful? No   Unsuccessful attempts Attempt 1   Call Status Left message            Iram Connor RN    7/22/2024, 12:02 EDT

## 2024-07-22 NOTE — OUTREACH NOTE
Call Center TCM Note      Flowsheet Row Responses   [facility] patient discharged from? [place]   Does the patient have one of the following disease processes/diagnoses(primary or secondary)? General Surgery   TCM attempt successful? No   Unsuccessful attempts Attempt 2   Call Status Left message            Iram Connor RN    7/22/2024, 16:26 EDT

## 2024-07-23 ENCOUNTER — TRANSITIONAL CARE MANAGEMENT TELEPHONE ENCOUNTER (OUTPATIENT)
Dept: CALL CENTER | Facility: HOSPITAL | Age: 29
End: 2024-07-23
Payer: COMMERCIAL

## 2024-07-23 ENCOUNTER — OFFICE VISIT (OUTPATIENT)
Dept: SURGERY | Facility: CLINIC | Age: 29
End: 2024-07-23
Payer: COMMERCIAL

## 2024-07-23 VITALS
SYSTOLIC BLOOD PRESSURE: 122 MMHG | BODY MASS INDEX: 24.22 KG/M2 | WEIGHT: 173 LBS | DIASTOLIC BLOOD PRESSURE: 84 MMHG | HEIGHT: 71 IN

## 2024-07-23 DIAGNOSIS — Z48.89 POSTOPERATIVE VISIT: Primary | ICD-10-CM

## 2024-07-23 PROCEDURE — 99024 POSTOP FOLLOW-UP VISIT: CPT | Performed by: SURGERY

## 2024-07-23 NOTE — LETTER
July 23, 2024     Kenya David MD     Patient: Henrietta Garrett   YOB: 1995   Date of Visit: 7/23/2024     Dear Kenya David MD:       Thank you for referring Henrietta Garrett to me for evaluation. Below are the relevant portions of my assessment and plan of care.    If you have questions, please do not hesitate to call me. I look forward to following Henrietta along with you.         Sincerely,        Madhu Zabala Jr., MD        CC:   No Recipients    Madhu Zabala Jr., MD  07/23/24 1054  Sign when Signing Visit  Subjective  Henrietta Garrett is a 29 y.o. female who returns to the office after undergoing a laparoscopic assisted small bowel resection on 7/5/2024.     History of Present Illness     The patient is recovering well and is being managed by pain management which is helping her pain syndrome.  She continues to have severe nausea related to gastroparesis but is tolerating oral Dilaudid prescribed by pain management.  She is on TPN for nutritional support and tolerating this well.  Her J-tube site has stopped draining after resecting the J-tube related small bowel fistula.  Her wound is healing well.    Review of Systems   Constitutional:  Negative for chills and fever.   Gastrointestinal:  Positive for abdominal pain and nausea.       Objective  Physical Exam  Constitutional:       Appearance: She is ill-appearing. She is not toxic-appearing.   Abdominal:      Palpations: Abdomen is soft.      Tenderness: There is generalized abdominal tenderness.      Comments: The abdomen is soft and diffusely tender as per her baseline.  It is a benign exam.  The wound is clean with a nice bed of granulation tissue and healing well.  There is no evidence of infection.  The previous J-tube site is closed.   Neurological:      Mental Status: She is alert.   Psychiatric:         Behavior: Behavior is cooperative.       BMI is within normal parameters. No other follow-up for BMI required.        Assessment &  Plan    1.  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  She was advised on ongoing wound care.  She will follow-up in 1 week.

## 2024-07-23 NOTE — PROGRESS NOTES
Subjective   Henrietta Garrett is a 29 y.o. female who returns to the office after undergoing a laparoscopic assisted small bowel resection on 7/5/2024.     History of Present Illness     The patient is recovering well and is being managed by pain management which is helping her pain syndrome.  She continues to have severe nausea related to gastroparesis but is tolerating oral Dilaudid prescribed by pain management.  She is on TPN for nutritional support and tolerating this well.  Her J-tube site has stopped draining after resecting the J-tube related small bowel fistula.  Her wound is healing well.    Review of Systems   Constitutional:  Negative for chills and fever.   Gastrointestinal:  Positive for abdominal pain and nausea.       Objective   Physical Exam  Constitutional:       Appearance: She is ill-appearing. She is not toxic-appearing.   Abdominal:      Palpations: Abdomen is soft.      Tenderness: There is generalized abdominal tenderness.      Comments: The abdomen is soft and diffusely tender as per her baseline.  It is a benign exam.  The wound is clean with a nice bed of granulation tissue and healing well.  There is no evidence of infection.  The previous J-tube site is closed.   Neurological:      Mental Status: She is alert.   Psychiatric:         Behavior: Behavior is cooperative.       BMI is within normal parameters. No other follow-up for BMI required.        Assessment & Plan     1.  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  She was advised on ongoing wound care.  She will follow-up in 1 week.

## 2024-07-23 NOTE — OUTREACH NOTE
Call Center TCM Note      Flowsheet Row Responses   [facility] [place] operated by [facility] patient discharged from? [place]   Does the patient have one of the following disease processes/diagnoses(primary or secondary)? General Surgery   TCM attempt successful? No   Unsuccessful attempts Attempt 3   Call Status Left message            Rain Riddle RN    7/23/2024, 13:32 EDT        
Unilateral primary osteoarthritis, left hip

## 2024-07-24 ENCOUNTER — OFFICE VISIT (OUTPATIENT)
Dept: PAIN MEDICINE | Facility: CLINIC | Age: 29
End: 2024-07-24
Payer: COMMERCIAL

## 2024-07-24 VITALS
SYSTOLIC BLOOD PRESSURE: 133 MMHG | TEMPERATURE: 96.6 F | RESPIRATION RATE: 16 BRPM | BODY MASS INDEX: 24.13 KG/M2 | DIASTOLIC BLOOD PRESSURE: 88 MMHG | HEIGHT: 71 IN | HEART RATE: 59 BPM

## 2024-07-24 DIAGNOSIS — M32.9 SYSTEMIC LUPUS ERYTHEMATOSUS, UNSPECIFIED SLE TYPE, UNSPECIFIED ORGAN INVOLVEMENT STATUS: ICD-10-CM

## 2024-07-24 DIAGNOSIS — Q79.60 EHLERS-DANLOS SYNDROME: ICD-10-CM

## 2024-07-24 DIAGNOSIS — R10.9 POSTOPERATIVE ABDOMINAL PAIN: ICD-10-CM

## 2024-07-24 DIAGNOSIS — G90.521 COMPLEX REGIONAL PAIN SYNDROME TYPE 1 OF RIGHT LOWER EXTREMITY: Primary | ICD-10-CM

## 2024-07-24 DIAGNOSIS — M05.9 SEROPOSITIVE RHEUMATOID ARTHRITIS: ICD-10-CM

## 2024-07-24 DIAGNOSIS — G89.18 POSTOPERATIVE ABDOMINAL PAIN: ICD-10-CM

## 2024-07-24 DIAGNOSIS — G89.4 CHRONIC PAIN SYNDROME: ICD-10-CM

## 2024-07-24 RX ORDER — HYDROMORPHONE HYDROCHLORIDE 2 MG/1
2 TABLET ORAL EVERY 4 HOURS PRN
Qty: 50 TABLET | Refills: 0 | Status: SHIPPED | OUTPATIENT
Start: 2024-07-24 | End: 2024-08-01

## 2024-07-24 NOTE — PROGRESS NOTES
CHIEF COMPLAINT  Pain is stable.    Subjective   Henrietta Garrett is a 29 y.o. female  who presents for follow-up.  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  The patient has had significant worsening of pain following recent bowel resection with G-tube placement which was performed on 7/5/2024.    Due to her severe gastroparesis she has significant absorption issues and has not been able to remain very comfortable due to inadequate coverage from pain medication.  On her last office visit Dr. Dailey authorized trial of hydromorphone 2 mg every 4 hours which she is tolerating without adverse effects.  The patient states that the first 3 days of taking the Dilaudid that she did well with significant reduction of pain but now she is back to minimal relief with the medication.    Most recent hospitalization the patient was treated with fentanyl patch which did not provide any relief though she does respond very well to IV fentanyl.  During the hospital stay she was also treated with IV Dilaudid and morphine.    Continues on TPN and liquids for nutrition and she is able to swallow pills however once again but is unclear how well she processes oral medications.    Surgical history:  7/5/2024-laparoscopic assisted takedown of J-tube and small bowel resection, G-tube removal with PEG  2/19/2024-J-tube replacement  2/15/2024-EGD with PEG, J-tube replacement  12/11/2023-laparoscopic cholecystectomy     She is a patient of Dr. Terrazas (orthopedics) and reports history of multiple shoulder surgeries.    Pain today 7/10 VAS in severity.      History of Present Illness     PEG Assessment   What number best describes your pain on average in the past week?8  What number best describes how, during the past week, pain has  interfered with your enjoyment of life?9  What number best describes how, during the past week, pain has interfered with your general activity?  9    Review of Pertinent Medical Data ---  Review of HERSON Paredes's 7/17/24 office note: Patient's plan of care was discussed with Dr. Dailey and due to her severe level of pain Norco 7.5/325 was discontinued and initiate of Dilaudid 2 mg p.o. every 4 hours as needed pain.  The plan was for the patient to return in 1 week for further evaluation and possible downward titration of the medication.  Narcan was also prescribed.      CT ABDOMEN AND PELVIS WITH IV CONTRAST     HISTORY: Abdominal pain     TECHNIQUE: Radiation dose reduction techniques were utilized, including  automated exposure control and exposure modulation based on body size.   3 mm images were obtained through the abdomen and pelvis after the  administration of IV contrast.     COMPARISON: Multiple CT abdomen and pelvis dating back to 6/16/2023     FINDINGS:     Please note evaluation is suboptimal due to beam hardening and streak  artifact and patient's arms being along their side. A percutaneous  gastrostomy tube is present. The bulb of the gastrostomy tube does not  appear to be located within the gastric lumen, as before, and is  contiguous with the wall. There is a tract of air which extends to the  gastrostomy tube bulb. Findings are grossly similar to on 5/25/2024. A  few air-fluid levels are present within the small bowel which is  otherwise nondistended. The appendix is unremarkable. Mild intrahepatic  bile duct dilation is present status post cholecystectomy, as before.  The pancreas, spleen and adrenal glands have an unremarkable  postcontrast CT appearance. There appears to be a subtle subcentimeter  right renal lesion which is too small to characterize. No  hydronephrosis.     Prominent and enlarged abdominal pelvic adenopathy is present. Enlarged  brett hepatic node measuring 1.3 cm in short axis  dimension, grossly  unchanged since 6/16/2023. Prominent aortocaval node measuring 0.8 cm in  short axis dimension, as before. An anterior pericaval node more  inferiorly has increased in size, measuring 0.8 cm in short axis  dimension (previously 0.5 cm on 2/26/2024. Asymmetric right adnexal  hypodense lesion measuring up to 2.3 cm is present, as before. No free  intraperitoneal air is seen..     Large amount of hyperdense stool is present throughout the colon.     No suspicious lytic or blastic osseous lesion. The previously seen  jejunostomy tube overlying the left lower quadrant has been removed.  There is ill-defined soft tissue thickening and fat stranding extending  through the subcutaneous tissues and abutting the adjacent small bowel  in this area. A small focus of gas is present along the tract and not  definitively located in the bowel.     IMPRESSION:  1.  Findings suggestive of constipation.  2.  Previously seen jejunostomy tube has been removed with extensive  soft tissue thickening extending from the small bowel to the skin  surface. A focus of air overlying the tract extending from the small  bowel through the subcutaneous tissues. CT findings are nonspecific and  correlation with patient history is recommended to exclude an  enterocutaneous fistula and/or developing infection.  3.  Prominent and mildly enlarged abdominal pelvic nodes. An aortocaval  node as increased in size since 2/26/2024. While nonspecific, continued  attention follow-up with CT abdomen and pelvis in 3 months is  recommended to ensure resolution.  4.  Percutaneous gastrostomy tube is not located within the stomach and  appears to have been further retracted since 2/26/2024 with the majority  of the balloon appearing to be outside of the stomach wall. A tract of  air extending from the gastric lumen into the gastrostomy balloon, as  seen on 5/25/2024.  5.  Other findings as above.        This report was finalized on 7/2/2024 4:44  "PM by Dr. Domingo Sims M.D    The following portions of the patient's history were reviewed and updated as appropriate: allergies, current medications, past family history, past medical history, past social history, past surgical history, and problem list.    Review of Systems   Constitutional:  Negative for activity change, fatigue and fever.   Respiratory:  Negative for cough and chest tightness.    Cardiovascular:  Negative for chest pain.   Gastrointestinal:  Positive for constipation and diarrhea. Negative for abdominal pain.   Genitourinary:  Negative for difficulty urinating and dysuria.   Neurological:  Positive for dizziness and weakness. Negative for light-headedness, numbness and headaches.   Psychiatric/Behavioral:  Positive for sleep disturbance. Negative for agitation and suicidal ideas. The patient is not nervous/anxious.      I have reviewed and confirmed the accuracy of the ROS as documented by the MA/LPN/RN VIVIANA Nelson  Vitals:    07/24/24 0838   BP: 133/88   BP Location: Left arm   Patient Position: Sitting   Cuff Size: Adult   Pulse: 59   Resp: 16   Temp: 96.6 °F (35.9 °C)   TempSrc: Temporal   Height: 180.3 cm (71\")   PainSc:   7         Objective   Physical Exam  Vitals and nursing note reviewed. Exam conducted with a chaperone present (Mother).   Constitutional:       Appearance: She is normal weight. She is ill-appearing.      Comments: Patient appears chronically ill and appears to be in moderate level of distress due to nausea and pain   HENT:      Head: Normocephalic.   Chest:       Abdominal:       Neurological:      Mental Status: She is alert and oriented to person, place, and time.      Cranial Nerves: Cranial nerves 2-12 are intact.      Sensory: Sensation is intact.      Motor: Motor function is intact.      Gait: Gait is intact.   Psychiatric:         Mood and Affect: Mood normal.         Behavior: Behavior normal.         Thought Content: Thought content normal.         " Judgment: Judgment normal.         Assessment & Plan   Diagnoses and all orders for this visit:    1. Complex regional pain syndrome type 1 of right lower extremity (Primary)    2. Seropositive rheumatoid arthritis    3. Systemic lupus erythematosus, unspecified SLE type, unspecified organ involvement status    4. Tomas-Danlos syndrome    5. Chronic pain syndrome        Henrietta Garrett reports a pain score of 7.  Given her pain assessment as noted, treatment options were discussed and the following options were decided upon as a follow-up plan to address the patient's pain: continuation of current treatment plan for pain, prescription for opiod analgesics, and use of non-medical modalities (ice, heat, stretching and/or behavior modifications).      --- Follow-up in 1 week for further evaluation and treat recommendations to be made.  --- Will discuss with Dr. Dailey the patient's downward titration if she would like to explore decreasing from Dilaudid to oxycodone.  --- Until Dr. Dailey is back in the office we will continue with Dilaudid 2 mg every 4 hours as needed pain.  The patient has a Narcan prescription at home.             VENTURA REPORT  As part of the patient's treatment plan, I am prescribing controlled substances. The patient has been made aware of appropriate use of such medications, including potential risk of somnolence, limited ability to drive and/or work safely, and the potential for dependence or overdose. It has also been made clear that these medications are for use by this patient only, without concomitant use of alcohol or other substances unless prescribed.     Patient has completed prescribing agreement detailing terms of continued prescribing of controlled substances, including monitoring VENTURA reports, urine drug screening, and pill counts if necessary. The patient is aware that inappropriate use will results in cessation of prescribing such medications.    As the clinician, I personally  reviewed the VENTURA from 7/24/2024 while the patient was in the office today.    History and physical exam exhibit continued safe and appropriate use of controlled substances.     Dictated utilizing Dragon dictation.

## 2024-07-24 NOTE — TELEPHONE ENCOUNTER
VENTURA reviewed and appropriate.  Last drug screen 6/10/24 appropriate.     This patient is under the care of my colleague and I am covering patient care for him at this time.  I have reviewed pertinent information/documentation as necessary and will continue the plan of care as previously directed to the best of my ability.

## 2024-07-30 ENCOUNTER — OFFICE VISIT (OUTPATIENT)
Dept: SURGERY | Facility: CLINIC | Age: 29
End: 2024-07-30
Payer: COMMERCIAL

## 2024-07-30 VITALS
WEIGHT: 170 LBS | HEIGHT: 71 IN | DIASTOLIC BLOOD PRESSURE: 82 MMHG | BODY MASS INDEX: 23.8 KG/M2 | SYSTOLIC BLOOD PRESSURE: 122 MMHG

## 2024-07-30 DIAGNOSIS — Z48.89 POSTOPERATIVE VISIT: Primary | ICD-10-CM

## 2024-07-30 PROCEDURE — 99024 POSTOP FOLLOW-UP VISIT: CPT | Performed by: SURGERY

## 2024-07-30 NOTE — LETTER
July 30, 2024     Kenya David MD     Patient: Henrietta Garrett   YOB: 1995   Date of Visit: 7/30/2024     Dear Kenya David MD:       Thank you for referring Hnerietta Garrett to me for evaluation. Below are the relevant portions of my assessment and plan of care.    If you have questions, please do not hesitate to call me. I look forward to following Henrietta along with you.         Sincerely,        Madhu Zabala Jr., MD        CC:   No Recipients    Madhu Zabala Jr., MD  07/30/24 1112  Sign when Signing Visit  Subjective  Henrietta Garrett is a 29 y.o. female who returns to the office after undergoing a laparoscopic assisted small bowel resection on 7/5/2024.     History of Present Illness     The patient is recovering well from surgery but is currently complaining of nausea related to her known gastroparesis.  She is on IV nausea medicine that does not seem to be providing as much relief as it used to provide.  She has been draining her G-tube with some improvement but it does not completely treat the nausea.    The limited midline incision is healing well.  She continues to use dressing changes.  There is no further drainage at the previous J-tube site.    Review of Systems   Constitutional:  Negative for chills and fever.   Gastrointestinal:  Positive for abdominal pain and nausea.       Objective  Physical Exam  Constitutional:       Appearance: She is not ill-appearing or toxic-appearing.   Abdominal:      Palpations: Abdomen is soft.      Tenderness: There is no abdominal tenderness.      Comments: Incision: Open with healthy granulation tissue all the way to the skin edge.  There is no evidence of infection.  G-tube site: Clean with no evidence of infection.   Neurological:      Mental Status: She is alert.   Psychiatric:         Behavior: Behavior is cooperative.       BMI is within normal parameters. No other follow-up for BMI required.        Assessment & Plan    1.  Postoperative visit: The  patient is recovering well from her laparoscopic assisted small bowel resection.  The wound is almost completely healed and she was advised to use a dry dressing.  She will follow-up in 2 weeks.    2.  Gastroparesis: The patient has multiple medical problems and has severe gastroparesis that is being managed by gastroenterology.  She is having increasing nausea that she is trying to manage and will be in touch with her gastroenterologist.

## 2024-07-30 NOTE — PROGRESS NOTES
Subjective   Henrietta Garrett is a 29 y.o. female who returns to the office after undergoing a laparoscopic assisted small bowel resection on 7/5/2024.     History of Present Illness     The patient is recovering well from surgery but is currently complaining of nausea related to her known gastroparesis.  She is on IV nausea medicine that does not seem to be providing as much relief as it used to provide.  She has been draining her G-tube with some improvement but it does not completely treat the nausea.    The limited midline incision is healing well.  She continues to use dressing changes.  There is no further drainage at the previous J-tube site.    Review of Systems   Constitutional:  Negative for chills and fever.   Gastrointestinal:  Positive for abdominal pain and nausea.       Objective   Physical Exam  Constitutional:       Appearance: She is not ill-appearing or toxic-appearing.   Abdominal:      Palpations: Abdomen is soft.      Tenderness: There is no abdominal tenderness.      Comments: Incision: Open with healthy granulation tissue all the way to the skin edge.  There is no evidence of infection.  G-tube site: Clean with no evidence of infection.   Neurological:      Mental Status: She is alert.   Psychiatric:         Behavior: Behavior is cooperative.       BMI is within normal parameters. No other follow-up for BMI required.        Assessment & Plan     1.  Postoperative visit: The patient is recovering well from her laparoscopic assisted small bowel resection.  The wound is almost completely healed and she was advised to use a dry dressing.  She will follow-up in 2 weeks.    2.  Gastroparesis: The patient has multiple medical problems and has severe gastroparesis that is being managed by gastroenterology.  She is having increasing nausea that she is trying to manage and will be in touch with her gastroenterologist.

## 2024-08-05 ENCOUNTER — OFFICE VISIT (OUTPATIENT)
Dept: FAMILY MEDICINE CLINIC | Facility: CLINIC | Age: 29
End: 2024-08-05
Payer: COMMERCIAL

## 2024-08-05 ENCOUNTER — OFFICE VISIT (OUTPATIENT)
Dept: PAIN MEDICINE | Facility: CLINIC | Age: 29
End: 2024-08-05
Payer: COMMERCIAL

## 2024-08-05 VITALS
DIASTOLIC BLOOD PRESSURE: 80 MMHG | SYSTOLIC BLOOD PRESSURE: 129 MMHG | HEIGHT: 71 IN | WEIGHT: 170 LBS | HEART RATE: 88 BPM | BODY MASS INDEX: 23.8 KG/M2 | TEMPERATURE: 96.9 F | RESPIRATION RATE: 18 BRPM

## 2024-08-05 VITALS
DIASTOLIC BLOOD PRESSURE: 88 MMHG | SYSTOLIC BLOOD PRESSURE: 149 MMHG | WEIGHT: 171 LBS | BODY MASS INDEX: 23.94 KG/M2 | HEIGHT: 71 IN

## 2024-08-05 DIAGNOSIS — M05.9 SEROPOSITIVE RHEUMATOID ARTHRITIS: ICD-10-CM

## 2024-08-05 DIAGNOSIS — D50.9 IRON DEFICIENCY ANEMIA, UNSPECIFIED IRON DEFICIENCY ANEMIA TYPE: ICD-10-CM

## 2024-08-05 DIAGNOSIS — Z79.899 ENCOUNTER FOR LONG-TERM (CURRENT) USE OF HIGH-RISK MEDICATION: ICD-10-CM

## 2024-08-05 DIAGNOSIS — L03.311 ABDOMINAL WALL CELLULITIS: ICD-10-CM

## 2024-08-05 DIAGNOSIS — Z78.9 ON TOTAL PARENTERAL NUTRITION (TPN): ICD-10-CM

## 2024-08-05 DIAGNOSIS — T81.49XA CELLULITIS, WOUND, POST-OPERATIVE: Primary | ICD-10-CM

## 2024-08-05 DIAGNOSIS — Q79.60 EHLERS-DANLOS SYNDROME: ICD-10-CM

## 2024-08-05 DIAGNOSIS — G90.521 COMPLEX REGIONAL PAIN SYNDROME TYPE 1 OF RIGHT LOWER EXTREMITY: Primary | ICD-10-CM

## 2024-08-05 DIAGNOSIS — G89.4 CHRONIC PAIN SYNDROME: ICD-10-CM

## 2024-08-05 PROBLEM — R93.5 ABNORMAL CT OF THE ABDOMEN: Status: RESOLVED | Noted: 2023-12-10 | Resolved: 2024-08-05

## 2024-08-05 PROBLEM — J45.990 EXERCISE-INDUCED ASTHMA: Status: ACTIVE | Noted: 2018-04-27

## 2024-08-05 PROBLEM — E46 PROTEIN-CALORIE MALNUTRITION: Status: ACTIVE | Noted: 2024-05-02

## 2024-08-05 PROBLEM — R04.2 HEMOPTYSIS: Status: RESOLVED | Noted: 2018-11-09 | Resolved: 2024-08-05

## 2024-08-05 PROBLEM — K58.2 IRRITABLE BOWEL SYNDROME WITH BOTH CONSTIPATION AND DIARRHEA: Status: RESOLVED | Noted: 2018-05-04 | Resolved: 2024-08-05

## 2024-08-05 PROBLEM — M32.8: Status: ACTIVE | Noted: 2018-05-04

## 2024-08-05 PROBLEM — K62.5 RECTAL BLEEDING: Status: RESOLVED | Noted: 2022-07-22 | Resolved: 2024-08-05

## 2024-08-05 PROBLEM — Z93.4 JEJUNOSTOMY TUBE PRESENT: Status: RESOLVED | Noted: 2023-06-27 | Resolved: 2024-08-05

## 2024-08-05 PROCEDURE — 99215 OFFICE O/P EST HI 40 MIN: CPT | Performed by: FAMILY MEDICINE

## 2024-08-05 PROCEDURE — 99214 OFFICE O/P EST MOD 30 MIN: CPT | Performed by: PHYSICIAN ASSISTANT

## 2024-08-05 RX ORDER — OXYCODONE AND ACETAMINOPHEN 10; 325 MG/1; MG/1
1 TABLET ORAL EVERY 4 HOURS PRN
Qty: 84 TABLET | Refills: 0 | Status: SHIPPED | OUTPATIENT
Start: 2024-08-05

## 2024-08-05 RX ORDER — HYDROMORPHONE HYDROCHLORIDE 2 MG/1
2 TABLET ORAL EVERY 4 HOURS PRN
COMMUNITY
End: 2024-08-05

## 2024-08-05 NOTE — PATIENT INSTRUCTIONS
Check labs    Urgent referral to wound care clinic  Referral to Dr. Skinner- re-establish for iron transfusion    Consider acupuncture- Dr. David does it for [place] pay on Tuesdays, [facility] does sliding scale.

## 2024-08-05 NOTE — PROGRESS NOTES
CHIEF COMPLAINT  Follow-up for pain. Ms. Garrett states that her pain is unchanged.      Subjective   Henrietta Garrett is a 29 y.o. female  who presents for follow-up.  She has a history of diffuse and widespread pain secondary to autoimmune overlap syndrome as well as severe gastroparesis.  Patient continues with J-tube and G-tube for tube feedings as well as to address issues with gastroparesis flares.  Primary pain complaint continues to be abdominal pain particularly within the right upper extremity as well as diagnosis of CRPS and Tomas-Danlos.    Current medication regimen consists of hydromorphone 2 mg every 4 hours as needed pain which is not providing optimal relief.  The patient has been treated previously with both Butrans and fentanyl patches both of which failed to provide adequate relief.  She reports that she does respond well to IV fentanyl, IV Dilaudid and morphine.    Continues on TPN and liquids for nutrition and she is able to swallow pills however once again but is unclear how well she processes oral medications.     Surgical history:  7/5/2024-laparoscopic assisted takedown of J-tube and small bowel resection, G-tube removal with PEG  2/19/2024-J-tube replacement  2/15/2024-EGD with PEG, J-tube replacement  12/11/2023-laparoscopic cholecystectomy    She is a patient of Dr. Terrazas (orthopedics) and reports history of multiple shoulder surgeries.      Pain today 8/10 VAS in severity.    History of Present Illness     PEG Assessment   What number best describes your pain on average in the past week?8  What number best describes how, during the past week, pain has interfered with your enjoyment of life?8  What number best describes how, during the past week, pain has interfered with your general activity?  7    Review of Pertinent Medical Data ---      The following portions of the patient's history were reviewed and updated as appropriate: allergies, current medications, past family history, past  "medical history, past social history, past surgical history, and problem list.    Review of Systems   Gastrointestinal:  Positive for constipation and diarrhea.   Genitourinary:  Negative for difficulty urinating.   Musculoskeletal:  Positive for arthralgias, back pain, myalgias and neck pain.   Neurological:  Positive for weakness and numbness (fingers and toes).   Psychiatric/Behavioral:  Positive for sleep disturbance. Negative for suicidal ideas. The patient is nervous/anxious.      I have reviewed and confirmed the accuracy of the ROS as documented by the MA/LPN/RN VIVIANA Nelson  Vitals:    08/05/24 0758   BP: 129/80   Pulse: 88   Resp: 18   Temp: 96.9 °F (36.1 °C)   Weight: 77.1 kg (170 lb)   Height: 180.3 cm (71\")   PainSc:   8   PainLoc: Generalized         Objective   Physical Exam  Vitals and nursing note reviewed. Exam conducted with a chaperone present (Mother).   Constitutional:       Appearance: She is normal weight. She is ill-appearing.      Comments: Patient appears chronically ill and appears to be in moderate level of distress due to nausea and pain   HENT:      Head: Normocephalic.   Chest:       Abdominal:       Neurological:      Mental Status: She is alert and oriented to person, place, and time.      Cranial Nerves: Cranial nerves 2-12 are intact.      Sensory: Sensation is intact.      Motor: Motor function is intact.      Gait: Gait is intact.   Psychiatric:         Mood and Affect: Mood normal.         Behavior: Behavior normal.         Thought Content: Thought content normal.         Judgment: Judgment normal.             Assessment & Plan   Diagnoses and all orders for this visit:    1. Complex regional pain syndrome type 1 of right lower extremity (Primary)    2. Tomas-Danlos syndrome    3. Chronic pain syndrome    4. Encounter for long-term (current) use of high-risk medication    5. Seropositive rheumatoid arthritis        Henrietta Garrett reports a pain score of 8.  Given her " pain assessment as noted, treatment options were discussed and the following options were decided upon as a follow-up plan to address the patient's pain: continuation of current treatment plan for pain, prescription for opiod analgesics, and use of non-medical modalities (ice, heat, stretching and/or behavior modifications).      --- Follow-up in 2 weeks or sooner for medication management  --- I have discussed the patient's medication regimen with Dr. Dailey who recommends discontinuing hydromorphone and rotating to oxycodone 10 mg every 4-6 hours as needed pain.  --- Patient does have a Narcan prescription within her home.      The urine drug screen confirmation from 6/10/2024 has been reviewed and the result is appropriate based on patient history and VENTURA report         The patient signed an updated copy of the controlled substance agreement on 6/10/2024.      [facility] REPORT  As part of the patient's treatment plan, I am prescribing controlled substances. The patient has been made aware of appropriate use of such medications, including potential risk of somnolence, limited ability to drive and/or work safely, and the potential for dependence or overdose. It has also been made clear that these medications are for use by this patient only, without concomitant use of alcohol or other substances unless prescribed.     Patient has completed prescribing agreement detailing terms of continued prescribing of controlled substances, including monitoring VENTURA reports, urine drug screening, and pill counts if necessary. The patient is aware that inappropriate use will results in cessation of prescribing such medications.    As the clinician, I personally reviewed the VENTURA from 8/5/2024 while the patient was in the office today.    History and physical exam exhibit continued safe and appropriate use of controlled substances.       Dictated utilizing Dragon dictation.

## 2024-08-05 NOTE — PROGRESS NOTES
"Chief Complaint  Chief Complaint   Patient presents with    [facility] fu, check infection        Subjective    History of Present Illness  Henriteta Garrett is a 29 y.o. female presents to [facility] for hospital followup    History of Present Illness  The patient, currently on permanent TPN, has ceased using her G-tube due to recurrent infections that led to sepsis.  Due to her Tomas-Danlos Syndrome (EDS), her G-tube was removed, multiple adhesions resulted in partial bowel removal. She was in a critical condition for 2.5 weeks, during which she was on a pain pump of Dilaudid daily. Post-surgery, her abdominal incision was left open to prevent infection; currently, it is semi-open. She was prescribed oral Augmentin during her last appointment surgery, with the last dose administered 2 weeks ago.  She was previously on several other antibiotics outpatient, and multiple antibiotics while admitted.  However she is concerned that she is not absorbing the antibiotic.  She is considering intravenous antibiotics through her port.. She has been applying antibiotic ointment to her G-tube, which has not provided relief.    Her fingers are currently purple due to Raynaud's     She consulted a pain specialist, who prescribed Dilaudid 2 mg every 2 to 4 hours. She has been on Dilaudid for approximately 1 month, which provided relief for the first 1.5 weeks. Her surgeon has prescribed a higher dose of oxycodone. She has tried acupuncture at the age of 17, which did not provide relief.    Objective   Vitals:    08/05/24 1324   BP: 149/88   Weight: 77.6 kg (171 lb)   Height: 180.3 cm (70.98\")        BMI is within normal parameters. No other follow-up for BMI required.       Physical Exam  Vitals reviewed.   Constitutional:       Appearance: She is ill-appearing. She is not toxic-appearing.   HENT:      Head: Normocephalic and atraumatic.   Cardiovascular:      Comments: Well " perfused  Pulmonary:      Effort: Pulmonary effort is normal. No respiratory distress.   Abdominal:      General: There is no distension.   Musculoskeletal:         General: Normal range of motion.   Skin:     Coloration: Skin is cyanotic (Fingers) and pale.      Findings: Wound (see clinical photo) present.   Neurological:      Mental Status: Mental status is at baseline.        WOUND IMAGE (08/05/2024)     The following data was reviewed by: Kenya David MD on 08/05/2024:  CMP          7/15/2024    04:48 7/16/2024    05:06 7/17/2024    03:16   CMP   Glucose 97  94  93    BUN 11  11  11    Creatinine 0.52  0.60  0.61    EGFR 129.2  124.8  124.3    Sodium 137  136  136    Potassium 4.0  3.7  3.9    Chloride 104  102  104    Calcium 9.1  8.8  9.2    Total Protein 7.3  7.5  7.6    Albumin 3.6  3.4  3.5    Globulin 3.7  4.1  4.1    Total Bilirubin <0.2  <0.2  <0.2    Alkaline Phosphatase 54  54  56    AST (SGOT) 10  13  13    ALT (SGPT) 6  8  9    Albumin/Globulin Ratio 1.0  0.8  0.9    BUN/Creatinine Ratio 21.2  18.3  18.0    Anion Gap 9.0  11.8  8.0      CBC          7/10/2024    05:45 7/14/2024    03:11 7/16/2024    05:06   CBC   WBC 4.22  5.18  3.44    RBC 3.79  3.61  3.66    Hemoglobin 9.5  9.0  9.3    Hematocrit 30.2  29.3  29.5    MCV 79.7  81.2  80.6    MCH 25.1  24.9  25.4    MCHC 31.5  30.7  31.5    RDW 14.5  14.5  14.4    Platelets 286  260  251      Last outpt surgeon note      Assessment and Plan  Henrietta Garrett is a 29 y.o. female presents to [facility] today for followup    Assessment & Plan  1. Abdominal wall cellulitis.  Due to previous administration of multiple antibiotics and possibility of needing IV antibiotics, an urgent referral to our wound clinic has been initiated.  She has an appointment in 2 days.    2. Iron deficiency anemia.  A referral to Dr. Skinner has been made to reestablish care; she previously had a reaction to iron transfusion.  Concern for needing  iron replenishment given low hemoglobin hematocrit and she is very pale.  Will recheck CBC, ferritin, iron today.  Will message Dr. Skinner.    Follow-up  A follow-up appointment is scheduled for 10/2024.    Diagnoses and all orders for this visit:    1. Cellulitis, wound, post-operative (Primary)  -     Ambulatory Referral to Wound Clinic    2. Abdominal wall cellulitis    3. Iron deficiency anemia, unspecified iron deficiency anemia type  -     CBC & Differential  -     Ferritin  -     Iron and TIBC  -     Cancel: Ambulatory Referral to Hematology  -     Ambulatory Referral to Hematology    4. On total parenteral nutrition (TPN)  -     Hemoglobin A1c  -     Insulin, Total  -     Cancel: Ambulatory Referral to Hematology  -     Ambulatory Referral to Hematology        Patient voiced understanding and agreement with plan of care and had no further questions or concerns at this time.     I spent 40 minutes on this encounter  Problems addressed: 1 or more chronic illnesses with severe exacerbation, progression, or side effects of treatment (HIGH)   Complexity: labs ordered yes, labs reviewed yes; coronation of care with outside physician  Risk: diagnosis significantly limited by social determinants of health    Kenya David MD  Family Medicine  [facility]      Follow Up  Return for Next scheduled follow up.    Patient Instructions   Check labs    Urgent referral to wound care clinic  Referral to Dr. Skinner- re-establish for iron transfusion    Consider acupuncture- Dr. David does it for [employer] pay on Tuesdays, [facility] does sliding scale.      Patient or patient representative verbalized consent for the use of Ambient Listening during the visit with  Kenya David MD for chart documentation. 8/5/2024  15:51 EDT

## 2024-08-06 ENCOUNTER — OFFICE VISIT (OUTPATIENT)
Dept: WOUND CARE | Facility: HOSPITAL | Age: 29
End: 2024-08-06
Payer: COMMERCIAL

## 2024-08-06 LAB
BASOPHILS # BLD AUTO: 0 X10E3/UL (ref 0–0.2)
BASOPHILS NFR BLD AUTO: 1 %
EOSINOPHIL # BLD AUTO: 0.3 X10E3/UL (ref 0–0.4)
EOSINOPHIL NFR BLD AUTO: 6 %
ERYTHROCYTE [DISTWIDTH] IN BLOOD BY AUTOMATED COUNT: 15.6 % (ref 11.7–15.4)
FERRITIN SERPL-MCNC: 15 NG/ML (ref 15–150)
HBA1C MFR BLD: 5.6 % (ref 4.8–5.6)
HCT VFR BLD AUTO: 30.8 % (ref 34–46.6)
HGB BLD-MCNC: 9.3 G/DL (ref 11.1–15.9)
IMM GRANULOCYTES # BLD AUTO: 0 X10E3/UL (ref 0–0.1)
IMM GRANULOCYTES NFR BLD AUTO: 0 %
INSULIN SERPL-ACNC: 38 UIU/ML (ref 2.6–24.9)
IRON SATN MFR SERPL: 7 % (ref 15–55)
IRON SERPL-MCNC: 21 UG/DL (ref 27–159)
LYMPHOCYTES # BLD AUTO: 1.3 X10E3/UL (ref 0.7–3.1)
LYMPHOCYTES NFR BLD AUTO: 31 %
MCH RBC QN AUTO: 25.2 PG (ref 26.6–33)
MCHC RBC AUTO-ENTMCNC: 30.2 G/DL (ref 31.5–35.7)
MCV RBC AUTO: 84 FL (ref 79–97)
MONOCYTES # BLD AUTO: 0.4 X10E3/UL (ref 0.1–0.9)
MONOCYTES NFR BLD AUTO: 10 %
NEUTROPHILS # BLD AUTO: 2.2 X10E3/UL (ref 1.4–7)
NEUTROPHILS NFR BLD AUTO: 52 %
PLATELET # BLD AUTO: 228 X10E3/UL (ref 150–450)
RBC # BLD AUTO: 3.69 X10E6/UL (ref 3.77–5.28)
TIBC SERPL-MCNC: 287 UG/DL (ref 250–450)
UIBC SERPL-MCNC: 266 UG/DL (ref 131–425)
WBC # BLD AUTO: 4.2 X10E3/UL (ref 3.4–10.8)

## 2024-08-06 PROCEDURE — G0463 HOSPITAL OUTPT CLINIC VISIT: HCPCS

## 2024-08-06 PROCEDURE — 97602 WOUND(S) CARE NON-SELECTIVE: CPT
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
## 2024-08-15 ENCOUNTER — TELEPHONE (OUTPATIENT)
Dept: ONCOLOGY | Facility: CLINIC | Age: 29
End: 2024-08-15
Payer: COMMERCIAL

## 2024-08-15 ENCOUNTER — LAB (OUTPATIENT)
Dept: LAB | Facility: HOSPITAL | Age: 29
End: 2024-08-15
Payer: COMMERCIAL

## 2024-08-15 ENCOUNTER — CONSULT (OUTPATIENT)
Dept: ONCOLOGY | Facility: CLINIC | Age: 29
End: 2024-08-15
Payer: COMMERCIAL

## 2024-08-15 VITALS
SYSTOLIC BLOOD PRESSURE: 147 MMHG | HEART RATE: 56 BPM | DIASTOLIC BLOOD PRESSURE: 96 MMHG | RESPIRATION RATE: 18 BRPM | BODY MASS INDEX: 23.8 KG/M2 | WEIGHT: 170 LBS | TEMPERATURE: 97.5 F | HEIGHT: 71 IN

## 2024-08-15 DIAGNOSIS — D50.8 IRON DEFICIENCY ANEMIA SECONDARY TO INADEQUATE DIETARY IRON INTAKE: Primary | ICD-10-CM

## 2024-08-15 DIAGNOSIS — D50.8 IRON DEFICIENCY ANEMIA SECONDARY TO INADEQUATE DIETARY IRON INTAKE: ICD-10-CM

## 2024-08-15 DIAGNOSIS — R79.89 ABNORMAL CBC: Primary | ICD-10-CM

## 2024-08-15 LAB
BASOPHILS # BLD AUTO: 0.01 10*3/MM3 (ref 0–0.2)
BASOPHILS NFR BLD AUTO: 0.3 % (ref 0–1.5)
DEPRECATED RDW RBC AUTO: 50.7 FL (ref 37–54)
EOSINOPHIL # BLD AUTO: 0.18 10*3/MM3 (ref 0–0.4)
EOSINOPHIL NFR BLD AUTO: 5 % (ref 0.3–6.2)
ERYTHROCYTE [DISTWIDTH] IN BLOOD BY AUTOMATED COUNT: 16.6 % (ref 12.3–15.4)
HCT VFR BLD AUTO: 29.4 % (ref 34–46.6)
HGB BLD-MCNC: 8.9 G/DL (ref 12–15.9)
IMM GRANULOCYTES # BLD AUTO: 0 10*3/MM3 (ref 0–0.05)
IMM GRANULOCYTES NFR BLD AUTO: 0 % (ref 0–0.5)
LYMPHOCYTES # BLD AUTO: 1.59 10*3/MM3 (ref 0.7–3.1)
LYMPHOCYTES NFR BLD AUTO: 44.4 % (ref 19.6–45.3)
MCH RBC QN AUTO: 25 PG (ref 26.6–33)
MCHC RBC AUTO-ENTMCNC: 30.3 G/DL (ref 31.5–35.7)
MCV RBC AUTO: 82.6 FL (ref 79–97)
MONOCYTES # BLD AUTO: 0.44 10*3/MM3 (ref 0.1–0.9)
MONOCYTES NFR BLD AUTO: 12.3 % (ref 5–12)
NEUTROPHILS NFR BLD AUTO: 1.36 10*3/MM3 (ref 1.7–7)
NEUTROPHILS NFR BLD AUTO: 38 % (ref 42.7–76)
NRBC BLD AUTO-RTO: 0 /100 WBC (ref 0–0.2)
PLATELET # BLD AUTO: 237 10*3/MM3 (ref 140–450)
PMV BLD AUTO: 9.5 FL (ref 6–12)
RBC # BLD AUTO: 3.56 10*6/MM3 (ref 3.77–5.28)
VIT B12 BLD-MCNC: 337 PG/ML (ref 211–946)
WBC NRBC COR # BLD AUTO: 3.58 10*3/MM3 (ref 3.4–10.8)

## 2024-08-15 PROCEDURE — 36415 COLL VENOUS BLD VENIPUNCTURE: CPT

## 2024-08-15 PROCEDURE — 82607 VITAMIN B-12: CPT | Performed by: INTERNAL MEDICINE

## 2024-08-15 PROCEDURE — 85025 COMPLETE CBC W/AUTO DIFF WBC: CPT
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
## 2024-08-15 NOTE — TELEPHONE ENCOUNTER
Called [facility] to have labs drawn with regularly scheduled lab draw on Tuesday as well as lab order for Tuesday before visit with us in October. No answer. Left voicemail with my call back number and set a reminder to try to call again tomorrow.     Needs b12 drawn Tu.     Need iron profile, ferritin, b12, and retic, and copper in October.     Gia Null RN

## 2024-08-16 NOTE — TELEPHONE ENCOUNTER
Called and s/w [facility] and gave lab orders for next Tuesday and 10/15 (the Tu prior to patient's f/u appt). Gia Null RN

## 2024-08-19 ENCOUNTER — INFUSION (OUTPATIENT)
Dept: ONCOLOGY | Facility: HOSPITAL | Age: 29
End: 2024-08-19
Payer: COMMERCIAL

## 2024-08-19 VITALS
TEMPERATURE: 97.8 F | HEART RATE: 76 BPM | RESPIRATION RATE: 16 BRPM | BODY MASS INDEX: 24.22 KG/M2 | SYSTOLIC BLOOD PRESSURE: 117 MMHG | DIASTOLIC BLOOD PRESSURE: 71 MMHG | WEIGHT: 173.6 LBS

## 2024-08-19 DIAGNOSIS — E53.8 B12 DEFICIENCY: Primary | ICD-10-CM

## 2024-08-19 DIAGNOSIS — D50.9 IRON DEFICIENCY ANEMIA, UNSPECIFIED IRON DEFICIENCY ANEMIA TYPE: ICD-10-CM

## 2024-08-19 DIAGNOSIS — T45.4X5A ADVERSE EFFECT OF IRON, INITIAL ENCOUNTER: ICD-10-CM

## 2024-08-19 PROCEDURE — 25810000003 SODIUM CHLORIDE 0.9 % SOLUTION: Performed by: NURSE PRACTITIONER

## 2024-08-19 PROCEDURE — 25010000002 METHYLPREDNISOLONE PER 125 MG: Performed by: INTERNAL MEDICINE

## 2024-08-19 PROCEDURE — 96374 THER/PROPH/DIAG INJ IV PUSH: CPT

## 2024-08-19 PROCEDURE — 96375 TX/PRO/DX INJ NEW DRUG ADDON: CPT

## 2024-08-19 PROCEDURE — 25010000002 FERUMOXYTOL 510 MG/17ML SOLUTION 17 ML VIAL: Performed by: NURSE PRACTITIONER

## 2024-08-19 RX ORDER — CETIRIZINE HYDROCHLORIDE 10 MG/1
10 TABLET ORAL ONCE
Status: DISCONTINUED | OUTPATIENT
Start: 2024-08-19 | End: 2024-08-19 | Stop reason: HOSPADM

## 2024-08-19 RX ORDER — FAMOTIDINE 10 MG/ML
20 INJECTION, SOLUTION INTRAVENOUS ONCE
Status: COMPLETED | OUTPATIENT
Start: 2024-08-19 | End: 2024-08-19

## 2024-08-19 RX ORDER — METHYLPREDNISOLONE SODIUM SUCCINATE 125 MG/2ML
20 INJECTION, POWDER, LYOPHILIZED, FOR SOLUTION INTRAMUSCULAR; INTRAVENOUS ONCE
Status: COMPLETED | OUTPATIENT
Start: 2024-08-19 | End: 2024-08-19

## 2024-08-19 RX ORDER — SODIUM CHLORIDE 9 MG/ML
20 INJECTION, SOLUTION INTRAVENOUS ONCE
Status: COMPLETED | OUTPATIENT
Start: 2024-08-19 | End: 2024-08-19

## 2024-08-19 RX ORDER — ACETAMINOPHEN 325 MG/1
650 TABLET ORAL ONCE
Status: DISCONTINUED | OUTPATIENT
Start: 2024-08-19 | End: 2024-08-19 | Stop reason: HOSPADM

## 2024-08-19 RX ADMIN — SODIUM CHLORIDE 20 ML/HR: 9 INJECTION, SOLUTION INTRAVENOUS at 08:14

## 2024-08-19 RX ADMIN — FERUMOXYTOL 510 MG: 510 INJECTION INTRAVENOUS at 08:56

## 2024-08-19 RX ADMIN — FAMOTIDINE 20 MG: 10 INJECTION INTRAVENOUS at 08:14

## 2024-08-19 RX ADMIN — METHYLPREDNISOLONE SODIUM SUCCINATE 20 MG: 125 INJECTION, POWDER, FOR SOLUTION INTRAMUSCULAR; INTRAVENOUS at 08:35

## 2024-08-19 NOTE — NURSING NOTE
Pt here for first fereheme infusion. Pt stated she does not tolerate PO medications due to gastroparesis. She also takes benadryl normally 4 times a day 25-50mg IV depending due to skin sensitivity with her adhesive bandages around her port and due to her lipid infusions with her TPN. She took benadryl this morning around 0600. Spoke with Dr. Skinner who also conferred with Judit HARGROVE RP and premedications of 20mg IV pepcid and 20 mg IV solu-medrol given. Dr. Skinner is okay with pt continuing her benadryl at home as she has been. Pt tolerated infusion well and discharged in stable condition. Message sent to clinic RN to change premedications for next fereheme infusion.

## 2024-08-20 ENCOUNTER — OFFICE VISIT (OUTPATIENT)
Dept: PAIN MEDICINE | Facility: CLINIC | Age: 29
End: 2024-08-20
Payer: COMMERCIAL

## 2024-08-20 VITALS
DIASTOLIC BLOOD PRESSURE: 72 MMHG | HEIGHT: 71 IN | RESPIRATION RATE: 12 BRPM | SYSTOLIC BLOOD PRESSURE: 125 MMHG | BODY MASS INDEX: 24.23 KG/M2 | TEMPERATURE: 98.1 F

## 2024-08-20 DIAGNOSIS — G89.18 POSTOPERATIVE ABDOMINAL PAIN: ICD-10-CM

## 2024-08-20 DIAGNOSIS — Q79.60 EHLERS-DANLOS SYNDROME: ICD-10-CM

## 2024-08-20 DIAGNOSIS — R10.9 POSTOPERATIVE ABDOMINAL PAIN: ICD-10-CM

## 2024-08-20 DIAGNOSIS — M05.9 SEROPOSITIVE RHEUMATOID ARTHRITIS: ICD-10-CM

## 2024-08-20 DIAGNOSIS — G89.4 CHRONIC PAIN SYNDROME: ICD-10-CM

## 2024-08-20 DIAGNOSIS — Z79.899 ENCOUNTER FOR LONG-TERM (CURRENT) USE OF HIGH-RISK MEDICATION: ICD-10-CM

## 2024-08-20 DIAGNOSIS — G90.521 COMPLEX REGIONAL PAIN SYNDROME TYPE 1 OF RIGHT LOWER EXTREMITY: ICD-10-CM

## 2024-08-20 DIAGNOSIS — M05.9 SEROPOSITIVE RHEUMATOID ARTHRITIS: Primary | ICD-10-CM

## 2024-08-20 LAB
POC AMPHETAMINES: NEGATIVE
POC BARBITURATES: NEGATIVE
POC BENZODIAZEPHINES: NEGATIVE
POC COCAINE: NEGATIVE
POC METHADONE: NEGATIVE
POC METHAMPHETAMINE SCREEN URINE: NEGATIVE
POC OPIATES: NEGATIVE
POC OXYCODONE: POSITIVE
POC PHENCYCLIDINE: NEGATIVE
POC PROPOXYPHENE: NEGATIVE
POC THC: NEGATIVE
POC TRICYCLIC ANTIDEPRESSANTS: NEGATIVE

## 2024-08-20 PROCEDURE — 99214 OFFICE O/P EST MOD 30 MIN: CPT | Performed by: NURSE PRACTITIONER

## 2024-08-20 PROCEDURE — 80305 DRUG TEST PRSMV DIR OPT OBS: CPT | Performed by: NURSE PRACTITIONER

## 2024-08-20 RX ORDER — OXYCODONE AND ACETAMINOPHEN 10; 325 MG/1; MG/1
1 TABLET ORAL EVERY 4 HOURS PRN
Qty: 126 TABLET | Refills: 0 | Status: SHIPPED | OUTPATIENT
Start: 2024-08-20

## 2024-08-20 NOTE — PROGRESS NOTES
CHIEF COMPLAINT  PAIN  Pain has slightly improved.    Subjective   Henrietta Garrett is a 29 y.o. female  who presents for follow-up.  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     Today her pain is 7/10VAS in severity. She is utilizing Percocet 10/325 3-6/day. She states that this medication regimen provides 30-40% relief. She notes that so far the Percocet has provided the best relief. She denies any side effects including somnolence or constipation. ADLs assisted by her fiance at times.     She is currently in wound care for her abdominal incision.     Has previously failed fentanyl patch, and Butrans.  Oral Dilaudid provided suboptimal relief.    She was at previous pain management and was placed on high dose Gabapentin at the time (she states that this was ~10 years ago), she notes this was not helpful to her pain.     She is working with Dr. Kee Skinner with hematology and will be starting intravenous iron.  Followed by Dr. Betzaida Rao (rheumatology)    Surgical history:  7/5/2024-laparoscopic assisted takedown of J-tube and small bowel resection, G-tube removal with PEG  2/19/2024-J-tube replacement  2/15/2024-EGD with PEG, J-tube replacement  12/11/2023-laparoscopic cholecystectomy     She is a patient of Dr. Terrazas (orthopedics) and reports history of multiple shoulder surgeries.    Pain  This is a chronic (7/10VAS in severity) problem. The current episode started more than 1 year ago. The problem occurs constantly. Progression since onset: slightly improved since last office visit. Associated symptoms include arthralgias, a fever (occ), myalgias, neck pain, numbness (hands) and weakness. Pertinent negatives include no abdominal pain, chest pain, coughing, fatigue or headaches. Nothing aggravates the symptoms. She has tried oral narcotics, NSAIDs and rest (antineuropathics) for the symptoms. The treatment provided mild relief.     "  PEG Assessment   What number best describes your pain on average in the past week?8  What number best describes how, during the past week, pain has interfered with your enjoyment of life?9  What number best describes how, during the past week, pain has interfered with your general activity?  9    The following portions of the patient's history were reviewed and updated as appropriate: allergies, current medications, past family history, past medical history, past social history, past surgical history, and problem list.    Review of Systems   Constitutional:  Positive for fever (occ). Negative for activity change and fatigue.   Respiratory:  Negative for cough and chest tightness.    Cardiovascular:  Negative for chest pain.   Gastrointestinal:  Positive for constipation and diarrhea. Negative for abdominal pain.   Genitourinary:  Negative for difficulty urinating and dysuria.   Musculoskeletal:  Positive for arthralgias, myalgias and neck pain. Negative for back pain.   Neurological:  Positive for dizziness, weakness, light-headedness and numbness (hands). Negative for headaches.   Psychiatric/Behavioral:  Positive for sleep disturbance. Negative for agitation and suicidal ideas. The patient is not nervous/anxious.      --  The aforementioned information the Chief Complaint section and above subjective data including any HPI data, and also the Review of Systems data, has been personally reviewed and affirmed.  --    Vitals:    08/20/24 0822   BP: 125/72   BP Location: Left arm   Patient Position: Sitting   Cuff Size: Adult   Resp: 12   Temp: 98.1 °F (36.7 °C)   TempSrc: Temporal   Height: 180.3 cm (70.98\")   PainSc:   7     Objective   Physical Exam  Vitals and nursing note reviewed.   Constitutional:       Appearance: Normal appearance. She is well-developed.   Eyes:      General: Lids are normal.   Cardiovascular:      Rate and Rhythm: Normal rate.   Pulmonary:      Effort: Pulmonary effort is normal.   Chest: "       Abdominal:      Tenderness: There is abdominal tenderness.   Neurological:      Mental Status: She is alert and oriented to person, place, and time.   Psychiatric:         Attention and Perception: Attention normal.         Mood and Affect: Mood normal.         Speech: Speech normal.         Behavior: Behavior normal.         Judgment: Judgment normal.       Assessment & Plan   Diagnoses and all orders for this visit:    1. Seropositive rheumatoid arthritis (Primary)    2. Chronic pain syndrome    3. Postoperative abdominal pain    4. Encounter for long-term (current) use of high-risk medication    5. Tomas-Danlos syndrome      Henrietta Garrett reports a pain score of 7.  Given her pain assessment as noted, treatment options were discussed and the following options were decided upon as a follow-up plan to address the patient's pain: continuation of current treatment plan for pain.    --- Routine UDS in office today as part of monitoring requirements for controlled substances.  The specimen was viewed and the immunoassay result reviewed and is +OXY.  This specimen will be sent to [facility] for confirmation.     --- CSA signed on 6/10/2024  --- ORT--Low; SOAPP--Negative  --- Refill Percocet 10/325. Reviewed with Ms. Garrett that she is utilizing this appropriately by only using it for her severe pain and not always taking it every 4 hours. Patient appears stable with current regimen. No adverse effects. Regarding continuation of opioids, there is no evidence of aberrant behavior or any red flags.  The patient continues with appropriate response to opioid therapy. ADL's remain intact by self.   --- Follow-up 3 weeks or sooner if needed.      [facility] REPORT  As part of the patient's treatment plan, I am prescribing controlled substances. The patient has been made aware of appropriate use of such medications, including potential risk of somnolence, limited ability to drive and/or work safely, and the  potential for dependence or overdose. It has also been made clear that these medications are for use by this patient only, without concomitant use of alcohol or other substances unless prescribed.     Patient has completed prescribing agreement detailing terms of continued prescribing of controlled substances, including monitoring VENTURA reports, urine drug screening, and pill counts if necessary. The patient is aware that inappropriate use will results in cessation of prescribing such medications.    As the clinician, I personally reviewed the VENTURA from 8/20/2024 while the patient was in the office today.    History and physical exam exhibit continued safe and appropriate use of controlled substances.    Dictated utilizing Dragon dictation.

## 2024-08-26 ENCOUNTER — INFUSION (OUTPATIENT)
Dept: ONCOLOGY | Facility: HOSPITAL | Age: 29
End: 2024-08-26
Payer: COMMERCIAL

## 2024-08-26 VITALS
SYSTOLIC BLOOD PRESSURE: 112 MMHG | BODY MASS INDEX: 23.72 KG/M2 | DIASTOLIC BLOOD PRESSURE: 67 MMHG | WEIGHT: 170 LBS | TEMPERATURE: 97.5 F | RESPIRATION RATE: 18 BRPM | HEART RATE: 64 BPM

## 2024-08-26 DIAGNOSIS — E53.8 B12 DEFICIENCY: Primary | ICD-10-CM

## 2024-08-26 DIAGNOSIS — T45.4X5A ADVERSE EFFECT OF IRON, INITIAL ENCOUNTER: ICD-10-CM

## 2024-08-26 DIAGNOSIS — D50.9 IRON DEFICIENCY ANEMIA, UNSPECIFIED IRON DEFICIENCY ANEMIA TYPE: ICD-10-CM

## 2024-08-26 PROCEDURE — 25810000003 SODIUM CHLORIDE 0.9 % SOLUTION: Performed by: NURSE PRACTITIONER

## 2024-08-26 PROCEDURE — 96374 THER/PROPH/DIAG INJ IV PUSH: CPT

## 2024-08-26 PROCEDURE — 96375 TX/PRO/DX INJ NEW DRUG ADDON: CPT

## 2024-08-26 PROCEDURE — 25010000002 METHYLPREDNISOLONE PER 125 MG: Performed by: NURSE PRACTITIONER

## 2024-08-26 PROCEDURE — 25010000002 FERUMOXYTOL 510 MG/17ML SOLUTION 17 ML VIAL: Performed by: NURSE PRACTITIONER

## 2024-08-26 RX ORDER — FAMOTIDINE 10 MG/ML
20 INJECTION, SOLUTION INTRAVENOUS ONCE
Status: COMPLETED | OUTPATIENT
Start: 2024-08-26 | End: 2024-08-26

## 2024-08-26 RX ORDER — METHYLPREDNISOLONE SODIUM SUCCINATE 125 MG/2ML
20 INJECTION, POWDER, LYOPHILIZED, FOR SOLUTION INTRAMUSCULAR; INTRAVENOUS ONCE
Status: COMPLETED | OUTPATIENT
Start: 2024-08-26 | End: 2024-08-26

## 2024-08-26 RX ORDER — SODIUM CHLORIDE 9 MG/ML
20 INJECTION, SOLUTION INTRAVENOUS ONCE
Status: COMPLETED | OUTPATIENT
Start: 2024-08-26 | End: 2024-08-26

## 2024-08-26 RX ADMIN — SODIUM CHLORIDE 20 ML/HR: 9 INJECTION, SOLUTION INTRAVENOUS at 07:55

## 2024-08-26 RX ADMIN — METHYLPREDNISOLONE SODIUM SUCCINATE 20 MG: 125 INJECTION, POWDER, FOR SOLUTION INTRAMUSCULAR; INTRAVENOUS at 08:16

## 2024-08-26 RX ADMIN — FERUMOXYTOL 510 MG: 510 INJECTION INTRAVENOUS at 08:46

## 2024-08-26 RX ADMIN — FAMOTIDINE 20 MG: 10 INJECTION INTRAVENOUS at 08:13

## 2024-09-09 ENCOUNTER — OFFICE VISIT (OUTPATIENT)
Dept: PAIN MEDICINE | Facility: CLINIC | Age: 29
End: 2024-09-09
Payer: COMMERCIAL

## 2024-09-09 VITALS
SYSTOLIC BLOOD PRESSURE: 122 MMHG | HEART RATE: 71 BPM | BODY MASS INDEX: 25.37 KG/M2 | DIASTOLIC BLOOD PRESSURE: 72 MMHG | WEIGHT: 181.2 LBS | TEMPERATURE: 96.4 F | HEIGHT: 71 IN | OXYGEN SATURATION: 98 %

## 2024-09-09 DIAGNOSIS — G89.4 CHRONIC PAIN SYNDROME: Primary | ICD-10-CM

## 2024-09-09 RX ORDER — BUPRENORPHINE 20 UG/H
1 PATCH TRANSDERMAL
Qty: 4 PATCH | Refills: 0 | Status: SHIPPED | OUTPATIENT
Start: 2024-09-09

## 2024-09-09 RX ORDER — OXYCODONE AND ACETAMINOPHEN 5; 325 MG/1; MG/1
1 TABLET ORAL EVERY 8 HOURS PRN
Qty: 90 TABLET | Refills: 0 | Status: SHIPPED | OUTPATIENT
Start: 2024-09-09

## 2024-09-09 NOTE — PATIENT INSTRUCTIONS
--------------  Education about Intrathecal Pump Trialing and Therapy:    IT pump Therapy involves the placing a catheter into the intrathecal space, which holds the spinal fluid, and in which the spinal cord resides, and using the catheter to infuse medication directly into the spinal fluid bathing the spinal cord.   This therapy is often a reasonable option for patients with chronic painful conditions that are not amenable to more basic interventional strategies, and also often for patients who wish to have better pain control and ability to meet functional goals while not having to rely on oral pain medications.  This therapy is also often used for patients with cancer or other terminal illnesses.      Prior to starting this advanced neuromodulation technique, it is oftentimes quite reasonable to engage in a trial to determine whether or not a chosen medication could be effective in providing pain relief.  A trial could consist of a single shot of medication as a spinal injection and observation for several hours in the recovery room; a trial could also consist of placement of a temporary catheter and inpatient admission to the hospital for 2 to 3 days for a more comprehensive trial.      Implant of the device is frequently an outpatient procedure; sometimes a 23-hour inpatient observation postoperatively is needed.  The surgery to implant usually takes 60-90 minutes, and two incisions are made:  One to implant and secure the catheter that enters the spine, and another to create the subcutaneous pocket in which the pump is implanted.      General surgical risks of both include but are not limited to bleeding & infection & injury & risk of neural injury or paralysis or coma or death, and risk of failure of the electronic device, and risk of worsening of clinical picture.  Intrathecal pump therapy also carries risks of increasing pain as well as withdrawal symptoms depending on drug choice if the system fails.   "Risks of granuloma in the intrathecal space may make dosing ineffective and may require a cessation of drug infusion.  Risk of catheter fracture is not common, but if this was to happen then drug could spill into the tissues and could cause irritation as well as risk of overdose.  Risk of drug side effects specific to each drug and risk of overdose and death.    After implant, multiple and frequent office visits may be required as we adjust the medication dose to a proper titration.  Intrathecal pump management over the long term includes routine office visits every 4 to 12 weeks, depending on drug and dose requirements, for interrogation of the system as well as refill of the reservoir.  Risks include but are not limited to bleeding, infection, and risk of \"pocket fill\" in which the drug is not properly instilled into the pump, which can cause overdose and death, depending on the drug being used.      Different medications can be utilized in the intrathecal pump.  In general, I feel that the most reasonable medication is usually the nonnarcotic neural blocker Prialt (ziconotide).  We tend to have the most success with this medication.  Sometimes opioids are chosen instead, and at other times depending on the situation the drug choice may be a local anesthetic agent, or a combination of local anesthetic and opioid.  On some occasions we also offer the option for use of a remote control so that the patient can administer their own doses of medication at times of their choosing.    --------------      "
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
## 2024-10-02 ENCOUNTER — TELEPHONE (OUTPATIENT)
Dept: PAIN MEDICINE | Facility: CLINIC | Age: 29
End: 2024-10-02

## 2024-10-02 NOTE — TELEPHONE ENCOUNTER
Caller: Henrietta Garrett    Relationship: Self    Best call back number: [phone]    What is the best time to reach you: ANY     Who are you requesting to speak with (clinical staff, provider,  specific staff member): ANY     Do you know the name of the person who called: YES     What was the call regarding: WANTING TO MAKE SURE THAT THE OFFICE NOTE FROM 09/09/2024 WAS FAXED TO THE [employer]

## 2024-10-03 NOTE — PROGRESS NOTES
"[facility] GROUP OUTPATIENT FOLLOW UP CLINIC VISIT    REASON FOR FOLLOW-UP:    Recurrent iron deficiency anemia     HISTORY OF PRESENT ILLNESS:  Henrietta Garrett is a 29 y.o. female who returns today for follow up of the above issue.      She continues to have significant issues with her autoimmune disease and arthritis pain.  Raynaud's phenomenon persists.  Chronic fatigue persists.  She is following up with rheumatology at [facility] and is also going to see immunology there as well.  TPN continues.  She did well with IV iron infusions.    REVIEW OF SYSTEMS:  As per the HPI    PHYSICAL EXAMINATION:    Vitals:    10/18/24 0748   BP: 121/81   Pulse: 79   Resp: 16   Temp: 98.2 °F (36.8 °C)   TempSrc: Oral   Weight: 77.6 kg (171 lb)  Comment: PER PATIENT   Height: 180 cm (70.87\")   PainSc: 7  Comment: ABDOMEN, BACK AND SHOULDERS   PainLoc: Generalized  Comment: ABDOMEN, BACK AND SHOULDERS         General:  No acute distress, awake, alert and oriented  Skin:  Warm and dry, no visible rash  HEENT:  Normocephalic/atraumatic.  Wearing a face mask.  Chest:  Normal respiratory effort.  Mediport in place.  TPN infusing.  Extremities: Raynaud's visible in her hands right greater than left  Neuro/psych:  Grossly nonfocal.  Normal mood and affect.        DIAGNOSTIC DATA:  CBC AND DIFFERENTIAL (10/15/2024 15:00)  AUTODIFF (10/15/2024 15:00)  MAGNESIUM (10/15/2024 15:00)  PHOSPHORUS (10/15/2024 15:00)  TRIGLYCERIDES (10/15/2024 15:00)  RETICULOCYTES (10/15/2024 15:00)  IRON PROFILE (10/15/2024 15:00)  FERRITIN (10/15/2024 15:00)  VITAMIN B12 (10/15/2024 15:00)      IMAGING:    None reviewed    ASSESSMENT:  This is a 29 y.o. female with:     *Iron deficiency anemia  Lack of dietary iron intake and lack of iron absorption as she is n.p.o. on chronic TPN at this point  Recent studies on 8/5/2024 with a ferritin of 15, iron 21 with 7% iron saturation  8/15/2024: Reevaluation here in the office.  Hemoglobin 8.9  2 doses of " intravenous Feraheme on 8/19 and 8/26/2024  10/15/2024: Hemoglobin 10.8, ferritin 63, iron 54     *Vitamin B12 deficiency  Vitamin B12 level 112, from 151, from 159, from 337    *Tomas-Danlos syndrome     *Significant rheumatologic disease, rheumatoid arthritis, lupus  Followed by Dr. Betzaida Rao with rheumatology  Now just on Plaquenil and prednisone, having been on methotrexate and other disease modifying drugs in the past with frequent infections as a result     *Significant Raynaud's phenomenon     *Significant gastrointestinal dysfunction  Now on permanent TPN, n.p.o.         PLAN:  Start vitamin B12 injections today then weekly for 4 weeks then monthly  Follow-up pending copper level  She gets weekly labs done with ongoing TPN  TPN continues  Repeat labs including reticulocyte count, ferritin, iron panel, vitamin B12 level in mid December with labs at home and I will see her back on December 20  Further IV iron when needed    I spent 55 minutes in this visit today reviewing her record, communicating with her, examining her, communicating with staff, placing orders, documenting the encounter.

## 2024-10-07 ENCOUNTER — OFFICE VISIT (OUTPATIENT)
Dept: PAIN MEDICINE | Facility: CLINIC | Age: 29
End: 2024-10-07
Payer: COMMERCIAL

## 2024-10-07 VITALS
HEART RATE: 83 BPM | SYSTOLIC BLOOD PRESSURE: 138 MMHG | OXYGEN SATURATION: 100 % | RESPIRATION RATE: 18 BRPM | DIASTOLIC BLOOD PRESSURE: 81 MMHG | TEMPERATURE: 98.3 F | HEIGHT: 71 IN | BODY MASS INDEX: 24.5 KG/M2 | WEIGHT: 175 LBS

## 2024-10-07 DIAGNOSIS — M32.9 SYSTEMIC LUPUS ERYTHEMATOSUS, UNSPECIFIED SLE TYPE, UNSPECIFIED ORGAN INVOLVEMENT STATUS: ICD-10-CM

## 2024-10-07 DIAGNOSIS — M32.8 LUPUS ERYTHEMATOSUS OVERLAP SYNDROME: ICD-10-CM

## 2024-10-07 DIAGNOSIS — G90.521 COMPLEX REGIONAL PAIN SYNDROME TYPE 1 OF RIGHT LOWER EXTREMITY: ICD-10-CM

## 2024-10-07 DIAGNOSIS — G89.18 POSTOPERATIVE ABDOMINAL PAIN: ICD-10-CM

## 2024-10-07 DIAGNOSIS — R10.9 POSTOPERATIVE ABDOMINAL PAIN: ICD-10-CM

## 2024-10-07 DIAGNOSIS — G89.4 CHRONIC PAIN SYNDROME: Primary | ICD-10-CM

## 2024-10-07 DIAGNOSIS — Q79.60 EHLERS-DANLOS SYNDROME: ICD-10-CM

## 2024-10-07 DIAGNOSIS — M05.9 SEROPOSITIVE RHEUMATOID ARTHRITIS: ICD-10-CM

## 2024-10-07 DIAGNOSIS — Z79.899 ENCOUNTER FOR LONG-TERM (CURRENT) USE OF HIGH-RISK MEDICATION: ICD-10-CM

## 2024-10-07 PROCEDURE — 99214 OFFICE O/P EST MOD 30 MIN: CPT | Performed by: ANESTHESIOLOGY

## 2024-10-07 RX ORDER — MORPHINE SULFATE 15 MG/1
15 TABLET ORAL EVERY 8 HOURS PRN
Qty: 50 TABLET | Refills: 0 | Status: SHIPPED | OUTPATIENT
Start: 2024-10-07 | End: 2024-10-18
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
## 2024-10-15 ENCOUNTER — OFFICE VISIT (OUTPATIENT)
Dept: FAMILY MEDICINE CLINIC | Facility: CLINIC | Age: 29
End: 2024-10-15
Payer: COMMERCIAL

## 2024-10-15 VITALS
WEIGHT: 171 LBS | DIASTOLIC BLOOD PRESSURE: 77 MMHG | HEIGHT: 71 IN | BODY MASS INDEX: 23.94 KG/M2 | SYSTOLIC BLOOD PRESSURE: 137 MMHG

## 2024-10-15 DIAGNOSIS — K31.84 GASTROPARESIS: ICD-10-CM

## 2024-10-15 DIAGNOSIS — G43.119 INTRACTABLE MIGRAINE WITH AURA WITHOUT STATUS MIGRAINOSUS: ICD-10-CM

## 2024-10-15 DIAGNOSIS — F32.A ANXIETY AND DEPRESSION: Primary | ICD-10-CM

## 2024-10-15 DIAGNOSIS — F41.9 ANXIETY AND DEPRESSION: Primary | ICD-10-CM

## 2024-10-15 DIAGNOSIS — M32.8 LUPUS ERYTHEMATOSUS OVERLAP SYNDROME: ICD-10-CM

## 2024-10-15 PROCEDURE — 99215 OFFICE O/P EST HI 40 MIN: CPT | Performed by: FAMILY MEDICINE

## 2024-10-15 RX ORDER — ZAVEGEPANT 10 MG/.1ML
10 SPRAY NASAL ONCE AS NEEDED
Qty: 1 EACH | Refills: 0 | COMMUNITY
Start: 2024-10-15

## 2024-10-15 RX ORDER — ESCITALOPRAM OXALATE 5 MG/5ML
5 SOLUTION ORAL 2 TIMES DAILY
Qty: 240 ML | Refills: 3 | Status: SHIPPED | OUTPATIENT
Start: 2024-10-15

## 2024-10-15 NOTE — ASSESSMENT & PLAN NOTE
+ PHQ-9, low risk CSSRS, + IRINA-7;  Not currently with active SI/HI plan or intent  -Patient declined counseling at this time  - Pharmacologic therapy: Switch Lexapro to liquid formulation; will try split dosing to see if that improves absorption, 5 mg twice daily via G-tube; hydroxyzine 50 mg via G-tube nightly for help with sleep and anxiety, may increase dose, maximum 200 mg.  - Discussed going to ER immediately if SI/HI develop.   - To RTC in 6 weeks, sooner prn.

## 2024-10-15 NOTE — PATIENT INSTRUCTIONS
Today: Adjust anxiety meds, work on headaches    Meds: Increase lexapro to 5mg liquid 2x daily  Add hydroxyzine 50mg liquid nightly for sleep  Try zavzpret nasal spray for migraine abortive, let me know if it works.  Ordered aimovig monthly injection for migraine

## 2024-10-15 NOTE — ASSESSMENT & PLAN NOTE
Patient with migraine with aura 2-3 times weekly, each headache lasting 2 to 3 days.  She is currently unable to tolerate oral medications due to severe gastroparesis-she is on TPN.  She has previously tried Botox, it was not effective.  - abortive: Zavzspret sample provided, if it works will submit PA.  Triptans contraindicated due to severe vascular disease from autoimmune conditions.  Ubrelvy and Nurtec do not have liquid formulations and pt is unable to tolerate PO, pills are poorly absorbed via G-tube due to gastroparesis.   - prophylaxis: Aimovig 1x monthly injection.  Previously failed Botox.  Unable to tolerate oral prophylaxis due to gastroparesis And malabsorption, currently on G-tube.

## 2024-10-15 NOTE — PROGRESS NOTES
Chief Complaint  Chief Complaint   Patient presents with    Depression    Headache    Anxiety       Subjective    History of Present Illness  Henrietta Garrett is a 29 y.o. female presents to [facility] for followup of depression.    She is on lexapro 5mg oral, and she doesn't feel she is digesting it all due to her gastroparesis.. She would like to try a liquid formulation through her G tube and increase the dose. She is feeling very anxious and it is making it hard for her to sleep. She has never used prescription sleeping meds, she has used OTC meds. She has tried therapy before and did not find it helpful. She has tried acupuncture, and did not like having needles in her head.     She has chronic headaches and migraine with aura, has seen neuro and ENT. She tried botox when she was in the hospital, it didn't help. Unable to tolerate all oral formulation due to comorbid vomiting, GI dysmotility and malabsorption. She has a migraine 2-3 x a week, each headache lasting 2-3 days. She has been unable to identify a clear trigger.     She was diagnosed with Raynaud's several years ago, advised that she didn't have the kind that causes damage. She recently saw [facility] rheum and had additional imaging of her hands showing arterial damage, indicating risk for gangrene and possible amputation in the future. They suspect possible scleroderma based on chest CT and symptoms. They agree that chemo treatment may be a good idea. They are concerned about decreasing kidney function in the imminent future.     She has been getting iron infusions by hematology, which has been helpful.     Her disability hearing was two weeks ago, it went well according to her , she is now waiting for a final result.     Her abdominal; wound is healing, she is being followed closely by wound care.     Her pain is not well controlled. She has tried fentanyl patches, dilaudid, hydrocodone, oxycodone, and is  "now on 15mg oral morphine. She is concerned the small pill is not absorbed well, would like to try liquid formulation.       Objective   Vitals:    10/15/24 0846   BP: 137/77   Weight: 77.6 kg (171 lb)   Height: 180.3 cm (70.98\")        BMI is within normal parameters. No other follow-up for BMI required.       Physical Exam  Vitals reviewed.   Constitutional:       Appearance: Normal appearance.   HENT:      Head: Normocephalic and atraumatic.   Cardiovascular:      Comments: Well perfused  Pulmonary:      Effort: Pulmonary effort is normal. No respiratory distress.   Skin:     Comments: Healing abdominal wound   Neurological:      Mental Status: She is alert. Mental status is at baseline.        PHQ-9 Depression Screening  Little interest or pleasure in doing things? Almost all   Feeling down, depressed, or hopeless? Over half   Trouble falling or staying asleep, or sleeping too much? Almost all   Feeling tired or having little energy? Almost all   Poor appetite or overeating? Almost all   Feeling bad about yourself - or that you are a failure or have let yourself or your family down? Over half   Trouble concentrating on things, such as reading the newspaper or watching television? Almost all   Moving or speaking so slowly that other people could have noticed? Or the opposite - being so fidgety or restless that you have been moving around a lot more than usual? Not at all   Thoughts that you would be better off dead, or of hurting yourself in some way? Several days   PHQ-9 Total Score 20   If you checked off any problems, how difficult have these problems made it for you to do your work, take care of things at home, or get along with other people? Extremely difficult     [place] Suicide Severity Rating (C-SSRS)  In the past month, have you wished you were dead or wished you could go to sleep and not wake up? No     In the past month, have you actually had any thoughts of killing yourself? No     Have you been " thinking about how you might do this?       Have you had these thoughts and had some intention of acting on them?       Have you started to work out or have you worked out the details of how to kill yourself?       Have you ever in your lifetime done anything, started to do anything, or prepared to do anything to end your life? No       Was this within the past three months?       Level of Risk per Screen No Risk Indicated     IRINA-7 (General Anxiety Disorder-7) from [employer]  on 10/15/2024  ** All calculations should be rechecked by clinician prior to use **    RESULT SUMMARY:  21 points  Severe anxiety disorder.    Functionally, the patient finds it is “extremely difficult” to perform life tasks due to their symptoms.      INPUTS:  Feeling nervous, anxious, or on edge --> 3 = Nearly every day  Not being able to stop or control worrying --> 3 = Nearly every day  Worrying too much about different things --> 3 = Nearly every day  Trouble relaxing --> 3 = Nearly every day  Being so restless that it's hard to sit still --> 3 = Nearly every day  Becoming easily annoyed or irritable --> 3 = Nearly every day  Feeling afraid as if something awful might happen --> 3 = Nearly every day  Ask the patient: how difficult have these problems made it to do work, take care of things at home, or get along with other people? --> 3 = Extremely difficult      The following data was reviewed by: Kenya David MD on 10/15/2024:  October 2024-CBC, CMP; September 2024 CBC, CMP, JUAN MANUEL, IgG levels,, anti-Rao, RNP,, antichromatin  Consultant notes pain management, [facility] rheumatology      Assessment and Plan  Henrietta Garrett is a 29 y.o. female presents to [facility] today for followup      Diagnoses and all orders for this visit:    1. Anxiety and depression (Primary)  Assessment & Plan:  + PHQ-9, low risk CSSRS, + IRINA-7;  Not currently with active SI/HI plan or intent  -Patient declined counseling at  this time  - Pharmacologic therapy: Switch Lexapro to liquid formulation; will try split dosing to see if that improves absorption, 5 mg twice daily via G-tube; hydroxyzine 50 mg via G-tube nightly for help with sleep and anxiety, may increase dose, maximum 200 mg.  - Discussed going to ER immediately if SI/HI develop.   - To RTC in 6 weeks, sooner prn.      Orders:  -     escitalopram (LEXAPRO) 1 mg/mL solution; Administer 5 mL per G tube 2 (Two) Times a Day. Indications: Major Depressive Disorder  Dispense: 240 mL; Refill: 3  -     Hydroxyzine liquid 50mg/ml; Administer 1 mL per G tube Every Night.  Dispense: 50 mL; Refill: 1    2. Intractable migraine with aura without status migrainosus  Assessment & Plan:  Patient with migraine with aura 2-3 times weekly, each headache lasting 2 to 3 days.  She is currently unable to tolerate oral medications due to severe gastroparesis-she is on TPN.  She has previously tried Botox, it was not effective.  - abortive: Zavzspret sample provided, if it works will submit PA.  Triptans contraindicated due to severe vascular disease from autoimmune conditions.  Ubrelvy and Nurtec do not have liquid formulations and pt is unable to tolerate PO, pills are poorly absorbed via G-tube due to gastroparesis.   - prophylaxis: Aimovig 1x monthly injection.  Previously failed Botox.  Unable to tolerate oral prophylaxis due to gastroparesis And malabsorption, currently on G-tube.        Orders:  -     Zavegepant HCl (Zavzpret) 10 MG/ACT solution; Administer 10 mg into the nostril(s) as directed by provider 1 (One) Time As Needed (migraine) for up to 1 dose. Indications: Migraine Headache  Dispense: 1 each; Refill: 0  -     Erenumab-aooe (AIMOVIG) 70 MG/ML auto-injector; Inject 1 mL under the skin into the appropriate area as directed Every 30 (Thirty) Days. Indications: Migraine Headache  Dispense: 1 mL; Refill: 3    3. Gastroparesis  Overview:  Managed with G tube, promethazine, gabapentin,  protonix, IVIG infusion weekly, lactulose PRN by GI      4. Lupus erythematosus overlap syndrome  Overview:  >>OVERVIEW FOR SYSTEMIC LUPUS ERYTHEMATOSUS WRITTEN ON 4/9/2024  4:53 PM BY KENYA ROMEO MD    Managed with prednisone 5-10mg, plaquenil 200mg BID and methotrexate by rheum          Patient voiced understanding and agreement with plan of care and had no further questions or concerns at this time.     I spent 49 minutes on this encounter, including chart review of any relevant previous encounters, labs, and imaging.   Problems addressed:  established problem: inadequately controlled,, established problem: not responding to treatment  Complexity: labs reviewed yes, records reviewied and summarized  Risk: prescription drug management    Kenya Romeo MD  [facility]      Follow Up  Return in about 6 weeks (around 11/26/2024) for Recheck- 30 min appt please.    Patient Instructions   Today: Adjust anxiety meds, work on headaches    Meds: Increase lexapro to 5mg liquid 2x daily  Add hydroxyzine 50mg liquid nightly for sleep  Try zavzpret nasal spray for migraine abortive, let me know if it works.  Ordered aimovig monthly injection for migraine               Patient or patient representative verbalized consent for the use of Ambient Listening during the visit with  Kenya Romeo MD for chart documentation. 10/15/2024  10:02 EDT

## 2024-10-17 DIAGNOSIS — F32.A ANXIETY AND DEPRESSION: Primary | ICD-10-CM

## 2024-10-17 DIAGNOSIS — F41.9 ANXIETY AND DEPRESSION: Primary | ICD-10-CM

## 2024-10-17 DIAGNOSIS — G43.119 INTRACTABLE MIGRAINE WITH AURA WITHOUT STATUS MIGRAINOSUS: Primary | ICD-10-CM

## 2024-10-17 RX ORDER — HYDROXYZINE HCL 10 MG/5 ML
25 SOLUTION, ORAL ORAL NIGHTLY
Qty: 473 ML | Refills: 0 | Status: SHIPPED | OUTPATIENT
Start: 2024-10-17

## 2024-10-18 ENCOUNTER — OFFICE VISIT (OUTPATIENT)
Dept: ONCOLOGY | Facility: CLINIC | Age: 29
End: 2024-10-18
Payer: COMMERCIAL

## 2024-10-18 ENCOUNTER — INFUSION (OUTPATIENT)
Dept: ONCOLOGY | Facility: HOSPITAL | Age: 29
End: 2024-10-18
Payer: COMMERCIAL

## 2024-10-18 ENCOUNTER — TELEPHONE (OUTPATIENT)
Dept: PAIN MEDICINE | Facility: CLINIC | Age: 29
End: 2024-10-18

## 2024-10-18 ENCOUNTER — TELEPHONE (OUTPATIENT)
Dept: ONCOLOGY | Facility: CLINIC | Age: 29
End: 2024-10-18
Payer: COMMERCIAL

## 2024-10-18 ENCOUNTER — APPOINTMENT (OUTPATIENT)
Dept: OTHER | Facility: HOSPITAL | Age: 29
End: 2024-10-18
Payer: COMMERCIAL

## 2024-10-18 VITALS
TEMPERATURE: 98.2 F | HEART RATE: 79 BPM | RESPIRATION RATE: 16 BRPM | HEIGHT: 71 IN | DIASTOLIC BLOOD PRESSURE: 81 MMHG | BODY MASS INDEX: 23.94 KG/M2 | WEIGHT: 171 LBS | SYSTOLIC BLOOD PRESSURE: 121 MMHG

## 2024-10-18 DIAGNOSIS — D50.8 IRON DEFICIENCY ANEMIA SECONDARY TO INADEQUATE DIETARY IRON INTAKE: ICD-10-CM

## 2024-10-18 DIAGNOSIS — E53.8 VITAMIN B12 DEFICIENCY: Primary | ICD-10-CM

## 2024-10-18 PROCEDURE — 25010000002 CYANOCOBALAMIN PER 1000 MCG: Performed by: INTERNAL MEDICINE

## 2024-10-18 PROCEDURE — 96372 THER/PROPH/DIAG INJ SC/IM: CPT

## 2024-10-18 RX ORDER — CYANOCOBALAMIN 1000 UG/ML
1000 INJECTION, SOLUTION INTRAMUSCULAR; SUBCUTANEOUS
Status: DISCONTINUED | OUTPATIENT
Start: 2024-10-18 | End: 2024-10-18 | Stop reason: HOSPADM

## 2024-10-18 RX ORDER — OXYCODONE AND ACETAMINOPHEN 10; 325 MG/1; MG/1
1 TABLET ORAL EVERY 6 HOURS PRN
Qty: 120 TABLET | Refills: 0 | Status: SHIPPED | OUTPATIENT
Start: 2024-10-18

## 2024-10-18 RX ADMIN — CYANOCOBALAMIN 1000 MCG: 1000 INJECTION, SOLUTION INTRAMUSCULAR at 08:57

## 2024-10-18 NOTE — TELEPHONE ENCOUNTER
Called in orders to [facility] ([phone]). Left voicemail with direct line [phone] and requested a call back to order labs. Cbc, retic, ferritin, iron, and vitamin b12 to be drawn the Tues prior to visit here on December 20th. Will attempt to call again later today. Gia Null RN

## 2024-10-18 NOTE — TELEPHONE ENCOUNTER
Hub staff attempted to follow warm transfer process and was unsuccessful     Caller: Henrietta Garrett    Relationship to patient: Self    Best call back number: [phone] (home)     Patient is needing: PATIENT WAS PUT ON MORPHINE AND IT IS MAKING HER PAIN WORSE AND SICK. SHE WANTS TO KNOW IF SHE CAN BE PUT BACK ON OXYCODONE- ACETAMINOPHEN, BUT A LIQUID VERSION.   OR SOMETHING ELSE THAT DR BRADLEY THINKS WOULD WORK.     PATIENT HAS TAKEN A COUPLE OF OXYCODONE -ACET 5MG SINCE SHE STOPPED TAKING THE MORPHINE, ITS ONLY HELPED A LITTLE BUT IS NOT MAKING HER SICK AND HELPING BETTER THAN THE MORPHINE    PLEASE ADVISE ASAP

## 2024-10-18 NOTE — TELEPHONE ENCOUNTER
Called [facility] again at 1215. [address] and gave lab orders for December from Dr. Skinner. Gia Null RN

## 2024-10-21 ENCOUNTER — OFFICE VISIT (OUTPATIENT)
Dept: PAIN MEDICINE | Facility: CLINIC | Age: 29
End: 2024-10-21
Payer: COMMERCIAL

## 2024-10-21 VITALS
DIASTOLIC BLOOD PRESSURE: 79 MMHG | HEIGHT: 71 IN | RESPIRATION RATE: 18 BRPM | TEMPERATURE: 97.5 F | SYSTOLIC BLOOD PRESSURE: 141 MMHG | HEART RATE: 76 BPM | BODY MASS INDEX: 23.94 KG/M2 | WEIGHT: 171 LBS | OXYGEN SATURATION: 100 %

## 2024-10-21 DIAGNOSIS — G89.18 POSTOPERATIVE ABDOMINAL PAIN: ICD-10-CM

## 2024-10-21 DIAGNOSIS — G89.4 CHRONIC PAIN SYNDROME: Primary | ICD-10-CM

## 2024-10-21 DIAGNOSIS — R10.9 POSTOPERATIVE ABDOMINAL PAIN: ICD-10-CM

## 2024-10-21 DIAGNOSIS — M32.8 LUPUS ERYTHEMATOSUS OVERLAP SYNDROME: ICD-10-CM

## 2024-10-21 DIAGNOSIS — Z79.899 ENCOUNTER FOR LONG-TERM (CURRENT) USE OF HIGH-RISK MEDICATION: ICD-10-CM

## 2024-10-21 DIAGNOSIS — G90.521 COMPLEX REGIONAL PAIN SYNDROME TYPE 1 OF RIGHT LOWER EXTREMITY: ICD-10-CM

## 2024-10-21 DIAGNOSIS — Q79.60 EHLERS-DANLOS SYNDROME: ICD-10-CM

## 2024-10-21 PROCEDURE — 99213 OFFICE O/P EST LOW 20 MIN: CPT | Performed by: ANESTHESIOLOGY
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
## 2024-10-23 DIAGNOSIS — Z71.9 ENCOUNTER FOR COUNSELING: Primary | ICD-10-CM

## 2024-10-24 ENCOUNTER — TELEPHONE (OUTPATIENT)
Dept: OTHER | Facility: HOSPITAL | Age: 29
End: 2024-10-24
Payer: COMMERCIAL

## 2024-10-24 NOTE — TELEPHONE ENCOUNTER
[facility] MULTIDISCIPLINARY CLINIC  [facility] CARE COORDINATION NOTE  PHONE      Call placed to patient   to discuss behavioral health referral placed by Dr Skinner's office yesterday and make myself available to answer any of her questions. Left voicemail encouraging patient to return call to me directly [phone]

## 2024-10-25 ENCOUNTER — INFUSION (OUTPATIENT)
Dept: ONCOLOGY | Facility: HOSPITAL | Age: 29
End: 2024-10-25
Payer: COMMERCIAL

## 2024-10-25 DIAGNOSIS — E53.8 VITAMIN B12 DEFICIENCY: Primary | ICD-10-CM

## 2024-10-25 PROCEDURE — 25010000002 CYANOCOBALAMIN PER 1000 MCG: Performed by: NURSE PRACTITIONER

## 2024-10-25 PROCEDURE — 96372 THER/PROPH/DIAG INJ SC/IM: CPT

## 2024-10-25 RX ORDER — CYANOCOBALAMIN 1000 UG/ML
1000 INJECTION, SOLUTION INTRAMUSCULAR; SUBCUTANEOUS
Status: DISCONTINUED | OUTPATIENT
Start: 2024-10-25 | End: 2024-10-25 | Stop reason: HOSPADM

## 2024-10-25 RX ADMIN — CYANOCOBALAMIN 1000 MCG: 1000 INJECTION, SOLUTION INTRAMUSCULAR at 08:22

## 2024-10-25 NOTE — NURSING NOTE
Pt arrived ambulatory for B12 injection. Injection given without complication and pt tolerated well. Follow up appointment reviewed. Pt aware to call the office should she have any questions or concerns prior to next visit. Discharged in stable condition.

## 2024-11-11 ENCOUNTER — INFUSION (OUTPATIENT)
Dept: ONCOLOGY | Facility: HOSPITAL | Age: 29
End: 2024-11-11
Payer: COMMERCIAL

## 2024-11-11 DIAGNOSIS — E53.8 VITAMIN B12 DEFICIENCY: Primary | ICD-10-CM

## 2024-11-11 PROCEDURE — 25010000002 CYANOCOBALAMIN PER 1000 MCG: Performed by: INTERNAL MEDICINE

## 2024-11-11 PROCEDURE — 96372 THER/PROPH/DIAG INJ SC/IM: CPT

## 2024-11-11 RX ORDER — CYANOCOBALAMIN 1000 UG/ML
1000 INJECTION, SOLUTION INTRAMUSCULAR; SUBCUTANEOUS
Status: DISCONTINUED | OUTPATIENT
Start: 2024-11-11 | End: 2024-11-11 | Stop reason: HOSPADM

## 2024-11-11 RX ADMIN — CYANOCOBALAMIN 1000 MCG: 1000 INJECTION, SOLUTION INTRAMUSCULAR at 09:44

## 2024-11-11 NOTE — NURSING NOTE
Injection  B12 Injection performed in left deltoid by Ivonne Geronimo RN. Patient tolerated the procedure well without complications.  11/11/24   Ivonne Geronimo RN

## 2024-11-14 DIAGNOSIS — F32.A ANXIETY AND DEPRESSION: ICD-10-CM

## 2024-11-14 DIAGNOSIS — F41.9 ANXIETY AND DEPRESSION: ICD-10-CM

## 2024-11-14 RX ORDER — HYDROXYZINE HCL 10 MG/5 ML
25 SOLUTION, ORAL ORAL NIGHTLY
Qty: 375 ML | Refills: 1 | Status: SHIPPED | OUTPATIENT
Start: 2024-11-14

## 2024-11-14 NOTE — TELEPHONE ENCOUNTER
Rx Refill Note  Requested Prescriptions     Pending Prescriptions Disp Refills    hydrOXYzine (ATARAX) 10 MG/5ML syrup [Pharmacy Med Name: HYDROXYZINE 10 MG/5 ML SYRUP] 375 mL 1     Sig: ADMINISTER 12.5 ML PER G TUBE EVERY NIGHT.      Last office visit with prescribing clinician: 10/15/2024   Last telemedicine visit with prescribing clinician: Visit date not found   Next office visit with prescribing clinician: 11/25/2024       Lamont Duron  11/14/24, 08:13 EST

## 2024-11-18 ENCOUNTER — INFUSION (OUTPATIENT)
Dept: ONCOLOGY | Facility: HOSPITAL | Age: 29
End: 2024-11-18
Payer: COMMERCIAL

## 2024-11-18 DIAGNOSIS — E53.8 VITAMIN B12 DEFICIENCY: Primary | ICD-10-CM

## 2024-11-18 PROCEDURE — 96372 THER/PROPH/DIAG INJ SC/IM: CPT

## 2024-11-18 PROCEDURE — 25010000002 CYANOCOBALAMIN PER 1000 MCG: Performed by: INTERNAL MEDICINE

## 2024-11-18 RX ORDER — CYANOCOBALAMIN 1000 UG/ML
1000 INJECTION, SOLUTION INTRAMUSCULAR; SUBCUTANEOUS
Status: DISCONTINUED | OUTPATIENT
Start: 2024-11-18 | End: 2024-11-18 | Stop reason: HOSPADM

## 2024-11-18 RX ADMIN — CYANOCOBALAMIN 1000 MCG: 1000 INJECTION, SOLUTION INTRAMUSCULAR at 09:32

## 2024-11-18 NOTE — NURSING NOTE
Injection  B12 Injection performed in R deltoid by Rio Del Rio RN. Patient tolerated the procedure well without complications.  11/18/24   Rio Del Rio RN

## 2024-11-19 ENCOUNTER — OFFICE VISIT (OUTPATIENT)
Age: 29
End: 2024-11-19
Payer: COMMERCIAL

## 2024-11-19 VITALS
HEART RATE: 74 BPM | HEIGHT: 71 IN | OXYGEN SATURATION: 98 % | DIASTOLIC BLOOD PRESSURE: 77 MMHG | BODY MASS INDEX: 23.1 KG/M2 | WEIGHT: 165 LBS | SYSTOLIC BLOOD PRESSURE: 117 MMHG

## 2024-11-19 DIAGNOSIS — F43.10 POST TRAUMATIC STRESS DISORDER (PTSD): ICD-10-CM

## 2024-11-19 DIAGNOSIS — F33.2 SEVERE EPISODE OF RECURRENT MAJOR DEPRESSIVE DISORDER, WITHOUT PSYCHOTIC FEATURES: Primary | ICD-10-CM

## 2024-11-19 DIAGNOSIS — F41.1 GENERALIZED ANXIETY DISORDER: ICD-10-CM

## 2024-11-19 NOTE — PROGRESS NOTES
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 and discussed in detail with the patient, who is agreeable to contact support system and/or call 911 or go to the nearest ER should he/she/they have any thoughts of harm to self or others.    MEDS ORDERED DURING VISIT:  No orders of the defined types were placed in this encounter.      FOLLOW UP:  Return in about 2 weeks (around 12/3/2024) for Next scheduled follow up.      Fabio Herr DO    This document has been electronically signed by Fabio Herr DO  November 19, 2024 17:19 EST    Part of this note may be an electronic transcription/translation of spoken language to printed text using the Dragon Dictation System. Some of the data in this electronic note has been brought forward from a previous encounter, any necessary changes have been made, it has been reviewed by this provider, and it is accurate.

## 2024-11-25 ENCOUNTER — OFFICE VISIT (OUTPATIENT)
Dept: FAMILY MEDICINE CLINIC | Facility: CLINIC | Age: 29
End: 2024-11-25
Payer: COMMERCIAL

## 2024-11-25 VITALS — BODY MASS INDEX: 23.1 KG/M2 | HEIGHT: 71 IN | DIASTOLIC BLOOD PRESSURE: 74 MMHG | SYSTOLIC BLOOD PRESSURE: 137 MMHG

## 2024-11-25 DIAGNOSIS — F32.A ANXIETY AND DEPRESSION: Primary | ICD-10-CM

## 2024-11-25 DIAGNOSIS — D50.8 OTHER IRON DEFICIENCY ANEMIA: ICD-10-CM

## 2024-11-25 DIAGNOSIS — M32.8 LUPUS ERYTHEMATOSUS OVERLAP SYNDROME: ICD-10-CM

## 2024-11-25 DIAGNOSIS — F41.9 ANXIETY AND DEPRESSION: Primary | ICD-10-CM

## 2024-11-25 DIAGNOSIS — G43.119 INTRACTABLE MIGRAINE WITH AURA WITHOUT STATUS MIGRAINOSUS: ICD-10-CM

## 2024-11-25 DIAGNOSIS — F43.10 POST TRAUMATIC STRESS DISORDER (PTSD): ICD-10-CM

## 2024-11-25 PROCEDURE — 99214 OFFICE O/P EST MOD 30 MIN: CPT | Performed by: FAMILY MEDICINE

## 2024-11-25 RX ORDER — PROMETHAZINE HYDROCHLORIDE 25 MG/1
25 TABLET ORAL EVERY 4 HOURS
COMMUNITY

## 2024-11-25 RX ORDER — ZAVEGEPANT 10 MG/.1ML
10 SPRAY NASAL ONCE AS NEEDED
Qty: 1 EACH | Refills: 0 | COMMUNITY
Start: 2024-11-25 | End: 2024-11-25

## 2024-11-25 RX ORDER — ZAVEGEPANT 10 MG/.1ML
10 SPRAY NASAL ONCE AS NEEDED
Qty: 6 EACH | Refills: 1 | COMMUNITY
Start: 2024-11-25 | End: 2024-11-25

## 2024-11-25 NOTE — PROGRESS NOTES
"Chief Complaint  Chief Complaint   Patient presents with    Anxiety    Depression    Migraine       Subjective    History of Present Illness  Henrietta Garrett is a 29 y.o. female presents to [facility] for followup    History of Present Illness  The patient presents for evaluation of multiple medical concerns.    She has been diagnosed with severe PTSD by her psychiatrist, who has recommended bi-weekly visits and is looking into IV medication options for her anxiety and depression. Despite taking hydroxyzine, she reports no improvement in her sleep quality. She is currently awaiting a decision on her disability claim, which is very stressful for her.    She is under the care of a hematologist for anemia, with her iron levels being closely monitored. She is scheduled to receive three infusions every two months.    She experiences migraines regularly, sometimes going a week without one, but then having several in quick succession over 1 week. She had a few migraines last week. She has found some relief from a Zavzpret nasal spray provided during her last visit. She just started Amovig injections for her migraines, and is hopeful they will help.     She has been advised to undergo chemotherapy by her doctor at the [facility]. She is concerned about her body's ability to tolerate the treatment. She has been diagnosed with scleroderma and Raynaud's disease, and her kidney function has deteriorated. She is scheduled to start treatment 12/10/2024. Her fiancee requests caregiver [facility] paperwork.     Objective   Vitals:    11/25/24 0900   BP: 137/74   Weight: 76.2 kg (168 lb)   Height: 180 cm (70.87\")        BMI is within normal parameters. No other follow-up for BMI required.       Physical Exam  Vitals reviewed.   Constitutional:       General: She is not in acute distress.     Appearance: She is ill-appearing. She is not toxic-appearing.   HENT:      Head: Normocephalic and atraumatic. "   Cardiovascular:      Comments: Well perfused  Neurological:      Mental Status: She is alert. Mental status is at baseline.          The following data was reviewed by: Kenya David MD on 11/25/2024:  CMP          7/15/2024    04:48 7/16/2024    05:06 7/17/2024    03:16   CMP   Glucose 97  94  93    BUN 11  11  11    Creatinine 0.52  0.60  0.61    EGFR 129.2  124.8  124.3    Sodium 137  136  136    Potassium 4.0  3.7  3.9    Chloride 104  102  104    Calcium 9.1  8.8  9.2    Total Protein 7.3  7.5  7.6    Albumin 3.6  3.4  3.5    Globulin 3.7  4.1  4.1    Total Bilirubin <0.2  <0.2  <0.2    Alkaline Phosphatase 54  54  56    AST (SGOT) 10  13  13    ALT (SGPT) 6  8  9    Albumin/Globulin Ratio 1.0  0.8  0.9    BUN/Creatinine Ratio 21.2  18.3  18.0    Anion Gap 9.0  11.8  8.0      CBC          7/16/2024    05:06 8/5/2024    14:06 8/15/2024    08:43   CBC   WBC 3.44  4.2  3.58    RBC 3.66  3.69  3.56    Hemoglobin 9.3  9.3  8.9    Hematocrit 29.5  30.8  29.4    MCV 80.6  84  82.6    MCH 25.4  25.2  25.0    MCHC 31.5  30.2  30.3    RDW 14.4  15.6  16.6    Platelets 251  228  237      Consultant notes psychiatry      Assessment and Plan  Henrietta Garrett is a 29 y.o. female presents to [facility] today for followup    Assessment & Plan  1. Severe PTSD, Anxiety and depression.  She has been diagnosed with severe PTSD by her psychiatrist and will be seen every 2 weeks for management. Her psychiatrist will manage her anxiety and depression medications. IV medication would most likely work better due to malabsorption.    3. Migraine.  Improved. A prescription for ZAVZPRET nasal spray was provided. Her Aimovig prescription was also refilled. Triptans contraindicated due to vascular disease, comorbid GI malabsorption requiring her to be on TPN all oral and ODT meds contraindicated.     4. Anemia.  She will continue to receive iron infusions every 2 months, with 3 infusions each time.    5.  Complex autoimmune disease  The FMLA form was completed today. She will be undergoing chemotherapy as suggested by her doctor from the [facility].       Diagnoses and all orders for this visit:    1. Anxiety and depression (Primary)    2. Post traumatic stress disorder (PTSD)    3. Intractable migraine with aura without status migrainosus  -     Discontinue: Zavegepant HCl (Zavzpret) 10 MG/ACT solution; Administer 10 mg into the nostril(s) as directed by provider 1 (One) Time As Needed (migraine). Indications: Migraine Headache, malabsorption  Dispense: 6 each; Refill: 1  -     Fremanezumab-vfrm 225 MG/1.5ML solution auto-injector; Inject 225 mg under the skin into the appropriate area as directed Every 30 (Thirty) Days. Indications: Migraine Headache  Dispense: 1.5 mL; Refill: 3  -     Zavegepant HCl (Zavzpret) 10 MG/ACT solution; Administer 10 mg into the nostril(s) as directed by provider 1 (One) Time As Needed (migraine). Indications: Migraine Headache, malabsorption  Dispense: 1 each; Refill: 0  -     Zavegepant HCl 10 MG/ACT solution; Administer 1 spray into the nostril(s) as directed by provider 1 (One) Time As Needed (migraine). Indications: Migraine Headache, GI malabsoprtion, triptan contraindicated  Dispense: 6 each; Refill: 1    4. Other iron deficiency anemia    5. Lupus erythematosus overlap syndrome  Overview:  >>OVERVIEW FOR SYSTEMIC LUPUS ERYTHEMATOSUS WRITTEN ON 4/9/2024  4:53 PM BY KENYA ROMEO MD    Managed with prednisone 5-10mg, plaquenil 200mg BID and methotrexate by rheum          Patient voiced understanding and agreement with plan of care and had no further questions or concerns at this time.   .   Problems addressed: 2 or more stable chronic illnesses (MODERATE) established problem:  worsening or not responding to treatment, established problem: inadequately controlled,  Complexity: labs reviewed yes  Risk: prescription drug management    Kenya Romeo MD  Family Medicine  [facility]  [facility]      Follow Up  Return in about 3 months (around 2/25/2025) for Recheck.      Patient or patient representative verbalized consent for the use of Ambient Listening during the visit with  Kenya David MD for chart documentation. 11/25/2024  10:19 EST

## 2024-12-02 ENCOUNTER — OFFICE VISIT (OUTPATIENT)
Age: 29
End: 2024-12-02
Payer: COMMERCIAL

## 2024-12-02 VITALS
WEIGHT: 168 LBS | DIASTOLIC BLOOD PRESSURE: 73 MMHG | HEIGHT: 71 IN | BODY MASS INDEX: 23.52 KG/M2 | SYSTOLIC BLOOD PRESSURE: 135 MMHG | HEART RATE: 86 BPM

## 2024-12-02 DIAGNOSIS — F41.1 GENERALIZED ANXIETY DISORDER: ICD-10-CM

## 2024-12-02 DIAGNOSIS — F33.2 SEVERE EPISODE OF RECURRENT MAJOR DEPRESSIVE DISORDER, WITHOUT PSYCHOTIC FEATURES: Primary | ICD-10-CM

## 2024-12-02 DIAGNOSIS — F43.10 POST TRAUMATIC STRESS DISORDER (PTSD): ICD-10-CM
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
## 2024-12-04 ENCOUNTER — TELEPHONE (OUTPATIENT)
Dept: PAIN MEDICINE | Facility: CLINIC | Age: 29
End: 2024-12-04
Payer: COMMERCIAL

## 2024-12-04 RX ORDER — OXYCODONE AND ACETAMINOPHEN 10; 325 MG/1; MG/1
1 TABLET ORAL EVERY 6 HOURS PRN
Qty: 120 TABLET | Refills: 0 | Status: SHIPPED | OUTPATIENT
Start: 2024-12-04

## 2024-12-04 NOTE — TELEPHONE ENCOUNTER
Medication Refill Request    Date of phone call: 24    Medication being requested: Percocet  mg   si tab po q 6 hrs prn  Qty: 120    Date of last visit: 10/21/24    Date of last refill:     [facility] up to date?:     Next Follow up?: 24    Any new pertinent information? (i.e, new medication allergies, new use of medications, change in patient's health or condition, non-compliance or inconsistency with prescribing agreement?):

## 2024-12-04 NOTE — TELEPHONE ENCOUNTER
Caller: Henrietta Garrett    Relationship: Self    Best call back number: [phone]    Requested Prescriptions: oxyCODONE-acetaminophen (PERCOCET)  MG per table   Requested Prescriptions      No prescriptions requested or ordered in this encounter        Pharmacy where request should be sent:    [facility] [id number] [facility] [address] [facility] [address] - [phone]  - [phone]       Last office visit with prescribing clinician: 10/21/2024   Last telemedicine visit with prescribing clinician: Visit date not found   Next office visit with prescribing clinician: 12/16/2024       Does the patient have less than a 3 day supply:  [x] Yes  [] No    Would you like a call back once the refill request has been completed: [] Yes [x] No    If the office needs to give you a call back, can they leave a voicemail: [x] Yes [] No

## 2024-12-06 ENCOUNTER — TELEPHONE (OUTPATIENT)
Dept: FAMILY MEDICINE CLINIC | Facility: CLINIC | Age: 29
End: 2024-12-06
Payer: COMMERCIAL

## 2024-12-16 ENCOUNTER — HOSPITAL ENCOUNTER (INPATIENT)
Facility: HOSPITAL | Age: 29
LOS: 1 days | Discharge: HOME | End: 2024-12-21
Attending: STUDENT IN AN ORGANIZED HEALTH CARE EDUCATION/TRAINING PROGRAM | Admitting: INTERNAL MEDICINE
Payer: COMMERCIAL

## 2024-12-16 ENCOUNTER — TELEPHONE (OUTPATIENT)
Dept: PAIN MEDICINE | Facility: CLINIC | Age: 29
End: 2024-12-16

## 2024-12-16 ENCOUNTER — APPOINTMENT (OUTPATIENT)
Dept: CT IMAGING | Facility: HOSPITAL | Age: 29
End: 2024-12-16
Payer: COMMERCIAL

## 2024-12-16 DIAGNOSIS — R10.84 GENERALIZED ABDOMINAL PAIN: Primary | ICD-10-CM

## 2024-12-16 DIAGNOSIS — D50.9 IRON DEFICIENCY ANEMIA, UNSPECIFIED IRON DEFICIENCY ANEMIA TYPE: ICD-10-CM

## 2024-12-16 DIAGNOSIS — R10.12 LEFT UPPER QUADRANT ABDOMINAL PAIN: ICD-10-CM

## 2024-12-16 DIAGNOSIS — J45.990 EXERCISE-INDUCED ASTHMA: ICD-10-CM

## 2024-12-16 DIAGNOSIS — D64.89 ANEMIA DUE TO OTHER CAUSE, NOT CLASSIFIED: ICD-10-CM

## 2024-12-16 DIAGNOSIS — K21.9 GASTROESOPHAGEAL REFLUX DISEASE, UNSPECIFIED WHETHER ESOPHAGITIS PRESENT: ICD-10-CM

## 2024-12-16 DIAGNOSIS — R13.10 PAINFUL SWALLOWING: ICD-10-CM

## 2024-12-16 DIAGNOSIS — K62.5 RECTAL BLEEDING: ICD-10-CM

## 2024-12-16 LAB
ALBUMIN SERPL-MCNC: 4.2 G/DL (ref 3.5–5.2)
ALBUMIN/GLOB SERPL: 1 G/DL
ALP SERPL-CCNC: 44 U/L (ref 39–117)
ALT SERPL W P-5'-P-CCNC: 9 U/L (ref 1–33)
ANION GAP SERPL CALCULATED.3IONS-SCNC: 10.6 MMOL/L (ref 5–15)
AST SERPL-CCNC: 14 U/L (ref 1–32)
B PARAPERT DNA SPEC QL NAA+PROBE: NOT DETECTED
B PERT DNA SPEC QL NAA+PROBE: NOT DETECTED
BACTERIA UR QL AUTO: ABNORMAL /HPF
BASOPHILS # BLD AUTO: 0.01 10*3/MM3 (ref 0–0.2)
BASOPHILS NFR BLD AUTO: 0.1 % (ref 0–1.5)
BILIRUB SERPL-MCNC: 0.4 MG/DL (ref 0–1.2)
BILIRUB UR QL STRIP: NEGATIVE
BUN SERPL-MCNC: 6 MG/DL (ref 6–20)
BUN/CREAT SERPL: 8.6 (ref 7–25)
C PNEUM DNA NPH QL NAA+NON-PROBE: NOT DETECTED
CALCIUM SPEC-SCNC: 9.6 MG/DL (ref 8.6–10.5)
CHLORIDE SERPL-SCNC: 100 MMOL/L (ref 98–107)
CLARITY UR: CLEAR
CO2 SERPL-SCNC: 23.4 MMOL/L (ref 22–29)
COLOR UR: YELLOW
CREAT SERPL-MCNC: 0.7 MG/DL (ref 0.57–1)
DEPRECATED RDW RBC AUTO: 38.9 FL (ref 37–54)
EGFRCR SERPLBLD CKD-EPI 2021: 120.2 ML/MIN/1.73
EOSINOPHIL # BLD AUTO: 0.01 10*3/MM3 (ref 0–0.4)
EOSINOPHIL NFR BLD AUTO: 0.1 % (ref 0.3–6.2)
ERYTHROCYTE [DISTWIDTH] IN BLOOD BY AUTOMATED COUNT: 12.4 % (ref 12.3–15.4)
FLUAV SUBTYP SPEC NAA+PROBE: NOT DETECTED
FLUBV RNA ISLT QL NAA+PROBE: NOT DETECTED
GLOBULIN UR ELPH-MCNC: 4.4 GM/DL
GLUCOSE SERPL-MCNC: 113 MG/DL (ref 65–99)
GLUCOSE UR STRIP-MCNC: NEGATIVE MG/DL
HADV DNA SPEC NAA+PROBE: NOT DETECTED
HCG SERPL QL: NEGATIVE
HCOV 229E RNA SPEC QL NAA+PROBE: NOT DETECTED
HCOV HKU1 RNA SPEC QL NAA+PROBE: NOT DETECTED
HCOV NL63 RNA SPEC QL NAA+PROBE: NOT DETECTED
HCOV OC43 RNA SPEC QL NAA+PROBE: NOT DETECTED
HCT VFR BLD AUTO: 32.6 % (ref 34–46.6)
HETEROPH AB SER QL LA: NEGATIVE
HGB BLD-MCNC: 11.3 G/DL (ref 12–15.9)
HGB UR QL STRIP.AUTO: NEGATIVE
HMPV RNA NPH QL NAA+NON-PROBE: NOT DETECTED
HOLD SPECIMEN: NORMAL
HPIV1 RNA ISLT QL NAA+PROBE: NOT DETECTED
HPIV2 RNA SPEC QL NAA+PROBE: NOT DETECTED
HPIV3 RNA NPH QL NAA+PROBE: NOT DETECTED
HPIV4 P GENE NPH QL NAA+PROBE: NOT DETECTED
HYALINE CASTS UR QL AUTO: ABNORMAL /LPF
IMM GRANULOCYTES # BLD AUTO: 0.02 10*3/MM3 (ref 0–0.05)
IMM GRANULOCYTES NFR BLD AUTO: 0.3 % (ref 0–0.5)
KETONES UR QL STRIP: ABNORMAL
LEUKOCYTE ESTERASE UR QL STRIP.AUTO: ABNORMAL
LIPASE SERPL-CCNC: 15 U/L (ref 13–60)
LYMPHOCYTES # BLD AUTO: 1.02 10*3/MM3 (ref 0.7–3.1)
LYMPHOCYTES NFR BLD AUTO: 12.8 % (ref 19.6–45.3)
M PNEUMO IGG SER IA-ACNC: NOT DETECTED
MCH RBC QN AUTO: 30.2 PG (ref 26.6–33)
MCHC RBC AUTO-ENTMCNC: 34.7 G/DL (ref 31.5–35.7)
MCV RBC AUTO: 87.2 FL (ref 79–97)
MONOCYTES # BLD AUTO: 0.5 10*3/MM3 (ref 0.1–0.9)
MONOCYTES NFR BLD AUTO: 6.3 % (ref 5–12)
NEUTROPHILS NFR BLD AUTO: 6.39 10*3/MM3 (ref 1.7–7)
NEUTROPHILS NFR BLD AUTO: 80.4 % (ref 42.7–76)
NITRITE UR QL STRIP: NEGATIVE
NRBC BLD AUTO-RTO: 0 /100 WBC (ref 0–0.2)
PH UR STRIP.AUTO: 6 [PH] (ref 5–8)
PLATELET # BLD AUTO: 203 10*3/MM3 (ref 140–450)
PMV BLD AUTO: 10.3 FL (ref 6–12)
POTASSIUM SERPL-SCNC: 3.7 MMOL/L (ref 3.5–5.2)
PROT SERPL-MCNC: 8.6 G/DL (ref 6–8.5)
PROT UR QL STRIP: NEGATIVE
QT INTERVAL: 415 MS
QTC INTERVAL: 445 MS
RBC # BLD AUTO: 3.74 10*6/MM3 (ref 3.77–5.28)
RBC # UR STRIP: ABNORMAL /HPF
REF LAB TEST METHOD: ABNORMAL
RHINOVIRUS RNA SPEC NAA+PROBE: NOT DETECTED
RSV RNA NPH QL NAA+NON-PROBE: NOT DETECTED
SARS-COV-2 RNA NPH QL NAA+NON-PROBE: NOT DETECTED
SODIUM SERPL-SCNC: 134 MMOL/L (ref 136–145)
SP GR UR STRIP: >1.03 (ref 1–1.03)
SQUAMOUS #/AREA URNS HPF: ABNORMAL /HPF
UROBILINOGEN UR QL STRIP: ABNORMAL
WBC # UR STRIP: ABNORMAL /HPF
WBC NRBC COR # BLD AUTO: 7.95 10*3/MM3 (ref 3.4–10.8)
WHOLE BLOOD HOLD COAG: NORMAL
WHOLE BLOOD HOLD SPECIMEN: NORMAL

## 2024-12-16 PROCEDURE — 83690 ASSAY OF LIPASE: CPT

## 2024-12-16 PROCEDURE — 85025 COMPLETE CBC W/AUTO DIFF WBC: CPT

## 2024-12-16 PROCEDURE — 84703 CHORIONIC GONADOTROPIN ASSAY: CPT

## 2024-12-16 PROCEDURE — G0378 HOSPITAL OBSERVATION PER HR: HCPCS

## 2024-12-16 PROCEDURE — 25510000001 IOPAMIDOL 61 % SOLUTION: Performed by: STUDENT IN AN ORGANIZED HEALTH CARE EDUCATION/TRAINING PROGRAM

## 2024-12-16 PROCEDURE — 0202U NFCT DS 22 TRGT SARS-COV-2: CPT | Performed by: STUDENT IN AN ORGANIZED HEALTH CARE EDUCATION/TRAINING PROGRAM

## 2024-12-16 PROCEDURE — 25010000002 DIPHENHYDRAMINE PER 50 MG: Performed by: INTERNAL MEDICINE

## 2024-12-16 PROCEDURE — 25810000003 SODIUM CHLORIDE 0.9 % SOLUTION: Performed by: PHYSICIAN ASSISTANT

## 2024-12-16 PROCEDURE — 25010000002 DIPHENHYDRAMINE PER 50 MG: Performed by: STUDENT IN AN ORGANIZED HEALTH CARE EDUCATION/TRAINING PROGRAM

## 2024-12-16 PROCEDURE — 86308 HETEROPHILE ANTIBODY SCREEN: CPT | Performed by: STUDENT IN AN ORGANIZED HEALTH CARE EDUCATION/TRAINING PROGRAM

## 2024-12-16 PROCEDURE — 80053 COMPREHEN METABOLIC PANEL: CPT

## 2024-12-16 PROCEDURE — 25010000002 HYDROMORPHONE PER 4 MG: Performed by: INTERNAL MEDICINE

## 2024-12-16 PROCEDURE — 25010000002 MORPHINE PER 10 MG: Performed by: STUDENT IN AN ORGANIZED HEALTH CARE EDUCATION/TRAINING PROGRAM

## 2024-12-16 PROCEDURE — 93010 ELECTROCARDIOGRAM REPORT: CPT | Performed by: INTERNAL MEDICINE

## 2024-12-16 PROCEDURE — 81001 URINALYSIS AUTO W/SCOPE: CPT | Performed by: STUDENT IN AN ORGANIZED HEALTH CARE EDUCATION/TRAINING PROGRAM

## 2024-12-16 PROCEDURE — 25010000002 ONDANSETRON PER 1 MG: Performed by: STUDENT IN AN ORGANIZED HEALTH CARE EDUCATION/TRAINING PROGRAM

## 2024-12-16 PROCEDURE — 87086 URINE CULTURE/COLONY COUNT: CPT | Performed by: STUDENT IN AN ORGANIZED HEALTH CARE EDUCATION/TRAINING PROGRAM

## 2024-12-16 PROCEDURE — 93005 ELECTROCARDIOGRAM TRACING: CPT | Performed by: STUDENT IN AN ORGANIZED HEALTH CARE EDUCATION/TRAINING PROGRAM

## 2024-12-16 PROCEDURE — 99285 EMERGENCY DEPT VISIT HI MDM: CPT

## 2024-12-16 PROCEDURE — 25010000002 HYDROMORPHONE 1 MG/ML SOLUTION: Performed by: STUDENT IN AN ORGANIZED HEALTH CARE EDUCATION/TRAINING PROGRAM

## 2024-12-16 PROCEDURE — 25810000003 SODIUM CHLORIDE 0.9 % SOLUTION: Performed by: INTERNAL MEDICINE

## 2024-12-16 PROCEDURE — 36415 COLL VENOUS BLD VENIPUNCTURE: CPT

## 2024-12-16 PROCEDURE — 74177 CT ABD & PELVIS W/CONTRAST: CPT

## 2024-12-16 RX ORDER — SODIUM CHLORIDE 9 MG/ML
100 INJECTION, SOLUTION INTRAVENOUS CONTINUOUS
Status: DISCONTINUED | OUTPATIENT
Start: 2024-12-16 | End: 2024-12-17

## 2024-12-16 RX ORDER — GABAPENTIN 100 MG/1
200 CAPSULE ORAL 3 TIMES DAILY
Status: DISCONTINUED | OUTPATIENT
Start: 2024-12-16 | End: 2024-12-17

## 2024-12-16 RX ORDER — MORPHINE SULFATE 2 MG/ML
4 INJECTION, SOLUTION INTRAMUSCULAR; INTRAVENOUS ONCE
Status: COMPLETED | OUTPATIENT
Start: 2024-12-16 | End: 2024-12-16

## 2024-12-16 RX ORDER — ACETAMINOPHEN 650 MG/1
650 SUPPOSITORY RECTAL EVERY 4 HOURS PRN
Status: DISCONTINUED | OUTPATIENT
Start: 2024-12-16 | End: 2024-12-17

## 2024-12-16 RX ORDER — SODIUM CHLORIDE 9 MG/ML
40 INJECTION, SOLUTION INTRAVENOUS AS NEEDED
Status: DISCONTINUED | OUTPATIENT
Start: 2024-12-16 | End: 2024-12-21 | Stop reason: HOSPADM

## 2024-12-16 RX ORDER — SODIUM CHLORIDE 0.9 % (FLUSH) 0.9 %
10 SYRINGE (ML) INJECTION EVERY 12 HOURS SCHEDULED
Status: DISCONTINUED | OUTPATIENT
Start: 2024-12-16 | End: 2024-12-21 | Stop reason: HOSPADM

## 2024-12-16 RX ORDER — ESCITALOPRAM OXALATE 5 MG/5ML
5 SOLUTION ORAL 2 TIMES DAILY
Status: DISCONTINUED | OUTPATIENT
Start: 2024-12-16 | End: 2024-12-21 | Stop reason: HOSPADM

## 2024-12-16 RX ORDER — HYDROXYCHLOROQUINE SULFATE 200 MG/1
200 TABLET, FILM COATED ORAL 2 TIMES DAILY
Status: DISCONTINUED | OUTPATIENT
Start: 2024-12-16 | End: 2024-12-17

## 2024-12-16 RX ORDER — SODIUM CHLORIDE 0.9 % (FLUSH) 0.9 %
20 SYRINGE (ML) INJECTION AS NEEDED
Status: DISCONTINUED | OUTPATIENT
Start: 2024-12-16 | End: 2024-12-21 | Stop reason: HOSPADM

## 2024-12-16 RX ORDER — DEXTROMETHORPHAN HYDROBROMIDE AND PROMETHAZINE HYDROCHLORIDE 15; 6.25 MG/5ML; MG/5ML
5 SYRUP ORAL EVERY 6 HOURS PRN
Status: DISCONTINUED | OUTPATIENT
Start: 2024-12-16 | End: 2024-12-21 | Stop reason: HOSPADM

## 2024-12-16 RX ORDER — SODIUM CHLORIDE 0.9 % (FLUSH) 0.9 %
10 SYRINGE (ML) INJECTION AS NEEDED
Status: DISCONTINUED | OUTPATIENT
Start: 2024-12-16 | End: 2024-12-21 | Stop reason: HOSPADM

## 2024-12-16 RX ORDER — OXYCODONE AND ACETAMINOPHEN 10; 325 MG/1; MG/1
1 TABLET ORAL EVERY 6 HOURS PRN
Status: DISCONTINUED | OUTPATIENT
Start: 2024-12-16 | End: 2024-12-17

## 2024-12-16 RX ORDER — SILVER SULFADIAZINE 10 MG/G
CREAM TOPICAL DAILY
Status: DISCONTINUED | OUTPATIENT
Start: 2024-12-16 | End: 2024-12-20

## 2024-12-16 RX ORDER — PROMETHAZINE HYDROCHLORIDE 25 MG/1
25 TABLET ORAL ONCE
Status: DISCONTINUED | OUTPATIENT
Start: 2024-12-16 | End: 2024-12-20

## 2024-12-16 RX ORDER — HYDROXYZINE HCL 10 MG/5 ML
25 SOLUTION, ORAL ORAL NIGHTLY
Status: DISCONTINUED | OUTPATIENT
Start: 2024-12-16 | End: 2024-12-21 | Stop reason: HOSPADM

## 2024-12-16 RX ORDER — ONDANSETRON 2 MG/ML
4 INJECTION INTRAMUSCULAR; INTRAVENOUS EVERY 6 HOURS PRN
Status: DISCONTINUED | OUTPATIENT
Start: 2024-12-16 | End: 2024-12-21 | Stop reason: HOSPADM

## 2024-12-16 RX ORDER — ACETAMINOPHEN 160 MG/5ML
650 SOLUTION ORAL EVERY 4 HOURS PRN
Status: DISCONTINUED | OUTPATIENT
Start: 2024-12-16 | End: 2024-12-17

## 2024-12-16 RX ORDER — BUDESONIDE AND FORMOTEROL FUMARATE DIHYDRATE 160; 4.5 UG/1; UG/1
2 AEROSOL RESPIRATORY (INHALATION)
Status: DISCONTINUED | OUTPATIENT
Start: 2024-12-16 | End: 2024-12-21 | Stop reason: HOSPADM

## 2024-12-16 RX ORDER — ACETAMINOPHEN 325 MG/1
650 TABLET ORAL EVERY 4 HOURS PRN
Status: DISCONTINUED | OUTPATIENT
Start: 2024-12-16 | End: 2024-12-17

## 2024-12-16 RX ORDER — HYDROMORPHONE HYDROCHLORIDE 1 MG/ML
0.5 INJECTION, SOLUTION INTRAMUSCULAR; INTRAVENOUS; SUBCUTANEOUS
Status: DISCONTINUED | OUTPATIENT
Start: 2024-12-16 | End: 2024-12-20

## 2024-12-16 RX ORDER — IOPAMIDOL 612 MG/ML
100 INJECTION, SOLUTION INTRAVASCULAR
Status: COMPLETED | OUTPATIENT
Start: 2024-12-16 | End: 2024-12-16

## 2024-12-16 RX ORDER — DIPHENHYDRAMINE HYDROCHLORIDE 50 MG/ML
25 INJECTION INTRAMUSCULAR; INTRAVENOUS ONCE
Status: COMPLETED | OUTPATIENT
Start: 2024-12-16 | End: 2024-12-16

## 2024-12-16 RX ORDER — ONDANSETRON 2 MG/ML
8 INJECTION INTRAMUSCULAR; INTRAVENOUS ONCE
Status: COMPLETED | OUTPATIENT
Start: 2024-12-16 | End: 2024-12-16

## 2024-12-16 RX ORDER — DICYCLOMINE HYDROCHLORIDE 10 MG/1
10 CAPSULE ORAL
Status: DISCONTINUED | OUTPATIENT
Start: 2024-12-16 | End: 2024-12-21 | Stop reason: HOSPADM

## 2024-12-16 RX ORDER — PANTOPRAZOLE SODIUM 40 MG/1
40 TABLET, DELAYED RELEASE ORAL 2 TIMES DAILY
Status: DISCONTINUED | OUTPATIENT
Start: 2024-12-16 | End: 2024-12-17

## 2024-12-16 RX ORDER — ONDANSETRON 2 MG/ML
4 INJECTION INTRAMUSCULAR; INTRAVENOUS ONCE
Status: COMPLETED | OUTPATIENT
Start: 2024-12-16 | End: 2024-12-16

## 2024-12-16 RX ORDER — DIPHENHYDRAMINE HYDROCHLORIDE 50 MG/ML
25 INJECTION INTRAMUSCULAR; INTRAVENOUS EVERY 6 HOURS PRN
Status: DISCONTINUED | OUTPATIENT
Start: 2024-12-16 | End: 2024-12-21 | Stop reason: HOSPADM

## 2024-12-16 RX ORDER — HEPARIN SODIUM (PORCINE) LOCK FLUSH IV SOLN 100 UNIT/ML 100 UNIT/ML
5 SOLUTION INTRAVENOUS AS NEEDED
Status: DISCONTINUED | OUTPATIENT
Start: 2024-12-16 | End: 2024-12-21 | Stop reason: HOSPADM

## 2024-12-16 RX ORDER — NITROGLYCERIN 0.4 MG/1
0.4 TABLET SUBLINGUAL
Status: DISCONTINUED | OUTPATIENT
Start: 2024-12-16 | End: 2024-12-20

## 2024-12-16 RX ADMIN — HYDROMORPHONE HYDROCHLORIDE 0.5 MG: 1 INJECTION, SOLUTION INTRAMUSCULAR; INTRAVENOUS; SUBCUTANEOUS at 22:03

## 2024-12-16 RX ADMIN — DIPHENHYDRAMINE HYDROCHLORIDE 25 MG: 50 INJECTION, SOLUTION INTRAMUSCULAR; INTRAVENOUS at 22:04

## 2024-12-16 RX ADMIN — ONDANSETRON 4 MG: 2 INJECTION, SOLUTION INTRAMUSCULAR; INTRAVENOUS at 13:13

## 2024-12-16 RX ADMIN — SODIUM CHLORIDE 1000 ML: 9 INJECTION, SOLUTION INTRAVENOUS at 10:36

## 2024-12-16 RX ADMIN — IOPAMIDOL 85 ML: 612 INJECTION, SOLUTION INTRAVENOUS at 10:54

## 2024-12-16 RX ADMIN — HYDROMORPHONE HYDROCHLORIDE 1 MG: 1 INJECTION, SOLUTION INTRAMUSCULAR; INTRAVENOUS; SUBCUTANEOUS at 12:37

## 2024-12-16 RX ADMIN — SODIUM CHLORIDE 100 ML/HR: 9 INJECTION, SOLUTION INTRAVENOUS at 17:54

## 2024-12-16 RX ADMIN — MORPHINE SULFATE 4 MG: 2 INJECTION, SOLUTION INTRAMUSCULAR; INTRAVENOUS at 10:38

## 2024-12-16 RX ADMIN — HYDROMORPHONE HYDROCHLORIDE 0.5 MG: 1 INJECTION, SOLUTION INTRAMUSCULAR; INTRAVENOUS; SUBCUTANEOUS at 17:13

## 2024-12-16 RX ADMIN — ONDANSETRON 8 MG: 2 INJECTION INTRAMUSCULAR; INTRAVENOUS at 10:37

## 2024-12-16 RX ADMIN — HYDROMORPHONE HYDROCHLORIDE 0.5 MG: 1 INJECTION, SOLUTION INTRAMUSCULAR; INTRAVENOUS; SUBCUTANEOUS at 19:40

## 2024-12-16 RX ADMIN — DIPHENHYDRAMINE HYDROCHLORIDE 25 MG: 50 INJECTION, SOLUTION INTRAMUSCULAR; INTRAVENOUS at 13:29

## 2024-12-16 NOTE — ED PROVIDER NOTES
EMERGENCY DEPARTMENT MD ATTESTATION NOTE    Room Number:  22/22  PCP: Kenya David MD  Independent Historians: Patient and Family    HPI:    Context: Henrietta Garrett is a 29 y.o. female with a medical history of complex regional pain syndrome, gastroparesis, myasthenia gravis, Tomas-Danlos syndrome, lupus, POTS who presents to the ED c/o acute abdominal pain, nausea, vomiting.  Symptoms began yesterday.  Patient has a history of gastroparesis and states this feels similar however is significantly worse.  Patient is on IV fluids, IV meds at home and receives TPN.  Patient is not able to tolerate any oral intake.    PHYSICAL EXAM    I have reviewed the triage vital signs and nursing notes.    ED Triage Vitals   Temp Heart Rate Resp BP SpO2   12/16/24 0922 12/16/24 0922 12/16/24 0925 12/16/24 0925 12/16/24 0922   97.7 °F (36.5 °C) 85 18 130/80 97 %      Temp src Heart Rate Source Patient Position BP Location FiO2 (%)   12/16/24 0922 -- -- -- --   Oral           Physical Exam  GENERAL: alert, no acute distress, ill-appearing  SKIN: Warm, dry  HENT: Normocephalic, atraumatic  EYES: no scleral icterus  CV: regular rhythm, regular rate  RESPIRATORY: normal effort, lungs clear  ABDOMEN: soft, nontender, nondistended  MUSCULOSKELETAL: no deformity  NEURO: alert, moves all extremities, follows commands            MEDICATIONS GIVEN IN ER  Medications   sodium chloride 0.9 % flush 10 mL (has no administration in time range)   promethazine (PHENERGAN) tablet 25 mg (25 mg Oral Not Given 12/16/24 1257)   ondansetron (ZOFRAN) injection 8 mg (8 mg Intravenous Given 12/16/24 1037)   morphine injection 4 mg (4 mg Intravenous Given 12/16/24 1038)   sodium chloride 0.9 % bolus 1,000 mL (0 mL Intravenous Stopped 12/16/24 1230)   iopamidol (ISOVUE-300) 61 % injection 100 mL (85 mL Intravenous Given by Other 12/16/24 1054)   HYDROmorphone (DILAUDID) injection 1 mg (1 mg Intravenous Given 12/16/24 1237)   ondansetron (ZOFRAN) injection 4  mg (4 mg Intravenous Given 12/16/24 1313)   diphenhydrAMINE (BENADRYL) injection 25 mg (25 mg Intravenous Given 12/16/24 1329)         ORDERS PLACED DURING THIS VISIT:  Orders Placed This Encounter   Procedures    Respiratory Panel PCR w/COVID-19(SARS-CoV-2) [facility] In-House, NP Swab in UTM/VTM, 2 HR TAT - Swab, Nasopharynx    CT Abdomen Pelvis With Contrast    [place] Draw    Comprehensive Metabolic Panel    Lipase    Urinalysis With Microscopic If Indicated (No Culture) - Urine, Clean Catch    hCG, Serum, Qualitative    CBC Auto Differential    Mononucleosis Screen    NPO Diet NPO Type: Strict NPO    Undress & Gown    [facility] (on-call MD unless specified) Details    ECG 12 Lead QT Measurement    Insert Peripheral IV    Initiate Observation Status    CBC & Differential    Green Top (Gel)    Lavender Top    Light Blue Top         PROCEDURES  Procedures            PROGRESS, DATA ANALYSIS, CONSULTS, AND MEDICAL DECISION MAKING  All labs have been independently interpreted by me.  All radiology studies have been reviewed by me. All EKG's have been independently viewed and interpreted by me.  Discussion below represents my analysis of pertinent findings related to patient's condition, differential diagnosis, treatment plan and final disposition.    Differential diagnosis includes but is not limited to gastroparesis, electrolyte abnormality, gastroenteritis, SBO.    Clinical Scores:                                     ED Course as of 12/16/24 1456   Mon Dec 16, 2024   1252 EKG interpreted by me demonstrates sinus rhythm, rate 69, no NH/QT prolongation, no ST elevation [MW]   1257 Reassessed patient she has ongoing abdominal pain and nausea.  Will give additional dose of Dilaudid.  Patient additionally requesting additional antiemetic.  She states promethazine usually works.  We do not have this in IV formulation.  Offered oral however she declines.  Will give additional dose of Zofran as patient has no QT  prolongation. [MW]   1427 Reassessed patient again and she has ongoing discomfort in her abdomen.  Will admit for further symptomatic management. [MW]   145 Discussed with Dr. Hinson who agrees to admit [MW]      ED Course User Index  [MW] Zachery Batres MD         COMPLEXITY OF CARE  The patient requires admission.    Please note that portions of this document were completed with a voice recognition program.    Note Disclaimer: At [facility], we believe that sharing information builds trust and better relationships. You are receiving this note because you recently visited [facility]. It is possible you will see health information before a provider has talked with you about it. This kind of information can be easy to misunderstand. To help you fully understand what it means for your health, we urge you to discuss this note with your provider.         Zachery Batres MD  12/16/24 6611

## 2024-12-16 NOTE — ED NOTES
Nursing report ED to floor  Henrietta Garrett  29 y.o.  female    HPI :   Chief Complaint   Patient presents with    Nausea    Weakness - Generalized       Admitting doctor:   Obi Hinson MD    Admitting diagnosis:   The encounter diagnosis was Generalized abdominal pain.    Code status:   Current Code Status       Date Active Code Status Order ID Comments User Context       Prior            Allergies:   Anesthetics, amide; Ciprofloxacin; Compazine [prochlorperazine]; Domperidone; Droperidol; Haldol [haloperidol]; Metoclopramide; and Sulfa antibiotics    Intake and Output    Intake/Output Summary (Last 24 hours) at 12/16/2024 1457  Last data filed at 12/16/2024 1230  Gross per 24 hour   Intake 1000 ml   Output --   Net 1000 ml       Weight:       12/16/24  0926   Weight: 77.1 kg (170 lb)       Most recent vitals:   Vitals:    12/16/24 1201 12/16/24 1231 12/16/24 1301 12/16/24 1331   BP: 114/63 116/70 112/60 111/58   Pulse:  73 68 69   Resp:       Temp:       TempSrc:       SpO2: 98% 96% 97% 97%   Weight:       Height:           Active LDAs/IV Access:   Lines, Drains & Airways       Active LDAs       Name Placement date Placement time Site Days    Peripheral IV 12/16/24 1035 Anterior;Right Forearm 12/16/24  1035  Forearm  less than 1    Gastrostomy/Enterostomy Percutaneous endoscopic gastrostomy (PEG) 20 Fr. LUQ 02/15/24  0854  LUQ  305    Gastrostomy/Enterostomy Jejunostomy 1 16 Fr. LUQ 02/19/24  1050  LUQ  301    Gastrostomy/Enterostomy Percutaneous endoscopic gastrostomy (PEG) LUQ 07/05/24  --  LUQ  164    Single Lumen Implantable Port Right Subclavian --  --  Subclavian  --                    Labs (abnormal labs have a star):   Labs Reviewed   COMPREHENSIVE METABOLIC PANEL - Abnormal; Notable for the following components:       Result Value    Glucose 113 (*)     Sodium 134 (*)     Total Protein 8.6 (*)     All other components within normal limits    Narrative:     GFR Categories in Chronic Kidney Disease  (CKD)      GFR Category          GFR (mL/min/1.73)    Interpretation  G1                     90 or greater         Normal or high (1)  G2                      60-89                Mild decrease (1)  G3a                   45-59                Mild to moderate decrease  G3b                   30-44                Moderate to severe decrease  G4                    15-29                Severe decrease  G5                    14 or less           Kidney failure          (1)In the absence of evidence of kidney disease, neither GFR category G1 or G2 fulfill the criteria for CKD.    eGFR calculation 2021 CKD-EPI creatinine equation, which does not include race as a factor   CBC WITH AUTO DIFFERENTIAL - Abnormal; Notable for the following components:    RBC 3.74 (*)     Hemoglobin 11.3 (*)     Hematocrit 32.6 (*)     Neutrophil % 80.4 (*)     Lymphocyte % 12.8 (*)     Eosinophil % 0.1 (*)     All other components within normal limits   RESPIRATORY PANEL PCR W/ COVID-19 (SARS-COV-2), NP SWAB IN UTM/VTP, 2 HR TAT - Normal    Narrative:     In the setting of a positive respiratory panel with a viral infection PLUS a negative procalcitonin without other underlying concern for bacterial infection, consider observing off antibiotics or discontinuation of antibiotics and continue supportive care. If the respiratory panel is positive for atypical bacterial infection (Bordetella pertussis, Chlamydophila pneumoniae, or Mycoplasma pneumoniae), consider antibiotic de-escalation to target atypical bacterial infection.   LIPASE - Normal   HCG, SERUM, QUALITATIVE - Normal   MONONUCLEOSIS SCREEN - Normal   RAINBOW DRAW    Narrative:     The following orders were created for panel order [place] Draw.  Procedure                               Abnormality         Status                     ---------                               -----------         ------                     Green Top (Gel)[[id number]]                                  Final result                Lavender Top[[id number]]                                     Final result               Gold Top - SST[[id number]]                                                              Light Blue Top[[id number]]                                   Final result                 Please view results for these tests on the individual orders.   URINALYSIS W/ MICROSCOPIC IF INDICATED (NO CULTURE)   CBC AND DIFFERENTIAL    Narrative:     The following orders were created for panel order CBC & Differential.  Procedure                               Abnormality         Status                     ---------                               -----------         ------                     CBC Auto Differential[[id number]]        Abnormal            Final result                 Please view results for these tests on the individual orders.   GREEN TOP   LAVENDER TOP   LIGHT BLUE TOP       EKG:   ECG 12 Lead QT Measurement   Final Result   HEART RATE=69  bpm   RR Fpbtcbkl=539  ms   FL Vepyqfjw=244  ms   P Horizontal Axis=-25  deg   P Front Axis=30  deg   QRSD Cigrozia=059  ms   QT Qlgqerhi=182  ms   OZdA=516  ms   QRS Axis=6  deg   T Wave Axis=38  deg   - OTHERWISE NORMAL ECG -   Sinus rhythm   Borderline  low voltage, extremity leads   No Prior Tracing for Comparison   Electronically Signed By: Evelyn Etienne ([facility]) 2024-12-16 14:48:01   Date and Time of Study:2024-12-16 12:49:57          Meds given in ED:   Medications   sodium chloride 0.9 % flush 10 mL (has no administration in time range)   promethazine (PHENERGAN) tablet 25 mg (25 mg Oral Not Given 12/16/24 1257)   ondansetron (ZOFRAN) injection 8 mg (8 mg Intravenous Given 12/16/24 1037)   morphine injection 4 mg (4 mg Intravenous Given 12/16/24 1038)   sodium chloride 0.9 % bolus 1,000 mL (0 mL Intravenous Stopped 12/16/24 1230)   iopamidol (ISOVUE-300) 61 % injection 100 mL (85 mL Intravenous Given by Other 12/16/24 1054)   HYDROmorphone (DILAUDID) injection 1 mg (1 mg  Intravenous Given 12/16/24 1237)   ondansetron (ZOFRAN) injection 4 mg (4 mg Intravenous Given 12/16/24 1313)   diphenhydrAMINE (BENADRYL) injection 25 mg (25 mg Intravenous Given 12/16/24 1329)       Imaging results:  CT Abdomen Pelvis With Contrast    Result Date: 12/16/2024   1. No acute findings identified in the abdomen or pelvis. 2. Stable mildly prominent abdominal retroperitoneal lymph nodes. No new or enlarging lymph nodes identified.  This report was finalized on 12/16/2024 11:20 AM by Dr. Abel Sánchez M.D on Workstation: [id number]       Ambulatory status:   - up ad isa    Social issues:   Social History     Socioeconomic History    Marital status: Single    Number of children: 0    Years of education: High School   Tobacco Use    Smoking status: Never    Smokeless tobacco: Never   Vaping Use    Vaping status: Never Used   Substance and Sexual Activity    Alcohol use: No    Drug use: No    Sexual activity: Not Currently     Partners: Male     Birth control/protection: Birth control pill       NIH Stroke Scale:        Nursing report ED to floor:
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
## 2024-12-16 NOTE — PLAN OF CARE
Goal Outcome Evaluation:    Admission complete. Patient has a right chest port. Patient refused dressing changed. Paged [facility] for orders to deaccess and reacces. Patient reports needing needle change tomorrow. Awaiting callback.    Received callback at 1853. Orders to deacces and reacces port. Notified IV therapy.

## 2024-12-16 NOTE — ED PROVIDER NOTES
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 CONSULTS, AND MEDICAL DECISION MAKING  All labs have been independently interpreted by me.  All radiology studies have been reviewed by me. All EKG's have been independently viewed and interpreted by me.  Discussion below represents my analysis of pertinent findings related to patient's condition, differential diagnosis, treatment plan and final disposition.    DIFFERENTIAL  Differential diagnosis includes but is not limited to:  - hepatobiliary pathology such as cholecystitis, cholangitis, and symptomatic cholelithiasis  - Pancreatitis  - Dyspepsia  - Small bowel obstruction  - Appendicitis  - Diverticulitis  - UTI including pyelonephritis  - Ureteral stone  - Zoster  - Colitis, including infectious and ischemic  - Atypical ACS  -Viral syndrome  -Gastroparesis  -Infectious mononucleosis    Clinical Scores:                  ED Course as of 12/16/24 2037   Mon Dec 16, 2024   1252 EKG interpreted by me demonstrates sinus rhythm, rate 69, no [place]/QT prolongation, no ST elevation [MW]   1257 Reassessed patient she has ongoing abdominal pain and nausea.  Will give additional dose of Dilaudid.  Patient additionally requesting additional antiemetic.  She states promethazine usually works.  We do not have this in  IV formulation.  Offered oral however she declines.  Will give additional dose of Zofran as patient has no QT prolongation. [MW]   1428 Reassessed patient again and she has ongoing discomfort in her abdomen.  Will admit for further symptomatic management. [MW]   1456 Discussed with Dr. Hinson who agrees to admit [MW]      ED Course User Index  [MW] Zachery Batres MD             AS OF 20:37 EST VITALS:    BP - 119/78  HR - 75  TEMP - 99.5 °F (37.5 °C) (Oral)  O2 SATS - 98%    COMPLEXITY OF CARE  The patient requires admission.      DIAGNOSIS  Final diagnoses:   Generalized abdominal pain         DISPOSITION  ED Disposition       ED Disposition   Decision to Admit    Condition   --    Comment   Level of Care: Med/Surg [1]   Diagnosis: Gastroparesis [536.3.ICD-9-CM]   Admitting Physician: JESSICA HINSON [[id number]]   Attending Physician: JESSICA HINSON [[id number]]   Is patient appropriate for Inpatient Observation Unit?: Yes [1]                    FOLLOW UP  No follow-up provider specified.      Prescribed Medications     Medication List      No changes were made to your prescriptions during this visit.                   Please note that portions of this document were completed with a voice recognition program.    Note Disclaimer: At [facility], we believe that sharing information builds trust and better relationships. You are receiving this note because you recently visited [facility]. It is possible you will see health information before a provider has talked with you about it. This kind of information can be easy to misunderstand. To help you fully understand what it means for your health, we urge you to discuss this note with your provider.         Ivonne Soto PA-C  12/16/24 2037

## 2024-12-16 NOTE — TELEPHONE ENCOUNTER
Caller: Henrietta Garrett    Relationship to patient: Self    Best call back number: [phone]    Type of visit: FOLLOW UP     Requested date: ASAP     If rescheduling, when is the original appointment: 12/16/24     Additional notes:PATIENT IS BEING SENT TO THE ER BY URGENT CARE. R/S FOR 1ST AVAILABLE, ASKING TO BE WORKED IN SOONER.

## 2024-12-17 PROBLEM — E87.6 HYPOKALEMIA: Status: ACTIVE | Noted: 2024-12-17

## 2024-12-17 PROBLEM — N39.0 UTI (URINARY TRACT INFECTION): Status: ACTIVE | Noted: 2024-12-17

## 2024-12-17 PROBLEM — R13.10 PAINFUL SWALLOWING: Status: ACTIVE | Noted: 2024-12-17

## 2024-12-17 PROBLEM — R10.84 GENERALIZED ABDOMINAL PAIN: Status: ACTIVE | Noted: 2024-12-17

## 2024-12-17 LAB
ALBUMIN SERPL-MCNC: 3.4 G/DL (ref 3.5–5.2)
ALBUMIN/GLOB SERPL: 0.9 G/DL
ALP SERPL-CCNC: 38 U/L (ref 39–117)
ALT SERPL W P-5'-P-CCNC: 6 U/L (ref 1–33)
ANION GAP SERPL CALCULATED.3IONS-SCNC: 9.6 MMOL/L (ref 5–15)
AST SERPL-CCNC: 13 U/L (ref 1–32)
BASOPHILS # BLD AUTO: 0.01 10*3/MM3 (ref 0–0.2)
BASOPHILS NFR BLD AUTO: 0.1 % (ref 0–1.5)
BILIRUB SERPL-MCNC: 0.4 MG/DL (ref 0–1.2)
BUN SERPL-MCNC: 5 MG/DL (ref 6–20)
BUN/CREAT SERPL: 7.7 (ref 7–25)
CALCIUM SPEC-SCNC: 8.2 MG/DL (ref 8.6–10.5)
CHLORIDE SERPL-SCNC: 104 MMOL/L (ref 98–107)
CO2 SERPL-SCNC: 19.4 MMOL/L (ref 22–29)
CREAT SERPL-MCNC: 0.65 MG/DL (ref 0.57–1)
DEPRECATED RDW RBC AUTO: 38.7 FL (ref 37–54)
DEPRECATED RDW RBC AUTO: 38.8 FL (ref 37–54)
EGFRCR SERPLBLD CKD-EPI 2021: 122.4 ML/MIN/1.73
EOSINOPHIL # BLD AUTO: 0 10*3/MM3 (ref 0–0.4)
EOSINOPHIL NFR BLD AUTO: 0 % (ref 0.3–6.2)
ERYTHROCYTE [DISTWIDTH] IN BLOOD BY AUTOMATED COUNT: 12.2 % (ref 12.3–15.4)
ERYTHROCYTE [DISTWIDTH] IN BLOOD BY AUTOMATED COUNT: 12.4 % (ref 12.3–15.4)
GLOBULIN UR ELPH-MCNC: 4 GM/DL
GLUCOSE SERPL-MCNC: 85 MG/DL (ref 65–99)
HCT VFR BLD AUTO: 30.2 % (ref 34–46.6)
HCT VFR BLD AUTO: 30.3 % (ref 34–46.6)
HGB BLD-MCNC: 10 G/DL (ref 12–15.9)
HGB BLD-MCNC: 10.3 G/DL (ref 12–15.9)
IMM GRANULOCYTES # BLD AUTO: 0.04 10*3/MM3 (ref 0–0.05)
IMM GRANULOCYTES NFR BLD AUTO: 0.5 % (ref 0–0.5)
LYMPHOCYTES # BLD AUTO: 1.63 10*3/MM3 (ref 0.7–3.1)
LYMPHOCYTES NFR BLD AUTO: 20.6 % (ref 19.6–45.3)
MAGNESIUM SERPL-MCNC: 1.8 MG/DL (ref 1.6–2.6)
MCH RBC QN AUTO: 29.2 PG (ref 26.6–33)
MCH RBC QN AUTO: 30 PG (ref 26.6–33)
MCHC RBC AUTO-ENTMCNC: 33 G/DL (ref 31.5–35.7)
MCHC RBC AUTO-ENTMCNC: 34.1 G/DL (ref 31.5–35.7)
MCV RBC AUTO: 88 FL (ref 79–97)
MCV RBC AUTO: 88.6 FL (ref 79–97)
MONOCYTES # BLD AUTO: 0.54 10*3/MM3 (ref 0.1–0.9)
MONOCYTES NFR BLD AUTO: 6.8 % (ref 5–12)
NEUTROPHILS NFR BLD AUTO: 5.71 10*3/MM3 (ref 1.7–7)
NEUTROPHILS NFR BLD AUTO: 72 % (ref 42.7–76)
NRBC BLD AUTO-RTO: 0 /100 WBC (ref 0–0.2)
PHOSPHATE SERPL-MCNC: 2.5 MG/DL (ref 2.5–4.5)
PLATELET # BLD AUTO: 171 10*3/MM3 (ref 140–450)
PLATELET # BLD AUTO: 176 10*3/MM3 (ref 140–450)
PMV BLD AUTO: 10.7 FL (ref 6–12)
PMV BLD AUTO: 11.4 FL (ref 6–12)
POTASSIUM SERPL-SCNC: 3.2 MMOL/L (ref 3.5–5.2)
PROT SERPL-MCNC: 7.4 G/DL (ref 6–8.5)
RBC # BLD AUTO: 3.42 10*6/MM3 (ref 3.77–5.28)
RBC # BLD AUTO: 3.43 10*6/MM3 (ref 3.77–5.28)
SODIUM SERPL-SCNC: 133 MMOL/L (ref 136–145)
TRIGL SERPL-MCNC: 97 MG/DL (ref 0–150)
WBC NRBC COR # BLD AUTO: 7.93 10*3/MM3 (ref 3.4–10.8)
WBC NRBC COR # BLD AUTO: 9.16 10*3/MM3 (ref 3.4–10.8)

## 2024-12-17 PROCEDURE — 25010000002 CALCIUM GLUCONATE PER 10 ML: Performed by: STUDENT IN AN ORGANIZED HEALTH CARE EDUCATION/TRAINING PROGRAM

## 2024-12-17 PROCEDURE — 83735 ASSAY OF MAGNESIUM: CPT | Performed by: INTERNAL MEDICINE

## 2024-12-17 PROCEDURE — 84478 ASSAY OF TRIGLYCERIDES: CPT | Performed by: STUDENT IN AN ORGANIZED HEALTH CARE EDUCATION/TRAINING PROGRAM

## 2024-12-17 PROCEDURE — G0378 HOSPITAL OBSERVATION PER HR: HCPCS

## 2024-12-17 PROCEDURE — 25010000002 DIPHENHYDRAMINE PER 50 MG: Performed by: INTERNAL MEDICINE

## 2024-12-17 PROCEDURE — 25010000002 POTASSIUM CHLORIDE 10 MEQ/100ML SOLUTION: Performed by: STUDENT IN AN ORGANIZED HEALTH CARE EDUCATION/TRAINING PROGRAM

## 2024-12-17 PROCEDURE — 80053 COMPREHEN METABOLIC PANEL: CPT | Performed by: STUDENT IN AN ORGANIZED HEALTH CARE EDUCATION/TRAINING PROGRAM

## 2024-12-17 PROCEDURE — 94664 DEMO&/EVAL PT USE INHALER: CPT

## 2024-12-17 PROCEDURE — 25010000002 ONDANSETRON PER 1 MG: Performed by: INTERNAL MEDICINE

## 2024-12-17 PROCEDURE — 94799 UNLISTED PULMONARY SVC/PX: CPT

## 2024-12-17 PROCEDURE — 25010000002 HYDROMORPHONE PER 4 MG: Performed by: INTERNAL MEDICINE

## 2024-12-17 PROCEDURE — 94640 AIRWAY INHALATION TREATMENT: CPT

## 2024-12-17 PROCEDURE — 25010000002 MAGNESIUM SULFATE PER 500 MG OF MAGNESIUM: Performed by: STUDENT IN AN ORGANIZED HEALTH CARE EDUCATION/TRAINING PROGRAM

## 2024-12-17 PROCEDURE — 85027 COMPLETE CBC AUTOMATED: CPT | Performed by: STUDENT IN AN ORGANIZED HEALTH CARE EDUCATION/TRAINING PROGRAM

## 2024-12-17 PROCEDURE — 25010000002 POTASSIUM CHLORIDE PER 2 MEQ OF POTASSIUM: Performed by: STUDENT IN AN ORGANIZED HEALTH CARE EDUCATION/TRAINING PROGRAM

## 2024-12-17 PROCEDURE — 94761 N-INVAS EAR/PLS OXIMETRY MLT: CPT

## 2024-12-17 PROCEDURE — 25010000002 CEFTRIAXONE PER 250 MG: Performed by: STUDENT IN AN ORGANIZED HEALTH CARE EDUCATION/TRAINING PROGRAM

## 2024-12-17 PROCEDURE — 80053 COMPREHEN METABOLIC PANEL: CPT | Performed by: INTERNAL MEDICINE

## 2024-12-17 PROCEDURE — 84100 ASSAY OF PHOSPHORUS: CPT | Performed by: INTERNAL MEDICINE

## 2024-12-17 PROCEDURE — 85025 COMPLETE CBC W/AUTO DIFF WBC: CPT | Performed by: INTERNAL MEDICINE

## 2024-12-17 PROCEDURE — 25810000003 SODIUM CHLORIDE 0.9 % SOLUTION: Performed by: INTERNAL MEDICINE

## 2024-12-17 RX ORDER — OXYCODONE AND ACETAMINOPHEN 10; 325 MG/1; MG/1
1 TABLET ORAL EVERY 6 HOURS PRN
Status: DISCONTINUED | OUTPATIENT
Start: 2024-12-17 | End: 2024-12-21 | Stop reason: HOSPADM

## 2024-12-17 RX ORDER — HYDROXYCHLOROQUINE SULFATE 200 MG/1
200 TABLET, FILM COATED ORAL 2 TIMES DAILY
Status: DISCONTINUED | OUTPATIENT
Start: 2024-12-17 | End: 2024-12-21 | Stop reason: HOSPADM

## 2024-12-17 RX ORDER — DEXTROSE MONOHYDRATE, SODIUM CHLORIDE, AND POTASSIUM CHLORIDE 50; 1.49; 4.5 G/1000ML; G/1000ML; G/1000ML
100 INJECTION, SOLUTION INTRAVENOUS CONTINUOUS
Status: DISPENSED | OUTPATIENT
Start: 2024-12-17 | End: 2024-12-17

## 2024-12-17 RX ORDER — POTASSIUM CHLORIDE 7.45 MG/ML
10 INJECTION INTRAVENOUS
Status: COMPLETED | OUTPATIENT
Start: 2024-12-17 | End: 2024-12-17

## 2024-12-17 RX ORDER — GABAPENTIN 100 MG/1
200 CAPSULE ORAL 3 TIMES DAILY
Status: DISCONTINUED | OUTPATIENT
Start: 2024-12-17 | End: 2024-12-20

## 2024-12-17 RX ORDER — ACETAMINOPHEN 650 MG/1
650 SUPPOSITORY RECTAL EVERY 4 HOURS PRN
Status: DISCONTINUED | OUTPATIENT
Start: 2024-12-17 | End: 2024-12-21 | Stop reason: HOSPADM

## 2024-12-17 RX ORDER — ACETAMINOPHEN 160 MG/5ML
650 SOLUTION ORAL EVERY 4 HOURS PRN
Status: DISCONTINUED | OUTPATIENT
Start: 2024-12-17 | End: 2024-12-21 | Stop reason: HOSPADM

## 2024-12-17 RX ORDER — ACETAMINOPHEN 325 MG/1
650 TABLET ORAL EVERY 4 HOURS PRN
Status: DISCONTINUED | OUTPATIENT
Start: 2024-12-17 | End: 2024-12-21 | Stop reason: HOSPADM

## 2024-12-17 RX ADMIN — HYDROMORPHONE HYDROCHLORIDE 0.5 MG: 1 INJECTION, SOLUTION INTRAMUSCULAR; INTRAVENOUS; SUBCUTANEOUS at 11:23

## 2024-12-17 RX ADMIN — POTASSIUM CHLORIDE 10 MEQ: 7.46 INJECTION, SOLUTION INTRAVENOUS at 08:50

## 2024-12-17 RX ADMIN — POTASSIUM CHLORIDE 10 MEQ: 7.46 INJECTION, SOLUTION INTRAVENOUS at 07:53

## 2024-12-17 RX ADMIN — DIPHENHYDRAMINE HYDROCHLORIDE 25 MG: 50 INJECTION, SOLUTION INTRAMUSCULAR; INTRAVENOUS at 18:47

## 2024-12-17 RX ADMIN — SODIUM CHLORIDE 100 ML/HR: 9 INJECTION, SOLUTION INTRAVENOUS at 01:58

## 2024-12-17 RX ADMIN — HYDROMORPHONE HYDROCHLORIDE 0.5 MG: 1 INJECTION, SOLUTION INTRAMUSCULAR; INTRAVENOUS; SUBCUTANEOUS at 23:15

## 2024-12-17 RX ADMIN — DEXTROSE MONOHYDRATE, SODIUM CHLORIDE, AND POTASSIUM CHLORIDE 100 ML/HR: 50; 1.49; 4.5 INJECTION, SOLUTION INTRAVENOUS at 08:51

## 2024-12-17 RX ADMIN — DIPHENHYDRAMINE HYDROCHLORIDE 25 MG: 50 INJECTION, SOLUTION INTRAMUSCULAR; INTRAVENOUS at 12:23

## 2024-12-17 RX ADMIN — CALCIUM GLUCONATE: 98 INJECTION, SOLUTION INTRAVENOUS at 18:43

## 2024-12-17 RX ADMIN — BUDESONIDE AND FORMOTEROL FUMARATE DIHYDRATE 2 PUFF: 160; 4.5 AEROSOL RESPIRATORY (INHALATION) at 20:29

## 2024-12-17 RX ADMIN — CEFTRIAXONE SODIUM 1000 MG: 1 INJECTION, POWDER, FOR SOLUTION INTRAMUSCULAR; INTRAVENOUS at 17:34

## 2024-12-17 RX ADMIN — HYDROMORPHONE HYDROCHLORIDE 0.5 MG: 1 INJECTION, SOLUTION INTRAMUSCULAR; INTRAVENOUS; SUBCUTANEOUS at 17:34

## 2024-12-17 RX ADMIN — HYDROMORPHONE HYDROCHLORIDE 0.5 MG: 1 INJECTION, SOLUTION INTRAMUSCULAR; INTRAVENOUS; SUBCUTANEOUS at 20:44

## 2024-12-17 RX ADMIN — HYDROMORPHONE HYDROCHLORIDE 0.5 MG: 1 INJECTION, SOLUTION INTRAMUSCULAR; INTRAVENOUS; SUBCUTANEOUS at 07:53

## 2024-12-17 RX ADMIN — POTASSIUM CHLORIDE 10 MEQ: 7.46 INJECTION, SOLUTION INTRAVENOUS at 10:53

## 2024-12-17 RX ADMIN — BUDESONIDE AND FORMOTEROL FUMARATE DIHYDRATE 2 PUFF: 160; 4.5 AEROSOL RESPIRATORY (INHALATION) at 08:13

## 2024-12-17 RX ADMIN — HYDROMORPHONE HYDROCHLORIDE 0.5 MG: 1 INJECTION, SOLUTION INTRAMUSCULAR; INTRAVENOUS; SUBCUTANEOUS at 01:56

## 2024-12-17 RX ADMIN — DIPHENHYDRAMINE HYDROCHLORIDE 25 MG: 50 INJECTION, SOLUTION INTRAMUSCULAR; INTRAVENOUS at 05:22

## 2024-12-17 RX ADMIN — ONDANSETRON 4 MG: 2 INJECTION INTRAMUSCULAR; INTRAVENOUS at 05:21

## 2024-12-17 RX ADMIN — ESCITALOPRAM OXALATE 5 MG: 5 SOLUTION ORAL at 22:13

## 2024-12-17 RX ADMIN — I.V. FAT EMULSION 50 G: 20 EMULSION INTRAVENOUS at 18:44

## 2024-12-17 RX ADMIN — HYDROXYZINE HYDROCHLORIDE 25 MG: 10 SOLUTION ORAL at 22:11

## 2024-12-17 RX ADMIN — HYDROMORPHONE HYDROCHLORIDE 0.5 MG: 1 INJECTION, SOLUTION INTRAMUSCULAR; INTRAVENOUS; SUBCUTANEOUS at 15:42

## 2024-12-17 RX ADMIN — POTASSIUM CHLORIDE 10 MEQ: 7.46 INJECTION, SOLUTION INTRAVENOUS at 13:26

## 2024-12-17 RX ADMIN — HYDROMORPHONE HYDROCHLORIDE 0.5 MG: 1 INJECTION, SOLUTION INTRAMUSCULAR; INTRAVENOUS; SUBCUTANEOUS at 05:01

## 2024-12-17 NOTE — CASE MANAGEMENT/SOCIAL WORK
[facility]charge Planning Assessment  [facility]     Patient Name: Henrietta Garrett  MRN: [id number]  Today's Date: 12/17/2024    Admit Date: 12/16/2024    Plan: Home   Discharge Needs Assessment       Row Name 12/17/24 1427       Living Environment    People in Home significant other    Current Living Arrangements apartment    Potentially Unsafe Housing Conditions none    Primary Care Provided by spouse/significant other;parent(s)    Provides Primary Care For no one, unable/limited ability to care for self;significant other    Family Caregiver if Needed significant other;parent(s)    Quality of Family Relationships supportive;helpful    Able to Return to Prior Arrangements yes       Resource/Environmental Concerns    Resource/Environmental Concerns none    Transportation Concerns none       Transportation Needs    In the past 12 months, has lack of transportation kept you from medical appointments or from getting medications? no    In the past 12 months, has lack of transportation kept you from meetings, work, or from getting things needed for daily living? No       Transition Planning    Patient/Family Anticipates Transition to home with help/services    Patient/Family Anticipated Services at Transition outpatient care    Transportation Anticipated family or friend will provide       Discharge Needs Assessment    Readmission Within the Last 30 Days no previous admission in last 30 days    Current Outpatient/Agency/Support Group infusion therapy, outpatient    Equipment Currently Used at Home cane, straight;walker, rolling;wheelchair;feeding device    Concerns to be Addressed denies needs/concerns at this time    Do you want help finding or keeping work or a job? I do not need or want help    Do you want help with school or training? For example, starting or completing job training or getting a high school diploma, GED or equivalent No    Anticipated Changes Related to Illness none    Equipment Needed After Discharge  none    Provided Post Acute Provider List? N/A    Provided Post Acute Provider Quality & Resource List? N/A    Current Discharge Risk chronically ill                   Discharge Plan       Row Name 12/17/24 3860       Plan    Plan Home    Plan Comments S/W pt at bedside.  Facesheet info confirmed.  Pt lives in a 4th floor apartment w/ elevator access w/ her anurag Sam who assists her at home as needed.  Home DME includes a cane, walker, w/c and TPN infusion supplies.  Pt is current w/ [facility] for TPN and [facility] for lab work, port care.  She does not drive - her anurag Sam provides transport.  Pt states her mother stays with her when her fiance is not there, so someone is with her at all times.  Pt plans to return home upon DC and denies any DC needs at this time.  CCP will continue to follow and assist if needed. ...........Melody DIALLO/ ROSY                  Continued Care and Services - Admitted Since 12/16/2024    No active coordination exists for this encounter.          Demographic Summary       Row Name 12/17/24 1426       General Information    Admission Type observation    Arrived From home    Referral Source admission list    Reason for Consult discharge planning    Preferred Language English                   Functional Status       Row Name 12/17/24 1426       Functional Status    Usual Activity Tolerance moderate       Functional Status, IADL    Medications assistive person    Meal Preparation assistive person    Housekeeping assistive person    Laundry assistive person    Shopping assistive person    If for any reason you need help with day-to-day activities such as bathing, preparing meals, shopping, managing finances, etc., do you get the help you need? I get all the help I need       Mental Status    General Appearance WDL WDL       Mental Status Summary    Recent Changes in Mental Status/Cognitive Functioning no changes                               Melody Bowman, ROSELINE

## 2024-12-17 NOTE — CONSULTS
visited pt this am secondary to a consult with this pt's RN.  In the room,  provided spiritual and emotional support via presence and dialogue.      Pastoral care remains available.

## 2024-12-17 NOTE — NURSING NOTE
Iv team called to assess Rt chest port for drsg and needle change. Drsg is C/D/I with biopatch noted at insertion site, white side up which may be upside down. Pt reported concerns of allergy to our drsg and requires a special ordered drsg made of silicone called iv clear. Pt reports fiance can bring some in in the am and that her skin is mildly reactive to this particular drsg as well. Discussed our options of sure site drsg which we normally utilize if pt allergic to border drsgs in port kits and pt is requesting only a silicone drsg to replace this said drsg. No silicone drsg is available to do a change tonight and needle and drsg 7 day change is due tomorrow. Fiance is to notify pt's primary RN once he arrives with the drsgs so iv team can evaluate and change the port needle and drsg. Reported off to Eliseo primary RN to report off to AM RN all the above info and to notify iv team once drsg is at bsd tomorrow.

## 2024-12-17 NOTE — PROGRESS NOTES
Name: Henrietta Garrett ADMIT: 2024   : 1995  PCP: Kenya David MD    MRN: [id number] LOS: 0 days   AGE/SEX: 29 y.o. female  ROOM: [facility]     Subjective   Subjective   Patient seen and examined this morning.  Hospital day 1.  She is awake and alert, resting in bed.  She continues to have ongoing generalized abdominal pain and nausea.  She is also endorsing pain in her throat when trying to swallow.       Objective   Objective   Vital Signs  Temp:  [98.8 °F (37.1 °C)-99.7 °F (37.6 °C)] 99.7 °F (37.6 °C)  Heart Rate:  [65-86] 86  Resp:  [16-18] 16  BP: (111-133)/(58-81) 133/78  SpO2:  [96 %-100 %] 97 %  on   ;   Device (Oxygen Therapy): room air  Body mass index is 24.54 kg/m².  Physical Exam  Vitals and nursing note reviewed.   Constitutional:       General: She is not in acute distress.     Appearance: She is ill-appearing.      Comments: Port in place   HENT:      Mouth/Throat:      Comments: No significant erythema appreciated  Eyes:      General: No scleral icterus.  Cardiovascular:      Rate and Rhythm: Normal rate.      Pulses: Normal pulses.      Heart sounds: Normal heart sounds.   Pulmonary:      Effort: Pulmonary effort is normal. No respiratory distress.   Abdominal:      General: Bowel sounds are normal. There is no distension.      Palpations: Abdomen is soft.      Tenderness: There is abdominal tenderness. There is no guarding.   Skin:     General: Skin is warm and dry.      Coloration: Skin is pale.   Neurological:      Mental Status: She is alert.       Results Review     I reviewed the patient's new clinical results.  Results from last 7 days   Lab Units 24  0514 24  0932   WBC 10*3/mm3 7.93 7.95   HEMOGLOBIN g/dL 10.0* 11.3*   PLATELETS 10*3/mm3 171 203     Results from last 7 days   Lab Units 24  0514 24  0932   SODIUM mmol/L 133* 134*   POTASSIUM mmol/L 3.2* 3.7   CHLORIDE mmol/L 104 100   CO2 mmol/L 19.4* 23.4   BUN mg/dL 5* 6   CREATININE mg/dL 0.65 0.70  "  GLUCOSE mg/dL 85 113*   EGFR mL/min/1.73 122.4 120.2     Results from last 7 days   Lab Units 12/17/24  0514 12/16/24  0932   ALBUMIN g/dL 3.4* 4.2   BILIRUBIN mg/dL 0.4 0.4   ALK PHOS U/L 38* 44   AST (SGOT) U/L 13 14   ALT (SGPT) U/L 6 9     Results from last 7 days   Lab Units 12/17/24  0514 12/16/24  0932 12/10/24  1435   CALCIUM mg/dL 8.2* 9.6  --    ALBUMIN g/dL 3.4* 4.2  --    MAGNESIUM mg/dL 1.8  --  1.8*   PHOSPHORUS mg/dL 2.5  --   --        No results found for: \"HGBA1C\", \"POCGLU\"    CT Abdomen Pelvis With Contrast    Result Date: 12/16/2024   1. No acute findings identified in the abdomen or pelvis. 2. Stable mildly prominent abdominal retroperitoneal lymph nodes. No new or enlarging lymph nodes identified.  This report was finalized on 12/16/2024 11:20 AM by Dr. Abel Sánchez M.D on Workstation: [id number]       I have personally reviewed all medications:  Scheduled Medications  budesonide-formoterol, 2 puff, Inhalation, BID - RT  dicyclomine, 10 mg, Per G Tube, 4x Daily AC & at Bedtime  escitalopram, 5 mL, Per G Tube, BID  Fat Emulsion Plant Based, 250 mL, Intravenous, Q24H (TPN)  gabapentin, 200 mg, Per G Tube, TID  hydroxychloroquine, 200 mg, Per G Tube, BID  hydrOXYzine, 25 mg, Per G Tube, Nightly  [START ON 12/18/2024] lansoprazole, 15 mg, Per G Tube, BID AC  nitroglycerin, 1 inch, Topical, Nightly  potassium chloride, 10 mEq, Intravenous, Q1H  promethazine, 25 mg, Oral, Once  silver sulfadiazine, , Topical, Daily  sodium chloride, 10 mL, Intravenous, Q12H  sodium chloride, 10 mL, Intravenous, Q12H    Infusions  Adult Cyclic 2-in-1 TPN,   dextrose 5 % and sodium chloride 0.45 % with KCl 20 mEq/L, 100 mL/hr, Last Rate: 100 mL/hr (12/17/24 0854)  Pharmacy to Dose TPN,     Diet  NPO Diet NPO Type: Strict NPO  Adult Cyclic 2-in-1 TPN    I have personally reviewed:  [x]  Laboratory   [x]  Microbiology   [x]  Radiology   [x]  EKG/Telemetry  []  Cardiology/Vascular   []  Pathology    []  " Records       Assessment/Plan     Active Hospital Problems    Diagnosis  POA    **Gastroparesis [K31.84]  Yes    UTI (urinary tract infection) [N39.0]  Yes    Painful swallowing [R13.10]  Yes    Generalized abdominal pain [R10.84]  Yes    Hypokalemia [E87.6]  Yes    Generalized anxiety disorder [F41.1]  Yes    Post traumatic stress disorder (PTSD) [F43.10]  Yes    Intractable migraine with aura without status migrainosus [G43.119]  Yes    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       29 y.o. female admitted with Gastroparesis.    Generalized abdominal pain with nausea  Gastroparesis  - Patient with abruptly worsening generalized abdominal pain and nausea.  CT abdomen and pelvis obtained following arrival negative for acute findings in the abdomen or pelvis, does show stable mildly prominent abdominal retroperitoneal lymph nodes.  She continues to have symptoms, despite current medication regimen, she is requiring frequent pain medication.  We discussed that frequent use of narcotics could further impair gastrointestinal motility, which could exacerbate symptoms.  She follows with gastroenterology at [facility].  Given ongoing symptoms, will ask our GI team to evaluate.    Pain/difficulty swallowing  - She is also endorsing pain in her throat when trying to swallow and states that when she has tried to swallow liquids, she has gotten choked. She is hesitant to try and take any PO medications because of this. She has history of Sjogren's syndrome per chart, which could be contributing to swallow difficulties. Will ask GI to see regarding this, and  in the setting of gastroparesis.    Urinary tract infection  - She has dysuria, UA showing + LE, 6-10 WBC. Given overall clinical picture, will start treatment with Ceftriaxone. Add on urine culture.    Other symptoms and signs concerning food and fluid intake  - She is on long term TPN, discussed with pharmacy and reordering this.    Hypokalemia  - K low on most recent labs; Will replete via electrolyte protocol. Follow up repeat labs to guide ongoing management decisions. Replete PRN per protocol. Continue to monitor    Dehydration  - Due to decreased intake, specific gravity on UA elevated, + ketones noted.  - Start maintenance fluids.    POTS  - Continue fluids, monitor vitals.    Complex regional pain syndrome/chronic pain  - Continue medications as ordered.    Myasthenia Gravis  - On IVIG once weekly.    Sjogren's syndrome  - Continue Plaquenil.      SCDs for DVT prophylaxis.  Full code.  Discussed with patient, nursing staff, CCP, and care team on multidisciplinary rounds.  Anticipate discharge home later this week.  Expected discharge date/ time has not been documented.      Rich Saucedo DO  [facility]  12/17/24

## 2024-12-17 NOTE — PROGRESS NOTES
"[facility] Clinical Pharmacy Services: Total Parental Nutrition Initial Consult    Indication:  gastroparesis - on TPN at home  Route: central  Type: cyclic    Relevant clinical data and objective history reviewed:  29 y.o. female 180.3 cm (71\") 79.8 kg (175 lb 14.8 oz)    Results from last 7 days   Lab Units 12/17/24  0514 12/16/24  0932 12/10/24  1435   SODIUM mmol/L 133*   < >  --    POTASSIUM mmol/L 3.2*   < >  --    CHLORIDE mmol/L 104   < >  --    CO2 mmol/L 19.4*   < >  --    BUN mg/dL 5*   < >  --    CREATININE mg/dL 0.65   < >  --    CALCIUM mg/dL 8.2*   < >  --    ALBUMIN g/dL 3.4*   < >  --    BILIRUBIN mg/dL 0.4   < >  --    ALK PHOS U/L 38*   < >  --    ALT (SGPT) U/L 6   < >  --    AST (SGOT) U/L 13   < >  --    GLUCOSE mg/dL 85   < >  --    MAGNESIUM mg/dL 1.8  --  1.8*   PHOSPHORUS mg/dL 2.5  --   --    TRIGLYCERIDES mg/dL  --   --  129    < > = values in this interval not displayed.        Estimated Creatinine Clearance: 160.9 mL/min (by C-G formula based on SCr of 0.65 mg/dL).    Active fluid orders: NS @ 100    Dietary Orders (From admission, onward)       Start     Ordered    12/16/24 0929  NPO Diet NPO Type: Strict NPO  (Abd / Flank Pain or N/V/D (Standing Order) - LEWIS/MANUEL)  Diet Effective Now        Question:  NPO Type  Answer:  Strict NPO    12/16/24 0928                  Assessment  Current TPN formulation from [facility]. Pt has not had TPN for 2 days.  Total volume 1350ml over 16 hrs  Dextrose 200 gm (680 kcal)  AA 85 gm  20% lipids 40 gm  Mag 25 mEq  Ca 10 mEq  NaPhos 15 mmol  NaAce 90 mEq  KCl 97 mEq  1ml trace elements  Zinc 1mg  MVI      Will change fluids to D5 1/2 NS + 20 KCl @ 100 until TPN starts at 18:00.  Will resume home TPN at 18:00 tonight.    Pharmacy will continue to follow.  Labs ordered for zurdo Lewis PharmD  Clinical Pharmacist    "

## 2024-12-17 NOTE — DISCHARGE PLACEMENT REQUEST
"Henrietta Garrett (29 y.o. Female)       Date of Birth   1995    Social Security Number       Address   [address] [place] [address]    Home Phone   [phone]    MRN   [id number]       Muslim   None    Marital Status   Single                            Admission Date   12/16/24    Admission Type   Emergency    Admitting Provider   Obi Hinson MD    Attending Provider   Rich Saucedo DO    Department, Room/Bed   [address], [address]/1       Discharge Date       Discharge Disposition       Discharge Destination                                 Attending Provider: Rich Saucedo DO    Allergies: Anesthetics, Amide, Ciprofloxacin, Compazine [Prochlorperazine], Domperidone, Droperidol, Haldol [Haloperidol], Metoclopramide, Sulfa Antibiotics    Isolation: None   Infection: None   Code Status: CPR    Ht: 180.3 cm (71\")   Wt: 79.8 kg (175 lb 14.8 oz)    Admission Cmt: None   Principal Problem: Gastroparesis [K31.84]                   Active Insurance as of 12/16/2024       Primary Coverage       Payor Plan Insurance Group Employer/Plan Group    [employer]HEM BLUE CROSS [facility] [employer] [id number]       Payor Plan Address Payor Plan Phone Number Payor Plan Fax Number Effective Dates    [phone]  1/1/2023 - None Entered    [facility] [id number]         Subscriber Name Subscriber Birth Date Member ID       NETTA SINGH [birth year]1963 [id number]                     Emergency Contacts        (Rel.) Home Phone Work Phone Mobile Phone    Miley Singh (Mother) [phone] -- [phone]    Flaco Redman (Significant Other) -- -- [phone]                "

## 2024-12-18 LAB
ALBUMIN SERPL-MCNC: 3.4 G/DL (ref 3.5–5.2)
ALBUMIN SERPL-MCNC: 3.7 G/DL (ref 3.5–5.2)
ALBUMIN/GLOB SERPL: 0.7 G/DL
ALBUMIN/GLOB SERPL: 1 G/DL
ALP SERPL-CCNC: 38 U/L (ref 39–117)
ALP SERPL-CCNC: 39 U/L (ref 39–117)
ALT SERPL W P-5'-P-CCNC: 8 U/L (ref 1–33)
ALT SERPL W P-5'-P-CCNC: 9 U/L (ref 1–33)
ANION GAP SERPL CALCULATED.3IONS-SCNC: 8.1 MMOL/L (ref 5–15)
ANION GAP SERPL CALCULATED.3IONS-SCNC: 9 MMOL/L (ref 5–15)
AST SERPL-CCNC: 12 U/L (ref 1–32)
AST SERPL-CCNC: 9 U/L (ref 1–32)
BACTERIA SPEC AEROBE CULT: NORMAL
BILIRUB SERPL-MCNC: 0.3 MG/DL (ref 0–1.2)
BILIRUB SERPL-MCNC: 0.3 MG/DL (ref 0–1.2)
BUN SERPL-MCNC: 4 MG/DL (ref 6–20)
BUN SERPL-MCNC: 7 MG/DL (ref 6–20)
BUN/CREAT SERPL: 13.5 (ref 7–25)
BUN/CREAT SERPL: 7.4 (ref 7–25)
CALCIUM SPEC-SCNC: 8.7 MG/DL (ref 8.6–10.5)
CALCIUM SPEC-SCNC: 8.9 MG/DL (ref 8.6–10.5)
CHLORIDE SERPL-SCNC: 103 MMOL/L (ref 98–107)
CHLORIDE SERPL-SCNC: 105 MMOL/L (ref 98–107)
CO2 SERPL-SCNC: 23 MMOL/L (ref 22–29)
CO2 SERPL-SCNC: 23.9 MMOL/L (ref 22–29)
CREAT SERPL-MCNC: 0.52 MG/DL (ref 0.57–1)
CREAT SERPL-MCNC: 0.54 MG/DL (ref 0.57–1)
EGFRCR SERPLBLD CKD-EPI 2021: 128 ML/MIN/1.73
EGFRCR SERPLBLD CKD-EPI 2021: 129.2 ML/MIN/1.73
GLOBULIN UR ELPH-MCNC: 3.8 GM/DL
GLOBULIN UR ELPH-MCNC: 4.6 GM/DL
GLUCOSE SERPL-MCNC: 117 MG/DL (ref 65–99)
GLUCOSE SERPL-MCNC: 139 MG/DL (ref 65–99)
POTASSIUM SERPL-SCNC: 3.9 MMOL/L (ref 3.5–5.2)
POTASSIUM SERPL-SCNC: 3.9 MMOL/L (ref 3.5–5.2)
PROT SERPL-MCNC: 7.5 G/DL (ref 6–8.5)
PROT SERPL-MCNC: 8 G/DL (ref 6–8.5)
S PYO AG THROAT QL: NEGATIVE
SODIUM SERPL-SCNC: 135 MMOL/L (ref 136–145)
SODIUM SERPL-SCNC: 137 MMOL/L (ref 136–145)

## 2024-12-18 PROCEDURE — 87880 STREP A ASSAY W/OPTIC: CPT | Performed by: STUDENT IN AN ORGANIZED HEALTH CARE EDUCATION/TRAINING PROGRAM

## 2024-12-18 PROCEDURE — 94799 UNLISTED PULMONARY SVC/PX: CPT

## 2024-12-18 PROCEDURE — 25010000002 DIPHENHYDRAMINE PER 50 MG: Performed by: INTERNAL MEDICINE

## 2024-12-18 PROCEDURE — 25010000002 CALCIUM GLUCONATE PER 10 ML: Performed by: STUDENT IN AN ORGANIZED HEALTH CARE EDUCATION/TRAINING PROGRAM

## 2024-12-18 PROCEDURE — 94761 N-INVAS EAR/PLS OXIMETRY MLT: CPT

## 2024-12-18 PROCEDURE — 25010000002 HYDROMORPHONE PER 4 MG: Performed by: INTERNAL MEDICINE

## 2024-12-18 PROCEDURE — 94664 DEMO&/EVAL PT USE INHALER: CPT

## 2024-12-18 PROCEDURE — 87081 CULTURE SCREEN ONLY: CPT | Performed by: STUDENT IN AN ORGANIZED HEALTH CARE EDUCATION/TRAINING PROGRAM

## 2024-12-18 PROCEDURE — 25810000003 LACTATED RINGERS PER 1000 ML: Performed by: STUDENT IN AN ORGANIZED HEALTH CARE EDUCATION/TRAINING PROGRAM

## 2024-12-18 PROCEDURE — 99214 OFFICE O/P EST MOD 30 MIN: CPT

## 2024-12-18 PROCEDURE — 25010000002 ONDANSETRON PER 1 MG: Performed by: INTERNAL MEDICINE

## 2024-12-18 PROCEDURE — 80053 COMPREHEN METABOLIC PANEL: CPT | Performed by: STUDENT IN AN ORGANIZED HEALTH CARE EDUCATION/TRAINING PROGRAM

## 2024-12-18 PROCEDURE — 25010000002 MAGNESIUM SULFATE PER 500 MG OF MAGNESIUM: Performed by: STUDENT IN AN ORGANIZED HEALTH CARE EDUCATION/TRAINING PROGRAM

## 2024-12-18 PROCEDURE — 25010000002 CEFTRIAXONE PER 250 MG: Performed by: STUDENT IN AN ORGANIZED HEALTH CARE EDUCATION/TRAINING PROGRAM

## 2024-12-18 PROCEDURE — G0378 HOSPITAL OBSERVATION PER HR: HCPCS

## 2024-12-18 PROCEDURE — 25010000002 POTASSIUM CHLORIDE PER 2 MEQ OF POTASSIUM: Performed by: STUDENT IN AN ORGANIZED HEALTH CARE EDUCATION/TRAINING PROGRAM

## 2024-12-18 RX ORDER — PANTOPRAZOLE SODIUM 40 MG/10ML
40 INJECTION, POWDER, LYOPHILIZED, FOR SOLUTION INTRAVENOUS
Status: DISCONTINUED | OUTPATIENT
Start: 2024-12-18 | End: 2024-12-21 | Stop reason: HOSPADM

## 2024-12-18 RX ORDER — SUCRALFATE 1 G/1
1 TABLET ORAL
Status: DISCONTINUED | OUTPATIENT
Start: 2024-12-18 | End: 2024-12-21 | Stop reason: HOSPADM

## 2024-12-18 RX ORDER — SODIUM CHLORIDE, SODIUM LACTATE, POTASSIUM CHLORIDE, CALCIUM CHLORIDE 600; 310; 30; 20 MG/100ML; MG/100ML; MG/100ML; MG/100ML
75 INJECTION, SOLUTION INTRAVENOUS CONTINUOUS
Status: ACTIVE | OUTPATIENT
Start: 2024-12-18 | End: 2024-12-19

## 2024-12-18 RX ADMIN — HYDROMORPHONE HYDROCHLORIDE 0.5 MG: 1 INJECTION, SOLUTION INTRAMUSCULAR; INTRAVENOUS; SUBCUTANEOUS at 21:32

## 2024-12-18 RX ADMIN — BUDESONIDE AND FORMOTEROL FUMARATE DIHYDRATE 2 PUFF: 160; 4.5 AEROSOL RESPIRATORY (INHALATION) at 07:06

## 2024-12-18 RX ADMIN — ONDANSETRON 4 MG: 2 INJECTION INTRAMUSCULAR; INTRAVENOUS at 05:55

## 2024-12-18 RX ADMIN — HYDROMORPHONE HYDROCHLORIDE 0.5 MG: 1 INJECTION, SOLUTION INTRAMUSCULAR; INTRAVENOUS; SUBCUTANEOUS at 11:43

## 2024-12-18 RX ADMIN — HYDROMORPHONE HYDROCHLORIDE 0.5 MG: 1 INJECTION, SOLUTION INTRAMUSCULAR; INTRAVENOUS; SUBCUTANEOUS at 16:02

## 2024-12-18 RX ADMIN — PANTOPRAZOLE SODIUM 40 MG: 40 INJECTION, POWDER, FOR SOLUTION INTRAVENOUS at 18:06

## 2024-12-18 RX ADMIN — CALCIUM GLUCONATE: 98 INJECTION, SOLUTION INTRAVENOUS at 18:22

## 2024-12-18 RX ADMIN — HYDROMORPHONE HYDROCHLORIDE 0.5 MG: 1 INJECTION, SOLUTION INTRAMUSCULAR; INTRAVENOUS; SUBCUTANEOUS at 18:26

## 2024-12-18 RX ADMIN — HYDROMORPHONE HYDROCHLORIDE 0.5 MG: 1 INJECTION, SOLUTION INTRAMUSCULAR; INTRAVENOUS; SUBCUTANEOUS at 09:08

## 2024-12-18 RX ADMIN — Medication 10 ML: at 09:07

## 2024-12-18 RX ADMIN — OXYCODONE AND ACETAMINOPHEN 1 TABLET: 325; 10 TABLET ORAL at 20:05

## 2024-12-18 RX ADMIN — I.V. FAT EMULSION 50 G: 20 EMULSION INTRAVENOUS at 18:22

## 2024-12-18 RX ADMIN — Medication 10 ML: at 21:34

## 2024-12-18 RX ADMIN — BUDESONIDE AND FORMOTEROL FUMARATE DIHYDRATE 2 PUFF: 160; 4.5 AEROSOL RESPIRATORY (INHALATION) at 22:11

## 2024-12-18 RX ADMIN — DIPHENHYDRAMINE HYDROCHLORIDE 25 MG: 50 INJECTION, SOLUTION INTRAMUSCULAR; INTRAVENOUS at 18:06

## 2024-12-18 RX ADMIN — HYDROMORPHONE HYDROCHLORIDE 0.5 MG: 1 INJECTION, SOLUTION INTRAMUSCULAR; INTRAVENOUS; SUBCUTANEOUS at 05:50

## 2024-12-18 RX ADMIN — SODIUM CHLORIDE, SODIUM LACTATE, POTASSIUM CHLORIDE, CALCIUM CHLORIDE 75 ML/HR: 20; 30; 600; 310 INJECTION, SOLUTION INTRAVENOUS at 16:02

## 2024-12-18 RX ADMIN — Medication 10 ML: at 21:33

## 2024-12-18 RX ADMIN — SUCRALFATE 1 G: 1 TABLET ORAL at 21:32

## 2024-12-18 RX ADMIN — HYDROMORPHONE HYDROCHLORIDE 0.5 MG: 1 INJECTION, SOLUTION INTRAMUSCULAR; INTRAVENOUS; SUBCUTANEOUS at 01:44

## 2024-12-18 RX ADMIN — HYDROMORPHONE HYDROCHLORIDE 0.5 MG: 1 INJECTION, SOLUTION INTRAMUSCULAR; INTRAVENOUS; SUBCUTANEOUS at 23:41

## 2024-12-18 RX ADMIN — SUCRALFATE 1 G: 1 TABLET ORAL at 18:22

## 2024-12-18 RX ADMIN — CEFTRIAXONE SODIUM 1000 MG: 1 INJECTION, POWDER, FOR SOLUTION INTRAMUSCULAR; INTRAVENOUS at 12:54

## 2024-12-18 NOTE — PROGRESS NOTES
Name: Henrietta Garrett ADMIT: 2024   : 1995  PCP: Kenya David MD    MRN: [id number] LOS: 0 days   AGE/SEX: 29 y.o. female  ROOM: [facility]     Subjective   Subjective   Patient seen and examined this morning.  Hospital day 2, still with abdominal pain, along with difficulty/painful swallowing.       Objective   Objective   Vital Signs  Temp:  [98.2 °F (36.8 °C)-99.1 °F (37.3 °C)] 98.2 °F (36.8 °C)  Heart Rate:  [68-90] 73  Resp:  [16] 16  BP: (122-134)/(72-79) 122/72  SpO2:  [98 %-100 %] 98 %  on   ;   Device (Oxygen Therapy): room air  Body mass index is 24.54 kg/m².  Physical Exam  Vitals and nursing note reviewed.   Constitutional:       General: She is not in acute distress.     Appearance: She is ill-appearing.      Comments: Port in place   HENT:      Mouth/Throat:      Comments: No significant erythema appreciated  Eyes:      General: No scleral icterus.  Cardiovascular:      Rate and Rhythm: Normal rate.      Pulses: Normal pulses.      Heart sounds: Normal heart sounds.   Pulmonary:      Effort: Pulmonary effort is normal. No respiratory distress.      Breath sounds: No wheezing.   Abdominal:      General: There is no distension.      Palpations: Abdomen is soft.      Tenderness: There is abdominal tenderness. There is no guarding.   Skin:     General: Skin is warm and dry.      Coloration: Skin is pale.   Neurological:      Mental Status: She is alert.       Results Review     I reviewed the patient's new clinical results.  Results from last 7 days   Lab Units 24  23024  0514 24  0932   WBC 10*3/mm3 9.16 7.93 7.95   HEMOGLOBIN g/dL 10.3* 10.0* 11.3*   PLATELETS 10*3/mm3 176 171 203     Results from last 7 days   Lab Units 24  0928 24  2301 24  0514 24  0932   SODIUM mmol/L 137 135* 133* 134*   POTASSIUM mmol/L 3.9 3.9 3.2* 3.7   CHLORIDE mmol/L 105 103 104 100   CO2 mmol/L 23.9 23.0 19.4* 23.4   BUN mg/dL 7 4* 5* 6   CREATININE mg/dL 0.52* 0.54*  "0.65 0.70   GLUCOSE mg/dL 117* 139* 85 113*   EGFR mL/min/1.73 129.2 128.0 122.4 120.2     Results from last 7 days   Lab Units 12/18/24 0928 12/17/24  2301 12/17/24  0514 12/16/24  0932   ALBUMIN g/dL 3.4* 3.7 3.4* 4.2   BILIRUBIN mg/dL 0.3 0.3 0.4 0.4   ALK PHOS U/L 38* 39 38* 44   AST (SGOT) U/L 12 9 13 14   ALT (SGPT) U/L 8 9 6 9     Results from last 7 days   Lab Units 12/18/24  0928 12/17/24 2301 12/17/24  0514 12/16/24  0932   CALCIUM mg/dL 8.9 8.7 8.2* 9.6   ALBUMIN g/dL 3.4* 3.7 3.4* 4.2   MAGNESIUM mg/dL  --   --  1.8  --    PHOSPHORUS mg/dL  --   --  2.5  --        No results found for: \"HGBA1C\", \"POCGLU\"    No radiology results for the last day    I have personally reviewed all medications:  Scheduled Medications  budesonide-formoterol, 2 puff, Inhalation, BID - RT  cefTRIAXone, 1,000 mg, Intravenous, Q24H  dicyclomine, 10 mg, Per G Tube, 4x Daily AC & at Bedtime  escitalopram, 5 mL, Per G Tube, BID  Fat Emulsion Plant Based, 250 mL, Intravenous, Q24H (TPN)  gabapentin, 200 mg, Per G Tube, TID  hydroxychloroquine, 200 mg, Per G Tube, BID  hydrOXYzine, 25 mg, Per G Tube, Nightly  nitroglycerin, 1 inch, Topical, Nightly  pantoprazole, 40 mg, Intravenous, BID AC  promethazine, 25 mg, Oral, Once  silver sulfadiazine, , Topical, Daily  sodium chloride, 10 mL, Intravenous, Q12H  sodium chloride, 10 mL, Intravenous, Q12H  sucralfate, 1 g, Oral, 4x Daily AC & at Bedtime    Infusions  Adult Cyclic 2-in-1 TPN, , Last Rate: Stopped (12/18/24 1254)  Adult Cyclic 2-in-1 TPN,   Pharmacy to Dose TPN,     Diet  NPO Diet NPO Type: Strict NPO  Adult Cyclic 2-in-1 TPN  Adult Cyclic 2-in-1 TPN    I have personally reviewed:  [x]  Laboratory   [x]  Microbiology   []  Radiology   [x]  EKG/Telemetry  []  Cardiology/Vascular   []  Pathology    []  Records       Assessment/Plan     Active Hospital Problems    Diagnosis  POA    **Gastroparesis [K31.84]  Yes    UTI (urinary tract infection) [N39.0]  Yes    Painful swallowing " [R13.10]  Yes    Generalized abdominal pain [R10.84]  Yes    Hypokalemia [E87.6]  Yes    Generalized anxiety disorder [F41.1]  Yes    Post traumatic stress disorder (PTSD) [F43.10]  Yes    Intractable migraine with aura without status migrainosus [G43.119]  Yes    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       29 y.o. female admitted with Gastroparesis.    Generalized abdominal pain with nausea  Gastroparesis  - Patient with abruptly worsening generalized abdominal pain and nausea.  CT abdomen and pelvis obtained following arrival negative for acute findings in the abdomen or pelvis, does show stable mildly prominent abdominal retroperitoneal lymph nodes.  Symptoms still present, despite current therapies, without noticeable improvement thus far.  - GI consulted to evaluate. Will follow up with them regarding further management. Continue medications as prescribed for now. Patient currently receiving IV pain medication PRN, high risk medication. Need to closely monitor for over-sedation, respiratory depression and constipation.    Pain/difficulty swallowing  - She is also endorsing pain in her throat when trying to swallow and states that when she has tried to swallow liquids, she has gotten choked. She is hesitant to try and take any PO medications because of this.   - GI consulted, may warrant EGD to evaluate further. Follow up their recommendations. Elevate HOB, aspiration precautions.     Urinary tract infection/Acute cystitis without hematuria  - She has dysuria, UA showing + LE, 6-10 WBC. She is on Ceftriaxone and tolerating well at present. Urine culture  showing mixed ritchie. Continue Ceftriaxone as prescribed.    Other symptoms and signs concerning food and fluid intake  - She is on long term TPN, continue as ordered.    Hypokalemia  - K low on prior labs, repleted and improved, continue to monitor.    Dehydration  - Due to decreased intake, specific gravity on UA elevated, + ketones noted. She was given fluids. She still has decreased intake, will give more fluid today and monitor this.    Anemia  - Hemoglobin low on most recent labs, however, stable from prior. No evidence of overt blood loss. No indications for acute intervention at this time.    - Order repeat CBC in AM for reassessment. Continue to monitor, transfuse for hemoglobin <7.    POTS  - Continue fluids, monitor vitals.    Complex regional pain syndrome/chronic pain  - Continue medications as ordered.    Myasthenia Gravis  - On IVIG once weekly.    Sjogren's syndrome  - Continue Plaquenil.      SCDs for DVT prophylaxis.  Full code.  Discussed with patient, nursing staff, CCP, and care team on multidisciplinary rounds.  Anticipate discharge home later this week.  Expected Discharge Date: 12/19/2024; Expected Discharge Time:       Rich Saucedo DO  [facility]  12/18/24

## 2024-12-18 NOTE — PROGRESS NOTES
[facility] Clinical Pharmacy Services: TPN Daily Progress Note    TPN Day # 2   Indication:  gastroparesis  Route: central  Type: cyclic    Subjective/Objective  Results from last 7 days   Lab Units 12/18/24  0928 12/17/24  2301 12/17/24  0514   SODIUM mmol/L 137 135* 133*   POTASSIUM mmol/L 3.9 3.9 3.2*   CHLORIDE mmol/L 105 103 104   CO2 mmol/L 23.9 23.0 19.4*   BUN mg/dL 7 4* 5*   CREATININE mg/dL 0.52* 0.54* 0.65   CALCIUM mg/dL 8.9 8.7 8.2*   ALBUMIN g/dL 3.4* 3.7 3.4*   BILIRUBIN mg/dL 0.3 0.3 0.4   ALK PHOS U/L 38* 39 38*   ALT (SGPT) U/L 8 9 6   AST (SGOT) U/L 12 9 13   GLUCOSE mg/dL 117* 139* 85   MAGNESIUM mg/dL  --   --  1.8   PHOSPHORUS mg/dL  --   --  2.5   TRIGLYCERIDES mg/dL  --  97  --         Diet Orders (active) (From admission, onward)       Start     Ordered    12/16/24 0929  NPO Diet NPO Type: Strict NPO  Diet Effective Now         12/16/24 0928                  Additional insulin administration while previous TPN infusing: none  Additional electrolyte administration while previous TPN infusing: none  Acid suppression: prevacid ordered but pt refusing    Assessment/Plan    Will repeat yesterday's TPN which is based off home TPN formulation.    John Lewis PharmD  Clinical Pharmacist

## 2024-12-18 NOTE — PLAN OF CARE
Goal Outcome Evaluation:    Patient alert and oriented, VSS, on room air. Paged GI for consult. Patient started on PPI 2 x daily. And Carafate 4 x daily. Received rocephin for UTI. TPN and Lipids to start. Pain managed with PRN medications. Family at bedside.
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
## 2024-12-18 NOTE — SIGNIFICANT NOTE
12/18/24 1539   OTHER   Discipline physical therapist   Therapy Assessment/Plan (PT)   Criteria for Skilled Interventions Met (PT) no;no problems identified which require skilled intervention  (Pt reports she is getting up SBA, denies acute PT needs, denies DME needs.Report she has assist at home and [facility] health. please re-order if needs change. will defer formal eval at this time.)
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
## 2024-12-19 ENCOUNTER — ANESTHESIA (OUTPATIENT)
Dept: GASTROENTEROLOGY | Facility: HOSPITAL | Age: 29
End: 2024-12-19
Payer: COMMERCIAL

## 2024-12-19 ENCOUNTER — ANESTHESIA EVENT (OUTPATIENT)
Dept: GASTROENTEROLOGY | Facility: HOSPITAL | Age: 29
End: 2024-12-19
Payer: COMMERCIAL

## 2024-12-19 PROBLEM — K62.5 RECTAL BLEEDING: Status: ACTIVE | Noted: 2024-12-16

## 2024-12-19 LAB
ANION GAP SERPL CALCULATED.3IONS-SCNC: 6.8 MMOL/L (ref 5–15)
BUN SERPL-MCNC: 7 MG/DL (ref 6–20)
BUN/CREAT SERPL: 14.3 (ref 7–25)
CALCIUM SPEC-SCNC: 9 MG/DL (ref 8.6–10.5)
CHLORIDE SERPL-SCNC: 103 MMOL/L (ref 98–107)
CO2 SERPL-SCNC: 26.2 MMOL/L (ref 22–29)
CREAT SERPL-MCNC: 0.49 MG/DL (ref 0.57–1)
DEPRECATED RDW RBC AUTO: 40.6 FL (ref 37–54)
EGFRCR SERPLBLD CKD-EPI 2021: 131 ML/MIN/1.73
ERYTHROCYTE [DISTWIDTH] IN BLOOD BY AUTOMATED COUNT: 12.3 % (ref 12.3–15.4)
GLUCOSE SERPL-MCNC: 104 MG/DL (ref 65–99)
HCT VFR BLD AUTO: 32.6 % (ref 34–46.6)
HGB BLD-MCNC: 10.7 G/DL (ref 12–15.9)
MCH RBC QN AUTO: 29.7 PG (ref 26.6–33)
MCHC RBC AUTO-ENTMCNC: 32.8 G/DL (ref 31.5–35.7)
MCV RBC AUTO: 90.6 FL (ref 79–97)
PLATELET # BLD AUTO: 192 10*3/MM3 (ref 140–450)
PMV BLD AUTO: 10.6 FL (ref 6–12)
POTASSIUM SERPL-SCNC: 4.6 MMOL/L (ref 3.5–5.2)
RBC # BLD AUTO: 3.6 10*6/MM3 (ref 3.77–5.28)
SODIUM SERPL-SCNC: 136 MMOL/L (ref 136–145)
WBC NRBC COR # BLD AUTO: 4.62 10*3/MM3 (ref 3.4–10.8)

## 2024-12-19 PROCEDURE — 25810000003 SODIUM CHLORIDE 0.9 % SOLUTION: Performed by: INTERNAL MEDICINE

## 2024-12-19 PROCEDURE — 94799 UNLISTED PULMONARY SVC/PX: CPT

## 2024-12-19 PROCEDURE — 25010000002 DIPHENHYDRAMINE PER 50 MG: Performed by: INTERNAL MEDICINE

## 2024-12-19 PROCEDURE — 85027 COMPLETE CBC AUTOMATED: CPT | Performed by: STUDENT IN AN ORGANIZED HEALTH CARE EDUCATION/TRAINING PROGRAM

## 2024-12-19 PROCEDURE — 80048 BASIC METABOLIC PNL TOTAL CA: CPT | Performed by: STUDENT IN AN ORGANIZED HEALTH CARE EDUCATION/TRAINING PROGRAM

## 2024-12-19 PROCEDURE — 43235 EGD DIAGNOSTIC BRUSH WASH: CPT | Performed by: INTERNAL MEDICINE

## 2024-12-19 PROCEDURE — 25010000002 MAGNESIUM SULFATE PER 500 MG OF MAGNESIUM: Performed by: STUDENT IN AN ORGANIZED HEALTH CARE EDUCATION/TRAINING PROGRAM

## 2024-12-19 PROCEDURE — 0DJD8ZZ INSPECTION OF LOWER INTESTINAL TRACT, VIA NATURAL OR ARTIFICIAL OPENING ENDOSCOPIC: ICD-10-PCS | Performed by: INTERNAL MEDICINE

## 2024-12-19 PROCEDURE — 99214 OFFICE O/P EST MOD 30 MIN: CPT | Performed by: NURSE PRACTITIONER

## 2024-12-19 PROCEDURE — 25010000002 CALCIUM GLUCONATE PER 10 ML: Performed by: STUDENT IN AN ORGANIZED HEALTH CARE EDUCATION/TRAINING PROGRAM

## 2024-12-19 PROCEDURE — G0378 HOSPITAL OBSERVATION PER HR: HCPCS

## 2024-12-19 PROCEDURE — 25010000002 PROPOFOL 1000 MG/100ML EMULSION: Performed by: NURSE ANESTHETIST, CERTIFIED REGISTERED

## 2024-12-19 PROCEDURE — 25010000002 HYDROMORPHONE PER 4 MG: Performed by: INTERNAL MEDICINE

## 2024-12-19 PROCEDURE — 25010000002 ONDANSETRON PER 1 MG: Performed by: INTERNAL MEDICINE

## 2024-12-19 PROCEDURE — 25010000002 POTASSIUM CHLORIDE PER 2 MEQ OF POTASSIUM: Performed by: STUDENT IN AN ORGANIZED HEALTH CARE EDUCATION/TRAINING PROGRAM

## 2024-12-19 PROCEDURE — 25010000002 LIDOCAINE 2% SOLUTION: Performed by: NURSE ANESTHETIST, CERTIFIED REGISTERED

## 2024-12-19 PROCEDURE — 25010000002 CEFTRIAXONE PER 250 MG: Performed by: STUDENT IN AN ORGANIZED HEALTH CARE EDUCATION/TRAINING PROGRAM

## 2024-12-19 PROCEDURE — 25010000002 GLYCOPYRROLATE 0.2 MG/ML SOLUTION: Performed by: NURSE ANESTHETIST, CERTIFIED REGISTERED

## 2024-12-19 PROCEDURE — 0DJ08ZZ INSPECTION OF UPPER INTESTINAL TRACT, VIA NATURAL OR ARTIFICIAL OPENING ENDOSCOPIC: ICD-10-PCS | Performed by: INTERNAL MEDICINE

## 2024-12-19 PROCEDURE — 45330 DIAGNOSTIC SIGMOIDOSCOPY: CPT | Performed by: INTERNAL MEDICINE

## 2024-12-19 RX ORDER — SODIUM CHLORIDE 9 MG/ML
30 INJECTION, SOLUTION INTRAVENOUS CONTINUOUS
Status: DISCONTINUED | OUTPATIENT
Start: 2024-12-19 | End: 2024-12-20

## 2024-12-19 RX ORDER — PROPOFOL 10 MG/ML
INJECTION, EMULSION INTRAVENOUS CONTINUOUS PRN
Status: DISCONTINUED | OUTPATIENT
Start: 2024-12-19 | End: 2024-12-19 | Stop reason: SURG

## 2024-12-19 RX ORDER — LIDOCAINE HYDROCHLORIDE 20 MG/ML
INJECTION, SOLUTION INFILTRATION; PERINEURAL AS NEEDED
Status: DISCONTINUED | OUTPATIENT
Start: 2024-12-19 | End: 2024-12-19 | Stop reason: SURG

## 2024-12-19 RX ORDER — GLYCOPYRROLATE 0.2 MG/ML
INJECTION INTRAMUSCULAR; INTRAVENOUS AS NEEDED
Status: DISCONTINUED | OUTPATIENT
Start: 2024-12-19 | End: 2024-12-19 | Stop reason: SURG

## 2024-12-19 RX ADMIN — PROPOFOL INJECTABLE EMULSION 100 MG: 10 INJECTION, EMULSION INTRAVENOUS at 14:46

## 2024-12-19 RX ADMIN — HYDROXYCHLOROQUINE SULFATE 200 MG: 200 TABLET, FILM COATED ORAL at 20:50

## 2024-12-19 RX ADMIN — BUDESONIDE AND FORMOTEROL FUMARATE DIHYDRATE 2 PUFF: 160; 4.5 AEROSOL RESPIRATORY (INHALATION) at 19:31

## 2024-12-19 RX ADMIN — HYDROMORPHONE HYDROCHLORIDE 0.5 MG: 1 INJECTION, SOLUTION INTRAMUSCULAR; INTRAVENOUS; SUBCUTANEOUS at 16:01

## 2024-12-19 RX ADMIN — GLYCOPYRROLATE 0.2 MG: 0.2 INJECTION INTRAMUSCULAR; INTRAVENOUS at 14:39

## 2024-12-19 RX ADMIN — CALCIUM GLUCONATE: 98 INJECTION, SOLUTION INTRAVENOUS at 18:24

## 2024-12-19 RX ADMIN — HYDROXYZINE HYDROCHLORIDE 25 MG: 10 SOLUTION ORAL at 20:50

## 2024-12-19 RX ADMIN — DICYCLOMINE HYDROCHLORIDE 10 MG: 10 CAPSULE ORAL at 20:50

## 2024-12-19 RX ADMIN — PANTOPRAZOLE SODIUM 40 MG: 40 INJECTION, POWDER, FOR SOLUTION INTRAVENOUS at 06:39

## 2024-12-19 RX ADMIN — HYDROMORPHONE HYDROCHLORIDE 0.5 MG: 1 INJECTION, SOLUTION INTRAMUSCULAR; INTRAVENOUS; SUBCUTANEOUS at 07:32

## 2024-12-19 RX ADMIN — HYDROMORPHONE HYDROCHLORIDE 0.5 MG: 1 INJECTION, SOLUTION INTRAMUSCULAR; INTRAVENOUS; SUBCUTANEOUS at 20:50

## 2024-12-19 RX ADMIN — DIPHENHYDRAMINE HYDROCHLORIDE 25 MG: 50 INJECTION, SOLUTION INTRAMUSCULAR; INTRAVENOUS at 11:28

## 2024-12-19 RX ADMIN — HYDROMORPHONE HYDROCHLORIDE 0.5 MG: 1 INJECTION, SOLUTION INTRAMUSCULAR; INTRAVENOUS; SUBCUTANEOUS at 02:48

## 2024-12-19 RX ADMIN — DIPHENHYDRAMINE HYDROCHLORIDE 25 MG: 50 INJECTION, SOLUTION INTRAMUSCULAR; INTRAVENOUS at 05:28

## 2024-12-19 RX ADMIN — Medication 10 ML: at 11:32

## 2024-12-19 RX ADMIN — HYDROMORPHONE HYDROCHLORIDE 0.5 MG: 1 INJECTION, SOLUTION INTRAMUSCULAR; INTRAVENOUS; SUBCUTANEOUS at 05:13

## 2024-12-19 RX ADMIN — I.V. FAT EMULSION 50 G: 20 EMULSION INTRAVENOUS at 17:45

## 2024-12-19 RX ADMIN — LIDOCAINE HYDROCHLORIDE 100 MG: 20 INJECTION, SOLUTION INFILTRATION; PERINEURAL at 14:39

## 2024-12-19 RX ADMIN — SUCRALFATE 1 G: 1 TABLET ORAL at 20:50

## 2024-12-19 RX ADMIN — DIPHENHYDRAMINE HYDROCHLORIDE 25 MG: 50 INJECTION, SOLUTION INTRAMUSCULAR; INTRAVENOUS at 18:05

## 2024-12-19 RX ADMIN — BUDESONIDE AND FORMOTEROL FUMARATE DIHYDRATE 2 PUFF: 160; 4.5 AEROSOL RESPIRATORY (INHALATION) at 07:42

## 2024-12-19 RX ADMIN — PANTOPRAZOLE SODIUM 40 MG: 40 INJECTION, POWDER, FOR SOLUTION INTRAVENOUS at 17:55

## 2024-12-19 RX ADMIN — ONDANSETRON 4 MG: 2 INJECTION INTRAMUSCULAR; INTRAVENOUS at 05:28

## 2024-12-19 RX ADMIN — ONDANSETRON 4 MG: 2 INJECTION INTRAMUSCULAR; INTRAVENOUS at 21:36

## 2024-12-19 RX ADMIN — HYDROMORPHONE HYDROCHLORIDE 0.5 MG: 1 INJECTION, SOLUTION INTRAMUSCULAR; INTRAVENOUS; SUBCUTANEOUS at 11:29

## 2024-12-19 RX ADMIN — SODIUM CHLORIDE 30 ML/HR: 9 INJECTION, SOLUTION INTRAVENOUS at 14:17

## 2024-12-19 RX ADMIN — PROPOFOL INJECTABLE EMULSION 200 MCG/KG/MIN: 10 INJECTION, EMULSION INTRAVENOUS at 14:47

## 2024-12-19 RX ADMIN — CEFTRIAXONE SODIUM 1000 MG: 1 INJECTION, POWDER, FOR SOLUTION INTRAMUSCULAR; INTRAVENOUS at 13:13

## 2024-12-19 NOTE — ANESTHESIA PREPROCEDURE EVALUATION
Anesthesia Evaluation     Patient summary reviewed and Nursing notes reviewed   history of anesthetic complications:  PONV  NPO Solid Status: > 8 hours  NPO Liquid Status: > 4 hours           Airway   Mallampati: II  Neck ROM: full  No difficulty expected  Dental - normal exam     Pulmonary     breath sounds clear to auscultation  (+) asthma,shortness of breath  Cardiovascular     Rhythm: regular        Neuro/Psych  (+) headaches, numbness, psychiatric history Depression, Anxiety and PTSD  GI/Hepatic/Renal/Endo    (+) GERD, GI bleeding     Musculoskeletal     Abdominal    Substance History      OB/GYN          Other   arthritis,       Other Comment: Ehrlos-Dantlos, Lupus,Gastroparesis,Myasthenia gravis, Raynauds, Sjogrens syndrome, Rheunatoid arthritis              Anesthesia Plan    ASA 3     MAC     (No lidocaine)  intravenous induction     Anesthetic plan, risks, benefits, and alternatives have been provided, discussed and informed consent has been obtained with: patient.    CODE STATUS:    Code Status (Patient has no pulse and is not breathing): CPR (Attempt to Resuscitate)  Medical Interventions (Patient has pulse or is breathing): Full Support

## 2024-12-19 NOTE — PROGRESS NOTES
Name: Henrietta Garrett ADMIT: 2024   : 1995  PCP: Kenya David MD    MRN: [id number] LOS: 0 days   AGE/SEX: 29 y.o. female  ROOM: [facility]     Subjective   Subjective   Patient symptom improving today.  Still having some abdominal pain and throat pain but improved since coming in.  Requiring IV Dilaudid quite frequently.  Family at bedside.  Agreeable to further GI evaluation today.  Nausea without vomiting.  States she has not vomited since she was in the emergency room.  Review of Systems   Constitutional:  Positive for fatigue. Negative for chills and fever.   Respiratory:  Negative for cough and shortness of breath.    Cardiovascular:  Negative for chest pain and leg swelling.   Gastrointestinal:  Positive for nausea. Negative for abdominal pain and diarrhea.     As above     Objective   Objective   Vital Signs  Temp:  [97.5 °F (36.4 °C)-98.1 °F (36.7 °C)] 98.1 °F (36.7 °C)  Heart Rate:  [50-65] 64  Resp:  [16-18] 16  BP: (111-119)/(65-78) 116/65  SpO2:  [94 %-100 %] 98 %  on   ;   Device (Oxygen Therapy): room air  Body mass index is 24.54 kg/m².  Physical Exam  Constitutional:       General: She is not in acute distress.     Appearance: She is not ill-appearing.   Cardiovascular:      Rate and Rhythm: Normal rate and regular rhythm.   Pulmonary:      Effort: Pulmonary effort is normal. No respiratory distress.   Abdominal:      General: Abdomen is flat. There is no distension.      Tenderness: There is abdominal tenderness (Left upper quadrant).   Musculoskeletal:         General: No swelling or deformity. Normal range of motion.   Skin:     General: Skin is warm and dry.   Neurological:      General: No focal deficit present.      Mental Status: She is alert. Mental status is at baseline.         Results Review     I reviewed the patient's new clinical results.  Results from last 7 days   Lab Units 24  0520 24  2301 24  0514 24  0932   WBC 10*3/mm3 4.62 9.16 7.93 7.95  "  HEMOGLOBIN g/dL 10.7* 10.3* 10.0* 11.3*   PLATELETS 10*3/mm3 192 176 171 203     Results from last 7 days   Lab Units 12/19/24 0520 12/18/24 0928 12/17/24 2301 12/17/24  0514   SODIUM mmol/L 136 137 135* 133*   POTASSIUM mmol/L 4.6 3.9 3.9 3.2*   CHLORIDE mmol/L 103 105 103 104   CO2 mmol/L 26.2 23.9 23.0 19.4*   BUN mg/dL 7 7 4* 5*   CREATININE mg/dL 0.49* 0.52* 0.54* 0.65   GLUCOSE mg/dL 104* 117* 139* 85   Estimated Creatinine Clearance: 213.4 mL/min (A) (by C-G formula based on SCr of 0.49 mg/dL (L)).  Results from last 7 days   Lab Units 12/18/24 0928 12/17/24 2301 12/17/24  0514 12/16/24  0932   ALBUMIN g/dL 3.4* 3.7 3.4* 4.2   BILIRUBIN mg/dL 0.3 0.3 0.4 0.4   ALK PHOS U/L 38* 39 38* 44   AST (SGOT) U/L 12 9 13 14   ALT (SGPT) U/L 8 9 6 9     Results from last 7 days   Lab Units 12/19/24 0520 12/18/24 0928 12/17/24 2301 12/17/24  0514 12/16/24  0932   CALCIUM mg/dL 9.0 8.9 8.7 8.2* 9.6   ALBUMIN g/dL  --  3.4* 3.7 3.4* 4.2   MAGNESIUM mg/dL  --   --   --  1.8  --    PHOSPHORUS mg/dL  --   --   --  2.5  --        COVID19   Date Value Ref Range Status   12/16/2024 Not Detected Not Detected - Ref. Range Final     SARS-CoV-2, DANIELLA   Date Value Ref Range Status   11/03/2024 NEGATIVE Negative Final     Comment:     The 2019-CoV rRT-PCR Assay is only for use under a Food and Drug Administration Emergency Use Authorization. The performance characteristics of the assay were verified by the Clinical Laboratory at [facility]. Results should be used in   conjunction with the patient's clinical symptoms, medical history, and other clinical/laboratory findings to determine an overall clinical diagnosis. Negative results do not preclude infection with SARS-CoV-2 (COVID-19).    Test parameters have not been validated for screening asymptomatic patients.     No results found for: \"HGBA1C\", \"POCGLU\"    CT Abdomen Pelvis With Contrast  Narrative: CT ABDOMEN PELVIS W CONTRAST-     INDICATION: Abdominal pain, " vomiting     COMPARISON: CT abdomen pelvis July 2, 2024     TECHNIQUE:  Routine CT abdomen/pelvis with IV contrast. Coronal and sagittal  reformats. Radiation dose reduction techniques were utilized, including  automated exposure control and exposure modulation based on body size.     FINDINGS:      Lung bases: Subsegmental atelectasis seen at the bases.     ABDOMEN: No liver mass. Cholecystectomy. No biliary ductal dilatation.  Spleen is at upper limits in size. Incidental splenule's. No pancreatic  mass or pancreatic ductal dilatation seen. No adrenal nodules. No renal  mass or hydronephrosis.     Pelvis: Underdistended bladder. No bladder calculus. Anteverted uterus.  Dominant follicle in the right ovary. Small amount of free fluid in the  cul-de-sac, within physiologic limits.     Bowel: Gastrostomy tube seen. Small bowel anastomosis suture seen in the  mid abdomen. Descending and sigmoid colon are collapsed. Rectum is  collapsed. No obstruction. Normal appendix.     Abdominal wall: Abdominal and pelvic wall scarring.     Retroperitoneum: Multiple mildly prominent abdominal retroperitoneal  lymph nodes seen. For example, aortocaval lymph node, series 2, axial  mage 82, measures 7 to 8 mm in short axis, stable. No lymph nodes  measuring greater than 1 cm are seen. No new or enlarging lymph nodes  present.     Vasculature: Patent. No abdominal aortic aneurysm. Circumaortic left  renal vein.     Osseous structures: No destructive osseous lesions.     Impression:    1. No acute findings identified in the abdomen or pelvis.  2. Stable mildly prominent abdominal retroperitoneal lymph nodes. No new  or enlarging lymph nodes identified.     This report was finalized on 12/16/2024 11:20 AM by Dr. Abel Sánchez M.D on Workstation: [id number]       I reviewed the patient's daily medications.  Scheduled Medications  budesonide-formoterol, 2 puff, Inhalation, BID - RT  cefTRIAXone, 1,000 mg, Intravenous,  Q24H  dicyclomine, 10 mg, Per G Tube, 4x Daily AC & at Bedtime  escitalopram, 5 mL, Per G Tube, BID  Fat Emulsion Plant Based, 250 mL, Intravenous, Q24H (TPN)  gabapentin, 200 mg, Per G Tube, TID  hydroxychloroquine, 200 mg, Per G Tube, BID  hydrOXYzine, 25 mg, Per G Tube, Nightly  nitroglycerin, 1 inch, Topical, Nightly  pantoprazole, 40 mg, Intravenous, BID AC  promethazine, 25 mg, Oral, Once  silver sulfadiazine, , Topical, Daily  sodium chloride, 10 mL, Intravenous, Q12H  sodium chloride, 10 mL, Intravenous, Q12H  sucralfate, 1 g, Oral, 4x Daily AC & at Bedtime    Infusions  Adult Cyclic 2-in-1 TPN, , Last Rate: 80 mL/hr at 12/18/24 1926  Pharmacy to Dose TPN,     Diet  Adult Cyclic 2-in-1 TPN  NPO Diet NPO Type: Strict NPO         I have personally reviewed:  [x]  Laboratory   [x]  Microbiology   [x]  Radiology   []  EKG/Telemetry   []  Cardiology/Vascular   []  Pathology   [x]  Records     Assessment/Plan     Active Hospital Problems    Diagnosis  POA    **Gastroparesis [K31.84]  Yes    UTI (urinary tract infection) [N39.0]  Yes    Painful swallowing [R13.10]  Yes    Generalized abdominal pain [R10.84]  Yes    Hypokalemia [E87.6]  Yes    Rectal bleeding [K62.5]  Unknown    Generalized anxiety disorder [F41.1]  Yes    Post traumatic stress disorder (PTSD) [F43.10]  Yes    Intractable migraine with aura without status migrainosus [G43.119]  Yes    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       29 y.o. female admitted with Gastroparesis.    Generalized abdominal pain with nausea  GERD  Gastroparesis  Pain/difficulty  swallowing  -CT abdomen with no acute findings  -GI consulted, continue IV PPI twice daily and Carafate.  Plan for EGD and flex sig today  -IV Dilaudid as needed     Urinary tract infection/Acute cystitis without hematuria  -Dysuria is improving.  Continue ceftriaxone      Other symptoms and signs concerning food and fluid intake  - She is on long term TPN, continue as ordered.     Hypokalemia  - K low on prior labs, repleted and improved, continue to monitor.     Dehydration  -Received some IV fluids, creatinine at baseline.       Anemia  -Stable, continue to monitor     POTS  - Continue fluids, monitor vitals.     Complex regional pain syndrome/chronic pain  - Continue medications as ordered.     Myasthenia Gravis  - On IVIG once weekly.     Sjogren's syndrome  - Continue Plaquenil.    SCDs for DVT prophylaxis.  Full code.  Discussed with patient, family, nursing staff, and primary care provider.  Anticipate discharge home in 1-2 days.    Expected Discharge Date: 12/20/2024; Expected Discharge Time:       Jadon Lewis MD  [facility]  12/19/24  11:06 EST

## 2024-12-19 NOTE — PLAN OF CARE
Goal Outcome Evaluation:  Plan of Care Reviewed With: patient    Problem: Adult Inpatient Plan of Care  Goal: Plan of Care Review  Outcome: Progressing  Flowsheets (Taken 12/19/2024 0502)  Progress: no change  Plan of Care Reviewed With: patient  Goal: Patient-Specific Goal (Individualized)  Outcome: Progressing  Goal: Absence of Hospital-Acquired Illness or Injury  Outcome: Progressing  Intervention: Identify and Manage Fall Risk  Recent Flowsheet Documentation  Taken 12/19/2024 0400 by Nalini Clifford RN  Safety Promotion/Fall Prevention: safety round/check completed  Taken 12/19/2024 0200 by Nalini Clifford RN  Safety Promotion/Fall Prevention: safety round/check completed  Taken 12/19/2024 0000 by Nalini Clifford RN  Safety Promotion/Fall Prevention: safety round/check completed  Taken 12/18/2024 2200 by Nalini Clifford RN  Safety Promotion/Fall Prevention: safety round/check completed  Taken 12/18/2024 2000 by Nalini Clifford RN  Safety Promotion/Fall Prevention:   assistive device/personal items within reach   clutter free environment maintained   nonskid shoes/slippers when out of bed   safety round/check completed  Intervention: Prevent Skin Injury  Recent Flowsheet Documentation  Taken 12/19/2024 0400 by Nalini Clifford RN  Body Position: position changed independently  Taken 12/19/2024 0200 by Nalini Clifford RN  Body Position: position changed independently  Taken 12/19/2024 0000 by Nalini Clifford RN  Body Position: position changed independently  Taken 12/18/2024 2200 by Nalini Clifford RN  Body Position: position changed independently  Taken 12/18/2024 2000 by Nalini Clifford RN  Body Position: position changed independently  Intervention: Prevent Infection  Recent Flowsheet Documentation  Taken 12/19/2024 0400 by Nalini Clifford RN  Infection Prevention:   rest/sleep promoted   single patient room provided  Taken 12/19/2024 0200 by Nalini Clifford RN  Infection Prevention:   rest/sleep promoted   single patient room provided  Taken  12/19/2024 0000 by Nalini Clifford RN  Infection Prevention:   single patient room provided   rest/sleep promoted  Taken 12/18/2024 2200 by Nalini Clifford RN  Infection Prevention:   rest/sleep promoted   single patient room provided  Taken 12/18/2024 2000 by Nalini Clifford RN  Infection Prevention:   rest/sleep promoted   single patient room provided  Goal: Optimal Comfort and Wellbeing  Outcome: Progressing  Intervention: Monitor Pain and Promote Comfort  Recent Flowsheet Documentation  Taken 12/18/2024 2000 by Nalini Clifford RN  Pain Management Interventions:   care clustered   pain medication given  Intervention: Provide Person-Centered Care  Recent Flowsheet Documentation  Taken 12/18/2024 2000 by Nalini Clifford RN  Trust Relationship/Rapport:   care explained   reassurance provided  Goal: Readiness for Transition of Care  Outcome: Progressing     Problem: Nausea and Vomiting  Goal: Nausea and Vomiting Relief  Outcome: Progressing     Problem: Comorbidity Management  Goal: Maintenance of Asthma Control  Outcome: Progressing  Intervention: Maintain Asthma Symptom Control  Recent Flowsheet Documentation  Taken 12/19/2024 0400 by Nalini Clifford RN  Medication Review/Management: medications reviewed  Taken 12/19/2024 0200 by Nalini Clifford RN  Medication Review/Management: medications reviewed  Taken 12/19/2024 0000 by Nalini Clifford RN  Medication Review/Management: medications reviewed  Taken 12/18/2024 2200 by Nalini Clifford RN  Medication Review/Management: medications reviewed  Taken 12/18/2024 2000 by Nalini Clifford RN  Medication Review/Management: medications reviewed  Goal: Maintenance of Behavioral Health Symptom Control  Outcome: Progressing  Intervention: Maintain Behavioral Health Symptom Control  Recent Flowsheet Documentation  Taken 12/19/2024 0400 by Nalini Clifford RN  Medication Review/Management: medications reviewed  Taken 12/19/2024 0200 by Nalini Clifford RN  Medication Review/Management: medications reviewed  Taken  12/19/2024 0000 by Nalini Clifford RN  Medication Review/Management: medications reviewed  Taken 12/18/2024 2200 by Nalini Clifford RN  Medication Review/Management: medications reviewed  Taken 12/18/2024 2000 by Nalini Clifford RN  Medication Review/Management: medications reviewed  Goal: Maintenance of COPD Symptom Control  Outcome: Progressing  Intervention: Maintain COPD (Chronic Obstructive Pulmonary Disease) Symptom Control  Recent Flowsheet Documentation  Taken 12/19/2024 0400 by Nalini Clifford RN  Medication Review/Management: medications reviewed  Taken 12/19/2024 0200 by Nalini Clifford RN  Medication Review/Management: medications reviewed  Taken 12/19/2024 0000 by Nalini Clifford RN  Medication Review/Management: medications reviewed  Taken 12/18/2024 2200 by Nalini Clifford RN  Medication Review/Management: medications reviewed  Taken 12/18/2024 2000 by Nalini Clifford RN  Medication Review/Management: medications reviewed  Goal: Blood Glucose Level Within Target Range  Outcome: Progressing  Intervention: Monitor and Manage Glycemia  Recent Flowsheet Documentation  Taken 12/19/2024 0400 by Nalini Clifford RN  Medication Review/Management: medications reviewed  Taken 12/19/2024 0200 by Nalini Clifford RN  Medication Review/Management: medications reviewed  Taken 12/19/2024 0000 by Nalini Clifford RN  Medication Review/Management: medications reviewed  Taken 12/18/2024 2200 by Nalini Clifford RN  Medication Review/Management: medications reviewed  Taken 12/18/2024 2000 by Nalini Clifford RN  Medication Review/Management: medications reviewed  Goal: Maintenance of Heart Failure Symptom Control  Outcome: Progressing  Intervention: Maintain Heart Failure Management  Recent Flowsheet Documentation  Taken 12/19/2024 0400 by Nalini Clifford RN  Medication Review/Management: medications reviewed  Taken 12/19/2024 0200 by Nalini Clifford RN  Medication Review/Management: medications reviewed  Taken 12/19/2024 0000 by Nalini Clifford RN  Medication  Review/Management: medications reviewed  Taken 12/18/2024 2200 by Nalini Clifford RN  Medication Review/Management: medications reviewed  Taken 12/18/2024 2000 by Nalini Clifford RN  Medication Review/Management: medications reviewed     Problem: Parenteral Nutrition  Goal: Effective Intravenous Nutrition Therapy Delivery  Outcome: Progressing  Intervention: Optimize Intravenous Nutrition Delivery  Recent Flowsheet Documentation  Taken 12/19/2024 0400 by Nalini Clifford RN  Medication Review/Management: medications reviewed  Taken 12/19/2024 0200 by Nalini Clifford RN  Medication Review/Management: medications reviewed  Taken 12/19/2024 0000 by Nalini Clifford RN  Medication Review/Management: medications reviewed  Taken 12/18/2024 2200 by Nalini Clifford RN  Medication Review/Management: medications reviewed  Taken 12/18/2024 2000 by Nalini Clifford RN  Medication Review/Management: medications reviewed           Progress: no change

## 2024-12-19 NOTE — PROGRESS NOTES
[facility] Clinical Pharmacy Services: TPN Daily Progress Note    TPN Day # 3   Indication: gastroparesis  Route: central  Type: cyclic    Subjective/Objective  Results from last 7 days   Lab Units 12/19/24  0520 12/18/24  0928 12/17/24  2301 12/17/24  0514   SODIUM mmol/L 136 137 135* 133*   POTASSIUM mmol/L 4.6 3.9 3.9 3.2*   CHLORIDE mmol/L 103 105 103 104   CO2 mmol/L 26.2 23.9 23.0 19.4*   BUN mg/dL 7 7 4* 5*   CREATININE mg/dL 0.49* 0.52* 0.54* 0.65   CALCIUM mg/dL 9.0 8.9 8.7 8.2*   ALBUMIN g/dL  --  3.4* 3.7 3.4*   BILIRUBIN mg/dL  --  0.3 0.3 0.4   ALK PHOS U/L  --  38* 39 38*   ALT (SGPT) U/L  --  8 9 6   AST (SGOT) U/L  --  12 9 13   GLUCOSE mg/dL 104* 117* 139* 85   MAGNESIUM mg/dL  --   --   --  1.8   PHOSPHORUS mg/dL  --   --   --  2.5   TRIGLYCERIDES mg/dL  --   --  97  --         Diet Orders (active) (From admission, onward)       Start     Ordered    12/19/24 1045  NPO Diet NPO Type: Strict NPO  Diet Effective Now         12/19/24 1044                  Additional insulin administration while previous TPN infusing: none units  Additional electrolyte administration while previous TPN infusing: none  Acid suppression: protonix IV    Assessment/Plan    Will repeat yesterday's TPN which is based off home formulation    [employer] PharmLILIBETH  Clinical Pharmacist

## 2024-12-19 NOTE — PROGRESS NOTES
[facility]  Inpatient Progress Note    Reason for Follow Up: Odynophagia and gastroparesis    Subjective     Interval History: Odynophagia    Patient continues to complain of odynophagia this morning.  Remains nauseated.  No vomiting since admission.  She is currently n.p.o. in anticipation of possible endoscopic evaluation today.  She goes on to complain of intermittent bright red blood per rectum started several weeks ago with bright red blood on the toilet paper, in the stool and in the toilet bowl.  She reports having episode yesterday.  Treatment has included twice daily dosing IV PPI therapy along with Carafate slurry.    Her last colonoscopy was 2020 with nonbleeding internal hemorrhoids otherwise normal.  Current hemoglobin 10.7 around baseline.      Current Facility-Administered Medications:     acetaminophen (TYLENOL) tablet 650 mg, 650 mg, Per G Tube, Q4H PRN **OR** acetaminophen (TYLENOL) 160 MG/5ML oral solution 650 mg, 650 mg, Per G Tube, Q4H PRN **OR** acetaminophen (TYLENOL) suppository 650 mg, 650 mg, Rectal, Q4H PRN, Rich Saucedo DO    Adult Cyclic 2-in-1 TPN, , Intravenous, Cyclic TPN - See Admin Instructions, Rich Saucedo DO, Last Rate: 80 mL/hr at 12/18/24 1926, Rate Change at 12/18/24 1926    budesonide-formoterol (SYMBICORT) 160-4.5 MCG/ACT inhaler 2 puff, 2 puff, Inhalation, BID - RT, Obi Hinson MD, 2 puff at 12/19/24 0742    Calcium Replacement - Follow Nurse / BPA Driven Protocol, , Not Applicable, PRN, Obi Hinson MD    cefTRIAXone (ROCEPHIN) 1,000 mg in sodium chloride 0.9 % 100 mL MBP, 1,000 mg, Intravenous, Q24H, Rich Saucedo DO, Last Rate: 200 mL/hr at 12/18/24 1254, 1,000 mg at 12/18/24 1254    dicyclomine (BENTYL) capsule 10 mg, 10 mg, Per G Tube, 4x Daily AC & at Bedtime, Obi Hinson MD    diphenhydrAMINE (BENADRYL) injection 25 mg, 25 mg, Intravenous, Q6H PRN, Obi Hinson MD, 25 mg at 12/19/24 2966    EPINEPHrine (ANAPHYLAXIS) 1 mg/ml injection  kit, 0.3 mg, Intramuscular, Once PRN, Obi Hinson MD    escitalopram (LEXAPRO) 1 mg/mL oral solution 5 mg, 5 mL, Per G Tube, BID, Obi Hinson MD, 5 mg at 12/17/24 2213    Fat Emulsion Plant Based (INTRALIPID,LIPOSYN) 20 % infusion 50 g, 250 mL, Intravenous, Q24H (TPN), Rich Saucedo DO, Last Rate: 20.8 mL/hr at 12/18/24 1822, 50 g at 12/18/24 1822    gabapentin (NEURONTIN) capsule 200 mg, 200 mg, Per G Tube, TID, Rich Saucedo DO    heparin injection 500 Units, 5 mL, Intravenous, PRN, Obi Hinson MD    HYDROmorphone (DILAUDID) injection 0.5 mg, 0.5 mg, Intravenous, Q2H PRN, Obi Hinson MD, 0.5 mg at 12/19/24 0732    hydroxychloroquine (PLAQUENIL) tablet 200 mg, 200 mg, Per G Tube, BID, Rich Saucedo DO    hydrOXYzine (ATARAX) 10 MG/5ML syrup 25 mg, 25 mg, Per G Tube, Nightly, Obi Hinson MD, 25 mg at 12/17/24 2211    Magnesium Standard Dose Replacement - Follow Nurse / BPA Driven Protocol, , Not Applicable, PRN, Obi Hinson MD    nitroglycerin (NITROSTAT) ointment 1 inch, 1 inch, Topical, Nightly, Obi Hinson MD    nitroglycerin (NITROSTAT) SL tablet 0.4 mg, 0.4 mg, Sublingual, Q5 Min PRN, Obi Hinson MD    ondansetron (ZOFRAN) injection 4 mg, 4 mg, Intravenous, Q6H PRN, Obi Hinson MD, 4 mg at 12/19/24 0528    oxyCODONE-acetaminophen (PERCOCET)  MG per tablet 1 tablet, 1 tablet, Per G Tube, Q6H PRN, Rich Saucedo DO, 1 tablet at 12/18/24 2005    pantoprazole (PROTONIX) injection 40 mg, 40 mg, Intravenous, BID URIAH, Cristel Stafford PA-C, 40 mg at 12/19/24 0639    Pharmacy to Dose TPN, , Not Applicable, Continuous PRN, Rich Saucedo,     Phosphorus Replacement - Follow Nurse / BPA Driven Protocol, , Not Applicable, PRN, Obi Hinson MD    Potassium Replacement - Follow Nurse / BPA Driven Protocol, , Not Applicable, PRBEENA, Obi Hinson MD    promethazine (PHENERGAN) tablet 25 mg, 25 mg, Oral, Once, Obi Hinson MD    promethazine-dextromethorphan (PROMETHAZINE-DM) 6.25-15 MG/5ML syrup 5 mL,  5 mL, Per J Tube, Q6H PRN, Obi Hinson MD    silver sulfadiazine (SILVADENE, SSD) 1 % cream, , Topical, Daily, Sravan, Obi, MD    sodium chloride 0.9 % flush 10 mL, 10 mL, Intravenous, PRN, Sravan, Jawed, MD    sodium chloride 0.9 % flush 10 mL, 10 mL, Intravenous, Q12H, Sravan, Jawranjan, MD, 10 mL at 12/18/24 2134    sodium chloride 0.9 % flush 10 mL, 10 mL, Intravenous, PRN, Sravan, Jawed, MD    sodium chloride 0.9 % flush 10 mL, 10 mL, Intravenous, Q12H, Sravan, Jawranjan, MD, 10 mL at 12/18/24 2133    sodium chloride 0.9 % flush 10 mL, 10 mL, Intravenous, PRN, Sravan, Jawed, MD    sodium chloride 0.9 % flush 20 mL, 20 mL, Intravenous, PRN, Sravan, Obi, MD    sodium chloride 0.9 % infusion 40 mL, 40 mL, Intravenous, PRN, Sravan, Jawed, MD    sodium chloride 0.9 % infusion 40 mL, 40 mL, Intravenous, PRN, Sravan, Jawed, MD    sucralfate (CARAFATE) tablet 1 g, 1 g, Oral, 4x Daily AC & at Bedtime, Cristel Stafford PA-C, 1 g at 12/18/24 2132    Zavegepant HCl solution 1 spray, 1 spray, Nasal, Once PRN, Obi Hinson MD  Review of Systems:    All systems were reviewed and negative except for:  Gastrointestinal: positive for  bright red blood per rectum, difficulty / pain with swallowing, and nausea    Objective     Vital Signs  Temp:  [97.5 °F (36.4 °C)-98.1 °F (36.7 °C)] 98.1 °F (36.7 °C)  Heart Rate:  [50-65] 64  Resp:  [16-18] 16  BP: (111-119)/(65-78) 116/65  Body mass index is 24.54 kg/m².    Intake/Output Summary (Last 24 hours) at 12/19/2024 0858  Last data filed at 12/18/2024 1632  Gross per 24 hour   Intake --   Output 800 ml   Net -800 ml     No intake/output data recorded.     Physical Exam:   General: patient awake, alert and cooperative  Abdomen: soft, left upper quadrant tenderness noted, nondistended; normal bowel sounds      Results Review:     I reviewed the patient's new clinical results.    Results from last 7 days   Lab Units 12/19/24  0520 12/17/24  2301 12/17/24  0514   WBC 10*3/mm3 4.62 9.16 7.93    HEMOGLOBIN g/dL 10.7* 10.3* 10.0*   HEMATOCRIT % 32.6* 30.2* 30.3*   PLATELETS 10*3/mm3 192 176 171     Results from last 7 days   Lab Units 12/19/24  0520 12/18/24  0928 12/17/24  2301 12/17/24  0514   SODIUM mmol/L 136 137 135* 133*   POTASSIUM mmol/L 4.6 3.9 3.9 3.2*   CHLORIDE mmol/L 103 105 103 104   CO2 mmol/L 26.2 23.9 23.0 19.4*   BUN mg/dL 7 7 4* 5*   CREATININE mg/dL 0.49* 0.52* 0.54* 0.65   CALCIUM mg/dL 9.0 8.9 8.7 8.2*   BILIRUBIN mg/dL  --  0.3 0.3 0.4   ALK PHOS U/L  --  38* 39 38*   ALT (SGPT) U/L  --  8 9 6   AST (SGOT) U/L  --  12 9 13   GLUCOSE mg/dL 104* 117* 139* 85         Lab Results   Lab Value Date/Time    LIPASE 15 12/16/2024 0932    LIPASE 20 11/03/2024 2104    LIPASE 21 07/03/2024 1004    LIPASE 28 07/02/2024 1439    LIPASE 27 05/25/2024 0022    LIPASE 35 03/06/2024 0057    LIPASE 44 12/09/2023 2058    LIPASE 18 06/26/2023 0642    LIPASE 19 06/23/2023 0653    LIPASE 30 06/16/2023 0101    LIPASE 80 01/05/2020 1030    LIPASE 53 07/20/2018 1151       Radiology:  CT Abdomen Pelvis With Contrast   Final Result       1. No acute findings identified in the abdomen or pelvis.   2. Stable mildly prominent abdominal retroperitoneal lymph nodes. No new   or enlarging lymph nodes identified.       This report was finalized on 12/16/2024 11:20 AM by Dr. Abel Sánchez M.D on Workstation: [id number]              Assessment & Plan     Active Hospital Problems    Diagnosis     **Gastroparesis     UTI (urinary tract infection)     Painful swallowing     Generalized abdominal pain     Hypokalemia     Generalized anxiety disorder     Post traumatic stress disorder (PTSD)     Intractable migraine with aura without status migrainosus     Tomsa-Danlos syndrome     Myasthenia gravis     Anemia     Sjogren's syndrome     Gastroesophageal reflux disease     Postural orthostatic tachycardia syndrome     Seropositive rheumatoid arthritis     Iron deficiency anemia        Assessment:  Odynophagia  Nausea  and vomiting  Gastroparesis - managed with G-tube, promethazine, gabapentin, Protonix, IVIG infusion weekly. Follows with [facility] and GI   GERD  Rectal bleeding  Irritable bowel syndrome  Anemia    Plan:  Recommend EGD and flexible sigmoidoscopy this afternoon  Continue IV PPI therapy and Carafate  Further recommendations pending procedural evaluation    I discussed the patients findings and my recommendations with patient, family, and Dr. Martínez .    Fatimah Spring, APRN

## 2024-12-19 NOTE — BRIEF OP NOTE
ESOPHAGOGASTRODUODENOSCOPY, SIGMOIDOSCOPY FLEXIBLE  Progress Note    Henrietta Garrett  12/19/2024    Pre-op Diagnosis:   Painful swallowing [R13.10]  Gastroesophageal reflux disease, unspecified whether esophagitis present [K21.9]  Left upper quadrant abdominal pain [R10.12]  Iron deficiency anemia, unspecified iron deficiency anemia type [D50.9]  Rectal bleeding [K62.5]       Post-Op Diagnosis Codes:     * Painful swallowing [R13.10]     * Gastroesophageal reflux disease, unspecified whether esophagitis present [K21.9]     * Left upper quadrant abdominal pain [R10.12]     * Iron deficiency anemia, unspecified iron deficiency anemia type [D50.9]     * Rectal bleeding [K62.5]    Procedure/CPT® Codes:        Procedure(s):  ESOPHAGOGASTRODUODENOSCOPY  SIGMOIDOSCOPY FLEXIBLE              Surgeon(s):  José Luis Martínez MD    Anesthesia: Monitored Anesthesia Care    Staff:   Endo Technician: Néstor Brand  Endo Nurse: Ashley Jamil RN         Estimated Blood Loss: none    Urine Voided: * No values recorded between 12/19/2024  2:35 PM and 12/19/2024  3:00 PM *    Specimens:                None          Drains:   Gastrostomy/Enterostomy Percutaneous endoscopic gastrostomy (PEG) 20 Fr. LUQ (Active)       Gastrostomy/Enterostomy Jejunostomy 1 16 Fr. LUQ (Active)       Gastrostomy/Enterostomy Percutaneous endoscopic gastrostomy (PEG) LUQ (Active)       Gastrostomy/Enterostomy (Active)   Surrounding Skin Dry;Intact 12/19/24 0820   Drain Status Open to gravity drainage 12/19/24 1150   Site Description Healing 12/19/24 0820   Gastrostomy/Enterostomy Site Interventions Site assessed 12/19/24 0820       Findings:     EGD:     Completely normal examination up to second part of duodenum.  An indwelling PEG tube was visualized    Flexible sigmoidoscopy up to splenic flexure    Completely normal examination up to splenic flexure.  The mucosa was completely normal.        Complications: None      Recommendations:    Resume  diet as tolerated.  Patient has significant underlying psychological issues that are contributing to her symptoms  No additional GI evaluation is indicated  Patient needs to follow-up after discharge with Dr. Quintana in [facility].    GI service will sign off.  Please call if needed.        José Luis Martínez MD     Date: 12/19/2024  Time: 15:06 EST

## 2024-12-19 NOTE — PLAN OF CARE
Goal Outcome Evaluation:      Dilaudid prn for pain. Benadryl iv for nausea. EGD and sigmoidoscopy today, findings negative. Had fleets enema before. On gi soft low irritant diet now. Brother at side. Safety maintained. Peace DUKES

## 2024-12-19 NOTE — ANESTHESIA POSTPROCEDURE EVALUATION
"Patient: Henrietta Garrett    Procedure Summary       Date: 12/19/24 Room / Location: [facility] ENDOSCOPY 7 /  [facility]    Anesthesia Start: 1435 Anesthesia Stop: 1514    Procedures:       ESOPHAGOGASTRODUODENOSCOPY (Esophagus)      SIGMOIDOSCOPY FLEXIBLE Diagnosis:       Painful swallowing      Gastroesophageal reflux disease, unspecified whether esophagitis present      Left upper quadrant abdominal pain      Iron deficiency anemia, unspecified iron deficiency anemia type      Rectal bleeding      (Painful swallowing [R13.10])      (Gastroesophageal reflux disease, unspecified whether esophagitis present [K21.9])      (Left upper quadrant abdominal pain [R10.12])      (Iron deficiency anemia, unspecified iron deficiency anemia type [D50.9])      (Rectal bleeding [K62.5])    Surgeons: José Luis Martínez MD Provider: Delroy Moser MD    Anesthesia Type: MAC ASA Status: 3            Anesthesia Type: MAC    Vitals  Vitals Value Taken Time   BP 99/59 12/19/24 1526   Temp     Pulse 75 12/19/24 1530   Resp 18 12/19/24 1523   SpO2 93 % 12/19/24 1530   Vitals shown include unfiled device data.        Post Anesthesia Care and Evaluation    Patient location during evaluation: bedside  Patient participation: complete - patient participated  Level of consciousness: sleepy but conscious  Pain score: 0  Pain management: adequate    Airway patency: patent  Anesthetic complications: No anesthetic complications    Cardiovascular status: acceptable  Respiratory status: acceptable  Hydration status: acceptable    Comments: /91 (BP Location: Left arm, Patient Position: Lying)   Pulse 84   Temp 36.6 °C (97.9 °F) (Oral)   Resp 18   Ht 180.3 cm (71\")   Wt 79.8 kg (175 lb 14.8 oz)   SpO2 95%   BMI 24.54 kg/m²       "

## 2024-12-20 LAB
ANION GAP SERPL CALCULATED.3IONS-SCNC: 9.1 MMOL/L (ref 5–15)
BACTERIA SPEC AEROBE CULT: NORMAL
BUN SERPL-MCNC: 9 MG/DL (ref 6–20)
BUN/CREAT SERPL: 15.3 (ref 7–25)
CALCIUM SPEC-SCNC: 9.3 MG/DL (ref 8.6–10.5)
CHLORIDE SERPL-SCNC: 101 MMOL/L (ref 98–107)
CO2 SERPL-SCNC: 24.9 MMOL/L (ref 22–29)
CREAT SERPL-MCNC: 0.59 MG/DL (ref 0.57–1)
DEPRECATED RDW RBC AUTO: 38.7 FL (ref 37–54)
EGFRCR SERPLBLD CKD-EPI 2021: 125.3 ML/MIN/1.73
ERYTHROCYTE [DISTWIDTH] IN BLOOD BY AUTOMATED COUNT: 12.1 % (ref 12.3–15.4)
GLUCOSE SERPL-MCNC: 106 MG/DL (ref 65–99)
HCT VFR BLD AUTO: 35.1 % (ref 34–46.6)
HGB BLD-MCNC: 11.6 G/DL (ref 12–15.9)
MCH RBC QN AUTO: 29.4 PG (ref 26.6–33)
MCHC RBC AUTO-ENTMCNC: 33 G/DL (ref 31.5–35.7)
MCV RBC AUTO: 89.1 FL (ref 79–97)
PLATELET # BLD AUTO: 222 10*3/MM3 (ref 140–450)
PMV BLD AUTO: 10.8 FL (ref 6–12)
POTASSIUM SERPL-SCNC: 4.4 MMOL/L (ref 3.5–5.2)
RBC # BLD AUTO: 3.94 10*6/MM3 (ref 3.77–5.28)
SODIUM SERPL-SCNC: 135 MMOL/L (ref 136–145)
WBC NRBC COR # BLD AUTO: 3.35 10*3/MM3 (ref 3.4–10.8)

## 2024-12-20 PROCEDURE — 25010000002 POTASSIUM CHLORIDE PER 2 MEQ OF POTASSIUM: Performed by: STUDENT IN AN ORGANIZED HEALTH CARE EDUCATION/TRAINING PROGRAM

## 2024-12-20 PROCEDURE — 94761 N-INVAS EAR/PLS OXIMETRY MLT: CPT

## 2024-12-20 PROCEDURE — 25010000002 HYDROMORPHONE PER 4 MG: Performed by: INTERNAL MEDICINE

## 2024-12-20 PROCEDURE — 25010000002 CEFTRIAXONE PER 250 MG: Performed by: STUDENT IN AN ORGANIZED HEALTH CARE EDUCATION/TRAINING PROGRAM

## 2024-12-20 PROCEDURE — 25010000002 MAGNESIUM SULFATE PER 500 MG OF MAGNESIUM: Performed by: STUDENT IN AN ORGANIZED HEALTH CARE EDUCATION/TRAINING PROGRAM

## 2024-12-20 PROCEDURE — 85027 COMPLETE CBC AUTOMATED: CPT | Performed by: STUDENT IN AN ORGANIZED HEALTH CARE EDUCATION/TRAINING PROGRAM

## 2024-12-20 PROCEDURE — 25010000002 CALCIUM GLUCONATE PER 10 ML: Performed by: STUDENT IN AN ORGANIZED HEALTH CARE EDUCATION/TRAINING PROGRAM

## 2024-12-20 PROCEDURE — 80048 BASIC METABOLIC PNL TOTAL CA: CPT | Performed by: STUDENT IN AN ORGANIZED HEALTH CARE EDUCATION/TRAINING PROGRAM

## 2024-12-20 PROCEDURE — 94799 UNLISTED PULMONARY SVC/PX: CPT

## 2024-12-20 PROCEDURE — 94760 N-INVAS EAR/PLS OXIMETRY 1: CPT

## 2024-12-20 PROCEDURE — 25010000002 ONDANSETRON PER 1 MG: Performed by: INTERNAL MEDICINE

## 2024-12-20 PROCEDURE — 25010000002 HYDROMORPHONE PER 4 MG: Performed by: STUDENT IN AN ORGANIZED HEALTH CARE EDUCATION/TRAINING PROGRAM

## 2024-12-20 PROCEDURE — 25010000002 DIPHENHYDRAMINE PER 50 MG: Performed by: INTERNAL MEDICINE

## 2024-12-20 PROCEDURE — 94664 DEMO&/EVAL PT USE INHALER: CPT

## 2024-12-20 RX ORDER — HYDROMORPHONE HYDROCHLORIDE 1 MG/ML
0.5 INJECTION, SOLUTION INTRAMUSCULAR; INTRAVENOUS; SUBCUTANEOUS EVERY 4 HOURS PRN
Status: DISCONTINUED | OUTPATIENT
Start: 2024-12-20 | End: 2024-12-21 | Stop reason: HOSPADM

## 2024-12-20 RX ADMIN — HYDROMORPHONE HYDROCHLORIDE 0.5 MG: 1 INJECTION, SOLUTION INTRAMUSCULAR; INTRAVENOUS; SUBCUTANEOUS at 21:21

## 2024-12-20 RX ADMIN — CALCIUM GLUCONATE: 98 INJECTION, SOLUTION INTRAVENOUS at 18:04

## 2024-12-20 RX ADMIN — SUCRALFATE 1 G: 1 TABLET ORAL at 12:09

## 2024-12-20 RX ADMIN — BUDESONIDE AND FORMOTEROL FUMARATE DIHYDRATE 2 PUFF: 160; 4.5 AEROSOL RESPIRATORY (INHALATION) at 19:43

## 2024-12-20 RX ADMIN — ONDANSETRON 4 MG: 2 INJECTION INTRAMUSCULAR; INTRAVENOUS at 17:55

## 2024-12-20 RX ADMIN — Medication 10 ML: at 21:09

## 2024-12-20 RX ADMIN — HYDROXYCHLOROQUINE SULFATE 200 MG: 200 TABLET, FILM COATED ORAL at 21:07

## 2024-12-20 RX ADMIN — Medication 10 ML: at 21:08

## 2024-12-20 RX ADMIN — DICYCLOMINE HYDROCHLORIDE 10 MG: 10 CAPSULE ORAL at 21:07

## 2024-12-20 RX ADMIN — ESCITALOPRAM OXALATE 5 MG: 5 SOLUTION ORAL at 21:09

## 2024-12-20 RX ADMIN — DIPHENHYDRAMINE HYDROCHLORIDE 25 MG: 50 INJECTION, SOLUTION INTRAMUSCULAR; INTRAVENOUS at 17:56

## 2024-12-20 RX ADMIN — CEFTRIAXONE SODIUM 1000 MG: 1 INJECTION, POWDER, FOR SOLUTION INTRAMUSCULAR; INTRAVENOUS at 14:30

## 2024-12-20 RX ADMIN — DICYCLOMINE HYDROCHLORIDE 10 MG: 10 CAPSULE ORAL at 08:40

## 2024-12-20 RX ADMIN — I.V. FAT EMULSION 50 G: 20 EMULSION INTRAVENOUS at 18:04

## 2024-12-20 RX ADMIN — SUCRALFATE 1 G: 1 TABLET ORAL at 21:21

## 2024-12-20 RX ADMIN — PANTOPRAZOLE SODIUM 40 MG: 40 INJECTION, POWDER, FOR SOLUTION INTRAVENOUS at 08:39

## 2024-12-20 RX ADMIN — DICYCLOMINE HYDROCHLORIDE 10 MG: 10 CAPSULE ORAL at 17:05

## 2024-12-20 RX ADMIN — PANTOPRAZOLE SODIUM 40 MG: 40 INJECTION, POWDER, FOR SOLUTION INTRAVENOUS at 17:05

## 2024-12-20 RX ADMIN — DICYCLOMINE HYDROCHLORIDE 10 MG: 10 CAPSULE ORAL at 12:09

## 2024-12-20 RX ADMIN — SUCRALFATE 1 G: 1 TABLET ORAL at 08:40

## 2024-12-20 RX ADMIN — ESCITALOPRAM OXALATE 5 MG: 5 SOLUTION ORAL at 08:41

## 2024-12-20 RX ADMIN — HYDROXYCHLOROQUINE SULFATE 200 MG: 200 TABLET, FILM COATED ORAL at 08:40

## 2024-12-20 RX ADMIN — HYDROMORPHONE HYDROCHLORIDE 0.5 MG: 1 INJECTION, SOLUTION INTRAMUSCULAR; INTRAVENOUS; SUBCUTANEOUS at 14:31

## 2024-12-20 RX ADMIN — DIPHENHYDRAMINE HYDROCHLORIDE 25 MG: 50 INJECTION, SOLUTION INTRAMUSCULAR; INTRAVENOUS at 05:55

## 2024-12-20 RX ADMIN — HYDROXYZINE HYDROCHLORIDE 25 MG: 10 SOLUTION ORAL at 21:14

## 2024-12-20 RX ADMIN — SUCRALFATE 1 G: 1 TABLET ORAL at 17:05

## 2024-12-20 RX ADMIN — HYDROMORPHONE HYDROCHLORIDE 0.5 MG: 1 INJECTION, SOLUTION INTRAMUSCULAR; INTRAVENOUS; SUBCUTANEOUS at 04:39

## 2024-12-20 RX ADMIN — HYDROMORPHONE HYDROCHLORIDE 0.5 MG: 1 INJECTION, SOLUTION INTRAMUSCULAR; INTRAVENOUS; SUBCUTANEOUS at 08:39

## 2024-12-20 RX ADMIN — HYDROMORPHONE HYDROCHLORIDE 0.5 MG: 1 INJECTION, SOLUTION INTRAMUSCULAR; INTRAVENOUS; SUBCUTANEOUS at 00:06

## 2024-12-20 NOTE — PROGRESS NOTES
Nutrition Services    Patient Name:  Henrietta Garrett  YOB: 1995  MRN: [id number]  Admit Date:  12/16/2024    Nutrition Services    Patient Name: Henrietta Garrett  YOB: 1995  MRN: [id number]  Admission date: 12/16/2024    PROGRESS NOTE      Encounter Information: TPN Day #4 follow up.  C/o sore throat and abdominal pain per MD's note. TPN based off home formula. TPN @ goal.   S/p flex sigmoidoscopy yesterday. Dx w/ rectal bleeding.        PO Diet: Adult Cyclic 2-in-1 TPN  Diet: Gastrointestinal; Low Irritant; Fluid Consistency: Thin (IDDSI 0)  Adult Cyclic 2-in-1 TPN   PO Supplements: --   PO Intake:  --       Current nutrition support: Home TPN:  200 g dex (680 kcal)  85 g A.A (340 kcal)  20% 250 mL (500kcal)   Nutrition support review: Tolerating. At goal.        Labs (reviewed below): Na 135, glu 106        GI Function:  Hypoactive. Last BM 12/19. Received enema yesterday       Nutrition Intervention Updates: - Continue home TPN regimen       Results from last 7 days   Lab Units 12/20/24  0550 12/19/24  0520 12/18/24  0928 12/17/24  2301 12/17/24  0514   SODIUM mmol/L 135* 136 137 135* 133*   POTASSIUM mmol/L 4.4 4.6 3.9 3.9 3.2*   CHLORIDE mmol/L 101 103 105 103 104   CO2 mmol/L 24.9 26.2 23.9 23.0 19.4*   BUN mg/dL 9 7 7 4* 5*   CREATININE mg/dL 0.59 0.49* 0.52* 0.54* 0.65   CALCIUM mg/dL 9.3 9.0 8.9 8.7 8.2*   BILIRUBIN mg/dL  --   --  0.3 0.3 0.4   ALK PHOS U/L  --   --  38* 39 38*   ALT (SGPT) U/L  --   --  8 9 6   AST (SGOT) U/L  --   --  12 9 13   GLUCOSE mg/dL 106* 104* 117* 139* 85     Results from last 7 days   Lab Units 12/20/24  0550 12/19/24  0520 12/17/24  2301 12/17/24  0514   MAGNESIUM mg/dL  --   --   --  1.8   PHOSPHORUS mg/dL  --   --   --  2.5   HEMOGLOBIN g/dL 11.6*   < > 10.3* 10.0*   HEMATOCRIT % 35.1   < > 30.2* 30.3*   TRIGLYCERIDES mg/dL  --   --  97  --     < > = values in this interval not displayed.     COVID19   Date Value Ref Range Status   12/16/2024 Not  Detected Not Detected - Ref. Range Final     Lab Results   Component Value Date    HGBA1C 5.6 08/05/2024       Wt Readings from Last 10 Encounters:   12/20/24 1252 77.6 kg (171 lb)   12/16/24 1604 79.8 kg (175 lb 14.8 oz)   12/16/24 0926 77.1 kg (170 lb)   12/02/24 1529 76.2 kg (168 lb)   11/19/24 1332 74.8 kg (165 lb)   10/21/24 1401 77.6 kg (171 lb)   10/18/24 0748 77.6 kg (171 lb)   10/15/24 0846 77.6 kg (171 lb)   10/07/24 0829 79.4 kg (175 lb)   09/09/24 1017 82.2 kg (181 lb 3.2 oz)   08/26/24 0744 77.1 kg (170 lb)   08/19/24 0740 78.7 kg (173 lb 9.6 oz)         RD to follow up per protocol.    Electronically signed by:  Gloria Walker RD  12/20/24 16:49 EST

## 2024-12-20 NOTE — NURSING NOTE
RN contacted [address] to give handoff report; nurse will return call when available.       Report given to Genoveva given at 1140.

## 2024-12-20 NOTE — PLAN OF CARE
Goal Outcome Evaluation:  Plan of Care Reviewed With: patient        Progress: no change  Outcome Evaluation: received pt from [address] [place] [address], iv pain medication given with relief - offered PO pain medication and she did not want at this time, oral meds given via g-tube, right chest mediport dressing intact, will begin TPN/Lipids this evening, diet ordered but pt only taking a few small sips of sprite, scds on

## 2024-12-20 NOTE — PROGRESS NOTES
Name: Henrietta Garrett ADMIT: 2024   : 1995  PCP: Kenya David MD    MRN: [id number] LOS: 0 days   AGE/SEX: 29 y.o. female  ROOM: [facility]     Subjective   Subjective   Pain continues to slowly improve.  Patient continues to have sore throat and abdominal pain.  EGD done yesterday with no  new findings, completely normal mucosa      Review of Systems   Constitutional:  Positive for fatigue. Negative for chills and fever.   Respiratory:  Negative for cough and shortness of breath.    Cardiovascular:  Negative for chest pain and leg swelling.   Gastrointestinal:  Positive for nausea. Negative for abdominal pain and diarrhea.     As above     Objective   Objective   Vital Signs  Temp:  [97.3 °F (36.3 °C)-98.2 °F (36.8 °C)] 97.4 °F (36.3 °C)  Heart Rate:  [58-84] 67  Resp:  [16-20] 16  BP: (101-118)/(62-91) 115/69  SpO2:  [95 %-100 %] 99 %  on   ;   Device (Oxygen Therapy): room air  Body mass index is 31.97 kg/m².  Physical Exam  Constitutional:       General: She is not in acute distress.     Appearance: She is not ill-appearing.   Cardiovascular:      Rate and Rhythm: Normal rate and regular rhythm.   Pulmonary:      Effort: Pulmonary effort is normal. No respiratory distress.   Abdominal:      General: Abdomen is flat. There is no distension.      Tenderness: There is abdominal tenderness (Left upper quadrant).   Musculoskeletal:         General: No swelling or deformity. Normal range of motion.   Skin:     General: Skin is warm and dry.   Neurological:      General: No focal deficit present.      Mental Status: She is alert. Mental status is at baseline.         Results Review     I reviewed the patient's new clinical results.  Results from last 7 days   Lab Units 24  0550 24  0520 24  2301 24  0514   WBC 10*3/mm3 3.35* 4.62 9.16 7.93   HEMOGLOBIN g/dL 11.6* 10.7* 10.3* 10.0*   PLATELETS 10*3/mm3 222 192 176 171     Results from last 7 days   Lab Units 24  0550  "12/19/24  0520 12/18/24  0928 12/17/24  2301   SODIUM mmol/L 135* 136 137 135*   POTASSIUM mmol/L 4.4 4.6 3.9 3.9   CHLORIDE mmol/L 101 103 105 103   CO2 mmol/L 24.9 26.2 23.9 23.0   BUN mg/dL 9 7 7 4*   CREATININE mg/dL 0.59 0.49* 0.52* 0.54*   GLUCOSE mg/dL 106* 104* 117* 139*   Estimated Creatinine Clearance: 133.5 mL/min (by C-G formula based on SCr of 0.59 mg/dL).  Results from last 7 days   Lab Units 12/18/24  0928 12/17/24  2301 12/17/24  0514 12/16/24  0932   ALBUMIN g/dL 3.4* 3.7 3.4* 4.2   BILIRUBIN mg/dL 0.3 0.3 0.4 0.4   ALK PHOS U/L 38* 39 38* 44   AST (SGOT) U/L 12 9 13 14   ALT (SGPT) U/L 8 9 6 9     Results from last 7 days   Lab Units 12/20/24  0550 12/19/24  0520 12/18/24  0928 12/17/24  2301 12/17/24  0514 12/16/24  0932   CALCIUM mg/dL 9.3 9.0 8.9 8.7 8.2* 9.6   ALBUMIN g/dL  --   --  3.4* 3.7 3.4* 4.2   MAGNESIUM mg/dL  --   --   --   --  1.8  --    PHOSPHORUS mg/dL  --   --   --   --  2.5  --        COVID19   Date Value Ref Range Status   12/16/2024 Not Detected Not Detected - Ref. Range Final     SARS-CoV-2, DANIELLA   Date Value Ref Range Status   11/03/2024 NEGATIVE Negative Final     Comment:     The 2019-CoV rRT-PCR Assay is only for use under a Food and Drug Administration Emergency Use Authorization. The performance characteristics of the assay were verified by the Clinical Laboratory at [facility]. Results should be used in   conjunction with the patient's clinical symptoms, medical history, and other clinical/laboratory findings to determine an overall clinical diagnosis. Negative results do not preclude infection with SARS-CoV-2 (COVID-19).    Test parameters have not been validated for screening asymptomatic patients.     No results found for: \"HGBA1C\", \"POCGLU\"    CT Abdomen Pelvis With Contrast  Narrative: CT ABDOMEN PELVIS W CONTRAST-     INDICATION: Abdominal pain, vomiting     COMPARISON: CT abdomen pelvis July 2, 2024     TECHNIQUE:  Routine CT abdomen/pelvis with IV " contrast. Coronal and sagittal  reformats. Radiation dose reduction techniques were utilized, including  automated exposure control and exposure modulation based on body size.     FINDINGS:      Lung bases: Subsegmental atelectasis seen at the bases.     ABDOMEN: No liver mass. Cholecystectomy. No biliary ductal dilatation.  Spleen is at upper limits in size. Incidental splenule's. No pancreatic  mass or pancreatic ductal dilatation seen. No adrenal nodules. No renal  mass or hydronephrosis.     Pelvis: Underdistended bladder. No bladder calculus. Anteverted uterus.  Dominant follicle in the right ovary. Small amount of free fluid in the  cul-de-sac, within physiologic limits.     Bowel: Gastrostomy tube seen. Small bowel anastomosis suture seen in the  mid abdomen. Descending and sigmoid colon are collapsed. Rectum is  collapsed. No obstruction. Normal appendix.     Abdominal wall: Abdominal and pelvic wall scarring.     Retroperitoneum: Multiple mildly prominent abdominal retroperitoneal  lymph nodes seen. For example, aortocaval lymph node, series 2, axial  mage 82, measures 7 to 8 mm in short axis, stable. No lymph nodes  measuring greater than 1 cm are seen. No new or enlarging lymph nodes  present.     Vasculature: Patent. No abdominal aortic aneurysm. Circumaortic left  renal vein.     Osseous structures: No destructive osseous lesions.     Impression:    1. No acute findings identified in the abdomen or pelvis.  2. Stable mildly prominent abdominal retroperitoneal lymph nodes. No new  or enlarging lymph nodes identified.     This report was finalized on 12/16/2024 11:20 AM by Dr. Abel Sánchez M.D on Workstation: [id number]       I reviewed the patient's daily medications.  Scheduled Medications  budesonide-formoterol, 2 puff, Inhalation, BID - RT  cefTRIAXone, 1,000 mg, Intravenous, Q24H  dicyclomine, 10 mg, Per G Tube, 4x Daily AC & at Bedtime  escitalopram, 5 mL, Per G Tube, BID  Fat Emulsion Plant  Based, 250 mL, Intravenous, Q24H (TPN)  gabapentin, 200 mg, Per G Tube, TID  hydroxychloroquine, 200 mg, Per G Tube, BID  hydrOXYzine, 25 mg, Per G Tube, Nightly  nitroglycerin, 1 inch, Topical, Nightly  pantoprazole, 40 mg, Intravenous, BID AC  silver sulfadiazine, , Topical, Daily  sodium chloride, 10 mL, Intravenous, Q12H  sodium chloride, 10 mL, Intravenous, Q12H  sucralfate, 1 g, Oral, 4x Daily AC & at Bedtime    Infusions  Adult Cyclic 2-in-1 TPN, , Last Rate: 40 mL/hr at 12/20/24 0922  Adult Cyclic 2-in-1 TPN,   Pharmacy to Dose TPN,   sodium chloride, 30 mL/hr, Last Rate: 30 mL/hr (12/19/24 1417)    Diet  Adult Cyclic 2-in-1 TPN  Diet: Gastrointestinal; Low Irritant; Fluid Consistency: Thin (IDDSI 0)  Adult Cyclic 2-in-1 TPN         I have personally reviewed:  [x]  Laboratory   [x]  Microbiology   [x]  Radiology   []  EKG/Telemetry   []  Cardiology/Vascular   []  Pathology   [x]  Records     Assessment/Plan     Active Hospital Problems    Diagnosis  POA    **Gastroparesis [K31.84]  Yes    UTI (urinary tract infection) [N39.0]  Yes    Painful swallowing [R13.10]  Yes    Generalized abdominal pain [R10.84]  Yes    Hypokalemia [E87.6]  Yes    Rectal bleeding [K62.5]  Unknown    Generalized anxiety disorder [F41.1]  Yes    Post traumatic stress disorder (PTSD) [F43.10]  Yes    Intractable migraine with aura without status migrainosus [G43.119]  Yes    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       29 y.o. female admitted with Gastroparesis.    Generalized abdominal pain with nausea  GERD  Gastroparesis  Pain/difficulty  swallowing  -CT abdomen with no acute findings  -GI consulted, continue IV PPI twice daily and Carafate.  EGD with normal mucosa, GI has signed off   -Decrease frequency of IV Dilaudid.     Urinary tract infection/Acute cystitis without hematuria  -Dysuria is improving.  Continue ceftriaxone day 4 of 5     Other symptoms and signs concerning food and fluid intake  - She is on long term TPN, continue as ordered.     Hypokalemia  - K low on prior labs, repleted and improved, continue to monitor.     Dehydration  -Received some IV fluids, creatinine at baseline.       Anemia  -Stable, continue to monitor     POTS  - Continue fluids, monitor vitals.     Complex regional pain syndrome/chronic pain  - Continue medications as ordered.     Myasthenia Gravis  - On IVIG once weekly.     Sjogren's syndrome  - Continue Plaquenil.    SCDs for DVT prophylaxis.  Full code.  Discussed with patient, family, nursing staff, and primary care provider.  Anticipate discharge home in 1-2 days.  Discussed with patient about leaving sometime over the weekend as we continue to wean her pain medications.    Expected Discharge Date: 12/20/2024; Expected Discharge Time:       Jadon Lewis MD  [facility]  12/20/24  14:34 EST

## 2024-12-20 NOTE — PLAN OF CARE
Goal Outcome Evaluation:        Problem: Adult Inpatient Plan of Care  Goal: Plan of Care Review  Outcome: Progressing  Goal: Patient-Specific Goal (Individualized)  Outcome: Progressing     Problem: Nausea and Vomiting  Goal: Nausea and Vomiting Relief  Outcome: Progressing  Intervention: Prevent and Manage Nausea and Vomiting  Recent Flowsheet Documentation  Taken 12/19/2024 2206 by Abel Peralta RN  Nausea/Vomiting Interventions: medication given     Problem: Parenteral Nutrition  Goal: Effective Intravenous Nutrition Therapy Delivery  Outcome: Progressing  Intervention: Optimize Intravenous Nutrition Delivery  Recent Flowsheet Documentation  Taken 12/19/2024 2100 by Abel Peralta RN  Medication Review/Management: medications reviewed     Problem: Adult Inpatient Plan of Care  Goal: Absence of Hospital-Acquired Illness or Injury  Intervention: Identify and Manage Fall Risk  Recent Flowsheet Documentation  Taken 12/19/2024 2100 by Abel Peralta RN  Safety Promotion/Fall Prevention: activity supervised  Intervention: Prevent Skin Injury  Recent Flowsheet Documentation  Taken 12/19/2024 2100 by Abel Peralta RN  Body Position: position changed independently  Goal: Optimal Comfort and Wellbeing  Intervention: Provide Person-Centered Care  Recent Flowsheet Documentation  Taken 12/19/2024 2100 by Abel Peralta RN  Trust Relationship/Rapport:   care explained   choices provided   emotional support provided   empathic listening provided   questions answered   questions encouraged   reassurance provided   thoughts/feelings acknowledged     Problem: Comorbidity Management  Goal: Maintenance of Asthma Control  Intervention: Maintain Asthma Symptom Control  Recent Flowsheet Documentation  Taken 12/19/2024 2100 by Abel Peralta RN  Medication Review/Management: medications reviewed  Goal: Maintenance of Behavioral Health Symptom Control  Intervention: Maintain Behavioral Health Symptom  Control  Recent Flowsheet Documentation  Taken 12/19/2024 2100 by Abel Peralta, RN  Medication Review/Management: medications reviewed  Goal: Maintenance of COPD Symptom Control  Intervention: Maintain COPD (Chronic Obstructive Pulmonary Disease) Symptom Control  Recent Flowsheet Documentation  Taken 12/19/2024 2100 by Abel Peralta, RN  Medication Review/Management: medications reviewed  Goal: Blood Glucose Level Within Target Range  Intervention: Monitor and Manage Glycemia  Recent Flowsheet Documentation  Taken 12/19/2024 2100 by Abel Peralta, RN  Medication Review/Management: medications reviewed  Goal: Maintenance of Heart Failure Symptom Control  Intervention: Maintain Heart Failure Management  Recent Flowsheet Documentation  Taken 12/19/2024 2100 by Abel Peralta, RN  Medication Review/Management: medications reviewed

## 2024-12-20 NOTE — PROGRESS NOTES
[facility] Clinical Pharmacy Services: TPN Daily Progress Note    TPN Day # 4   Indication: gastroparesis  Route: central  Type: cyclic    Subjective/Objective  Results from last 7 days   Lab Units 12/20/24  0550 12/19/24  0520 12/18/24  0928 12/17/24  2301 12/17/24  0514   SODIUM mmol/L 135*   < > 137 135* 133*   POTASSIUM mmol/L 4.4   < > 3.9 3.9 3.2*   CHLORIDE mmol/L 101   < > 105 103 104   CO2 mmol/L 24.9   < > 23.9 23.0 19.4*   BUN mg/dL 9   < > 7 4* 5*   CREATININE mg/dL 0.59   < > 0.52* 0.54* 0.65   CALCIUM mg/dL 9.3   < > 8.9 8.7 8.2*   ALBUMIN g/dL  --   --  3.4* 3.7 3.4*   BILIRUBIN mg/dL  --   --  0.3 0.3 0.4   ALK PHOS U/L  --   --  38* 39 38*   ALT (SGPT) U/L  --   --  8 9 6   AST (SGOT) U/L  --   --  12 9 13   GLUCOSE mg/dL 106*   < > 117* 139* 85   MAGNESIUM mg/dL  --   --   --   --  1.8   PHOSPHORUS mg/dL  --   --   --   --  2.5   TRIGLYCERIDES mg/dL  --   --   --  97  --     < > = values in this interval not displayed.        Diet Orders (active) (From admission, onward)       Start     Ordered    12/19/24 1045  NPO Diet NPO Type: Strict NPO  Diet Effective Now         12/19/24 1044                  Additional insulin administration while previous TPN infusing: none units  Additional electrolyte administration while previous TPN infusing: none  Acid suppression: protonix IV    Assessment/Plan    Will repeat yesterday's TPN which is based off home formulation    John Lewis PharmD  Clinical Pharmacist

## 2024-12-21 VITALS
BODY MASS INDEX: 32.28 KG/M2 | WEIGHT: 171 LBS | SYSTOLIC BLOOD PRESSURE: 99 MMHG | TEMPERATURE: 97.3 F | HEIGHT: 61 IN | DIASTOLIC BLOOD PRESSURE: 57 MMHG | HEART RATE: 66 BPM | OXYGEN SATURATION: 97 % | RESPIRATION RATE: 16 BRPM

## 2024-12-21 LAB
ANION GAP SERPL CALCULATED.3IONS-SCNC: 11.5 MMOL/L (ref 5–15)
BUN SERPL-MCNC: 12 MG/DL (ref 6–20)
BUN/CREAT SERPL: 20.3 (ref 7–25)
CALCIUM SPEC-SCNC: 9.5 MG/DL (ref 8.6–10.5)
CHLORIDE SERPL-SCNC: 96 MMOL/L (ref 98–107)
CO2 SERPL-SCNC: 26.5 MMOL/L (ref 22–29)
CREAT SERPL-MCNC: 0.59 MG/DL (ref 0.57–1)
DEPRECATED RDW RBC AUTO: 38.5 FL (ref 37–54)
EGFRCR SERPLBLD CKD-EPI 2021: 125.3 ML/MIN/1.73
ERYTHROCYTE [DISTWIDTH] IN BLOOD BY AUTOMATED COUNT: 12.2 % (ref 12.3–15.4)
GLUCOSE SERPL-MCNC: 87 MG/DL (ref 65–99)
HCT VFR BLD AUTO: 34.9 % (ref 34–46.6)
HGB BLD-MCNC: 11.8 G/DL (ref 12–15.9)
MAGNESIUM SERPL-MCNC: 2.1 MG/DL (ref 1.6–2.6)
MCH RBC QN AUTO: 29.6 PG (ref 26.6–33)
MCHC RBC AUTO-ENTMCNC: 33.8 G/DL (ref 31.5–35.7)
MCV RBC AUTO: 87.5 FL (ref 79–97)
PHOSPHATE SERPL-MCNC: 4.4 MG/DL (ref 2.5–4.5)
PLATELET # BLD AUTO: 244 10*3/MM3 (ref 140–450)
PMV BLD AUTO: 10.5 FL (ref 6–12)
POTASSIUM SERPL-SCNC: 4.1 MMOL/L (ref 3.5–5.2)
RBC # BLD AUTO: 3.99 10*6/MM3 (ref 3.77–5.28)
SODIUM SERPL-SCNC: 134 MMOL/L (ref 136–145)
WBC NRBC COR # BLD AUTO: 3.4 10*3/MM3 (ref 3.4–10.8)

## 2024-12-21 PROCEDURE — 80048 BASIC METABOLIC PNL TOTAL CA: CPT | Performed by: STUDENT IN AN ORGANIZED HEALTH CARE EDUCATION/TRAINING PROGRAM

## 2024-12-21 PROCEDURE — 94799 UNLISTED PULMONARY SVC/PX: CPT

## 2024-12-21 PROCEDURE — 94664 DEMO&/EVAL PT USE INHALER: CPT

## 2024-12-21 PROCEDURE — 85027 COMPLETE CBC AUTOMATED: CPT | Performed by: STUDENT IN AN ORGANIZED HEALTH CARE EDUCATION/TRAINING PROGRAM

## 2024-12-21 PROCEDURE — 84100 ASSAY OF PHOSPHORUS: CPT | Performed by: STUDENT IN AN ORGANIZED HEALTH CARE EDUCATION/TRAINING PROGRAM

## 2024-12-21 PROCEDURE — 83735 ASSAY OF MAGNESIUM: CPT | Performed by: STUDENT IN AN ORGANIZED HEALTH CARE EDUCATION/TRAINING PROGRAM

## 2024-12-21 PROCEDURE — 25010000002 DIPHENHYDRAMINE PER 50 MG: Performed by: INTERNAL MEDICINE

## 2024-12-21 PROCEDURE — 25010000002 HYDROMORPHONE PER 4 MG: Performed by: STUDENT IN AN ORGANIZED HEALTH CARE EDUCATION/TRAINING PROGRAM

## 2024-12-21 PROCEDURE — 25010000002 CEFTRIAXONE PER 250 MG: Performed by: STUDENT IN AN ORGANIZED HEALTH CARE EDUCATION/TRAINING PROGRAM

## 2024-12-21 RX ORDER — DICYCLOMINE HYDROCHLORIDE 10 MG/5ML
10 SOLUTION ORAL
Start: 2024-12-21 | End: 2025-01-20

## 2024-12-21 RX ADMIN — PANTOPRAZOLE SODIUM 40 MG: 40 INJECTION, POWDER, FOR SOLUTION INTRAVENOUS at 08:26

## 2024-12-21 RX ADMIN — HYDROMORPHONE HYDROCHLORIDE 0.5 MG: 1 INJECTION, SOLUTION INTRAMUSCULAR; INTRAVENOUS; SUBCUTANEOUS at 11:39

## 2024-12-21 RX ADMIN — HYDROXYCHLOROQUINE SULFATE 200 MG: 200 TABLET, FILM COATED ORAL at 11:33

## 2024-12-21 RX ADMIN — HYDROMORPHONE HYDROCHLORIDE 0.5 MG: 1 INJECTION, SOLUTION INTRAMUSCULAR; INTRAVENOUS; SUBCUTANEOUS at 06:10

## 2024-12-21 RX ADMIN — DIPHENHYDRAMINE HYDROCHLORIDE 25 MG: 50 INJECTION, SOLUTION INTRAMUSCULAR; INTRAVENOUS at 06:10

## 2024-12-21 RX ADMIN — HYDROMORPHONE HYDROCHLORIDE 0.5 MG: 1 INJECTION, SOLUTION INTRAMUSCULAR; INTRAVENOUS; SUBCUTANEOUS at 01:19

## 2024-12-21 RX ADMIN — PANTOPRAZOLE SODIUM 40 MG: 40 INJECTION, POWDER, FOR SOLUTION INTRAVENOUS at 16:54

## 2024-12-21 RX ADMIN — SUCRALFATE 1 G: 1 TABLET ORAL at 11:33

## 2024-12-21 RX ADMIN — DICYCLOMINE HYDROCHLORIDE 10 MG: 10 CAPSULE ORAL at 16:54

## 2024-12-21 RX ADMIN — Medication 10 ML: at 11:39

## 2024-12-21 RX ADMIN — BUDESONIDE AND FORMOTEROL FUMARATE DIHYDRATE 2 PUFF: 160; 4.5 AEROSOL RESPIRATORY (INHALATION) at 09:15

## 2024-12-21 RX ADMIN — SUCRALFATE 1 G: 1 TABLET ORAL at 16:54

## 2024-12-21 RX ADMIN — HYDROMORPHONE HYDROCHLORIDE 0.5 MG: 1 INJECTION, SOLUTION INTRAMUSCULAR; INTRAVENOUS; SUBCUTANEOUS at 16:54

## 2024-12-21 RX ADMIN — DICYCLOMINE HYDROCHLORIDE 10 MG: 10 CAPSULE ORAL at 11:33

## 2024-12-21 RX ADMIN — CEFTRIAXONE SODIUM 1000 MG: 1 INJECTION, POWDER, FOR SOLUTION INTRAMUSCULAR; INTRAVENOUS at 15:00

## 2024-12-21 RX ADMIN — ESCITALOPRAM OXALATE 5 MG: 5 SOLUTION ORAL at 11:33

## 2024-12-21 NOTE — PLAN OF CARE
Goal Outcome Evaluation:  Plan of Care Reviewed With: patient        Progress: no change  Outcome Evaluation: tpn rate adjusted per orders, vdg, gt to bsd x when clamped for meds, will take some meds po-but most via gt, up ad isa, blood drawn via mediport

## 2024-12-21 NOTE — PROGRESS NOTES
[facility] Clinical Pharmacy Services: TPN Daily Progress Note    TPN Day # 5   Indication: Gastroparesis  Route: central  Type: cyclic    Subjective/Objective  Results from last 7 days   Lab Units 24  0612 24  0520 24  0928 24  2301   SODIUM mmol/L 134*   < > 137 135*   POTASSIUM mmol/L 4.1   < > 3.9 3.9   CHLORIDE mmol/L 96*   < > 105 103   CO2 mmol/L 26.5   < > 23.9 23.0   BUN mg/dL 12   < > 7 4*   CREATININE mg/dL 0.59   < > 0.52* 0.54*   CALCIUM mg/dL 9.5   < > 8.9 8.7   ALBUMIN g/dL  --   --  3.4* 3.7   BILIRUBIN mg/dL  --   --  0.3 0.3   ALK PHOS U/L  --   --  38* 39   ALT (SGPT) U/L  --   --  8 9   AST (SGOT) U/L  --   --  12 9   GLUCOSE mg/dL 87   < > 117* 139*   MAGNESIUM mg/dL 2.1  --   --   --    PHOSPHORUS mg/dL 4.4  --   --   --    TRIGLYCERIDES mg/dL  --   --   --  97    < > = values in this interval not displayed.      Diet Orders (active) (From admission, onward)       Start     Ordered    24 1800  Adult Cyclic 2-in-1 TPN  Cyclic TPN - See Admin Instructions        Note to Pharmacy: TPN bag #5    24 1138    24 1510  Diet: Gastrointestinal; Low Irritant; Fluid Consistency: Thin (IDDSI 0)  Diet Effective Now         24 1509             Additional insulin administration while previous TPN infusin units  Additional electrolyte administration while previous TPN infusing: None  Acid suppression: Pantoprazole 40 mg IV every 12 hours    Goal TPN Formula Recommendations (per home regimen):  AA: 85 g/day  Dextrose: 680 kcal/day  Lipids: 250 mL/day  Total: 1520 kcal/day  Total volume: 1150 mL    Assessment for TPN Formula on 24:    Glucose measurements have all been <110. SCr and BUN have remained stable. Continue TPN with home macronutrients.  Electrolytes/Additives have been fairly stable; however sodium and chloride continue to trend down on home formula. Will add sodium chloride to today's TPN.  Triglycerides were appropriate at 97 on .  Will get a repeat lab tomorrow morning. Continue lipids at current amount.    Plan for TPN Formula on 12/21/24:    Will continue cyclic TPN with 1150 mL total volume with home macronutrients:                      AA: 85 g/day                      Dextrose: 680 kcal/day                      Lipids: 500 kcal/day (250 mL)                      Total: 1520 kcal/day    Will change sodium chloride, sodium acetate and calcium gluconate in today's TPN:                      Sodium Chloride: 50 mEq                      Sodium Acetate: 90 mEq                      Sodium Phosphate: 15 mEq                      Potassium Chloride: 97 mEq                      Magnesium Sulfate: 25 mEq                      Calcium Gluconate: 10 mEq                      Multivitamins: 10 mL                      Trace Elements: 1 mL    Will order a CMP, phosphorus, magnesium and triglyceride level for tomorrow morning.    Svetlana Avitia, PharmCoreyD., BCPS   Clinical Pharmacist

## 2024-12-21 NOTE — CASE MANAGEMENT/SOCIAL WORK
Continued Stay Note  [facility]     Patient Name: Henrietta Garrett  MRN: [id number]  Today's Date: 2024    Admit Date: 2024    Plan: Home   Discharge Plan       Row Name 24 1502       Plan    Plan Comments Incoming VM from RN regarding patient's concern for TPN via [facility]. [facility] called and spoke with Nguyen/Gabriela who states TPN could not be made and delivered until Monday, . Nguyen also notes patient's TPN from home  on . CCP updated patient and she will have her mother confirm expiration dates versus staying in hospital until Monday. RN updated. Carole DUKES CCP                   Discharge Codes    No documentation.                 Expected Discharge Date and Time       Expected Discharge Date Expected Discharge Time    Dec 21, 2024               Carole Lance RN

## 2024-12-21 NOTE — DISCHARGE SUMMARY
Patient Name: Henrietta Garrett  : 1995  MRN: [id number]    Date of Admission: 2024  Date of Discharge:  2024  Primary Care Physician: Kenya David MD      Chief Complaint:   Nausea and Weakness - Generalized      Discharge Diagnoses     Active Hospital Problems    Diagnosis  POA    **Gastroparesis [K31.84]  Yes    UTI (urinary tract infection) [N39.0]  Yes    Painful swallowing [R13.10]  Yes    Generalized abdominal pain [R10.84]  Yes    Hypokalemia [E87.6]  Yes    Rectal bleeding [K62.5]  Unknown    Generalized anxiety disorder [F41.1]  Yes    Post traumatic stress disorder (PTSD) [F43.10]  Yes    Intractable migraine with aura without status migrainosus [G43.119]  Yes    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        Hospital Course     Ms. Garrett is a 29 y.o. female with a history of gastroparesis on TPN follows with [facility] motility clinic, POTS, complex regional pain syndrome, chronic pain, myasthenia gravis, Sjogren's syndrome presenting with throat and abdominal pain, nausea, vomiting consistent with gastroparesis flare.  GI was consulted and did EGD that showed completely normal mucosa.  Patient was found to have a UTI, received 5 days of ceftriaxone prior to discharge.  This was likely the cause of of her gastroparesis flare.  Her pain is slowly improved over the last few days as we have been weaning IV Dilaudid as well.  Have discussed with patient and she is agreeable to go home.    At the time of discharge patient was told to take all medications as prescribed, keep all follow-up  appointments, and call their doctor or return to the hospital with any worsening or concerning symptoms.    Day of Discharge     Subjective:  Patient lying in bed.  Continues to improve every day.  Patient to discharge after last dose of IV antibiotics. Family at bedside        Physical Exam:  Temp:  [97 °F (36.1 °C)-97.4 °F (36.3 °C)] 97.3 °F (36.3 °C)  Heart Rate:  [59-70] 66  Resp:  [16] 16  BP: ()/(57-69) 99/57  Body mass index is 31.97 kg/m².  Physical Exam  Constitutional:       General: She is not in acute distress.     Appearance: She is not ill-appearing.   Cardiovascular:      Rate and Rhythm: Normal rate and regular rhythm.   Pulmonary:      Effort: Pulmonary effort is normal. No respiratory distress.   Abdominal:      General: Abdomen is flat. There is no distension.      Tenderness: There is abdominal tenderness (Left upper quadrant).   Musculoskeletal:         General: No swelling or deformity. Normal range of motion.   Skin:     General: Skin is warm and dry.   Neurological:      General: No focal deficit present.      Mental Status: She is alert. Mental status is at baseline.   Consultants     Consult Orders (all) (From admission, onward)       Start     Ordered    12/17/24 1107  Inpatient Gastroenterology Consult  Once        Specialty:  Gastroenterology  Provider:  Mary Ann Sarmiento MD    12/17/24 1107    12/16/24 1429  [facility] (on-call MD unless specified) Details  Once        Specialty:  Hospitalist  Provider:  Obi Hinson MD    12/16/24 1428                  Procedures     Imaging Results (All)       Procedure Component Value Units Date/Time    CT Abdomen Pelvis With Contrast [[id number]] Collected: 12/16/24 1102     Updated: 12/16/24 1123    Narrative:      CT ABDOMEN PELVIS W CONTRAST-     INDICATION: Abdominal pain, vomiting     COMPARISON: CT abdomen pelvis July 2, 2024     TECHNIQUE:  Routine CT abdomen/pelvis with IV contrast. Coronal and sagittal  reformats. Radiation dose reduction  techniques were utilized, including  automated exposure control and exposure modulation based on body size.     FINDINGS:      Lung bases: Subsegmental atelectasis seen at the bases.     ABDOMEN: No liver mass. Cholecystectomy. No biliary ductal dilatation.  Spleen is at upper limits in size. Incidental splenule's. No pancreatic  mass or pancreatic ductal dilatation seen. No adrenal nodules. No renal  mass or hydronephrosis.     Pelvis: Underdistended bladder. No bladder calculus. Anteverted uterus.  Dominant follicle in the right ovary. Small amount of free fluid in the  cul-de-sac, within physiologic limits.     Bowel: Gastrostomy tube seen. Small bowel anastomosis suture seen in the  mid abdomen. Descending and sigmoid colon are collapsed. Rectum is  collapsed. No obstruction. Normal appendix.     Abdominal wall: Abdominal and pelvic wall scarring.     Retroperitoneum: Multiple mildly prominent abdominal retroperitoneal  lymph nodes seen. For example, aortocaval lymph node, series 2, axial  mage 82, measures 7 to 8 mm in short axis, stable. No lymph nodes  measuring greater than 1 cm are seen. No new or enlarging lymph nodes  present.     Vasculature: Patent. No abdominal aortic aneurysm. Circumaortic left  renal vein.     Osseous structures: No destructive osseous lesions.       Impression:         1. No acute findings identified in the abdomen or pelvis.  2. Stable mildly prominent abdominal retroperitoneal lymph nodes. No new  or enlarging lymph nodes identified.     This report was finalized on 12/16/2024 11:20 AM by Dr. Abel Sánchez M.D on Workstation: [id number]               Pertinent Labs     Results from last 7 days   Lab Units 12/21/24  0612 12/20/24  0550 12/19/24  0520 12/17/24  2301   WBC 10*3/mm3 3.40 3.35* 4.62 9.16   HEMOGLOBIN g/dL 11.8* 11.6* 10.7* 10.3*   PLATELETS 10*3/mm3 244 222 192 176     Results from last 7 days   Lab Units 12/21/24  0612 12/20/24  0550 12/19/24  0520  12/18/24  0928   SODIUM mmol/L 134* 135* 136 137   POTASSIUM mmol/L 4.1 4.4 4.6 3.9   CHLORIDE mmol/L 96* 101 103 105   CO2 mmol/L 26.5 24.9 26.2 23.9   BUN mg/dL 12 9 7 7   CREATININE mg/dL 0.59 0.59 0.49* 0.52*   GLUCOSE mg/dL 87 106* 104* 117*   Estimated Creatinine Clearance: 133.5 mL/min (by C-G formula based on SCr of 0.59 mg/dL).  Results from last 7 days   Lab Units 12/18/24  0928 12/17/24  2301 12/17/24  0514 12/16/24  0932   ALBUMIN g/dL 3.4* 3.7 3.4* 4.2   BILIRUBIN mg/dL 0.3 0.3 0.4 0.4   ALK PHOS U/L 38* 39 38* 44   AST (SGOT) U/L 12 9 13 14   ALT (SGPT) U/L 8 9 6 9     Results from last 7 days   Lab Units 12/21/24  0612 12/20/24  0550 12/19/24  0520 12/18/24  0928 12/17/24  2301 12/17/24  0514 12/16/24  0932   CALCIUM mg/dL 9.5 9.3 9.0 8.9 8.7 8.2* 9.6   ALBUMIN g/dL  --   --   --  3.4* 3.7 3.4* 4.2   MAGNESIUM mg/dL 2.1  --   --   --   --  1.8  --    PHOSPHORUS mg/dL 4.4  --   --   --   --  2.5  --      Results from last 7 days   Lab Units 12/16/24  0932   LIPASE U/L 15         Results from last 7 days   Lab Units 12/17/24  2301   TRIGLYCERIDES mg/dL 97     Results from last 7 days   Lab Units 12/16/24  1546   URINECX  <25,000 CFU/mL Mixed Leanrda Isolated       Test Results Pending at Discharge       Discharge Details        Discharge Medications        Continue These Medications        Instructions Start Date   Blisovi 24 Fe 1-20 MG-MCG(24) per tablet  Generic drug: norethindrone-ethinyl estradiol-ferrous fumarate   1 tablet, Oral, Daily      dicyclomine 10 MG/5ML syrup  Commonly known as: BENTYL   10 mg, Oral, 4 Times Daily Before Meals & Nightly      diphenhydrAMINE  Commonly known as: BENADRYL   25 mg, As Needed      EPINEPHrine 0.3 MG/0.3ML solution auto-injector injection  Commonly known as: EPIPEN   0.3 mg, As Needed      escitalopram 1 mg/mL solution  Commonly known as: LEXAPRO   5 mg, Per G Tube, 2 Times Daily      Fremanezumab-vfrm 225 MG/1.5ML solution auto-injector   225 mg, Subcutaneous,  Every 30 Days      Gammaplex 10 GM/200ML solution infusion  Generic drug: immune globulin (human)   Infuse  into a venous catheter 1 (One) Time Per Week.      hydroxychloroquine 200 MG tablet  Commonly known as: PLAQUENIL   200 mg, 2 Times Daily      hydrOXYzine 10 MG/5ML syrup  Commonly known as: ATARAX   25 mg, Per G Tube, Nightly      IBUPROFEN PO       multivitamin with minerals tablet tablet   1 tablet, Daily      naloxone 0.4 MG/ML injection  Commonly known as: NARCAN   0.1 mg, Intravenous, Every 5 Minutes PRN      naloxone 4 MG/0.1ML nasal spray  Commonly known as: NARCAN   Call 911. Don't prime. Bath in 1 nostril for overdose. Repeat in 2-3 minutes in other nostril if no or minimal breathing/responsiveness.      Nitro-Bid 2 % ointment  Generic drug: nitroglycerin   APPLY BY TRANSDERMAL ROUTE TO FINGER WEBS FOR RAYNAUD'S EVERY 12 HOURS AND REMOVE AT BEDTIME      oxyCODONE-acetaminophen  MG per tablet  Commonly known as: PERCOCET   1 tablet, Oral, Every 6 Hours PRN      pantoprazole 40 MG EC tablet  Commonly known as: PROTONIX   40 mg, 2 Times Daily      predniSONE 5 MG tablet  Commonly known as: DELTASONE   Take 1 tablet by mouth Daily.      promethazine 25 MG tablet  Commonly known as: PHENERGAN   25 mg, Every 4 Hours      promethazine-dextromethorphan 6.25-15 MG/5ML syrup  Commonly known as: PROMETHAZINE-DM   20 mL, Every 6 Hours PRN      Symbicort 160-4.5 MCG/ACT inhaler  Generic drug: budesonide-formoterol   2 puffs, 2 Times Daily - RT      Zavegepant HCl 10 MG/ACT solution   1 spray, Nasal, Once As Needed               Allergies   Allergen Reactions    Anesthetics, Amide Nausea And Vomiting    Ciprofloxacin Other (See Comments)     EHERLIS STANDLIS? ILLNESS. UNABLE TO TAKE     Compazine [Prochlorperazine] Dystonia    Domperidone Other (See Comments)     Bad reaction per pt report    Droperidol Other (See Comments)     Tardive dyskinesia    Haldol [Haloperidol] Other (See Comments)     Muscle  spasms      Metoclopramide Nausea And Vomiting    Sulfa Antibiotics Other (See Comments)     Unable to take as causes leukopenia         Discharge Disposition:  Home or Self Care    Discharge Diet:  Diet Order   Procedures    Diet: Gastrointestinal; Low Irritant; Fluid Consistency: Thin (IDDSI 0)       Discharge Activity:   As tolerated    CODE STATUS:    Code Status and Medical Interventions: CPR (Attempt to Resuscitate); Full Support   Ordered at: 12/16/24 1606     Code Status (Patient has no pulse and is not breathing):    CPR (Attempt to Resuscitate)     Medical Interventions (Patient has pulse or is breathing):    Full Support       Future Appointments   Date Time Provider Department Center   2/3/2025  2:20 PM Ross Dailey MD [facility]   2/24/2025  1:30 PM Kenya David MD [facility]      Follow-up Information       Kenya David MD .    Specialties: Family Medicine, Acupuncturist, Urgent Care  Contact information:  [address] [facility] [address] [phone]                             Time Spent on Discharge:  Greater than 30 minutes      Jadon Lewis MD  [facility]  12/21/24  12:53 EST

## 2024-12-21 NOTE — NURSING NOTE
Reviewed AVS with patient, all questions answered, patient verbalized understanding, g-tube clamped, mediport accessed but capped to receive TPN at home.

## 2024-12-22 ENCOUNTER — READMISSION MANAGEMENT (OUTPATIENT)
Dept: CALL CENTER | Facility: HOSPITAL | Age: 29
End: 2024-12-22
Payer: COMMERCIAL

## 2024-12-22 NOTE — OUTREACH NOTE
Prep Survey      Flowsheet Row Responses   [facility] discharged [facility]? [facility]   Is LACE score < 7 ? No   Eligibility Readm Mgmt   Discharge diagnosis *Gastroparesis   Does the patient have one of the following disease processes/diagnoses(primary or secondary)? Other   Does the patient have Home health ordered? Yes   What is the Home health agency?  [facility]   Is there a DME ordered? No   Prep survey completed? Yes            MICHAEL SWIFT - Registered Nurse

## 2024-12-22 NOTE — CASE MANAGEMENT/SOCIAL WORK
Case Management Discharge Note      Final Note: home with continued [facility] for TPN    Provided Post Acute Provider List?: N/A  Provided Post Acute Provider Quality & Resource List?: N/A    Selected Continued Care - Discharged on 12/21/2024 Admission date: 12/16/2024 - Discharge disposition: Home or Self Care      Destination    No services have been selected for the patient.                Durable Medical Equipment    No services have been selected for the patient.                Dialysis/Infusion Coordination complete.      Service Provider Services Address Phone Fax Patient Preferred    [facility] [id number] [address] [address] [phone] --              Home Medical Care Coordination complete.      Service Provider Services Address Phone Fax Patient Preferred    [facility] [address] [place], [place] [id number] [phone] --              Therapy    No services have been selected for the patient.                Community Resources    No services have been selected for the patient.                [facility]    No services have been selected for the patient.                    Transportation Services  Private: Car    Final Discharge Disposition Code: 01 - home or self-care

## 2024-12-23 NOTE — PAYOR COMM NOTE
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    Daily CHG Bath While Central Line in Place    Code Status and Medical Interventions: CPR (Attempt to Resuscitate); Full Support    LHA (on-call MD unless specified) Details    Opioid Administration - Capnography    ECG 12 Lead QT Measurement    Telemetry Scan    Insert Peripheral IV    Insert Peripheral IV    Access Port    Initiate Observation Status    CBC &  Differential    Green Top (Gel)    Lavender Top    Light Blue Top         OUTPATIENT MEDICATION MANAGEMENT:  Current Facility-Administered Medications Ordered in Epic   Medication Dose Route Frequency Provider Last Rate Last Admin    acetaminophen (TYLENOL) tablet 650 mg  650 mg Oral Q4H PRN Obi Hinson MD        Or    acetaminophen (TYLENOL) 160 MG/5ML oral solution 650 mg  650 mg Oral Q4H PRN Obi Hinson MD        Or    acetaminophen (TYLENOL) suppository 650 mg  650 mg Rectal Q4H PRN Obi Hinson MD        budesonide-formoterol (SYMBICORT) 160-4.5 MCG/ACT inhaler 2 puff  2 puff Inhalation BID - RT Obi Hinson MD        Calcium Replacement - Follow Nurse / BPA Driven Protocol   Not Applicable PRN Obi Hinson MD        dicyclomine (BENTYL) capsule 10 mg  10 mg Per G Tube 4x Daily AC & at Bedtime Obi Hinson MD        EPINEPHrine (ANAPHYLAXIS) 1 mg/ml injection kit  0.3 mg Intramuscular Once PRN Obi Hinson MD        gabapentin (NEURONTIN) capsule 200 mg  200 mg Oral TID Obi Hinson MD        heparin injection 500 Units  5 mL Intravenous PRN Obi Hinson MD        HYDROmorphone (DILAUDID) injection 0.5 mg  0.5 mg Intravenous Q2H PRN Obi Hinson MD   0.5 mg at 12/16/24 1940    hydroxychloroquine (PLAQUENIL) tablet 200 mg  200 mg Oral BID Obi Hinson MD        hydrOXYzine (ATARAX) 10 MG/5ML syrup 25 mg  25 mg Per G Tube Nightly Obi Hinson MD        Magnesium Standard Dose Replacement - Follow Nurse / BPA Driven Protocol   Not Applicable PRN Obi Hinson MD        nitroglycerin (NITROSTAT) ointment 1 inch  1 inch Topical Nightly Obi Hinson MD        nitroglycerin (NITROSTAT) SL tablet 0.4 mg  0.4 mg Sublingual Q5 Min PRN Obi Hinson MD        ondansetron (ZOFRAN) injection 4 mg  4 mg Intravenous Q6H PRN Obi Hinson MD        oxyCODONE-acetaminophen (PERCOCET)  MG per tablet 1 tablet  1 tablet Oral Q6H PRN Obi Hinson MD        pantoprazole (PROTONIX) EC tablet 40 mg  40 mg Oral  BID Obi Hinson MD        PATIENT SUPPLIED MEDICATION  5 mL Per G Tube BID Obi Hinson MD        Phosphorus Replacement - Follow Nurse / BPA Driven Protocol   Not Applicable PRN Obi Hinson MD        Potassium Replacement - Follow Nurse / BPA Driven Protocol   Not Applicable PRN Obi Hinson MD        promethazine (PHENERGAN) tablet 25 mg  25 mg Oral Once Obi Hinson MD        promethazine-dextromethorphan (PROMETHAZINE-DM) 6.25-15 MG/5ML syrup 5 mL  5 mL Per J Tube Q6H PRN Obi Hinson MD        silver sulfadiazine (SILVADENE, SSD) 1 % cream   Topical Daily Obi Hinson MD        sodium chloride 0.9 % flush 10 mL  10 mL Intravenous PRN Obi Hinson MD        sodium chloride 0.9 % flush 10 mL  10 mL Intravenous Q12H Obi Hinson MD        sodium chloride 0.9 % flush 10 mL  10 mL Intravenous PRN Obi Hinson MD        sodium chloride 0.9 % flush 10 mL  10 mL Intravenous Q12H Obi Hinson MD        sodium chloride 0.9 % flush 10 mL  10 mL Intravenous PRN Obi Hinson MD        sodium chloride 0.9 % flush 20 mL  20 mL Intravenous PRN Obi Hinson MD        sodium chloride 0.9 % infusion 40 mL  40 mL Intravenous PRN Obi Hinson MD        sodium chloride 0.9 % infusion 40 mL  40 mL Intravenous PRN Obi Hinson MD        sodium chloride 0.9 % infusion  100 mL/hr Intravenous Continuous Obi Hinson  mL/hr at 12/16/24 1754 100 mL/hr at 12/16/24 1754    Zavegepant HCl solution 1 spray  1 spray Nasal Once PRN Obi Hinson MD         No current [facility]-ordered outpatient medications on file.         PROCEDURES  Procedures            PROGRESS, DATA ANALYSIS, CONSULTS, AND MEDICAL DECISION MAKING  All labs have been independently interpreted by me.  All radiology studies have been reviewed by me. All EKG's have been independently viewed and interpreted by me.  Discussion below represents my analysis of pertinent findings related to patient's condition, differential diagnosis, treatment plan and final  disposition.    DIFFERENTIAL  Differential diagnosis includes but is not limited to:  - hepatobiliary pathology such as cholecystitis, cholangitis, and symptomatic cholelithiasis  - Pancreatitis  - Dyspepsia  - Small bowel obstruction  - Appendicitis  - Diverticulitis  - UTI including pyelonephritis  - Ureteral stone  - Zoster  - Colitis, including infectious and ischemic  - Atypical ACS  -Viral syndrome  -Gastroparesis  -Infectious mononucleosis    Clinical Scores:                  ED Course as of 12/16/24 2037   Mon Dec 16, 2024   1252 EKG interpreted by me demonstrates sinus rhythm, rate 69, no NJ/QT prolongation, no ST elevation [MW]   1257 Reassessed patient she has ongoing abdominal pain and nausea.  Will give additional dose of Dilaudid.  Patient additionally requesting additional antiemetic.  She states promethazine usually works.  We do not have this in IV formulation.  Offered oral however she declines.  Will give additional dose of Zofran as patient has no QT prolongation. [MW]   1428 Reassessed patient again and she has ongoing discomfort in her abdomen.  Will admit for further symptomatic management. [MW]   1456 Discussed with Dr. Hinson who agrees to admit [MW]      ED Course User Index  [MW] Zachery Batres MD             AS OF 20:37 EST VITALS:    BP - 119/78  HR - 75  TEMP - 99.5 °F (37.5 °C) (Oral)  O2 SATS - 98%    COMPLEXITY OF CARE  The patient requires admission.      DIAGNOSIS  Final diagnoses:   Generalized abdominal pain         DISPOSITION  ED Disposition       ED Disposition   Decision to Admit    Condition   --    Comment   Level of Care: Med/Surg [1]   Diagnosis: Gastroparesis [536.3.ICD-9-CM]   Admitting Physician: JESSICA HINSON [[id number]]   Attending Physician: JESSICA HINSON [[id number]]   Is patient appropriate for Inpatient Observation Unit?: Yes [1]                    FOLLOW UP  No follow-up provider specified.      Prescribed Medications     Medication List      No changes were made to your  prescriptions during this visit.                   Please note that portions of this document were completed with a voice recognition program.    Note Disclaimer: At [facility], we believe that sharing information builds trust and better relationships. You are receiving this note because you recently visited [facility]. It is possible you will see health information before a provider has talked with you about it. This kind of information can be easy to misunderstand. To help you fully understand what it means for your health, we urge you to discuss this note with your provider.         Ivonne Soto PA-C  24      Electronically signed by Zachery Batres MD at 24 185       Earnest Rao RN at 24 0924          Patient to ED via pv from [facility]. Patient vomiting and weakness x 1 week.     Electronically signed by Earnest Rao RN at 24 0925          Physician Progress Notes (last 7 days)        Jadon Lewis MD at 24 1434              Name: Henrietta Garrett ADMIT: 2024   : 1995  PCP: eKnya David MD    MRN: [id number] LOS: 0 days   AGE/SEX: 29 y.o. female  ROOM: [facility]     Subjective   Subjective   Pain continues to slowly improve.  Patient continues to have sore throat and abdominal pain.  EGD done yesterday with no  new findings, completely normal mucosa      Review of Systems   Constitutional:  Positive for fatigue. Negative for chills and fever.   Respiratory:  Negative for cough and shortness of breath.    Cardiovascular:  Negative for chest pain and leg swelling.   Gastrointestinal:  Positive for nausea. Negative for abdominal pain and diarrhea.     As above    Objective   Objective   Vital Signs  Temp:  [97.3 °F (36.3 °C)-98.2 °F (36.8 °C)] 97.4 °F (36.3 °C)  Heart Rate:  [58-84] 67  Resp:  [16-20] 16  BP: (101-118)/(62-91) 115/69  SpO2:  [95 %-100 %] 99 %  on   ;   Device (Oxygen Therapy): room air  Body mass index is 31.97 kg/m².  Physical  Exam  Constitutional:       General: She is not in acute distress.     Appearance: She is not ill-appearing.   Cardiovascular:      Rate and Rhythm: Normal rate and regular rhythm.   Pulmonary:      Effort: Pulmonary effort is normal. No respiratory distress.   Abdominal:      General: Abdomen is flat. There is no distension.      Tenderness: There is abdominal tenderness (Left upper quadrant).   Musculoskeletal:         General: No swelling or deformity. Normal range of motion.   Skin:     General: Skin is warm and dry.   Neurological:      General: No focal deficit present.      Mental Status: She is alert. Mental status is at baseline.         Results Review     I reviewed the patient's new clinical results.  Results from last 7 days   Lab Units 12/20/24  0550 12/19/24  0520 12/17/24  2301 12/17/24  0514   WBC 10*3/mm3 3.35* 4.62 9.16 7.93   HEMOGLOBIN g/dL 11.6* 10.7* 10.3* 10.0*   PLATELETS 10*3/mm3 222 192 176 171     Results from last 7 days   Lab Units 12/20/24  0550 12/19/24  0520 12/18/24 0928 12/17/24  2301   SODIUM mmol/L 135* 136 137 135*   POTASSIUM mmol/L 4.4 4.6 3.9 3.9   CHLORIDE mmol/L 101 103 105 103   CO2 mmol/L 24.9 26.2 23.9 23.0   BUN mg/dL 9 7 7 4*   CREATININE mg/dL 0.59 0.49* 0.52* 0.54*   GLUCOSE mg/dL 106* 104* 117* 139*   Estimated Creatinine Clearance: 133.5 mL/min (by C-G formula based on SCr of 0.59 mg/dL).  Results from last 7 days   Lab Units 12/18/24  0928 12/17/24  2301 12/17/24  0514 12/16/24  0932   ALBUMIN g/dL 3.4* 3.7 3.4* 4.2   BILIRUBIN mg/dL 0.3 0.3 0.4 0.4   ALK PHOS U/L 38* 39 38* 44   AST (SGOT) U/L 12 9 13 14   ALT (SGPT) U/L 8 9 6 9     Results from last 7 days   Lab Units 12/20/24  0550 12/19/24  0520 12/18/24  0928 12/17/24  2301 12/17/24  0514 12/16/24  0932   CALCIUM mg/dL 9.3 9.0 8.9 8.7 8.2* 9.6   ALBUMIN g/dL  --   --  3.4* 3.7 3.4* 4.2   MAGNESIUM mg/dL  --   --   --   --  1.8  --    PHOSPHORUS mg/dL  --   --   --   --  2.5  --        COVID19   Date Value Ref  "Range Status   12/16/2024 Not Detected Not Detected - Ref. Range Final     SARS-CoV-2, DANIELLA   Date Value Ref Range Status   11/03/2024 NEGATIVE Negative Final     Comment:     The 2019-CoV rRT-PCR Assay is only for use under a Food and Drug Administration Emergency Use Authorization. The performance characteristics of the assay were verified by the Clinical Laboratory at [facility]. Results should be used in   conjunction with the patient's clinical symptoms, medical history, and other clinical/laboratory findings to determine an overall clinical diagnosis. Negative results do not preclude infection with SARS-CoV-2 (COVID-19).    Test parameters have not been validated for screening asymptomatic patients.     No results found for: \"HGBA1C\", \"POCGLU\"    CT Abdomen Pelvis With Contrast  Narrative: CT ABDOMEN PELVIS W CONTRAST-     INDICATION: Abdominal pain, vomiting     COMPARISON: CT abdomen pelvis July 2, 2024     TECHNIQUE:  Routine CT abdomen/pelvis with IV contrast. Coronal and sagittal  reformats. Radiation dose reduction techniques were utilized, including  automated exposure control and exposure modulation based on body size.     FINDINGS:      Lung bases: Subsegmental atelectasis seen at the bases.     ABDOMEN: No liver mass. Cholecystectomy. No biliary ductal dilatation.  Spleen is at upper limits in size. Incidental splenule's. No pancreatic  mass or pancreatic ductal dilatation seen. No adrenal nodules. No renal  mass or hydronephrosis.     Pelvis: Underdistended bladder. No bladder calculus. Anteverted uterus.  Dominant follicle in the right ovary. Small amount of free fluid in the  cul-de-sac, within physiologic limits.     Bowel: Gastrostomy tube seen. Small bowel anastomosis suture seen in the  mid abdomen. Descending and sigmoid colon are collapsed. Rectum is  collapsed. No obstruction. Normal appendix.     Abdominal wall: Abdominal and pelvic wall scarring.     Retroperitoneum: Multiple " mildly prominent abdominal retroperitoneal  lymph nodes seen. For example, aortocaval lymph node, series 2, axial  mage 82, measures 7 to 8 mm in short axis, stable. No lymph nodes  measuring greater than 1 cm are seen. No new or enlarging lymph nodes  present.     Vasculature: Patent. No abdominal aortic aneurysm. Circumaortic left  renal vein.     Osseous structures: No destructive osseous lesions.     Impression:    1. No acute findings identified in the abdomen or pelvis.  2. Stable mildly prominent abdominal retroperitoneal lymph nodes. No new  or enlarging lymph nodes identified.     This report was finalized on 12/16/2024 11:20 AM by Dr. Abel Sánchez M.D on Workstation: [id number]       I reviewed the patient's daily medications.  Scheduled Medications  budesonide-formoterol, 2 puff, Inhalation, BID - RT  cefTRIAXone, 1,000 mg, Intravenous, Q24H  dicyclomine, 10 mg, Per G Tube, 4x Daily AC & at Bedtime  escitalopram, 5 mL, Per G Tube, BID  Fat Emulsion Plant Based, 250 mL, Intravenous, Q24H (TPN)  gabapentin, 200 mg, Per G Tube, TID  hydroxychloroquine, 200 mg, Per G Tube, BID  hydrOXYzine, 25 mg, Per G Tube, Nightly  nitroglycerin, 1 inch, Topical, Nightly  pantoprazole, 40 mg, Intravenous, BID AC  silver sulfadiazine, , Topical, Daily  sodium chloride, 10 mL, Intravenous, Q12H  sodium chloride, 10 mL, Intravenous, Q12H  sucralfate, 1 g, Oral, 4x Daily AC & at Bedtime    Infusions  Adult Cyclic 2-in-1 TPN, , Last Rate: 40 mL/hr at 12/20/24 0922  Adult Cyclic 2-in-1 TPN,   Pharmacy to Dose TPN,   sodium chloride, 30 mL/hr, Last Rate: 30 mL/hr (12/19/24 1417)    Diet  Adult Cyclic 2-in-1 TPN  Diet: Gastrointestinal; Low Irritant; Fluid Consistency: Thin (IDDSI 0)  Adult Cyclic 2-in-1 TPN        I have personally reviewed:  [x]  Laboratory   [x]  Microbiology   [x]  Radiology   []  EKG/Telemetry   []  Cardiology/Vascular   []  Pathology   [x]  Records     Assessment/Plan     Active Hospital Problems     Diagnosis  POA    **Gastroparesis [K31.84]  Yes    UTI (urinary tract infection) [N39.0]  Yes    Painful swallowing [R13.10]  Yes    Generalized abdominal pain [R10.84]  Yes    Hypokalemia [E87.6]  Yes    Rectal bleeding [K62.5]  Unknown    Generalized anxiety disorder [F41.1]  Yes    Post traumatic stress disorder (PTSD) [F43.10]  Yes    Intractable migraine with aura without status migrainosus [G43.119]  Yes    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       29 y.o. female admitted with Gastroparesis.    Generalized abdominal pain with nausea  GERD  Gastroparesis  Pain/difficulty swallowing  -CT abdomen with no acute findings  -GI consulted, continue IV PPI twice daily and Carafate.  EGD with normal mucosa, GI has signed off   -Decrease frequency of IV Dilaudid.     Urinary tract infection/Acute cystitis without hematuria  -Dysuria is improving.  Continue ceftriaxone day 4 of 5     Other symptoms and signs concerning food and fluid intake  - She is on long term TPN, continue as ordered.     Hypokalemia  - K low on prior labs, repleted and improved, continue to monitor.     Dehydration  -Received some IV fluids, creatinine at baseline.       Anemia  -Stable, continue to monitor     POTS  - Continue fluids, monitor vitals.     Complex regional pain syndrome/chronic pain  - Continue medications as ordered.     Myasthenia Gravis  - On IVIG once weekly.     Sjogren's syndrome  - Continue Plaquenil.    SCDs for DVT prophylaxis.  Full code.  Discussed with patient, family, nursing staff, and primary care provider.  Anticipate  discharge home in 1-2 days.  Discussed with patient about leaving sometime over the weekend as we continue to wean her pain medications.    Expected Discharge Date: 2024; Expected Discharge Time:       Jadon Lewis MD  [facility]  24  14:34 EST            Electronically signed by Jadon Lewis MD at 24 1435       Jadon Lewis MD at 24 1103              Name: Henrietta Garrett ADMIT: 2024   : 1995  PCP: Kenya David MD    MRN: [id number] LOS: 0 days   AGE/SEX: 29 y.o. female  ROOM: [facility]     Subjective   Subjective   Patient symptom improving today.  Still having some abdominal pain and throat pain but improved since coming in.  Requiring IV Dilaudid quite frequently.  Family at bedside.  Agreeable to further GI evaluation today.  Nausea without vomiting.  States she has not vomited since she was in the emergency room.  Review of Systems   Constitutional:  Positive for fatigue. Negative for chills and fever.   Respiratory:  Negative for cough and shortness of breath.    Cardiovascular:  Negative for chest pain and leg swelling.   Gastrointestinal:  Positive for nausea. Negative for abdominal pain and diarrhea.     As above    Objective   Objective   Vital Signs  Temp:  [97.5 °F (36.4 °C)-98.1 °F (36.7 °C)] 98.1 °F (36.7 °C)  Heart Rate:  [50-65] 64  Resp:  [16-18] 16  BP: (111-119)/(65-78) 116/65  SpO2:  [94 %-100 %] 98 %  on   ;   Device (Oxygen Therapy): room air  Body mass index is 24.54 kg/m².  Physical Exam  Constitutional:       General: She is not in acute distress.     Appearance: She is not ill-appearing.   Cardiovascular:      Rate and Rhythm: Normal rate and regular rhythm.   Pulmonary:      Effort: Pulmonary effort is normal. No respiratory distress.   Abdominal:      General: Abdomen is flat. There is no distension.      Tenderness: There is abdominal tenderness (Left upper quadrant).   Musculoskeletal:         General: No swelling or  deformity. Normal range of motion.   Skin:     General: Skin is warm and dry.   Neurological:      General: No focal deficit present.      Mental Status: She is alert. Mental status is at baseline.         Results Review     I reviewed the patient's new clinical results.  Results from last 7 days   Lab Units 12/19/24 0520 12/17/24 2301 12/17/24  0514 12/16/24  0932   WBC 10*3/mm3 4.62 9.16 7.93 7.95   HEMOGLOBIN g/dL 10.7* 10.3* 10.0* 11.3*   PLATELETS 10*3/mm3 192 176 171 203     Results from last 7 days   Lab Units 12/19/24 0520 12/18/24 0928 12/17/24 2301 12/17/24  0514   SODIUM mmol/L 136 137 135* 133*   POTASSIUM mmol/L 4.6 3.9 3.9 3.2*   CHLORIDE mmol/L 103 105 103 104   CO2 mmol/L 26.2 23.9 23.0 19.4*   BUN mg/dL 7 7 4* 5*   CREATININE mg/dL 0.49* 0.52* 0.54* 0.65   GLUCOSE mg/dL 104* 117* 139* 85   Estimated Creatinine Clearance: 213.4 mL/min (A) (by C-G formula based on SCr of 0.49 mg/dL (L)).  Results from last 7 days   Lab Units 12/18/24 0928 12/17/24 2301 12/17/24  0514 12/16/24  0932   ALBUMIN g/dL 3.4* 3.7 3.4* 4.2   BILIRUBIN mg/dL 0.3 0.3 0.4 0.4   ALK PHOS U/L 38* 39 38* 44   AST (SGOT) U/L 12 9 13 14   ALT (SGPT) U/L 8 9 6 9     Results from last 7 days   Lab Units 12/19/24 0520 12/18/24 0928 12/17/24 2301 12/17/24  0514 12/16/24  0932   CALCIUM mg/dL 9.0 8.9 8.7 8.2* 9.6   ALBUMIN g/dL  --  3.4* 3.7 3.4* 4.2   MAGNESIUM mg/dL  --   --   --  1.8  --    PHOSPHORUS mg/dL  --   --   --  2.5  --        COVID19   Date Value Ref Range Status   12/16/2024 Not Detected Not Detected - Ref. Range Final     SARS-CoV-2, DANIELLA   Date Value Ref Range Status   11/03/2024 NEGATIVE Negative Final     Comment:     The 2019-CoV rRT-PCR Assay is only for use under a Food and Drug Administration Emergency Use Authorization. The performance characteristics of the assay were verified by the Clinical Laboratory at [facility]. Results should be used in   conjunction with the patient's clinical symptoms,  "medical history, and other clinical/laboratory findings to determine an overall clinical diagnosis. Negative results do not preclude infection with SARS-CoV-2 (COVID-19).    Test parameters have not been validated for screening asymptomatic patients.     No results found for: \"HGBA1C\", \"POCGLU\"    CT Abdomen Pelvis With Contrast  Narrative: CT ABDOMEN PELVIS W CONTRAST-     INDICATION: Abdominal pain, vomiting     COMPARISON: CT abdomen pelvis July 2, 2024     TECHNIQUE:  Routine CT abdomen/pelvis with IV contrast. Coronal and sagittal  reformats. Radiation dose reduction techniques were utilized, including  automated exposure control and exposure modulation based on body size.     FINDINGS:      Lung bases: Subsegmental atelectasis seen at the bases.     ABDOMEN: No liver mass. Cholecystectomy. No biliary ductal dilatation.  Spleen is at upper limits in size. Incidental splenule's. No pancreatic  mass or pancreatic ductal dilatation seen. No adrenal nodules. No renal  mass or hydronephrosis.     Pelvis: Underdistended bladder. No bladder calculus. Anteverted uterus.  Dominant follicle in the right ovary. Small amount of free fluid in the  cul-de-sac, within physiologic limits.     Bowel: Gastrostomy tube seen. Small bowel anastomosis suture seen in the  mid abdomen. Descending and sigmoid colon are collapsed. Rectum is  collapsed. No obstruction. Normal appendix.     Abdominal wall: Abdominal and pelvic wall scarring.     Retroperitoneum: Multiple mildly prominent abdominal retroperitoneal  lymph nodes seen. For example, aortocaval lymph node, series 2, axial  mage 82, measures 7 to 8 mm in short axis, stable. No lymph nodes  measuring greater than 1 cm are seen. No new or enlarging lymph nodes  present.     Vasculature: Patent. No abdominal aortic aneurysm. Circumaortic left  renal vein.     Osseous structures: No destructive osseous lesions.     Impression:    1. No acute findings identified in the abdomen or " pelvis.  2. Stable mildly prominent abdominal retroperitoneal lymph nodes. No new  or enlarging lymph nodes identified.     This report was finalized on 12/16/2024 11:20 AM by Dr. Abel Sánchez M.D on Workstation: [id number]       I reviewed the patient's daily medications.  Scheduled Medications  budesonide-formoterol, 2 puff, Inhalation, BID - RT  cefTRIAXone, 1,000 mg, Intravenous, Q24H  dicyclomine, 10 mg, Per G Tube, 4x Daily AC & at Bedtime  escitalopram, 5 mL, Per G Tube, BID  Fat Emulsion Plant Based, 250 mL, Intravenous, Q24H (TPN)  gabapentin, 200 mg, Per G Tube, TID  hydroxychloroquine, 200 mg, Per G Tube, BID  hydrOXYzine, 25 mg, Per G Tube, Nightly  nitroglycerin, 1 inch, Topical, Nightly  pantoprazole, 40 mg, Intravenous, BID AC  promethazine, 25 mg, Oral, Once  silver sulfadiazine, , Topical, Daily  sodium chloride, 10 mL, Intravenous, Q12H  sodium chloride, 10 mL, Intravenous, Q12H  sucralfate, 1 g, Oral, 4x Daily AC & at Bedtime    Infusions  Adult Cyclic 2-in-1 TPN, , Last Rate: 80 mL/hr at 12/18/24 1926  Pharmacy to Dose TPN,     Diet  Adult Cyclic 2-in-1 TPN  NPO Diet NPO Type: Strict NPO        I have personally reviewed:  [x]  Laboratory   [x]  Microbiology   [x]  Radiology   []  EKG/Telemetry   []  Cardiology/Vascular   []  Pathology   [x]  Records     Assessment/Plan     Active Hospital Problems    Diagnosis  POA    **Gastroparesis [K31.84]  Yes    UTI (urinary tract infection) [N39.0]  Yes    Painful swallowing [R13.10]  Yes    Generalized abdominal pain [R10.84]  Yes    Hypokalemia [E87.6]  Yes    Rectal bleeding [K62.5]  Unknown    Generalized anxiety disorder [F41.1]  Yes    Post traumatic stress disorder (PTSD) [F43.10]  Yes    Intractable migraine with aura without status migrainosus [G43.119]  Yes    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       29 y.o. female admitted with Gastroparesis.    Generalized abdominal pain with nausea  GERD  Gastroparesis  Pain/difficulty swallowing  -CT abdomen with no acute findings  -GI consulted, continue IV PPI twice daily and Carafate.  Plan for EGD and flex sig today  -IV Dilaudid as needed     Urinary tract infection/Acute cystitis without hematuria  -Dysuria is improving.  Continue ceftriaxone      Other symptoms and signs concerning food and fluid intake  - She is on long term TPN, continue as ordered.     Hypokalemia  - K low on prior labs, repleted and improved, continue to monitor.     Dehydration  -Received some IV fluids, creatinine at baseline.       Anemia  -Stable, continue to monitor     POTS  - Continue fluids, monitor vitals.     Complex regional pain syndrome/chronic pain  - Continue medications as ordered.     Myasthenia Gravis  - On IVIG once weekly.     Sjogren's syndrome  - Continue Plaquenil.    SCDs for DVT prophylaxis.  Full code.  Discussed with patient, family, nursing staff, and primary care provider.  Anticipate discharge home in 1-2 days.    Expected Discharge Date: 12/20/2024; Expected Discharge Time:       Jadon Lewis MD  [facility]  12/19/24  11:06 EST            Electronically signed by Jadon Lewis MD at 12/19/24 5794       Fatimah Spring APRN at 12/19/24 0872          [facility]  Inpatient Progress Note    Reason for Follow Up: Odynophagia and gastroparesis    Subjective     Interval History: Odynophagia    Patient continues to complain of odynophagia this morning.  Remains nauseated.  No vomiting since admission.  She is currently n.p.o. in anticipation of possible endoscopic  evaluation today.  She goes on to complain of intermittent bright red blood per rectum started several weeks ago with bright red blood on the toilet paper, in the stool and in the toilet bowl.  She reports having episode yesterday.  Treatment has included twice daily dosing IV PPI therapy along with Carafate slurry.    Her last colonoscopy was 2020 with nonbleeding internal hemorrhoids otherwise normal.  Current hemoglobin 10.7 around baseline.      Current Facility-Administered Medications:     acetaminophen (TYLENOL) tablet 650 mg, 650 mg, Per G Tube, Q4H PRN **OR** acetaminophen (TYLENOL) 160 MG/5ML oral solution 650 mg, 650 mg, Per G Tube, Q4H PRN **OR** acetaminophen (TYLENOL) suppository 650 mg, 650 mg, Rectal, Q4H PRN, Rich Saucedo DO    Adult Cyclic 2-in-1 TPN, , Intravenous, Cyclic TPN - See Admin Instructions, Rich Saucedo DO, Last Rate: 80 mL/hr at 12/18/24 1926, Rate Change at 12/18/24 1926    budesonide-formoterol (SYMBICORT) 160-4.5 MCG/ACT inhaler 2 puff, 2 puff, Inhalation, BID - RT, Obi Hinson MD, 2 puff at 12/19/24 0742    Calcium Replacement - Follow Nurse / BPA Driven Protocol, , Not Applicable, PRN, Obi Hinson MD    cefTRIAXone (ROCEPHIN) 1,000 mg in sodium chloride 0.9 % 100 mL MBP, 1,000 mg, Intravenous, Q24H, Rich Saucedo DO, Last Rate: 200 mL/hr at 12/18/24 1254, 1,000 mg at 12/18/24 1254    dicyclomine (BENTYL) capsule 10 mg, 10 mg, Per G Tube, 4x Daily AC & at Bedtime, Obi Hinson MD    diphenhydrAMINE (BENADRYL) injection 25 mg, 25 mg, Intravenous, Q6H PRN, Obi Hinson MD, 25 mg at 12/19/24 0528    EPINEPHrine (ANAPHYLAXIS) 1 mg/ml injection kit, 0.3 mg, Intramuscular, Once PRN, Obi Hinson MD    escitalopram (LEXAPRO) 1 mg/mL oral solution 5 mg, 5 mL, Per G Tube, BID, Obi Hinson MD, 5 mg at 12/17/24 2213    Fat Emulsion Plant Based (INTRALIPID,LIPOSYN) 20 % infusion 50 g, 250 mL, Intravenous, Q24H (TPN), Rich Saucedo DO, Last Rate: 20.8 mL/hr at 12/18/24 1822, 50 g at  12/18/24 1822    gabapentin (NEURONTIN) capsule 200 mg, 200 mg, Per G Tube, TID, Rich Saucedo DO    heparin injection 500 Units, 5 mL, Intravenous, PRN, Obi Hinson MD    HYDROmorphone (DILAUDID) injection 0.5 mg, 0.5 mg, Intravenous, Q2H PRN, Obi Hinson MD, 0.5 mg at 12/19/24 0732    hydroxychloroquine (PLAQUENIL) tablet 200 mg, 200 mg, Per G Tube, BID, Rich Saucedo DO    hydrOXYzine (ATARAX) 10 MG/5ML syrup 25 mg, 25 mg, Per G Tube, Nightly, Obi Hinson MD, 25 mg at 12/17/24 2211    Magnesium Standard Dose Replacement - Follow Nurse / BPA Driven Protocol, , Not Applicable, PRN, Obi Hinson MD    nitroglycerin (NITROSTAT) ointment 1 inch, 1 inch, Topical, Nightly, Obi Hinson MD    nitroglycerin (NITROSTAT) SL tablet 0.4 mg, 0.4 mg, Sublingual, Q5 Min PRN, Obi Hinson MD    ondansetron (ZOFRAN) injection 4 mg, 4 mg, Intravenous, Q6H PRN, Obi Hinson MD, 4 mg at 12/19/24 0528    oxyCODONE-acetaminophen (PERCOCET)  MG per tablet 1 tablet, 1 tablet, Per G Tube, Q6H PRN, Rich Saucedo DO, 1 tablet at 12/18/24 2005    pantoprazole (PROTONIX) injection 40 mg, 40 mg, Intravenous, BID Rivera KRUSE Sally A, PA-C, 40 mg at 12/19/24 0639    Pharmacy to Dose TPN, , Not Applicable, Continuous PRN, Rich Saucedo DO    Phosphorus Replacement - Follow Nurse / BPA Driven Protocol, , Not Applicable, PRNSravan Jawed, MD    Potassium Replacement - Follow Nurse / BPA Driven Protocol, , Not Applicable, PRN, Obi Hinson MD    promethazine (PHENERGAN) tablet 25 mg, 25 mg, Oral, Once, bOi Hinson MD    promethazine-dextromethorphan (PROMETHAZINE-DM) 6.25-15 MG/5ML syrup 5 mL, 5 mL, Per J Tube, Q6H PRN, Obi Hinson MD    silver sulfadiazine (SILVADENE, SSD) 1 % cream, , Topical, Daily, Obi Hinson MD    sodium chloride 0.9 % flush 10 mL, 10 mL, Intravenous, PRN, Obi Hinson MD    sodium chloride 0.9 % flush 10 mL, 10 mL, Intravenous, Q12H, Obi Hinson MD, 10 mL at 12/18/24 2130    sodium chloride 0.9 %  flush 10 mL, 10 mL, Intravenous, PRN, Obi Hinson MD    sodium chloride 0.9 % flush 10 mL, 10 mL, Intravenous, Q12H, Obi Hinson MD, 10 mL at 12/18/24 2133    sodium chloride 0.9 % flush 10 mL, 10 mL, Intravenous, PRN, Obi Hinson MD    sodium chloride 0.9 % flush 20 mL, 20 mL, Intravenous, PRN, Obi Hinson MD    sodium chloride 0.9 % infusion 40 mL, 40 mL, Intravenous, PRN, Sravan, Obi, MD    sodium chloride 0.9 % infusion 40 mL, 40 mL, Intravenous, PRN, Obi Hinson MD    sucralfate (CARAFATE) tablet 1 g, 1 g, Oral, 4x Daily AC & at Bedtime, Cristel Stafford PA-C, 1 g at 12/18/24 2132    Zavegepant HCl solution 1 spray, 1 spray, Nasal, Once PRN, Obi Hinson MD  Review of Systems:    All systems were reviewed and negative except for:  Gastrointestinal: positive for  bright red blood per rectum, difficulty / pain with swallowing, and nausea    Objective     Vital Signs  Temp:  [97.5 °F (36.4 °C)-98.1 °F (36.7 °C)] 98.1 °F (36.7 °C)  Heart Rate:  [50-65] 64  Resp:  [16-18] 16  BP: (111-119)/(65-78) 116/65  Body mass index is 24.54 kg/m².    Intake/Output Summary (Last 24 hours) at 12/19/2024 0858  Last data filed at 12/18/2024 1632  Gross per 24 hour   Intake --   Output 800 ml   Net -800 ml     No intake/output data recorded.     Physical Exam:   General: patient awake, alert and cooperative  Abdomen: soft, left upper quadrant tenderness noted, nondistended; normal bowel sounds      Results Review:     I reviewed the patient's new clinical results.    Results from last 7 days   Lab Units 12/19/24  0520 12/17/24  2301 12/17/24  0514   WBC 10*3/mm3 4.62 9.16 7.93   HEMOGLOBIN g/dL 10.7* 10.3* 10.0*   HEMATOCRIT % 32.6* 30.2* 30.3*   PLATELETS 10*3/mm3 192 176 171     Results from last 7 days   Lab Units 12/19/24  0520 12/18/24  0928 12/17/24  2301 12/17/24  0514   SODIUM mmol/L 136 137 135* 133*   POTASSIUM mmol/L 4.6 3.9 3.9 3.2*   CHLORIDE mmol/L 103 105 103 104   CO2 mmol/L 26.2 23.9 23.0 19.4*   BUN  mg/dL 7 7 4* 5*   CREATININE mg/dL 0.49* 0.52* 0.54* 0.65   CALCIUM mg/dL 9.0 8.9 8.7 8.2*   BILIRUBIN mg/dL  --  0.3 0.3 0.4   ALK PHOS U/L  --  38* 39 38*   ALT (SGPT) U/L  --  8 9 6   AST (SGOT) U/L  --  12 9 13   GLUCOSE mg/dL 104* 117* 139* 85         Lab Results   Lab Value Date/Time    LIPASE 15 12/16/2024 0932    LIPASE 20 11/03/2024 2104    LIPASE 21 07/03/2024 1004    LIPASE 28 07/02/2024 1439    LIPASE 27 05/25/2024 0022    LIPASE 35 03/06/2024 0057    LIPASE 44 12/09/2023 2058    LIPASE 18 06/26/2023 0642    LIPASE 19 06/23/2023 0653    LIPASE 30 06/16/2023 0101    LIPASE 80 01/05/2020 1030    LIPASE 53 07/20/2018 1151       Radiology:  CT Abdomen Pelvis With Contrast   Final Result       1. No acute findings identified in the abdomen or pelvis.   2. Stable mildly prominent abdominal retroperitoneal lymph nodes. No new   or enlarging lymph nodes identified.       This report was finalized on 12/16/2024 11:20 AM by Dr. Abel Sánchez M.D on Workstation: [id number]              Assessment & Plan     Active Hospital Problems    Diagnosis     **Gastroparesis     UTI (urinary tract infection)     Painful swallowing     Generalized abdominal pain     Hypokalemia     Generalized anxiety disorder     Post traumatic stress disorder (PTSD)     Intractable migraine with aura without status migrainosus     Tomas-Danlos syndrome     Myasthenia gravis     Anemia     Sjogren's syndrome     Gastroesophageal reflux disease     Postural orthostatic tachycardia syndrome     Seropositive rheumatoid arthritis     Iron deficiency anemia        Assessment:  Odynophagia  Nausea and vomiting  Gastroparesis - managed with G-tube, promethazine, gabapentin, Protonix, IVIG infusion weekly. Follows with [facility] and GI   GERD  Rectal bleeding  Irritable bowel syndrome  Anemia    Plan:  Recommend EGD and flexible sigmoidoscopy this afternoon  Continue IV PPI therapy and Carafate  Further recommendations pending procedural  evaluation    I discussed the patients findings and my recommendations with patient, family, and Dr. Martínez .    MITCHELL Mooney                Electronically signed by Fatimah Spring APRN at 24 1045       Rich Saucedo DO at 24 1100              Name: Henrietta Garrett ADMIT: 2024   : 1995  PCP: Kenya David MD    MRN: [id number] LOS: 0 days   AGE/SEX: 29 y.o. female  ROOM: [facility]     Subjective   Subjective   Patient seen and examined this morning.  Hospital day 2, still with abdominal pain, along with difficulty/painful swallowing.      Objective   Objective   Vital Signs  Temp:  [98.2 °F (36.8 °C)-99.1 °F (37.3 °C)] 98.2 °F (36.8 °C)  Heart Rate:  [68-90] 73  Resp:  [16] 16  BP: (122-134)/(72-79) 122/72  SpO2:  [98 %-100 %] 98 %  on   ;   Device (Oxygen Therapy): room air  Body mass index is 24.54 kg/m².  Physical Exam  Vitals and nursing note reviewed.   Constitutional:       General: She is not in acute distress.     Appearance: She is ill-appearing.      Comments: Port in place   HENT:      Mouth/Throat:      Comments: No significant erythema appreciated  Eyes:      General: No scleral icterus.  Cardiovascular:      Rate and Rhythm: Normal rate.      Pulses: Normal pulses.      Heart sounds: Normal heart sounds.   Pulmonary:      Effort: Pulmonary effort is normal. No respiratory distress.      Breath sounds: No wheezing.   Abdominal:      General: There is no distension.      Palpations: Abdomen is soft.      Tenderness: There is abdominal tenderness. There is no guarding.   Skin:     General: Skin is warm and dry.      Coloration: Skin is pale.   Neurological:      Mental Status: She is alert.       Results Review     I reviewed the patient's new clinical results.  Results from last 7 days   Lab Units 24  2301 24  0514 24  0932   WBC 10*3/mm3 9.16 7.93 7.95   HEMOGLOBIN g/dL 10.3* 10.0* 11.3*   PLATELETS 10*3/mm3 176 171 203     Results from last 7 days   Lab  "Units 12/18/24 0928 12/17/24 2301 12/17/24  0514 12/16/24  0932   SODIUM mmol/L 137 135* 133* 134*   POTASSIUM mmol/L 3.9 3.9 3.2* 3.7   CHLORIDE mmol/L 105 103 104 100   CO2 mmol/L 23.9 23.0 19.4* 23.4   BUN mg/dL 7 4* 5* 6   CREATININE mg/dL 0.52* 0.54* 0.65 0.70   GLUCOSE mg/dL 117* 139* 85 113*   EGFR mL/min/1.73 129.2 128.0 122.4 120.2     Results from last 7 days   Lab Units 12/18/24 0928 12/17/24 2301 12/17/24  0514 12/16/24  0932   ALBUMIN g/dL 3.4* 3.7 3.4* 4.2   BILIRUBIN mg/dL 0.3 0.3 0.4 0.4   ALK PHOS U/L 38* 39 38* 44   AST (SGOT) U/L 12 9 13 14   ALT (SGPT) U/L 8 9 6 9     Results from last 7 days   Lab Units 12/18/24 0928 12/17/24 2301 12/17/24  0514 12/16/24  0932   CALCIUM mg/dL 8.9 8.7 8.2* 9.6   ALBUMIN g/dL 3.4* 3.7 3.4* 4.2   MAGNESIUM mg/dL  --   --  1.8  --    PHOSPHORUS mg/dL  --   --  2.5  --        No results found for: \"HGBA1C\", \"POCGLU\"    No radiology results for the last day    I have personally reviewed all medications:  Scheduled Medications  budesonide-formoterol, 2 puff, Inhalation, BID - RT  cefTRIAXone, 1,000 mg, Intravenous, Q24H  dicyclomine, 10 mg, Per G Tube, 4x Daily AC & at Bedtime  escitalopram, 5 mL, Per G Tube, BID  Fat Emulsion Plant Based, 250 mL, Intravenous, Q24H (TPN)  gabapentin, 200 mg, Per G Tube, TID  hydroxychloroquine, 200 mg, Per G Tube, BID  hydrOXYzine, 25 mg, Per G Tube, Nightly  nitroglycerin, 1 inch, Topical, Nightly  pantoprazole, 40 mg, Intravenous, BID AC  promethazine, 25 mg, Oral, Once  silver sulfadiazine, , Topical, Daily  sodium chloride, 10 mL, Intravenous, Q12H  sodium chloride, 10 mL, Intravenous, Q12H  sucralfate, 1 g, Oral, 4x Daily AC & at Bedtime    Infusions  Adult Cyclic 2-in-1 TPN, , Last Rate: Stopped (12/18/24 1254)  Adult Cyclic 2-in-1 TPN,   Pharmacy to Dose TPN,     Diet  NPO Diet NPO Type: Strict NPO  Adult Cyclic 2-in-1 TPN  Adult Cyclic 2-in-1 TPN    I have personally reviewed:  [x]  Laboratory   [x]  Microbiology   []  " Radiology   [x]  EKG/Telemetry  []  Cardiology/Vascular   []  Pathology    []  Records      Assessment/Plan     Active Hospital Problems    Diagnosis  POA    **Gastroparesis [K31.84]  Yes    UTI (urinary tract infection) [N39.0]  Yes    Painful swallowing [R13.10]  Yes    Generalized abdominal pain [R10.84]  Yes    Hypokalemia [E87.6]  Yes    Generalized anxiety disorder [F41.1]  Yes    Post traumatic stress disorder (PTSD) [F43.10]  Yes    Intractable migraine with aura without status migrainosus [G43.119]  Yes    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       29 y.o. female admitted with Gastroparesis.    Generalized abdominal pain with nausea  Gastroparesis  - Patient with abruptly worsening generalized abdominal pain and nausea.  CT abdomen and pelvis obtained following arrival negative for acute findings in the abdomen or pelvis, does show stable mildly prominent abdominal retroperitoneal lymph nodes.  Symptoms still present, despite current therapies, without noticeable improvement thus far.  - GI consulted to evaluate. Will follow up with them regarding further management. Continue medications as prescribed for now. Patient currently receiving IV pain medication PRN, high risk medication. Need to closely monitor for over-sedation, respiratory depression and constipation.    Pain/difficulty swallowing  - She is also endorsing pain in her throat when trying to swallow and states that when she has tried to swallow liquids, she has gotten choked. She is hesitant to try and take any PO medications because of this.   - GI consulted, may warrant EGD to  evaluate further. Follow up their recommendations. Elevate HOB, aspiration precautions.     Urinary tract infection/Acute cystitis without hematuria  - She has dysuria, UA showing + LE, 6-10 WBC. She is on Ceftriaxone and tolerating well at present. Urine culture showing mixed ritchie. Continue Ceftriaxone as prescribed.    Other symptoms and signs concerning food and fluid intake  - She is on long term TPN, continue as ordered.    Hypokalemia  - K low on prior labs, repleted and improved, continue to monitor.    Dehydration  - Due to decreased intake, specific gravity on UA elevated, + ketones noted. She was given fluids. She still has decreased intake, will give more fluid today and monitor this.    Anemia  - Hemoglobin low on most recent labs, however, stable from prior. No evidence of overt blood loss. No indications for acute intervention at this time.    - Order repeat CBC in AM for reassessment. Continue to monitor, transfuse for hemoglobin <7.    POTS  - Continue fluids, monitor vitals.    Complex regional pain syndrome/chronic pain  - Continue medications as ordered.    Myasthenia Gravis  - On IVIG once weekly.    Sjogren's syndrome  - Continue Plaquenil.      SCDs for DVT prophylaxis.  Full code.  Discussed with patient, nursing staff, CCP, and care team on multidisciplinary rounds.  Anticipate discharge home later this week.  Expected Discharge Date: 2024; Expected Discharge Time:       Rich Saucedo DO  [facility]  24        Electronically signed by Rich Saucedo DO at 24 1440       Rich Saucedo DO at 24 0955              Name: Henrietta Garrett ADMIT: 2024   : 1995  PCP: Kenya David MD    MRN: [id number] LOS: 0 days   AGE/SEX: 29 y.o. female  ROOM: [facility]     Subjective   Subjective   Patient seen and examined this morning.  Hospital day 1.  She is awake and alert, resting in bed.  She continues to have ongoing generalized abdominal pain and  nausea.  She is also endorsing pain in her throat when trying to swallow.      Objective   Objective   Vital Signs  Temp:  [98.8 °F (37.1 °C)-99.7 °F (37.6 °C)] 99.7 °F (37.6 °C)  Heart Rate:  [65-86] 86  Resp:  [16-18] 16  BP: (111-133)/(58-81) 133/78  SpO2:  [96 %-100 %] 97 %  on   ;   Device (Oxygen Therapy): room air  Body mass index is 24.54 kg/m².  Physical Exam  Vitals and nursing note reviewed.   Constitutional:       General: She is not in acute distress.     Appearance: She is ill-appearing.      Comments: Port in place   HENT:      Mouth/Throat:      Comments: No significant erythema appreciated  Eyes:      General: No scleral icterus.  Cardiovascular:      Rate and Rhythm: Normal rate.      Pulses: Normal pulses.      Heart sounds: Normal heart sounds.   Pulmonary:      Effort: Pulmonary effort is normal. No respiratory distress.   Abdominal:      General: Bowel sounds are normal. There is no distension.      Palpations: Abdomen is soft.      Tenderness: There is abdominal tenderness. There is no guarding.   Skin:     General: Skin is warm and dry.      Coloration: Skin is pale.   Neurological:      Mental Status: She is alert.       Results Review     I reviewed the patient's new clinical results.  Results from last 7 days   Lab Units 12/17/24  0514 12/16/24  0932   WBC 10*3/mm3 7.93 7.95   HEMOGLOBIN g/dL 10.0* 11.3*   PLATELETS 10*3/mm3 171 203     Results from last 7 days   Lab Units 12/17/24  0514 12/16/24  0932   SODIUM mmol/L 133* 134*   POTASSIUM mmol/L 3.2* 3.7   CHLORIDE mmol/L 104 100   CO2 mmol/L 19.4* 23.4   BUN mg/dL 5* 6   CREATININE mg/dL 0.65 0.70   GLUCOSE mg/dL 85 113*   EGFR mL/min/1.73 122.4 120.2     Results from last 7 days   Lab Units 12/17/24  0514 12/16/24  0932   ALBUMIN g/dL 3.4* 4.2   BILIRUBIN mg/dL 0.4 0.4   ALK PHOS U/L 38* 44   AST (SGOT) U/L 13 14   ALT (SGPT) U/L 6 9     Results from last 7 days   Lab Units 12/17/24  0514 12/16/24  0932 12/10/24  1435   CALCIUM mg/dL  "8.2* 9.6  --    ALBUMIN g/dL 3.4* 4.2  --    MAGNESIUM mg/dL 1.8  --  1.8*   PHOSPHORUS mg/dL 2.5  --   --        No results found for: \"HGBA1C\", \"POCGLU\"    CT Abdomen Pelvis With Contrast    Result Date: 12/16/2024   1. No acute findings identified in the abdomen or pelvis. 2. Stable mildly prominent abdominal retroperitoneal lymph nodes. No new or enlarging lymph nodes identified.  This report was finalized on 12/16/2024 11:20 AM by Dr. Abel Sánchez M.D on Workstation: [id number]       I have personally reviewed all medications:  Scheduled Medications  budesonide-formoterol, 2 puff, Inhalation, BID - RT  dicyclomine, 10 mg, Per G Tube, 4x Daily AC & at Bedtime  escitalopram, 5 mL, Per G Tube, BID  Fat Emulsion Plant Based, 250 mL, Intravenous, Q24H (TPN)  gabapentin, 200 mg, Per G Tube, TID  hydroxychloroquine, 200 mg, Per G Tube, BID  hydrOXYzine, 25 mg, Per G Tube, Nightly  [START ON 12/18/2024] lansoprazole, 15 mg, Per G Tube, BID AC  nitroglycerin, 1 inch, Topical, Nightly  potassium chloride, 10 mEq, Intravenous, Q1H  promethazine, 25 mg, Oral, Once  silver sulfadiazine, , Topical, Daily  sodium chloride, 10 mL, Intravenous, Q12H  sodium chloride, 10 mL, Intravenous, Q12H    Infusions  Adult Cyclic 2-in-1 TPN,   dextrose 5 % and sodium chloride 0.45 % with KCl 20 mEq/L, 100 mL/hr, Last Rate: 100 mL/hr (12/17/24 0854)  Pharmacy to Dose TPN,     Diet  NPO Diet NPO Type: Strict NPO  Adult Cyclic 2-in-1 TPN    I have personally reviewed:  [x]  Laboratory   [x]  Microbiology   [x]  Radiology   [x]  EKG/Telemetry  []  Cardiology/Vascular   []  Pathology    []  Records      Assessment/Plan     Active Hospital Problems    Diagnosis  POA    **Gastroparesis [K31.84]  Yes    UTI (urinary tract infection) [N39.0]  Yes    Painful swallowing [R13.10]  Yes    Generalized abdominal pain [R10.84]  Yes    Hypokalemia [E87.6]  Yes    Generalized anxiety disorder [F41.1]  Yes    Post traumatic stress disorder (PTSD) " [F43.10]  Yes    Intractable migraine with aura without status migrainosus [G43.119]  Yes    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       29 y.o. female admitted with Gastroparesis.    Generalized abdominal pain with nausea  Gastroparesis  - Patient with abruptly worsening generalized abdominal pain and nausea.  CT abdomen and pelvis obtained following arrival negative for acute findings in the abdomen or pelvis, does show stable mildly prominent abdominal retroperitoneal lymph nodes.  She continues to have symptoms, despite current medication regimen, she is requiring frequent pain medication.  We discussed that frequent use of narcotics could further impair gastrointestinal motility, which could exacerbate symptoms.  She follows with gastroenterology at [facility].  Given ongoing symptoms, will ask our GI team to evaluate.    Pain/difficulty swallowing  - She is also endorsing pain in her throat when trying to swallow and states that when she has tried to swallow liquids, she has gotten choked. She is hesitant to try and take any PO medications because of this. She has history of Sjogren's syndrome per chart, which could be contributing to swallow difficulties. Will ask GI to see regarding this, and in the setting of gastroparesis.    Urinary tract infection  - She has dysuria, UA showing + LE, 6-10 WBC. Given overall clinical picture, will start treatment with Ceftriaxone. Add on urine culture.    Other symptoms and signs concerning food and fluid intake  - She is on long term TPN, discussed with pharmacy and reordering  this.    Hypokalemia  - K low on most recent labs; Will replete via electrolyte protocol. Follow up repeat labs to guide ongoing management decisions. Replete PRN per protocol. Continue to monitor    Dehydration  - Due to decreased intake, specific gravity on UA elevated, + ketones noted.  - Start maintenance fluids.    POTS  - Continue fluids, monitor vitals.    Complex regional pain syndrome/chronic pain  - Continue medications as ordered.    Myasthenia Gravis  - On IVIG once weekly.    Sjogren's syndrome  - Continue Plaquenil.      SCDs for DVT prophylaxis.  Full code.  Discussed with patient, nursing staff, CCP, and care team on multidisciplinary rounds.  Anticipate discharge home later this week.  Expected discharge date/ time has not been documented.      Rich Saucedo DO  [facility]  12/17/24        Electronically signed by Rcih Saucedo DO at 12/17/24 1311          Consult Notes (last 7 days)        Lencho Vasquez at 12/17/24 1102           visited pt this am secondary to a consult with this pt's RN.  In the room,  provided spiritual and emotional support via presence and dialogue.      Pastoral care remains available.    Electronically signed by Lencho Vasquez at 12/17/24 1103       Cristel Stafford PA-C at 12/18/24 1109        Consult Orders    1. Inpatient Gastroenterology Consult [[id number]] ordered by Rich Saucedo DO at 12/17/24 1107              Attestation signed by José Luis Martínez MD at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

## 2024-12-27 ENCOUNTER — READMISSION MANAGEMENT (OUTPATIENT)
Dept: CALL CENTER | Facility: HOSPITAL | Age: 29
End: 2024-12-27
Payer: COMMERCIAL

## 2024-12-27 NOTE — OUTREACH NOTE
Medical Week 1 Survey      Flowsheet Row Responses   [facility] patient discharged from? [facility]   Does the patient have one of the following disease processes/diagnoses(primary or secondary)? Other   Week 1 attempt successful? No   Unsuccessful attempts Attempt 1  [attempted all numbers.]            Rox POLLOCK - Registered Nurse

## 2025-01-02 NOTE — PAYOR COMM NOTE
"Henrietta Garrett EBENA (29 y.o. Female)     PLEASE SEE ATTACHED FOR DC NOTICE    REF #   [id number]     THANK YOU  CURTIS SORENSON RN/ DEPT  [facility]   [phone]  [phone]        Date of Birth   1995    Social Security Number       Address   [address] [address]    Home Phone   [phone]    MRN   [id number]       Yazidism   None    Marital Status   Single                            Admission Date   12/16/24    Admission Type   Emergency    Admitting Provider   Obi Hinson MD    Attending Provider       Department, Room/Bed   [facility] [address] [place], P694/1       Discharge Date   12/21/2024    Discharge Disposition   Home or Self Care    Discharge Destination                                 Attending Provider: (none)   Allergies: Anesthetics, Amide, Ciprofloxacin, Compazine [Prochlorperazine], Domperidone, Droperidol, Haldol [Haloperidol], Metoclopramide, Sulfa Antibiotics    Isolation: None   Infection: None   Code Status: Prior    Ht: 155.8 cm (61.32\")   Wt: 77.6 kg (171 lb)    Admission Cmt: None   Principal Problem: Gastroparesis [K31.84]                   Active Insurance as of 12/16/2024       Primary Coverage       Payor Plan Insurance Group Employer/Plan Group    [facility] [facility] [id number]       Payor Plan Address Payor Plan Phone Number Payor Plan Fax Number Effective Dates    [address] [phone]  1/1/2023 - None Entered    [place] [address]         Subscriber Name Subscriber Birth Date Member ID       SARAHLATRICIA LESTERCLARISSA ZAYAS 11/27/1963 [id number]                     Emergency Contacts        (Rel.) Home Phone Work Phone Mobile Phone    Miley Singh (Mother) [phone] -- [phone]    Flaco Redman (Significant Other) -- -- [phone]              [place]: NPI [id number]  Tax ID [id number]     Discharge Summary        Jadon Lewis MD at 12/21/24 1253        "       Patient Name: Henrietta Garrett  : 1995  MRN: [id number]    Date of Admission: 2024  Date of Discharge:  2024  Primary Care Physician: Kenya David MD      Chief Complaint:   Nausea and Weakness - Generalized      Discharge Diagnoses     Active Hospital Problems    Diagnosis  POA    **Gastroparesis [K31.84]  Yes    UTI (urinary tract infection) [N39.0]  Yes    Painful swallowing [R13.10]  Yes    Generalized abdominal pain [R10.84]  Yes    Hypokalemia [E87.6]  Yes    Rectal bleeding [K62.5]  Unknown    Generalized anxiety disorder [F41.1]  Yes    Post traumatic stress disorder (PTSD) [F43.10]  Yes    Intractable migraine with aura without status migrainosus [G43.119]  Yes    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        Hospital Course     Ms. Garrett is a 29 y.o. female with a history of gastroparesis on TPN follows with [facility] motility clinic, POTS, complex regional pain syndrome, chronic pain, myasthenia gravis, Sjogren's syndrome presenting with throat and abdominal pain, nausea, vomiting consistent with gastroparesis flare.  GI was consulted and did EGD that showed completely normal mucosa.  Patient was found to have a UTI, received 5 days of ceftriaxone prior to discharge.  This was likely the cause of of her gastroparesis flare.  Her pain is slowly improved over the last few days as we have been weaning IV Dilaudid as well.  Have discussed with patient and she is agreeable to go home.    At the time of discharge patient was told to take all medications as prescribed, keep all follow-up  appointments, and call their doctor or return to the hospital with any worsening or concerning symptoms.    Day of Discharge     Subjective:  Patient lying in bed.  Continues to improve every day.  Patient to discharge after last dose of IV antibiotics. Family at bedside        Physical Exam:  Temp:  [97 °F (36.1 °C)-97.4 °F (36.3 °C)] 97.3 °F (36.3 °C)  Heart Rate:  [59-70] 66  Resp:  [16] 16  BP: ()/(57-69) 99/57  Body mass index is 31.97 kg/m².  Physical Exam  Constitutional:       General: She is not in acute distress.     Appearance: She is not ill-appearing.   Cardiovascular:      Rate and Rhythm: Normal rate and regular rhythm.   Pulmonary:      Effort: Pulmonary effort is normal. No respiratory distress.   Abdominal:      General: Abdomen is flat. There is no distension.      Tenderness: There is abdominal tenderness (Left upper quadrant).   Musculoskeletal:         General: No swelling or deformity. Normal range of motion.   Skin:     General: Skin is warm and dry.   Neurological:      General: No focal deficit present.      Mental Status: She is alert. Mental status is at baseline.   Consultants     Consult Orders (all) (From admission, onward)       Start     Ordered    12/17/24 1107  Inpatient Gastroenterology Consult  Once        Specialty:  Gastroenterology  Provider:  Mary Ann Sarmiento MD    12/17/24 1107    12/16/24 1429  [facility] (on-call MD unless specified) Details  Once        Specialty:  Hospitalist  Provider:  Obi Hinson MD    12/16/24 1428                  Procedures     Imaging Results (All)       Procedure Component Value Units Date/Time    CT Abdomen Pelvis With Contrast [[id number]] Collected: 12/16/24 1102     Updated: 12/16/24 1123    Narrative:      CT ABDOMEN PELVIS W CONTRAST-     INDICATION: Abdominal pain, vomiting     COMPARISON: CT abdomen pelvis July 2, 2024     TECHNIQUE:  Routine CT abdomen/pelvis with IV contrast. Coronal and sagittal  reformats. Radiation dose reduction  techniques were utilized, including  automated exposure control and exposure modulation based on body size.     FINDINGS:      Lung bases: Subsegmental atelectasis seen at the bases.     ABDOMEN: No liver mass. Cholecystectomy. No biliary ductal dilatation.  Spleen is at upper limits in size. Incidental splenule's. No pancreatic  mass or pancreatic ductal dilatation seen. No adrenal nodules. No renal  mass or hydronephrosis.     Pelvis: Underdistended bladder. No bladder calculus. Anteverted uterus.  Dominant follicle in the right ovary. Small amount of free fluid in the  cul-de-sac, within physiologic limits.     Bowel: Gastrostomy tube seen. Small bowel anastomosis suture seen in the  mid abdomen. Descending and sigmoid colon are collapsed. Rectum is  collapsed. No obstruction. Normal appendix.     Abdominal wall: Abdominal and pelvic wall scarring.     Retroperitoneum: Multiple mildly prominent abdominal retroperitoneal  lymph nodes seen. For example, aortocaval lymph node, series 2, axial  mage 82, measures 7 to 8 mm in short axis, stable. No lymph nodes  measuring greater than 1 cm are seen. No new or enlarging lymph nodes  present.     Vasculature: Patent. No abdominal aortic aneurysm. Circumaortic left  renal vein.     Osseous structures: No destructive osseous lesions.       Impression:         1. No acute findings identified in the abdomen or pelvis.  2. Stable mildly prominent abdominal retroperitoneal lymph nodes. No new  or enlarging lymph nodes identified.     This report was finalized on 12/16/2024 11:20 AM by Dr. Abel Sánchez M.D on Workstation: [id number]               Pertinent Labs     Results from last 7 days   Lab Units 12/21/24  0612 12/20/24  0550 12/19/24  0520 12/17/24  2301   WBC 10*3/mm3 3.40 3.35* 4.62 9.16   HEMOGLOBIN g/dL 11.8* 11.6* 10.7* 10.3*   PLATELETS 10*3/mm3 244 222 192 176     Results from last 7 days   Lab Units 12/21/24  0612 12/20/24  0550 12/19/24  0520  12/18/24  0928   SODIUM mmol/L 134* 135* 136 137   POTASSIUM mmol/L 4.1 4.4 4.6 3.9   CHLORIDE mmol/L 96* 101 103 105   CO2 mmol/L 26.5 24.9 26.2 23.9   BUN mg/dL 12 9 7 7   CREATININE mg/dL 0.59 0.59 0.49* 0.52*   GLUCOSE mg/dL 87 106* 104* 117*   Estimated Creatinine Clearance: 133.5 mL/min (by C-G formula based on SCr of 0.59 mg/dL).  Results from last 7 days   Lab Units 12/18/24  0928 12/17/24  2301 12/17/24  0514 12/16/24  0932   ALBUMIN g/dL 3.4* 3.7 3.4* 4.2   BILIRUBIN mg/dL 0.3 0.3 0.4 0.4   ALK PHOS U/L 38* 39 38* 44   AST (SGOT) U/L 12 9 13 14   ALT (SGPT) U/L 8 9 6 9     Results from last 7 days   Lab Units 12/21/24  0612 12/20/24  0550 12/19/24  0520 12/18/24  0928 12/17/24  2301 12/17/24  0514 12/16/24  0932   CALCIUM mg/dL 9.5 9.3 9.0 8.9 8.7 8.2* 9.6   ALBUMIN g/dL  --   --   --  3.4* 3.7 3.4* 4.2   MAGNESIUM mg/dL 2.1  --   --   --   --  1.8  --    PHOSPHORUS mg/dL 4.4  --   --   --   --  2.5  --      Results from last 7 days   Lab Units 12/16/24  0932   LIPASE U/L 15         Results from last 7 days   Lab Units 12/17/24  2301   TRIGLYCERIDES mg/dL 97     Results from last 7 days   Lab Units 12/16/24  1546   URINECX  <25,000 CFU/mL Mixed Leandra Isolated       Test Results Pending at Discharge       Discharge Details        Discharge Medications        Continue These Medications        Instructions Start Date   Blisovi 24 Fe 1-20 MG-MCG(24) per tablet  Generic drug: norethindrone-ethinyl estradiol-ferrous fumarate   1 tablet, Oral, Daily      dicyclomine 10 MG/5ML syrup  Commonly known as: BENTYL   10 mg, Oral, 4 Times Daily Before Meals & Nightly      diphenhydrAMINE  Commonly known as: BENADRYL   25 mg, As Needed      EPINEPHrine 0.3 MG/0.3ML solution auto-injector injection  Commonly known as: EPIPEN   0.3 mg, As Needed      escitalopram 1 mg/mL solution  Commonly known as: LEXAPRO   5 mg, Per G Tube, 2 Times Daily      Fremanezumab-vfrm 225 MG/1.5ML solution auto-injector   225 mg, Subcutaneous,  Every 30 Days      Gammaplex 10 GM/200ML solution infusion  Generic drug: immune globulin (human)   Infuse  into a venous catheter 1 (One) Time Per Week.      hydroxychloroquine 200 MG tablet  Commonly known as: PLAQUENIL   200 mg, 2 Times Daily      hydrOXYzine 10 MG/5ML syrup  Commonly known as: ATARAX   25 mg, Per G Tube, Nightly      IBUPROFEN PO       multivitamin with minerals tablet tablet   1 tablet, Daily      naloxone 0.4 MG/ML injection  Commonly known as: NARCAN   0.1 mg, Intravenous, Every 5 Minutes PRN      naloxone 4 MG/0.1ML nasal spray  Commonly known as: NARCAN   Call 911. Don't prime. [place] in 1 nostril for overdose. Repeat in 2-3 minutes in other nostril if no or minimal breathing/responsiveness.      Nitro-Bid 2 % ointment  Generic drug: nitroglycerin   APPLY BY TRANSDERMAL ROUTE TO FINGER WEBS FOR RAYNAUD'S EVERY 12 HOURS AND REMOVE AT BEDTIME      oxyCODONE-acetaminophen  MG per tablet  Commonly known as: PERCOCET   1 tablet, Oral, Every 6 Hours PRN      pantoprazole 40 MG EC tablet  Commonly known as: PROTONIX   40 mg, 2 Times Daily      predniSONE 5 MG tablet  Commonly known as: DELTASONE   Take 1 tablet by mouth Daily.      promethazine 25 MG tablet  Commonly known as: PHENERGAN   25 mg, Every 4 Hours      promethazine-dextromethorphan 6.25-15 MG/5ML syrup  Commonly known as: PROMETHAZINE-DM   20 mL, Every 6 Hours PRN      Symbicort 160-4.5 MCG/ACT inhaler  Generic drug: budesonide-formoterol   2 puffs, 2 Times Daily - RT      Zavegepant HCl 10 MG/ACT solution   1 spray, Nasal, Once As Needed               Allergies   Allergen Reactions    Anesthetics, Amide Nausea And Vomiting    Ciprofloxacin Other (See Comments)     EHERLIS STANDLIS? ILLNESS. UNABLE TO TAKE     Compazine [Prochlorperazine] Dystonia    Domperidone Other (See Comments)     Bad reaction per pt report    Droperidol Other (See Comments)     Tardive dyskinesia    Haldol [Haloperidol] Other (See Comments)     Muscle  spasms      Metoclopramide Nausea And Vomiting    Sulfa Antibiotics Other (See Comments)     Unable to take as causes leukopenia         Discharge Disposition:  Home or Self Care    Discharge Diet:  Diet Order   Procedures    Diet: Gastrointestinal; Low Irritant; Fluid Consistency: Thin (IDDSI 0)       Discharge Activity:   As tolerated    CODE STATUS:    Code Status and Medical Interventions: CPR (Attempt to Resuscitate); Full Support   Ordered at: 12/16/24 1606     Code Status (Patient has no pulse and is not breathing):    CPR (Attempt to Resuscitate)     Medical Interventions (Patient has pulse or is breathing):    Full Support       Future Appointments   Date Time Provider Department Center   2/3/2025  2:20 PM Ross Dailey MD [facility]   2/24/2025  1:30 PM Kenya David MD [facility]      Follow-up Information       Kenya David MD .    Specialties: Family Medicine, Acupuncturist, Urgent Care  Contact information:  [address] [facility] [address] [phone]                             Time Spent on Discharge:  Greater than 30 minutes      Jadon Lewis MD  [facility]  12/21/24  12:53 EST                Electronically signed by Jadon Lewis MD at 12/21/24 2899

## 2025-01-09 ENCOUNTER — READMISSION MANAGEMENT (OUTPATIENT)
Dept: CALL CENTER | Facility: HOSPITAL | Age: 30
End: 2025-01-09
Payer: COMMERCIAL

## 2025-01-09 NOTE — OUTREACH NOTE
Medical Week 3 Survey      Flowsheet Row Responses   [facility] patient discharged from? [place]   Does the patient have one of the following disease processes/diagnoses(primary or secondary)? Other   Week 3 attempt successful? No   Unsuccessful attempts Attempt 1            [facility] - Registered Nurse

## 2025-01-14 ENCOUNTER — READMISSION MANAGEMENT (OUTPATIENT)
Dept: CALL CENTER | Facility: HOSPITAL | Age: 30
End: 2025-01-14
Payer: COMMERCIAL

## 2025-01-14 NOTE — OUTREACH NOTE
Medical Week 3 Survey      Flowsheet Row Responses   [facility] patient discharged from? [place]   Does the patient have one of the following disease processes/diagnoses(primary or secondary)? Other   Week 3 attempt successful? No   Unsuccessful attempts Attempt 2   Revoke Decline to participate            [facility] - Licensed Nurse

## 2025-02-03 ENCOUNTER — OFFICE VISIT (OUTPATIENT)
Dept: PAIN MEDICINE | Facility: CLINIC | Age: 30
End: 2025-02-03
Payer: COMMERCIAL

## 2025-02-03 VITALS
RESPIRATION RATE: 16 BRPM | HEART RATE: 96 BPM | OXYGEN SATURATION: 97 % | DIASTOLIC BLOOD PRESSURE: 88 MMHG | SYSTOLIC BLOOD PRESSURE: 132 MMHG | HEIGHT: 61 IN | BODY MASS INDEX: 33.12 KG/M2 | TEMPERATURE: 96.8 F | WEIGHT: 175.4 LBS

## 2025-02-03 DIAGNOSIS — R10.9 POSTOPERATIVE ABDOMINAL PAIN: ICD-10-CM

## 2025-02-03 DIAGNOSIS — G89.18 POSTOPERATIVE ABDOMINAL PAIN: ICD-10-CM

## 2025-02-03 DIAGNOSIS — G89.4 CHRONIC PAIN SYNDROME: ICD-10-CM

## 2025-02-03 DIAGNOSIS — Z79.899 ENCOUNTER FOR LONG-TERM (CURRENT) USE OF HIGH-RISK MEDICATION: ICD-10-CM

## 2025-02-03 DIAGNOSIS — K31.84 GASTROPARESIS: Primary | ICD-10-CM

## 2025-02-03 PROCEDURE — 99213 OFFICE O/P EST LOW 20 MIN: CPT | Performed by: ANESTHESIOLOGY

## 2025-02-03 RX ORDER — ACETAMINOPHEN 10 MG/ML
1000 INJECTION, SOLUTION INTRAVENOUS EVERY 12 HOURS
Qty: 6000 ML | Refills: 0 | Status: SHIPPED | OUTPATIENT
Start: 2025-02-03 | End: 2025-03-05

## 2025-02-03 RX ORDER — OXYCODONE AND ACETAMINOPHEN 10; 325 MG/1; MG/1
1 TABLET ORAL EVERY 6 HOURS PRN
Qty: 120 TABLET | Refills: 0 | Status: SHIPPED | OUTPATIENT
Start: 2025-02-03
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
## 2025-02-24 ENCOUNTER — OFFICE VISIT (OUTPATIENT)
Dept: FAMILY MEDICINE CLINIC | Facility: CLINIC | Age: 30
End: 2025-02-24
Payer: COMMERCIAL

## 2025-02-24 VITALS — BODY MASS INDEX: 32.78 KG/M2 | SYSTOLIC BLOOD PRESSURE: 125 MMHG | HEIGHT: 61 IN | DIASTOLIC BLOOD PRESSURE: 75 MMHG

## 2025-02-24 DIAGNOSIS — F32.A ANXIETY AND DEPRESSION: ICD-10-CM

## 2025-02-24 DIAGNOSIS — F41.9 ANXIETY AND DEPRESSION: ICD-10-CM

## 2025-02-24 DIAGNOSIS — F33.2 SEVERE EPISODE OF RECURRENT MAJOR DEPRESSIVE DISORDER, WITHOUT PSYCHOTIC FEATURES: ICD-10-CM

## 2025-02-24 DIAGNOSIS — N91.2 AMENORRHEA: Primary | ICD-10-CM

## 2025-02-24 DIAGNOSIS — G90.521 COMPLEX REGIONAL PAIN SYNDROME TYPE 1 OF RIGHT LOWER EXTREMITY: ICD-10-CM

## 2025-02-24 DIAGNOSIS — F41.1 GENERALIZED ANXIETY DISORDER: ICD-10-CM

## 2025-02-24 DIAGNOSIS — Q79.60 EHLERS-DANLOS SYNDROME: ICD-10-CM

## 2025-02-24 DIAGNOSIS — M32.8 LUPUS ERYTHEMATOSUS OVERLAP SYNDROME: ICD-10-CM

## 2025-02-24 DIAGNOSIS — G90.A POSTURAL ORTHOSTATIC TACHYCARDIA SYNDROME: ICD-10-CM

## 2025-02-24 PROCEDURE — 99215 OFFICE O/P EST HI 40 MIN: CPT | Performed by: FAMILY MEDICINE

## 2025-02-24 RX ORDER — ESCITALOPRAM OXALATE 5 MG/5ML
5 SOLUTION ORAL 2 TIMES DAILY
Qty: 240 ML | Refills: 3 | Status: SHIPPED | OUTPATIENT
Start: 2025-02-24

## 2025-02-24 NOTE — PROGRESS NOTES
Chief Complaint  Chief Complaint   Patient presents with    Anxiety    Depression    abnormal period       Subjective    History of Present Illness  Henrietta Garrett is a 30 y.o. female presents to [facility] for followup    History of Present Illness  The patient is a 30-year-old female who presents for evaluation of premenopausal symptoms, depression, pain management, and Raynaud's disease.    She reports experiencing hot flashes, which she suspects may be hormonal in nature.  She has been amenorrheic since discontinuing her feeding tube, despite improved nutrition. She experiences occasional spotting but no full menstrual periods. She hypothesizes that she may be in the premenopausal phase. She has a family history of early menopause, with her grandmother experiencing it at age 32. She is seeking advice on potential treatments for these symptoms. She has been diagnosed with Postural Orthostatic Tachycardia Syndrome (POTS), which may be contributing to her temperature dysregulation. She also experiences back spasms, confusion, and persistent fevers.    She has been on Lexapro for approximately one year, with no reported side effects. However, she was recently hospitalized and the medication was temporarily discontinued. She did not experience any withdrawal symptoms during this period. She is under the care of a psychiatrist who is considering initiating IV clonazepam treatment.    She is currently on oxycodone for pain management and is considering adding IV acetaminophen three times daily. She receives home health care from [facility] twice weekly for TPN administration.     She has undergone testing for Raynaud's disease, which has revealed a lack of sensation in her hands. Surgical intervention has been proposed to halt disease progression, but she is concerned about potential nerve damage.      She has been experiencing uncontrolled nausea and has been in communication with her  "gastroenterologist regarding this issue. She is scheduled to start chemotherapy soon.    FAMILY HISTORY  Her grandmother went through menopause at 32. Her mother had a hysterectomy at 55 but had not gone through menopause.    Objective   Vitals:    02/24/25 1300   BP: 125/75   Height: 155.8 cm (61.34\")        Physical Exam  Vitals reviewed.   HENT:      Head: Normocephalic and atraumatic.   Cardiovascular:      Comments: Well perfused  Pulmonary:      Effort: No respiratory distress.   Neurological:      Mental Status: She is alert.          The following data was reviewed by: Kenya David MD on 02/24/2025:  CMP          12/19/2024    05:20 12/20/2024    05:50 12/21/2024    06:12   CMP   Glucose 104  106  87    BUN 7  9  12    Creatinine 0.49  0.59  0.59    EGFR 131.0  125.3  125.3    Sodium 136  135  134    Potassium 4.6  4.4  4.1    Chloride 103  101  96    Calcium 9.0  9.3  9.5    BUN/Creatinine Ratio 14.3  15.3  20.3    Anion Gap 6.8  9.1  11.5      CBC          12/19/2024    05:20 12/20/2024    05:50 12/21/2024    06:12   CBC   WBC 4.62  3.35  3.40    RBC 3.60  3.94  3.99    Hemoglobin 10.7  11.6  11.8    Hematocrit 32.6  35.1  34.9    MCV 90.6  89.1  87.5    MCH 29.7  29.4  29.6    MCHC 32.8  33.0  33.8    RDW 12.3  12.1  12.2    Platelets 192  222  244        Consultant notes psychiatry, pain medicine, GI and Recent hospitalization notes December 2024      Assessment and Plan  Henrietta Garrett is a 30 y.o. female presents to [facility] today for followup     Assessment & Plan  1.  Hot flashes and amenorrhea  She reports hot flashes, night sweats, and absence of periods, which may indicate premenopause or premature ovarian failure. Hormone levels, including testosterone, estrogen, and prolactin, will be assessed. Thyroid function will also be evaluated to rule out thyroid-related issues. If premature ovarian failure is confirmed, hormone replacement therapy with estrogen and " progesterone will be considered. An estrogen patch and a depot shot of progesterone every 3 months are potential options due to her inability to absorb oral progesterone.    2. Depression and anxiety  She has been on Lexapro but reports not absorbing it well. A message will be sent to Dr. Herr to discuss alternative treatment options, including the possibility of an IV clonazepam regimen. =    3. Pain management.  She is currently on oxycodone and is considering adding IV acetaminophen three times a day. Liver function will be closely monitored to avoid potential acetaminophen overdose. She will continue her current pain management regimen while exploring the addition of IV acetaminophen.    4. Raynaud's disease.  She experiences severe symptoms and is considering surgery to stop the progression of the disease. The risks and benefits of the surgery, including potential nerve damage, were discussed. She will continue to monitor her symptoms and consult with her specialist regarding the surgical option.    5. Administrative encounter  [facility] paperwork for her fiancee completed as he is her primary caregiver and transports her to all appointments and helps with her ADLs. Will place ref for OT to help with energy conservation, home safety, and mobility.     Follow-up  The patient will follow up in 3 months.    Diagnoses and all orders for this visit:    1. Amenorrhea (Primary)  -     Estradiol  -     TSH Rfx On Abnormal To Free T4  -     Testosterone (Free & Total), LC / MS  -     Prolactin    2. Generalized anxiety disorder    3. Severe episode of recurrent major depressive disorder, without psychotic features    4. Complex regional pain syndrome type 1 of right lower extremity  -     Ambulatory Referral to Home Health    5. Lupus erythematosus overlap syndrome  Overview:  >>OVERVIEW FOR SYSTEMIC LUPUS ERYTHEMATOSUS WRITTEN ON 4/9/2024  4:53 PM BY DINORA ROMEO MD    Managed with prednisone 5-10mg, plaquenil 200mg  BID and methotrexate by rheum    Orders:  -     Ambulatory Referral to Home Health    6. Tomas-Danlos syndrome  -     Ambulatory Referral to Home Health    7. Postural orthostatic tachycardia syndrome  -     Ambulatory Referral to Home Health        Patient voiced understanding and agreement with plan of care and had no further questions or concerns at this time.     I spent 46 minutes on this encounter, including chart review of any relevant previous encounters, labs, and imaging.   Problems addressed: 2 or more stable chronic illnesses (MODERATE) new presenting problem: requiring additional assessment, consultation, or diagnostic studies, established problem: inadequately controlled,  Complexity: labs ordered yes, labs reviewed yes; coordination of care with external physicians    Kenya David MD  [facility]      Kenya David MD  [facility]      Follow Up  Return in about 3 months (around 5/24/2025) for Recheck.    Patient Instructions   Today: Check hormone levels. Completed paperwork.     Meds: No changes for now    Referrals: Added Home OT, ask [facility] to get home health aide and will order it.            Patient or patient representative verbalized consent for the use of Ambient Listening during the visit with  Kenya David MD for chart documentation. 2/24/2025  16:22 EST

## 2025-02-24 NOTE — PATIENT INSTRUCTIONS
Today: Check hormone levels. Completed paperwork.     Meds: No changes for now    Referrals: Added Home OT, ask [facility] to get home health aide and will order it.

## 2025-02-24 NOTE — TELEPHONE ENCOUNTER
Rx Refill Note  Requested Prescriptions     Pending Prescriptions Disp Refills    escitalopram (LEXAPRO) 1 mg/mL solution 240 mL 3     Sig: Administer 5 mL per G tube 2 (Two) Times a Day. Indications: Major Depressive Disorder      Last office visit with prescribing clinician: 11/25/2024   Last telemedicine visit with prescribing clinician: Visit date not found   Next office visit with prescribing clinician: 2/24/2025       Lamont Duron  02/24/25, 09:15 EST

## 2025-02-28 LAB
ESTRADIOL SERPL-MCNC: 24.2 PG/ML
PROLACTIN SERPL-MCNC: 45.8 NG/ML (ref 4.8–33.4)
TESTOST FREE SERPL-MCNC: 0.8 PG/ML (ref 0–4.2)
TESTOST SERPL-MCNC: 6.8 NG/DL (ref 10–55)
TSH SERPL DL<=0.005 MIU/L-ACNC: 2.84 UIU/ML (ref 0.27–4.2)

## 2025-02-28 NOTE — PROGRESS NOTES
Hello!    Here are the results of your most recent labs:    Your estrogen and thyroid function is normal. Your testosterone is low.    Your prolactin, a hormone made by the pituitary, is high. Guidelines recommend a repeat prolactin, and if still high get an MRI of the brain. It looks like the last MRI we got on you was in 2022, and the pituitary was normal. Would you like me to put in the prolactin repeat to be drawn at a [facility] lab or here in clinic?     Thank you!  Dr. David

## 2025-03-04 DIAGNOSIS — R79.89 PATHOLOGIC HIGH SERUM PROLACTIN: Primary | ICD-10-CM

## 2025-03-06 ENCOUNTER — PATIENT MESSAGE (OUTPATIENT)
Dept: FAMILY MEDICINE CLINIC | Facility: CLINIC | Age: 30
End: 2025-03-06
Payer: COMMERCIAL

## 2025-03-10 DIAGNOSIS — G89.4 CHRONIC PAIN SYNDROME: ICD-10-CM

## 2025-03-10 RX ORDER — OXYCODONE AND ACETAMINOPHEN 10; 325 MG/1; MG/1
1 TABLET ORAL EVERY 6 HOURS PRN
Qty: 120 TABLET | Refills: 0 | Status: SHIPPED | OUTPATIENT
Start: 2025-03-10

## 2025-03-20 ENCOUNTER — PATIENT MESSAGE (OUTPATIENT)
Dept: FAMILY MEDICINE CLINIC | Facility: CLINIC | Age: 30
End: 2025-03-20
Payer: COMMERCIAL

## 2025-03-20 ENCOUNTER — TELEPHONE (OUTPATIENT)
Dept: ONCOLOGY | Facility: CLINIC | Age: 30
End: 2025-03-20

## 2025-03-20 NOTE — TELEPHONE ENCOUNTER
Caller: Henrietta Garrett    Relationship to patient: Self    Best call back number: [phone]    Type of visit: VITALS, F/U, AND B12 INJECTION    Requested date: PATIENT NEEDS MONDAY OR TUESDAY FOR APPTS    If rescheduling, when is the original appointment: 12/20/24

## 2025-04-07 ENCOUNTER — OFFICE VISIT (OUTPATIENT)
Dept: PAIN MEDICINE | Facility: CLINIC | Age: 30
End: 2025-04-07
Payer: COMMERCIAL

## 2025-04-07 VITALS
SYSTOLIC BLOOD PRESSURE: 124 MMHG | HEART RATE: 74 BPM | DIASTOLIC BLOOD PRESSURE: 87 MMHG | TEMPERATURE: 97.9 F | BODY MASS INDEX: 34.74 KG/M2 | WEIGHT: 184 LBS | RESPIRATION RATE: 16 BRPM | HEIGHT: 61 IN

## 2025-04-07 DIAGNOSIS — M05.9 SEROPOSITIVE RHEUMATOID ARTHRITIS: ICD-10-CM

## 2025-04-07 DIAGNOSIS — M32.9 SYSTEMIC LUPUS ERYTHEMATOSUS, UNSPECIFIED SLE TYPE, UNSPECIFIED ORGAN INVOLVEMENT STATUS: ICD-10-CM

## 2025-04-07 DIAGNOSIS — G89.4 CHRONIC PAIN SYNDROME: Primary | ICD-10-CM

## 2025-04-07 DIAGNOSIS — G90.521 COMPLEX REGIONAL PAIN SYNDROME TYPE 1 OF RIGHT LOWER EXTREMITY: ICD-10-CM

## 2025-04-07 DIAGNOSIS — Z79.899 ENCOUNTER FOR LONG-TERM (CURRENT) USE OF HIGH-RISK MEDICATION: ICD-10-CM

## 2025-04-07 LAB
POC AMPHETAMINES: NEGATIVE
POC BARBITURATES: NEGATIVE
POC BENZODIAZEPHINES: NEGATIVE
POC BUPRENORPHINE: NEGATIVE
POC COCAINE: NEGATIVE
POC METHADONE: NEGATIVE
POC METHAMPHETAMINE SCREEN URINE: NEGATIVE
POC OPIATES: NEGATIVE
POC OXYCODONE: POSITIVE
POC PHENCYCLIDINE: NEGATIVE
POC PROPOXYPHENE: NEGATIVE
POC THC: NEGATIVE
POC TRICYCLIC ANTIDEPRESSANTS: NEGATIVE

## 2025-04-07 PROCEDURE — 99213 OFFICE O/P EST LOW 20 MIN: CPT | Performed by: ANESTHESIOLOGY

## 2025-04-07 PROCEDURE — 80305 DRUG TEST PRSMV DIR OPT OBS: CPT | Performed by: ANESTHESIOLOGY

## 2025-04-07 RX ORDER — OXYCODONE AND ACETAMINOPHEN 10; 325 MG/1; MG/1
1 TABLET ORAL EVERY 6 HOURS PRN
Qty: 120 TABLET | Refills: 0 | Status: SHIPPED | OUTPATIENT
Start: 2025-04-09

## 2025-04-07 RX ORDER — ACETAMINOPHEN 10 MG/ML
1000 INJECTION, SOLUTION INTRAVENOUS DAILY
Qty: 3000 ML | Refills: 0 | Status: SHIPPED | OUTPATIENT
Start: 2025-04-07
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
## 2025-04-15 ENCOUNTER — TELEPHONE (OUTPATIENT)
Dept: PAIN MEDICINE | Facility: CLINIC | Age: 30
End: 2025-04-15

## 2025-04-15 NOTE — TELEPHONE ENCOUNTER
I spoke with Ronda at [facility] and she states that they don't dispense IV Tylenol. I called Ms. Garrett's and left a message asking her to return my call. I need to inform her about the medication not being able to be filled at [facility].

## 2025-04-15 NOTE — TELEPHONE ENCOUNTER
Hub staff attempted to follow warm transfer process and was unsuccessful     Caller: Henrietta Talbot    Relationship to patient: Self    Best call back number: [phone] (home)     Patient is needing: [facility]

## 2025-04-15 NOTE — TELEPHONE ENCOUNTER
Hub staff attempted to follow warm transfer process and was unsuccessful     Caller: Henrietta Garrett    Relationship to patient: Self    Best call back number: [phone]    Patient is needing: PATIENT IS RETURNING A CALL TO [facility]. PLEASE CALL AS SOON AS POSSIBLE.

## 2025-04-18 NOTE — TELEPHONE ENCOUNTER
The pharmacy manager Mariah called from [facility] called today and states they are able to order the Ofirmev. They need another order sent. Dr. Dailey gave the ok to give a verbal order to the pharmacy.

## 2025-04-28 DIAGNOSIS — F41.9 ANXIETY AND DEPRESSION: ICD-10-CM

## 2025-04-28 DIAGNOSIS — F32.A ANXIETY AND DEPRESSION: ICD-10-CM

## 2025-04-28 RX ORDER — ESCITALOPRAM OXALATE 5 MG/5ML
5 SOLUTION ORAL 2 TIMES DAILY
Qty: 240 ML | Refills: 3 | Status: CANCELLED | OUTPATIENT
Start: 2025-04-28

## 2025-04-28 NOTE — TELEPHONE ENCOUNTER
Rx Refill Note  Requested Prescriptions     Pending Prescriptions Disp Refills    escitalopram (LEXAPRO) 1 mg/mL solution 240 mL 3     Sig: Administer 5 mL per G tube 2 (Two) Times a Day. Indications: Major Depressive Disorder      Last office visit with prescribing clinician: 2/24/2025   Last telemedicine visit with prescribing clinician: Visit date not found   Next office visit with prescribing clinician: 6/2/2025       Lamont Duron  04/28/25, 13:32 EDT

## 2025-05-12 DIAGNOSIS — G89.4 CHRONIC PAIN SYNDROME: ICD-10-CM

## 2025-05-12 RX ORDER — OXYCODONE AND ACETAMINOPHEN 10; 325 MG/1; MG/1
1 TABLET ORAL EVERY 6 HOURS PRN
Qty: 120 TABLET | Refills: 0 | Status: SHIPPED | OUTPATIENT
Start: 2025-05-12

## 2025-05-14 ENCOUNTER — HOSPITAL ENCOUNTER (EMERGENCY)
Facility: HOSPITAL | Age: 30
Discharge: HOME | End: 2025-05-14
Attending: EMERGENCY MEDICINE | Admitting: EMERGENCY MEDICINE
Payer: COMMERCIAL

## 2025-05-14 VITALS
DIASTOLIC BLOOD PRESSURE: 81 MMHG | RESPIRATION RATE: 16 BRPM | OXYGEN SATURATION: 100 % | SYSTOLIC BLOOD PRESSURE: 134 MMHG | HEART RATE: 72 BPM | TEMPERATURE: 98 F

## 2025-05-14 DIAGNOSIS — S61.213A LACERATION OF LEFT MIDDLE FINGER WITHOUT FOREIGN BODY WITHOUT DAMAGE TO NAIL, INITIAL ENCOUNTER: Primary | ICD-10-CM

## 2025-05-14 PROCEDURE — 90715 TDAP VACCINE 7 YRS/> IM: CPT | Performed by: NURSE PRACTITIONER

## 2025-05-14 PROCEDURE — 25010000002 LIDOCAINE 1 % SOLUTION: Performed by: NURSE PRACTITIONER

## 2025-05-14 PROCEDURE — 90471 IMMUNIZATION ADMIN: CPT | Performed by: NURSE PRACTITIONER

## 2025-05-14 PROCEDURE — 99282 EMERGENCY DEPT VISIT SF MDM: CPT

## 2025-05-14 PROCEDURE — 25010000002 TETANUS-DIPHTH-ACELL PERTUSSIS 5-2.5-18.5 LF-MCG/0.5 SUSPENSION PREFILLED SYRINGE: Performed by: NURSE PRACTITIONER

## 2025-05-14 RX ORDER — LIDOCAINE HYDROCHLORIDE 10 MG/ML
10 INJECTION, SOLUTION INFILTRATION; PERINEURAL ONCE
Status: COMPLETED | OUTPATIENT
Start: 2025-05-14 | End: 2025-05-14

## 2025-05-14 RX ADMIN — TETANUS TOXOID, REDUCED DIPHTHERIA TOXOID AND ACELLULAR PERTUSSIS VACCINE, ADSORBED 0.5 ML: 5; 2.5; 8; 8; 2.5 SUSPENSION INTRAMUSCULAR at 20:00

## 2025-05-14 RX ADMIN — LIDOCAINE HYDROCHLORIDE 10 ML: 10 INJECTION, SOLUTION INFILTRATION; PERINEURAL at 20:02
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
## 2025-05-14 NOTE — Clinical Note
[facility] EMERGENCY DEPARTMENT  [address] [facility] [address]  Phone: [phone]    Flaco Redman accompanied Henrietta Garrett to the emergency department on 5/14/2025. They may return to work on 05/15/2025.        Thank you for choosing [facility].    Maryana Choudhary APRN

## 2025-05-15 NOTE — ED PROVIDER NOTES
MD ATTESTATION NOTE    The FABIOLA and I have discussed this patient's history, physical exam, and treatment plan.  I have reviewed the documentation and personally had a face to face interaction with the patient. I affirm the documentation and agree with the treatment and plan.  The attached note describes my personal findings.      I provided a substantive portion of the care of the patient.  I personally performed the physical exam in its entirety, and below are my findings.       Brief HPI: This patient is a 30-year-old female presenting to the emergency room today with a laceration of her left middle finger.  She reports that she cut the finger on a aluminum can.  She denies any numbness/tingling or loss of motor function.  Tetanus shot is currently not up-to-date.      PHYSICAL EXAM  ED Triage Vitals   Temp Heart Rate Resp BP SpO2   05/14/25 1912 05/14/25 1912 05/14/25 1912 05/14/25 1913 05/14/25 1912   98 °F (36.7 °C) (!) 125 26 135/79 98 %      Temp src Heart Rate Source Patient Position BP Location FiO2 (%)   05/14/25 1912 -- -- -- --   Tympanic             GENERAL: Resting comfortably and in no acute distress, nontoxic in appearance  HENT: nares patent  EYES: no scleral icterus  CV: regular rhythm, normal rate, no M/R/G  RESPIRATORY: normal effort, lungs clear bilaterally  MUSCULOSKELETAL: no deformity, no edema  NEURO: alert, moves all extremities, follows commands, normal sensation and motor function  PSYCH:  calm, cooperative  SKIN: warm, dry, laceration present to the distal left third digit    Vital signs and nursing notes reviewed.        Plan: We will clean and repair the laceration accordingly as well as update the patient's tetanus.       Poli Escobar MD  05/14/25 0246

## 2025-05-15 NOTE — DISCHARGE INSTRUCTIONS
The sutures in your hand will absorb on their own  Wash with mild soap and water, twice daily  Return for increased pain/redness/ fever/ or signs of infection

## 2025-05-30 DIAGNOSIS — Z30.09 ENCOUNTER FOR COUNSELING REGARDING CONTRACEPTION: ICD-10-CM

## 2025-05-30 RX ORDER — NORETHINDRONE ACETATE AND ETHINYL ESTRADIOL 1MG-20(24)
1 KIT ORAL DAILY
Qty: 84 TABLET | Refills: 3 | Status: SHIPPED | OUTPATIENT
Start: 2025-05-30

## 2025-06-09 ENCOUNTER — OFFICE VISIT (OUTPATIENT)
Dept: PAIN MEDICINE | Facility: CLINIC | Age: 30
End: 2025-06-09
Payer: COMMERCIAL

## 2025-06-09 VITALS
HEIGHT: 61 IN | BODY MASS INDEX: 34.06 KG/M2 | RESPIRATION RATE: 18 BRPM | HEART RATE: 83 BPM | SYSTOLIC BLOOD PRESSURE: 128 MMHG | WEIGHT: 180.4 LBS | TEMPERATURE: 97.8 F | DIASTOLIC BLOOD PRESSURE: 86 MMHG

## 2025-06-09 DIAGNOSIS — M32.8 LUPUS ERYTHEMATOSUS OVERLAP SYNDROME: ICD-10-CM

## 2025-06-09 DIAGNOSIS — M79.18 MYOFASCIAL PAIN SYNDROME, CERVICAL: ICD-10-CM

## 2025-06-09 DIAGNOSIS — M79.18 MYOFASCIAL PAIN SYNDROME OF LUMBAR SPINE: ICD-10-CM

## 2025-06-09 DIAGNOSIS — G89.4 CHRONIC PAIN SYNDROME: ICD-10-CM

## 2025-06-09 DIAGNOSIS — Z79.899 ENCOUNTER FOR LONG-TERM (CURRENT) USE OF HIGH-RISK MEDICATION: Primary | ICD-10-CM

## 2025-06-09 RX ORDER — PROMETHAZINE HYDROCHLORIDE 6.25 MG/5ML
SYRUP ORAL
COMMUNITY
Start: 2025-03-28

## 2025-06-09 RX ORDER — CHLORZOXAZONE 500 MG/1
500 TABLET ORAL 3 TIMES DAILY PRN
Qty: 90 TABLET | Refills: 0 | Status: SHIPPED | OUTPATIENT
Start: 2025-06-09

## 2025-06-09 RX ORDER — ALBUTEROL SULFATE 90 UG/1
2 INHALANT RESPIRATORY (INHALATION)
COMMUNITY
Start: 2025-05-06

## 2025-06-09 RX ORDER — DICYCLOMINE HYDROCHLORIDE 10 MG/1
10 CAPSULE ORAL
COMMUNITY
Start: 2025-06-04

## 2025-06-09 RX ORDER — OXYCODONE AND ACETAMINOPHEN 10; 325 MG/1; MG/1
1.5 TABLET ORAL EVERY 6 HOURS PRN
Qty: 180 TABLET | Refills: 0 | Status: SHIPPED | OUTPATIENT
Start: 2025-06-09
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
## 2025-06-25 ENCOUNTER — TELEPHONE (OUTPATIENT)
Dept: ONCOLOGY | Facility: CLINIC | Age: 30
End: 2025-06-25

## 2025-06-25 RX ORDER — ACETAMINOPHEN 10 MG/ML
1000 INJECTION, SOLUTION INTRAVENOUS DAILY
Qty: 3000 ML | Refills: 0 | Status: SHIPPED | OUTPATIENT
Start: 2025-06-25

## 2025-06-25 NOTE — TELEPHONE ENCOUNTER
.  Caller: Penny Garrett    Relationship: Self    Best call back number: [phone]*    What is the best time to reach you: ANY    Who are you requesting to speak with (clinical staff, provider,  specific staff member): SCHEDULING     What was the call regarding: PENNY IS CALLING TO GET SCHEDULED WITH DR ZAVALA IN MID JULY

## 2025-06-25 NOTE — TELEPHONE ENCOUNTER
I called and spoke with the patient and was able to schedule an appointment for 07/14. Ok to see MITCHELL per Sharmaine TODD Patient requested a Monday or Tuesday in Mid-July.

## 2025-06-26 ENCOUNTER — OFFICE VISIT (OUTPATIENT)
Dept: FAMILY MEDICINE CLINIC | Facility: CLINIC | Age: 30
End: 2025-06-26
Payer: COMMERCIAL

## 2025-06-26 VITALS
HEIGHT: 61 IN | HEART RATE: 60 BPM | SYSTOLIC BLOOD PRESSURE: 120 MMHG | BODY MASS INDEX: 32.47 KG/M2 | TEMPERATURE: 98 F | DIASTOLIC BLOOD PRESSURE: 80 MMHG | WEIGHT: 172 LBS

## 2025-06-26 DIAGNOSIS — J02.9 SORE THROAT: Primary | ICD-10-CM

## 2025-06-26 DIAGNOSIS — R05.9 COUGH, UNSPECIFIED TYPE: ICD-10-CM

## 2025-06-26 LAB
EXPIRATION DATE: ABNORMAL
EXPIRATION DATE: NORMAL
FLUAV AG UPPER RESP QL IA.RAPID: DETECTED
FLUBV AG UPPER RESP QL IA.RAPID: NOT DETECTED
INTERNAL CONTROL: ABNORMAL
INTERNAL CONTROL: NORMAL
Lab: ABNORMAL
Lab: NORMAL
S PYO AG THROAT QL: NEGATIVE
SARS-COV-2 AG UPPER RESP QL IA.RAPID: NOT DETECTED

## 2025-06-26 PROCEDURE — 87880 STREP A ASSAY W/OPTIC: CPT | Performed by: FAMILY MEDICINE

## 2025-06-26 PROCEDURE — 99214 OFFICE O/P EST MOD 30 MIN: CPT | Performed by: FAMILY MEDICINE

## 2025-06-26 PROCEDURE — 87428 SARSCOV & INF VIR A&B AG IA: CPT | Performed by: FAMILY MEDICINE

## 2025-06-26 RX ORDER — OSELTAMIVIR PHOSPHATE 75 MG/1
75 CAPSULE ORAL 2 TIMES DAILY
Qty: 10 CAPSULE | Refills: 0 | Status: SHIPPED | OUTPATIENT
Start: 2025-06-26 | End: 2025-07-01

## 2025-06-26 NOTE — PATIENT INSTRUCTIONS
Upper respiratory infection plan:  - nasal congestion relief with OTC sudafed, mucinex, sinus rinse, use of steam shower or humidifier  - sore throat relief with OTC cough drops, spoonfuls of honey, salt water gargle, throat coat tea, hot honey lemon water  - cough relief with OTC cough drops (Cepacol), honey, use of steam shower or humidifier; RX tessalon perles  - pain/inflammation relief with OTC ibuprofen 400mg every 4 hrs with food/water  - you may consider taking elderberry syrup or gummies or zinc supplement to boost immune system  - increase hydration  - Red flags: new fever (100.4+), inability to swallow, one sided severe facial pain or dental pain, new shortness of breath, new chest pain not associated with coughing, blood in phlegm

## 2025-06-26 NOTE — PROGRESS NOTES
Chief Complaint  Swollen Glands (5 days), Cough (2 days ), Nasal Congestion (4 days ), and Chest Pain (3 to 4)    Subjective        Henrietta Garrett presents to [facility]       The patient is a 30-year-old female who presents for evaluation of neck pain, nasal congestion, and cough.    She reports severe neck swelling and pain, which she describes as unbearable. The swelling extends from her neck to her armpit, causing difficulty in breathing and necessitating gasping for air. She also experiences muscle pain. She has a history of lupus, which she suspects may be causing lymph node issues. Her white blood cell count is typically low, often dropping to 1, 2, or 3, indicating a constant state of flare-up. Despite this, she has sufficient neutrophils. She has been advised by several physicians to consider tonsillectomy. She has a history of recurrent strep throat and has been recommended for tonsillectomy by an ENT specialist. She has a history of mononucleosis, which occurred approximately 6 to 7 years ago. She recalls a similar episode about 8 months ago, characterized by high fevers, cold and hot chills, and a need to undress and redress frequently over a week. She is currently on oxycodone for pain management.    She also reports nasal congestion and cough, which she attributes to her neck swelling. She has not found any specific medication that consistently alleviates her symptoms during severe cough or cold episodes. She is unable to take NyQuil or DayQuil due to her condition.    Supplemental Information  She is constantly anemic and receives iron infusions every 3 months and weekly B12 injections. She plans to resume these treatments as her levels are currently low. 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    ALLERGIES  The patient has no known allergies.    MEDICATIONS  Current: Oxycodone,  "iron infusions, B12 injections    Swollen Glands  Symptoms: chest pain, cough and swollen glands    Cough  Associated symptoms: chest pain    Chest Pain   Associated symptoms include a cough.       Objective   Vital Signs:  /80 Comment: rt arm  Pulse 60 Comment: manually  Temp 98 °F (36.7 °C) (Oral)   Ht 155.8 cm (61.34\")   Wt 78 kg (172 lb)   BMI 32.14 kg/m²   Estimated body mass index is 32.14 kg/m² as calculated from the following:    Height as of this encounter: 155.8 cm (61.34\").    Weight as of this encounter: 78 kg (172 lb).          Physical Exam  Constitutional:       Appearance: Normal appearance.   HENT:      Head: Normocephalic and atraumatic.      Right Ear: Tympanic membrane, ear canal and external ear normal.      Left Ear: Tympanic membrane, ear canal and external ear normal.      Nose: Nose normal.      Mouth/Throat:      Mouth: Mucous membranes are moist.      Tonsils: No tonsillar abscesses. 2+ on the right. 2+ on the left.      Comments: Debris noted to R tonsillar crypts.  Eyes:      General: Lids are normal.      Conjunctiva/sclera: Conjunctivae normal.   Neck:      Comments: Marked cervical adenopathy to R side, quite tender  Cardiovascular:      Rate and Rhythm: Normal rate and regular rhythm.   Pulmonary:      Effort: Pulmonary effort is normal.      Breath sounds: Normal breath sounds.   Musculoskeletal:      Cervical back: Normal range of motion.   Lymphadenopathy:      Cervical: Cervical adenopathy present.   Skin:     General: Skin is warm and dry.   Neurological:      General: No focal deficit present.      Mental Status: She is alert and oriented to person, place, and time.      Gait: Gait is intact.   Psychiatric:         Mood and Affect: Mood normal.         Behavior: Behavior normal.         Thought Content: Thought content normal.        Result Review :               Results  Laboratory Studies  White blood cell count is low.       Assessment and Plan        1. " Influenza.  Symptoms include swollen neck, nasal congestion, and cough. A swab test was conducted for strep, flu, and COVID-19. The patient was prescribed Tamiflu to be taken twice daily for 5 days. Over-the-counter remedies such as sinus rinse, cough drops, salt water gargles, and honey were recommended. Advised to avoid close contact with young children for the next few days. If symptoms worsen within 48 hours instead of improving, a CT scan will be considered.    2. Lymphadenitis.  The patient has significantly swollen lymph nodes. If the strep, flu, and COVID-19 tests return negative results, Augmentin will be prescribed. If the condition worsens within 48 hours instead of improving, a CT scan will be considered.    3. Anemia.  The patient is constantly anemic and requires iron infusions every three months and weekly B12 injections. She will need to resume these treatments as her levels are currently low.    Diagnoses and all orders for this visit:    1. Sore throat (Primary)  -     POC Rapid Strep A    2. Cough, unspecified type  -     POCT SARS-CoV-2 + Flu Antigen BUDDY    Other orders  -     oseltamivir (Tamiflu) 75 MG capsule; Take 1 capsule by mouth 2 (Two) Times a Day for 5 days.  Dispense: 10 capsule; Refill: 0             Follow Up   No follow-ups on file.  Patient was given instructions and counseling regarding her condition or for health maintenance advice. Please see specific information pulled into the AVS if appropriate.     Patient or patient representative verbalized consent for the use of Ambient Listening during the visit with  Eve Perry MD for chart documentation. 6/26/2025  15:12 EDT

## 2025-06-27 ENCOUNTER — TELEPHONE (OUTPATIENT)
Age: 30
End: 2025-06-27
Payer: COMMERCIAL

## 2025-07-08 ENCOUNTER — OFFICE VISIT (OUTPATIENT)
Dept: PAIN MEDICINE | Facility: CLINIC | Age: 30
End: 2025-07-08
Payer: COMMERCIAL

## 2025-07-08 VITALS
BODY MASS INDEX: 25.65 KG/M2 | TEMPERATURE: 99 F | OXYGEN SATURATION: 95 % | SYSTOLIC BLOOD PRESSURE: 135 MMHG | DIASTOLIC BLOOD PRESSURE: 86 MMHG | WEIGHT: 183.2 LBS | HEIGHT: 71 IN | HEART RATE: 90 BPM

## 2025-07-08 DIAGNOSIS — M79.18 MYOFASCIAL PAIN SYNDROME OF LUMBAR SPINE: ICD-10-CM

## 2025-07-08 DIAGNOSIS — Z79.899 ENCOUNTER FOR LONG-TERM (CURRENT) USE OF HIGH-RISK MEDICATION: ICD-10-CM

## 2025-07-08 DIAGNOSIS — Q79.60 EHLERS-DANLOS SYNDROME: ICD-10-CM

## 2025-07-08 DIAGNOSIS — G90.521 COMPLEX REGIONAL PAIN SYNDROME TYPE 1 OF RIGHT LOWER EXTREMITY: ICD-10-CM

## 2025-07-08 DIAGNOSIS — G89.4 CHRONIC PAIN SYNDROME: Primary | ICD-10-CM

## 2025-07-08 DIAGNOSIS — M79.18 MYOFASCIAL PAIN SYNDROME OF LUMBAR SPINE: Primary | ICD-10-CM

## 2025-07-08 DIAGNOSIS — G89.4 CHRONIC PAIN SYNDROME: ICD-10-CM

## 2025-07-08 DIAGNOSIS — M05.9 SEROPOSITIVE RHEUMATOID ARTHRITIS: ICD-10-CM

## 2025-07-08 PROCEDURE — 99214 OFFICE O/P EST MOD 30 MIN: CPT | Performed by: NURSE PRACTITIONER

## 2025-07-08 RX ORDER — CHLORZOXAZONE 500 MG/1
500 TABLET ORAL 3 TIMES DAILY PRN
Qty: 90 TABLET | Refills: 0 | Status: SHIPPED | OUTPATIENT
Start: 2025-07-08

## 2025-07-08 RX ORDER — OXYCODONE AND ACETAMINOPHEN 10; 325 MG/1; MG/1
1.5 TABLET ORAL EVERY 6 HOURS PRN
Qty: 180 TABLET | Refills: 0 | Status: SHIPPED | OUTPATIENT
Start: 2025-07-08

## 2025-07-08 NOTE — PROGRESS NOTES
CHIEF COMPLAINT  F/u joint pain- pain has improve slightly since her last visit.     Subjective   Henrietta Garrett is a 30 y.o. female  who presents for follow-up.  She has a history of  diffuse pain d/t autoimmune overlap syndrome, diffuse joint pain d/t RA,  and abdominal pain d/t severe gastroparesis resulting in chronic TPN.  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    Today her pain is 7/10VAS in severity. She is currently maintained on Ofirmev 1000 mg daily via her IV port, Percocet 10/325 max of 6/day, and Chlorzoxazone 500 mg TID. She states that the Ofirmev has been somewhat helpful to her pain. She state that her medication regimen decreases her pain by 30-40%. She notes that it seems some days she processes the percocet better then other days with regards to her gastroparesis. She denies any side effects including somnolence or constipation. She notes that she is chronically fatigued d/t her lupus and the chronic TPN. ADLs by self.     Has previously failed Fentanyl patch, Butrans, Dilaudid, Morphine, and high dose Gabapentin.       She is working with Dr. Kee Skinner with hematology and will be starting intravenous iron.  Followed by Dr. Betzaida Rao (rheumatology)     Surgical history:  7/5/2024-laparoscopic assisted takedown of J-tube and small bowel resection, G-tube removal with PEG  2/19/2024-J-tube replacement  2/15/2024-EGD with PEG, J-tube replacement  12/11/2023-laparoscopic cholecystectomy     She is a patient of Dr. Terrazas (orthopedics) and reports history of multiple shoulder surgeries.    Pain  This is a chronic (7/10VAS in severity) problem. The current episode started more than 1 year ago. The problem occurs constantly. The problem has been waxing and waning. Associated symptoms include abdominal pain, chest pain, chills, congestion, fatigue, headaches, myalgias, neck pain, numbness and weakness. Pertinent negatives include no  "coughing or fever. Nothing aggravates the symptoms. She has tried oral narcotics, NSAIDs and rest (antineuropathics) for the symptoms. The treatment provided mild relief.      PEG Assessment   What number best describes your pain on average in the past week?8  What number best describes how, during the past week, pain has interfered with your enjoyment of life?8  What number best describes how, during the past week, pain has interfered with your general activity?  9    The following portions of the patient's history were reviewed and updated as appropriate: allergies, current medications, past family history, past medical history, past social history, past surgical history, and problem list.    Review of Systems   Constitutional:  Positive for chills and fatigue. Negative for activity change and fever.   HENT:  Positive for congestion.    Eyes:  Negative for visual disturbance.   Respiratory:  Positive for shortness of breath. Negative for cough, chest tightness and stridor.    Cardiovascular:  Positive for chest pain.   Gastrointestinal:  Positive for abdominal pain, constipation and diarrhea.   Genitourinary:  Negative for difficulty urinating, dyspareunia and dysuria.   Musculoskeletal:  Positive for back pain, myalgias and neck pain.   Neurological:  Positive for dizziness, weakness, light-headedness, numbness and headaches.   Psychiatric/Behavioral:  Positive for agitation and sleep disturbance. Negative for self-injury and suicidal ideas. The patient is nervous/anxious.      --  The aforementioned information the Chief Complaint section and above subjective data including any HPI data, and also the Review of Systems data, has been personally reviewed and affirmed.  --    Vitals:    07/08/25 1533   BP: 135/86   Pulse: 90   Temp: 99 °F (37.2 °C)   SpO2: 95%   Weight: 83.1 kg (183 lb 3.2 oz)   Height: 180.3 cm (71\")   PainSc: 7    PainLoc: Back     Objective   Physical Exam  Vitals and nursing note reviewed. "   Constitutional:       Appearance: She is well-developed. She is ill-appearing.   Eyes:      General: Lids are normal.   Cardiovascular:      Rate and Rhythm: Normal rate.   Pulmonary:      Effort: Pulmonary effort is normal.   Musculoskeletal:      Cervical back: Normal range of motion.      Thoracic back: Decreased range of motion.      Lumbar back: Decreased range of motion.   Neurological:      Mental Status: She is alert and oriented to person, place, and time.   Psychiatric:         Attention and Perception: Attention normal.         Mood and Affect: Mood normal.         Speech: Speech normal.         Behavior: Behavior normal.         Judgment: Judgment normal.       Assessment & Plan   Diagnoses and all orders for this visit:    1. Chronic pain syndrome (Primary)    2. Myofascial pain syndrome of lumbar spine    3. Seropositive rheumatoid arthritis    4. Complex regional pain syndrome type 1 of right lower extremity    5. Tomas-Danlos syndrome    6. Encounter for long-term (current) use of high-risk medication      Henrietta Garrett reports a pain score of 7.  Given her pain assessment as noted, treatment options were discussed and the following options were decided upon as a follow-up plan to address the patient's pain: continuation of current treatment plan for pain.    --- Patient screened positive for depression based on a PHQ-9 score of 20 on 7/8/2025. Follow-up recommendations include: Established with Psychiatry. She denies any suicidal ideation.     --- The urine drug screen confirmation from 4/7/2025 has been reviewed and the result is appropriate based on patient history and [facility] report  --- CSA and consent to treat with controlled substances signed on 7/8/2025  --- Opioid Risk--Low, consider at least moderate d/t MEDD of 90 mg  --- Refill Percocet 10/325. Fill date 7/10/2025 applied. Patient appears stable with current regimen. No adverse effects. Regarding continuation of opioids, there is no  evidence of aberrant behavior or any red flags.  The patient continues with appropriate response to opioid therapy. ADL's remain intact by self.   --- Continue Ofirmev via Port  --- Follow-up 1 month or sooner if needed.      [facility] REPORT  As part of the patient's treatment plan, I am prescribing controlled substances. The patient has been made aware of appropriate use of such medications, including potential risk of somnolence, limited ability to drive and/or work safely, and the potential for dependence or overdose. It has also been made clear that these medications are for use by this patient only, without concomitant use of alcohol or other substances unless prescribed.     Patient has completed prescribing agreement detailing terms of continued prescribing of controlled substances, including monitoring [facility] reports, urine drug screening, and pill counts if necessary. The patient is aware that inappropriate use will results in cessation of prescribing such medications.    As the clinician, I personally reviewed the [facility] from 7/8/2025 while the patient was in the office today.    History and physical exam exhibit continued safe and appropriate use of controlled substances.    Dictated utilizing Dragon dictation.

## 2025-07-10 ENCOUNTER — TELEPHONE (OUTPATIENT)
Dept: ONCOLOGY | Facility: CLINIC | Age: 30
End: 2025-07-10

## 2025-07-15 ENCOUNTER — HOSPITAL ENCOUNTER (EMERGENCY)
Facility: HOSPITAL | Age: 30
Discharge: HOME | End: 2025-07-15
Attending: STUDENT IN AN ORGANIZED HEALTH CARE EDUCATION/TRAINING PROGRAM | Admitting: STUDENT IN AN ORGANIZED HEALTH CARE EDUCATION/TRAINING PROGRAM
Payer: COMMERCIAL

## 2025-07-15 VITALS
SYSTOLIC BLOOD PRESSURE: 162 MMHG | HEART RATE: 92 BPM | DIASTOLIC BLOOD PRESSURE: 100 MMHG | OXYGEN SATURATION: 93 % | RESPIRATION RATE: 16 BRPM

## 2025-07-15 DIAGNOSIS — K02.9 DENTAL DECAY: ICD-10-CM

## 2025-07-15 DIAGNOSIS — K08.89 ODONTALGIA: Primary | ICD-10-CM

## 2025-07-15 PROCEDURE — 99283 EMERGENCY DEPT VISIT LOW MDM: CPT

## 2025-07-15 RX ORDER — NAPROXEN 500 MG/1
500 TABLET ORAL 2 TIMES DAILY PRN
Qty: 15 TABLET | Refills: 0 | Status: SHIPPED | OUTPATIENT
Start: 2025-07-15

## 2025-07-15 RX ORDER — LIDOCAINE HYDROCHLORIDE 20 MG/ML
5 SOLUTION OROPHARYNGEAL AS NEEDED
Qty: 50 ML | Refills: 0 | Status: SHIPPED | OUTPATIENT
Start: 2025-07-15

## 2025-07-15 RX ORDER — LIDOCAINE HYDROCHLORIDE 20 MG/ML
10 SOLUTION OROPHARYNGEAL ONCE
Status: COMPLETED | OUTPATIENT
Start: 2025-07-15 | End: 2025-07-15

## 2025-07-15 RX ORDER — HYDROCODONE BITARTRATE AND ACETAMINOPHEN 5; 325 MG/1; MG/1
1 TABLET ORAL ONCE
Refills: 0 | Status: DISCONTINUED | OUTPATIENT
Start: 2025-07-15 | End: 2025-07-15 | Stop reason: HOSPADM

## 2025-07-15 RX ADMIN — LIDOCAINE HYDROCHLORIDE 10 ML: 20 SOLUTION ORAL at 18:50
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
## 2025-07-21 ENCOUNTER — TELEPHONE (OUTPATIENT)
Dept: ONCOLOGY | Facility: CLINIC | Age: 30
End: 2025-07-21
Payer: COMMERCIAL

## 2025-07-30 DIAGNOSIS — F41.9 ANXIETY AND DEPRESSION: ICD-10-CM

## 2025-07-30 DIAGNOSIS — F32.A ANXIETY AND DEPRESSION: ICD-10-CM

## 2025-08-05 RX ORDER — ESCITALOPRAM OXALATE 5 MG/5ML
5 SOLUTION ORAL 2 TIMES DAILY
Qty: 240 ML | Refills: 3 | Status: SHIPPED | OUTPATIENT
Start: 2025-08-05

## 2025-08-08 DIAGNOSIS — M79.18 MYOFASCIAL PAIN SYNDROME OF LUMBAR SPINE: ICD-10-CM

## 2025-08-09 RX ORDER — CHLORZOXAZONE 500 MG/1
500 TABLET ORAL 3 TIMES DAILY PRN
Qty: 90 TABLET | Refills: 0 | Status: SHIPPED | OUTPATIENT
Start: 2025-08-09

## 2025-08-11 DIAGNOSIS — G89.4 CHRONIC PAIN SYNDROME: ICD-10-CM

## 2025-08-11 RX ORDER — OXYCODONE AND ACETAMINOPHEN 10; 325 MG/1; MG/1
1.5 TABLET ORAL EVERY 6 HOURS PRN
Qty: 180 TABLET | Refills: 0 | Status: SHIPPED | OUTPATIENT
Start: 2025-08-11

## 2025-08-13 ENCOUNTER — OFFICE VISIT (OUTPATIENT)
Dept: PAIN MEDICINE | Facility: CLINIC | Age: 30
End: 2025-08-13
Payer: COMMERCIAL

## 2025-08-13 VITALS
DIASTOLIC BLOOD PRESSURE: 80 MMHG | TEMPERATURE: 98 F | BODY MASS INDEX: 25.65 KG/M2 | WEIGHT: 183.2 LBS | HEART RATE: 59 BPM | HEIGHT: 71 IN | OXYGEN SATURATION: 97 % | RESPIRATION RATE: 18 BRPM | SYSTOLIC BLOOD PRESSURE: 131 MMHG

## 2025-08-13 DIAGNOSIS — M05.9 SEROPOSITIVE RHEUMATOID ARTHRITIS: Primary | ICD-10-CM

## 2025-08-13 DIAGNOSIS — M79.18 MYOFASCIAL PAIN SYNDROME, CERVICAL: ICD-10-CM

## 2025-08-13 DIAGNOSIS — G90.521 COMPLEX REGIONAL PAIN SYNDROME TYPE 1 OF RIGHT LOWER EXTREMITY: ICD-10-CM

## 2025-08-13 DIAGNOSIS — G89.4 CHRONIC PAIN SYNDROME: ICD-10-CM

## 2025-08-13 DIAGNOSIS — Z79.899 ENCOUNTER FOR LONG-TERM (CURRENT) USE OF HIGH-RISK MEDICATION: ICD-10-CM

## 2025-08-13 DIAGNOSIS — M79.18 MYOFASCIAL PAIN SYNDROME OF LUMBAR SPINE: ICD-10-CM

## 2025-08-13 DIAGNOSIS — Q79.60 EHLERS-DANLOS SYNDROME: ICD-10-CM

## 2025-08-13 PROCEDURE — 99214 OFFICE O/P EST MOD 30 MIN: CPT | Performed by: NURSE PRACTITIONER

## 2025-08-26 RX ORDER — ACETAMINOPHEN 10 MG/ML
1000 INJECTION, SOLUTION INTRAVENOUS DAILY
Qty: 3000 ML | Refills: 0 | Status: SHIPPED | OUTPATIENT
Start: 2025-08-26

## (undated) DEVICE — ADAPT CLN BIOGUARD AIR/H2O DISP

## (undated) DEVICE — CANN O2 ETCO2 FITS ALL CONN CO2 SMPL A/ 7IN DISP LF

## (undated) DEVICE — CATH CHOLANG 4.5F18IN BRGNDY

## (undated) DEVICE — ENDOCUT SCISSOR TIP, DISPOSABLE: Brand: RENEW

## (undated) DEVICE — STANDARD HYPODERMIC NEEDLE,POLYPROPYLENE HUB: Brand: MONOJECT

## (undated) DEVICE — TOTAL TRAY, 16FR 10ML SIL FOLEY, URN: Brand: MEDLINE

## (undated) DEVICE — KT ORCA ORCAPOD DISP STRL

## (undated) DEVICE — GLV SURG PREMIERPRO ORTHO LTX PF SZ8 BRN

## (undated) DEVICE — APPL CHLORAPREP HI/LITE 26ML ORNG

## (undated) DEVICE — LAPAROVUE VISIBILITY SYSTEM LAPAROSCOPIC SOLUTIONS: Brand: LAPAROVUE

## (undated) DEVICE — 3M™ STERI-STRIP™ COMPOUND BENZOIN TINCTURE 40 BAGS/CARTON 4 CARTONS/CASE C1544: Brand: 3M™ STERI-STRIP™

## (undated) DEVICE — LOU LAP CHOLE: Brand: MEDLINE INDUSTRIES, INC.

## (undated) DEVICE — ENDOPATH XCEL BLADELESS TROCARS WITH STABILITY SLEEVES: Brand: ENDOPATH XCEL

## (undated) DEVICE — DISPOSABLE MONOPOLAR ENDOSCOPIC CORD 10 FT. (3M): Brand: KIRWAN

## (undated) DEVICE — ENSEAL X1 TISSUE SEALER, CURVED JAW, 37 CM SHAFT LENGTH: Brand: ENSEAL

## (undated) DEVICE — TUBING, SUCTION, 1/4" X 20', STRAIGHT: Brand: MEDLINE INDUSTRIES, INC.

## (undated) DEVICE — TOWEL,OR,DSP,ST,BLUE,STD,4/PK,20PK/CS: Brand: MEDLINE

## (undated) DEVICE — SUT GUT CHRM 3/0 PS2 27IN 1638H

## (undated) DEVICE — ENDOPATH XCEL UNIVERSAL TROCAR STABLILITY SLEEVES: Brand: ENDOPATH XCEL

## (undated) DEVICE — SPNG LAP 18X18IN LF STRL PK/5

## (undated) DEVICE — ANTIBACTERIAL UNDYED BRAIDED (POLYGLACTIN 910), SYNTHETIC ABSORBABLE SUTURE: Brand: COATED VICRYL

## (undated) DEVICE — GLV SURG PREMIERPRO ORTHO LTX PF SZ7.5 BRN

## (undated) DEVICE — EXTENSION SET, MALE LUER LOCK ADAPTER WITH RETRACTABLE COLLAR

## (undated) DEVICE — IRRIGATOR BULB ASEPTO 60CC STRL

## (undated) DEVICE — SUT MNCRYL PLS ANTIB UD 4/0 PS2 18IN

## (undated) DEVICE — TUBING, SUCTION, 1/4" X 10', STRAIGHT: Brand: MEDLINE

## (undated) DEVICE — PAD GRND E/S MEGADYNE MONOPLR 2/PLT W/CORD A/ DISP

## (undated) DEVICE — PLUG CATH FOL W/CAP STRL

## (undated) DEVICE — COVER,MAYO STAND,STERILE: Brand: MEDLINE

## (undated) DEVICE — BNDR ABD 4PANEL 12IN MED/LG

## (undated) DEVICE — SENSR O2 OXIMAX FNGR A/ 18IN NONSTR

## (undated) DEVICE — PK ENT MINOR 40

## (undated) DEVICE — BLCK/BITE BLOX W/DENTL/RIM W/STRAP 54F

## (undated) DEVICE — STPCK 3WY D201 DISCOFIX

## (undated) DEVICE — SUT PROLN 0 CT1 30IN 8424H

## (undated) DEVICE — SUT VIC 2/0 TIES 18IN J111T

## (undated) DEVICE — Device

## (undated) DEVICE — LN SMPL CO2 SHTRM SD STREAM W/M LUER

## (undated) DEVICE — SUT SILK 2/0 SH CR8 18IN CR8 C012D

## (undated) DEVICE — SUT VIC 0 TN 27IN DYED JTN0G

## (undated) DEVICE — LAPAROSCOPIC SMOKE FILTRATION SYSTEM: Brand: PALL LAPAROSHIELD® PLUS LAPAROSCOPIC SMOKE FILTRATION SYSTEM

## (undated) DEVICE — SPONGE,LAP,4"X18",XR,ST,5/PK,40PK/CS: Brand: MEDLINE INDUSTRIES, INC.

## (undated) DEVICE — CATH IV INSYTE AUTOGARD 14G 1 1/2IN ORNG

## (undated) DEVICE — DRP C/ARM 41X74IN

## (undated) DEVICE — LAB CORP FIT LEUR FEM CELLDYN 1/16IN ID FOR SAPPHIRE

## (undated) DEVICE — BLD TONG INDIV/WRP A/ 6IN STRL

## (undated) DEVICE — PK PROC MINOR 40 CA/3

## (undated) DEVICE — MEDI-VAC YANKAUER SUCTION HANDLE W/BULBOUS TIP: Brand: CARDINAL HEALTH

## (undated) DEVICE — SOL NACL 0.9PCT 1000ML

## (undated) DEVICE — PENCL SMOKE/EVAC MEGADYNE TELESCP 10FT

## (undated) DEVICE — DRAPE,REIN 53X77,STERILE: Brand: MEDLINE

## (undated) DEVICE — INTENDED FOR TISSUE SEPARATION, AND OTHER PROCEDURES THAT REQUIRE A SHARP SURGICAL BLADE TO PUNCTURE OR CUT.: Brand: BARD-PARKER ® CARBON RIB-BACK BLADES

## (undated) DEVICE — SUT SILK 3/0 SH CR8 18IN C013D

## (undated) DEVICE — CONTAINER,SPECIMEN,OR STERILE,4OZ: Brand: MEDLINE

## (undated) DEVICE — SUT VIC 0 TIES 18IN J912G

## (undated) DEVICE — ENDOPOUCH RETRIEVER SPECIMEN RETRIEVAL BAGS: Brand: ENDOPOUCH RETRIEVER

## (undated) DEVICE — SUT PDS 1 CT1 36IN Z347H

## (undated) DEVICE — ENDOPATH PNEUMONEEDLE INSUFFLATION NEEDLES WITH LUER LOCK CONNECTORS 120MM: Brand: ENDOPATH

## (undated) DEVICE — SUT ETHLN 3/0 PS1 18IN 1663H

## (undated) DEVICE — GAUZE,SPONGE,4"X4",16PLY,XRAY,STRL,LF: Brand: MEDLINE

## (undated) DEVICE — WOUND RETRACTOR AND PROTECTOR: Brand: ALEXIS WOUND PROTECTOR-RETRACTOR